# Patient Record
Sex: MALE | Race: WHITE | Employment: OTHER | URBAN - METROPOLITAN AREA
[De-identification: names, ages, dates, MRNs, and addresses within clinical notes are randomized per-mention and may not be internally consistent; named-entity substitution may affect disease eponyms.]

---

## 2018-04-09 ENCOUNTER — OFFICE VISIT (OUTPATIENT)
Dept: CARDIOLOGY CLINIC | Age: 73
End: 2018-04-09
Payer: COMMERCIAL

## 2018-04-09 VITALS
HEIGHT: 68 IN | BODY MASS INDEX: 29.86 KG/M2 | SYSTOLIC BLOOD PRESSURE: 124 MMHG | RESPIRATION RATE: 12 BRPM | WEIGHT: 197 LBS | DIASTOLIC BLOOD PRESSURE: 76 MMHG | HEART RATE: 62 BPM

## 2018-04-09 DIAGNOSIS — I25.10 CORONARY ARTERY DISEASE INVOLVING NATIVE CORONARY ARTERY OF NATIVE HEART WITHOUT ANGINA PECTORIS: Primary | ICD-10-CM

## 2018-04-09 DIAGNOSIS — Z87.2 H/O PSORIATIC ARTHRITIS: ICD-10-CM

## 2018-04-09 DIAGNOSIS — I25.2 MI, OLD: ICD-10-CM

## 2018-04-09 DIAGNOSIS — E78.00 PURE HYPERCHOLESTEROLEMIA: ICD-10-CM

## 2018-04-09 DIAGNOSIS — Z95.5 STENTED CORONARY ARTERY: ICD-10-CM

## 2018-04-09 PROCEDURE — 99213 OFFICE O/P EST LOW 20 MIN: CPT | Performed by: INTERNAL MEDICINE

## 2018-04-09 PROCEDURE — 93000 ELECTROCARDIOGRAM COMPLETE: CPT | Performed by: INTERNAL MEDICINE

## 2018-04-09 RX ORDER — HYDROXYCHLOROQUINE SULFATE 200 MG/1
200 TABLET, FILM COATED ORAL 2 TIMES DAILY
COMMUNITY

## 2018-05-31 ENCOUNTER — HOSPITAL ENCOUNTER (OUTPATIENT)
Dept: SLEEP CENTER | Age: 73
Discharge: HOME | End: 2018-05-31
Payer: COMMERCIAL

## 2018-05-31 PROCEDURE — G0399 HOME SLEEP TEST/TYPE 3 PORTA: HCPCS

## 2018-07-10 ENCOUNTER — HOSPITAL ENCOUNTER (OUTPATIENT)
Age: 73
Discharge: HOME | End: 2018-07-12
Payer: COMMERCIAL

## 2018-07-10 ENCOUNTER — HOSPITAL ENCOUNTER (OUTPATIENT)
Dept: GENERAL RADIOLOGY | Age: 73
Discharge: HOME | End: 2018-07-12
Payer: COMMERCIAL

## 2018-07-10 DIAGNOSIS — R05.9 COUGH: ICD-10-CM

## 2018-07-10 PROCEDURE — 71046 X-RAY EXAM CHEST 2 VIEWS: CPT

## 2018-07-12 RX ORDER — LOSARTAN POTASSIUM 25 MG/1
TABLET ORAL
Qty: 90 TABLET | Refills: 3 | Status: ON HOLD | OUTPATIENT
Start: 2018-07-12 | End: 2018-10-15 | Stop reason: HOSPADM

## 2018-08-30 ENCOUNTER — HOSPITAL ENCOUNTER (OUTPATIENT)
Dept: ULTRASOUND IMAGING | Age: 73
Discharge: HOME | End: 2018-09-01
Payer: COMMERCIAL

## 2018-08-30 ENCOUNTER — HOSPITAL ENCOUNTER (OUTPATIENT)
Age: 73
Discharge: HOME | End: 2018-09-01
Payer: COMMERCIAL

## 2018-08-30 DIAGNOSIS — R60.9 EDEMA, UNSPECIFIED TYPE: ICD-10-CM

## 2018-08-30 PROCEDURE — 93971 EXTREMITY STUDY: CPT

## 2018-09-26 ENCOUNTER — HOSPITAL ENCOUNTER (OUTPATIENT)
Age: 73
Discharge: HOME | End: 2018-09-28
Payer: COMMERCIAL

## 2018-09-26 ENCOUNTER — HOSPITAL ENCOUNTER (OUTPATIENT)
Dept: GENERAL RADIOLOGY | Age: 73
Discharge: HOME | End: 2018-09-28
Payer: COMMERCIAL

## 2018-09-26 DIAGNOSIS — R05.9 COUGH: ICD-10-CM

## 2018-09-26 PROCEDURE — 71046 X-RAY EXAM CHEST 2 VIEWS: CPT

## 2018-10-08 ENCOUNTER — APPOINTMENT (OUTPATIENT)
Dept: GENERAL RADIOLOGY | Age: 73
DRG: 377 | End: 2018-10-08
Payer: COMMERCIAL

## 2018-10-08 ENCOUNTER — HOSPITAL ENCOUNTER (INPATIENT)
Age: 73
LOS: 7 days | Discharge: HOME HEALTH | DRG: 377 | End: 2018-10-15
Attending: EMERGENCY MEDICINE | Admitting: SURGERY
Payer: COMMERCIAL

## 2018-10-08 DIAGNOSIS — R57.1 HYPOVOLEMIC SHOCK (HCC): ICD-10-CM

## 2018-10-08 DIAGNOSIS — D62 ACUTE BLOOD LOSS ANEMIA: ICD-10-CM

## 2018-10-08 DIAGNOSIS — K92.2 ACUTE UPPER GI BLEED: Primary | ICD-10-CM

## 2018-10-08 LAB
ABO/RH: NORMAL
ALBUMIN SERPL-MCNC: 3.1 G/DL (ref 3.5–5.2)
ALP BLD-CCNC: 75 U/L (ref 40–129)
ALT SERPL-CCNC: 18 U/L (ref 0–40)
ANION GAP SERPL CALCULATED.3IONS-SCNC: 12 MMOL/L (ref 7–16)
ANTIBODY SCREEN: NORMAL
APTT: 25.8 SEC (ref 24.5–35.1)
AST SERPL-CCNC: 23 U/L (ref 0–39)
BASOPHILS ABSOLUTE: 0.1 E9/L (ref 0–0.2)
BASOPHILS RELATIVE PERCENT: 0.5 % (ref 0–2)
BILIRUB SERPL-MCNC: 0.6 MG/DL (ref 0–1.2)
BLOOD BANK DISPENSE STATUS: NORMAL
BLOOD BANK PRODUCT CODE: NORMAL
BPU ID: NORMAL
BUN BLDV-MCNC: 11 MG/DL (ref 8–23)
CALCIUM SERPL-MCNC: 8.6 MG/DL (ref 8.6–10.2)
CHLORIDE BLD-SCNC: 102 MMOL/L (ref 98–107)
CO2: 21 MMOL/L (ref 22–29)
CREAT SERPL-MCNC: 0.8 MG/DL (ref 0.7–1.2)
DESCRIPTION BLOOD BANK: NORMAL
EKG ATRIAL RATE: 78 BPM
EKG P AXIS: 27 DEGREES
EKG P-R INTERVAL: 158 MS
EKG Q-T INTERVAL: 448 MS
EKG QRS DURATION: 94 MS
EKG QTC CALCULATION (BAZETT): 510 MS
EKG R AXIS: 6 DEGREES
EKG T AXIS: 6 DEGREES
EKG VENTRICULAR RATE: 78 BPM
EOSINOPHILS ABSOLUTE: 0.1 E9/L (ref 0.05–0.5)
EOSINOPHILS RELATIVE PERCENT: 0.5 % (ref 0–6)
GFR AFRICAN AMERICAN: >60
GFR NON-AFRICAN AMERICAN: >60 ML/MIN/1.73
GLUCOSE BLD-MCNC: 129 MG/DL (ref 74–109)
HCT VFR BLD CALC: 28.5 % (ref 37–54)
HEMOGLOBIN: 15 G/DL (ref 12.5–16.5)
HEMOGLOBIN: 9.6 G/DL (ref 12.5–16.5)
IMMATURE GRANULOCYTES #: 0.22 E9/L
IMMATURE GRANULOCYTES %: 1.2 % (ref 0–5)
INR BLD: 1.2
LYMPHOCYTES ABSOLUTE: 1.63 E9/L (ref 1.5–4)
LYMPHOCYTES RELATIVE PERCENT: 8.9 % (ref 20–42)
MCH RBC QN AUTO: 28.2 PG (ref 26–35)
MCHC RBC AUTO-ENTMCNC: 33.7 % (ref 32–34.5)
MCV RBC AUTO: 83.8 FL (ref 80–99.9)
MONOCYTES ABSOLUTE: 1.4 E9/L (ref 0.1–0.95)
MONOCYTES RELATIVE PERCENT: 7.6 % (ref 2–12)
NEUTROPHILS ABSOLUTE: 14.96 E9/L (ref 1.8–7.3)
NEUTROPHILS RELATIVE PERCENT: 81.3 % (ref 43–80)
PDW BLD-RTO: 19.2 FL (ref 11.5–15)
PLATELET # BLD: 378 E9/L (ref 130–450)
PMV BLD AUTO: 10 FL (ref 7–12)
POTASSIUM SERPL-SCNC: 4.6 MMOL/L (ref 3.5–5)
PROTHROMBIN TIME: 13.4 SEC (ref 9.3–12.4)
RBC # BLD: 3.4 E12/L (ref 3.8–5.8)
SODIUM BLD-SCNC: 135 MMOL/L (ref 132–146)
TOTAL PROTEIN: 5.8 G/DL (ref 6.4–8.3)
TROPONIN: 0.07 NG/ML (ref 0–0.03)
TROPONIN: <0.01 NG/ML (ref 0–0.03)
WBC # BLD: 18.4 E9/L (ref 4.5–11.5)

## 2018-10-08 PROCEDURE — 2000000000 HC ICU R&B

## 2018-10-08 PROCEDURE — 71045 X-RAY EXAM CHEST 1 VIEW: CPT

## 2018-10-08 PROCEDURE — 86900 BLOOD TYPING SEROLOGIC ABO: CPT

## 2018-10-08 PROCEDURE — P9040 RBC LEUKOREDUCED IRRADIATED: HCPCS

## 2018-10-08 PROCEDURE — P9059 PLASMA, FRZ BETWEEN 8-24HOUR: HCPCS

## 2018-10-08 PROCEDURE — 93005 ELECTROCARDIOGRAM TRACING: CPT | Performed by: EMERGENCY MEDICINE

## 2018-10-08 PROCEDURE — 3E0G8GC INTRODUCTION OF OTHER THERAPEUTIC SUBSTANCE INTO UPPER GI, VIA NATURAL OR ARTIFICIAL OPENING ENDOSCOPIC: ICD-10-PCS | Performed by: SURGERY

## 2018-10-08 PROCEDURE — 86923 COMPATIBILITY TEST ELECTRIC: CPT

## 2018-10-08 PROCEDURE — 86850 RBC ANTIBODY SCREEN: CPT

## 2018-10-08 PROCEDURE — 31500 INSERT EMERGENCY AIRWAY: CPT

## 2018-10-08 PROCEDURE — 0W3P8ZZ CONTROL BLEEDING IN GASTROINTESTINAL TRACT, VIA NATURAL OR ARTIFICIAL OPENING ENDOSCOPIC: ICD-10-PCS | Performed by: SURGERY

## 2018-10-08 PROCEDURE — P9016 RBC LEUKOCYTES REDUCED: HCPCS

## 2018-10-08 PROCEDURE — 96365 THER/PROPH/DIAG IV INF INIT: CPT

## 2018-10-08 PROCEDURE — 6360000002 HC RX W HCPCS: Performed by: STUDENT IN AN ORGANIZED HEALTH CARE EDUCATION/TRAINING PROGRAM

## 2018-10-08 PROCEDURE — 85730 THROMBOPLASTIN TIME PARTIAL: CPT

## 2018-10-08 PROCEDURE — 36415 COLL VENOUS BLD VENIPUNCTURE: CPT

## 2018-10-08 PROCEDURE — 36592 COLLECT BLOOD FROM PICC: CPT

## 2018-10-08 PROCEDURE — 2500000003 HC RX 250 WO HCPCS: Performed by: STUDENT IN AN ORGANIZED HEALTH CARE EDUCATION/TRAINING PROGRAM

## 2018-10-08 PROCEDURE — 84484 ASSAY OF TROPONIN QUANT: CPT

## 2018-10-08 PROCEDURE — 6370000000 HC RX 637 (ALT 250 FOR IP): Performed by: STUDENT IN AN ORGANIZED HEALTH CARE EDUCATION/TRAINING PROGRAM

## 2018-10-08 PROCEDURE — 85018 HEMOGLOBIN: CPT

## 2018-10-08 PROCEDURE — P9035 PLATELET PHERES LEUKOREDUCED: HCPCS

## 2018-10-08 PROCEDURE — 2709999900 HC NON-CHARGEABLE SUPPLY: Performed by: SURGERY

## 2018-10-08 PROCEDURE — 02HV33Z INSERTION OF INFUSION DEVICE INTO SUPERIOR VENA CAVA, PERCUTANEOUS APPROACH: ICD-10-PCS | Performed by: EMERGENCY MEDICINE

## 2018-10-08 PROCEDURE — 0BH17EZ INSERTION OF ENDOTRACHEAL AIRWAY INTO TRACHEA, VIA NATURAL OR ARTIFICIAL OPENING: ICD-10-PCS | Performed by: EMERGENCY MEDICINE

## 2018-10-08 PROCEDURE — 2580000003 HC RX 258: Performed by: EMERGENCY MEDICINE

## 2018-10-08 PROCEDURE — 85025 COMPLETE CBC W/AUTO DIFF WBC: CPT

## 2018-10-08 PROCEDURE — 2580000003 HC RX 258: Performed by: INTERNAL MEDICINE

## 2018-10-08 PROCEDURE — 94761 N-INVAS EAR/PLS OXIMETRY MLT: CPT

## 2018-10-08 PROCEDURE — 36430 TRANSFUSION BLD/BLD COMPNT: CPT

## 2018-10-08 PROCEDURE — 99291 CRITICAL CARE FIRST HOUR: CPT

## 2018-10-08 PROCEDURE — 2580000003 HC RX 258: Performed by: STUDENT IN AN ORGANIZED HEALTH CARE EDUCATION/TRAINING PROGRAM

## 2018-10-08 PROCEDURE — 6360000002 HC RX W HCPCS: Performed by: EMERGENCY MEDICINE

## 2018-10-08 PROCEDURE — 5A1945Z RESPIRATORY VENTILATION, 24-96 CONSECUTIVE HOURS: ICD-10-PCS | Performed by: EMERGENCY MEDICINE

## 2018-10-08 PROCEDURE — 86920 COMPATIBILITY TEST SPIN: CPT

## 2018-10-08 PROCEDURE — 2720000010 HC SURG SUPPLY STERILE: Performed by: SURGERY

## 2018-10-08 PROCEDURE — 3609013000 HC EGD TRANSORAL CONTROL BLEEDING ANY METHOD: Performed by: SURGERY

## 2018-10-08 PROCEDURE — C9113 INJ PANTOPRAZOLE SODIUM, VIA: HCPCS | Performed by: EMERGENCY MEDICINE

## 2018-10-08 PROCEDURE — 86901 BLOOD TYPING SEROLOGIC RH(D): CPT

## 2018-10-08 PROCEDURE — 85610 PROTHROMBIN TIME: CPT

## 2018-10-08 PROCEDURE — 80053 COMPREHEN METABOLIC PANEL: CPT

## 2018-10-08 PROCEDURE — 36620 INSERTION CATHETER ARTERY: CPT

## 2018-10-08 PROCEDURE — 30243R1 TRANSFUSION OF NONAUTOLOGOUS PLATELETS INTO CENTRAL VEIN, PERCUTANEOUS APPROACH: ICD-10-PCS | Performed by: EMERGENCY MEDICINE

## 2018-10-08 PROCEDURE — 30243K1 TRANSFUSION OF NONAUTOLOGOUS FROZEN PLASMA INTO CENTRAL VEIN, PERCUTANEOUS APPROACH: ICD-10-PCS | Performed by: EMERGENCY MEDICINE

## 2018-10-08 PROCEDURE — 94002 VENT MGMT INPAT INIT DAY: CPT

## 2018-10-08 PROCEDURE — 96375 TX/PRO/DX INJ NEW DRUG ADDON: CPT

## 2018-10-08 DEVICE — CLIPPING DEVICE
Type: IMPLANTABLE DEVICE | Status: FUNCTIONAL
Brand: RESOLUTION CLIP

## 2018-10-08 RX ORDER — SODIUM CHLORIDE 9 MG/ML
INJECTION, SOLUTION INTRAVENOUS CONTINUOUS
Status: DISCONTINUED | OUTPATIENT
Start: 2018-10-08 | End: 2018-10-10

## 2018-10-08 RX ORDER — ONDANSETRON 2 MG/ML
4 INJECTION INTRAMUSCULAR; INTRAVENOUS ONCE
Status: COMPLETED | OUTPATIENT
Start: 2018-10-08 | End: 2018-10-08

## 2018-10-08 RX ORDER — ROCURONIUM BROMIDE 10 MG/ML
80 INJECTION, SOLUTION INTRAVENOUS ONCE
Status: COMPLETED | OUTPATIENT
Start: 2018-10-08 | End: 2018-10-08

## 2018-10-08 RX ORDER — FOLIC ACID 1 MG/1
1 TABLET ORAL EVERY MORNING
COMMUNITY

## 2018-10-08 RX ORDER — 0.9 % SODIUM CHLORIDE 0.9 %
250 INTRAVENOUS SOLUTION INTRAVENOUS ONCE
Status: COMPLETED | OUTPATIENT
Start: 2018-10-08 | End: 2018-10-08

## 2018-10-08 RX ORDER — CHOLECALCIFEROL (VITAMIN D3) 125 MCG
1 CAPSULE ORAL EVERY MORNING
Status: ON HOLD | COMMUNITY
End: 2018-10-15 | Stop reason: HOSPADM

## 2018-10-08 RX ORDER — OCTREOTIDE ACETATE 50 UG/ML
50 INJECTION, SOLUTION INTRAVENOUS; SUBCUTANEOUS ONCE
Status: COMPLETED | OUTPATIENT
Start: 2018-10-08 | End: 2018-10-08

## 2018-10-08 RX ORDER — ANTACID TABLETS 648 MG/1
1 TABLET, CHEWABLE ORAL 2 TIMES DAILY
Status: ON HOLD | COMMUNITY
End: 2018-10-15 | Stop reason: HOSPADM

## 2018-10-08 RX ORDER — ONDANSETRON 2 MG/ML
INJECTION INTRAMUSCULAR; INTRAVENOUS
Status: DISPENSED
Start: 2018-10-08 | End: 2018-10-08

## 2018-10-08 RX ORDER — CHLORHEXIDINE GLUCONATE 0.12 MG/ML
15 RINSE ORAL 2 TIMES DAILY
Status: DISCONTINUED | OUTPATIENT
Start: 2018-10-08 | End: 2018-10-10

## 2018-10-08 RX ORDER — ETOMIDATE 2 MG/ML
INJECTION INTRAVENOUS
Status: DISPENSED
Start: 2018-10-08 | End: 2018-10-09

## 2018-10-08 RX ORDER — PROPOFOL 10 MG/ML
10 INJECTION, EMULSION INTRAVENOUS
Status: DISCONTINUED | OUTPATIENT
Start: 2018-10-08 | End: 2018-10-10

## 2018-10-08 RX ORDER — 0.9 % SODIUM CHLORIDE 0.9 %
1000 INTRAVENOUS SOLUTION INTRAVENOUS ONCE
Status: COMPLETED | OUTPATIENT
Start: 2018-10-08 | End: 2018-10-08

## 2018-10-08 RX ORDER — ETOMIDATE 2 MG/ML
20 INJECTION INTRAVENOUS ONCE
Status: COMPLETED | OUTPATIENT
Start: 2018-10-08 | End: 2018-10-08

## 2018-10-08 RX ORDER — SODIUM CHLORIDE 0.9 % (FLUSH) 0.9 %
10 SYRINGE (ML) INJECTION PRN
Status: DISCONTINUED | OUTPATIENT
Start: 2018-10-08 | End: 2018-10-15 | Stop reason: HOSPADM

## 2018-10-08 RX ORDER — ROCURONIUM BROMIDE 10 MG/ML
INJECTION, SOLUTION INTRAVENOUS
Status: DISPENSED
Start: 2018-10-08 | End: 2018-10-09

## 2018-10-08 RX ORDER — DOXYCYCLINE HYCLATE 100 MG
1 TABLET ORAL 2 TIMES DAILY
Status: ON HOLD | COMMUNITY
Start: 2018-09-27 | End: 2018-10-15 | Stop reason: HOSPADM

## 2018-10-08 RX ORDER — 0.9 % SODIUM CHLORIDE 0.9 %
1000 INTRAVENOUS SOLUTION INTRAVENOUS ONCE
Status: COMPLETED | OUTPATIENT
Start: 2018-10-08 | End: 2018-10-09

## 2018-10-08 RX ORDER — 0.9 % SODIUM CHLORIDE 0.9 %
250 INTRAVENOUS SOLUTION INTRAVENOUS ONCE
Status: COMPLETED | OUTPATIENT
Start: 2018-10-08 | End: 2018-10-09

## 2018-10-08 RX ADMIN — PROPOFOL 10 MCG/KG/MIN: 10 INJECTION, EMULSION INTRAVENOUS at 17:08

## 2018-10-08 RX ADMIN — SODIUM CHLORIDE 1000 ML: 9 INJECTION, SOLUTION INTRAVENOUS at 09:02

## 2018-10-08 RX ADMIN — CHLORHEXIDINE GLUCONATE 0.12% ORAL RINSE 15 ML: 1.2 LIQUID ORAL at 21:13

## 2018-10-08 RX ADMIN — ROCURONIUM BROMIDE 80 MG: 50 INJECTION, SOLUTION INTRAVENOUS at 14:58

## 2018-10-08 RX ADMIN — SODIUM CHLORIDE 1000 ML: 9 INJECTION, SOLUTION INTRAVENOUS at 22:58

## 2018-10-08 RX ADMIN — SODIUM CHLORIDE 250 ML: 9 INJECTION, SOLUTION INTRAVENOUS at 14:50

## 2018-10-08 RX ADMIN — PROPOFOL 25 MCG/KG/MIN: 10 INJECTION, EMULSION INTRAVENOUS at 22:47

## 2018-10-08 RX ADMIN — ONDANSETRON 4 MG: 2 INJECTION INTRAMUSCULAR; INTRAVENOUS at 10:39

## 2018-10-08 RX ADMIN — PANTOPRAZOLE SODIUM 8 MG/HR: 40 INJECTION, POWDER, FOR SOLUTION INTRAVENOUS at 18:01

## 2018-10-08 RX ADMIN — OCTREOTIDE ACETATE 25 MCG/HR: 500 INJECTION, SOLUTION INTRAVENOUS; SUBCUTANEOUS at 17:19

## 2018-10-08 RX ADMIN — PANTOPRAZOLE SODIUM 8 MG/HR: 40 INJECTION, POWDER, FOR SOLUTION INTRAVENOUS at 09:02

## 2018-10-08 RX ADMIN — CHLORHEXIDINE GLUCONATE 0.12% ORAL RINSE 15 ML: 1.2 LIQUID ORAL at 18:03

## 2018-10-08 RX ADMIN — SODIUM CHLORIDE 80 MG: 9 INJECTION, SOLUTION INTRAVENOUS at 08:26

## 2018-10-08 RX ADMIN — SODIUM CHLORIDE 100 ML/HR: 9 INJECTION, SOLUTION INTRAVENOUS at 21:23

## 2018-10-08 RX ADMIN — SODIUM CHLORIDE 250 ML: 9 INJECTION, SOLUTION INTRAVENOUS at 19:48

## 2018-10-08 RX ADMIN — ETOMIDATE 20 MG: 2 INJECTION INTRAVENOUS at 14:48

## 2018-10-08 RX ADMIN — Medication 25 MCG/HR: at 18:10

## 2018-10-08 RX ADMIN — OCTREOTIDE ACETATE 50 MCG: 50 INJECTION, SOLUTION INTRAVENOUS; SUBCUTANEOUS at 17:12

## 2018-10-08 ASSESSMENT — ENCOUNTER SYMPTOMS
BACK PAIN: 0
CHEST TIGHTNESS: 0
DIARRHEA: 0
NAUSEA: 0
COUGH: 0
COLOR CHANGE: 0
RECTAL PAIN: 0
EYE PAIN: 0
SHORTNESS OF BREATH: 0
RHINORRHEA: 0
ABDOMINAL PAIN: 0
VOMITING: 1
BLOOD IN STOOL: 1
SORE THROAT: 0

## 2018-10-08 ASSESSMENT — PULMONARY FUNCTION TESTS
PIF_VALUE: 21
PIF_VALUE: 19
PIF_VALUE: 20
PIF_VALUE: 21

## 2018-10-08 ASSESSMENT — PAIN SCALES - GENERAL
PAINLEVEL_OUTOF10: 7
PAINLEVEL_OUTOF10: 0

## 2018-10-08 NOTE — PROGRESS NOTES
Epi injection .1mg/1ml x 10cc to bleeding site gastric pouch by Dr. Neva Hamilton. Edlich tube used for irrigation. Resolution clip X2 to bleeding site gastric pouch.

## 2018-10-08 NOTE — H&P
History and Physical      CHIEF COMPLAINT:  GI bleed      HISTORY OF PRESENT ILLNESS:      The patient is a 68 y.o. male patient of Dr Pauly Correa who presents with GI bleed from home. He will call possibly 4 AM this morning at which time he noticed that he was having a frankly bloody bowel movement. He took Pepcid by mouth after which he began to vomit bright red blood. He began to develop dizziness and lightheadedness with standing. Does take aspirin 81 mg daily but is not on any other anticoagulants. He has not had any recent endoscopy and denies any previous history of intestinal bleeding. He denies use of nonsteroidals. He does have history of coronary disease and has had previous intestinal bypass surgery. He denies any chest pain or shortness of breath. He denies any fever or chills. He admits to vague generalized upper abdominal discomfort. His gastric bypass was performed including an approximately 14 years ago.     Past Medical History:    Past Medical History:   Diagnosis Date    CAD (coronary artery disease)     s/p stent RCA    Hypertension     Left ventricular systolic dysfunction     MI (myocardial infarction) ([facility] [address] 1997    Mitral regurgitation     Obesity, morbid ([address]     s/p intestinal bypass    Sleep apnea     Valvular heart disease        Past Surgical History:    Past Surgical History:   Procedure Laterality Date    APPENDECTOMY      BACK SURGERY      CARPAL TUNNEL RELEASE Bilateral     CORONARY ANGIOPLASTY  7/7/97    PTCA/stent of the RCA     DIAGNOSTIC CARDIAC CATH LAB PROCEDURE  2/24/97    DIAGNOSTIC CARDIAC CATH LAB PROCEDURE  7/7/97    EYE SURGERY Bilateral     cataract removal w lense implants    FRACTURE SURGERY      Clayton in left femur    HIP SURGERY  2014    fx, clayton placed    INTESTINAL BYPASS      JOINT REPLACEMENT      TONSILLECTOMY         Medications Prior to Admission:    Prescriptions Prior to Admission: methotrexate (RHEUMATREX) 2.5 MG chemo tablet, Take 10 mg by [id number]    Dispense Status Blood Bank selected   PREPARE RBC (CROSSMATCH) Number of Units: 6, 6 Units [[id number]] Collected: 10/08/18 0750   Updated: 10/08/18 1622    Specimen Source: Blood     Product Code Blood Bank [id number]    Description Blood Bank Red Blood Cells, Irradiated, Leuko-reduced    Unit Number [id number]    Dispense Status Blood Bank released    Product Code Blood Bank N1839M21    Description Blood Bank Red Blood Cells, Irradiated, Leuko-reduced    Unit Number [id number]    Dispense Status Blood Bank issued    Product Code Blood Bank Z1282K98    Description Blood Bank Red Blood Cells, Leuko-reduced    Unit Number [id number]    Dispense Status Blood Bank issued    Product Code Blood Bank N8851T12    Description Blood Bank Red Blood Cells, Leuko-reduced    Unit Number [id number]    Dispense Status Blood Bank issued    Product Code Blood Bank M9777W86    Description Blood Bank Red Blood Cells, Apheresis, Leuko-reduced    Unit Number [id number]    Dispense Status Blood Bank selected    Product Code Blood Bank G4297U77    Description Blood Bank Red Blood Cells, Apheresis, Leuko-reduced    Unit Number [id number]    Dispense Status Blood Bank selected    Product Code Blood Bank V0225C95    Description Blood Bank Red Blood Cells, Apheresis, Leuko-reduced    Unit Number [id number]    Dispense Status Blood Bank selected    Product Code Blood Bank G9158F57    Description Blood Bank Red Blood Cells, Leuko-reduced    Unit Number [id number]    Dispense Status Blood Bank selected    Product Code Blood Bank C8905B07    Description Blood Bank Red Blood Cells, Apheresis, Leuko-reduced    Unit Number [id number]    Dispense Status Blood Bank issued    Product Code Blood Bank P3666U70    Description Blood Bank Red Blood Cells, Apheresis, Leuko-reduced    Unit Number [id number]    [facility] Status Blood Bank released   PREPARE FRESH FROZEN PLASMA Number of Units: 4 [[id number]] Collected: 10/08/18 0750   Updated: 10/08/18 1433    Specimen Type: Blood    Specimen Source: Blood     Product Code Blood Bank G1360K83    Description Blood Bank Plasma, 5 Day, Thawed    Unit Number [id number]    Dispense Status Blood Bank issued    Product Code Blood Bank K7355Q51    Description Blood Bank Plasma, 5 Day, Thawed    Unit Number [id number]    Dispense Status Blood Bank issued    Product Code Blood Bank V5148B96    Description Blood Bank Plasma, 5 Day, Thawed    Unit Number [id number]    Dispense Status Blood Bank issued   Transfuse RBC [[id number]] Resulted: 10/08/18 1315   Updated: 10/08/18 1315    Transfuse RBC [[id number]] Resulted: 10/08/18 1310   Updated: 10/08/18 1310    Transfuse RBC [[id number]] Resulted: 10/08/18 1307   Updated: 10/08/18 1307    PREPARE RBC (CROSSMATCH) Number of Units: 2, 4 Units [[id number]] Collected: 10/08/18 0750   Updated: 10/08/18 1301    Specimen Source: Blood     Product Code Blood Bank I5599A20    Description Blood Bank Red Blood Cells, Leuko-reduced    Unit Number [id number]    Dispense Status Blood Bank issued    Product Code Blood Bank R2468Z43    Description Blood Bank Red Blood Cells, Irradiated, Leuko-reduced    Unit Number [id number]    Dispense Status Blood Bank issued    Product Code Blood Bank T1745N92    Description Blood Bank Red Blood Cells, Leuko-reduced    Unit Number [id number]    Dispense Status Blood Bank issued    Product Code Blood Bank S5356K89    Description Blood Bank Red Blood Cells, Leuko-reduced    Unit Number [id number]    Dispense Status Blood Bank issued    Product Code Blood Bank I7881S47    Description Blood Bank Red Blood Cells, Leuko-reduced    Unit Number [id number]    Dispense Status Blood Bank issued    Product Code Blood Bank R3775R45    Description Blood Bank Red Blood Cells, Leuko-reduced    Unit Number [id number]    Dispense Status Blood Bank issued    Product Code Blood Bank X5899B36    Description Blood Bank Red

## 2018-10-08 NOTE — CONSULTS
GENERAL SURGERY  CONSULT NOTE  10/8/2018    Physician Consulted: Dr. Chapo Lazaro  Reason for Consult: GIB      HPI  Allison Mratinez is a 68 y.o. male w/ a Hx of gastric bypass who presents for evaluation of 1 day of hematemesis and hematochezia. He states he had a gastric bypass done at [address] [place] [address] about 14 years ago. He takes 81mg ASA daily. He denies any Hx of GIB. States his last Colonoscopy was 2 years ago w/ Dr. Migel Colon and was nml. He denies any prior EGD. He denies any Hx of acid reflux or GERD. Past Medical History:   Diagnosis Date    CAD (coronary artery disease)     s/p stent RCA    Hypertension     Left ventricular systolic dysfunction     MI (myocardial infarction) ([facility] [address] 1997    Mitral regurgitation     Obesity, morbid ([facility] [address]     s/p intestinal bypass    Sleep apnea     Valvular heart disease        Past Surgical History:   Procedure Laterality Date    APPENDECTOMY      BACK SURGERY      CARPAL TUNNEL RELEASE Bilateral     CORONARY ANGIOPLASTY  7/7/97    PTCA/stent of the RCA     DIAGNOSTIC CARDIAC CATH LAB PROCEDURE  2/24/97    DIAGNOSTIC CARDIAC CATH LAB PROCEDURE  7/7/97    EYE SURGERY Bilateral     cataract removal w lense implants    FRACTURE SURGERY      Clayton in left femur    HIP SURGERY  2014    fx, clayton placed    INTESTINAL BYPASS      TONSILLECTOMY         Medications Prior to Admission:    Prior to Admission medications    Medication Sig Start Date End Date Taking?  Authorizing Provider   methotrexate (RHEUMATREX) 2.5 MG chemo tablet Take 10 mg by mouth once a week    Yes Historical Provider, MD   folic acid (FOLVITE) 1 MG tablet Take 1 mg by mouth every morning    Yes Historical Provider, MD   doxycycline hyclate (VIBRA-TABS) 100 MG tablet Take 1 tablet by mouth 2 times daily 9/27/18 10/11/18 Yes Historical Provider, MD   calcium carbonate 648 MG TABS Take 1 tablet by mouth 2 times daily   Yes Historical Provider, MD   Omega-3 Fatty Acids (FISH OIL TRIPLE STRENGTH) 1360 MG CAPS Take Father     Other Sister         back problems    Heart Disease Brother     Other Sister         MS    Other Brother         aneurysm       Social History   Substance Use Topics    Smoking status: Former Smoker     Packs/day: 0.50     Types: Cigarettes     Start date: 10/8/1972     Quit date: 1/1/2001    Smokeless tobacco: Never Used    Alcohol use 16.8 oz/week     28 Glasses of wine per week         Review of Systems   General ROS: fever (-), chills (-)  Respiratory ROS: SOB (-)  Cardiovascular ROS: CP (-)  Gastrointestinal ROS: Nausea (-), vomiting (-), diarrhea (-), constipation (-), melena (-), hematochezia (+)        PHYSICAL EXAM:    Vitals:    10/08/18 1138   BP: (!) 79/46   Pulse: 96   Resp: 20   Temp: 97.6 °F (36.4 °C)   SpO2:        General Appearance:  Alert, awake, cooperative, lying in bed   Lungs:  Normal work of breathing   Heart:  Regular rate   Abdomen:  Soft, no tenderness, non distended. NGT present with minimal bloody output      LABS:  CBC  Recent Labs      10/08/18   0807   WBC  18.4*   HGB  9.6*   HCT  28.5*   PLT  378     BMP  Recent Labs      10/08/18   0807   NA  135   K  4.6   CL  102   CO2  21*   BUN  11   CREATININE  0.8   CALCIUM  8.6     Liver Function  Recent Labs      10/08/18   0807   BILITOT  0.6   AST  23   ALT  18   ALKPHOS  75   PROT  5.8*   LABALBU  3.1*     No results for input(s): LACTATE in the last 72 hours. Recent Labs      10/08/18   0807   INR  1.2       RADIOLOGY  No results found.       ASSESSMENT:  68 y.o. male with GIB likely a marginal ulcer    PLAN:  PPI drip  NPO  IVF  Monitor Hg  Transfuse as needed  Plan for bedside EGD today    Electronically signed by Rg Torres MD on 10/8/18 at 11:54 AM

## 2018-10-08 NOTE — PROCEDURES
Sidneyhaleigh Carter      DATE OF PROCEDURE: 10/8/2018    SURGEON: Dr. Taylor Pean:    EGD with control of hemorrhage        Physician Signature: Electronically signed by Dr. Jude Olivera M.D.     Full dictation to follow  Findings: complicated marginal ulcer with active hemorrhage  Hemostasis with epinephrine and clips  JSGaJenelle

## 2018-10-08 NOTE — PROGRESS NOTES
Critical Care Team: Daily Progress Note         Date and time: 10/8/2018 4:22 PM    Patient's name:  Francia Pereyra    Medical Record Number: [id number]    Patient's account/billing number: [de-identified]    Patient's YOB: 1945    Age: 68 y.o. Date of Admission: 10/8/2018  7:29 AM    Length of stay during current admission: 0    Primary Care Physician: Leif Johnson MD    Previous Pulmonary: Shazia Villagomez (last seen 7/2018 for BETTIE)    Code Status: No Order    PMH:    Past Medical History:   Diagnosis Date    CAD (coronary artery disease)     s/p stent RCA    Hypertension     Left ventricular systolic dysfunction     MI (myocardial infarction) ([facility] [address] 1997    Mitral regurgitation     Obesity, morbid ([address]     s/p intestinal bypass    Sleep apnea     Valvular heart disease        PSH:   Past Surgical History:   Procedure Laterality Date    APPENDECTOMY      BACK SURGERY      CARPAL TUNNEL RELEASE Bilateral     CORONARY ANGIOPLASTY  7/7/97    PTCA/stent of the RCA     DIAGNOSTIC CARDIAC CATH LAB PROCEDURE  2/24/97    DIAGNOSTIC CARDIAC CATH LAB PROCEDURE  7/7/97    EYE SURGERY Bilateral     cataract removal w lense implants    FRACTURE SURGERY      Clayton in left femur    HIP SURGERY  2014    fx, clayton placed    INTESTINAL BYPASS      JOINT REPLACEMENT      TONSILLECTOMY         Reason for ICU admission:   1. Upper GI bleed      Subjective:     Events in the past 24 Hours:   3 81-year-old white male post gastric bypass surgery presented to the emergency room with hemoptysis. Intubated and ventilated for airway protection, surgical consultation was obtained urgently. No arterial line was placed. The patient was directly admitted to the ICU. Fresh frozen plasma and blood products are being transfused. In the ICU, endoscopy revealed acute blood loss occurring just distal to the stomach prior to the bypass.  Currently, the patient's been taken downstairs for

## 2018-10-09 ENCOUNTER — APPOINTMENT (OUTPATIENT)
Dept: GENERAL RADIOLOGY | Age: 73
DRG: 377 | End: 2018-10-09
Payer: COMMERCIAL

## 2018-10-09 LAB
AADO2: 157 MMHG
ALBUMIN SERPL-MCNC: 2.2 G/DL (ref 3.5–5.2)
ALP BLD-CCNC: 42 U/L (ref 40–129)
ALT SERPL-CCNC: 48 U/L (ref 0–40)
ANION GAP SERPL CALCULATED.3IONS-SCNC: 8 MMOL/L (ref 7–16)
ANISOCYTOSIS: ABNORMAL
AST SERPL-CCNC: 55 U/L (ref 0–39)
B.E.: -5.7 MMOL/L (ref -3–3)
BASOPHILS ABSOLUTE: 0.08 E9/L (ref 0–0.2)
BASOPHILS RELATIVE PERCENT: 0.4 % (ref 0–2)
BILIRUB SERPL-MCNC: 0.7 MG/DL (ref 0–1.2)
BLOOD BANK DISPENSE STATUS: NORMAL
BLOOD BANK PRODUCT CODE: NORMAL
BPU ID: NORMAL
BUN BLDV-MCNC: 16 MG/DL (ref 8–23)
BURR CELLS: ABNORMAL
CALCIUM SERPL-MCNC: 6.9 MG/DL (ref 8.6–10.2)
CHLORIDE BLD-SCNC: 111 MMOL/L (ref 98–107)
CO2: 20 MMOL/L (ref 22–29)
COHB: 0.6 % (ref 0–1.5)
CREAT SERPL-MCNC: 1.4 MG/DL (ref 0.7–1.2)
CRITICAL: ABNORMAL
DATE ANALYZED: ABNORMAL
DATE OF COLLECTION: ABNORMAL
DESCRIPTION BLOOD BANK: NORMAL
EOSINOPHILS ABSOLUTE: 0.04 E9/L (ref 0.05–0.5)
EOSINOPHILS RELATIVE PERCENT: 0.2 % (ref 0–6)
FIO2: 50 %
GFR AFRICAN AMERICAN: >60
GFR NON-AFRICAN AMERICAN: 50 ML/MIN/1.73
GLUCOSE BLD-MCNC: 128 MG/DL (ref 74–109)
HCO3: 19 MMOL/L (ref 22–26)
HCT VFR BLD CALC: 33 % (ref 37–54)
HEMOGLOBIN: 10.3 G/DL (ref 12.5–16.5)
HEMOGLOBIN: 10.5 G/DL (ref 12.5–16.5)
HEMOGLOBIN: 11.3 G/DL (ref 12.5–16.5)
HEMOGLOBIN: 11.6 G/DL (ref 12.5–16.5)
HEMOGLOBIN: 12.1 G/DL (ref 12.5–16.5)
HHB: 1.3 % (ref 0–5)
IMMATURE GRANULOCYTES #: 0.17 E9/L
IMMATURE GRANULOCYTES %: 0.9 % (ref 0–5)
LAB: ABNORMAL
LV EF: 38 %
LVEF MODALITY: NORMAL
LYMPHOCYTES ABSOLUTE: 1.18 E9/L (ref 1.5–4)
LYMPHOCYTES RELATIVE PERCENT: 6.3 % (ref 20–42)
Lab: ABNORMAL
MCH RBC QN AUTO: 30.5 PG (ref 26–35)
MCHC RBC AUTO-ENTMCNC: 34.2 % (ref 32–34.5)
MCV RBC AUTO: 88.9 FL (ref 80–99.9)
METHB: 0.4 % (ref 0–1.5)
MODE: AC
MONOCYTES ABSOLUTE: 2.51 E9/L (ref 0.1–0.95)
MONOCYTES RELATIVE PERCENT: 13.4 % (ref 2–12)
NEUTROPHILS ABSOLUTE: 14.81 E9/L (ref 1.8–7.3)
NEUTROPHILS RELATIVE PERCENT: 78.8 % (ref 43–80)
O2 CONTENT: 17 ML/DL
O2 SATURATION: 98.7 % (ref 92–98.5)
O2HB: 97.7 % (ref 94–97)
OPERATOR ID: ABNORMAL
OVALOCYTES: ABNORMAL
PATIENT TEMP: 37 C
PCO2: 34.5 MMHG (ref 35–45)
PDW BLD-RTO: 15.3 FL (ref 11.5–15)
PEEP/CPAP: 5 CMH2O
PFO2: 2.96 MMHG/%
PH BLOOD GAS: 7.36 (ref 7.35–7.45)
PLATELET # BLD: 97 E9/L (ref 130–450)
PLATELET CONFIRMATION: NORMAL
PMV BLD AUTO: 10.6 FL (ref 7–12)
PO2: 148.2 MMHG (ref 60–100)
POIKILOCYTES: ABNORMAL
POTASSIUM SERPL-SCNC: 5.4 MMOL/L (ref 3.5–5)
RBC # BLD: 3.71 E12/L (ref 3.8–5.8)
RI(T): 106 %
RR MECHANICAL: 14 B/MIN
SODIUM BLD-SCNC: 139 MMOL/L (ref 132–146)
SOURCE, BLOOD GAS: ABNORMAL
THB: 12.2 G/DL (ref 11.5–16.5)
TIME ANALYZED: 547
TOTAL PROTEIN: 3.8 G/DL (ref 6.4–8.3)
TROPONIN: 0.09 NG/ML (ref 0–0.03)
TROPONIN: 0.13 NG/ML (ref 0–0.03)
TROPONIN: 0.14 NG/ML (ref 0–0.03)
VT MECHANICAL: 500 ML
WBC # BLD: 18.8 E9/L (ref 4.5–11.5)

## 2018-10-09 PROCEDURE — C9113 INJ PANTOPRAZOLE SODIUM, VIA: HCPCS | Performed by: EMERGENCY MEDICINE

## 2018-10-09 PROCEDURE — 2500000003 HC RX 250 WO HCPCS: Performed by: STUDENT IN AN ORGANIZED HEALTH CARE EDUCATION/TRAINING PROGRAM

## 2018-10-09 PROCEDURE — 2000000000 HC ICU R&B

## 2018-10-09 PROCEDURE — 94003 VENT MGMT INPAT SUBQ DAY: CPT

## 2018-10-09 PROCEDURE — 86923 COMPATIBILITY TEST ELECTRIC: CPT

## 2018-10-09 PROCEDURE — 82805 BLOOD GASES W/O2 SATURATION: CPT

## 2018-10-09 PROCEDURE — 6360000002 HC RX W HCPCS: Performed by: EMERGENCY MEDICINE

## 2018-10-09 PROCEDURE — 2580000003 HC RX 258: Performed by: INTERNAL MEDICINE

## 2018-10-09 PROCEDURE — 84484 ASSAY OF TROPONIN QUANT: CPT

## 2018-10-09 PROCEDURE — 6370000000 HC RX 637 (ALT 250 FOR IP): Performed by: STUDENT IN AN ORGANIZED HEALTH CARE EDUCATION/TRAINING PROGRAM

## 2018-10-09 PROCEDURE — 2500000003 HC RX 250 WO HCPCS: Performed by: INTERNAL MEDICINE

## 2018-10-09 PROCEDURE — 36415 COLL VENOUS BLD VENIPUNCTURE: CPT

## 2018-10-09 PROCEDURE — 85018 HEMOGLOBIN: CPT

## 2018-10-09 PROCEDURE — 36600 WITHDRAWAL OF ARTERIAL BLOOD: CPT

## 2018-10-09 PROCEDURE — 93306 TTE W/DOPPLER COMPLETE: CPT

## 2018-10-09 PROCEDURE — 87081 CULTURE SCREEN ONLY: CPT

## 2018-10-09 PROCEDURE — 36592 COLLECT BLOOD FROM PICC: CPT

## 2018-10-09 PROCEDURE — 2580000003 HC RX 258: Performed by: EMERGENCY MEDICINE

## 2018-10-09 PROCEDURE — 2580000003 HC RX 258: Performed by: STUDENT IN AN ORGANIZED HEALTH CARE EDUCATION/TRAINING PROGRAM

## 2018-10-09 PROCEDURE — 80053 COMPREHEN METABOLIC PANEL: CPT

## 2018-10-09 PROCEDURE — 85025 COMPLETE CBC W/AUTO DIFF WBC: CPT

## 2018-10-09 PROCEDURE — APPSS180 APP SPLIT SHARED TIME > 60 MINUTES: Performed by: NURSE PRACTITIONER

## 2018-10-09 PROCEDURE — 99223 1ST HOSP IP/OBS HIGH 75: CPT | Performed by: INTERNAL MEDICINE

## 2018-10-09 PROCEDURE — 6360000002 HC RX W HCPCS

## 2018-10-09 PROCEDURE — 71045 X-RAY EXAM CHEST 1 VIEW: CPT

## 2018-10-09 PROCEDURE — 2700000000 HC OXYGEN THERAPY PER DAY

## 2018-10-09 PROCEDURE — 6360000002 HC RX W HCPCS: Performed by: STUDENT IN AN ORGANIZED HEALTH CARE EDUCATION/TRAINING PROGRAM

## 2018-10-09 RX ORDER — METOPROLOL TARTRATE 5 MG/5ML
5 INJECTION INTRAVENOUS EVERY 6 HOURS
Status: DISCONTINUED | OUTPATIENT
Start: 2018-10-09 | End: 2018-10-10

## 2018-10-09 RX ORDER — HYDRALAZINE HYDROCHLORIDE 20 MG/ML
10 INJECTION INTRAMUSCULAR; INTRAVENOUS EVERY 6 HOURS PRN
Status: DISCONTINUED | OUTPATIENT
Start: 2018-10-09 | End: 2018-10-15 | Stop reason: HOSPADM

## 2018-10-09 RX ORDER — HYDRALAZINE HYDROCHLORIDE 20 MG/ML
INJECTION INTRAMUSCULAR; INTRAVENOUS
Status: COMPLETED
Start: 2018-10-09 | End: 2018-10-09

## 2018-10-09 RX ADMIN — PROPOFOL 20 MCG/KG/MIN: 10 INJECTION, EMULSION INTRAVENOUS at 05:49

## 2018-10-09 RX ADMIN — PANTOPRAZOLE SODIUM 8 MG/HR: 40 INJECTION, POWDER, FOR SOLUTION INTRAVENOUS at 03:20

## 2018-10-09 RX ADMIN — METOPROLOL TARTRATE 5 MG: 5 INJECTION, SOLUTION INTRAVENOUS at 22:00

## 2018-10-09 RX ADMIN — PANTOPRAZOLE SODIUM 8 MG/HR: 40 INJECTION, POWDER, FOR SOLUTION INTRAVENOUS at 15:40

## 2018-10-09 RX ADMIN — SODIUM CHLORIDE: 9 INJECTION, SOLUTION INTRAVENOUS at 07:43

## 2018-10-09 RX ADMIN — HYDRALAZINE HYDROCHLORIDE 10 MG: 20 INJECTION INTRAMUSCULAR; INTRAVENOUS at 15:31

## 2018-10-09 RX ADMIN — PANTOPRAZOLE SODIUM 8 MG/HR: 40 INJECTION, POWDER, FOR SOLUTION INTRAVENOUS at 22:00

## 2018-10-09 RX ADMIN — PROPOFOL 20 MCG/KG/MIN: 10 INJECTION, EMULSION INTRAVENOUS at 09:55

## 2018-10-09 RX ADMIN — METOPROLOL TARTRATE 5 MG: 5 INJECTION, SOLUTION INTRAVENOUS at 17:30

## 2018-10-09 RX ADMIN — CHLORHEXIDINE GLUCONATE 0.12% ORAL RINSE 15 ML: 1.2 LIQUID ORAL at 20:47

## 2018-10-09 RX ADMIN — SODIUM CHLORIDE: 9 INJECTION, SOLUTION INTRAVENOUS at 22:00

## 2018-10-09 RX ADMIN — Medication 75 MCG/HR: at 09:55

## 2018-10-09 RX ADMIN — METOPROLOL TARTRATE 5 MG: 5 INJECTION, SOLUTION INTRAVENOUS at 12:48

## 2018-10-09 RX ADMIN — SODIUM CHLORIDE: 9 INJECTION, SOLUTION INTRAVENOUS at 18:17

## 2018-10-09 RX ADMIN — CHLORHEXIDINE GLUCONATE 0.12% ORAL RINSE 15 ML: 1.2 LIQUID ORAL at 09:55

## 2018-10-09 RX ADMIN — Medication 75 MCG/HR: at 02:30

## 2018-10-09 RX ADMIN — OCTREOTIDE ACETATE 25 MCG/HR: 500 INJECTION, SOLUTION INTRAVENOUS; SUBCUTANEOUS at 05:49

## 2018-10-09 ASSESSMENT — PAIN SCALES - GENERAL
PAINLEVEL_OUTOF10: 0

## 2018-10-09 ASSESSMENT — PULMONARY FUNCTION TESTS
PIF_VALUE: 15
PIF_VALUE: 13
PIF_VALUE: 20
PIF_VALUE: 13
PIF_VALUE: 21
PIF_VALUE: 20
PIF_VALUE: 21
PIF_VALUE: 11
PIF_VALUE: 25

## 2018-10-09 NOTE — PLAN OF CARE
Problem: Restraint Use - Nonviolent/Non-Self-Destructive Behavior:  Goal: Absence of restraint indications  Absence of restraint indications   Outcome: Met This Shift      Problem: Falls - Risk of:  Goal: Absence of physical injury  Absence of physical injury   Outcome: Met This Shift      Problem: Risk for Impaired Skin Integrity  Goal: Tissue integrity - skin and mucous membranes  Structural intactness and normal physiological function of skin and  mucous membranes.    Outcome: Met This Shift

## 2018-10-09 NOTE — PROGRESS NOTES
Subjective: Intubated and sedated    Objective:    BP (!) 107/57   Pulse 84   Temp 99.1 °F (37.3 °C) (Axillary)   Resp 20   Ht 5' 7\" (1.702 m)   Wt 214 lb 15.2 oz (97.5 kg)   SpO2 100%   BMI 33.67 kg/m²   Skin: Warm and dry  Neck: Supple, no JVD  Heart:  RRR, no murmurs, gallops, or rubs. Lungs:  Diminished breath sounds bilaterally, no wheeze, rales or rhonchi  Abd: bowel sounds present, nontender, nondistended, no masses  Extrem:  No clubbing, cyanosis, or edema, pulses intact    I/O last 3 completed shifts:   In: 9231 [I.V.:2769; Blood:4228]  Out: 275 [Urine:275]    Laboratory:     CBC with Differential:    Lab Results   Component Value Date    WBC 18.8 10/09/2018    RBC 3.71 10/09/2018    HGB 10.5 10/09/2018    HCT 33.0 10/09/2018    PLT 97 10/09/2018    MCV 88.9 10/09/2018    MCH 30.5 10/09/2018    MCHC 34.2 10/09/2018    RDW 15.3 10/09/2018    LYMPHOPCT 6.3 10/09/2018    MONOPCT 13.4 10/09/2018    BASOPCT 0.4 10/09/2018    MONOSABS 2.51 10/09/2018    LYMPHSABS 1.18 10/09/2018    EOSABS 0.04 10/09/2018    BASOSABS 0.08 10/09/2018     CMP:    Lab Results   Component Value Date     10/09/2018    K 5.4 10/09/2018     10/09/2018    CO2 20 10/09/2018    BUN 16 10/09/2018    CREATININE 1.4 10/09/2018    GFRAA >60 10/09/2018    LABGLOM 50 10/09/2018    GLUCOSE 128 10/09/2018    PROT 3.8 10/09/2018    LABALBU 2.2 10/09/2018    CALCIUM 6.9 10/09/2018    BILITOT 0.7 10/09/2018    ALKPHOS 42 10/09/2018    AST 55 10/09/2018    ALT 48 10/09/2018     Hepatic Function Panel:    Lab Results   Component Value Date    ALKPHOS 42 10/09/2018    ALT 48 10/09/2018    AST 55 10/09/2018    PROT 3.8 10/09/2018    BILITOT 0.7 10/09/2018    LABALBU 2.2 10/09/2018      Results      Component Value Units   Troponin [[id number]] (Abnormal) Collected: 10/09/18 1240   Updated: 10/09/18 1310    Specimen Source: Blood     Troponin 0.14 (H) ng/mL    Comment: TROPONIN T BLOOD LEVELS:          0.03 ng/mL     Upper

## 2018-10-09 NOTE — PROGRESS NOTES
bleed  2. Hx of BETTIE        Plan:     Wean per protocol:  [x] No   [] Yes  [] N/A    ICU Prophylaxis:  Stress ulcer:  [x] PPI Agent  [] P7Pwalo [] Sucralfate  [] Other:  VTE:   [] Enoxaparin  [] Unfract. Heparin Subcut  [x] EPC Cuffs    Nutrition:  [x] NPO [] Tube Feeding (Specify: ) [] TPN  [] PO (Diet:  )    Home medications reconciled: [] No  [x] Yes    Insulin drip:   [x] No   [] Yes    Family updated:    [] No   [x] Yes    Transfer Candidate?:   [x] No   [] Yes    Additional plans:  1. Wean vent as tolerated leading to t-piece and extubation  2. Follow H&H and labs  3. Vigilance for transfusion related acute lung injury          Chart review/lab review/X-ray viewing/documentation: 10 minutes  Assessment: 10 minutes  Conversation patient/family re: prognosis, care options and any end of life issues: 20 minutes  Conversation with staff: 10 minutes  RAMA Manzanares KansasRaoul ROCHE

## 2018-10-09 NOTE — CONSULTS
[facility] [employer]  53. IV Dye allergy: \"passed out\"  12. Lexiscan MPS 3/21/2017: EF 30%, with dyskinesis of inferior wall. Non ischemic. 13. TTE 3/30/2017 Dr Kris Nunez: Left ventricle is mildly enlarged. Inferior wall hypokinesis. There is doppler evidence of stage II diastolic dysfunction. EF visually estimated at 45 to 50%.  Mild tricuspid regurgitation. RVSP is 48 mmHg. Norvasc stopped and Cozaar 25 mg daily added  14. EGD 10/8/2018: No evidence of varices to explain the hemorrhage. The gastric pouch was entered. The anastomosis between the stomach and the Scar limb was somewhat angled, but I was able to note a visible vessel that was not actively bleeding right on the pouch side of the anastomosis s/p Resolution Clip on this visible vessel. Continued bleeding coming from the anastomosis. Right at the anastomosis was a very large marginal ulcer with active bleeding. Injection epinephrine into the ulcer, which was able to slow the bleeding down somewhat, but there was still active hemorrhage and pulsatile bleeding that could be visualized. I then inserted another Resolution Clip and was able to fire the clip. Just below the vessel, the angle was quite severe and difficult in order to get the clip directly on the blood vessel; there was some improvement. Placed  second        Resolution Clip above the first one. 15. Bilateral carpal tunnel surgery, R hip fracture s/p saumya placement 2014, tonsillectomy, bilateral cataract surgery, appendectomy, broken back s/p surgery  16. Placed on Plaquenil on 7/2018  17. Chronic back/neck pain s/p cervical injection 8/2018  18. Psoriatic Arthritis (follows at [facility]) last seen 8/2018 placed on Prednisone dose back along with Methotrexate and Folic Acid      Medications Prior to admit:  Prior to Admission medications    Medication Sig Start Date End Date Taking?  Authorizing Provider   methotrexate (RHEUMATREX) 2.5 MG chemo tablet Take 10 mg by mouth once a week    Yes Historical Provider, MD   folic acid (FOLVITE) 1 MG tablet Take 1 mg by mouth every morning    Yes Historical Provider, MD   doxycycline hyclate (VIBRA-TABS) 100 MG tablet Take 1 tablet by mouth 2 times daily 9/27/18 10/11/18 Yes Historical Provider, MD   calcium carbonate 648 MG TABS Take 1 tablet by mouth 2 times daily   Yes Historical Provider, MD   Omega-3 Fatty Acids (FISH OIL TRIPLE STRENGTH) 1360 MG CAPS Take 1 capsule by mouth every morning   Yes Historical Provider, MD   losartan (COZAAR) 25 MG tablet TAKE ONE TABLET BY MOUTH EVERY DAY  Patient taking differently: TAKE ONE TABLET BY MOUTH EVERY MORNING 7/12/18  Yes Mike Murillo MD   hydroxychloroquine (PLAQUENIL) 200 MG tablet Take 200 mg by mouth 2 times daily    Yes Historical Provider, MD   DULoxetine (CYMBALTA) 60 MG extended release capsule Take 60 mg by mouth every morning    Yes Historical Provider, MD   cetirizine (ZYRTEC) 10 MG tablet Take 10 mg by mouth every evening    Yes Historical Provider, MD   oxaprozin (DAYPRO) 600 MG tablet Take 600 mg by mouth 2 times daily    Yes Historical Provider, MD   Multiple Vitamins-Minerals (MULTIVITAMIN ADULT PO) Take 1 tablet by mouth 2 times daily    Yes Historical Provider, MD   Cholecalciferol (VITAMIN D3) 5000 UNITS TABS Take 1 tablet by mouth every morning    Yes Historical Provider, MD   Cranberry 500 MG CAPS Take 500 mg by mouth every morning    Yes Historical Provider, MD   gabapentin (NEURONTIN) 300 MG capsule Take 600 mg by mouth 3 times daily. No taking.  9/16/14  Yes Historical Provider, MD   tamsulosin (FLOMAX) 0.4 MG capsule Take 0.4 mg by mouth 2 times daily  8/26/14  Yes Historical Provider, MD   aspirin 81 MG EC tablet Take 81 mg by mouth every evening    Yes Historical Provider, MD   famotidine (PEPCID) 20 MG tablet Take 20 mg by mouth every evening    Yes Historical Provider, MD   atenolol (TENORMIN) 25 MG tablet Take 25 mg by mouth every morning    Yes Historical Provider, MD   RESTASIS 0.05

## 2018-10-10 ENCOUNTER — APPOINTMENT (OUTPATIENT)
Dept: GENERAL RADIOLOGY | Age: 73
DRG: 377 | End: 2018-10-10
Payer: COMMERCIAL

## 2018-10-10 LAB
ALBUMIN SERPL-MCNC: 2.2 G/DL (ref 3.5–5.2)
ALP BLD-CCNC: 48 U/L (ref 40–129)
ALT SERPL-CCNC: 29 U/L (ref 0–40)
ANION GAP SERPL CALCULATED.3IONS-SCNC: 7 MMOL/L (ref 7–16)
ANISOCYTOSIS: ABNORMAL
AST SERPL-CCNC: 24 U/L (ref 0–39)
BASOPHILS ABSOLUTE: 0.09 E9/L (ref 0–0.2)
BASOPHILS RELATIVE PERCENT: 0.4 % (ref 0–2)
BILIRUB SERPL-MCNC: 0.8 MG/DL (ref 0–1.2)
BUN BLDV-MCNC: 11 MG/DL (ref 8–23)
CALCIUM SERPL-MCNC: 7.1 MG/DL (ref 8.6–10.2)
CHLORIDE BLD-SCNC: 109 MMOL/L (ref 98–107)
CHOLESTEROL, TOTAL: 82 MG/DL (ref 0–199)
CO2: 20 MMOL/L (ref 22–29)
CREAT SERPL-MCNC: 0.9 MG/DL (ref 0.7–1.2)
EOSINOPHILS ABSOLUTE: 0.1 E9/L (ref 0.05–0.5)
EOSINOPHILS RELATIVE PERCENT: 0.4 % (ref 0–6)
GFR AFRICAN AMERICAN: >60
GFR NON-AFRICAN AMERICAN: >60 ML/MIN/1.73
GLUCOSE BLD-MCNC: 117 MG/DL (ref 74–109)
HCT VFR BLD CALC: 29.6 % (ref 37–54)
HDLC SERPL-MCNC: 23 MG/DL
HEMOGLOBIN: 9.9 G/DL (ref 12.5–16.5)
IMMATURE GRANULOCYTES #: 0.21 E9/L
IMMATURE GRANULOCYTES %: 0.8 % (ref 0–5)
LACTIC ACID: 0.9 MMOL/L (ref 0.5–2.2)
LDL CHOLESTEROL CALCULATED: 39 MG/DL (ref 0–99)
LYMPHOCYTES ABSOLUTE: 0.93 E9/L (ref 1.5–4)
LYMPHOCYTES RELATIVE PERCENT: 3.8 % (ref 20–42)
MAGNESIUM: 1.4 MG/DL (ref 1.6–2.6)
MAGNESIUM: 1.6 MG/DL (ref 1.6–2.6)
MCH RBC QN AUTO: 30.3 PG (ref 26–35)
MCHC RBC AUTO-ENTMCNC: 33.4 % (ref 32–34.5)
MCV RBC AUTO: 90.5 FL (ref 80–99.9)
MONOCYTES ABSOLUTE: 2.71 E9/L (ref 0.1–0.95)
MONOCYTES RELATIVE PERCENT: 10.9 % (ref 2–12)
NEUTROPHILS ABSOLUTE: 20.72 E9/L (ref 1.8–7.3)
NEUTROPHILS RELATIVE PERCENT: 83.7 % (ref 43–80)
OVALOCYTES: ABNORMAL
PDW BLD-RTO: 15.9 FL (ref 11.5–15)
PLATELET # BLD: 110 E9/L (ref 130–450)
PMV BLD AUTO: 10.4 FL (ref 7–12)
POIKILOCYTES: ABNORMAL
POTASSIUM SERPL-SCNC: 3.6 MMOL/L (ref 3.5–5)
POTASSIUM SERPL-SCNC: 3.8 MMOL/L (ref 3.5–5)
RBC # BLD: 3.27 E12/L (ref 3.8–5.8)
SODIUM BLD-SCNC: 136 MMOL/L (ref 132–146)
TOTAL PROTEIN: 4.3 G/DL (ref 6.4–8.3)
TRIGL SERPL-MCNC: 100 MG/DL (ref 0–149)
TSH SERPL DL<=0.05 MIU/L-ACNC: 0.44 UIU/ML (ref 0.27–4.2)
VLDLC SERPL CALC-MCNC: 20 MG/DL
WBC # BLD: 24.8 E9/L (ref 4.5–11.5)

## 2018-10-10 PROCEDURE — 6360000002 HC RX W HCPCS: Performed by: EMERGENCY MEDICINE

## 2018-10-10 PROCEDURE — 6360000002 HC RX W HCPCS: Performed by: STUDENT IN AN ORGANIZED HEALTH CARE EDUCATION/TRAINING PROGRAM

## 2018-10-10 PROCEDURE — 84132 ASSAY OF SERUM POTASSIUM: CPT

## 2018-10-10 PROCEDURE — 36415 COLL VENOUS BLD VENIPUNCTURE: CPT

## 2018-10-10 PROCEDURE — 36592 COLLECT BLOOD FROM PICC: CPT

## 2018-10-10 PROCEDURE — 84443 ASSAY THYROID STIM HORMONE: CPT

## 2018-10-10 PROCEDURE — 6370000000 HC RX 637 (ALT 250 FOR IP): Performed by: INTERNAL MEDICINE

## 2018-10-10 PROCEDURE — 2580000003 HC RX 258: Performed by: EMERGENCY MEDICINE

## 2018-10-10 PROCEDURE — 71045 X-RAY EXAM CHEST 1 VIEW: CPT

## 2018-10-10 PROCEDURE — 6370000000 HC RX 637 (ALT 250 FOR IP): Performed by: STUDENT IN AN ORGANIZED HEALTH CARE EDUCATION/TRAINING PROGRAM

## 2018-10-10 PROCEDURE — 83735 ASSAY OF MAGNESIUM: CPT

## 2018-10-10 PROCEDURE — 80061 LIPID PANEL: CPT

## 2018-10-10 PROCEDURE — C9113 INJ PANTOPRAZOLE SODIUM, VIA: HCPCS | Performed by: EMERGENCY MEDICINE

## 2018-10-10 PROCEDURE — 2700000000 HC OXYGEN THERAPY PER DAY

## 2018-10-10 PROCEDURE — 99233 SBSQ HOSP IP/OBS HIGH 50: CPT | Performed by: INTERNAL MEDICINE

## 2018-10-10 PROCEDURE — 2060000000 HC ICU INTERMEDIATE R&B

## 2018-10-10 PROCEDURE — 80053 COMPREHEN METABOLIC PANEL: CPT

## 2018-10-10 PROCEDURE — 6360000002 HC RX W HCPCS: Performed by: INTERNAL MEDICINE

## 2018-10-10 PROCEDURE — 2580000003 HC RX 258: Performed by: INTERNAL MEDICINE

## 2018-10-10 PROCEDURE — 2500000003 HC RX 250 WO HCPCS: Performed by: INTERNAL MEDICINE

## 2018-10-10 PROCEDURE — 83605 ASSAY OF LACTIC ACID: CPT

## 2018-10-10 PROCEDURE — 85025 COMPLETE CBC W/AUTO DIFF WBC: CPT

## 2018-10-10 RX ORDER — POTASSIUM BICARBONATE 25 MEQ/1
50 TABLET, EFFERVESCENT ORAL ONCE
Status: COMPLETED | OUTPATIENT
Start: 2018-10-10 | End: 2018-10-10

## 2018-10-10 RX ORDER — LOSARTAN POTASSIUM 25 MG/1
25 TABLET ORAL DAILY
Status: DISCONTINUED | OUTPATIENT
Start: 2018-10-10 | End: 2018-10-11

## 2018-10-10 RX ORDER — MAGNESIUM SULFATE IN WATER 40 MG/ML
2 INJECTION, SOLUTION INTRAVENOUS ONCE
Status: COMPLETED | OUTPATIENT
Start: 2018-10-10 | End: 2018-10-10

## 2018-10-10 RX ORDER — METOPROLOL SUCCINATE 50 MG/1
50 TABLET, EXTENDED RELEASE ORAL DAILY
Status: DISCONTINUED | OUTPATIENT
Start: 2018-10-11 | End: 2018-10-11

## 2018-10-10 RX ORDER — METOPROLOL SUCCINATE 25 MG/1
25 TABLET, EXTENDED RELEASE ORAL ONCE
Status: COMPLETED | OUTPATIENT
Start: 2018-10-10 | End: 2018-10-10

## 2018-10-10 RX ORDER — METOPROLOL TARTRATE 5 MG/5ML
5 INJECTION INTRAVENOUS EVERY 6 HOURS PRN
Status: DISCONTINUED | OUTPATIENT
Start: 2018-10-10 | End: 2018-10-15 | Stop reason: HOSPADM

## 2018-10-10 RX ORDER — FUROSEMIDE 10 MG/ML
20 INJECTION INTRAMUSCULAR; INTRAVENOUS ONCE
Status: COMPLETED | OUTPATIENT
Start: 2018-10-10 | End: 2018-10-10

## 2018-10-10 RX ORDER — ATENOLOL 25 MG/1
25 TABLET ORAL DAILY
Status: DISCONTINUED | OUTPATIENT
Start: 2018-10-10 | End: 2018-10-10

## 2018-10-10 RX ORDER — TAMSULOSIN HYDROCHLORIDE 0.4 MG/1
0.4 CAPSULE ORAL 2 TIMES DAILY
Status: DISCONTINUED | OUTPATIENT
Start: 2018-10-10 | End: 2018-10-15 | Stop reason: HOSPADM

## 2018-10-10 RX ORDER — FUROSEMIDE 10 MG/ML
20 INJECTION INTRAMUSCULAR; INTRAVENOUS DAILY
Status: DISCONTINUED | OUTPATIENT
Start: 2018-10-11 | End: 2018-10-11

## 2018-10-10 RX ORDER — SPIRONOLACTONE 25 MG/1
25 TABLET ORAL DAILY
Status: DISCONTINUED | OUTPATIENT
Start: 2018-10-10 | End: 2018-10-15 | Stop reason: HOSPADM

## 2018-10-10 RX ORDER — METOPROLOL SUCCINATE 25 MG/1
25 TABLET, EXTENDED RELEASE ORAL DAILY
Status: DISCONTINUED | OUTPATIENT
Start: 2018-10-10 | End: 2018-10-10

## 2018-10-10 RX ORDER — SUCRALFATE 1 G/1
1 TABLET ORAL
Status: DISCONTINUED | OUTPATIENT
Start: 2018-10-10 | End: 2018-10-15 | Stop reason: HOSPADM

## 2018-10-10 RX ORDER — METOPROLOL TARTRATE 5 MG/5ML
5 INJECTION INTRAVENOUS EVERY 6 HOURS PRN
Status: DISCONTINUED | OUTPATIENT
Start: 2018-10-10 | End: 2018-10-10

## 2018-10-10 RX ADMIN — ATENOLOL 25 MG: 25 TABLET ORAL at 10:28

## 2018-10-10 RX ADMIN — SUCRALFATE 1 G: 1 TABLET ORAL at 16:54

## 2018-10-10 RX ADMIN — LOSARTAN POTASSIUM 25 MG: 25 TABLET, FILM COATED ORAL at 10:28

## 2018-10-10 RX ADMIN — METOPROLOL SUCCINATE 25 MG: 25 TABLET, EXTENDED RELEASE ORAL at 12:49

## 2018-10-10 RX ADMIN — METOPROLOL TARTRATE 5 MG: 5 INJECTION, SOLUTION INTRAVENOUS at 04:19

## 2018-10-10 RX ADMIN — SPIRONOLACTONE 25 MG: 25 TABLET, FILM COATED ORAL at 12:49

## 2018-10-10 RX ADMIN — Medication 10 ML: at 14:31

## 2018-10-10 RX ADMIN — POTASSIUM BICARBONATE 50 MEQ: 25 TABLET, EFFERVESCENT ORAL at 18:19

## 2018-10-10 RX ADMIN — POTASSIUM BICARBONATE 50 MEQ: 25 TABLET, EFFERVESCENT ORAL at 16:54

## 2018-10-10 RX ADMIN — SUCRALFATE 1 G: 1 TABLET ORAL at 21:08

## 2018-10-10 RX ADMIN — SUCRALFATE 1 G: 1 TABLET ORAL at 08:30

## 2018-10-10 RX ADMIN — MAGNESIUM SULFATE HEPTAHYDRATE 2 G: 40 INJECTION, SOLUTION INTRAVENOUS at 08:58

## 2018-10-10 RX ADMIN — MAGNESIUM SULFATE HEPTAHYDRATE 2 G: 40 INJECTION, SOLUTION INTRAVENOUS at 17:13

## 2018-10-10 RX ADMIN — SUCRALFATE 1 G: 1 TABLET ORAL at 12:49

## 2018-10-10 RX ADMIN — TAMSULOSIN HYDROCHLORIDE 0.4 MG: 0.4 CAPSULE ORAL at 10:55

## 2018-10-10 RX ADMIN — PANTOPRAZOLE SODIUM 8 MG/HR: 40 INJECTION, POWDER, FOR SOLUTION INTRAVENOUS at 12:49

## 2018-10-10 RX ADMIN — OCTREOTIDE ACETATE 25 MCG/HR: 500 INJECTION, SOLUTION INTRAVENOUS; SUBCUTANEOUS at 10:55

## 2018-10-10 RX ADMIN — FUROSEMIDE 20 MG: 10 INJECTION, SOLUTION INTRAVENOUS at 10:28

## 2018-10-10 RX ADMIN — TAMSULOSIN HYDROCHLORIDE 0.4 MG: 0.4 CAPSULE ORAL at 21:08

## 2018-10-10 ASSESSMENT — PAIN SCALES - GENERAL
PAINLEVEL_OUTOF10: 0

## 2018-10-10 NOTE — PROGRESS NOTES
Sedation    Paralyzed?:  [x] No    [] Yes      Vasopressors:  [] No    [x] Yes    If yes -   [] Levophed       [] Dopamine     [] Vasopressin       [] Dobutamine  [] Phenylephrine         [] Epinephrine    Central line:     [] No   [x] Yes         If yes -       [] Right IJ     [] Left IJ [x] Right Femoral [] Left Femoral                   [] Right Subclavian [] Left Subclavian       Emery catheter:   [x] No   [] Yes     Urine output:            [] Good   [x] Low              [] Anuric      Objective:     Vital signs:  BP (!) 166/75   Pulse 113   Temp 99.5 °F (37.5 °C) (Axillary)   Resp 28   Ht 5' 7\" (1.702 m)   Wt 208 lb 1.8 oz (94.4 kg)   SpO2 98%   BMI 32.60 kg/m²   Tmax over 24 hours:  Temp (24hrs), Av °F (37.2 °C), Min:98.8 °F (37.1 °C), Max:99.5 °F (37.5 °C)      Patient Vitals for the past 6 hrs:   BP Temp Temp src Pulse Resp SpO2   10/10/18 0900 - - - 113 28 98 %   10/10/18 0800 (!) 166/75 99.5 °F (37.5 °C) Axillary 110 24 100 %   10/10/18 0700 - - - 98 21 100 %   10/10/18 0600 - - - 91 25 100 %   10/10/18 0500 - - - 90 23 99 %         Intake/Output Summary (Last 24 hours) at 10/10/18 1008  Last data filed at 10/10/18 0800   Gross per 24 hour   Intake          3185.25 ml   Output             2750 ml   Net           435.25 ml     Wt Readings from Last 2 Encounters:   10/10/18 208 lb 1.8 oz (94.4 kg)   18 197 lb (89.4 kg)     Body mass index is 32.6 kg/m². Physical examination:    General: No distress. Awake and alert  Eyes: PERRL. No sclera icterus. No conjunctival injection. ENT: No discharge. Pharynx clear. Neck: Trachea midline. Normal thyroid. Resp: No accessory muscle use. No rales. No wheezing. No rhonchi. CV: Regular rate. Regular rhythm. No murmur or rub. Abd: Non-tender. Non-distended. No masses. No organmegaly. Normal bowel sounds. Skin: Warm and dry. No nodules on exposed extremities. No rash on exposed extremities.   Ext: No cyanosis, clubbing, edema  Lymph: No

## 2018-10-10 NOTE — PROGRESS NOTES
[facility] Cardiology Inpatient Progress Note    Patient is a 68 y.o. male of Ramy Martins MD seen in hospital follow up. Chief complaint: SVT/GIB/Elevated troponin. HPI: No acute distress. Extubated. Some SOB. No CP.      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    Stented coronary artery    Hyperlipidemia    H/O psoriatic arthritis    Acute upper GI bleed    SVT (supraventricular tachycardia) (HCC)    Elevated troponin       Allergies   Allergen Reactions    Dye [Iodides] Other (See Comments)     Passing out       Current Facility-Administered Medications   Medication Dose Route Frequency Provider Last Rate Last Dose    sucralfate (CARAFATE) tablet 1 g  1 g Oral 4x Daily WC Javan Enamorado MD   1 g at 10/10/18 0830    magnesium sulfate 2 g in 50 mL IVPB premix  2 g Intravenous Once Cecy Lee, DO 25 mL/hr at 10/10/18 0858 2 g at 10/10/18 0858    metoprolol (LOPRESSOR) injection 5 mg  5 mg Intravenous Q6H Sidney Vaughn, DO   5 mg at 10/10/18 0419    perflutren lipid microspheres (DEFINITY) injection 1.65 mg  1.5 mL Intravenous ONCE PRN Sidney Vaughn, DO        hydrALAZINE (APRESOLINE) injection 10 mg  10 mg Intravenous Q6H PRN Cecy Lee, DO   10 mg at 10/09/18 1531    sodium chloride flush 0.9 % injection 10 mL  10 mL Intravenous PRN Amparo Moulds, DO        pantoprazole (PROTONIX) 80 mg in sodium chloride 0.9 % 100 mL infusion  8 mg/hr Intravenous Continuous Amparo Moulds, DO 10 mL/hr at 10/09/18 2200 8 mg/hr at 10/09/18 2200    chlorhexidine (PERIDEX) 0.12 % solution 15 mL  15 mL Mouth/Throat BID Cecy Lee, DO   15 mL at 10/09/18 2047    fentaNYL 5 mcg/ml in 0.9%  ml infusion  25 mcg/hr Intravenous Continuous Cecy Lee, DO   Stopped at 10/09/18 1900    propofol 1000 MG/100ML injection  10 mcg/kg/min

## 2018-10-11 ENCOUNTER — APPOINTMENT (OUTPATIENT)
Dept: GENERAL RADIOLOGY | Age: 73
DRG: 377 | End: 2018-10-11
Payer: COMMERCIAL

## 2018-10-11 LAB
ANION GAP SERPL CALCULATED.3IONS-SCNC: 10 MMOL/L (ref 7–16)
ANISOCYTOSIS: ABNORMAL
BASOPHILS ABSOLUTE: 0 E9/L (ref 0–0.2)
BASOPHILS RELATIVE PERCENT: 0 % (ref 0–2)
BUN BLDV-MCNC: 7 MG/DL (ref 8–23)
CALCIUM SERPL-MCNC: 7.8 MG/DL (ref 8.6–10.2)
CHLORIDE BLD-SCNC: 95 MMOL/L (ref 98–107)
CO2: 24 MMOL/L (ref 22–29)
CREAT SERPL-MCNC: 0.6 MG/DL (ref 0.7–1.2)
EOSINOPHILS ABSOLUTE: 0 E9/L (ref 0.05–0.5)
EOSINOPHILS RELATIVE PERCENT: 0 % (ref 0–6)
GFR AFRICAN AMERICAN: >60
GFR NON-AFRICAN AMERICAN: >60 ML/MIN/1.73
GLUCOSE BLD-MCNC: 117 MG/DL (ref 74–109)
HCT VFR BLD CALC: 28.3 % (ref 37–54)
HEMOGLOBIN: 9.4 G/DL (ref 12.5–16.5)
LYMPHOCYTES ABSOLUTE: 0.69 E9/L (ref 1.5–4)
LYMPHOCYTES RELATIVE PERCENT: 2.6 % (ref 20–42)
MAGNESIUM: 2.1 MG/DL (ref 1.6–2.6)
MCH RBC QN AUTO: 30.3 PG (ref 26–35)
MCHC RBC AUTO-ENTMCNC: 33.2 % (ref 32–34.5)
MCV RBC AUTO: 91.3 FL (ref 80–99.9)
MONOCYTES ABSOLUTE: 0.92 E9/L (ref 0.1–0.95)
MONOCYTES RELATIVE PERCENT: 4.4 % (ref 2–12)
MRSA CULTURE ONLY: NORMAL
NEUTROPHILS ABSOLUTE: 21.39 E9/L (ref 1.8–7.3)
NEUTROPHILS RELATIVE PERCENT: 93 % (ref 43–80)
NUCLEATED RED BLOOD CELLS: 0 /100 WBC
PDW BLD-RTO: 15.8 FL (ref 11.5–15)
PLATELET # BLD: 132 E9/L (ref 130–450)
PMV BLD AUTO: 10.5 FL (ref 7–12)
POLYCHROMASIA: ABNORMAL
POTASSIUM SERPL-SCNC: 3.8 MMOL/L (ref 3.5–5)
RBC # BLD: 3.1 E12/L (ref 3.8–5.8)
SODIUM BLD-SCNC: 129 MMOL/L (ref 132–146)
WBC # BLD: 23 E9/L (ref 4.5–11.5)

## 2018-10-11 PROCEDURE — 6360000002 HC RX W HCPCS: Performed by: INTERNAL MEDICINE

## 2018-10-11 PROCEDURE — 2060000000 HC ICU INTERMEDIATE R&B

## 2018-10-11 PROCEDURE — 2580000003 HC RX 258: Performed by: INTERNAL MEDICINE

## 2018-10-11 PROCEDURE — 36415 COLL VENOUS BLD VENIPUNCTURE: CPT

## 2018-10-11 PROCEDURE — 6370000000 HC RX 637 (ALT 250 FOR IP): Performed by: INTERNAL MEDICINE

## 2018-10-11 PROCEDURE — G8987 SELF CARE CURRENT STATUS: HCPCS

## 2018-10-11 PROCEDURE — 2580000003 HC RX 258: Performed by: EMERGENCY MEDICINE

## 2018-10-11 PROCEDURE — 71045 X-RAY EXAM CHEST 1 VIEW: CPT

## 2018-10-11 PROCEDURE — 97166 OT EVAL MOD COMPLEX 45 MIN: CPT

## 2018-10-11 PROCEDURE — 99233 SBSQ HOSP IP/OBS HIGH 50: CPT | Performed by: INTERNAL MEDICINE

## 2018-10-11 PROCEDURE — 6360000002 HC RX W HCPCS: Performed by: SURGERY

## 2018-10-11 PROCEDURE — 83735 ASSAY OF MAGNESIUM: CPT

## 2018-10-11 PROCEDURE — 6370000000 HC RX 637 (ALT 250 FOR IP): Performed by: STUDENT IN AN ORGANIZED HEALTH CARE EDUCATION/TRAINING PROGRAM

## 2018-10-11 PROCEDURE — 85025 COMPLETE CBC W/AUTO DIFF WBC: CPT

## 2018-10-11 PROCEDURE — C9113 INJ PANTOPRAZOLE SODIUM, VIA: HCPCS | Performed by: EMERGENCY MEDICINE

## 2018-10-11 PROCEDURE — 97530 THERAPEUTIC ACTIVITIES: CPT

## 2018-10-11 PROCEDURE — C9113 INJ PANTOPRAZOLE SODIUM, VIA: HCPCS | Performed by: SURGERY

## 2018-10-11 PROCEDURE — 6360000002 HC RX W HCPCS: Performed by: EMERGENCY MEDICINE

## 2018-10-11 PROCEDURE — 80048 BASIC METABOLIC PNL TOTAL CA: CPT

## 2018-10-11 PROCEDURE — G8988 SELF CARE GOAL STATUS: HCPCS

## 2018-10-11 PROCEDURE — 97535 SELF CARE MNGMENT TRAINING: CPT

## 2018-10-11 PROCEDURE — 97161 PT EVAL LOW COMPLEX 20 MIN: CPT

## 2018-10-11 RX ORDER — FUROSEMIDE 10 MG/ML
40 INJECTION INTRAMUSCULAR; INTRAVENOUS DAILY
Status: DISCONTINUED | OUTPATIENT
Start: 2018-10-11 | End: 2018-10-15 | Stop reason: HOSPADM

## 2018-10-11 RX ORDER — METOPROLOL SUCCINATE 50 MG/1
50 TABLET, EXTENDED RELEASE ORAL 2 TIMES DAILY
Status: DISCONTINUED | OUTPATIENT
Start: 2018-10-11 | End: 2018-10-15 | Stop reason: HOSPADM

## 2018-10-11 RX ORDER — LOSARTAN POTASSIUM 50 MG/1
50 TABLET ORAL DAILY
Status: DISCONTINUED | OUTPATIENT
Start: 2018-10-12 | End: 2018-10-15 | Stop reason: HOSPADM

## 2018-10-11 RX ORDER — PANTOPRAZOLE SODIUM 40 MG/10ML
40 INJECTION, POWDER, LYOPHILIZED, FOR SOLUTION INTRAVENOUS 2 TIMES DAILY
Status: DISCONTINUED | OUTPATIENT
Start: 2018-10-11 | End: 2018-10-15 | Stop reason: HOSPADM

## 2018-10-11 RX ADMIN — FUROSEMIDE 20 MG: 10 INJECTION, SOLUTION INTRAVENOUS at 08:22

## 2018-10-11 RX ADMIN — Medication 10 ML: at 21:05

## 2018-10-11 RX ADMIN — Medication 10 ML: at 08:22

## 2018-10-11 RX ADMIN — METHOTREXATE 10 MG: 2.5 TABLET ORAL at 21:05

## 2018-10-11 RX ADMIN — SUCRALFATE 1 G: 1 TABLET ORAL at 12:20

## 2018-10-11 RX ADMIN — PANTOPRAZOLE SODIUM 8 MG/HR: 40 INJECTION, POWDER, FOR SOLUTION INTRAVENOUS at 00:55

## 2018-10-11 RX ADMIN — PANTOPRAZOLE SODIUM 40 MG: 40 INJECTION, POWDER, FOR SOLUTION INTRAVENOUS at 21:06

## 2018-10-11 RX ADMIN — OCTREOTIDE ACETATE 25 MCG/HR: 500 INJECTION, SOLUTION INTRAVENOUS; SUBCUTANEOUS at 08:15

## 2018-10-11 RX ADMIN — LOSARTAN POTASSIUM 25 MG: 25 TABLET, FILM COATED ORAL at 08:18

## 2018-10-11 RX ADMIN — METOPROLOL SUCCINATE 50 MG: 50 TABLET, EXTENDED RELEASE ORAL at 08:18

## 2018-10-11 RX ADMIN — SPIRONOLACTONE 25 MG: 25 TABLET, FILM COATED ORAL at 08:18

## 2018-10-11 RX ADMIN — TAMSULOSIN HYDROCHLORIDE 0.4 MG: 0.4 CAPSULE ORAL at 08:18

## 2018-10-11 RX ADMIN — Medication 10 ML: at 10:47

## 2018-10-11 RX ADMIN — SUCRALFATE 1 G: 1 TABLET ORAL at 21:08

## 2018-10-11 RX ADMIN — PANTOPRAZOLE SODIUM 40 MG: 40 INJECTION, POWDER, FOR SOLUTION INTRAVENOUS at 10:47

## 2018-10-11 RX ADMIN — METOPROLOL SUCCINATE 50 MG: 50 TABLET, EXTENDED RELEASE ORAL at 21:05

## 2018-10-11 RX ADMIN — SUCRALFATE 1 G: 1 TABLET ORAL at 08:18

## 2018-10-11 RX ADMIN — TAMSULOSIN HYDROCHLORIDE 0.4 MG: 0.4 CAPSULE ORAL at 21:05

## 2018-10-11 RX ADMIN — FUROSEMIDE 20 MG: 10 INJECTION, SOLUTION INTRAMUSCULAR; INTRAVENOUS at 10:46

## 2018-10-11 RX ADMIN — SUCRALFATE 1 G: 1 TABLET ORAL at 17:27

## 2018-10-11 ASSESSMENT — PAIN SCALES - GENERAL: PAINLEVEL_OUTOF10: 0

## 2018-10-11 NOTE — PROGRESS NOTES
Balance: CGA/min assist ,fall risk     Endurance: decreased   Chair alarm: no - family present      ASSESSMENT  Pt displays functional ability as noted in the objective portion of this evaluation. Comments/Treatment:  Unsteady gait, at risk for falls. Rec ww use       Examination of body systems Decreased   Functional mobility x   ROM    Strength x   Safety Awareness    Cognition    Endurance x   Sensation    Balance x   Vision/Visual Deficits    Coordination        Patient education  Pt educated on  Fall risk , ww use     Patient response to education:   Pt verbalized understanding Pt demonstrated skill Pt requires further education in this area   x  x     Rehab potential is Good for reaching above PT goals. Pts/ family goals   1. Increase strength     Patient and or family understand(s) diagnosis, prognosis, and plan of care. -  Yes     PLAN  PT care will be provided in accordance with the objectives noted above. Whenever appropriate, clear delegation orders will be provided for nursing staff. Exercises and functional mobility practice will be used as well as appropriate assistive devices or modalities to obtain goals. Patient and family education will also be administered as needed. Frequency of treatments will be 2-3 x/week x  5 days.     Time in:  1017  Time out:  2620 Ba Sheikh number:  [id number]

## 2018-10-11 NOTE — PROGRESS NOTES
respiratory failure   respiratory failure     Comparison: 10/10/2018     FINDINGS:    Enlarged cardiac silhouette, as on the prior study. There is blunting of the costophrenic angles and hazy, patchy airspace  opacities in the lower lungs, worse on the right compared to the left. No pneumothorax is seen. Impression:     Bilateral small pleural effusions and opacities in the lower lungs  suggestive of pneumonia and likely atelectasis, without definite  change when compared to the previous day's study. Basic metabolic panel [[id number]] (Abnormal) Collected: 10/11/18 0402   Updated: 10/11/18 0528    Specimen Source: Blood     Sodium 129 (L) mmol/L    Potassium 3.8 mmol/L    Chloride 95 (L) mmol/L    CO2 24 mmol/L    Anion Gap 10 mmol/L    Glucose 117 (H) mg/dL    BUN 7 (L) mg/dL    CREATININE 0.6 (L) mg/dL    GFR Non-African American >60 mL/min/1.73    Comment: Chronic Kidney Disease: less than 60 ml/min/1.73 sq. m.         Kidney Failure: less than 15 ml/min/1.73 sq.m. Results valid for patients 18 years and older.         GFR African American >60    Calcium 7.8 (L) mg/dL   Magnesium [[id number]] Collected: 10/11/18 0402   Updated: 10/11/18 0528    Specimen Source: Blood     Magnesium 2.1 mg/dL         Current Facility-Administered Medications   Medication Dose Route Frequency Provider Last Rate Last Dose    metoprolol succinate (TOPROL XL) extended release tablet 50 mg  50 mg Oral BID  Massy, DO        [START ON 10/12/2018] losartan (COZAAR) tablet 50 mg  50 mg Oral Daily  Massy, DO        furosemide (LASIX) injection 40 mg  40 mg Intravenous Daily  Massy, DO   20 mg at 10/11/18 1046    pantoprazole (PROTONIX) injection 40 mg  40 mg Intravenous BID Bibiana Mancera MD   40 mg at 10/11/18 1047    sucralfate (CARAFATE) tablet 1 g  1 g Oral 4x Daily  Rosamaria Dasilva MD   1 g at 10/11/18 1220    metoprolol (LOPRESSOR) injection 5 mg  5 mg Intravenous Q6H PRN Reed Cook, MD Lindajo Bence tamsulosin (FLOMAX) capsule 0.4 mg  0.4 mg Oral BID Bruno Bustamante MD   0.4 mg at 10/11/18 0818    spironolactone (ALDACTONE) tablet 25 mg  25 mg Oral Daily Alex Butterfield, DO   25 mg at 10/11/18 0818    belladonna-opium (B&O SUPPRETTES) 16.2-30 MG suppository 60 mg  60 mg Rectal Q8H PRN Rickie Sanon DO        hydrALAZINE (APRESOLINE) injection 10 mg  10 mg Intravenous Q6H PRN Eddie Lutz DO   10 mg at 10/09/18 1531    sodium chloride flush 0.9 % injection 10 mL  10 mL Intravenous PRN Yassine Me, DO   10 mL at 10/11/18 1047       Problem list:    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 Advance diet as tolerated    2. Continue PPI    3. Continue metoprolol    4.  Encourage activity      Rickie Sanon D.O., [facility]  3:27 PM  10/11/2018

## 2018-10-12 LAB
ANION GAP SERPL CALCULATED.3IONS-SCNC: 10 MMOL/L (ref 7–16)
ANISOCYTOSIS: ABNORMAL
BASOPHILS ABSOLUTE: 0.04 E9/L (ref 0–0.2)
BASOPHILS RELATIVE PERCENT: 0.3 % (ref 0–2)
BUN BLDV-MCNC: 7 MG/DL (ref 8–23)
CALCIUM SERPL-MCNC: 8 MG/DL (ref 8.6–10.2)
CHLORIDE BLD-SCNC: 95 MMOL/L (ref 98–107)
CO2: 25 MMOL/L (ref 22–29)
CREAT SERPL-MCNC: 0.6 MG/DL (ref 0.7–1.2)
EOSINOPHILS ABSOLUTE: 0.12 E9/L (ref 0.05–0.5)
EOSINOPHILS RELATIVE PERCENT: 0.8 % (ref 0–6)
GFR AFRICAN AMERICAN: >60
GFR NON-AFRICAN AMERICAN: >60 ML/MIN/1.73
GLUCOSE BLD-MCNC: 97 MG/DL (ref 74–109)
HCT VFR BLD CALC: 26.5 % (ref 37–54)
HEMOGLOBIN: 8.8 G/DL (ref 12.5–16.5)
HYPOCHROMIA: ABNORMAL
IMMATURE GRANULOCYTES #: 0.1 E9/L
IMMATURE GRANULOCYTES %: 0.7 % (ref 0–5)
LYMPHOCYTES ABSOLUTE: 0.92 E9/L (ref 1.5–4)
LYMPHOCYTES RELATIVE PERCENT: 6.1 % (ref 20–42)
MCH RBC QN AUTO: 30.3 PG (ref 26–35)
MCHC RBC AUTO-ENTMCNC: 33.2 % (ref 32–34.5)
MCV RBC AUTO: 91.4 FL (ref 80–99.9)
MONOCYTES ABSOLUTE: 2.01 E9/L (ref 0.1–0.95)
MONOCYTES RELATIVE PERCENT: 13.3 % (ref 2–12)
NEUTROPHILS ABSOLUTE: 11.87 E9/L (ref 1.8–7.3)
NEUTROPHILS RELATIVE PERCENT: 78.8 % (ref 43–80)
PDW BLD-RTO: 15.6 FL (ref 11.5–15)
PLATELET # BLD: 157 E9/L (ref 130–450)
PMV BLD AUTO: 10.7 FL (ref 7–12)
POTASSIUM SERPL-SCNC: 3.5 MMOL/L (ref 3.5–5)
RBC # BLD: 2.9 E12/L (ref 3.8–5.8)
SODIUM BLD-SCNC: 130 MMOL/L (ref 132–146)
WBC # BLD: 15.1 E9/L (ref 4.5–11.5)

## 2018-10-12 PROCEDURE — 2580000003 HC RX 258: Performed by: EMERGENCY MEDICINE

## 2018-10-12 PROCEDURE — 6370000000 HC RX 637 (ALT 250 FOR IP): Performed by: INTERNAL MEDICINE

## 2018-10-12 PROCEDURE — 6360000002 HC RX W HCPCS: Performed by: INTERNAL MEDICINE

## 2018-10-12 PROCEDURE — 99233 SBSQ HOSP IP/OBS HIGH 50: CPT | Performed by: INTERNAL MEDICINE

## 2018-10-12 PROCEDURE — 6360000002 HC RX W HCPCS: Performed by: SURGERY

## 2018-10-12 PROCEDURE — 36415 COLL VENOUS BLD VENIPUNCTURE: CPT

## 2018-10-12 PROCEDURE — 6370000000 HC RX 637 (ALT 250 FOR IP): Performed by: STUDENT IN AN ORGANIZED HEALTH CARE EDUCATION/TRAINING PROGRAM

## 2018-10-12 PROCEDURE — 2060000000 HC ICU INTERMEDIATE R&B

## 2018-10-12 PROCEDURE — 80048 BASIC METABOLIC PNL TOTAL CA: CPT

## 2018-10-12 PROCEDURE — C9113 INJ PANTOPRAZOLE SODIUM, VIA: HCPCS | Performed by: SURGERY

## 2018-10-12 PROCEDURE — 85025 COMPLETE CBC W/AUTO DIFF WBC: CPT

## 2018-10-12 RX ORDER — ACETAMINOPHEN 325 MG/1
TABLET ORAL
Status: COMPLETED
Start: 2018-10-12 | End: 2018-10-12

## 2018-10-12 RX ADMIN — SUCRALFATE 1 G: 1 TABLET ORAL at 08:23

## 2018-10-12 RX ADMIN — TAMSULOSIN HYDROCHLORIDE 0.4 MG: 0.4 CAPSULE ORAL at 08:23

## 2018-10-12 RX ADMIN — PANTOPRAZOLE SODIUM 40 MG: 40 INJECTION, POWDER, FOR SOLUTION INTRAVENOUS at 20:17

## 2018-10-12 RX ADMIN — SUCRALFATE 1 G: 1 TABLET ORAL at 16:03

## 2018-10-12 RX ADMIN — Medication 10 ML: at 08:23

## 2018-10-12 RX ADMIN — METOPROLOL SUCCINATE 50 MG: 50 TABLET, EXTENDED RELEASE ORAL at 20:17

## 2018-10-12 RX ADMIN — SUCRALFATE 1 G: 1 TABLET ORAL at 20:17

## 2018-10-12 RX ADMIN — PANTOPRAZOLE SODIUM 40 MG: 40 INJECTION, POWDER, FOR SOLUTION INTRAVENOUS at 08:22

## 2018-10-12 RX ADMIN — TAMSULOSIN HYDROCHLORIDE 0.4 MG: 0.4 CAPSULE ORAL at 20:17

## 2018-10-12 RX ADMIN — FUROSEMIDE 40 MG: 10 INJECTION, SOLUTION INTRAMUSCULAR; INTRAVENOUS at 08:22

## 2018-10-12 RX ADMIN — Medication 10 ML: at 20:17

## 2018-10-12 RX ADMIN — SPIRONOLACTONE 25 MG: 25 TABLET, FILM COATED ORAL at 08:24

## 2018-10-12 RX ADMIN — SUCRALFATE 1 G: 1 TABLET ORAL at 12:07

## 2018-10-12 RX ADMIN — METOPROLOL SUCCINATE 50 MG: 50 TABLET, EXTENDED RELEASE ORAL at 08:24

## 2018-10-12 RX ADMIN — LOSARTAN POTASSIUM 50 MG: 50 TABLET, FILM COATED ORAL at 08:23

## 2018-10-12 ASSESSMENT — PAIN SCALES - GENERAL
PAINLEVEL_OUTOF10: 0

## 2018-10-12 NOTE — PROGRESS NOTES
Occupational Therapy  Date:10/12/2018  Patient Name: Aden Hare  Room: 8298/3124-O     Occupational Therapy (OT) treatment attempted this afternoon; patient seated in bedside chair with multiple visitors present upon this OTR's arrival to patient's room. Patient declined to participate in ADLs and functional transfers/mobility at this time. Continue OT POC. SANTI Funez/L  License Number: [id number]

## 2018-10-12 NOTE — CARE COORDINATION
Social Work / Discharge Planning : SW met with patient, spouse and daughter and explaiend role as discharge planner/ transition of care. Therapy AM/PAC discussed 16/24 and spouse and patient stated home at discharge. They declined SNF. HHC discussed and both receptive. Choices discussed and they both want referral made to [facility] due to pats hx with them. SW did notify [facility] from [facility] of referral. NO additional needs. Charge RN updated for [facility] orders. SW to follow.  Electronically signed by KRISTIAN Angeles on 10/12/2018 at 11:51 AM

## 2018-10-12 NOTE — PROGRESS NOTES
P Quality Flow/Interdisciplinary Rounds Progress Note        Quality Flow Rounds held on October 12, 2018    Disciplines Attending:  Bedside Nurse, ,  and Nursing Unit Leadership    Himanshu Parra was admitted on 10/8/2018  7:29 AM    Anticipated Discharge Date:  Expected Discharge Date: 10/11/18    Disposition:    Avelino Score:  Avelino Scale Score: 19    Readmission Risk              Risk of Unplanned Readmission:        10             Discussed patient goal for the day, patient clinical progression, and barriers to discharge.   The following Goal(s) of the Day/Commitment(s) have been identified:    neela Diaz  October 12, 2018

## 2018-10-12 NOTE — PROGRESS NOTES
mg Intravenous Daily Ronak Michell, DO   40 mg at 10/12/18 2394    pantoprazole (PROTONIX) injection 40 mg  40 mg Intravenous BID Js Wong MD   40 mg at 10/12/18 1122    methotrexate (RHEUMATREX) chemo tablet 10 mg  10 mg Oral Weekly Rafael Shook, DO   10 mg at 10/11/18 2105    sucralfate (CARAFATE) tablet 1 g  1 g Oral 4x Daily  Kath Brandon MD   1 g at 10/12/18 1207    metoprolol (LOPRESSOR) injection 5 mg  5 mg Intravenous Q6H PRN Bhargavi Diallo MD        [facility] capsule 0.4 mg  0.4 mg Oral BID Bhargavi Diallo MD   0.4 mg at 10/12/18 9657    spironolactone (ALDACTONE) tablet 25 mg  25 mg Oral Daily Ronak Galarza, DO   25 mg at 10/12/18 5055    belladonna-opium (B&O SUPPRETTES) 16.2-30 MG suppository 60 mg  60 mg Rectal Q8H PRN Rafael Ashleyok, DO        hydrALAZINE (APRESOLINE) injection 10 mg  10 mg Intravenous Q6H PRN Moira Dickson, DO   10 mg at 10/09/18 1531    sodium chloride flush 0.9 % injection 10 mL  10 mL Intravenous PRN Claretha Chol, DO   10 mL at 10/12/18 9851       Problem list:    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    Stented coronary artery    Hyperlipidemia    H/O psoriatic arthritis    Acute upper GI bleed    SVT [facility]supraventricular tachycardia) ([facility])    Elevated troponin       Assessment:     CAD (coronary artery disease)s/p stent RCA    Valvular heart disease    Hypertension    Obesity, morbid s/p intestinal bypass    MI, 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 Continue current time activity    2. Continue to monitor H/H closely    3.  Stress test when cleared by general surgery, prior to discharge      Nazario San D.O., FACOI  1:15 PM  10/12/2018

## 2018-10-13 LAB
ANION GAP SERPL CALCULATED.3IONS-SCNC: 12 MMOL/L (ref 7–16)
BASOPHILS ABSOLUTE: 0.12 E9/L (ref 0–0.2)
BASOPHILS RELATIVE PERCENT: 1 % (ref 0–2)
BUN BLDV-MCNC: 5 MG/DL (ref 8–23)
CALCIUM SERPL-MCNC: 7.9 MG/DL (ref 8.6–10.2)
CHLORIDE BLD-SCNC: 98 MMOL/L (ref 98–107)
CO2: 27 MMOL/L (ref 22–29)
CREAT SERPL-MCNC: 0.7 MG/DL (ref 0.7–1.2)
EOSINOPHILS ABSOLUTE: 0.25 E9/L (ref 0.05–0.5)
EOSINOPHILS RELATIVE PERCENT: 2 % (ref 0–6)
GFR AFRICAN AMERICAN: >60
GFR NON-AFRICAN AMERICAN: >60 ML/MIN/1.73
GLUCOSE BLD-MCNC: 99 MG/DL (ref 74–109)
HCT VFR BLD CALC: 28.7 % (ref 37–54)
HEMOGLOBIN: 9.5 G/DL (ref 12.5–16.5)
HYPOCHROMIA: ABNORMAL
LYMPHOCYTES ABSOLUTE: 0.86 E9/L (ref 1.5–4)
LYMPHOCYTES RELATIVE PERCENT: 7 % (ref 20–42)
MCH RBC QN AUTO: 30.3 PG (ref 26–35)
MCHC RBC AUTO-ENTMCNC: 33.1 % (ref 32–34.5)
MCV RBC AUTO: 91.4 FL (ref 80–99.9)
MONOCYTES ABSOLUTE: 0.86 E9/L (ref 0.1–0.95)
MONOCYTES RELATIVE PERCENT: 7 % (ref 2–12)
NEUTROPHILS ABSOLUTE: 10.21 E9/L (ref 1.8–7.3)
NEUTROPHILS RELATIVE PERCENT: 83 % (ref 43–80)
PDW BLD-RTO: 15.3 FL (ref 11.5–15)
PLATELET # BLD: 185 E9/L (ref 130–450)
PMV BLD AUTO: 10.6 FL (ref 7–12)
POLYCHROMASIA: ABNORMAL
POTASSIUM SERPL-SCNC: 2.9 MMOL/L (ref 3.5–5)
RBC # BLD: 3.14 E12/L (ref 3.8–5.8)
SODIUM BLD-SCNC: 137 MMOL/L (ref 132–146)
WBC # BLD: 12.3 E9/L (ref 4.5–11.5)

## 2018-10-13 PROCEDURE — 6360000002 HC RX W HCPCS: Performed by: INTERNAL MEDICINE

## 2018-10-13 PROCEDURE — 80048 BASIC METABOLIC PNL TOTAL CA: CPT

## 2018-10-13 PROCEDURE — 6370000000 HC RX 637 (ALT 250 FOR IP): Performed by: INTERNAL MEDICINE

## 2018-10-13 PROCEDURE — C9113 INJ PANTOPRAZOLE SODIUM, VIA: HCPCS | Performed by: SURGERY

## 2018-10-13 PROCEDURE — 6370000000 HC RX 637 (ALT 250 FOR IP): Performed by: STUDENT IN AN ORGANIZED HEALTH CARE EDUCATION/TRAINING PROGRAM

## 2018-10-13 PROCEDURE — 36415 COLL VENOUS BLD VENIPUNCTURE: CPT

## 2018-10-13 PROCEDURE — 6360000002 HC RX W HCPCS: Performed by: SURGERY

## 2018-10-13 PROCEDURE — 2580000003 HC RX 258: Performed by: EMERGENCY MEDICINE

## 2018-10-13 PROCEDURE — 85025 COMPLETE CBC W/AUTO DIFF WBC: CPT

## 2018-10-13 PROCEDURE — 2060000000 HC ICU INTERMEDIATE R&B

## 2018-10-13 RX ORDER — POTASSIUM CHLORIDE 7.45 MG/ML
10 INJECTION INTRAVENOUS
Status: DISCONTINUED | OUTPATIENT
Start: 2018-10-13 | End: 2018-10-13

## 2018-10-13 RX ORDER — POTASSIUM CHLORIDE 20 MEQ/1
20 TABLET, EXTENDED RELEASE ORAL ONCE
Status: COMPLETED | OUTPATIENT
Start: 2018-10-13 | End: 2018-10-13

## 2018-10-13 RX ORDER — POTASSIUM CHLORIDE 20 MEQ/1
40 TABLET, EXTENDED RELEASE ORAL ONCE
Status: COMPLETED | OUTPATIENT
Start: 2018-10-13 | End: 2018-10-13

## 2018-10-13 RX ADMIN — FUROSEMIDE 40 MG: 10 INJECTION, SOLUTION INTRAMUSCULAR; INTRAVENOUS at 08:36

## 2018-10-13 RX ADMIN — TAMSULOSIN HYDROCHLORIDE 0.4 MG: 0.4 CAPSULE ORAL at 08:30

## 2018-10-13 RX ADMIN — SUCRALFATE 1 G: 1 TABLET ORAL at 20:28

## 2018-10-13 RX ADMIN — PANTOPRAZOLE SODIUM 40 MG: 40 INJECTION, POWDER, FOR SOLUTION INTRAVENOUS at 20:28

## 2018-10-13 RX ADMIN — POTASSIUM CHLORIDE 20 MEQ: 20 TABLET, EXTENDED RELEASE ORAL at 12:24

## 2018-10-13 RX ADMIN — POTASSIUM CHLORIDE 20 MEQ: 20 TABLET, EXTENDED RELEASE ORAL at 09:27

## 2018-10-13 RX ADMIN — SPIRONOLACTONE 25 MG: 25 TABLET, FILM COATED ORAL at 08:30

## 2018-10-13 RX ADMIN — SUCRALFATE 1 G: 1 TABLET ORAL at 08:30

## 2018-10-13 RX ADMIN — METOPROLOL SUCCINATE 50 MG: 50 TABLET, EXTENDED RELEASE ORAL at 08:30

## 2018-10-13 RX ADMIN — POTASSIUM CHLORIDE 10 MEQ: 10 INJECTION, SOLUTION INTRAVENOUS at 08:31

## 2018-10-13 RX ADMIN — Medication 10 ML: at 20:28

## 2018-10-13 RX ADMIN — SUCRALFATE 1 G: 1 TABLET ORAL at 12:23

## 2018-10-13 RX ADMIN — PANTOPRAZOLE SODIUM 40 MG: 40 INJECTION, POWDER, FOR SOLUTION INTRAVENOUS at 08:31

## 2018-10-13 RX ADMIN — SUCRALFATE 1 G: 1 TABLET ORAL at 17:28

## 2018-10-13 RX ADMIN — LOSARTAN POTASSIUM 50 MG: 50 TABLET, FILM COATED ORAL at 08:30

## 2018-10-13 RX ADMIN — METOPROLOL SUCCINATE 50 MG: 50 TABLET, EXTENDED RELEASE ORAL at 20:28

## 2018-10-13 RX ADMIN — POTASSIUM CHLORIDE 40 MEQ: 20 TABLET, EXTENDED RELEASE ORAL at 17:28

## 2018-10-13 RX ADMIN — TAMSULOSIN HYDROCHLORIDE 0.4 MG: 0.4 CAPSULE ORAL at 20:31

## 2018-10-13 ASSESSMENT — PAIN SCALES - GENERAL
PAINLEVEL_OUTOF10: 0

## 2018-10-13 NOTE — PROGRESS NOTES
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 Replace potassium orally    2. Continue metoprolol    3. Continue to monitor H/H closely    4.  Ischemic assessment, timing at the discretion of cardiology      Rafael Song D.O., [facility] [place]  3:34 PM  10/13/2018

## 2018-10-13 NOTE — PROGRESS NOTES
clubbing, edema  Lymph: No cervical LAD. No supraclavicular LAD. M/S: No cyanosis. No joint deformity. No clubbing. Neuro: Awake. Follows commands. Positive pupils/gag/corneals. Normal pain response. Results:  CBC:   Recent Labs      10/11/18   0805  10/12/18   0430  10/13/18   0615   WBC  23.0*  15.1*  12.3*   HGB  9.4*  8.8*  9.5*   HCT  28.3*  26.5*  28.7*   MCV  91.3  91.4  91.4   PLT  132  157  185     BMP:   Recent Labs      10/11/18   0402  10/12/18   0430  10/13/18   0615   NA  129*  130*  137   K  3.8  3.5  2.9*   CL  95*  95*  98   CO2  24  25  27   BUN  7*  7*  5*   CREATININE  0.6*  0.6*  0.7     LIVER PROFILE:   No results for input(s): AST, ALT, LIPASE, BILIDIR, BILITOT, ALKPHOS in the last 72 hours. Invalid input(s): AMYLASE,  ALB  PT/INR: No results for input(s): PROTIME, INR in the last 72 hours. APTT: No results for input(s): APTT in the last 72 hours. Pathology:  1. N/A      Microbiology:  1. None    Recent ABG:   No results for input(s): PH, PO2, PCO2, HCO3, BE, O2SAT, METHB, O2HB, COHB, O2CON, HHB, THB in the last 72 hours. FiO2 : 50 %  I:E Ratio: 1:2.30    Recent Films:  XR CHEST PORTABLE   Final Result   Bilateral small pleural effusions and opacities in the lower lungs   suggestive of pneumonia and likely atelectasis, without definite   change when compared to the previous day's study. XR CHEST PORTABLE   Final Result   No change in mild CHF. XR CHEST PORTABLE   Final Result   Tortuous ectatic aorta   Cardiomegaly    Airspace disease compatible with atelectasis or pneumonia   Slightly improved at the right lung base and worse at the left lung   base                  XR Chest Abdomen Ng Placement   Final Result   Nonspecific bowel gas pattern with a nasogastric tube with the tip   looped in the proximal stomach. Infiltrates in the lung bases. XR CHEST PORTABLE    (Results Pending)       Assessment:  1.  Massive GI bleed: Appears stable

## 2018-10-13 NOTE — PROGRESS NOTES
Attending Physician Progress Note     Current Meds:    potassium chloride 10 mEq/100 mL IVPB (Peripheral Line) Q1H   metoprolol succinate (TOPROL XL) extended release tablet 50 mg BID   losartan (COZAAR) tablet 50 mg Daily   furosemide (LASIX) injection 40 mg Daily   pantoprazole (PROTONIX) injection 40 mg BID   methotrexate (RHEUMATREX) chemo tablet 10 mg Weekly   sucralfate (CARAFATE) tablet 1 g 4x Daily WC   metoprolol (LOPRESSOR) injection 5 mg Q6H PRN   tamsulosin (FLOMAX) capsule 0.4 mg BID   spironolactone (ALDACTONE) tablet 25 mg Daily   belladonna-opium (B&O SUPPRETTES) 16.2-30 MG suppository 60 mg Q8H PRN   hydrALAZINE (APRESOLINE) injection 10 mg Q6H PRN   sodium chloride flush 0.9 % injection 10 mL PRN        Vitals/Labs:    Patient Vitals for the past 24 hrs:   BP Temp Temp src Pulse Resp SpO2 Weight   10/13/18 0539 - - - - - - 193 lb 4.8 oz (87.7 kg)   10/13/18 0033 - - - 71 - - -   10/13/18 0030 (!) 152/76 98.5 °F (36.9 °C) Oral 82 16 97 % -   10/12/18 1958 132/70 98.9 °F (37.2 °C) Oral 82 18 96 % -   10/12/18 1445 (!) 158/69 99 °F (37.2 °C) Oral 72 16 97 % -   10/12/18 0900 (!) 155/84 98 °F (36.7 °C) Oral 92 18 98 % -        I/O last 3 completed shifts: In: 300 [P.O.:300]  Out: 2300 [Urine:2300]  I/O this shift:  In: -   Out: 425 [Urine:425]      Recent Labs      10/11/18   0805  10/12/18   0430  10/13/18   0615   WBC  23.0*  15.1*  12.3*   HGB  9.4*  8.8*  9.5*   HCT  28.3*  26.5*  28.7*   PLT  132  157  185     Recent Labs      10/11/18   0402  10/12/18   0430  10/13/18   0615   CREATININE  0.6*  0.6*  0.7   BUN  7*  7*  5*   NA  129*  130*  137   K  3.8  3.5  2.9*   CL  95*  95*  98   CO2  24  25  27     No results for input(s): AST, ALT, ALB, BILITOT, ALKPHOS in the last 72 hours. No results for input(s): LIPASE, AMYLASE in the last 72 hours.     Patient is feeling ok  Only complaint is burning in IV from potassium  No melena or hematochezia reported  Hb up today    Physical Exam:    Abdomen:

## 2018-10-14 LAB
ANION GAP SERPL CALCULATED.3IONS-SCNC: 12 MMOL/L (ref 7–16)
ANISOCYTOSIS: ABNORMAL
BASOPHILS ABSOLUTE: 0.06 E9/L (ref 0–0.2)
BASOPHILS RELATIVE PERCENT: 0.4 % (ref 0–2)
BUN BLDV-MCNC: 7 MG/DL (ref 8–23)
CALCIUM SERPL-MCNC: 8.2 MG/DL (ref 8.6–10.2)
CHLORIDE BLD-SCNC: 96 MMOL/L (ref 98–107)
CO2: 24 MMOL/L (ref 22–29)
CREAT SERPL-MCNC: 0.6 MG/DL (ref 0.7–1.2)
EOSINOPHILS ABSOLUTE: 0.15 E9/L (ref 0.05–0.5)
EOSINOPHILS RELATIVE PERCENT: 0.9 % (ref 0–6)
GFR AFRICAN AMERICAN: >60
GFR NON-AFRICAN AMERICAN: >60 ML/MIN/1.73
GLUCOSE BLD-MCNC: 88 MG/DL (ref 74–109)
HCT VFR BLD CALC: 27.2 % (ref 37–54)
HEMOGLOBIN: 9.2 G/DL (ref 12.5–16.5)
IMMATURE GRANULOCYTES #: 0.09 E9/L
IMMATURE GRANULOCYTES %: 0.6 % (ref 0–5)
LYMPHOCYTES ABSOLUTE: 0.91 E9/L (ref 1.5–4)
LYMPHOCYTES RELATIVE PERCENT: 5.7 % (ref 20–42)
MCH RBC QN AUTO: 30.8 PG (ref 26–35)
MCHC RBC AUTO-ENTMCNC: 33.8 % (ref 32–34.5)
MCV RBC AUTO: 91 FL (ref 80–99.9)
MONOCYTES ABSOLUTE: 2.55 E9/L (ref 0.1–0.95)
MONOCYTES RELATIVE PERCENT: 15.9 % (ref 2–12)
NEUTROPHILS ABSOLUTE: 12.32 E9/L (ref 1.8–7.3)
NEUTROPHILS RELATIVE PERCENT: 76.5 % (ref 43–80)
OVALOCYTES: ABNORMAL
PDW BLD-RTO: 15.1 FL (ref 11.5–15)
PLATELET # BLD: 232 E9/L (ref 130–450)
PMV BLD AUTO: 10.6 FL (ref 7–12)
POIKILOCYTES: ABNORMAL
POLYCHROMASIA: ABNORMAL
POTASSIUM SERPL-SCNC: 3.4 MMOL/L (ref 3.5–5)
RBC # BLD: 2.99 E12/L (ref 3.8–5.8)
SODIUM BLD-SCNC: 132 MMOL/L (ref 132–146)
WBC # BLD: 16.1 E9/L (ref 4.5–11.5)

## 2018-10-14 PROCEDURE — 6370000000 HC RX 637 (ALT 250 FOR IP): Performed by: INTERNAL MEDICINE

## 2018-10-14 PROCEDURE — 6370000000 HC RX 637 (ALT 250 FOR IP): Performed by: STUDENT IN AN ORGANIZED HEALTH CARE EDUCATION/TRAINING PROGRAM

## 2018-10-14 PROCEDURE — 2060000000 HC ICU INTERMEDIATE R&B

## 2018-10-14 PROCEDURE — 85025 COMPLETE CBC W/AUTO DIFF WBC: CPT

## 2018-10-14 PROCEDURE — 6360000002 HC RX W HCPCS: Performed by: SURGERY

## 2018-10-14 PROCEDURE — 99232 SBSQ HOSP IP/OBS MODERATE 35: CPT | Performed by: INTERNAL MEDICINE

## 2018-10-14 PROCEDURE — 2580000003 HC RX 258: Performed by: EMERGENCY MEDICINE

## 2018-10-14 PROCEDURE — 36415 COLL VENOUS BLD VENIPUNCTURE: CPT

## 2018-10-14 PROCEDURE — 6360000002 HC RX W HCPCS: Performed by: INTERNAL MEDICINE

## 2018-10-14 PROCEDURE — C9113 INJ PANTOPRAZOLE SODIUM, VIA: HCPCS | Performed by: SURGERY

## 2018-10-14 PROCEDURE — 80048 BASIC METABOLIC PNL TOTAL CA: CPT

## 2018-10-14 RX ORDER — POTASSIUM CHLORIDE 20 MEQ/1
20 TABLET, EXTENDED RELEASE ORAL ONCE
Status: COMPLETED | OUTPATIENT
Start: 2018-10-14 | End: 2018-10-14

## 2018-10-14 RX ORDER — ACETAMINOPHEN 325 MG/1
650 TABLET ORAL EVERY 4 HOURS PRN
Status: DISCONTINUED | OUTPATIENT
Start: 2018-10-14 | End: 2018-10-15 | Stop reason: HOSPADM

## 2018-10-14 RX ADMIN — POTASSIUM CHLORIDE 20 MEQ: 20 TABLET, EXTENDED RELEASE ORAL at 23:20

## 2018-10-14 RX ADMIN — ACETAMINOPHEN 650 MG: 325 TABLET ORAL at 21:02

## 2018-10-14 RX ADMIN — FUROSEMIDE 40 MG: 10 INJECTION, SOLUTION INTRAMUSCULAR; INTRAVENOUS at 09:09

## 2018-10-14 RX ADMIN — Medication 10 ML: at 09:09

## 2018-10-14 RX ADMIN — LOSARTAN POTASSIUM 50 MG: 50 TABLET, FILM COATED ORAL at 09:09

## 2018-10-14 RX ADMIN — TAMSULOSIN HYDROCHLORIDE 0.4 MG: 0.4 CAPSULE ORAL at 09:09

## 2018-10-14 RX ADMIN — METOPROLOL SUCCINATE 50 MG: 50 TABLET, EXTENDED RELEASE ORAL at 21:02

## 2018-10-14 RX ADMIN — PANTOPRAZOLE SODIUM 40 MG: 40 INJECTION, POWDER, FOR SOLUTION INTRAVENOUS at 09:09

## 2018-10-14 RX ADMIN — SUCRALFATE 1 G: 1 TABLET ORAL at 16:57

## 2018-10-14 RX ADMIN — METOPROLOL SUCCINATE 50 MG: 50 TABLET, EXTENDED RELEASE ORAL at 09:09

## 2018-10-14 RX ADMIN — TAMSULOSIN HYDROCHLORIDE 0.4 MG: 0.4 CAPSULE ORAL at 21:01

## 2018-10-14 RX ADMIN — SUCRALFATE 1 G: 1 TABLET ORAL at 21:01

## 2018-10-14 RX ADMIN — SUCRALFATE 1 G: 1 TABLET ORAL at 12:27

## 2018-10-14 RX ADMIN — SPIRONOLACTONE 25 MG: 25 TABLET, FILM COATED ORAL at 09:09

## 2018-10-14 RX ADMIN — SUCRALFATE 1 G: 1 TABLET ORAL at 09:08

## 2018-10-14 RX ADMIN — PANTOPRAZOLE SODIUM 40 MG: 40 INJECTION, POWDER, FOR SOLUTION INTRAVENOUS at 21:01

## 2018-10-14 ASSESSMENT — PAIN SCALES - GENERAL
PAINLEVEL_OUTOF10: 0
PAINLEVEL_OUTOF10: 1
PAINLEVEL_OUTOF10: 0

## 2018-10-14 NOTE — PROGRESS NOTES
primary,cardiology  Would not continue home daypro  May resume 81 asa on discharge  Change pepcid to PPI  rx for carafate as well  Follow up in my office; will arrange for repeat EGD in 12 weeks      Electronically by Ean Pérez MD on 10/14/2018 at 8:46 AM

## 2018-10-14 NOTE — PROGRESS NOTES
0.6*   GLUCOSE  97  99  88   CALCIUM  8.0*  7.9*  8.2*     Lab Results   Component Value Date    MG 2.1 10/11/2018     No results for input(s): ALKPHOS, ALT, AST, PROT, BILITOT, BILIDIR, LABALBU in the last 72 hours. Recent Labs      10/12/18   0430  10/13/18   0615  10/14/18   0356   WBC  15.1*  12.3*  16.1*   RBC  2.90*  3.14*  2.99*   HGB  8.8*  9.5*  9.2*   HCT  26.5*  28.7*  27.2*   MCV  91.4  91.4  91.0   MCH  30.3  30.3  30.8   MCHC  33.2  33.1  33.8   RDW  15.6*  15.3*  15.1*   PLT  157  185  232   MPV  10.7  10.6  10.6     Lab Results   Component Value Date    TROPONINI 0.14 (H) 10/09/2018    TROPONINI 0.13 (H) 10/09/2018    TROPONINI 0.09 (H) 10/09/2018     Lab Results   Component Value Date    INR 1.2 10/08/2018    PROTIME 13.4 (H) 10/08/2018     Lab Results   Component Value Date    TSH 0.439 10/10/2018     No results found for: LABA1C  No results found for: EAG  Lab Results   Component Value Date    CHOL 82 10/10/2018     Lab Results   Component Value Date    TRIG 100 10/10/2018     Lab Results   Component Value Date    HDL 23 10/10/2018     Lab Results   Component Value Date    LDLCALC 39 10/10/2018     Lab Results   Component Value Date    LABVLDL 20 10/10/2018     No results found for: CHOLHDLRATIO    Cardiac Tests:  ECG:     Telemetry findings reviewed: sinus    Chest X-ray:    Echo 10/09/18   2   Summary   Ejection fraction is visually estimated at 35-40%.   The inferolateral wall is hypokinetic suggesting underlying CAD.   There is doppler evidence of stage I diastolic dysfunction.   Mildly enlarged right ventricle with normal function. TAPSE is 1.7 cm.   Left atrial volume index of 41 ml per meters squared BSA.   Borderline aortic stenosis is present. Mild aortic regurgitation is noted.   Moderate mitral regurgitation is present.   Physiologic and/or trace tricuspid regurgitation.   Pulmonary hypertension is mild.  RVSP is 45 mmHg.       Pertinent history:  1 admitted 10/8/18 with hematemesis BUN 16. Creatinine 1.4. Troponin 0.07, 0.13, 0.14  2  10/08/18 marginal ulcer with active hemorrhage s/p hemostasis with epinephrine and clips. 3 Inferior STEMI 1997  4 [facility] 2/24/1997: 70% diagonal, sequential borderline lesion LCx, 80% RCA  5  PTCA RCA 02/26/97  6 [facility] 7/7/1997 Total occlusion RCA with PTCA/PCI RCA  7 BETTIE [id number]  following weight loss  no longer needed C-pap   C-pap resumed summer of 2018  8 Morbid obesity weighed up to 360 pounds lost 200 pounds  5 Gastric Bypass [address] [facility] [employer] [place] [address]    Assessment:  1 GI bleed stable post EGD 10/08/18 with epinephrine and resolution clip  H/H=  9.2/27    Plan  1 Lexiscan stress test in a.m.       Abdon Quispe, [facility]        Lab Results   Component Value Date     10/14/2018     10/13/2018     (L) 10/12/2018    K 3.4 (L) 10/14/2018    K 2.9 (L) 10/13/2018    K 3.5 10/12/2018    CL 96 (L) 10/14/2018    CL 98 10/13/2018    CL 95 (L) 10/12/2018    CO2 24 10/14/2018    CO2 27 10/13/2018    CO2 25 10/12/2018    BUN 7 (L) 10/14/2018    BUN 5 (L) 10/13/2018    BUN 7 (L) 10/12/2018    CREATININE 0.6 (L) 10/14/2018    CREATININE 0.7 10/13/2018    CREATININE 0.6 (L) 10/12/2018    GLUCOSE 88 10/14/2018    GLUCOSE 99 10/13/2018    GLUCOSE 97 10/12/2018    CALCIUM 8.2 (L) 10/14/2018    CALCIUM 7.9 (L) 10/13/2018    CALCIUM 8.0 (L) 10/12/2018

## 2018-10-15 ENCOUNTER — APPOINTMENT (OUTPATIENT)
Dept: NUCLEAR MEDICINE | Age: 73
DRG: 377 | End: 2018-10-15
Payer: COMMERCIAL

## 2018-10-15 VITALS
SYSTOLIC BLOOD PRESSURE: 141 MMHG | TEMPERATURE: 98.8 F | HEIGHT: 67 IN | HEART RATE: 91 BPM | BODY MASS INDEX: 30.09 KG/M2 | OXYGEN SATURATION: 94 % | RESPIRATION RATE: 16 BRPM | DIASTOLIC BLOOD PRESSURE: 79 MMHG | WEIGHT: 191.7 LBS

## 2018-10-15 LAB
ANION GAP SERPL CALCULATED.3IONS-SCNC: 9 MMOL/L (ref 7–16)
BASOPHILS ABSOLUTE: 0 E9/L (ref 0–0.2)
BASOPHILS RELATIVE PERCENT: 0 % (ref 0–2)
BUN BLDV-MCNC: 7 MG/DL (ref 8–23)
BURR CELLS: ABNORMAL
CALCIUM SERPL-MCNC: 8.2 MG/DL (ref 8.6–10.2)
CHLORIDE BLD-SCNC: 102 MMOL/L (ref 98–107)
CO2: 26 MMOL/L (ref 22–29)
CREAT SERPL-MCNC: 0.7 MG/DL (ref 0.7–1.2)
EOSINOPHILS ABSOLUTE: 0 E9/L (ref 0.05–0.5)
EOSINOPHILS RELATIVE PERCENT: 0 % (ref 0–6)
GFR AFRICAN AMERICAN: >60
GFR NON-AFRICAN AMERICAN: >60 ML/MIN/1.73
GLUCOSE BLD-MCNC: 91 MG/DL (ref 74–109)
HCT VFR BLD CALC: 29.5 % (ref 37–54)
HEMOGLOBIN: 9.9 G/DL (ref 12.5–16.5)
HYPOCHROMIA: ABNORMAL
LV EF: 28 %
LVEF MODALITY: NORMAL
LYMPHOCYTES ABSOLUTE: 0.69 E9/L (ref 1.5–4)
LYMPHOCYTES RELATIVE PERCENT: 4 % (ref 20–42)
MAGNESIUM: 1.8 MG/DL (ref 1.6–2.6)
MCH RBC QN AUTO: 30 PG (ref 26–35)
MCHC RBC AUTO-ENTMCNC: 33.6 % (ref 32–34.5)
MCV RBC AUTO: 89.4 FL (ref 80–99.9)
MONOCYTES ABSOLUTE: 1.38 E9/L (ref 0.1–0.95)
MONOCYTES RELATIVE PERCENT: 8 % (ref 2–12)
NEUTROPHILS ABSOLUTE: 15.22 E9/L (ref 1.8–7.3)
NEUTROPHILS RELATIVE PERCENT: 88 % (ref 43–80)
PDW BLD-RTO: 14.9 FL (ref 11.5–15)
PLATELET # BLD: 281 E9/L (ref 130–450)
PMV BLD AUTO: 9.9 FL (ref 7–12)
POIKILOCYTES: ABNORMAL
POLYCHROMASIA: ABNORMAL
POTASSIUM SERPL-SCNC: 3.4 MMOL/L (ref 3.5–5)
RBC # BLD: 3.3 E12/L (ref 3.8–5.8)
SODIUM BLD-SCNC: 137 MMOL/L (ref 132–146)
TARGET CELLS: ABNORMAL
WBC # BLD: 17.3 E9/L (ref 4.5–11.5)

## 2018-10-15 PROCEDURE — 6360000002 HC RX W HCPCS: Performed by: INTERNAL MEDICINE

## 2018-10-15 PROCEDURE — A9500 TC99M SESTAMIBI: HCPCS | Performed by: RADIOLOGY

## 2018-10-15 PROCEDURE — C9113 INJ PANTOPRAZOLE SODIUM, VIA: HCPCS | Performed by: SURGERY

## 2018-10-15 PROCEDURE — 6360000002 HC RX W HCPCS: Performed by: SURGERY

## 2018-10-15 PROCEDURE — 2580000003 HC RX 258: Performed by: EMERGENCY MEDICINE

## 2018-10-15 PROCEDURE — 93016 CV STRESS TEST SUPVJ ONLY: CPT | Performed by: INTERNAL MEDICINE

## 2018-10-15 PROCEDURE — 93018 CV STRESS TEST I&R ONLY: CPT | Performed by: INTERNAL MEDICINE

## 2018-10-15 PROCEDURE — 6370000000 HC RX 637 (ALT 250 FOR IP): Performed by: INTERNAL MEDICINE

## 2018-10-15 PROCEDURE — 93017 CV STRESS TEST TRACING ONLY: CPT

## 2018-10-15 PROCEDURE — 85025 COMPLETE CBC W/AUTO DIFF WBC: CPT

## 2018-10-15 PROCEDURE — 3430000000 HC RX DIAGNOSTIC RADIOPHARMACEUTICAL: Performed by: RADIOLOGY

## 2018-10-15 PROCEDURE — 80048 BASIC METABOLIC PNL TOTAL CA: CPT

## 2018-10-15 PROCEDURE — 78452 HT MUSCLE IMAGE SPECT MULT: CPT

## 2018-10-15 PROCEDURE — 36415 COLL VENOUS BLD VENIPUNCTURE: CPT

## 2018-10-15 PROCEDURE — 6370000000 HC RX 637 (ALT 250 FOR IP): Performed by: STUDENT IN AN ORGANIZED HEALTH CARE EDUCATION/TRAINING PROGRAM

## 2018-10-15 PROCEDURE — 83735 ASSAY OF MAGNESIUM: CPT

## 2018-10-15 PROCEDURE — 99232 SBSQ HOSP IP/OBS MODERATE 35: CPT | Performed by: INTERNAL MEDICINE

## 2018-10-15 RX ORDER — SUCRALFATE 1 G/1
1 TABLET ORAL
Qty: 120 TABLET | Refills: 3 | Status: SHIPPED | OUTPATIENT
Start: 2018-10-15 | End: 2019-01-15 | Stop reason: CLARIF

## 2018-10-15 RX ORDER — SPIRONOLACTONE 25 MG/1
25 TABLET ORAL DAILY
Qty: 30 TABLET | Refills: 3 | Status: SHIPPED | OUTPATIENT
Start: 2018-10-16

## 2018-10-15 RX ORDER — LOSARTAN POTASSIUM 50 MG/1
50 TABLET ORAL DAILY
Qty: 30 TABLET | Refills: 3 | Status: SHIPPED | OUTPATIENT
Start: 2018-10-16 | End: 2020-07-02 | Stop reason: ALTCHOICE

## 2018-10-15 RX ORDER — POTASSIUM CHLORIDE 20 MEQ/1
40 TABLET, EXTENDED RELEASE ORAL ONCE
Status: COMPLETED | OUTPATIENT
Start: 2018-10-15 | End: 2018-10-15

## 2018-10-15 RX ORDER — FUROSEMIDE 20 MG/1
20 TABLET ORAL DAILY
Qty: 30 TABLET | Refills: 0 | Status: SHIPPED | OUTPATIENT
Start: 2018-10-15 | End: 2018-11-05 | Stop reason: SDUPTHER

## 2018-10-15 RX ORDER — PANTOPRAZOLE SODIUM 40 MG/10ML
40 INJECTION, POWDER, LYOPHILIZED, FOR SOLUTION INTRAVENOUS DAILY
Qty: 90 EACH | Refills: 3 | Status: SHIPPED | OUTPATIENT
Start: 2018-10-15 | End: 2019-08-09 | Stop reason: CLARIF

## 2018-10-15 RX ORDER — METOPROLOL SUCCINATE 50 MG/1
50 TABLET, EXTENDED RELEASE ORAL 2 TIMES DAILY
Qty: 30 TABLET | Refills: 3 | Status: SHIPPED | OUTPATIENT
Start: 2018-10-15

## 2018-10-15 RX ADMIN — SUCRALFATE 1 G: 1 TABLET ORAL at 12:47

## 2018-10-15 RX ADMIN — POTASSIUM CHLORIDE 40 MEQ: 20 TABLET, EXTENDED RELEASE ORAL at 15:29

## 2018-10-15 RX ADMIN — PANTOPRAZOLE SODIUM 40 MG: 40 INJECTION, POWDER, FOR SOLUTION INTRAVENOUS at 08:18

## 2018-10-15 RX ADMIN — TAMSULOSIN HYDROCHLORIDE 0.4 MG: 0.4 CAPSULE ORAL at 12:47

## 2018-10-15 RX ADMIN — Medication 10 MILLICURIE: at 10:01

## 2018-10-15 RX ADMIN — REGADENOSON 0.4 MG: 0.08 INJECTION, SOLUTION INTRAVENOUS at 11:00

## 2018-10-15 RX ADMIN — Medication 10 ML: at 08:19

## 2018-10-15 RX ADMIN — SPIRONOLACTONE 25 MG: 25 TABLET, FILM COATED ORAL at 12:47

## 2018-10-15 RX ADMIN — LOSARTAN POTASSIUM 50 MG: 50 TABLET, FILM COATED ORAL at 12:47

## 2018-10-15 RX ADMIN — METOPROLOL SUCCINATE 50 MG: 50 TABLET, EXTENDED RELEASE ORAL at 12:48

## 2018-10-15 RX ADMIN — Medication 30 MILLICURIE: at 10:01

## 2018-10-15 RX ADMIN — SUCRALFATE 1 G: 1 TABLET ORAL at 17:41

## 2018-10-15 RX ADMIN — FUROSEMIDE 40 MG: 10 INJECTION, SOLUTION INTRAMUSCULAR; INTRAVENOUS at 08:19

## 2018-10-15 ASSESSMENT — PAIN SCALES - GENERAL
PAINLEVEL_OUTOF10: 0

## 2018-10-15 NOTE — PROGRESS NOTES
extremities. Ext: No cyanosis, clubbing, edema  Lymph: No cervical LAD. No supraclavicular LAD. M/S: No cyanosis. No joint deformity. No clubbing. Neuro: Awake. Follows commands. Positive pupils/gag/corneals. Normal pain response. Results:  CBC:   Recent Labs      10/13/18   0615  10/14/18   0356  10/15/18   0745   WBC  12.3*  16.1*  17.3*   HGB  9.5*  9.2*  9.9*   HCT  28.7*  27.2*  29.5*   MCV  91.4  91.0  89.4   PLT  185  232  281     BMP:   Recent Labs      10/13/18   0615  10/14/18   0356  10/15/18   0745   NA  137  132  137   K  2.9*  3.4*  3.4*   CL  98  96*  102   CO2  27  24  26   BUN  5*  7*  7*   CREATININE  0.7  0.6*  0.7     LIVER PROFILE:   No results for input(s): AST, ALT, LIPASE, BILIDIR, BILITOT, ALKPHOS in the last 72 hours. Invalid input(s): AMYLASE,  ALB  PT/INR: No results for input(s): PROTIME, INR in the last 72 hours. APTT: No results for input(s): APTT in the last 72 hours. Pathology:  1. N/A      Microbiology:  1. None    Recent ABG:   No results for input(s): PH, PO2, PCO2, HCO3, BE, O2SAT, METHB, O2HB, COHB, O2CON, HHB, THB in the last 72 hours. FiO2 : 50 %  I:E Ratio: 1:2.30    Recent Films:  Stress/Rest NM Myocardial SPECT RX   Final Result   ? 1. Pharmacologically-induced perfusion defect involving the inferior   wall and the lateral wall, without evidence of reversibility. 2. Diffusely hypokinetic left ventricular wall motion with akinesia of   both the inferior and septal walls, towards the heart base. 3. Left ventricular ejection fraction of 28 %   4. Left ventricular dilatation. XR CHEST PORTABLE   Final Result   Bilateral small pleural effusions and opacities in the lower lungs   suggestive of pneumonia and likely atelectasis, without definite   change when compared to the previous day's study. XR CHEST PORTABLE   Final Result   No change in mild CHF.             XR CHEST PORTABLE   Final Result   Tortuous ectatic aorta   Cardiomegaly

## 2018-10-16 NOTE — DISCHARGE SUMMARY
Physician Discharge Summary     Patient ID:  Joey Sims  [id number]  34 y.o.  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    Stented coronary artery    Hyperlipidemia    H/O psoriatic arthritis    Acute upper GI bleed secondary to marginal ulcer    Hypovolemic shock  Obstructive sleep apnea  Ischemic cardiomyopathy  Supraventricular tachycardia      Consults: cardiology and general surgery    Procedures:     Bibiana Mancera MD Physician Signed General Surgery  Op Note Date of Service: 10/8/2018 10:22 PM         []Manual[]Template  []Copied              [id number] [address]                                OPERATIVE REPORT     PATIENT NAME: Hever Gaston             :         1945  MED REC NO:   [id number]                            ROOM:  ACCOUNT NO:   [de-identified]                           ADMIT DATE:  10/08/2018  PROVIDER:     Bibiana Mancera MD     DATE OF PROCEDURE:  10/08/2018     PREOPERATIVE DIAGNOSIS:  Severe upper GI bleeding.     POSTOPERATIVE DIAGNOSIS:  Large marginal ulcer with active hemorrhage.     PROCEDURE PERFORMED:  EGD with control of hemorrhage via epinephrine  injection and clip application.     SURGEON:  Bibiana Mancera MD     ANESTHESIA:  None.     SPECIMENS:  None.     COMPLICATIONS:  None.     CLINICAL NOTE:  A 80-year-old male who had a history of Scar-en-Y  gastric bypass 15 years ago. He presents with hematemesis, melena,  and hypotension. He was taken from the ER and brought to the  Intensive Care Unit, and fluids and blood products were given.   He was  somewhat hypotensive and bleeding. He denies use of nonsteroidals. He does have history of coronary disease and has had previous intestinal bypass surgery. He denies any chest pain or shortness of breath. He denies any fever or chills. He admits to vague generalized upper abdominal discomfort. His gastric bypass was performed including an approximately 14 years ago.       He was seen by general surgery and taken for urgent surgical intervention with successful results as noted above. He did develop a significant atrial tachydysrhythmia and was seen in consultation by cardiology. He had significant blood loss requiring multiple transfusion. He gradually improved and ultimately underwent an ischemic assessment prior to discharge which was unremarkable. He was subsequently discharged home in stable condition home care and will follow up with general surgery and cardiology at their discretion.       Discharge Exam:  See progress note from today    Disposition: home    Patient Instructions:   Discharge Medication List as of 10/15/2018  6:16 PM      START taking these medications    Details   sucralfate (CARAFATE) 1 GM tablet Take 1 tablet by mouth 4 times daily (with meals and nightly), Disp-120 tablet, R-3Normal      pantoprazole (PROTONIX) 40 MG injection Infuse 40 mg intravenously daily, Disp-90 each, R-3Normal      metoprolol succinate (TOPROL XL) 50 MG extended release tablet Take 1 tablet by mouth 2 times daily, Disp-30 tablet, R-3Normal      spironolactone (ALDACTONE) 25 MG tablet Take 1 tablet by mouth daily, Disp-30 tablet, R-3Normal      furosemide (LASIX) 20 MG tablet Take 1 tablet by mouth daily, Disp-30 tablet, R-0Normal         CONTINUE these medications which have CHANGED    Details   losartan (COZAAR) 50 MG tablet Take 1 tablet by mouth daily, Disp-30 tablet, R-3Normal         CONTINUE these medications which have NOT CHANGED    Details   methotrexate (RHEUMATREX) 2.5 MG chemo tablet Take 10 mg by mouth once a week Historical Med folic acid (FOLVITE) 1 MG tablet Take 1 mg by mouth every morning Historical Med      hydroxychloroquine (PLAQUENIL) 200 MG tablet Take 200 mg by mouth 2 times daily Historical Med      DULoxetine (CYMBALTA) 60 MG extended release capsule Take 60 mg by mouth every morning Historical Med      Multiple Vitamins-Minerals (MULTIVITAMIN ADULT PO) Take 1 tablet by mouth 2 times daily Historical Med      Cholecalciferol (VITAMIN D3) 5000 UNITS TABS Take 1 tablet by mouth every morning Historical Med      tamsulosin (FLOMAX) 0.4 MG capsule Take 0.4 mg by mouth 2 times daily Historical Med      RESTASIS 0.05 % ophthalmic emulsion Place 1 drop into both eyes 2 times daily as needed Historical Med      vitamin B-12 (CYANOCOBALAMIN) 100 MCG tablet Take 50 mcg by mouth Twice a Week Tues and Sat. Historical Med         STOP taking these medications       doxycycline hyclate (VIBRA-TABS) 100 MG tablet Comments:   Reason for Stopping:         calcium carbonate 648 MG TABS Comments:   Reason for Stopping:         Omega-3 Fatty Acids (FISH OIL TRIPLE STRENGTH) 1360 MG CAPS Comments:   Reason for Stopping:         cetirizine (ZYRTEC) 10 MG tablet Comments:   Reason for Stopping:         oxaprozin (DAYPRO) 600 MG tablet Comments:   Reason for Stopping:         Cranberry 500 MG CAPS Comments:   Reason for Stopping:         gabapentin (NEURONTIN) 300 MG capsule Comments:   Reason for Stopping:         Flaxseed, Linseed, 1000 MG CAPS Comments:   Reason for Stopping:         aspirin 81 MG EC tablet Comments:   Reason for Stopping:         famotidine (PEPCID) 20 MG tablet Comments:   Reason for Stopping:         atenolol (TENORMIN) 25 MG tablet Comments:   Reason for Stopping:             Activity: activity as tolerated  Diet: regular diet    Follow-up with Dr Anderson in 1 week.     Note that over 30 minutes was spent in preparing discharge papers, discussing discharge with patient, medication review, etc.    Signed:  [facility] Briana MCKINNON,[facility]  10/16/2018  1:49 PM

## 2018-10-19 ENCOUNTER — TELEPHONE (OUTPATIENT)
Dept: ADMINISTRATIVE | Age: 73
End: 2018-10-19

## 2018-11-05 ENCOUNTER — OFFICE VISIT (OUTPATIENT)
Dept: CARDIOLOGY CLINIC | Age: 73
End: 2018-11-05
Payer: COMMERCIAL

## 2018-11-05 VITALS
DIASTOLIC BLOOD PRESSURE: 72 MMHG | BODY MASS INDEX: 27.31 KG/M2 | WEIGHT: 174 LBS | HEART RATE: 64 BPM | RESPIRATION RATE: 14 BRPM | HEIGHT: 67 IN | SYSTOLIC BLOOD PRESSURE: 118 MMHG

## 2018-11-05 DIAGNOSIS — K92.2 ACUTE UPPER GI BLEED: ICD-10-CM

## 2018-11-05 DIAGNOSIS — I38 VALVULAR HEART DISEASE: ICD-10-CM

## 2018-11-05 DIAGNOSIS — I25.10 CORONARY ARTERY DISEASE INVOLVING NATIVE CORONARY ARTERY OF NATIVE HEART WITHOUT ANGINA PECTORIS: Primary | ICD-10-CM

## 2018-11-05 DIAGNOSIS — I25.5 ISCHEMIC CARDIOMYOPATHY: ICD-10-CM

## 2018-11-05 DIAGNOSIS — I47.1 SVT (SUPRAVENTRICULAR TACHYCARDIA) (HCC): ICD-10-CM

## 2018-11-05 PROCEDURE — 99213 OFFICE O/P EST LOW 20 MIN: CPT | Performed by: PHYSICIAN ASSISTANT

## 2018-11-05 RX ORDER — GABAPENTIN 300 MG/1
CAPSULE ORAL
COMMUNITY
Start: 2018-04-24

## 2018-11-05 RX ORDER — FUROSEMIDE 20 MG/1
20 TABLET ORAL DAILY
Qty: 90 TABLET | Refills: 3 | Status: ON HOLD
Start: 2018-11-05 | End: 2021-12-21 | Stop reason: HOSPADM

## 2018-11-05 NOTE — PROGRESS NOTES
volume index of 41 ml per meters squared BSA.   Borderline aortic stenosis is present. Mild aortic regurgitation is noted.   Moderate mitral regurgitation is present.   Physiologic and/or trace tricuspid regurgitation.   Pulmonary hypertension is mild. RVSP is 45 mmHg. EKG: patient refused EKG today    ASSESSMENT/PLAN:     1. Hx CAD (inf STEMI 1997 s/p PTCA to RCA)- stable stress 10/15/18. Denies any exertional anginal complaints since discharge  2. ICM (EF 35-40%)- compensated on exam today  3. SVT (during hospitalization with GIB 10/18)- HRs controlled today  4. GIB- recent marginal ulcer with hemorrhage s/p epi and clipping. Following with Dr. Abran Sacks. Denies any active bleeding issues. 5. S/p gastric bypass 2005  6. VHD- Echo 10/9/18 noting mod MR, mild AI, mild PH  7. Psoriatic arthritis- follows with [facility]    - Echo and stress test results reviewed with patient in detail  - continue ASA, Toprol XL, Cozaar, Aldactone, Lasix  - encouraged monitoring of daily weights, monitoring fluid intake, maintaining low sodium diet  - monitor H/H closely. Maintain Hgb > 9.0 recommended  - monitor VHD with serial Echos  - he will return to see Dr. Sherlyn Barrera in 1-2 months or sooner if any issues arise               Thank you for entrusting us to participate in [address] [place] [address]. If you have any questions, please don't hesitate to call us at [facility].     Sincerely,    Zayda Boothe PA-C  [facility]

## 2018-12-17 ENCOUNTER — HOSPITAL ENCOUNTER (OUTPATIENT)
Age: 73
Discharge: HOME | End: 2018-12-19
Payer: COMMERCIAL

## 2018-12-17 LAB — PROSTATE SPECIFIC ANTIGEN: 2.29 NG/ML (ref 0–4)

## 2018-12-17 PROCEDURE — G0103 PSA SCREENING: HCPCS

## 2019-01-15 ENCOUNTER — OFFICE VISIT (OUTPATIENT)
Dept: CARDIOLOGY CLINIC | Age: 74
End: 2019-01-15
Payer: COMMERCIAL

## 2019-01-15 VITALS
WEIGHT: 189.6 LBS | BODY MASS INDEX: 29.76 KG/M2 | RESPIRATION RATE: 16 BRPM | DIASTOLIC BLOOD PRESSURE: 70 MMHG | HEART RATE: 55 BPM | HEIGHT: 67 IN | SYSTOLIC BLOOD PRESSURE: 120 MMHG

## 2019-01-15 DIAGNOSIS — I25.2 MI, OLD: ICD-10-CM

## 2019-01-15 DIAGNOSIS — I51.9 LV DYSFUNCTION: Primary | ICD-10-CM

## 2019-01-15 DIAGNOSIS — I47.1 SVT (SUPRAVENTRICULAR TACHYCARDIA) (HCC): ICD-10-CM

## 2019-01-15 DIAGNOSIS — E78.00 PURE HYPERCHOLESTEROLEMIA: ICD-10-CM

## 2019-01-15 DIAGNOSIS — Z95.5 STENTED CORONARY ARTERY: ICD-10-CM

## 2019-01-15 DIAGNOSIS — I10 ESSENTIAL HYPERTENSION: ICD-10-CM

## 2019-01-15 PROCEDURE — 93000 ELECTROCARDIOGRAM COMPLETE: CPT | Performed by: INTERNAL MEDICINE

## 2019-01-15 PROCEDURE — 99214 OFFICE O/P EST MOD 30 MIN: CPT | Performed by: INTERNAL MEDICINE

## 2019-04-08 ENCOUNTER — OFFICE VISIT (OUTPATIENT)
Dept: CARDIOLOGY CLINIC | Age: 74
End: 2019-04-08
Payer: COMMERCIAL

## 2019-04-08 VITALS
HEIGHT: 66 IN | SYSTOLIC BLOOD PRESSURE: 128 MMHG | DIASTOLIC BLOOD PRESSURE: 72 MMHG | HEART RATE: 51 BPM | RESPIRATION RATE: 12 BRPM | BODY MASS INDEX: 29.41 KG/M2 | WEIGHT: 183 LBS

## 2019-04-08 DIAGNOSIS — R00.1 BRADYCARDIA: ICD-10-CM

## 2019-04-08 DIAGNOSIS — I10 HYPERTENSION, UNSPECIFIED TYPE: ICD-10-CM

## 2019-04-08 DIAGNOSIS — I25.10 CORONARY ARTERY DISEASE INVOLVING NATIVE CORONARY ARTERY OF NATIVE HEART WITHOUT ANGINA PECTORIS: ICD-10-CM

## 2019-04-08 DIAGNOSIS — I38 VHD (VALVULAR HEART DISEASE): ICD-10-CM

## 2019-04-08 DIAGNOSIS — I50.22 CHRONIC SYSTOLIC HEART FAILURE (HCC): Primary | ICD-10-CM

## 2019-04-08 DIAGNOSIS — I51.9 LV DYSFUNCTION: ICD-10-CM

## 2019-04-08 PROCEDURE — 99213 OFFICE O/P EST LOW 20 MIN: CPT | Performed by: PHYSICIAN ASSISTANT

## 2019-04-08 RX ORDER — PYRIDOXINE HCL (VITAMIN B6) 100 MG
500 TABLET ORAL
COMMUNITY

## 2019-04-08 NOTE — PATIENT INSTRUCTIONS
Patient Education        Limiting Sodium and Fluids With Heart Failure: Care Instructions  Your Care Instructions    Sodium causes your body to hold on to extra water. This may cause your heart failure symptoms to get worse. Limiting sodium may help you feel better and lower your risk of having to go to the hospital.  People get most of their sodium from processed foods. Fast food and restaurant meals also tend to be very high in sodium. Your doctor may suggest that you limit sodium to 2,000 milligrams (mg) a day or less. That is less than 1 teaspoon of salt a day, including all the salt you eat in cooked or packaged foods. Usually, you have to limit the amount of liquids you drink only if your heart failure is severe. Limiting sodium alone often is enough to help your body get rid of extra fluids. However, your doctor may tell you to limit your fluid intake to a set amount each day. Follow-up care is a key part of your treatment and safety. Be sure to make and go to all appointments, and call your doctor if you are having problems. It's also a good idea to know your test results and keep a list of the medicines you take. How can you care for yourself at home? Read food labels  · Read food labels on cans and food packages. The labels tell you how much sodium is in each serving. Make sure that you look at the serving size. If you eat more than the serving size, you have eaten more sodium than is listed for one serving. · Food labels also tell you the Percent Daily Value. If the Percent Daily Value says 50%, it means that you will get at least 50% of all the sodium you need for the entire day in one serving. Choose products with low Percent Daily Values for sodium. · Be aware that sodium can come in forms other than salt, including monosodium glutamate (MSG), sodium citrate, and sodium bicarbonate (baking soda). MSG is often added to Asian food. You can sometimes ask for food without MSG or salt.   Buy pretzels. · Western [place]anya fries, pizza, tacos, and other fast foods. · Pickles, olives, ketchup, and other condiments, especially soy sauce, unless labeled sodium-free or low-sodium. If you cannot cook for yourself  · Have family members or friends help you, or have someone cook low-sodium meals. · Check with your local senior nutrition program to find out where meals are served and whether they offer a low-sodium option. You can often find these programs through your local health department or hospital.  · Have meals delivered to your home. Most [facility] have a Meals on [employer] [place]. These programs provide one hot meal a day for older adults, delivered to their homes. Ask whether these meals are low-sodium. Let them know that you are on a low-sodium diet. Limiting fluid intake  · Find a method that works for you. You might simply write down how much you drink every time you do. Some people keep a container filled with the amount of fluid allowed for that day. If they drink from a source other than the container, then they pour out that amount. · Measure your regular drinking glasses to find out how much fluid each one holds. Once you know this, you will not have to measure every time. · Besides water, milk, juices, and other drinks, some foods have a lot of fluid. Count any foods that will melt (such as ice cream or gelatin dessert) or liquid foods (such as soup) as part of your fluid intake for the day. Where can you learn more? Go to https://evi.[email or web address] org and sign in to your [employer] account. Enter A166 in the [facility] box to learn more about \"Limiting Sodium and Fluids With Heart Failure: Care Instructions. \"     If you do not have an account, please click on the \"Sign Up Now\" link. Current as of: July 22, 2018  Content Version: 11.9  © 3989-9689 for; [employer]. Care instructions adapted under license by [facility] ([facility]).  If you have questions about a medical condition

## 2019-04-08 NOTE — PROGRESS NOTES
[facility]        Dear Dr. Julio Jerez MD,    We had the pleasure of seeing Silvia Richard in the [facility] office. His primary cardiologist is Dr. Luciano Sarah. Chief Complaint: 3 month OV for CAD, HFrEF, VHD, HTN  Chief Complaint   Patient presents with    3 Month Follow-Up    Coronary Artery Disease       Date of Service: 4/8/2019    HISTORY OF PRESENT ILLNESS:    Silvia Richard is a pleasant 68 y.o. male with a past medical history below. Patient presents for 3 month f/u for history of CAD, ICM, HFrEF, and VHD. Over the past few months, he has been able to ambulate and do light ADLs without any exertional chest pain, significant LANDON, palpitations, or dizziness. He is down 6 pounds since his OV in 1/19. He denies any recent leg edema, orthopnea, PND, or abdominal distension. He has maintained compliance on his medications. He did inquire with his insurance company regarding [employer] (he was quoted around $300 + per month for the medication, which he states he cannot afford monthly). His wife and daughter are present during exam today.       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 Family History   Problem Relation Age of Onset    Hypertension Mother     Hypertension Father     Other Sister         back problems    Heart Disease Brother     Other Sister         MS    Other Brother         aneurysm          REVIEW OF SYSTEMS:  Cardiac: As per HPI  General: No fever, chills + mild fatigue  Pulmonary: As per HPI  HEENT: No visual disturbances, difficult swallowing  GI: No nausea, vomiting, abdominal pain  : No dysuria or hematuria  Endocrine: No thyroid disease or diabetes  Musculoskeletal: LAY x 4, no focal motor deficits  Skin: Intact, no rashes  Neuro/Psych: No headache or seizures    PHYSICAL EXAMINATION:   Vitals:    04/08/19 0800   BP: 128/72   Pulse: 51   Resp: 12   Weight: 183 lb (83 kg)   Height: 5' 6\" (1.676 m)       Constitutional: A well developed, well nourished 68 y.o. male who is alert, oriented, cooperative and in no apparent distress. Skin: Intact, no rash  Head: Normocephalic, atraumatic  Eyes: EOMI, no conjunctival erythema  ENT: No pharyngeal erythema, MMM, no rhinorrhea  Neck: Supple, no elevated JVP, no carotid bruits  Lungs: Clear to auscultation bilaterally. No wheezes, rales, or rhonchi. Cardiac: Regular rhythm, bradycardic, +S1S2, 1/6 CHRIS noted  Abdomen: Soft, nontender, +bowel sounds  Extremities: Moves all extremities x 4, no lower extremity edema  Neurologic: No focal motor deficits apparent, normal mood and affect, alert and oriented x 3  Peripheral Pulses: Intact posterior tibial pulses bilaterally    DATA:   The data collected below information that was obtained, reviewed, analyzed and interpreted today. Stress Test:  10/15/18  1. Pharmacologically-induced perfusion defect involving the inferior   wall and the lateral wall, without evidence of reversibility.    2. Diffusely hypokinetic left ventricular wall motion with akinesia of   both the inferior and septal walls, towards the heart base. 3. Left ventricular ejection fraction of 28 %   4. Left ventricular dilatation. Echocardiogram: 10/9/18   Summary   Ejection fraction is visually estimated at 35-40%.   The inferolateral wall is hypokinetic suggesting underlying CAD.   There is doppler evidence of stage I diastolic dysfunction.   Mildly enlarged right ventricle with normal function. TAPSE is 1.7 cm.   Left atrial volume index of 41 ml per meters squared BSA.   Borderline aortic stenosis is present. Mild aortic regurgitation is noted.   Moderate mitral regurgitation is present.   Physiologic and/or trace tricuspid regurgitation.   Pulmonary hypertension is mild. RVSP is 45 mmHg. EKG: patient refused EKG today      ASSESSMENT/PLAN:     1. Hx CAD (inf STEMI 1997 s/p PTCA to RCA)- stable stress 10/15/18. Denies any exertional anginal complaints since discharge  2. HFrEF/ICM (EF 35-40%)- compensated on exam today  3. SVT (during hospitalization with GIB 10/18)- HRs controlled today  4. GIB- recent marginal ulcer with hemorrhage s/p epi and clipping. Following with Dr. Odessia Nissen. Denies any active bleeding issues. 5. S/p gastric bypass 2005  6. VHD- Echo 10/9/18 noting mod MR, mild AI, mild PH  7. Psoriatic arthritis- follows with [facility]     - Echo and stress test results reviewed with patient in detail  - continue ASA, Toprol XL, Cozaar, Aldactone, Lasix  - he states Shlomo Pérez will cost $300+ per month per his insurance company; which he states he cannot afford monthly. He is doing well on above medications. - encouraged monitoring of daily weights, monitoring fluid intake, maintaining low sodium diet  - monitor H/H closely. Maintain Hgb > 9.0 recommended  - monitor VHD with serial Echos  - he will return to see Dr. Sujatha Lovett in 4 months or sooner if any issues arise              Thank you for entrusting us to participate in [address] [place] [address].   If you have

## 2019-06-17 ENCOUNTER — HOSPITAL ENCOUNTER (OUTPATIENT)
Age: 74
Discharge: HOME | End: 2019-06-17
Payer: COMMERCIAL

## 2019-06-17 LAB — PROSTATE SPECIFIC ANTIGEN: 4.35 NG/ML (ref 0–4)

## 2019-06-17 PROCEDURE — 87186 SC STD MICRODIL/AGAR DIL: CPT

## 2019-06-17 PROCEDURE — 84153 ASSAY OF PSA TOTAL: CPT

## 2019-06-17 PROCEDURE — 87088 URINE BACTERIA CULTURE: CPT

## 2019-06-17 PROCEDURE — 36415 COLL VENOUS BLD VENIPUNCTURE: CPT

## 2019-06-19 LAB
ORGANISM: ABNORMAL
URINE CULTURE, ROUTINE: ABNORMAL
URINE CULTURE, ROUTINE: ABNORMAL

## 2019-07-09 ENCOUNTER — HOSPITAL ENCOUNTER (OUTPATIENT)
Age: 74
Discharge: HOME | End: 2019-07-11
Payer: COMMERCIAL

## 2019-07-09 PROCEDURE — 87186 SC STD MICRODIL/AGAR DIL: CPT

## 2019-07-09 PROCEDURE — 87088 URINE BACTERIA CULTURE: CPT

## 2019-07-09 PROCEDURE — 87077 CULTURE AEROBIC IDENTIFY: CPT

## 2019-07-11 LAB
ORGANISM: ABNORMAL
URINE CULTURE, ROUTINE: ABNORMAL
URINE CULTURE, ROUTINE: ABNORMAL

## 2019-07-22 ENCOUNTER — HOSPITAL ENCOUNTER (OUTPATIENT)
Age: 74
Discharge: HOME | End: 2019-07-24
Payer: COMMERCIAL

## 2019-07-22 ENCOUNTER — HOSPITAL ENCOUNTER (OUTPATIENT)
Dept: GENERAL RADIOLOGY | Age: 74
Discharge: HOME | End: 2019-07-24
Payer: COMMERCIAL

## 2019-07-22 DIAGNOSIS — R05.9 COUGH: ICD-10-CM

## 2019-07-22 PROCEDURE — 71046 X-RAY EXAM CHEST 2 VIEWS: CPT

## 2019-08-09 ENCOUNTER — OFFICE VISIT (OUTPATIENT)
Dept: CARDIOLOGY CLINIC | Age: 74
End: 2019-08-09
Payer: COMMERCIAL

## 2019-08-09 ENCOUNTER — HOSPITAL ENCOUNTER (OUTPATIENT)
Age: 74
Discharge: HOME | End: 2019-08-11
Payer: COMMERCIAL

## 2019-08-09 VITALS
SYSTOLIC BLOOD PRESSURE: 108 MMHG | HEART RATE: 62 BPM | RESPIRATION RATE: 12 BRPM | DIASTOLIC BLOOD PRESSURE: 68 MMHG | WEIGHT: 180 LBS | HEIGHT: 66 IN | BODY MASS INDEX: 28.93 KG/M2

## 2019-08-09 DIAGNOSIS — I38 VHD (VALVULAR HEART DISEASE): ICD-10-CM

## 2019-08-09 DIAGNOSIS — I10 HYPERTENSION, UNSPECIFIED TYPE: ICD-10-CM

## 2019-08-09 DIAGNOSIS — I25.10 CORONARY ARTERY DISEASE INVOLVING NATIVE CORONARY ARTERY OF NATIVE HEART WITHOUT ANGINA PECTORIS: ICD-10-CM

## 2019-08-09 DIAGNOSIS — I25.2 MI, OLD: ICD-10-CM

## 2019-08-09 DIAGNOSIS — I10 ESSENTIAL HYPERTENSION: ICD-10-CM

## 2019-08-09 DIAGNOSIS — I50.22 CHRONIC SYSTOLIC HEART FAILURE (HCC): ICD-10-CM

## 2019-08-09 DIAGNOSIS — I51.9 LV DYSFUNCTION: Primary | ICD-10-CM

## 2019-08-09 DIAGNOSIS — R00.1 BRADYCARDIA: ICD-10-CM

## 2019-08-09 DIAGNOSIS — E78.00 PURE HYPERCHOLESTEROLEMIA: ICD-10-CM

## 2019-08-09 DIAGNOSIS — I47.1 SVT (SUPRAVENTRICULAR TACHYCARDIA) (HCC): ICD-10-CM

## 2019-08-09 PROCEDURE — 87088 URINE BACTERIA CULTURE: CPT

## 2019-08-09 PROCEDURE — 99214 OFFICE O/P EST MOD 30 MIN: CPT | Performed by: INTERNAL MEDICINE

## 2019-08-09 RX ORDER — PNV NO.95/FERROUS FUM/FOLIC AC 28MG-0.8MG
TABLET ORAL DAILY
Refills: 1 | COMMUNITY
Start: 2019-07-23

## 2019-08-09 RX ORDER — PANTOPRAZOLE SODIUM 40 MG/1
TABLET, DELAYED RELEASE ORAL
Refills: 3 | COMMUNITY
Start: 2019-07-23

## 2019-08-09 NOTE — PROGRESS NOTES
into both eyes 2 times daily as needed        No current facility-administered medications for this visit. CC: Patient is seen for new problem of worsening LV function and in follow-up for:  1. LV dysfunction    2. 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 SVT (supraventricular tachycardia) ([address]    4. Essential hypertension    5. Pure hypercholesterolemia    6. Stented coronary artery        HPI  Patient is seen in routine follow-up. Tolerating medications well without side effects. No chest pain, SOB or lightheadedness. Of concern is decrease in EF% on recent non invasive evaluation. Was seen in evaluation at [facility]. Review of Systems   Constitutional: Positive for fatigue. HENT: Negative for nosebleeds. Respiratory: Negative for cough. Cardiovascular: Negative for chest pain, palpitations and leg swelling. Gastrointestinal: Negative for blood in stool. Genitourinary: Negative for hematuria. Musculoskeletal: Negative for myalgias. Skin: Negative for color change. Neurological: Negative for dizziness, weakness and numbness. Hematological: Does not bruise/bleed easily. Psychiatric/Behavioral: Negative for sleep disturbance. The patient is not nervous/anxious. Objective:   Physical Exam  /78 pulse 70  NECK: No bruits. No JVD at 30 degrees. LUNGS: Clear to auscultation and percussion in all fields. HEART: Regular rate and rhythm. Quiet precordium. Normal S1-S2 in intensity and splitting. Gr I-II/VI HSM at LLSB   ABDOMEN: Soft without. masses or organomegaly. EXTREMITIES: 2+ left and 1+ right ankle edema No cyanosis. PERIPHERAL VASCULAR: Radial pulses are equal and symmetric. NEUROLOGIC: Grossly intact. HEMATOLOGIC:  No palpable nodes or splenomegaly. EKG: Normal sinus rhythm. Remote Inferior M.I. No change compared to prior tracing. Assessment:      1. LV dysfunction    2. SVT (supraventricular tachycardia) ([address]    3. Hypertension, unspecified type    4.

## 2019-08-11 LAB — URINE CULTURE, ROUTINE: NORMAL

## 2019-10-28 ENCOUNTER — HOSPITAL ENCOUNTER (OUTPATIENT)
Age: 74
Discharge: HOME | End: 2019-10-30
Payer: COMMERCIAL

## 2019-10-28 LAB — PROSTATE SPECIFIC ANTIGEN: 1.03 NG/ML (ref 0–4)

## 2019-10-28 PROCEDURE — 84153 ASSAY OF PSA TOTAL: CPT

## 2019-11-05 ENCOUNTER — HOSPITAL ENCOUNTER (OUTPATIENT)
Age: 74
Discharge: HOME | End: 2019-11-07
Payer: COMMERCIAL

## 2019-11-05 PROCEDURE — 88305 TISSUE EXAM BY PATHOLOGIST: CPT

## 2020-05-11 ENCOUNTER — HOSPITAL ENCOUNTER (OUTPATIENT)
Age: 75
Discharge: HOME | End: 2020-05-13
Payer: MEDICARE

## 2020-05-11 LAB — PROSTATE SPECIFIC ANTIGEN: 0.9 NG/ML (ref 0–4)

## 2020-05-11 PROCEDURE — 84153 ASSAY OF PSA TOTAL: CPT

## 2020-06-10 ENCOUNTER — OFFICE VISIT (OUTPATIENT)
Dept: CARDIOLOGY CLINIC | Age: 75
End: 2020-06-10
Payer: MEDICARE

## 2020-06-10 VITALS
BODY MASS INDEX: 30.95 KG/M2 | HEART RATE: 64 BPM | DIASTOLIC BLOOD PRESSURE: 62 MMHG | WEIGHT: 197.2 LBS | SYSTOLIC BLOOD PRESSURE: 110 MMHG | HEIGHT: 67 IN | RESPIRATION RATE: 18 BRPM

## 2020-06-10 PROCEDURE — 99214 OFFICE O/P EST MOD 30 MIN: CPT | Performed by: INTERNAL MEDICINE

## 2020-06-10 PROCEDURE — 93000 ELECTROCARDIOGRAM COMPLETE: CPT | Performed by: INTERNAL MEDICINE

## 2020-06-22 ENCOUNTER — HOSPITAL ENCOUNTER (OUTPATIENT)
Age: 75
Discharge: HOME | End: 2020-06-24
Payer: MEDICARE

## 2020-06-22 ENCOUNTER — HOSPITAL ENCOUNTER (OUTPATIENT)
Dept: GENERAL RADIOLOGY | Age: 75
Discharge: HOME | End: 2020-06-24
Payer: MEDICARE

## 2020-06-22 PROCEDURE — 71046 X-RAY EXAM CHEST 2 VIEWS: CPT

## 2020-06-29 ENCOUNTER — TELEPHONE (OUTPATIENT)
Dept: CARDIOLOGY | Age: 75
End: 2020-06-29

## 2020-06-29 NOTE — TELEPHONE ENCOUNTER
Left message on voice mail reminding patient of Lexiscan stress test on July 1, 2020 at 093. Instructions for testing left on message and asked patient to call office with any questions.

## 2020-07-01 ENCOUNTER — HOSPITAL ENCOUNTER (OUTPATIENT)
Dept: CARDIOLOGY | Age: 75
Discharge: HOME | End: 2020-07-01
Payer: MEDICARE

## 2020-07-01 ENCOUNTER — TELEPHONE (OUTPATIENT)
Dept: CARDIOLOGY CLINIC | Age: 75
End: 2020-07-01

## 2020-07-01 VITALS
WEIGHT: 195 LBS | DIASTOLIC BLOOD PRESSURE: 70 MMHG | HEART RATE: 72 BPM | SYSTOLIC BLOOD PRESSURE: 132 MMHG | HEIGHT: 67 IN | BODY MASS INDEX: 30.61 KG/M2

## 2020-07-01 PROCEDURE — 3430000000 HC RX DIAGNOSTIC RADIOPHARMACEUTICAL: Performed by: INTERNAL MEDICINE

## 2020-07-01 PROCEDURE — 6360000002 HC RX W HCPCS: Performed by: INTERNAL MEDICINE

## 2020-07-01 PROCEDURE — 93017 CV STRESS TEST TRACING ONLY: CPT

## 2020-07-01 PROCEDURE — A9500 TC99M SESTAMIBI: HCPCS | Performed by: INTERNAL MEDICINE

## 2020-07-01 PROCEDURE — 78452 HT MUSCLE IMAGE SPECT MULT: CPT

## 2020-07-01 PROCEDURE — 2580000003 HC RX 258: Performed by: INTERNAL MEDICINE

## 2020-07-01 RX ORDER — SODIUM CHLORIDE 0.9 % (FLUSH) 0.9 %
10 SYRINGE (ML) INJECTION PRN
Status: DISCONTINUED | OUTPATIENT
Start: 2020-07-01 | End: 2020-07-02 | Stop reason: HOSPADM

## 2020-07-01 RX ADMIN — SODIUM CHLORIDE, PRESERVATIVE FREE 10 ML: 5 INJECTION INTRAVENOUS at 11:30

## 2020-07-01 RX ADMIN — SODIUM CHLORIDE, PRESERVATIVE FREE 10 ML: 5 INJECTION INTRAVENOUS at 10:20

## 2020-07-01 RX ADMIN — Medication 10.2 MILLICURIE: at 10:20

## 2020-07-01 RX ADMIN — Medication 31.8 MILLICURIE: at 11:30

## 2020-07-01 RX ADMIN — SODIUM CHLORIDE, PRESERVATIVE FREE 10 ML: 5 INJECTION INTRAVENOUS at 11:31

## 2020-07-01 RX ADMIN — REGADENOSON 0.4 MG: 0.08 INJECTION, SOLUTION INTRAVENOUS at 11:30

## 2020-07-01 NOTE — PROCEDURES
[address] [facility] [address]  [phone]                Pharmacologic Stress Nuclear Gated SPECT Study    Name: Lynda Gaitan Account Number: [de-identified]    :  1945          Sex: male         Date of Study:  2020    Height: 5' 7\" (170.2 cm)         Weight: 195 lb (88.5 kg)     Ordering Provider: Apryl Davidson MD          PCP: Felecia Lebron MD      Cardiologist: Apryl Davidson MD             Interpreting Physician: Apryl Davidson MD  _________________________________________________________________________________    Indication:   Evaluation of extent and severity of coronary artery disease    Clinical History:   Patient has prior history of coronary artery disease. Resting ECG:    MS int .20 sec, QRS int .08 sec, QT int .42 sec; HR 52 bpm  Normal sinus rhythm and Nonspecific ST-T wave changes    Procedure:   Pharmacologic stress testing was performed with regadenoson 0.4 mg for 15 seconds. Additionally, low-level exercise was performed along with the infusion. The heart rate was 52 at baseline and bonnie to 84 beats during the infusion. This corresponds to 57% of maximum predicted heart rate. The blood pressure at baseline was 142/82 and blood pressure at the end of infusion was 132/70. Blood pressure response was normal during the stress procedure. The patient experienced mild increased breathing post infusion. Symptoms resolved post coffee. ECG during the infusion did not change. IMAGING: Myocardial perfusion imaging was performed at rest 30-35 minutes following the intravenous injection of 10.2 mCi of (Tc-Sestamibi) followed by 10 ml of Normal Saline. As per infusion protocol, the patient was injected intravenously with 31.8 mCi of (Tc-Sestamibi) followed by 10 ml of Normal Saline. Gated post-stress tomographic imaging was performed 20-25 minutes after stress.      FINDINGS: The overall quality of the study was good. Left ventricular cavity size was noted to be normal.    Rotational analog analysis demonstrated no patient motion or abnormal extracardiac radioactivity. A severe defect was present in the mid inferolateral wall(s) that was  large sized by quantification. The resting images show no change. Gated SPECT left ventricular ejection fraction was calculated to be 29%, with akinesis of the inferolateral wall. Impression:    1. ECG during the infusion did not change. 2. The myocardial perfusion imaging was abnormal. The abnormality was a a large sized fixed defect in the inferolateral wall suggestive of a prior MI  3. Overall left ventricular systolic function was abnormal with regional wall motion abnormalities. 4. High risk general pharmacologic stress test. Due to LV dysfunction. Thank you for sending your patient to this [employer].      Electronically signed by Lesley Munson MD on 7/1/20 at 1:17 PM EDT

## 2020-07-02 NOTE — TELEPHONE ENCOUNTER
Please let patient know that stress test is unchanged. Would like to change his medications to see if he would feel better. Would like to discontinue Cozaar and start him on Entresto. Office follow-up in 1 month.   Entresto 24/26 twice daily #60 refill x2

## 2020-08-03 ENCOUNTER — OFFICE VISIT (OUTPATIENT)
Dept: CARDIOLOGY CLINIC | Age: 75
End: 2020-08-03
Payer: MEDICARE

## 2020-08-03 VITALS
HEART RATE: 74 BPM | HEIGHT: 67 IN | BODY MASS INDEX: 30.95 KG/M2 | WEIGHT: 197.2 LBS | TEMPERATURE: 97.6 F | RESPIRATION RATE: 20 BRPM | DIASTOLIC BLOOD PRESSURE: 76 MMHG | SYSTOLIC BLOOD PRESSURE: 118 MMHG

## 2020-08-03 PROCEDURE — 99213 OFFICE O/P EST LOW 20 MIN: CPT | Performed by: INTERNAL MEDICINE

## 2020-08-03 RX ORDER — SACUBITRIL AND VALSARTAN 49; 51 MG/1; MG/1
1 TABLET, FILM COATED ORAL 2 TIMES DAILY
COMMUNITY
End: 2020-08-03 | Stop reason: SDUPTHER

## 2020-08-03 RX ORDER — SACUBITRIL AND VALSARTAN 49; 51 MG/1; MG/1
1 TABLET, FILM COATED ORAL 2 TIMES DAILY
Qty: 60 TABLET | Refills: 3 | Status: SHIPPED
Start: 2020-08-03 | End: 2020-08-03 | Stop reason: SDUPTHER

## 2020-08-03 RX ORDER — SACUBITRIL AND VALSARTAN 49; 51 MG/1; MG/1
1 TABLET, FILM COATED ORAL 2 TIMES DAILY
Qty: 60 TABLET | Refills: 3 | Status: SHIPPED
Start: 2020-08-03 | End: 2020-11-05 | Stop reason: CLARIF

## 2020-08-03 NOTE — PROGRESS NOTES
Subjective:      Patient ID: Saji Moy is a 77 y.o. male.   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 07/05/2012    CAD (coronary artery disease)s/p stent RCA     Valvular heart disease     Hypertension     Obesity, morbid s/p intestinal bypass        Current Outpatient Medications   Medication Sig Dispense Refill    sacubitril-valsartan (ENTRESTO) 49-51 MG per tablet Take 1 tablet by mouth 2 times daily 60 tablet 3    fexofenadine (ALLEGRA ALLERGY) 180 MG tablet Take 180 mg by mouth daily      azelastine HCl 0.15 % SOLN 2 sprays by Nasal route daily      risperiDONE (RISPERDAL) 1 MG tablet Take 1 mg by mouth daily       Ferrous Sulfate (IRON) 325 (65 Fe) MG TABS daily   1    pantoprazole (PROTONIX) 40 MG tablet TAKE 1 TABLET BY MOUTH ONCE DAILY  3    Cranberry 500 MG CAPS Take 500 mg by mouth      gabapentin (NEURONTIN) 300 MG capsule Take two capsules three times daily      aspirin 81 MG tablet Take 81 mg by mouth daily      furosemide (LASIX) 20 MG tablet Take 1 tablet by mouth daily 90 tablet 3    metoprolol succinate (TOPROL XL) 50 MG extended release tablet Take 1 tablet by mouth 2 times daily 30 tablet 3    spironolactone (ALDACTONE) 25 MG tablet Take 1 tablet by mouth daily 30 tablet 3    methotrexate (RHEUMATREX) 2.5 MG chemo tablet Take 10 mg by mouth once a week       folic acid (FOLVITE) 1 MG tablet Take 1 mg by mouth every morning       hydroxychloroquine (PLAQUENIL) 200 MG tablet Take 200 mg by mouth 2 times daily       DULoxetine (CYMBALTA) 60 MG extended release capsule Take 60 mg by mouth every morning       Multiple Vitamins-Minerals (MULTIVITAMIN ADULT PO) Take 1 tablet by mouth 2 times daily       Cholecalciferol (VITAMIN D3) 5000 UNITS TABS Take 1 tablet by mouth every morning       tamsulosin (FLOMAX) 0.4 MG capsule Take 0.4 mg by mouth every other day       RESTASIS 0.05 % ophthalmic emulsion Place 1 drop into both eyes 2 times daily as needed       Respiratory Therapy Supplies MONAE 1 Device by Does not apply route every 3 hours as needed (increase pressure to 12) (Patient taking differently: 1 Device by Does not apply route every 3 hours as needed (increase pressure to 12) C-PaP EVERY NIGHT) 1 Device 0     No current facility-administered medications for this visit. CC: Patient is seen in follow-up for:  1. LV dysfunction    2. 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 SVT (supraventricular tachycardia) ([address]    4. Essential hypertension    5. Pure hypercholesterolemia    6. Stented coronary artery    7. Mitral valve regurgitation-moderate    HPI  Significantly improved since switching to Entresto. Wife notes that he is more alert and less short of breath with activities. Review of Systems   Constitutional: Less fatigue. HENT: Negative for nosebleeds. Respiratory: Negative for cough. Cardiovascular: Negative for palpitations and leg swelling. Gastrointestinal: Negative for blood in stool. Genitourinary: Negative for hematuria. Musculoskeletal: Negative for myalgias. Skin: Negative for color change. Neurological: Negative for dizziness, weakness and numbness. Hematological: Does not bruise/bleed easily. Psychiatric/Behavioral: Negative for sleep disturbance. The patient is not nervous/anxious. Objective:   Physical Exam  /78 pulse 70  NECK: No bruits. No JVD at 30 degrees. LUNGS: Clear to auscultation and percussion in all fields. HEART: Regular rate and rhythm. Quiet precordium. Normal S1-S2 in intensity and splitting. Gr I-II/VI HSM at [facility]   ABDOMEN: Soft without. masses or organomegaly. EXTREMITIES: 1+ left and 1+ right ankle edema No cyanosis.     PERIPHERAL VASCULAR: Radial pulses are equal and symmetric. NEUROLOGIC: Grossly intact. HEMATOLOGIC:  No palpable nodes or splenomegaly. EKG: Normal sinus rhythm. Remote Inferior M.I. No change compared to prior tracing. Assessment:      1. LV dysfunction    2. Hypertension, unspecified type    3. Coronary artery disease involving native coronary artery of native heart without angina pectoris    4. Chronic systolic heart failure ([address]    5. VHD (valvular heart disease)    6. 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 Essential hypertension    8. Pure hypercholesterolemia    9. Bradycardia    11. Mitral valve regurgitation-moderate in severity    Chronic CHF -improved post initiation of Entresto      Plan:     1.   Reviewed labs 6/22/2020 creatinine 0.7, BUN 12, and LDL 50.  2. Increase Entresto to 49/51 dose      Follow up in 3 months

## 2020-11-05 ENCOUNTER — OFFICE VISIT (OUTPATIENT)
Dept: CARDIOLOGY CLINIC | Age: 75
End: 2020-11-05
Payer: MEDICARE

## 2020-11-05 VITALS
OXYGEN SATURATION: 97 % | SYSTOLIC BLOOD PRESSURE: 102 MMHG | BODY MASS INDEX: 32.28 KG/M2 | RESPIRATION RATE: 16 BRPM | HEART RATE: 64 BPM | WEIGHT: 189.1 LBS | DIASTOLIC BLOOD PRESSURE: 60 MMHG | HEIGHT: 64 IN

## 2020-11-05 PROCEDURE — 99214 OFFICE O/P EST MOD 30 MIN: CPT | Performed by: INTERNAL MEDICINE

## 2020-11-05 NOTE — PROGRESS NOTES
Subjective:      Patient ID: Jairo Aleman is a 77 y.o. male.   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/05/2012    CAD (coronary artery disease)s/p stent RCA     Valvular heart disease     Hypertension     Obesity, morbid s/p intestinal bypass        Current Outpatient Medications   Medication Sig Dispense Refill    sacubitril-valsartan (ENTRESTO)  MG per tablet Take 1 tablet by mouth 2 times daily 180 tablet 3    fexofenadine (ALLEGRA ALLERGY) 180 MG tablet Take 180 mg by mouth daily      azelastine HCl 0.15 % SOLN 2 sprays by Nasal route daily      risperiDONE (RISPERDAL) 1 MG tablet Take 1 mg by mouth daily       Respiratory Therapy Supplies MONAE 1 Device by Does not apply route every 3 hours as needed (increase pressure to 12) (Patient taking differently: 1 Device by Does not apply route every 3 hours as needed (increase pressure to 12) C-PaP EVERY NIGHT) 1 Device 0    Ferrous Sulfate (IRON) 325 (65 Fe) MG TABS daily   1    pantoprazole (PROTONIX) 40 MG tablet TAKE 1 TABLET BY MOUTH ONCE DAILY  3    Cranberry 500 MG CAPS Take 500 mg by mouth      gabapentin (NEURONTIN) 300 MG capsule Take two capsules three times daily      aspirin 81 MG tablet Take 81 mg by mouth daily      furosemide (LASIX) 20 MG tablet Take 1 tablet by mouth daily 90 tablet 3    metoprolol succinate (TOPROL XL) 50 MG extended release tablet Take 1 tablet by mouth 2 times daily 30 tablet 3    spironolactone (ALDACTONE) 25 MG tablet Take 1 tablet by mouth daily 30 tablet 3    methotrexate (RHEUMATREX) 2.5 MG chemo tablet Take 7.5 mg by mouth once a week Indications: Thursday       folic acid (FOLVITE) 1 MG tablet Take 1 mg by mouth every morning       hydroxychloroquine (PLAQUENIL) 200 MG tablet Take 200 mg by mouth 2 times daily       DULoxetine (CYMBALTA) 60 MG extended release capsule Take 60 mg by mouth every morning       Multiple Vitamins-Minerals (MULTIVITAMIN ADULT PO) Take 1 tablet by mouth 2 times daily       Cholecalciferol (VITAMIN D3) 5000 UNITS TABS Take 1 tablet by mouth every morning       tamsulosin (FLOMAX) 0.4 MG capsule Take 0.4 mg by mouth every other day       RESTASIS 0.05 % ophthalmic emulsion Place 1 drop into both eyes 2 times daily as needed        No current facility-administered medications for this visit. CC: Patient is seen in follow-up for:  1. LV dysfunction    2. 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 SVT (supraventricular tachycardia) ([address]    4. Essential hypertension    5. Pure hypercholesterolemia    6. Stented coronary artery    7. Mitral valve regurgitation-moderate    HPI  Significantly improved since switching to Entresto. Wife notes that he is more alert and less short of breath with activities. Review of Systems   Constitutional: Less fatigue. HENT: Negative for nosebleeds. Respiratory: Negative for cough. Cardiovascular: Negative for palpitations and leg swelling. Gastrointestinal: Negative for blood in stool. Genitourinary: Negative for hematuria. Musculoskeletal: Negative for myalgias. Skin: Negative for color change. Neurological: Negative for dizziness, weakness and numbness. Hematological: Does not bruise/bleed easily. Psychiatric/Behavioral: Negative for sleep disturbance. The patient is not nervous/anxious. Objective:   Physical Exam  /78 pulse 70  NECK: No bruits. No JVD at 30 degrees. LUNGS: Clear to auscultation and percussion in all fields. HEART: Regular rate and rhythm. Quiet precordium. Normal S1-S2 in intensity and splitting. Gr I[facility]II[facility]VI HSM at [facility]   ABDOMEN: Soft without. masses or organomegaly. EXTREMITIES: 1+ left and 1+ right ankle edema No cyanosis. PERIPHERAL VASCULAR: Radial pulses are equal and symmetric. NEUROLOGIC: Grossly intact. HEMATOLOGIC:  No palpable nodes or splenomegaly. EKG: Normal sinus rhythm. Remote Inferior M.I. No change compared to prior tracing. Assessment:      1. Coronary artery disease involving native coronary artery of native heart without angina pectoris    2. LV dysfunction    3. Hypertension, unspecified type    4. Chronic systolic heart failure ([address]    5. 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 Pure hypercholesterolemia    7. VHD (valvular heart disease) - moderate MR    11. Mitral valve regurgitation-moderate in severity    Chronic CHF -improved post initiation of Entresto      Plan:     1. Reviewed [facility] report and echo: EF  51% with moderate MR  2. Labs 6/25/2020 creatinine 0.7, BUN 12, and LDL 50.  3.  Increase Entresto to 97/103 dose      Follow up in 6 months

## 2021-05-05 ENCOUNTER — OFFICE VISIT (OUTPATIENT)
Dept: CARDIOLOGY CLINIC | Age: 76
End: 2021-05-05
Payer: MEDICARE

## 2021-05-05 VITALS
SYSTOLIC BLOOD PRESSURE: 124 MMHG | DIASTOLIC BLOOD PRESSURE: 60 MMHG | HEIGHT: 66 IN | BODY MASS INDEX: 32.11 KG/M2 | HEART RATE: 66 BPM | WEIGHT: 199.8 LBS | RESPIRATION RATE: 16 BRPM

## 2021-05-05 DIAGNOSIS — E78.00 PURE HYPERCHOLESTEROLEMIA: ICD-10-CM

## 2021-05-05 DIAGNOSIS — I10 ESSENTIAL HYPERTENSION: ICD-10-CM

## 2021-05-05 DIAGNOSIS — I25.2 MI, OLD: ICD-10-CM

## 2021-05-05 DIAGNOSIS — Z95.5 STENTED CORONARY ARTERY: ICD-10-CM

## 2021-05-05 DIAGNOSIS — I25.10 CORONARY ARTERY DISEASE INVOLVING NATIVE CORONARY ARTERY OF NATIVE HEART WITHOUT ANGINA PECTORIS: ICD-10-CM

## 2021-05-05 DIAGNOSIS — I50.22 CHRONIC SYSTOLIC HEART FAILURE (HCC): Primary | ICD-10-CM

## 2021-05-05 DIAGNOSIS — I51.9 LV DYSFUNCTION: ICD-10-CM

## 2021-05-05 DIAGNOSIS — I38 VHD (VALVULAR HEART DISEASE): ICD-10-CM

## 2021-05-05 PROCEDURE — 93000 ELECTROCARDIOGRAM COMPLETE: CPT | Performed by: INTERNAL MEDICINE

## 2021-05-05 PROCEDURE — 99213 OFFICE O/P EST LOW 20 MIN: CPT | Performed by: INTERNAL MEDICINE

## 2021-05-05 RX ORDER — SIMVASTATIN 20 MG
20 TABLET ORAL
Status: ON HOLD | COMMUNITY
Start: 2021-05-03 | End: 2021-12-21 | Stop reason: HOSPADM

## 2021-05-05 NOTE — PROGRESS NOTES
Subjective:      Patient ID: Kayla Rueda is a 77 y.o. male.   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/05/2012    CAD (coronary artery disease)s/p stent RCA     Valvular heart disease     Hypertension     Obesity, morbid s/p intestinal bypass        Current Outpatient Medications   Medication Sig Dispense Refill    Cetirizine HCl (ZYRTEC ALLERGY PO) Take by mouth daily      simvastatin (ZOCOR) 20 MG tablet 20 mg       Acetaminophen (TYLENOL ARTHRITIS PAIN PO) Take by mouth daily      sacubitril-valsartan (ENTRESTO)  MG per tablet Take 1 tablet by mouth 2 times daily 180 tablet 3    azelastine HCl 0.15 % SOLN 2 sprays by Nasal route daily      risperiDONE (RISPERDAL) 1 MG tablet Take 1 mg by mouth daily       Respiratory Therapy Supplies MONAE 1 Device by Does not apply route every 3 hours as needed (increase pressure to 12) (Patient taking differently: 1 Device by Does not apply route every 3 hours as needed (increase pressure to 12) C-PaP EVERY NIGHT) 1 Device 0    Ferrous Sulfate (IRON) 325 (65 Fe) MG TABS daily   1    pantoprazole (PROTONIX) 40 MG tablet TAKE 1 TABLET BY MOUTH ONCE DAILY  3    Cranberry 500 MG CAPS Take 500 mg by mouth      gabapentin (NEURONTIN) 300 MG capsule Take two capsules three times daily      aspirin 81 MG tablet Take 81 mg by mouth daily      furosemide (LASIX) 20 MG tablet Take 1 tablet by mouth daily 90 tablet 3    metoprolol succinate (TOPROL XL) 50 MG extended release tablet Take 1 tablet by mouth 2 times daily 30 tablet 3    spironolactone (ALDACTONE) 25 MG tablet Take 1 tablet by mouth daily 30 tablet 3    methotrexate (RHEUMATREX) 2.5 MG chemo tablet Take 7.5 mg by mouth once a week Indications: Thursday       folic acid (FOLVITE) 1 MG tablet Take 1 mg by mouth every morning       hydroxychloroquine (PLAQUENIL) 200 MG tablet Take 200 mg by mouth 2 times daily       DULoxetine (CYMBALTA) 60 MG extended release capsule Take 60 mg by mouth every morning       Multiple Vitamins-Minerals (MULTIVITAMIN ADULT PO) Take 1 tablet by mouth 2 times daily       Cholecalciferol (VITAMIN D3) 5000 UNITS TABS Take 1 tablet by mouth every morning       tamsulosin (FLOMAX) 0.4 MG capsule Take 0.4 mg by mouth every other day       RESTASIS 0.05 % ophthalmic emulsion Place 1 drop into both eyes 2 times daily as needed        No current facility-administered medications for this visit. CC: Patient is seen in follow-up for:  1. LV dysfunction    2. 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 SVT (supraventricular tachycardia) ([address]    4. Essential hypertension    5. Pure hypercholesterolemia    6. Stented coronary artery    7. Mitral valve regurgitation-moderate    HPI  Significantly improved since switching to Entresto. Wife notes that he is more alert and less short of breath with activities. Review of Systems   Constitutional: Less fatigue. HENT: Negative for nosebleeds. Respiratory: Negative for cough. Cardiovascular: Negative for palpitations and leg swelling. Gastrointestinal: Negative for blood in stool. Genitourinary: Negative for hematuria. Musculoskeletal: Negative for myalgias. Skin: Negative for color change. Neurological: Negative for dizziness, weakness and numbness. Hematological: Does not bruise/bleed easily. Psychiatric/Behavioral: Negative for sleep disturbance. The patient is not nervous/anxious. Objective:   Physical Exam  /78 pulse 70  NECK: No bruits. No JVD at 30 degrees. LUNGS: Clear to auscultation and percussion in all fields. HEART: Regular rate and rhythm. Quiet precordium. Normal S1-S2 in intensity and splitting.  [facility]: Soft without. masses or organomegaly. EXTREMITIES: 1+ left and 1+ right ankle edema No cyanosis. PERIPHERAL VASCULAR: Radial pulses are equal and symmetric. NEUROLOGIC: Grossly intact. HEMATOLOGIC:  No palpable nodes or splenomegaly. EKG: Normal sinus rhythm. Remote Inferior M.I. QTc 446 No change compared to prior tracing. Assessment:      1. Chronic systolic heart failure ([address]    2. LV dysfunction    3. Coronary artery disease involving native coronary artery of native heart without angina pectoris    4. 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 Pure hypercholesterolemia    6. VHD (valvular heart disease) - moderate MR    7. Essential hypertension    8. Stented coronary artery    11. Mitral valve regurgitation-moderate in severity    Chronic CHF -improved post initiation of Entresto      Plan:     1. Reviewed [facility] report and echo: EF  51% with moderate MR  2.   Labs 3/10/2021 creatinine 0.67, BUN 13, and LDL 54.  3. Same medications      Follow up in 6 months

## 2021-06-16 ENCOUNTER — TELEPHONE (OUTPATIENT)
Dept: CARDIOLOGY CLINIC | Age: 76
End: 2021-06-16

## 2021-06-16 NOTE — TELEPHONE ENCOUNTER
Patient called and stated that the [facility] cost has gone up to over 1000.00 a month and he cannot afford this.   He would like to know if there is anything he can be changed to

## 2021-11-15 RX ORDER — SACUBITRIL AND VALSARTAN 97; 103 MG/1; MG/1
TABLET, FILM COATED ORAL
Qty: 180 TABLET | Refills: 1 | Status: SHIPPED
Start: 2021-11-15 | End: 2022-08-24 | Stop reason: SDUPTHER

## 2021-11-16 ENCOUNTER — OFFICE VISIT (OUTPATIENT)
Dept: CARDIOLOGY CLINIC | Age: 76
End: 2021-11-16
Payer: MEDICARE

## 2021-11-16 VITALS
BODY MASS INDEX: 28.06 KG/M2 | HEART RATE: 74 BPM | SYSTOLIC BLOOD PRESSURE: 108 MMHG | DIASTOLIC BLOOD PRESSURE: 60 MMHG | WEIGHT: 174.6 LBS | HEIGHT: 66 IN | RESPIRATION RATE: 16 BRPM

## 2021-11-16 DIAGNOSIS — I25.10 CORONARY ARTERY DISEASE INVOLVING NATIVE CORONARY ARTERY OF NATIVE HEART WITHOUT ANGINA PECTORIS: Primary | ICD-10-CM

## 2021-11-16 DIAGNOSIS — I10 PRIMARY HYPERTENSION: ICD-10-CM

## 2021-11-16 DIAGNOSIS — I38 VALVULAR HEART DISEASE: ICD-10-CM

## 2021-11-16 PROCEDURE — 99213 OFFICE O/P EST LOW 20 MIN: CPT | Performed by: INTERNAL MEDICINE

## 2021-11-16 NOTE — PROGRESS NOTES
Subjective:      Patient ID: Fidel Bedoya is a 77 y.o. male.   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    CAD (coronary artery disease)s/p stent RCA     Valvular heart disease     Hypertension     Obesity, morbid s/p intestinal bypass        Current Outpatient Medications   Medication Sig Dispense Refill    ENTRESTO  MG per tablet TAKE ONE TABLET BY MOUTH TWO TIMES A  tablet 1    Cetirizine HCl (ZYRTEC ALLERGY PO) Take by mouth daily      simvastatin (ZOCOR) 20 MG tablet 20 mg       Acetaminophen (TYLENOL ARTHRITIS PAIN PO) Take by mouth daily      azelastine HCl 0.15 % SOLN 2 sprays by Nasal route daily      risperiDONE (RISPERDAL) 1 MG tablet Take 1 mg by mouth daily       Respiratory Therapy Supplies MONAE 1 Device by Does not apply route every 3 hours as needed (increase pressure to 12) (Patient taking differently: 1 Device by Does not apply route every 3 hours as needed (increase pressure to 12) C-PaP EVERY NIGHT) 1 Device 0    Ferrous Sulfate (IRON) 325 (65 Fe) MG TABS daily   1    pantoprazole (PROTONIX) 40 MG tablet TAKE 1 TABLET BY MOUTH ONCE DAILY  3    Cranberry 500 MG CAPS Take 500 mg by mouth      gabapentin (NEURONTIN) 300 MG capsule Take two capsules three times daily      aspirin 81 MG tablet Take 81 mg by mouth daily      furosemide (LASIX) 20 MG tablet Take 1 tablet by mouth daily 90 tablet 3    metoprolol succinate (TOPROL XL) 50 MG extended release tablet Take 1 tablet by mouth 2 times daily 30 tablet 3    spironolactone (ALDACTONE) 25 MG tablet Take 1 tablet by mouth daily 30 tablet 3    methotrexate (RHEUMATREX) 2.5 MG chemo tablet Take 7.5 mg by mouth once a week Indications: Thursday       folic acid (FOLVITE) 1 MG tablet Take 1 mg by mouth every morning       hydroxychloroquine (PLAQUENIL) 200 MG tablet Take 200 mg by mouth 2 times daily       DULoxetine (CYMBALTA) 60 MG extended release capsule Take 60 mg by mouth every morning       Multiple Vitamins-Minerals (MULTIVITAMIN ADULT PO) Take 1 tablet by mouth 2 times daily       Cholecalciferol (VITAMIN D3) 5000 UNITS TABS Take 1 tablet by mouth every morning       tamsulosin (FLOMAX) 0.4 MG capsule Take 0.4 mg by mouth daily       RESTASIS 0.05 % ophthalmic emulsion Place 1 drop into both eyes 2 times daily as needed        No current facility-administered medications for this visit. CC: Patient is seen in follow-up for:  1. LV dysfunction    2. 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 SVT (supraventricular tachycardia) ([address]    4. Essential hypertension    5. Pure hypercholesterolemia    6. Stented coronary artery    7. Mitral valve regurgitation-moderate    HPI  Significantly improved since switching to Entresto. Wife notes that he is more alert and less short of breath with activities. Recent Holter monitor [facility] at [facility]. Review of Systems   Constitutional: Less fatigue. HENT: Negative for nosebleeds. Respiratory: Negative for cough. Cardiovascular: Negative for palpitations and leg swelling. Gastrointestinal: Negative for blood in stool. Genitourinary: Negative for hematuria. Musculoskeletal: Negative for myalgias. Skin: Negative for color change. Neurological: Negative for dizziness, weakness and numbness. Hematological: Does not bruise/bleed easily. Psychiatric/Behavioral: Negative for sleep disturbance. The patient is not nervous/anxious. Objective:   Physical Exam  /78 pulse 70  NECK: No bruits. No JVD at 30 degrees. LUNGS: Clear to auscultation and percussion in all fields. HEART: Regular rate and rhythm. Quiet precordium. Normal S1-S2 in intensity and splitting.  [facility]: Soft without. masses or organomegaly. EXTREMITIES: 1+ left and 1+ right ankle edema No cyanosis. PERIPHERAL VASCULAR: Radial pulses are equal and symmetric. NEUROLOGIC: Grossly intact. HEMATOLOGIC:  No palpable nodes or splenomegaly. Assessment:      1. LV dysfunction    2. 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 SVT (supraventricular tachycardia) ([address]    4. Essential hypertension    5. Pure hypercholesterolemia    6. Stented coronary artery    7. Mitral valve regurgitation-moderate      Chronic CHF -improved post initiation of Entresto      Plan:     1. Reviewed [facility] report and echo: EF  51% with moderate MR  2.   Labs 9/30/2021 creatinine 0.69, and LDL 42.  3. Same medications      Follow up in 6 months

## 2021-12-12 ENCOUNTER — APPOINTMENT (OUTPATIENT)
Dept: GENERAL RADIOLOGY | Age: 76
End: 2021-12-12
Payer: MEDICARE

## 2021-12-12 ENCOUNTER — HOSPITAL ENCOUNTER (EMERGENCY)
Age: 76
Discharge: ANOTHER HOSPITAL | End: 2021-12-12
Attending: EMERGENCY MEDICINE
Payer: MEDICARE

## 2021-12-12 ENCOUNTER — HOSPITAL ENCOUNTER (INPATIENT)
Age: 76
LOS: 9 days | Discharge: HOME | DRG: 222 | End: 2021-12-21
Attending: INTERNAL MEDICINE | Admitting: INTERNAL MEDICINE
Payer: MEDICARE

## 2021-12-12 VITALS
HEIGHT: 66 IN | HEART RATE: 81 BPM | DIASTOLIC BLOOD PRESSURE: 59 MMHG | OXYGEN SATURATION: 73 % | TEMPERATURE: 97.8 F | SYSTOLIC BLOOD PRESSURE: 103 MMHG | WEIGHT: 168 LBS | RESPIRATION RATE: 14 BRPM | BODY MASS INDEX: 27 KG/M2

## 2021-12-12 DIAGNOSIS — I46.9 CARDIAC ARREST (HCC): ICD-10-CM

## 2021-12-12 DIAGNOSIS — I21.3 ST ELEVATION MYOCARDIAL INFARCTION (STEMI), UNSPECIFIED ARTERY (HCC): Primary | ICD-10-CM

## 2021-12-12 DIAGNOSIS — R74.01 TRANSAMINITIS: ICD-10-CM

## 2021-12-12 DIAGNOSIS — I47.20 VENTRICULAR TACHYCARDIA: ICD-10-CM

## 2021-12-12 LAB
AADO2: 162.6 MMHG
ALBUMIN SERPL-MCNC: 3.7 G/DL (ref 3.5–5.2)
ALBUMIN SERPL-MCNC: 4.3 G/DL (ref 3.5–5.2)
ALP BLD-CCNC: 102 U/L (ref 40–129)
ALP BLD-CCNC: 117 U/L (ref 40–129)
ALT SERPL-CCNC: 65 U/L (ref 0–40)
ALT SERPL-CCNC: 81 U/L (ref 0–40)
ANION GAP SERPL CALCULATED.3IONS-SCNC: 15 MMOL/L (ref 7–16)
ANION GAP SERPL CALCULATED.3IONS-SCNC: 16 MMOL/L (ref 7–16)
ANISOCYTOSIS: ABNORMAL
AST SERPL-CCNC: 100 U/L (ref 0–39)
AST SERPL-CCNC: 69 U/L (ref 0–39)
B.E.: -4.7 MMOL/L (ref -3–3)
B.E.: -6.2 MMOL/L (ref -3–3)
B.E.: -7.8 MMOL/L (ref -3–3)
BASOPHILS ABSOLUTE: 0 E9/L (ref 0–0.2)
BASOPHILS ABSOLUTE: 0.04 E9/L (ref 0–0.2)
BASOPHILS RELATIVE PERCENT: 0.2 % (ref 0–2)
BASOPHILS RELATIVE PERCENT: 0.4 % (ref 0–2)
BILIRUB SERPL-MCNC: 1.5 MG/DL (ref 0–1.2)
BILIRUB SERPL-MCNC: 1.5 MG/DL (ref 0–1.2)
BUN BLDV-MCNC: 15 MG/DL (ref 6–23)
BUN BLDV-MCNC: 15 MG/DL (ref 6–23)
BURR CELLS: ABNORMAL
CALCIUM SERPL-MCNC: 8.6 MG/DL (ref 8.6–10.2)
CALCIUM SERPL-MCNC: 9.3 MG/DL (ref 8.6–10.2)
CHLORIDE BLD-SCNC: 103 MMOL/L (ref 98–107)
CHLORIDE BLD-SCNC: 107 MMOL/L (ref 98–107)
CO2: 18 MMOL/L (ref 22–29)
CO2: 21 MMOL/L (ref 22–29)
COHB: 0.7 % (ref 0–1.5)
COHB: 0.7 % (ref 0–1.5)
COHB: 1.2 % (ref 0–1.5)
CREAT SERPL-MCNC: 0.8 MG/DL (ref 0.7–1.2)
CREAT SERPL-MCNC: 1 MG/DL (ref 0.7–1.2)
CRITICAL: ABNORMAL
DATE ANALYZED: ABNORMAL
DATE OF COLLECTION: ABNORMAL
EOSINOPHILS ABSOLUTE: 0.07 E9/L (ref 0.05–0.5)
EOSINOPHILS ABSOLUTE: 0.12 E9/L (ref 0.05–0.5)
EOSINOPHILS RELATIVE PERCENT: 0.9 % (ref 0–6)
EOSINOPHILS RELATIVE PERCENT: 1.3 % (ref 0–6)
FIO2: 100 %
FIO2: 100 %
GFR AFRICAN AMERICAN: >60
GFR AFRICAN AMERICAN: >60
GFR NON-AFRICAN AMERICAN: >60 ML/MIN/1.73
GFR NON-AFRICAN AMERICAN: >60 ML/MIN/1.73
GLUCOSE BLD-MCNC: 117 MG/DL (ref 74–99)
GLUCOSE BLD-MCNC: 131 MG/DL (ref 74–99)
HCO3: 17.4 MMOL/L (ref 22–26)
HCO3: 18.4 MMOL/L (ref 22–26)
HCO3: 19.2 MMOL/L (ref 22–26)
HCT VFR BLD CALC: 35.3 % (ref 37–54)
HCT VFR BLD CALC: 42.1 % (ref 37–54)
HEMOGLOBIN: 11.8 G/DL (ref 12.5–16.5)
HEMOGLOBIN: 13.6 G/DL (ref 12.5–16.5)
HHB: 0.1 % (ref 0–5)
HHB: 0.5 % (ref 0–5)
HHB: 0.7 % (ref 0–5)
IMMATURE GRANULOCYTES #: 0.03 E9/L
IMMATURE GRANULOCYTES %: 0.3 % (ref 0–5)
LAB: ABNORMAL
LV EF: 24 %
LVEF MODALITY: NORMAL
LYMPHOCYTES ABSOLUTE: 0.33 E9/L (ref 1.5–4)
LYMPHOCYTES ABSOLUTE: 1.23 E9/L (ref 1.5–4)
LYMPHOCYTES RELATIVE PERCENT: 13.2 % (ref 20–42)
LYMPHOCYTES RELATIVE PERCENT: 4.4 % (ref 20–42)
Lab: ABNORMAL
MAGNESIUM: 1.9 MG/DL (ref 1.6–2.6)
MCH RBC QN AUTO: 28.7 PG (ref 26–35)
MCH RBC QN AUTO: 29.1 PG (ref 26–35)
MCHC RBC AUTO-ENTMCNC: 32.3 % (ref 32–34.5)
MCHC RBC AUTO-ENTMCNC: 33.4 % (ref 32–34.5)
MCV RBC AUTO: 85.9 FL (ref 80–99.9)
MCV RBC AUTO: 90.1 FL (ref 80–99.9)
METER GLUCOSE: 122 MG/DL (ref 74–99)
METHB: 0.1 % (ref 0–1.5)
METHB: 0.3 % (ref 0–1.5)
METHB: 0.3 % (ref 0–1.5)
MODE: ABNORMAL
MODE: AC
MODE: AC
MONOCYTES ABSOLUTE: 0.25 E9/L (ref 0.1–0.95)
MONOCYTES ABSOLUTE: 0.63 E9/L (ref 0.1–0.95)
MONOCYTES RELATIVE PERCENT: 2.6 % (ref 2–12)
MONOCYTES RELATIVE PERCENT: 6.7 % (ref 2–12)
NEUTROPHILS ABSOLUTE: 7.29 E9/L (ref 1.8–7.3)
NEUTROPHILS ABSOLUTE: 7.64 E9/L (ref 1.8–7.3)
NEUTROPHILS RELATIVE PERCENT: 78.1 % (ref 43–80)
NEUTROPHILS RELATIVE PERCENT: 92.1 % (ref 43–80)
O2 CONTENT: 17.8 ML/DL
O2 SATURATION: 99.3 % (ref 92–98.5)
O2 SATURATION: 99.5 % (ref 92–98.5)
O2 SATURATION: 99.9 % (ref 92–98.5)
O2HB: 98 % (ref 94–97)
O2HB: 98.5 % (ref 94–97)
O2HB: 98.9 % (ref 94–97)
OPERATOR ID: 1893
OPERATOR ID: 1926
OPERATOR ID: 420
PATIENT TEMP: 37 C
PCO2: 28.3 MMHG (ref 35–45)
PCO2: 35 MMHG (ref 35–45)
PCO2: 37.6 MMHG (ref 35–45)
PDW BLD-RTO: 16 FL (ref 11.5–15)
PDW BLD-RTO: 16.6 FL (ref 11.5–15)
PEEP/CPAP: 5 CMH2O
PEEP/CPAP: 8 CMH2O
PFO2: 4.9 MMHG/%
PH BLOOD GAS: 7.32 (ref 7.35–7.45)
PH BLOOD GAS: 7.33 (ref 7.35–7.45)
PH BLOOD GAS: 7.43 (ref 7.35–7.45)
PLATELET # BLD: 109 E9/L (ref 130–450)
PLATELET # BLD: 120 E9/L (ref 130–450)
PMV BLD AUTO: 11.6 FL (ref 7–12)
PMV BLD AUTO: 12.8 FL (ref 7–12)
PO2: 198.6 MMHG (ref 75–100)
PO2: 490.4 MMHG (ref 75–100)
PO2: >500 MMHG (ref 75–100)
POC ACT LR: 275 SECONDS
POC ACT LR: 292 SECONDS
POIKILOCYTES: ABNORMAL
POTASSIUM REFLEX MAGNESIUM: 5.2 MMOL/L (ref 3.5–5)
POTASSIUM SERPL-SCNC: 3.51 MMOL/L (ref 3.5–5)
POTASSIUM SERPL-SCNC: 3.6 MMOL/L (ref 3.5–5)
PRO-BNP: 4962 PG/ML (ref 0–450)
RBC # BLD: 4.11 E12/L (ref 3.8–5.8)
RBC # BLD: 4.67 E12/L (ref 3.8–5.8)
REASON FOR REJECTION: NORMAL
REJECTED TEST: NORMAL
RI(T): 0.33
RR MECHANICAL: 14 B/MIN
RR MECHANICAL: 14 B/MIN
SARS-COV-2, NAAT: NOT DETECTED
SODIUM BLD-SCNC: 140 MMOL/L (ref 132–146)
SODIUM BLD-SCNC: 140 MMOL/L (ref 132–146)
SOURCE, BLOOD GAS: ABNORMAL
THB: 12.6 G/DL (ref 11.5–16.5)
THB: 12.6 G/DL (ref 11.5–16.5)
THB: 13.8 G/DL (ref 11.5–16.5)
TIME ANALYZED: 1042
TIME ANALYZED: 1218
TIME ANALYZED: 1555
TOTAL PROTEIN: 6.3 G/DL (ref 6.4–8.3)
TOTAL PROTEIN: 7.3 G/DL (ref 6.4–8.3)
TSH SERPL DL<=0.05 MIU/L-ACNC: 3.82 UIU/ML (ref 0.27–4.2)
VT MECHANICAL: 450 ML
VT MECHANICAL: 450 ML
WBC # BLD: 8.3 E9/L (ref 4.5–11.5)
WBC # BLD: 9.3 E9/L (ref 4.5–11.5)

## 2021-12-12 PROCEDURE — 2580000003 HC RX 258

## 2021-12-12 PROCEDURE — 93571 IV DOP VEL&/PRESS C FLO 1ST: CPT | Performed by: INTERNAL MEDICINE

## 2021-12-12 PROCEDURE — 5A1945Z RESPIRATORY VENTILATION, 24-96 CONSECUTIVE HOURS: ICD-10-PCS | Performed by: INTERNAL MEDICINE

## 2021-12-12 PROCEDURE — B2111ZZ FLUOROSCOPY OF MULTIPLE CORONARY ARTERIES USING LOW OSMOLAR CONTRAST: ICD-10-PCS | Performed by: INTERNAL MEDICINE

## 2021-12-12 PROCEDURE — 4A023N7 MEASUREMENT OF CARDIAC SAMPLING AND PRESSURE, LEFT HEART, PERCUTANEOUS APPROACH: ICD-10-PCS | Performed by: INTERNAL MEDICINE

## 2021-12-12 PROCEDURE — 02703ZZ DILATION OF CORONARY ARTERY, ONE ARTERY, PERCUTANEOUS APPROACH: ICD-10-PCS | Performed by: INTERNAL MEDICINE

## 2021-12-12 PROCEDURE — 93458 L HRT ARTERY/VENTRICLE ANGIO: CPT | Performed by: INTERNAL MEDICINE

## 2021-12-12 PROCEDURE — 36415 COLL VENOUS BLD VENIPUNCTURE: CPT

## 2021-12-12 PROCEDURE — 80053 COMPREHEN METABOLIC PANEL: CPT

## 2021-12-12 PROCEDURE — 92941 PRQ TRLML REVSC TOT OCCL AMI: CPT | Performed by: INTERNAL MEDICINE

## 2021-12-12 PROCEDURE — 85025 COMPLETE CBC W/AUTO DIFF WBC: CPT

## 2021-12-12 PROCEDURE — 96374 THER/PROPH/DIAG INJ IV PUSH: CPT

## 2021-12-12 PROCEDURE — 2580000003 HC RX 258: Performed by: EMERGENCY MEDICINE

## 2021-12-12 PROCEDURE — C1725 CATH, TRANSLUMIN NON-LASER: HCPCS

## 2021-12-12 PROCEDURE — 92960 CARDIOVERSION ELECTRIC EXT: CPT

## 2021-12-12 PROCEDURE — 84443 ASSAY THYROID STIM HORMONE: CPT

## 2021-12-12 PROCEDURE — 0BH17EZ INSERTION OF ENDOTRACHEAL AIRWAY INTO TRACHEA, VIA NATURAL OR ARTIFICIAL OPENING: ICD-10-PCS | Performed by: INTERNAL MEDICINE

## 2021-12-12 PROCEDURE — 36556 INSERT NON-TUNNEL CV CATH: CPT

## 2021-12-12 PROCEDURE — 6360000002 HC RX W HCPCS

## 2021-12-12 PROCEDURE — 99283 EMERGENCY DEPT VISIT LOW MDM: CPT

## 2021-12-12 PROCEDURE — C1874 STENT, COATED/COV W/DEL SYS: HCPCS

## 2021-12-12 PROCEDURE — C9606 PERC D-E COR REVASC W AMI S: HCPCS

## 2021-12-12 PROCEDURE — 84132 ASSAY OF SERUM POTASSIUM: CPT

## 2021-12-12 PROCEDURE — 71045 X-RAY EXAM CHEST 1 VIEW: CPT

## 2021-12-12 PROCEDURE — 93571 IV DOP VEL&/PRESS C FLO 1ST: CPT

## 2021-12-12 PROCEDURE — 6370000000 HC RX 637 (ALT 250 FOR IP): Performed by: EMERGENCY MEDICINE

## 2021-12-12 PROCEDURE — 83735 ASSAY OF MAGNESIUM: CPT

## 2021-12-12 PROCEDURE — 6370000000 HC RX 637 (ALT 250 FOR IP): Performed by: SURGERY

## 2021-12-12 PROCEDURE — 37799 UNLISTED PX VASCULAR SURGERY: CPT

## 2021-12-12 PROCEDURE — 2500000003 HC RX 250 WO HCPCS: Performed by: SURGERY

## 2021-12-12 PROCEDURE — 6360000004 HC RX CONTRAST MEDICATION

## 2021-12-12 PROCEDURE — 87635 SARS-COV-2 COVID-19 AMP PRB: CPT

## 2021-12-12 PROCEDURE — C1894 INTRO/SHEATH, NON-LASER: HCPCS

## 2021-12-12 PROCEDURE — 94002 VENT MGMT INPAT INIT DAY: CPT

## 2021-12-12 PROCEDURE — 2500000003 HC RX 250 WO HCPCS: Performed by: EMERGENCY MEDICINE

## 2021-12-12 PROCEDURE — 027034Z DILATION OF CORONARY ARTERY, ONE ARTERY WITH DRUG-ELUTING INTRALUMINAL DEVICE, PERCUTANEOUS APPROACH: ICD-10-PCS | Performed by: INTERNAL MEDICINE

## 2021-12-12 PROCEDURE — 83880 ASSAY OF NATRIURETIC PEPTIDE: CPT

## 2021-12-12 PROCEDURE — 85347 COAGULATION TIME ACTIVATED: CPT

## 2021-12-12 PROCEDURE — 6370000000 HC RX 637 (ALT 250 FOR IP): Performed by: INTERNAL MEDICINE

## 2021-12-12 PROCEDURE — 93306 TTE W/DOPPLER COMPLETE: CPT

## 2021-12-12 PROCEDURE — 2709999900 HC NON-CHARGEABLE SUPPLY

## 2021-12-12 PROCEDURE — 93458 L HRT ARTERY/VENTRICLE ANGIO: CPT

## 2021-12-12 PROCEDURE — 93005 ELECTROCARDIOGRAM TRACING: CPT | Performed by: EMERGENCY MEDICINE

## 2021-12-12 PROCEDURE — 2000000000 HC ICU R&B

## 2021-12-12 PROCEDURE — 6360000002 HC RX W HCPCS: Performed by: EMERGENCY MEDICINE

## 2021-12-12 PROCEDURE — 96375 TX/PRO/DX INJ NEW DRUG ADDON: CPT

## 2021-12-12 PROCEDURE — B2151ZZ FLUOROSCOPY OF LEFT HEART USING LOW OSMOLAR CONTRAST: ICD-10-PCS | Performed by: INTERNAL MEDICINE

## 2021-12-12 PROCEDURE — 82805 BLOOD GASES W/O2 SATURATION: CPT

## 2021-12-12 PROCEDURE — C1887 CATHETER, GUIDING: HCPCS

## 2021-12-12 PROCEDURE — 82962 GLUCOSE BLOOD TEST: CPT

## 2021-12-12 PROCEDURE — 92950 HEART/LUNG RESUSCITATION CPR: CPT

## 2021-12-12 PROCEDURE — C1769 GUIDE WIRE: HCPCS

## 2021-12-12 PROCEDURE — 2580000003 HC RX 258: Performed by: INTERNAL MEDICINE

## 2021-12-12 PROCEDURE — 6360000002 HC RX W HCPCS: Performed by: INTERNAL MEDICINE

## 2021-12-12 PROCEDURE — 2500000003 HC RX 250 WO HCPCS

## 2021-12-12 PROCEDURE — 5A12012 PERFORMANCE OF CARDIAC OUTPUT, SINGLE, MANUAL: ICD-10-PCS | Performed by: INTERNAL MEDICINE

## 2021-12-12 PROCEDURE — 31500 INSERT EMERGENCY AIRWAY: CPT

## 2021-12-12 PROCEDURE — 99291 CRITICAL CARE FIRST HOUR: CPT | Performed by: INTERNAL MEDICINE

## 2021-12-12 PROCEDURE — 92921 HC PRQ CARDIAC ANGIO ADDL ART: CPT

## 2021-12-12 RX ORDER — METHYLPREDNISOLONE SODIUM SUCCINATE 125 MG/2ML
125 INJECTION, POWDER, LYOPHILIZED, FOR SOLUTION INTRAMUSCULAR; INTRAVENOUS ONCE
Status: COMPLETED | OUTPATIENT
Start: 2021-12-12 | End: 2021-12-12

## 2021-12-12 RX ORDER — MIDAZOLAM HYDROCHLORIDE 1 MG/ML
INJECTION INTRAMUSCULAR; INTRAVENOUS
Status: DISCONTINUED
Start: 2021-12-12 | End: 2021-12-12 | Stop reason: HOSPADM

## 2021-12-12 RX ORDER — EPINEPHRINE 0.1 MG/ML
1 SYRINGE (ML) INJECTION ONCE
Status: COMPLETED | OUTPATIENT
Start: 2021-12-12 | End: 2021-12-12

## 2021-12-12 RX ORDER — DIPHENHYDRAMINE HYDROCHLORIDE 50 MG/ML
50 INJECTION INTRAMUSCULAR; INTRAVENOUS ONCE
Status: COMPLETED | OUTPATIENT
Start: 2021-12-12 | End: 2021-12-12

## 2021-12-12 RX ORDER — LIDOCAINE HYDROCHLORIDE ANHYDROUS AND DEXTROSE MONOHYDRATE .4; 5 G/100ML; G/100ML
0.5 INJECTION, SOLUTION INTRAVENOUS CONTINUOUS
Status: DISCONTINUED | OUTPATIENT
Start: 2021-12-12 | End: 2021-12-14

## 2021-12-12 RX ORDER — SODIUM CHLORIDE 9 MG/ML
INJECTION, SOLUTION INTRAVENOUS CONTINUOUS
Status: DISCONTINUED | OUTPATIENT
Start: 2021-12-12 | End: 2021-12-13

## 2021-12-12 RX ORDER — MINERAL OIL AND WHITE PETROLATUM 150; 830 MG/G; MG/G
OINTMENT OPHTHALMIC EVERY 4 HOURS
Status: DISCONTINUED | OUTPATIENT
Start: 2021-12-12 | End: 2021-12-13

## 2021-12-12 RX ORDER — ONDANSETRON 2 MG/ML
4 INJECTION INTRAMUSCULAR; INTRAVENOUS EVERY 6 HOURS PRN
Status: DISCONTINUED | OUTPATIENT
Start: 2021-12-12 | End: 2021-12-21 | Stop reason: HOSPADM

## 2021-12-12 RX ORDER — FENTANYL CITRATE 50 UG/ML
INJECTION, SOLUTION INTRAMUSCULAR; INTRAVENOUS
Status: DISCONTINUED
Start: 2021-12-12 | End: 2021-12-12 | Stop reason: HOSPADM

## 2021-12-12 RX ORDER — POLYVINYL ALCOHOL 14 MG/ML
1 SOLUTION/ DROPS OPHTHALMIC EVERY 4 HOURS
Status: DISCONTINUED | OUTPATIENT
Start: 2021-12-12 | End: 2021-12-13

## 2021-12-12 RX ORDER — LIDOCAINE HYDROCHLORIDE 10 MG/ML
100 INJECTION, SOLUTION INTRAVENOUS ONCE
Status: COMPLETED | OUTPATIENT
Start: 2021-12-12 | End: 2021-12-12

## 2021-12-12 RX ORDER — ACETAMINOPHEN 325 MG/1
650 TABLET ORAL EVERY 4 HOURS PRN
Status: DISCONTINUED | OUTPATIENT
Start: 2021-12-12 | End: 2021-12-21 | Stop reason: HOSPADM

## 2021-12-12 RX ORDER — ASPIRIN 81 MG/1
81 TABLET, CHEWABLE ORAL DAILY
Status: DISCONTINUED | OUTPATIENT
Start: 2021-12-13 | End: 2021-12-21 | Stop reason: HOSPADM

## 2021-12-12 RX ORDER — HEPARIN SODIUM 1000 [USP'U]/ML
4000 INJECTION, SOLUTION INTRAVENOUS; SUBCUTANEOUS ONCE
Status: COMPLETED | OUTPATIENT
Start: 2021-12-12 | End: 2021-12-12

## 2021-12-12 RX ORDER — SODIUM CHLORIDE 0.9 % (FLUSH) 0.9 %
5-40 SYRINGE (ML) INJECTION PRN
Status: DISCONTINUED | OUTPATIENT
Start: 2021-12-12 | End: 2021-12-21 | Stop reason: HOSPADM

## 2021-12-12 RX ORDER — ASPIRIN 81 MG/1
324 TABLET, CHEWABLE ORAL ONCE
Status: COMPLETED | OUTPATIENT
Start: 2021-12-12 | End: 2021-12-12

## 2021-12-12 RX ORDER — HEPARIN SODIUM 10000 [USP'U]/ML
5000 INJECTION, SOLUTION INTRAVENOUS; SUBCUTANEOUS EVERY 8 HOURS
Status: DISCONTINUED | OUTPATIENT
Start: 2021-12-12 | End: 2021-12-21 | Stop reason: HOSPADM

## 2021-12-12 RX ORDER — SODIUM CHLORIDE 9 MG/ML
25 INJECTION, SOLUTION INTRAVENOUS PRN
Status: DISCONTINUED | OUTPATIENT
Start: 2021-12-12 | End: 2021-12-21 | Stop reason: HOSPADM

## 2021-12-12 RX ORDER — ADENOSINE 3 MG/ML
INJECTION, SOLUTION INTRAVENOUS
Status: DISCONTINUED
Start: 2021-12-12 | End: 2021-12-12 | Stop reason: HOSPADM

## 2021-12-12 RX ORDER — CHLORHEXIDINE GLUCONATE 0.12 MG/ML
15 RINSE ORAL 2 TIMES DAILY
Status: DISCONTINUED | OUTPATIENT
Start: 2021-12-12 | End: 2021-12-16

## 2021-12-12 RX ORDER — MAGNESIUM SULFATE HEPTAHYDRATE 500 MG/ML
2000 INJECTION, SOLUTION INTRAMUSCULAR; INTRAVENOUS ONCE
Status: DISCONTINUED | OUTPATIENT
Start: 2021-12-12 | End: 2021-12-12 | Stop reason: HOSPADM

## 2021-12-12 RX ORDER — HEPARIN SODIUM 1000 [USP'U]/ML
4000 INJECTION, SOLUTION INTRAVENOUS; SUBCUTANEOUS PRN
Status: DISCONTINUED | OUTPATIENT
Start: 2021-12-12 | End: 2021-12-12 | Stop reason: HOSPADM

## 2021-12-12 RX ORDER — ROSUVASTATIN CALCIUM 20 MG/1
40 TABLET, COATED ORAL NIGHTLY
Status: DISCONTINUED | OUTPATIENT
Start: 2021-12-12 | End: 2021-12-21 | Stop reason: HOSPADM

## 2021-12-12 RX ORDER — 0.9 % SODIUM CHLORIDE 0.9 %
1000 INTRAVENOUS SOLUTION INTRAVENOUS ONCE
Status: COMPLETED | OUTPATIENT
Start: 2021-12-12 | End: 2021-12-12

## 2021-12-12 RX ORDER — LIDOCAINE HYDROCHLORIDE ANHYDROUS AND DEXTROSE MONOHYDRATE .4; 5 G/100ML; G/100ML
1 INJECTION, SOLUTION INTRAVENOUS CONTINUOUS
Status: DISCONTINUED | OUTPATIENT
Start: 2021-12-12 | End: 2021-12-12 | Stop reason: HOSPADM

## 2021-12-12 RX ORDER — FENTANYL CITRATE 50 UG/ML
100 INJECTION, SOLUTION INTRAMUSCULAR; INTRAVENOUS ONCE
Status: COMPLETED | OUTPATIENT
Start: 2021-12-12 | End: 2021-12-12

## 2021-12-12 RX ORDER — ADENOSINE 3 MG/ML
INJECTION, SOLUTION INTRAVENOUS
Status: DISCONTINUED
Start: 2021-12-12 | End: 2021-12-12 | Stop reason: WASHOUT

## 2021-12-12 RX ORDER — PROPOFOL 10 MG/ML
5-50 INJECTION, EMULSION INTRAVENOUS ONCE
Status: DISCONTINUED | OUTPATIENT
Start: 2021-12-12 | End: 2021-12-12 | Stop reason: HOSPADM

## 2021-12-12 RX ORDER — HEPARIN SODIUM 10000 [USP'U]/100ML
5-30 INJECTION, SOLUTION INTRAVENOUS CONTINUOUS
Status: DISCONTINUED | OUTPATIENT
Start: 2021-12-12 | End: 2021-12-12 | Stop reason: HOSPADM

## 2021-12-12 RX ORDER — HEPARIN SODIUM 1000 [USP'U]/ML
2000 INJECTION, SOLUTION INTRAVENOUS; SUBCUTANEOUS PRN
Status: DISCONTINUED | OUTPATIENT
Start: 2021-12-12 | End: 2021-12-12 | Stop reason: HOSPADM

## 2021-12-12 RX ORDER — SODIUM CHLORIDE 0.9 % (FLUSH) 0.9 %
5-40 SYRINGE (ML) INJECTION EVERY 12 HOURS SCHEDULED
Status: DISCONTINUED | OUTPATIENT
Start: 2021-12-12 | End: 2021-12-21 | Stop reason: HOSPADM

## 2021-12-12 RX ORDER — MIDAZOLAM HYDROCHLORIDE 2 MG/2ML
2 INJECTION, SOLUTION INTRAMUSCULAR; INTRAVENOUS ONCE
Status: COMPLETED | OUTPATIENT
Start: 2021-12-12 | End: 2021-12-12

## 2021-12-12 RX ADMIN — MIDAZOLAM HYDROCHLORIDE 2 MG: 1 INJECTION, SOLUTION INTRAMUSCULAR; INTRAVENOUS at 10:38

## 2021-12-12 RX ADMIN — FAMOTIDINE 20 MG: 10 INJECTION, SOLUTION INTRAVENOUS at 11:02

## 2021-12-12 RX ADMIN — Medication 12.5 MCG/HR: at 10:53

## 2021-12-12 RX ADMIN — EPINEPHRINE 1 MG: 0.1 INJECTION, SOLUTION ENDOTRACHEAL; INTRACARDIAC; INTRAVENOUS at 10:34

## 2021-12-12 RX ADMIN — LIDOCAINE HYDROCHLORIDE 1 MG/MIN: 4 INJECTION, SOLUTION INTRAVENOUS at 10:56

## 2021-12-12 RX ADMIN — METHYLPREDNISOLONE SODIUM SUCCINATE 125 MG: 125 INJECTION, POWDER, FOR SOLUTION INTRAMUSCULAR; INTRAVENOUS at 11:01

## 2021-12-12 RX ADMIN — HEPARIN SODIUM 4000 UNITS: 1000 INJECTION INTRAVENOUS; SUBCUTANEOUS at 11:06

## 2021-12-12 RX ADMIN — 0.12% CHLORHEXIDINE GLUCONATE 15 ML: 1.2 RINSE ORAL at 20:27

## 2021-12-12 RX ADMIN — METOPROLOL TARTRATE 25 MG: 25 TABLET, FILM COATED ORAL at 20:27

## 2021-12-12 RX ADMIN — NITROGLYCERIN 0.5 INCH: 20 OINTMENT TOPICAL at 17:58

## 2021-12-12 RX ADMIN — SODIUM CHLORIDE 1000 ML: 9 INJECTION, SOLUTION INTRAVENOUS at 10:30

## 2021-12-12 RX ADMIN — ROSUVASTATIN 40 MG: 20 TABLET, FILM COATED ORAL at 20:27

## 2021-12-12 RX ADMIN — FAMOTIDINE 20 MG: 10 INJECTION INTRAVENOUS at 20:27

## 2021-12-12 RX ADMIN — Medication 10 ML: at 20:27

## 2021-12-12 RX ADMIN — HEPARIN SODIUM 12 UNITS/KG/HR: 10000 INJECTION, SOLUTION INTRAVENOUS at 11:16

## 2021-12-12 RX ADMIN — HEPARIN SODIUM 5000 UNITS: 10000 INJECTION INTRAVENOUS; SUBCUTANEOUS at 22:28

## 2021-12-12 RX ADMIN — TICAGRELOR 90 MG: 90 TABLET ORAL at 20:27

## 2021-12-12 RX ADMIN — ASPIRIN 81 MG CHEWABLE TABLET 324 MG: 81 TABLET CHEWABLE at 11:08

## 2021-12-12 RX ADMIN — TICAGRELOR 180 MG: 90 TABLET ORAL at 11:07

## 2021-12-12 RX ADMIN — FENTANYL CITRATE 100 MCG: 50 INJECTION, SOLUTION INTRAMUSCULAR; INTRAVENOUS at 10:30

## 2021-12-12 RX ADMIN — MINERAL OIL AND WHITE PETROLATUM: 150; 830 OINTMENT OPHTHALMIC at 20:28

## 2021-12-12 RX ADMIN — DIPHENHYDRAMINE HYDROCHLORIDE 50 MG: 50 INJECTION, SOLUTION INTRAMUSCULAR; INTRAVENOUS at 11:02

## 2021-12-12 RX ADMIN — LIDOCAINE HYDROCHLORIDE 100 MG: 10 INJECTION, SOLUTION INTRAVENOUS at 10:26

## 2021-12-12 ASSESSMENT — PAIN SCALES - GENERAL
PAINLEVEL_OUTOF10: 0

## 2021-12-12 ASSESSMENT — ENCOUNTER SYMPTOMS
DIARRHEA: 0
BACK PAIN: 0
VOMITING: 0
COUGH: 0
NAUSEA: 0
ABDOMINAL PAIN: 0
EYE PAIN: 0
SHORTNESS OF BREATH: 0
SORE THROAT: 0

## 2021-12-12 ASSESSMENT — PULMONARY FUNCTION TESTS
PIF_VALUE: 23
PIF_VALUE: 31

## 2021-12-12 NOTE — PROCEDURES
60% luminal  narrowing. The apical LAD had a long 70% disease before the mustache. LCX:  The left circumflex is basically a moderate-sized marginal branch,  which is diffusely diseased with sequential lesions in the proximal and  mid vessel with up to 70% to 80% proximal vessel narrowing and 80% mid  vessel narrowing. RCA:  The right coronary artery is a dominant vessel. It is patent  along its course with TANG-3 flow in the vessel. There was mild diffuse  disease in the proximal and mid vessel with around 40% disease in the  mid vessel. The posterior descending artery branch appeared patent. The posterolateral branch gives multiple subdivisions with the largest  subdivision having around 70% luminal narrowing. LEFT VENTRICULOGRAPHY:  The left ventricle appeared dilated. There was  severe generalized hypokinesis with akinesis of the inferior wall with a  rapid sparing of the inferoapical wall. There appeared to be at least  moderate mitral regurgitation. There appeared to be a jet from the left  ventricle filling a cavity, the nature of which is unclear with the  differential diagnoses being those of an aneurysm or a pseudoaneurysm. INTERVENTIONAL PROCEDURE:  A pressure wire evaluation of the proximal LAD lesion was performed  yielding serial IFRs of 0.7 and 0.4 respectively. Accordingly, it was  decided to intervene on the left anterior descending artery. A 6-Bengali JL4 guide catheter with side holes was used for the pressure  wire evaluation and for the intervention on the LAD. An extra support  exchange wire was then successfully advanced through the proximal  stenosis in the LAD and further distal into the vessel. Another similar  wire was advanced into the diagonal branch (bifurcation lesion). The LAD stenosis was then dilated with a 3.0 mm x 12 mm balloon inflated  to 14 atmospheres.   This was followed by advancing a 3.5 mm x 15 mm  Resolute [place] drug-eluting coronary stent to the proximal LAD stenosis  and this stent was deployed at 12 atmospheres. The wire that was in the  diagonal branch was then pulled out and redirected through the stent  struts into the diagonal branch again over which a 2.5 mm x 12 mm  Monorail balloon was advanced to the ostium of the diagonal branch. Another 3.5 mm x 12 mm noncompliant balloon was advanced over the wire  in the LAD to the stented segment in the proximal LAD, and simultaneous  balloon inflations were performed with the balloon in the LAD inflated  at 12 atmospheres and the balloon in the diagonal branch also inflated  at 12 atmospheres. Contrast injection at the end of the procedure  revealed very good results at both sites without any significant  compromise to the ostium of the diagonal branch and with no significant  residual stenosis in the LAD with TANG-3 flow in the LAD and the  diagonal branch. The right femoral arterial sheath was securely sutured to the skin at  the end of the procedure. The patient tolerated the procedure well. The patient remained  hemodynamically stable during the procedure and was transferred to the  cath lab holding area awaiting an intensive bed unit. CONCLUSIONS:  1. Coronary artery disease. a. LEFT MAIN:  10% distal vessel tapering.  b.  LAD:  30% eccentric proximal vessel narrowing and 80% to 90% very  proximal eccentric bifurcation lesion at the site of takeoff of a large  diagonal branch. 90% ostial stenosis and 70% to 80% proximal stenosis  of a moderate-sized second diagonal branch. 90% ostial stenosis of a  small third diagonal branch. 50% disease of the LAD after the second  diagonal branch. 60% disease of the LAD after the third diagonal  branch. Long up to 60% to 70% disease of the apical LAD before the  mustache.  c.  LCX:  Basically a large lateral branch with 70% to 80% proximal  vessel disease and 80% mid vessel disease.   d.  RCA:  Dominant vessel with 40% mid vessel narrowing and

## 2021-12-12 NOTE — ED NOTES
Lab called and reports that initial troponin drawn in [facility] ED cannot be run D/T being hemolyzed.       Shirley Bird RN  12/12/21 [id number]

## 2021-12-12 NOTE — CONSULTS
Inpatient Cardiology Consultation      Reason for Consult:  VT Arrest    Consulting Physician: Dr Shira Collier    Requesting Physician:  Dr Daja Holland    Date of Consultation: 12/12/2021    HISTORY OF PRESENT ILLNESS: 78 yo  male known to Dr Tatyana Allison last seen in office 11/16/2021. PMH: ex smoker quit in 2003, HTN, HLD, Inferior STEMI 1997 s/p PCI RCA, morbid obesity s/p gastric bypass, BETTIE no longer needed barby ot weight loss, IV dye allergy \"passed out,\" GIB, ICMP, psoriatic arthritis on Methotrexate/plaquenil, bilateral carpal tunnel release, [place] wears hearing aides, palpitations s/p 72 hour Holter ([facility]), chronic neck/back pain, and abberant R subclavian artery, and vaccinated with Moderna (no booster). Saturday while having a BM became diaphoretic , lethargic, weak needed assistance off toilet but resolved fairly quickly according to wife  12/12/2021 around 0600 was in bed asked for a glass of water, complained of feeling weak, was diaphoretic, and lethargic. Denies CP or SOB. Wife check HR which was in the 100's and 's--> called daughter and she drove him to ED. [facility] ED 's taken back to room--> VT--> see [facility] ED physician note  Lab work hemolyzed in [address]  12/12/2021 transferred to [facility] for emergent :[facility] Inferior STEMI    Please note: past medical records were reviewed per electronic medical record (EMR) - see detailed reports under Past Medical/ Surgical History. Past Medical History:    1. Tobacco up to 1 ppd 40 years quit in 2003  2. HTN  3. HLD  4. Inferior STEMI 1997  5. [facility] 2/24/1997: 70% diagonal, sequential borderline lesion LCx, 80% RCA  6. 2/26/1997 PTCA RCA  7. [facility] 7/7/1997 Total occlusion RCA with PTCA/PCI RCA  8. BETTIE diagnosed in 1999 after loosing weight no longer needed C-pap but due to return of snoring C-pap resumed summer of 2018  9. Morbid obesity weighed up to 360 pounds lost 200 pounds  10. Gastric Bypass [address] [place] [address] [facility] [place] [address]  22.  IV Dye allergy: \"passed out\"  12. Lexiscan MPS 3/21/2017: EF 30%, with dyskinesis of inferior wall. Non ischemic. 13. TTE 3/30/2017 Dr Vickie Rucker: Left ventricle is mildly enlarged. Inferior wall hypokinesis. There is doppler evidence of stage II diastolic dysfunction. EF visually estimated at 45 to 50%.  Mild tricuspid regurgitation. RVSP is 48 mmHg. Norvasc stopped and Cozaar 25 mg daily added  14. EGD 10/8/2018: No evidence of varices to explain the hemorrhage. The gastric pouch was entered.  The anastomosis between the stomach and the Scar limb was somewhat angled, but I was able to note a visible vessel that was not actively bleeding right on the pouch side of the anastomosis s/p Resolution Clip on this visible vessel.  Continued bleeding coming from the anastomosis. Right at the anastomosis was a very large marginal ulcer with active bleeding.  Injection epinephrine into the ulcer, which was able to slow the bleeding down somewhat, but there was still active hemorrhage and pulsatile bleeding that could be visualized.  I then inserted another Resolution Clip and was able to fire the clip.  Just below the vessel, the angle was quite severe and difficult in order to get the clip directly on the blood vessel; there was some improvement.  Placed  second  Resolution Clip above the first one. 15. Bilateral carpal tunnel surgery, R hip fracture s/p saumya placement 2014, tonsillectomy, bilateral cataract surgery, appendectomy, broken back s/p surgery  16. Placed on Plaquenil on 7/2018  17. Chronic back/neck pain s/p cervical injection 8/2018  18.  Psoriatic Arthritis (follows at [facility]) last seen 8/2018 placed on Prednisone dose back along with Methotrexate and Folic Acid  6/19/9202 72 hour Zio Patch [facility] for palpitations-->Patient had a min HR of 33 bpm, max HR of 133 bpm, and avg HR of 50 bpm. Predominant underlying rhythm was Sinus Rhythm. 2 Ventricular Tachycardia runs occurred, the run with the fastest interval lasting 5 beats with a max rate of 133 bpm (avg 108 bpm); the run with the fastest interval was also the longest. Idioventricular Rhythm was present. Isolated SVEs were frequent (6.2%, 6313), SVE Couplets were rare (<1.0%, 150), and SVE Triplets were rare (<1.0%, 10). Isolated VEs were frequent (10.0%, 62259), VE Couplets were occasional (1.6%, 812), and VE Triplets were rare (<1.0%, 30). Ventricular Bigeminy and Trigeminy were present. 11/10/2021 Pulmonary outpatient ([facility])-->Lung nodules: To my eyes all nodules appear to be stable. Again, these are most likely benign. Subpleural fibrotic changes with some honeycombing changes/ILD: Concerning for IPF. These could not be well appreciated on the last CT due to the pleural effusions--> PFT's ordered per pulmonary of abnormal will refer to ILD clinic  11/10/2021 PFT's-->Spirometry shows a reduced FEV1/FVC ratio; but individually normal FVC and FEV1   predicted  values. This pattern indicates mild obstruction or a normal variant. There is no significant bronchodilator response. The diffusing capacity is moderately reduced. The presence of a reduced lung diffusing capacity - that does not normalize when measured independent of alveolar volume (kCO) suggests a parenchymal or pulmonary   vascular disorder. Medications Prior to admit:  Prior to Admission medications    Medication Sig Start Date End Date Taking?  Authorizing Provider   ENTRESTO  MG per tablet TAKE ONE TABLET BY MOUTH TWO TIMES A DAY 11/15/21   Shelbie Villanueva MD   Cetirizine HCl (ZYRTEC ALLERGY PO) Take by mouth daily    Historical Provider, MD   simvastatin (ZOCOR) 20 MG tablet 20 mg  5/3/21   Historical Provider, MD   Acetaminophen (TYLENOL ARTHRITIS PAIN PO) Take by mouth daily    Historical Provider, MD   azelastine HCl 0.15 % SOLN 2 sprays by Nasal route daily    Historical Provider, MD   risperiDONE (RISPERDAL) 1 MG tablet Take 1 mg by mouth daily     Historical Provider, MD   Respiratory Therapy Supplies MONAE 1 Device by Does not apply route every 3 hours as needed (increase pressure to 12)  Patient taking differently: 1 Device by Does not apply route every 3 hours as needed (increase pressure to 12) C-PaP EVERY NIGHT 5/27/20   Garret Geiger MD   Ferrous Sulfate (IRON) 325 (65 Fe) MG TABS daily  7/23/19   Historical Provider, MD   pantoprazole (PROTONIX) 40 MG tablet TAKE 1 TABLET BY MOUTH ONCE DAILY 7/23/19   Historical Provider, MD   Cranberry 500 MG CAPS Take 500 mg by mouth    Historical Provider, MD   gabapentin (NEURONTIN) 300 MG capsule Take two capsules three times daily 4/24/18   Historical Provider, MD   aspirin 81 MG tablet Take 81 mg by mouth daily    Historical Provider, MD   furosemide (LASIX) 20 MG tablet Take 1 tablet by mouth daily 11/5/18   MORGAN Kirkland   metoprolol succinate (TOPROL XL) 50 MG extended release tablet Take 1 tablet by mouth 2 times daily 10/15/18   Dutch Cedillo DO   spironolactone (ALDACTONE) 25 MG tablet Take 1 tablet by mouth daily 10/16/18   Dutch Cedillo DO   methotrexate (RHEUMATREX) 2.5 MG chemo tablet Take 7.5 mg by mouth once a week Indications: Thursday     Historical Provider, MD   folic acid (FOLVITE) 1 MG tablet Take 1 mg by mouth every morning     Historical Provider, MD   hydroxychloroquine (PLAQUENIL) 200 MG tablet Take 200 mg by mouth 2 times daily     Historical Provider, MD   DULoxetine (CYMBALTA) 60 MG extended release capsule Take 60 mg by mouth every morning     Historical Provider, MD   Multiple Vitamins-Minerals (MULTIVITAMIN ADULT PO) Take 1 tablet by mouth 2 times daily     Historical Provider, MD   Cholecalciferol (VITAMIN D3) 5000 UNITS TABS Take 1 tablet by mouth every morning     Historical Provider, MD   tamsulosin (FLOMAX) 0.4 MG capsule Take 0.4 mg by mouth daily  8/26/14   Historical Provider, MD   RESTASIS 0.05 % ophthalmic emulsion Place 1 drop into both eyes 2 times daily as needed  10/21/13   Historical Provider, MD       Current Medications:    No current facility-administered medications for this encounter. Facility-Administered Medications Ordered in Other Encounters: adenosine (ADENOCARD) 6 MG/2ML injection, , ,   adenosine (ADENOCARD) 6 MG/2ML injection, , ,   midazolam (VERSED) 2 MG/2ML injection, , ,   fentaNYL (SUBLIMAZE) 100 MCG/2ML injection, , ,   propofol injection, 5-50 mcg/kg/min, IntraVENous, Once  lidocaine (CARDIAC INFUSION) 2 g in dextrose 5% 500 mL infusion, 1 mg/min, IntraVENous, Continuous  fentaNYL 5 mcg/ml in 0.9%  ml infusion, 12.5-200 mcg/hr, IntraVENous, Continuous  heparin (porcine) injection 4,000 Units, 4,000 Units, IntraVENous, PRN  heparin (porcine) injection 2,000 Units, 2,000 Units, IntraVENous, PRN  heparin 25,000 units in dextrose 5% 250 mL (premix) infusion, 5-30 Units/kg/hr, IntraVENous, Continuous    Allergies:  Dye [iodides] and Dust mite extract   DYE: \"Passed out\"    Social History:    Ex smoker quit in 2003  Occasional glass of wine  Lives with wife in one story home with basement. + Drives  Code Status: Full Code      Family History: Non contributory secondary to age      REVIEW OF SYSTEMS:   Unable to be obtained patient intubated    PHYSICAL EXAM:   There were no vitals taken for this visit. CONST:  Well developed,  male who appears of stated age. Sedated, orally intubated on ventilator. No apparent distress. HEENT:   Head- Normocephalic, atraumatic   Eyes- Conjunctivae pink, anicteric  Throat- Oral mucosa pink and moist. Orally intubated. OGT. Neck-  No stridor, trachea midline, no jugular venous distention. No carotid bruit. RIJ TLC  CHEST: Chest symmetrical and no grimacing to palpation. No accessory muscle use or intercostal retractions  RESPIRATORY: Lung sounds - coarse BS   CARDIOVASCULAR:     Heart Inspection- shows no noted pulsations  Heart Palpation- no heaves or thrills; PMI is non-displaced   Heart Ausculation- Regular rate and rhythm, no murmur.  No s3, s4 or signed by Aline Whitfield MD on 12/12/2021 at 11:51 AM

## 2021-12-12 NOTE — ED PROVIDER NOTES
HPI   Patient is a 77 y.o. male with a past medical history of CAD, hypertension, hyperlipidemia, SVT, CHF, presenting to the Emergency Department for fatigue, sweating. History obtained by patient and patient's wife. Symptoms are severe in severity and persistent since onset. They are improved by nothing and worsened by nothing. Patient presents with fatigue and diaphoresis. Patient states he woke up this morning and has had severe diaphoresis and sweating. He states just does not feel good. He previously had diabetes but has been off of all of his diabetic medications secondary to weight loss. His sugars were normal at home today. He did know if this was causing his symptoms. He states he is in sweating all morning. He also states he is very tired and just does not feel well. Symptoms similar to this happened last week. He states he is very fatigued. He states he diaphoresis last week but his symptoms lasted for approximate 1 hour and resolved. Today the symptoms started and they have not improved which is what prompted evaluation. He denies any chest pain or shortness of breath but states he is too fatigued and lightheaded. He denies any fevers or chills. No recent sick contacts. Review of Systems   Constitutional: Positive for diaphoresis and fatigue. Negative for chills and fever. HENT: Negative for congestion and sore throat. Eyes: Negative for pain and visual disturbance. Respiratory: Negative for cough and shortness of breath. Cardiovascular: Negative for chest pain. Gastrointestinal: Negative for abdominal pain, diarrhea, nausea and vomiting. Genitourinary: Negative for dysuria and frequency. Musculoskeletal: Negative for arthralgias and back pain. Skin: Negative for rash and wound. Neurological: Positive for dizziness and light-headedness. Negative for syncope, weakness and headaches. All other systems reviewed and are negative.        Physical Exam  Vitals and nursing note reviewed. Constitutional:       Appearance: He is well-developed. He is ill-appearing and diaphoretic. HENT:      Head: Normocephalic and atraumatic. Eyes:      Extraocular Movements: Extraocular movements intact. Pupils: Pupils are equal, round, and reactive to light. Cardiovascular:      Rate and Rhythm: Regular rhythm. Tachycardia present. Pulses: Normal pulses. Heart sounds: Normal heart sounds. Pulmonary:      Effort: Pulmonary effort is normal. No respiratory distress. Breath sounds: Normal breath sounds. No wheezing or rales. Abdominal:      Palpations: Abdomen is soft. Tenderness: There is no abdominal tenderness. There is no guarding or rebound. Musculoskeletal:      Cervical back: Normal range of motion and neck supple. Skin:     Capillary Refill: Capillary refill takes less than 2 seconds. Coloration: Skin is pale. Comments: Cool, clammy   Neurological:      Mental Status: He is alert and oriented to person, place, and time. Cranial Nerves: No cranial nerve deficit. Sensory: No sensory deficit. Motor: No weakness. Coordination: Coordination normal.          Procedures   Central Line Placement Procedure Note    Indication: central venous monitoring, centrally administered medications and need for frequent blood draws    Consent: Unable to be obtained due to the emergent nature of this procedure. Procedure: The patient was positioned appropriately and the skin over the right internal jugular vein was prepped with betadine and draped in a sterile fashion. Local anesthesia was obtained by infiltration using 1% Lidocaine without epinephrine. A large bore needle was used to identify the vein. A guide wire was then inserted into the vein through the needle. A triple lumen catheter was then inserted into the vessel over the guide wire using the Seldinger technique.   All ports showed good, free flowing blood return and were flushed with saline solution. The catheter was then securely fastened to the skin with suture at 20 cm. Two sutures were placed into the proximal eyelets. An antibiotic disk was placed and the site was then covered with a sterile dressing. A post procedure X-ray was indicated showed no pneumothorax. The patient tolerated the procedure well. Complications: None      Intubation Procedure Note    Indication: airway compromise, airway protection    Consent: Unable to be obtained due to the emergent nature of this procedure. Medications Used: None    Procedure: The patient was placed in the appropriate position. Cricoid pressure was not required. Intubation was performed by direct laryngoscopy using a laryngoscope and a 7.5 cuffed endotracheal tube. The cuff was then inflated and the tube was secured appropriately at a distance of 25 cm to the dental ridge. Initial confirmation of placement included bilateral breath sounds, an end tidal CO2 detector, absence of sounds over the stomach, tube fogging and adequate chest rise. A chest x-ray to verify correct placement of the tube showed appropriate tube position. The patient tolerated the procedure well. Complications: None      PROCEDURE  12/12/21       defribirllation  Risks, benefits and alternatives (for applicable procedures below) described. Performed By: EM Attending Physician and EM Resident. Indication: ventricular tachycardia. Informed consent: Verbal consent obtained. The patient was and spouse was counseled regarding the procedure in person, it's indications, risks, potential complications and alternatives and any questions were answered. Verbal consent was obtained and then patient went into pulseless vtach and defibrillation performed  Procedure:  Vagal maneuvers were not attempted.   Prior to the Procedure, anesthsia given: no.  Cardioversion was performed under my order and direction, and was attempted by asynchronous mode, 1 times with 200 Joules. The resultant rhythm was:  conversion to normal sinus rhythm. Complications: None. Patient tolerated the procedure well. Cardiology Consult Placed:  yes. MDM   Patient presented to the Emergency Department for fatigue and diaphoresis. Ill-appearing on arrival, hypotensive as well as tachycardic. EKG concerning for ventricular tachycardia. Patient hypotensive. Given bolus of lidocaine while setting up for cardioversion. Lido did slow patient down to 160 but still vtach. Discussed patient's wishes with patient and patient's wife. He is a full code. While preparing to cardiovert patient he did convert to pulseless ventricular tachycardia. 1 round of CPR initiated. Defibrillation and 1 dose of epi given. Patient had ROSC after 1 round of CPR. He was intubated during arrest and right IJ placed when ROSC was obtained. Repeat EKG concerning for ST elevation inferiorly. With patient having unstable ventricular tachycardia as well as pulseless V. tach and concern for STEMI/ischemia consult placed to Cath Lab. Recommend heparin, Brilinta as well as aspirin which were given to patient's NG tube. There is concern for cardiac etiology and ischemia. Cardiology agreeable with heart catheterization. Patient was started on a lidocaine drip as well as sedation. He has AN allergy to amiodarone so lidocaine chosen. Updated patient's wife and daughter multiple times in the emergency department. Patient will be [facility] to [facility] for heart catheterization. There is concern for ventricular tachycardia secondary to patient's poor EF. He also has history of cardiovascular disease. ED Course as of 12/12/21 1211   Sun Dec 12, 2021   1043 Patient brought to the room. Heart rate in the 190s. EKG concerning for ventricular tachycardia. Blood pressure 90-22 systolic. Lido bolus given while preparing to cardiovert.  Disucssed with wife and patient and History: family history includes Heart Disease in his brother; Hypertension in his father and mother; Other in his brother, sister, and sister. The patients home medications have been reviewed.     Allergies: Dye [iodides] and Dust mite extract    ------------------------------------------------ RESULTS ---------------------------------------------------    LABS:  Results for orders placed or performed during the hospital encounter of 12/12/21   CBC Auto Differential   Result Value Ref Range    WBC 9.3 4.5 - 11.5 E9/L    RBC 4.67 3.80 - 5.80 E12/L    Hemoglobin 13.6 12.5 - 16.5 g/dL    Hematocrit 42.1 37.0 - 54.0 %    MCV 90.1 80.0 - 99.9 fL    MCH 29.1 26.0 - 35.0 pg    MCHC 32.3 32.0 - 34.5 %    RDW 16.6 (H) 11.5 - 15.0 fL    Platelets 867 (L) 592 - 450 E9/L    MPV 12.8 (H) 7.0 - 12.0 fL    Neutrophils % 78.1 43.0 - 80.0 %    Immature Granulocytes % 0.3 0.0 - 5.0 %    Lymphocytes % 13.2 (L) 20.0 - 42.0 %    Monocytes % 6.7 2.0 - 12.0 %    Eosinophils % 1.3 0.0 - 6.0 %    Basophils % 0.4 0.0 - 2.0 %    Neutrophils Absolute 7.29 1.80 - 7.30 E9/L    Immature Granulocytes # 0.03 E9/L    Lymphocytes Absolute 1.23 (L) 1.50 - 4.00 E9/L    Monocytes Absolute 0.63 0.10 - 0.95 E9/L    Eosinophils Absolute 0.12 0.05 - 0.50 E9/L    Basophils Absolute 0.04 0.00 - 0.20 E9/L   Comprehensive Metabolic Panel w/ Reflex to MG   Result Value Ref Range    Sodium 140 132 - 146 mmol/L    Potassium reflex Magnesium 5.2 (H) 3.5 - 5.0 mmol/L    Chloride 103 98 - 107 mmol/L    CO2 21 (L) 22 - 29 mmol/L    Anion Gap 16 7 - 16 mmol/L    Glucose 117 (H) 74 - 99 mg/dL    BUN 15 6 - 23 mg/dL    CREATININE 1.0 0.7 - 1.2 mg/dL    GFR Non-African American >60 >=60 mL/min/1.73    GFR African American >60     Calcium 9.3 8.6 - 10.2 mg/dL    Total Protein 7.3 6.4 - 8.3 g/dL    Albumin 4.3 3.5 - 5.2 g/dL    Total Bilirubin 1.5 (H) 0.0 - 1.2 mg/dL    Alkaline Phosphatase 117 40 - 129 U/L    ALT 81 (H) 0 - 40 U/L     (H) 0 - 39 U/L   Brain Natriuretic Peptide   Result Value Ref Range    Pro-BNP 4,962 (H) 0 - 450 pg/mL   Magnesium   Result Value Ref Range    Magnesium 1.9 1.6 - 2.6 mg/dL   TSH without Reflex   Result Value Ref Range    TSH 3.820 0.270 - 4.200 uIU/mL   Blood Gas, Arterial   Result Value Ref Range    Date Analyzed 20211212     Time Analyzed 1042     Source: Blood Arterial     pH, Blood Gas 7.430 7.350 - 7.450    PCO2 28.3 (L) 35.0 - 45.0 mmHg    PO2 198.6 (H) 75.0 - 100.0 mmHg    HCO3 18.4 (L) 22.0 - 26.0 mmol/L    B.E. -4.7 (L) -3.0 - 3.0 mmol/L    O2 Sat 99.3 (H) 92.0 - 98.5 %    O2Hb 98.0 (H) 94.0 - 97.0 %    COHb 1.2 0.0 - 1.5 %    MetHb 0.1 0.0 - 1.5 %    O2 Content 17.8 mL/dL    HHb 0.7 0.0 - 5.0 %    tHb (est) 12.6 11.5 - 16.5 g/dL    Mode RA     Date Of Collection      Time Collected      Pt Temp 37.0 C     ID [id number]     Lab [id number]     Critical(s) Notified . No Critical Values    SPECIMEN REJECTION   Result Value Ref Range    Rejected Test Trop     Reason for Rejection see below    POCT Glucose   Result Value Ref Range    Meter Glucose 122 (H) 74 - 99 mg/dL       RADIOLOGY:    All Radiology results interpreted by Radiologist unless otherwise noted. XR CHEST PORTABLE   Final Result   Endotracheal tube in good position. NG tube in the stomach. Right internal jugular central venous catheter tip in the SVC. No pneumothorax on the right on the left. A perihilar vascular congestion with cardiomegaly observed. Cannot exclude discrete infiltrates in the bases particular on the left side. XR CHEST ABDOMEN NG PLACEMENT   Final Result   NG tube in good position. EKG: This EKG is signed and interpreted by ED Physician. Time:  1007   Rate: 190  Rhythm: V-Tach. Interpretation: ventricular tachycardia. Comparison: changes compared to previous EKG. EKG: This EKG is signed and interpreted by ED Physician.   Time:  1038   Rate: 120  Rhythm: sinus with PVCs  Interpretation: Normal axis deviation. Left posterior fascicular block. Concern for inferior STEMI. Elevation in lead III as well as aVF. Depressions in aVL. Comparison: changes compared to previous EKG.    ---------------------------- NURSING NOTES AND VITALS REVIEWED -------------------------   The nursing notes within the ED encounter and vital signs as below have been reviewed.    BP (!) 103/59   Pulse 81   Temp 97.8 °F (36.6 °C) (Temporal)   Resp 14   Ht 5' 6\" (1.676 m)   Wt 168 lb (76.2 kg)   SpO2 (!) 73%   BMI 27.12 kg/m²   Oxygen Saturation Interpretation: Normal      ------------------------------------------PROGRESS NOTES -------------------------------------------    ED COURSE MEDICATIONS:                Medications   adenosine (ADENOCARD) 6 MG/2ML injection (has no administration in time range)   adenosine (ADENOCARD) 6 MG/2ML injection (has no administration in time range)   midazolam (VERSED) 2 MG/2ML injection (has no administration in time range)   fentaNYL (SUBLIMAZE) 100 MCG/2ML injection (has no administration in time range)   propofol injection (has no administration in time range)   lidocaine (CARDIAC INFUSION) 2 g in dextrose 5% 500 mL infusion (0 mg/min IntraVENous Patient Transferred to Other Facility 12/12/21 1147)   fentaNYL 5 mcg/ml in 0.9%  ml infusion (0 mcg/hr IntraVENous Patient Transferred to Other Facility 12/12/21 1147)   heparin (porcine) injection 4,000 Units (has no administration in time range)   heparin (porcine) injection 2,000 Units (has no administration in time range)   heparin 25,000 units in dextrose 5% 250 mL (premix) infusion (0 Units/kg/hr × 76.2 kg IntraVENous Patient Transferred to Other Facility 12/12/21 1147)   magnesium sulfate injection 2,000 mg (has no administration in time range)   0.9 % sodium chloride bolus (0 mLs IntraVENous Stopped 12/12/21 1100)   aspirin chewable tablet 324 mg (324 mg Per NG tube Given 12/12/21 1108)   ticagrelor (BRILINTA) tablet 180 mg (180 mg Per NG tube Given 12/12/21 1107)   heparin (porcine) injection 4,000 Units (4,000 Units IntraVENous Given 12/12/21 1106)   methylPREDNISolone sodium (SOLU-MEDROL) injection 125 mg (125 mg IntraVENous Given 12/12/21 1101)   famotidine (PEPCID) injection 20 mg (20 mg IntraVENous Given 12/12/21 1102)   diphenhydrAMINE (BENADRYL) injection 50 mg (50 mg IntraVENous Given 12/12/21 1102)   lidocaine (cardiac) (XYLOCAINE) injection 100 mg (100 mg IntraVENous Given 12/12/21 1026)   fentaNYL (SUBLIMAZE) injection 100 mcg (100 mcg IntraVENous Given 12/12/21 1030)   midazolam PF (VERSED) injection 2 mg (2 mg IntraVENous Given 12/12/21 1038)   EPINEPHrine 1 MG/10ML injection 1 mg (1 mg IntraVENous Given 12/12/21 1034)       CONSULTATIONS:            Consultation:  I Spoke with Dr. Letha Aviles  Discussed case. They will take to cath lab. Nadeen Hough PROCEDURES:         Intubation, central line, defibrillation    COUNSELING:   I have spoken with the spouse, daughter and patient and discussed todays results, in addition to providing specific details for the plan of care and counseling regarding the diagnosis and prognosis.     --------------------------------------- IMPRESSION & DISPOSITION --------------------------------     IMPRESSION(s):  1. ST elevation myocardial infarction (STEMI), unspecified artery ([address]    2. Ventricular tachycardia ([address]    3. Cardiac arrest ([address]    4. Transaminitis        This patient's ED course included: a personal history and physicial examination, re-evaluation prior to disposition, multiple bedside re-evaluations, IV medications, cardiac monitoring, continuous pulse oximetry and complex medical decision making and emergency management    This patient has initially deteriorated, but then stabilized during their ED course. DISPOSITION:  Disposition: Transfer to cath lab. Patient condition is critical.    CRITICAL CARE:   40 MINUTES.           Please note that the withdrawal or failure to initiate urgent interventions for this patient would likely result in a life threatening deterioration or permanent disability. Accordingly this patient received the above mentioned time, excluding separately billable procedures. END OF PROVIDER NOTE.            Horacio Johansen DO  Resident  12/12/21 [id number]

## 2021-12-12 NOTE — PLAN OF CARE
Problem: Cardiac Output - Decreased:  Goal: Hemodynamic stability will improve  Description: Hemodynamic stability will improve  Outcome: Met This Shift  Note: MONITOR VITAL SIGNS ADM MEDS AS ORDERED MONITOR FLUID BALANCE AND LAB VALUES     Problem: MECHANICAL VENTILATION  Goal: Patient will maintain patent airway  Outcome: Met This Shift  Note: MONITOR ABGS AND SAO2 ALSO MONITOR CXR AND BREATHE SOUNDS

## 2021-12-12 NOTE — ED NOTES
While administering Fentanyl prior to synchronized cardioversion pt started to have agonal respirations and had no palpable pulses. CPR was started, Epi was given, and pt was cardioverted with 200 joules. 2 minutes of CPR was performed and pt had a palpable pulse and was breathing on his own.      Nichole Layton RN  12/12/21 7825

## 2021-12-12 NOTE — ED NOTES
Bed: 25  Expected date:   Expected time:   Means of arrival:   Comments:  triage     Anila Webb.  Harleen Seay RN  12/12/21 1007

## 2021-12-12 NOTE — OP NOTE
Operative Note      Patient: Priyank Rodríguez  YOB: 1945  MRN: [id number]    Date of Procedure: 12/12/21      Indication:    1. Vfib arrest. Suspected inferior STEMI  2. Catheterization: AUC score 9 . Indication 2.  3. PCI: AUC score 9 Indication 1. Procedure: Left Heart Catheterization, coronary angiography, left ventriculography, percutaneous coronary intervention to LAD and LAD Diagonal branch ( bifurcation lesion )      Anesthesia: Came to the cath lab intubated, on vent, sedated, unresponsive, on Fentanyl drip      Cardiac cath performed via right femoral approach using 6 F 45 cm arrow sheath. Findings:   Left main: 0% stenosis  LAD: 80 %  Stenosis ( IFR = 0.70 and 0.74 )  Circumflex: 70-80%   stenosis. RCA: Dominant. 40 %  stenosis. LV angio: 25%  ejection fraction      Hemodynamics: Ao: 139/76 ( 102 )  LV: 152  LVEDP: 22    Interventional procedure:   1. PCI vessel: LAD. Lesion type: C. TANG III flow pre PCI. Angioplasty performed with 3.0 mm balloon. Stenting performed with 3.5 mm Post dilated with 3.5 mm non compliant balloon ( kissing balloons with 2.5 mm balloon in the Diagonal branch ). Result: 0% residual stenosis and TANG III distal flow. 2. PCI vessel:  LAD-Diag Lesion type C  TANG III flow pre PCI  Angioplasty performed with 2.5 mm balloon ( kissing balloons with  3.5 mm balloon in the LAD )  Result: 0% residual stenosis with TANG III distal flow. Drugs: . Anticoagulation was maintained with IV Heparin . Brilinta 180 mg load given  in the outlying ED. Right femoral sheath sutured to the skin. There was good distal pulses in the RLEE at the end of the procedure. Complication: None   Blood loss ( with blood draws ) ~ 50 cc  Contrast used: 130 cc      Post op diagnosis:  CAD  Ischemic cardiomyopathy  S/p Vfib arrest  S/p successful PCI to LAD and LAD-Diagonal branch    Plan:  DAPT  Risk profile modification.    See orders      Electronically signed by Julian Jauregui MD on 12/12/2021 at 1:41 PM

## 2021-12-12 NOTE — CONSULTS
SURGICAL CRITICAL CARE  ICU CONSULT NOTE      Date of admission:  12/12/2021  Reason for ICU transfer:  V fib arrest    TAMARA Flores is a 76yo male who presented with fatigue, sweating. PMH HTN, CAD, HLD, CHF, prior MI, obesity s/p RYGB, hx of GI bleed. History obtained from wife and daughter as patient presently intubated. He reportedly  woke up this morning and has had severe diaphoresis and sweating. He told ED that he just did not feel well. He previously had diabetes but has been off of all of his diabetic medications secondary to weight loss. Also c/o weakness, fatigue. He had EKG concerning for V-tach, became hypotensive. He did convert to pulseless Vtach and received 1 round of CPR before ROSC. Stat cardiology consult and he was taken for cardiac cath. Had PCI in LAD and LAD diagonal branch. Critical care consulted post cath as patient still intubated and requires ICU monitoring post procedure. He arouses and follows commands. Shakes head no to having pain. He is breathing comfortably with the ventilator.         Past Medical History:   Diagnosis Date    CAD (coronary artery disease)     s/p stent RCA    [place] (hard of hearing)     WEARS HEARING AIDES    Hypertension     Left ventricular systolic dysfunction     MI (myocardial infarction) ([address] 1997    Mitral regurgitation     Obesity, morbid ([address]     s/p intestinal bypass    Sleep apnea     Valvular heart disease        Past Surgical History:   Procedure Laterality Date    APPENDECTOMY      BACK SURGERY      CARPAL TUNNEL RELEASE Bilateral     CORONARY ANGIOPLASTY  7/7/97    PTCA/stent of the RCA     CORONARY ANGIOPLASTY WITH STENT PLACEMENT  12/12/2021    3.5 x 15 Resolute Luis to the prox LAD by Dr. Makenzie Neville CATH LAB PROCEDURE  2/24/97    DIAGNOSTIC CARDIAC CATH LAB PROCEDURE  7/7/97    EYE SURGERY Bilateral     cataract removal w lense implants    FRACTURE SURGERY      [place] in left femur    HIP SURGERY  2014    fx, saumya placed    INTESTINAL BYPASS      JOINT REPLACEMENT      TONSILLECTOMY      UPPER GASTROINTESTINAL ENDOSCOPY N/A 10/8/2018    EGD CONTROL HEMORRHAGE at bedside performed by Óscar Camacho MD at [facility]:  Unable to obtain as patient intubated    PHYSICAL EXAM  Vitals:    12/12/21 1519   BP: (!) 161/99   Pulse: 74   Resp: 12   Temp: 97.2 °F (36.2 °C)   SpO2: 100%       GENERAL:  Intubated, arousable, light sedation, follows commnds  HEAD:  Normocephalic. Atraumatic. EYES:   No scleral icterus. PERRL. LUNGS:  Mechanical ventilation, synrchronous with vent  CARDIOVASCULAR: on lidocaine drip. Regular rate and rhythm  ABDOMEN:  Soft, non-distended, non-tender. No guarding, rigidity, rebound. Upper midline scar from prior surgery. No hernias. EXTREMITIES:   MAEx4. Atraumatic. No LE edema. SKIN:  Warm and dry  NEUROLOGIC:  GCS 11T  SKIN:  No rashes or erythema. ASSESSMENT/PLAN  Neuro:  Pain - fentanyl gtt for sedation. CV:  Vfib arrest, s/p cardiac cath with PCI - Cardiology following, continue Lidocaine gtt per Cardiology. Beta blocker, ASA, nitroglycerine, Monitor hemodynamics  Pulm:  Acute respiratory failure 2/2 VF arrest - cont mechanical ventilation. Monitor RR, SpO2. Would wait til tomorrow to start SBT    Ventilator Settings: AC 14/450/40%+5  GI:  NPO, if unable to extubate tomorrow start tube feeds. HX RYGB and prior ulcer disease. Pepcid. Renal:  No acute issues. Monitor UOP, Electrolytes. Heme:        Anemia, monitor H/H  ID:  No acute issues. Monitor for SIRS. Endo:  Hx of DM. Monitor glucose, SSI.   MSK:  No acute issues. PT/OT when able. Bowel Regimen: Senna/Colace, miralax  DVT PPx:  PCDs  GI PPx:  Pepcid   Glucose Protocol: SSI  Mouth/Eye Care: Peridex/Aqua tears   Emery:   Keep in place for critical care monitoring of fluid balance. CVC Sites:  R fem A-line, R IJ CVC  Ancillary Consults: Cardiology, IM (admitting)  Family Update:  As available         HOSPITAL COURSE:  12/12/21  - Vfib arrest, taken for cardiac cath. S/p PCI to LAD, LAD diagnoal branch.  Intubated, sedated      Samira Briones DO  Resident, PGY-3  12/12/2021  3:32 PM

## 2021-12-13 ENCOUNTER — APPOINTMENT (OUTPATIENT)
Dept: GENERAL RADIOLOGY | Age: 76
DRG: 222 | End: 2021-12-13
Attending: INTERNAL MEDICINE
Payer: MEDICARE

## 2021-12-13 PROBLEM — I50.21 ACUTE SYSTOLIC (CONGESTIVE) HEART FAILURE (HCC): Status: ACTIVE | Noted: 2021-12-13

## 2021-12-13 PROBLEM — I34.0 MITRAL REGURGITATION: Status: ACTIVE | Noted: 2021-12-13

## 2021-12-13 PROBLEM — I05.0 MITRAL STENOSIS: Status: ACTIVE | Noted: 2021-12-13

## 2021-12-13 PROBLEM — I27.20 MODERATE TO SEVERE PULMONARY HYPERTENSION (HCC): Status: ACTIVE | Noted: 2021-12-13

## 2021-12-13 PROBLEM — I47.20 VENTRICULAR TACHYCARDIA (HCC): Status: ACTIVE | Noted: 2021-12-13

## 2021-12-13 LAB
AADO2: 43.3 MMHG
AADO2: 58.3 MMHG
ALBUMIN SERPL-MCNC: 3.7 G/DL (ref 3.5–5.2)
ALP BLD-CCNC: 89 U/L (ref 40–129)
ALT SERPL-CCNC: 52 U/L (ref 0–40)
ANION GAP SERPL CALCULATED.3IONS-SCNC: 12 MMOL/L (ref 7–16)
AST SERPL-CCNC: 68 U/L (ref 0–39)
B.E.: -2.8 MMOL/L (ref -3–3)
B.E.: -7 MMOL/L (ref -3–3)
BILIRUB SERPL-MCNC: 0.8 MG/DL (ref 0–1.2)
BUN BLDV-MCNC: 18 MG/DL (ref 6–23)
CALCIUM SERPL-MCNC: 8.3 MG/DL (ref 8.6–10.2)
CHLORIDE BLD-SCNC: 107 MMOL/L (ref 98–107)
CHOLESTEROL, TOTAL: 100 MG/DL (ref 0–199)
CO2: 20 MMOL/L (ref 22–29)
COHB: 0.7 % (ref 0–1.5)
COHB: 1.1 % (ref 0–1.5)
CREAT SERPL-MCNC: 0.9 MG/DL (ref 0.7–1.2)
CRITICAL: ABNORMAL
CRITICAL: ABNORMAL
DATE ANALYZED: ABNORMAL
DATE ANALYZED: ABNORMAL
DATE OF COLLECTION: ABNORMAL
DATE OF COLLECTION: ABNORMAL
EKG ATRIAL RATE: 220 BPM
EKG Q-T INTERVAL: 278 MS
EKG QRS DURATION: 162 MS
EKG QTC CALCULATION (BAZETT): 494 MS
EKG R AXIS: -28 DEGREES
EKG T AXIS: 100 DEGREES
EKG VENTRICULAR RATE: 190 BPM
FIO2: 35 %
FIO2: 35 %
GFR AFRICAN AMERICAN: >60
GFR NON-AFRICAN AMERICAN: >60 ML/MIN/1.73
GLUCOSE BLD-MCNC: 135 MG/DL (ref 74–99)
HBA1C MFR BLD: 4.9 % (ref 4–5.6)
HCO3: 15.9 MMOL/L (ref 22–26)
HCO3: 22.4 MMOL/L (ref 22–26)
HCT VFR BLD CALC: 37.4 % (ref 37–54)
HDLC SERPL-MCNC: 39 MG/DL
HEMOGLOBIN: 12.4 G/DL (ref 12.5–16.5)
HHB: 0.9 % (ref 0–5)
HHB: 1.1 % (ref 0–5)
LAB: ABNORMAL
LAB: ABNORMAL
LDL CHOLESTEROL CALCULATED: 49 MG/DL (ref 0–99)
Lab: ABNORMAL
Lab: ABNORMAL
MAGNESIUM: 2.1 MG/DL (ref 1.6–2.6)
MCH RBC QN AUTO: 29.4 PG (ref 26–35)
MCHC RBC AUTO-ENTMCNC: 33.2 % (ref 32–34.5)
MCV RBC AUTO: 88.6 FL (ref 80–99.9)
METHB: 0.2 % (ref 0–1.5)
METHB: 0.2 % (ref 0–1.5)
MODE: ABNORMAL
MODE: AC
O2 CONTENT: 18.1 ML/DL
O2 CONTENT: 18.9 ML/DL
O2 SATURATION: 98.9 % (ref 92–98.5)
O2 SATURATION: 99.1 % (ref 92–98.5)
O2HB: 97.6 % (ref 94–97)
O2HB: 98.2 % (ref 94–97)
OPERATOR ID: 3342
OPERATOR ID: 5100
PATIENT TEMP: 37 C
PATIENT TEMP: 37 C
PCO2: 25.7 MMHG (ref 35–45)
PCO2: 40.4 MMHG (ref 35–45)
PDW BLD-RTO: 16.1 FL (ref 11.5–15)
PEEP/CPAP: 5 CMH2O
PEEP/CPAP: 5 CMH2O
PFO2: 4.3 MMHG/%
PFO2: 4.36 MMHG/%
PH BLOOD GAS: 7.36 (ref 7.35–7.45)
PH BLOOD GAS: 7.41 (ref 7.35–7.45)
PHOSPHORUS: 4.2 MG/DL (ref 2.5–4.5)
PLATELET # BLD: 116 E9/L (ref 130–450)
PMV BLD AUTO: 11.9 FL (ref 7–12)
PO2: 150.5 MMHG (ref 75–100)
PO2: 152.6 MMHG (ref 75–100)
POTASSIUM REFLEX MAGNESIUM: 4.4 MMOL/L (ref 3.5–5)
POTASSIUM SERPL-SCNC: 4.32 MMOL/L (ref 3.5–5)
RBC # BLD: 4.22 E12/L (ref 3.8–5.8)
RI(T): 0.29
RI(T): 38 %
RR MECHANICAL: 14 B/MIN
SODIUM BLD-SCNC: 139 MMOL/L (ref 132–146)
SOURCE, BLOOD GAS: ABNORMAL
SOURCE, BLOOD GAS: ABNORMAL
THB: 13 G/DL (ref 11.5–16.5)
THB: 13.5 G/DL (ref 11.5–16.5)
TIME ANALYZED: 1329
TIME ANALYZED: 431
TOTAL PROTEIN: 6 G/DL (ref 6.4–8.3)
TRIGL SERPL-MCNC: 61 MG/DL (ref 0–149)
VLDLC SERPL CALC-MCNC: 12 MG/DL
VT MECHANICAL: 450 ML
WBC # BLD: 9.6 E9/L (ref 4.5–11.5)

## 2021-12-13 PROCEDURE — 36415 COLL VENOUS BLD VENIPUNCTURE: CPT

## 2021-12-13 PROCEDURE — 71045 X-RAY EXAM CHEST 1 VIEW: CPT

## 2021-12-13 PROCEDURE — 2500000003 HC RX 250 WO HCPCS: Performed by: SURGERY

## 2021-12-13 PROCEDURE — 36592 COLLECT BLOOD FROM PICC: CPT

## 2021-12-13 PROCEDURE — 2580000003 HC RX 258: Performed by: SURGERY

## 2021-12-13 PROCEDURE — 6370000000 HC RX 637 (ALT 250 FOR IP): Performed by: SURGERY

## 2021-12-13 PROCEDURE — 2580000003 HC RX 258: Performed by: STUDENT IN AN ORGANIZED HEALTH CARE EDUCATION/TRAINING PROGRAM

## 2021-12-13 PROCEDURE — 2500000003 HC RX 250 WO HCPCS: Performed by: INTERNAL MEDICINE

## 2021-12-13 PROCEDURE — 2580000003 HC RX 258: Performed by: INTERNAL MEDICINE

## 2021-12-13 PROCEDURE — 80053 COMPREHEN METABOLIC PANEL: CPT

## 2021-12-13 PROCEDURE — 6360000002 HC RX W HCPCS: Performed by: NURSE PRACTITIONER

## 2021-12-13 PROCEDURE — 93010 ELECTROCARDIOGRAM REPORT: CPT | Performed by: INTERNAL MEDICINE

## 2021-12-13 PROCEDURE — 99291 CRITICAL CARE FIRST HOUR: CPT | Performed by: INTERNAL MEDICINE

## 2021-12-13 PROCEDURE — 84132 ASSAY OF SERUM POTASSIUM: CPT

## 2021-12-13 PROCEDURE — 83036 HEMOGLOBIN GLYCOSYLATED A1C: CPT

## 2021-12-13 PROCEDURE — 84100 ASSAY OF PHOSPHORUS: CPT

## 2021-12-13 PROCEDURE — 99291 CRITICAL CARE FIRST HOUR: CPT | Performed by: SURGERY

## 2021-12-13 PROCEDURE — 82805 BLOOD GASES W/O2 SATURATION: CPT

## 2021-12-13 PROCEDURE — 6360000002 HC RX W HCPCS: Performed by: INTERNAL MEDICINE

## 2021-12-13 PROCEDURE — 6370000000 HC RX 637 (ALT 250 FOR IP): Performed by: INTERNAL MEDICINE

## 2021-12-13 PROCEDURE — 80061 LIPID PANEL: CPT

## 2021-12-13 PROCEDURE — 2000000000 HC ICU R&B

## 2021-12-13 PROCEDURE — 99233 SBSQ HOSP IP/OBS HIGH 50: CPT | Performed by: INTERNAL MEDICINE

## 2021-12-13 PROCEDURE — 94003 VENT MGMT INPAT SUBQ DAY: CPT

## 2021-12-13 PROCEDURE — 6370000000 HC RX 637 (ALT 250 FOR IP): Performed by: STUDENT IN AN ORGANIZED HEALTH CARE EDUCATION/TRAINING PROGRAM

## 2021-12-13 PROCEDURE — 85027 COMPLETE CBC AUTOMATED: CPT

## 2021-12-13 PROCEDURE — 2700000000 HC OXYGEN THERAPY PER DAY

## 2021-12-13 PROCEDURE — 83735 ASSAY OF MAGNESIUM: CPT

## 2021-12-13 RX ORDER — METOPROLOL SUCCINATE 25 MG/1
25 TABLET, EXTENDED RELEASE ORAL DAILY
Status: DISCONTINUED | OUTPATIENT
Start: 2021-12-13 | End: 2021-12-17

## 2021-12-13 RX ORDER — POLYETHYLENE GLYCOL 3350 17 G/17G
17 POWDER, FOR SOLUTION ORAL DAILY
Status: DISCONTINUED | OUTPATIENT
Start: 2021-12-13 | End: 2021-12-21 | Stop reason: HOSPADM

## 2021-12-13 RX ORDER — SODIUM CHLORIDE, SODIUM LACTATE, POTASSIUM CHLORIDE, AND CALCIUM CHLORIDE .6; .31; .03; .02 G/100ML; G/100ML; G/100ML; G/100ML
500 INJECTION, SOLUTION INTRAVENOUS ONCE
Status: COMPLETED | OUTPATIENT
Start: 2021-12-13 | End: 2021-12-13

## 2021-12-13 RX ORDER — FUROSEMIDE 10 MG/ML
20 INJECTION INTRAMUSCULAR; INTRAVENOUS ONCE
Status: COMPLETED | OUTPATIENT
Start: 2021-12-13 | End: 2021-12-13

## 2021-12-13 RX ADMIN — ASPIRIN 81 MG CHEWABLE TABLET 81 MG: 81 TABLET CHEWABLE at 08:32

## 2021-12-13 RX ADMIN — HEPARIN SODIUM 5000 UNITS: 10000 INJECTION INTRAVENOUS; SUBCUTANEOUS at 06:03

## 2021-12-13 RX ADMIN — POLYVINYL ALCOHOL 1 DROP: 14 SOLUTION/ DROPS OPHTHALMIC at 11:53

## 2021-12-13 RX ADMIN — HEPARIN SODIUM 5000 UNITS: 10000 INJECTION INTRAVENOUS; SUBCUTANEOUS at 21:44

## 2021-12-13 RX ADMIN — ROSUVASTATIN 40 MG: 20 TABLET, FILM COATED ORAL at 20:41

## 2021-12-13 RX ADMIN — 0.12% CHLORHEXIDINE GLUCONATE 15 ML: 1.2 RINSE ORAL at 08:32

## 2021-12-13 RX ADMIN — Medication 10 ML: at 08:32

## 2021-12-13 RX ADMIN — MINERAL OIL AND WHITE PETROLATUM: 150; 830 OINTMENT OPHTHALMIC at 12:45

## 2021-12-13 RX ADMIN — TICAGRELOR 90 MG: 90 TABLET ORAL at 08:32

## 2021-12-13 RX ADMIN — SODIUM CHLORIDE: 9 INJECTION, SOLUTION INTRAVENOUS at 05:00

## 2021-12-13 RX ADMIN — POLYVINYL ALCOHOL 1 DROP: 14 SOLUTION/ DROPS OPHTHALMIC at 08:00

## 2021-12-13 RX ADMIN — FUROSEMIDE 20 MG: 10 INJECTION, SOLUTION INTRAMUSCULAR; INTRAVENOUS at 16:46

## 2021-12-13 RX ADMIN — POLYVINYL ALCOHOL 1 DROP: 14 SOLUTION/ DROPS OPHTHALMIC at 03:39

## 2021-12-13 RX ADMIN — POLYVINYL ALCOHOL 1 DROP: 14 SOLUTION/ DROPS OPHTHALMIC at 00:00

## 2021-12-13 RX ADMIN — LIDOCAINE HYDROCHLORIDE 0.5 MG/MIN: 4 INJECTION, SOLUTION INTRAVENOUS at 20:42

## 2021-12-13 RX ADMIN — SODIUM CHLORIDE, POTASSIUM CHLORIDE, SODIUM LACTATE AND CALCIUM CHLORIDE 500 ML: 600; 310; 30; 20 INJECTION, SOLUTION INTRAVENOUS at 12:33

## 2021-12-13 RX ADMIN — POLYETHYLENE GLYCOL 3350 17 G: 17 POWDER, FOR SOLUTION ORAL at 08:41

## 2021-12-13 RX ADMIN — SODIUM CHLORIDE, PRESERVATIVE FREE 10 ML: 5 INJECTION INTRAVENOUS at 12:43

## 2021-12-13 RX ADMIN — Medication 10 ML: at 21:00

## 2021-12-13 RX ADMIN — MINERAL OIL AND WHITE PETROLATUM: 150; 830 OINTMENT OPHTHALMIC at 05:01

## 2021-12-13 RX ADMIN — FAMOTIDINE 20 MG: 10 INJECTION INTRAVENOUS at 20:41

## 2021-12-13 RX ADMIN — MINERAL OIL AND WHITE PETROLATUM: 150; 830 OINTMENT OPHTHALMIC at 01:06

## 2021-12-13 RX ADMIN — Medication 100 MCG/HR: at 03:37

## 2021-12-13 RX ADMIN — TICAGRELOR 90 MG: 90 TABLET ORAL at 20:41

## 2021-12-13 RX ADMIN — SODIUM CHLORIDE 0.2 MCG/KG/HR: 0.9 INJECTION, SOLUTION INTRAVENOUS at 14:36

## 2021-12-13 RX ADMIN — METOPROLOL TARTRATE 25 MG: 25 TABLET, FILM COATED ORAL at 08:32

## 2021-12-13 RX ADMIN — FAMOTIDINE 20 MG: 10 INJECTION INTRAVENOUS at 08:32

## 2021-12-13 RX ADMIN — MINERAL OIL AND WHITE PETROLATUM: 150; 830 OINTMENT OPHTHALMIC at 09:30

## 2021-12-13 RX ADMIN — SODIUM CHLORIDE, PRESERVATIVE FREE 10 ML: 5 INJECTION INTRAVENOUS at 16:49

## 2021-12-13 ASSESSMENT — PAIN SCALES - GENERAL
PAINLEVEL_OUTOF10: 0

## 2021-12-13 ASSESSMENT — PULMONARY FUNCTION TESTS
PIF_VALUE: 13
PIF_VALUE: 19
PIF_VALUE: 19
PIF_VALUE: 15
PIF_VALUE: 19
PIF_VALUE: 20
PIF_VALUE: 10
PIF_VALUE: 22
PIF_VALUE: 18
PIF_VALUE: 21
PIF_VALUE: 19
PIF_VALUE: 11
PIF_VALUE: 15
PIF_VALUE: 20
PIF_VALUE: 20

## 2021-12-13 NOTE — PROGRESS NOTES
Patient attempted in PSV for the second time today. Patient started on a PSV of 10 and PEEP of 5.  PS decreased to 10 due to patient pulling tidal volumes of 1052 consecutively. Patient however has a respiratory rate of only 6 breathes per minute. Loan CARRILLO notified of the attempt.

## 2021-12-13 NOTE — FLOWSHEET NOTE
Pt will not keep right leg straight despite reorientation and verbal redirection post arterial sheath removal.. Right ankle restraint added. Wife and daughter informed.

## 2021-12-13 NOTE — PROGRESS NOTES
tears. Peridex.    CVC sites:  [facility], Day # 1  Ancillary consults: Cardiology  Family Update: As available     Code status:   Full Code  Family Update: As available     Electronically signed by Margarita Bowen DO on 12/13/21 at 7:40 AM EST

## 2021-12-13 NOTE — FLOWSHEET NOTE
Pt will not keep right leg straight, keeps sitting up in bed despite 2 pt soft wrist restraints. Dr. Uzair Teran @[facility]. Right femoral arterial sheath discontinued. Pressure held x 30minutes. Site ecchymotic with hematoma. Pressure drsg applied with sandbag.

## 2021-12-13 NOTE — PLAN OF CARE
Problem: Non-Violent Restraints  Goal: No harm/injury to patient while restraints in use  12/13/2021 1740 by Adrián Brady RN  Outcome: Met This Shift     Problem: Non-Violent Restraints  Goal: Patient's dignity will be maintained  12/13/2021 1740 by Adrián Brady RN  Outcome: Met This Shift     Problem: Non-Violent Restraints  Goal: Removal from restraints as soon as assessed to be safe  12/13/2021 1740 by Adrián Brady RN  Outcome: Not Met This Shift

## 2021-12-13 NOTE — PROGRESS NOTES
Physical Therapy  Physical Therapy Attempt    Name: Fidel Bedoya  : 1945  MRN: [id number]      Date of Service: 2021  Chart reviewed. Pt was agitated after extubation and not following commands. Will re-attempt as able.     Lali Cook, PT, DPT  [id number]

## 2021-12-13 NOTE — H&P
[address] [facility]  History and Physical      CHIEF COMPLAINT:       Patient of Scarlet Larson MD presents with:  CAD (coronary artery disease)    History of Present Illness:   Patient is intubated so unable to provide history. His wife states that yesterday he woke up with severe nausea and diaphoresis. She states he was not complaining of chest pain or lightheadedness. No radiation. No other associated symptoms. No modifying factors. Presented to ED and found to be in monomorphic sustained VT. He coded in the emergency department but ROSC was achieved. He was started on antiarrhythmics and taken for cardiac catheterization. Currently he remains on lidocaine drip, intubated, sedated. He is following simple commands. REVIEW OF SYSTEMS:  Pertinent negatives are above in HPI. 10 point ROS otherwise negative.       Past Medical History:   Diagnosis Date    CAD (coronary artery disease)     s/p stent RCA    [place] (hard of hearing)     WEARS HEARING AIDES    Hypertension     Left ventricular systolic dysfunction     MI (myocardial infarction) ([address] 1997    Mitral regurgitation     Obesity, morbid ([address]     s/p intestinal bypass    Sleep apnea     Valvular heart disease          Past Surgical History:   Procedure Laterality Date    APPENDECTOMY      BACK SURGERY      CARPAL TUNNEL RELEASE Bilateral     CORONARY ANGIOPLASTY  7/7/97    PTCA/stent of the RCA     CORONARY ANGIOPLASTY WITH STENT PLACEMENT  12/12/2021    3.5 x 15 Resolute Luis to the prox LAD by Dr. Hellen Lemons CATH LAB PROCEDURE  2/24/97    DIAGNOSTIC CARDIAC CATH LAB PROCEDURE  7/7/97    EYE SURGERY Bilateral     cataract removal w lense implants    FRACTURE SURGERY      Clayton in left femur    HIP SURGERY  2014    fx, clayton placed    INTESTINAL BYPASS      JOINT REPLACEMENT      TONSILLECTOMY      UPPER GASTROINTESTINAL ENDOSCOPY N/A 10/8/2018    EGD CONTROL HEMORRHAGE at bedside performed by Jessenia Whipple MD at [facility] ENDOSCOPY       Medications Prior to Admission:    Medications Prior to Admission: ENTRESTO  MG per tablet, TAKE ONE TABLET BY MOUTH TWO TIMES A DAY  Cetirizine HCl (ZYRTEC ALLERGY PO), Take by mouth daily  simvastatin (ZOCOR) 20 MG tablet, 20 mg   Acetaminophen (TYLENOL ARTHRITIS PAIN PO), Take by mouth daily  azelastine HCl 0.15 % SOLN, 2 sprays by Nasal route daily  risperiDONE (RISPERDAL) 1 MG tablet, Take 1 mg by mouth daily   Respiratory Therapy Supplies MONAE, 1 Device by Does not apply route every 3 hours as needed (increase pressure to 12) (Patient taking differently: 1 Device by Does not apply route every 3 hours as needed (increase pressure to 12) C-PaP EVERY NIGHT)  Ferrous Sulfate (IRON) 325 (65 Fe) MG TABS, daily   pantoprazole (PROTONIX) 40 MG tablet, TAKE 1 TABLET BY MOUTH ONCE DAILY  Cranberry 500 MG CAPS, Take 500 mg by mouth  gabapentin (NEURONTIN) 300 MG capsule, Take two capsules three times daily  aspirin 81 MG tablet, Take 81 mg by mouth daily  furosemide (LASIX) 20 MG tablet, Take 1 tablet by mouth daily  metoprolol succinate (TOPROL XL) 50 MG extended release tablet, Take 1 tablet by mouth 2 times daily  spironolactone (ALDACTONE) 25 MG tablet, Take 1 tablet by mouth daily  methotrexate (RHEUMATREX) 2.5 MG chemo tablet, Take 7.5 mg by mouth once a week Indications: Thursday   folic acid (FOLVITE) 1 MG tablet, Take 1 mg by mouth every morning   hydroxychloroquine (PLAQUENIL) 200 MG tablet, Take 200 mg by mouth 2 times daily   DULoxetine (CYMBALTA) 60 MG extended release capsule, Take 60 mg by mouth every morning   Multiple Vitamins-Minerals (MULTIVITAMIN ADULT PO), Take 1 tablet by mouth 2 times daily   Cholecalciferol (VITAMIN D3) 5000 UNITS TABS, Take 1 tablet by mouth every morning   tamsulosin (FLOMAX) 0.4 MG capsule, Take 0.4 mg by mouth daily   RESTASIS 0.05 % ophthalmic emulsion, Place 1 drop into both eyes 2 times daily as needed     Note that the patient's home medications were reviewed and the above list is accurate to the best of my knowledge at the time of the exam.    Allergies:    Dye [iodides] and Dust mite extract    Social History:    reports that he quit smoking about 20 years ago. His smoking use included cigarettes. He started smoking about 49 years ago. He smoked 0.50 packs per day. He has never used smokeless tobacco. He reports current alcohol use of about 28.0 standard drinks of alcohol per week. He reports that he does not use drugs. Family History:   family history includes Heart Disease in his brother; Hypertension in his father and mother; Other in his brother, sister, and sister. PHYSICAL EXAM:    Vitals:  BP (!) 162/81   Pulse 60   Temp 97.7 °F (36.5 °C) (Esophageal)   Resp 15   Ht 5' 6\" (1.676 m)   Wt 190 lb 0.6 oz (86.2 kg)   SpO2 96%   BMI 30.67 kg/m²       General appearance: Awake, intubated  Eyes: Sclerae anicteric, PERRLA  HEENT: AT/NC, MMM  Neck: FROM, supple, no thyromegaly  Lymph: No cervical / supraclavicular lymphadenopathy  Lungs: Clear to auscultation anteriorly, WOB normal  CV: RRR, no MRGs, no lower extremity edema  Abdomen: Soft, non-tender; no masses or HSM, +BS  Extremities: FROM without synovitis. No clubbing or cyanosis of the hands. Skin: no rash, induration, lesions, or ulcers  Psych: Calm and cooperative. Neuro: Following simple commands, squeezes hands bilaterally, doesn't wiggle toes b/l, doesn't track with eyes but clearly awake    LABS:  All labs reviewed.   Of note:  CBC with Differential:    Lab Results   Component Value Date    WBC 9.6 12/13/2021    RBC 4.22 12/13/2021    HGB 12.4 12/13/2021    HCT 37.4 12/13/2021     12/13/2021    MCV 88.6 12/13/2021    MCH 29.4 12/13/2021    MCHC 33.2 12/13/2021    RDW 16.1 12/13/2021    NRBC 0.0 10/11/2018    LYMPHOPCT 4.4 12/12/2021    MONOPCT 2.6 12/12/2021    BASOPCT 0.2 12/12/2021    MONOSABS 0.25 12/12/2021    LYMPHSABS 0.33 12/12/2021 EOSABS 0.07 12/12/2021    BASOSABS 0.00 12/12/2021     CMP:    Lab Results   Component Value Date     12/13/2021    K 4.32 12/13/2021    K 4.4 12/13/2021     12/13/2021    CO2 20 12/13/2021    BUN 18 12/13/2021    CREATININE 0.9 12/13/2021    GFRAA >60 12/13/2021    LABGLOM >60 12/13/2021    GLUCOSE 135 12/13/2021    PROT 6.0 12/13/2021    LABALBU 3.7 12/13/2021    CALCIUM 8.3 12/13/2021    BILITOT 0.8 12/13/2021    ALKPHOS 89 12/13/2021    AST 68 12/13/2021    ALT 52 12/13/2021       Imaging:  I've personally reviewed the patient's CXR  Endotracheal tube in good position. NG tube in the stomach. Right internal jugular central venous catheter tip in the SVC. No pneumothorax on the right on the left. A perihilar vascular congestion with cardiomegaly observed. Cannot exclude discrete infiltrates in the bases particular on the left side. EKG:  I've personally reviewed the patient's EKG: Monomorphic VT    LHC    Findings:   Left main: 0% stenosis  LAD: 80 %  Stenosis ( IFR = 0.70 and 0.74 )  Circumflex: 70-80%   stenosis. RCA: Dominant. 40 %  stenosis. LV angio: 25%  ejection fraction        Hemodynamics: Ao: 139/76 ( 102 )  LV: 152  LVEDP: 22     Interventional procedure:   1. PCI vessel: LAD. Lesion type: C. TANG III flow pre PCI. Angioplasty performed with 3.0 mm balloon. Stenting performed with 3.5 mm Post dilated with 3.5 mm non compliant balloon ( kissing balloons with 2.5 mm balloon in the Diagonal branch ). Result: 0% residual stenosis and TANG III distal flow. 2. PCI vessel:  LAD-Diag Lesion type C  TANG III flow pre PCI  Angioplasty performed with 2.5 mm balloon ( kissing balloons with  3.5 mm balloon in the LAD )  Result: 0% residual stenosis with TANG III distal flow. TTE   Micro-bubble contrast injected ( to try to define an anomaly seen on the   LV gram ) --> could not be defined --> consider cardiac MRI ( once / if   patient recovers / is stable ).    The left ventricle is dilated. The inferior wall, the inferolateral wall, the basal lateral wall and the   posterior wall are thin, scarred and akinetic. The right ventricle is dilated. Right ventricle global systolic function is mildly reduced ( TAPSE = 1.6   ). The left atrium is mildly dilated. The right atrium is moderately dilated. Focal calcification mitral valve leaflets. The mitral valve leaflets are restricted in mobility   Mitral annular calcification is present. Mitral stenosis is present ( peak / mean gradient 17.66 / 5.24 mmHg; area   1.0 cm2 ). Moderate to Severe mitral regurgitation. Trace aortic regurgitation. Mild tricuspid regurgitation. Moderate to severe pulmonary hypertension. ASSESSMENT/PLAN:  Principal Problem:    STEMI (ST elevation myocardial infarction) ([address]  Active Problems:    CAD (coronary artery disease)s/p stent RCA    Valvular heart disease    H/O psoriatic arthritis    Cardiac arrest (HCC)    Acute systolic (congestive) heart failure (HCC)    Ventricular tachycardia (HCC)    Mitral stenosis    Mitral regurgitation    Moderate to severe pulmonary hypertension ([address]  Resolved Problems:    * No resolved hospital problems. *      Dual antiplatelet therapy, statin, beta-blocker    Continue home dose Entresto    Lidocaine drip    Wean ventilator    Consider LifeVest    EP consult    Further recommendations regarding valvular disease: Cardiology versus CTS?     DVT prophylaxis: Heparin  Code status: full  Requires inpatient level of care  Matthew Duval MD    6:56 AM  12/13/2021

## 2021-12-13 NOTE — CONSULTS
[facility],[address] Floor and [facility]  Consultation Report  PATIENT: Anival Syed [facility]  MEDICAL RECORD NUMBER: [id number]  DATE OF SERVICE:  12/13/2021  ATTENDING ELECTROPHYSIOLOGIST: Adriana Salguero MD   PRIMARY ELECTROPHYSIOLOGIST: Adriana Salguero MD   REFERRING PHYSICIAN: Geovani Rivero MD and Naomi Blue MD  CHIEF COMPLAINT: Lethargy and diaphoresis. HPI: Wili Pascual  is a 77 y.o. male with a history of prior CAD with a STEMI in 1997 with PCI to the RCA at that time, heart failure with recovered LVEF, LVEF 45-50% on TTE 03/30/17, 35-40 on TTE 10/09/18, and a LVEF of 29% on a Lexiscan stress test 07/01/20. HTN, HLD, obesity S/p gastric bypass, Carpal tunnel surgery. He was at home on Saturday 12/11/21 when he became diaphoretic. Weak, and lethargic when getting off the toilet, a day later at 0600 when in his wife found his HR in the 100's and SBP in the 100's as well and he was taken to [facility] where he was found to be in VT with a rate of 180 bpm. 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 He remains intubated and remains on IV Lidocaine at 1mg/min without recurrent VT. Troponin not assess prior to PCI, K 4.32, mag 2.1, Pro-BNP 4,962, ALT 52, AST 68, Plt 116. Initial EKG showed VT with a rate of 190 bpm, baseline EKG SR with 1st degree AVB rate 68 bpm, , QTc 478ms.          Patient Active Problem List   Diagnosis    CAD (coronary artery disease)s/p stent RCA    Valvular heart disease    Hypertension    Obesity, morbid s/p intestinal bypass    STEMI ([facility] elevation [facility] infarction) [facility] [address]    Stented coronary artery    Hyperlipidemia    H/O psoriatic arthritis    Acute upper GI bleed    SVT (supraventricular tachycardia) (HCC)    LV dysfunction    Cardiac arrest ([facility]    Acute systolic (congestive) heart failure (HCC)    Ventricular tachycardia (HCC)    Mitral stenosis    Mitral regurgitation    Moderate to severe pulmonary hypertension ([address]   who presents to the hospital complaining of lethargy and diaphoresis.   Cardiac electrophysiology service is consulted for VT arrest.    Patient Active Problem List    Diagnosis Date Noted    Acute systolic (congestive) heart failure ([address] 2021    Ventricular tachycardia ([address] 2021    Mitral stenosis 2021    Mitral regurgitation 2021    Moderate to severe pulmonary hypertension ([address] 2021    Cardiac arrest ([address] 2021    LV dysfunction 01/15/2019    [facility]     Acute upper GI bleed 10/08/2018    H/O psoriatic arthritis 2018    Hyperlipidemia 10/27/2013    Stented coronary artery 10/25/2013    STEMI (ST elevation myocardial infarction) ([address] 2012    CAD (coronary artery disease)s/p stent RCA     Valvular heart disease     Hypertension     Obesity, morbid s/p intestinal bypass        Past Medical History:   Diagnosis Date    CAD (coronary artery disease)     s/p stent RCA    Solomon (hard of hearing)     WEARS HEARING AIDES    Hypertension     Left ventricular systolic dysfunction     MI (myocardial infarction) ([address]     Mitral regurgitation     Obesity, morbid ([address]     s/p intestinal bypass    Sleep apnea     Valvular heart disease        Family History   Problem Relation Age of Onset    Hypertension Mother     Hypertension Father     Other Sister         back problems    Heart Disease Brother     Other Sister         MS    Other Brother         aneurysm CLOSE TO HIS HEART       Social History     Tobacco Use    Smoking status: Former Smoker     Packs/day: 0.50     Types: Cigarettes     Start date: 10/8/1972     Quit date: 2001     Years since quittin.9    Smokeless tobacco: Never Used   Substance Use Topics    Alcohol use: Yes     Alcohol/week: 28.0 standard drinks     Types: 28 Glasses of wine per week     Comment: SOCIALLY       Current Facility-Administered Medications   Medication Dose Route Frequency Provider Last Rate Last Admin    polyethylene glycol (GLYCOLAX) packet 17 g  17 g Oral Daily Melvin Noguera DO        perflutren lipid microspheres (DEFINITY) injection 1.65 mg  1.5 mL IntraVENous ONCE PRN Ptier Falcon MD        sodium chloride flush 0.9 % injection 5-40 mL  5-40 mL IntraVENous 2 times per day Piter Falcon MD   10 mL at 12/13/21 0832    sodium chloride flush 0.9 % injection 5-40 mL  5-40 mL IntraVENous PRN Piter Falcon MD        0.9 % sodium chloride infusion  25 mL IntraVENous PRN Piter Falcon MD        acetaminophen (TYLENOL) tablet 650 mg  650 mg Oral Q4H PRN Piter Falcon MD        ondansetron [facility]) injection 4 mg  4 mg IntraVENous Q6H PRN Piter Falcon MD        0.9 % sodium chloride infusion   IntraVENous Continuous Piter Falcon MD 75 mL/hr at 12/13/21 0500 New Bag at 12/13/21 0500    rosuvastatin (CRESTOR) tablet 40 mg  40 mg Oral Nightly Piter Falcon MD   40 mg at 12/12/21 2027    aspirin chewable tablet 81 mg  81 mg Oral Daily Piter Falcon MD   81 mg at 12/13/21 3431    ticagrelor (BRILINTA) tablet 90 mg  90 mg Oral BID Piter Falcon MD   90 mg at 12/13/21 5229    [Held by provider] nitroglycerin (NITRO-BID) 2 % ointment 0.5 inch  0.5 inch Topical 4 times per day Piter Falcon MD   0.5 inch at 12/12/21 1758    metoprolol tartrate (LOPRESSOR) tablet 25 mg  25 mg Oral BID Piter Falcon MD   25 mg at 12/13/21 9198    heparin (porcine) injection 5,000 Units  5,000 Units SubCUTAneous Q8H Piter Falcon MD   5,000 Units at 12/13/21 0603    lidocaine (CARDIAC INFUSION) 2 g in dextrose 5% 500 mL infusion  1 mg/min IntraVENous Continuous Piter Falcon MD 15 mL/hr at 12/12/21 2029 1 mg/min at 12/12/21 2029    fentaNYL 5 mcg/ml in 0.9%  ml infusion  12.5-200 mcg/hr IntraVENous Continuous Kamlesh Buckley MD 25 mL/hr at 12/13/21 0800 125 mcg/hr at 12/13/21 0800    polyvinyl alcohol (LIQUIFILM TEARS) 1.4 % ophthalmic solution 1 drop  1 drop Both Eyes Q4H Sheila E Kaercher, DO   1 drop at 12/13/21 0800    And    lubrifresh P.M. (artificial tears) ophthalmic ointment   Both Eyes Q4H Sheila E Jacqualine Bun, DO   Given at 12/13/21 0501    chlorhexidine (PERIDEX) 0.12 % solution 15 mL  15 mL Mouth/Throat BID Sheila E Kaercher, DO   15 mL at 12/13/21 6101    famotidine (PEPCID) injection 20 mg  20 mg IntraVENous BID Sheila E Kaercher, DO   20 mg at 12/13/21 7324        Allergies   Allergen Reactions    Dye [Iodides] Anaphylaxis     Passing out    Dust Mite Extract Other (See Comments)     All year around       ROS: Unable to assess due to intubation. PHYSICAL EXAM:   Vitals:    12/13/21 0700 12/13/21 0750 12/13/21 0800 12/13/21 0832   BP:    (!) 180/94   Pulse: 57 57 91 130   Resp: 14 15 15    Temp:   97.5 °F (36.4 °C)    TempSrc:   Esophageal    SpO2: 97% 94% 99%    Weight:       Height:       Constitutional: Intubated, restrained and moving all extremities. Head: Normocephalic and atraumatic. Neck: No hepatojugular reflux and no JVD present  Cardiovascular: S1 , S2 present, RRR   Pulmonary/Chest:  No respiratory distress. Abdominal: Soft. Normal appearance   Musculoskeletal: Normal range of motion of all extremities  Neurological: anxious not following commands. Skin: Skin is warm and dry. Extremity:   No edema. Psychiatric: Normal mood and affect.  Thought content normal.          I have personally reviewed the laboratory, cardiac diagnostic and radiographic testing as outlined below:    Data:    Recent Labs     12/12/21  1044 12/12/21  1225 12/13/21  0425   WBC 9.3 8.3 9.6   HGB 13.6 11.8* 12.4*   HCT 42.1 35.3* 37.4   * 109* 116*     Recent Labs     12/12/21  1044 12/12/21  1218 12/12/21  1225 12/13/21  0425 12/13/21  0431     --  140 139  --    K 5.2*   < > 3.6 4.4 4.32     --  107 107  --    CO2 21*  --  18* 20*  --    BUN 15  --  15 18  --    CREATININE 1.0  --  0.8 0.9  --    CALCIUM 9.3  --  8.6 8.3*  --     < > = values in this interval not displayed. Lab Results   Component Value Date    MG 2.1 12/13/2021     Recent Labs     12/12/21  1044   TSH 3.820     No results for input(s): INR in the last 72 hours. CXR (12/13/21):   Esophagogastric tube with side port in the region of the distal esophagus. Recommend repositioning.  Additional support devices as above.     Interval development of patchy bilateral airspace opacities, concerning for  pneumonia versus aspiration. Telemetry (12/13/21): sinus with PVC no recurrent sustained VT. EKG (12/12/21): VT rate 190 bpm, Please see scan in Cardiology. EKG (12/12/21): NSR rate 76 bpm, QRS 98ms, QTc 490ms  Please see scan in Cardiology. Echocardiogram (12/12/21): Micro-bubble contrast injected ( to try to define an anomaly seen on the   LV gram ) --> could not be defined --> consider cardiac MRI ( once / if   patient recovers / is stable ). The left ventricle is dilated. The inferior wall, the inferolateral wall, the basal lateral wall and the   posterior wall are thin, scarred and akinetic. The right ventricle is dilated. Right ventricle global systolic function is mildly reduced ( TAPSE = 1.6   ). The left atrium is mildly dilated. The right atrium is moderately dilated. Focal calcification mitral valve leaflets. The mitral valve leaflets are restricted in mobility   Mitral annular calcification is present. Mitral stenosis is present ( peak / mean gradient 17.66 / 5.24 mmHg; area   1.0 cm2 ). Moderate to Severe mitral regurgitation. Trace aortic regurgitation. Mild tricuspid regurgitation. Moderate to severe pulmonary hypertension. Cardiac Catheterization (12/12/21):   CONCLUSIONS:  1. Coronary artery disease. a.   LEFT MAIN:  10% distal vessel tapering.  b.  LAD:  30% eccentric proximal vessel narrowing and 80% to 90% very  proximal eccentric bifurcation lesion at the site of takeoff of a large  diagonal branch. 90% ostial stenosis and 70% to 80% proximal stenosis  of a moderate-sized second diagonal branch. 90% ostial stenosis of a  small third diagonal branch. 50% disease of the LAD after the second  diagonal branch. 60% disease of the LAD after the third diagonal  branch. Long up to 60% to 70% disease of the apical LAD before the  mustache.  c.  LCX:  Basically a large lateral branch with 70% to 80% proximal  vessel disease and 80% mid vessel disease. d.  RCA:  Dominant vessel with 40% mid vessel narrowing and 70% ostial  narrowing of the largest subdivision of the posterolateral branch. 2.  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 3.  Hemodynamically significant proximal LAD disease with pressure wire  evaluation yielding an IFR of 0.70 and _____. 4.  Successful balloon angioplasty with deployment of drug-eluting  coronary stent to the proximal LAD and balloon angioplasty to the ostium  of the diagonal branch (bifurcation lesion; double wire; kissing  balloons) with very good results. Stress Test (07/01/20):   1. ECG during the infusion did not change. 2. The myocardial perfusion imaging was abnormal. The abnormality was a a large sized fixed defect in the inferolateral wall suggestive of a prior MI  3. Overall left ventricular systolic function was abnormal with regional wall motion abnormalities. 4. High risk general pharmacologic stress test. Due to LV dysfunction. Cardiac MRI (08/13/19):    Mildly dilated left ventricle (LVEDVi 125cc/m2) with a   mild-to-moderate reduction in systolic function (LVEF 79%).  There are   thinned and akinetic [id number] inferior, inferolateral and   anterolateral segments with associated transmural delayed enhancement as   well as hypokinesis of the [id number] inferoseptum with subendocardial   enhancement.  This constellation of findings is consistent with ischemic   cardiomyopathy with an overall low probability of myocardial viability. 2. Posterior mitral leaflet tethering secondary to the akinetic basal   segments resulting in a mild-to-moderate anteriorly directed eccentric   jet of mitral regurgitation (Rvol 19-24 cc/beat; Rfrac 20-25%)     3. Moderate biatrial enlargement. 4. The arch vessel branching pattern is remarkable for a left sided arch   with an aberrant right subclavian artery that takes the typical   retroesophageal course. Prior outpatient monitor (09/27/21): Patient had a min HR of 33 bpm, max HR of 133 bpm, and avg HR of 50 bpm. Predominant underlying rhythm was Sinus Rhythm. 2 Ventricular Tachycardia runs occurred, the run with the fastest interval lasting 5 beats with a max rate of 133 bpm (avg 108 bpm); the run with the fastest interval was also the longest. Idioventricular Rhythm was present. Isolated SVEs were frequent (6.2%, 6313), SVE Couplets were rare (<1.0%, 150), and SVE Triplets were rare (<1.0%, 10). Isolated VEs were frequent (10.0%, 08032), VE Couplets were occasional (1.6%, 812), and VE Triplets were rare (<1.0%, 30). Ventricular Bigeminy and Trigeminy were present. I have independently reviewed all of the ECGs and rhythm strips per above     Assessment/Plan:  This is a 77 y.o. male with a history of   Patient Active Problem List   Diagnosis    CAD (coronary artery disease)s/p stent RCA    Valvular heart disease    Hypertension    Obesity, morbid s/p intestinal bypass    STEMI ([facility] elevation [facility] infarction) ([facility] [address]    Stented coronary artery    Hyperlipidemia    H/O psoriatic arthritis    Acute upper GI bleed    SVT (supraventricular tachycardia) (HCC)    LV dysfunction    Cardiac arrest (HCC)    Acute systolic (congestive) heart failure (HCC)    Ventricular tachycardia (HCC)    Mitral stenosis    Mitral regurgitation    Moderate to severe pulmonary hypertension ([facility] [address]    who presents with sustained ventricular tachycardia. 1. Ventricular tachycardia   - Observed 12/12/21 at 1007. - Initially stable VT then converting to pulseless VT in [facility] requiring DCCV.   - Known delayed enhancement of basal to mid interseptum on CMR 08/13/19.   - No recurrence after IV Lidocaine started 12/12/21    2. HFrEF   - Ischemic   - LVEF 50% 10/03/09 on TTE   - LVEF 45-50% 03/30/17 on TTE   - LVEF 35-40% 10/09/19 on TTE   - LVEF 29% 07/01/20 on Lexiscan stress test   - LVEF 24% on TTE 12/12/21  - GDMT: Entresto ,  Lasix, Toprol 50 BID, Aldactone 25mg daily. 3. Coronary Artery Disease   - Inferior STEMI 1997 s/p PCI RCA.  - Known Large sized inferolateral fixed defect suggesting prior MI.  - [facility] 12/12/21 patient RCA stent, high grade stenosis of the LAD-D1 S/p PCI. - statin, BB, ASA     4. HTN     5. HLD     6. Obesity s/p gastric bypass    7. IV dye allergy     8. psoriatic arthritis     9. GI bleed        Recommendations:  1. To follow per Dr. Marilee Reynaga. Thank you for allowing me to participate in your patient's care. Please call me if there are any questions or concerns. Discussed with Dr. Marilee Reynaga. Daryn Villanueva, APRN - CNP  Cardiac Electrophysiology  [facility]) Physicians  [facility] [place]: [facility]  8:33 AM  12/13/2021          Addendum    I personally saw, examined and provided care for the patient. Radiographs, labs and medication list were reviewed by me independently. The case was discussed in detail and plans for care were established.  Review of Nurse Practitioner documentation was conducted and revisions were made as appropriate. I agree with the above documented exam, problem list and plan of care. Re : VT    77year old male with known CAD, STEMI 1997 with PCI to the RCA,   LVEF 45-50% on TTE 03/30/17, 35-40% on TTE 10/09/18, LVEF of 29% on a Lexiscan stress test 07/01/20. Other hx includes HTN, HLD, obesity S/p gastric bypass, Carpal tunnel surgery. At baseline, he is on Entresto, spironolactone, Metoprolol 50 mg bid  He presented to ED 12/12 with palpitation, weakness for one hour and was found to be in sustained VT monomorphic, Rate 190, LBBB, superior axis. He was given Lidocaine and while preparing to cardiovert patient, he had pulseless VT and CPR was started, he was intubated, defibrillated x1. There was a concern for inferior STEMI thus Cath lab was activated. Cath showed prox LAD 80-90%, mod D2 90% ostial, apical LAD 70%, diffuse Lcx 70-80% lesions, mild diffuse RCA tsenosis  LV gram showed possible LV aneurysm, mod MR and inferior akinesis, dilated LV. FFR of LAD proved that it was significant thus he had simultaneous RYAN to prox LAD and angioplasty to ostium Diagonal branch    He is intubated and awake, not really following commands. He is on Lidocaine 1mg/min, no pressors, metoprolol 25 mg bid    ROS  - Unable to obtain    Vitals:    12/13/21 1400   BP: (!) 178/108   Pulse: 102   Resp: (!) 35   Temp:    SpO2: 97%       Constitutional: In NAD. Intubated  Eyes: Conjunctivae are normal.  Cardiovascular: S1, S2 positive, bradycardia  Pulmonary/Chest: decreased BS   Abdominal: Soft. Normal appearance  Neurological: Intubated/sedated  Skin: Skin is warm and dry. Extremity: No clubbing or cyanosis.       A/P    1. VT Arrest  - Known CAD on Entresto, BB, Sprinolactone  - last LVEF 29% on SPECT with fixed inferior large scar  - Now with sustained VT with hemodynamic compromise and arrest  - Cath findings do not suggest ACS but more likely severe CAD and is s/p revascularization of LAD and ostial of diagonal with residual high burden diffuse multivessel CAD   - Cardiac MRI from 2019 shows scar in the inf/inflat areas  - Wean Lidocaine gtt  - Start oral amiodarone load in am  - Monitor rhythm, currently in NSR  - Recommend ICD for secondary prevention prior to discharge  - Optimize medical regimen  - We will follow    I have spent a total of 40 CC minutes with the patient and his/her family reviewing the above stated recommendations. A total of >50% of that time involved face-to-face time providing counseling and or coordination of care with the other providers.       Tammy Cervantes M.D  [facility] Electrophysiology

## 2021-12-13 NOTE — PROGRESS NOTES
Patient extubated to a 4 liter nasal cannula with Dr. Levy Serra at bedside. Patient is unwilling to follow simple instructions in order to obtain weaning parameters. Patient after extubation is thrashing around his bed with repetitively stating \"no tubes. \" SpO2 96% on the 4 liters.

## 2021-12-13 NOTE — FLOWSHEET NOTE
Patient is attempting to pull at ETT, and important lines. Alternative measures have been attempted including distraction, redirection and pain management but patient is unable to be redirected and continues to attempt to bend his R. Leg where his sheath is placed causing bleeding and attempting to remove his ETT. At this time the patient is placed in bilateral soft wrist restraints for his own safety. Will continue to monitor.

## 2021-12-13 NOTE — FLOWSHEET NOTE
Dr. Haleigh Cross informed that Dr. Isaiah Thrasher wants the right femoral arterial sheath discontinued; will speak with Dr. Gustavo Gale

## 2021-12-13 NOTE — PROGRESS NOTES
[facility]   INTENSIVE CARE UNIT    CRITICAL CARE ATTENDING PROGRESS NOTE    I have examined the patient, reviewed the record, and discussed the case with the APN/  Resident. I have reviewed all relevant labs and imaging data. Please refer to the  APN/ resident's note. I agree with the  assessment and plan with the following corrections/ additions. The following summarizes my clinical findings and independent assessment. CC: V. fib arrest    HOSPITAL COURSE:  12/12 pulseless V. tach, 1 round of CPR, cardiac cath with PCI placement LAD and LAD diagonal branch  12/13 awake and angry. Overnight he had tachycardic, right bundle branch block and ventricular overload on EKG    EXAM:  Intubated, awake, follows commands, angry  Moves all extremities  Normal sinus  Abdomen soft nontender nondistended      ASSESSMENT:  Principal Problem:    STEMI (ST elevation myocardial infarction) ([facility] [address]  Active Problems:    CAD (coronary artery disease)s/p stent RCA    Valvular heart disease    H/O psoriatic arthritis    Cardiac arrest (HCC)    Acute systolic (congestive) heart failure (HCC)    Ventricular tachycardia (HCC)    Mitral stenosis    Mitral regurgitation    Moderate to severe pulmonary hypertension ([facility] [address]  Resolved Problems:    * No resolved hospital problems. *       PLAN:  Sedation/ Pain: Off all sedation    CV: Ventricular tachycardia-on lidocaine drip. Heart failure-on Lopressor  STEMI-status post cardiac cath. On aspirin and Brilinta and Crestor      Pulmonary: Acute respiratory failure-plan on extubation today. GI: N.p.o.    FEN: Marginal urine output. Continue monitor. Minimize IV fluids secondary to history of dilated cardiomyopathy/heart failure         Endo: Monitor Blood Sugars. Target blood glucose less than 180 in the ICU.       DVT prophylaxis--SCDS,    GI Prophylaxis--pepcid   Lines--right IJ triple-lumen catheter 12/12  CODE: FULL     DISPOSITION-Continue ICU Care    Critical care time exclusive of teaching and procedures = 32 min     Pt needs continuous ICU monitoring because the patient is at risk for deterioration from a cardiac standpoint. Amanda Washington MD, FACS  12/13/2021  1:55 PM    NOTE: This report was transcribed using voice recognition software. Every effort was made to ensure accuracy; however, inadvertent computerized transcription errors may be present.

## 2021-12-13 NOTE — PLAN OF CARE
Problem: Cardiac:  Goal: Ability to maintain vital signs within normal range will improve  Description: Ability to maintain vital signs within normal range will improve  Outcome: Met This Shift     Problem: Physical Regulation:  Goal: Complications related to the disease process, condition or treatment will be avoided or minimized  Description: Complications related to the disease process, condition or treatment will be avoided or minimized  Outcome: Met This Shift     Problem: Cardiac Output - Decreased:  Goal: Hemodynamic stability will improve  Description: Hemodynamic stability will improve  12/13/2021 0145 by Camelia Ken RN  Outcome: Met This Shift     Problem: OXYGENATION/RESPIRATORY FUNCTION  Goal: Patient will maintain patent airway  Outcome: Met This Shift     Problem: MECHANICAL VENTILATION  Goal: Patient will maintain patent airway  12/13/2021 0145 by Camelia Ken RN  Outcome: Met This Shift     Problem: MECHANICAL VENTILATION  Goal: ET tube will be managed safely  Outcome: Met This Shift     Problem: Skin Integrity:  Goal: Will show no infection signs and symptoms  Description: Will show no infection signs and symptoms  Outcome: Met This Shift

## 2021-12-13 NOTE — PROGRESS NOTES
Occupational Therapy  OT consult received. Chart reviewed. Will hold evaluation secondary to patient not medically appropriate. Will re-attempt OT evaluation as schedule permits when patient is appropriate.  Yonatan Ya, OTR/L #[id number]

## 2021-12-13 NOTE — FLOWSHEET NOTE
Dr. Kia So notified that BP 79/41, HR 52, Fentanyl stopped, and that U/O low, 83cc out so far this am.

## 2021-12-13 NOTE — FLOWSHEET NOTE
Spoke to  about the patients dysrhythmias and current vital signs. See MAR for new orders. Will Continue to monitor.

## 2021-12-13 NOTE — FLOWSHEET NOTE
when restraints released, pt reaches for ETT despite verbal redirection. 2 pt soft wrist restraints cont'd for pt safety.

## 2021-12-13 NOTE — PROGRESS NOTES
Inpatient Cardiology Progress note     PATIENT IS BEING FOLLOWED FOR: VT/VFib arrest. CAD. Ischemic cardiomyopathy. Mitral regurgitation    Cholo Carlisle is a 77 y.o. male known to Dr. Clarisse Jaquez: Intubated on vent, not adequately setated on IV fentanyl ( per critical care team attempting to extubate ), Trying to sit up in bed. Following commands. BP elevated when he is agitated, low when resting calm    OBJECTIVE: as above. Right femoral sheath transduced !! --> orders placed last pm to discontinue sheath. Nitropaste discontinued last pm per critical care team for \" low BP \"     ROS:  Consist: Denies fevers, chills or night sweats  Heart: Denies chest pain, palpitations, lightheadedness, dizziness or syncope  Lungs: Denies SOB, cough, wheezing, orthopnea or PND  GI: Denies abdominal pain, vomiting or diarrhea    PHYSICAL EXAM:   BP (!) 162/81   Pulse 91   Temp 97.7 °F (36.5 °C) (Esophageal)   Resp 15   Ht 5' 6\" (1.676 m)   Wt 190 lb 0.6 oz (86.2 kg)   SpO2 99%   BMI 30.67 kg/m²    B/P Range last 24 hours: Systolic (57ENE), LDQ:610 , Min:103 , RJE:242    Diastolic (53NPT), RRZ:16, Min:59, Max:118    CONST: Well developed, well nourished who appears stated age. Awake, alert and cooperative. No apparent distress  HEENT:   Head- Normocephalic, atraumatic   Eyes- Conjunctivae pink, anicteric  Throat- Oral mucosa pink and moist  Neck-  No stridor, trachea midline, no jugular venous distention. No carotid bruit  CHEST: Chest symmetrical and non-tender to palpation. No accessory muscle use or intercostal retractions  RESPIRATORY:  Lung sounds - clear throughout fields   CARDIOVASCULAR:     Heart Inspection- shows no noted pulsations  Heart Palpation- no heaves or thrills; PMI is non-displaced   Heart Ausculation- Regular rate and rhythm, no murmur. No s3, s4 or rub   PV: No lower extremity edema. No varicosities.  Pedal pulses palpable, no clubbing or cyanosis   ABDOMEN: Soft, non-tender to light palpation. Bowel sounds present. No palpable masses no organomegaly; no abdominal bruit  MS: Good muscle strength and tone. No atrophy or abnormal movements. : Deferred  SKIN: Warm and dry no statis dermatitis or ulcers   NEURO / PSYCH: Oriented to person, place and time. Speech clear and appropriate. Follows all commands. Pleasant affect       Intake/Output Summary (Last 24 hours) at 12/13/2021 0825  Last data filed at 12/13/2021 0800  Gross per 24 hour   Intake 1733 ml   Output 695 ml   Net 1038 ml       Weight:   Wt Readings from Last 3 Encounters:   12/13/21 190 lb 0.6 oz (86.2 kg)   12/12/21 168 lb (76.2 kg)   11/16/21 174 lb 9.6 oz (79.2 kg)     Current Inpatient Medications:   polyethylene glycol  17 g Oral Daily    sodium chloride flush  5-40 mL IntraVENous 2 times per day    rosuvastatin  40 mg Oral Nightly    aspirin  81 mg Oral Daily    ticagrelor  90 mg Oral BID    [Held by provider] nitroglycerin  0.5 inch Topical 4 times per day    metoprolol tartrate  25 mg Oral BID    heparin (porcine)  5,000 Units SubCUTAneous Q8H    polyvinyl alcohol  1 drop Both Eyes Q4H    And    artificial tears   Both Eyes Q4H    chlorhexidine  15 mL Mouth/Throat BID    famotidine (PEPCID) injection  20 mg IntraVENous BID       IV Infusions (if any):   sodium chloride      sodium chloride 75 mL/hr at 12/13/21 0500    lidocaine 1 mg/min (12/12/21 2029)    fentaNYL 5 mcg/ml in 0.9%  ml infusion 125 mcg/hr (12/13/21 0800)       DIAGNOSTIC/ LABORATORY DATA:  Labs:   CBC:   Recent Labs     12/12/21  1225 12/13/21  0425   WBC 8.3 9.6   HGB 11.8* 12.4*   HCT 35.3* 37.4   * 116*     BMP:   Recent Labs     12/12/21  1044 12/12/21  1225 12/13/21  0425 12/13/21  0431   NA  --  140 139  --    K   < > 3.6 4.4 4.32   CO2  --  18* 20*  --    BUN  --  15 18  --    CREATININE  --  0.8 0.9  --    LABGLOM  --  >60 >60  --    CALCIUM  --  8.6 8.3*  --     < > = values in this interval not displayed.      Mag: Recent Labs     12/12/21  1044 12/13/21  0425   MG 1.9 2.1     Phos:   Recent Labs     12/13/21  0425   PHOS 4.2     TFT:   Lab Results   Component Value Date    TSH 3.820 12/12/2021      HgA1c:   Lab Results   Component Value Date    LABA1C 4.9 12/13/2021     FASTING LIPID PANEL:  Lab Results   Component Value Date    CHOL 100 12/13/2021    HDL 39 12/13/2021    LDLCALC 49 12/13/2021    TRIG 61 12/13/2021     LIVER PROFILE:  Recent Labs     12/12/21  1225 12/13/21  0425   AST 69* 68*   ALT 65* 52*   LABALBU 3.7 3.7       CXR 12/12/21:  Endotracheal tube in good position.   NG tube in the stomach.   Right internal jugular central venous catheter tip in the SVC.   No pneumothorax on the right on the left.   A perihilar vascular congestion with cardiomegaly observed.   Cannot exclude discrete infiltrates in the bases particular on the left side. CXR 12/13/21:   Esophagogastric tube with side port in the region of the distal esophagus. Recommend repositioning.  Additional support devices as above.   Interval development of patchy bilateral airspace opacities, concerning for pneumonia versus aspiration. ECG 12/12/21 19:41  Sinus rhythm with 1st degree AV block with premature atrial complexes  Cannot rule out Inferior infarct (cited on or before 12-DEC-2021)  Abnormal ECG    Telemetry: SR. SB. ST    Cardiac Catheterization 12/12/2021 ([facility])   CONCLUSIONS:  1. Coronary artery disease. a. LEFT MAIN:  10% distal vessel tapering.  b.  LAD:  30% eccentric proximal vessel narrowing and 80% to 90% very  proximal eccentric bifurcation lesion at the site of takeoff of a large  diagonal branch. 90% ostial stenosis and 70% to 80% proximal stenosis  of a moderate-sized second diagonal branch. 90% ostial stenosis of a  small third diagonal branch. 50% disease of the LAD after the second  diagonal branch. 60% disease of the LAD after the third diagonal  branch.   Long up to 60% to 70% disease of the apical LAD before 1. Pulseless VT Arrest s/p CPR. EP following. Started on Amiodarone; ICD recommended for secondary prevention   2. Hx ventricular arrhythmias on Holter 9/2021 ([facility])  3. Known CAD/ICMP/Moderate MR. Hx Inferior MI. s/p [facility] 12/12/0221 with RYAN- proximal LAD and kissing balloon angioplasty to diagonal branch ([facility] 12/12/2021)  -- doubt the primary cause of VT   arrest.   4. Respiratory failure secondary to # 1, on vent, weaning in progress  4. H/o HTN  5. H/o HLD  6. Ex smoker quit in 2003  7. Psoriatic arthritis on Methotrexate and Plaquenil  8. IV dye allergy  9. S/p Gastric bypass for morbid obesity in 2005  10. Hx BETTIE C-pap non longer needed after weight loss  11. Hx GIB in 2018  12. Depression  13. Bilateral carpal tunnel surgery  14. Chronic back/cervical pain   15. Mild transaminitis     PLAN:  1. Discontinue right femoral arterial sheath  2. Weaning / extubation as per the critical care team  3. Will give one dose of IV lasix  4. Change lopressor to Toprol XL post extubation, when taking PO  5. Monitor BP, consider resuming low dose Entresto  6. Consider resuming spironolactone  7. Continue DAPT  8. Continue Statin therapy, monitor LFT's  9. Consider JIMBO / cardiac MRI  10. Rest as per the critical care team, the primary service and other consultants  11.  Will follow        Electronically signed by Warden Reddy MD on 12/13/2021 at 8:25 AM

## 2021-12-13 NOTE — FLOWSHEET NOTE
Dr. Isaiah Thrasher informed that after the last 2 doses of metoprolol, pt dropped HR into 50s (59) and Bp 89/48(63); will decrease dose.

## 2021-12-13 NOTE — FLOWSHEET NOTE
When restraints released, pt pulls at critical IV lines and won't keep his right leg straight despite verbal redirection. 2 pt soft wrist and right ankle restraints cont'd for pt safety.

## 2021-12-13 NOTE — FLOWSHEET NOTE
Dr. Shira Collier phoned in, updated on extubation, sheath removal, hematoma of R groin, when agitated SBP 170s, HR 80s, when calm SBP 80s, HR 62, low U/O; will order lasix.

## 2021-12-14 LAB
ABO/RH: NORMAL
ALBUMIN SERPL-MCNC: 3.4 G/DL (ref 3.5–5.2)
ALP BLD-CCNC: 74 U/L (ref 40–129)
ALT SERPL-CCNC: 41 U/L (ref 0–40)
ANION GAP SERPL CALCULATED.3IONS-SCNC: 14 MMOL/L (ref 7–16)
ANTIBODY SCREEN: NORMAL
AST SERPL-CCNC: 58 U/L (ref 0–39)
BILIRUB SERPL-MCNC: 1 MG/DL (ref 0–1.2)
BLOOD BANK DISPENSE STATUS: NORMAL
BLOOD BANK PRODUCT CODE: NORMAL
BPU ID: NORMAL
BUN BLDV-MCNC: 25 MG/DL (ref 6–23)
CALCIUM SERPL-MCNC: 8.5 MG/DL (ref 8.6–10.2)
CHLORIDE BLD-SCNC: 103 MMOL/L (ref 98–107)
CO2: 20 MMOL/L (ref 22–29)
CREAT SERPL-MCNC: 1.1 MG/DL (ref 0.7–1.2)
DESCRIPTION BLOOD BANK: NORMAL
EKG ATRIAL RATE: 88 BPM
EKG Q-T INTERVAL: 346 MS
EKG QRS DURATION: 86 MS
EKG QTC CALCULATION (BAZETT): 493 MS
EKG R AXIS: 44 DEGREES
EKG T AXIS: 151 DEGREES
EKG VENTRICULAR RATE: 122 BPM
GFR AFRICAN AMERICAN: >60
GFR NON-AFRICAN AMERICAN: >60 ML/MIN/1.73
GLUCOSE BLD-MCNC: 96 MG/DL (ref 74–99)
HCT VFR BLD CALC: 24.1 % (ref 37–54)
HEMOGLOBIN: 8 G/DL (ref 12.5–16.5)
MAGNESIUM: 2 MG/DL (ref 1.6–2.6)
MCH RBC QN AUTO: 29.5 PG (ref 26–35)
MCHC RBC AUTO-ENTMCNC: 33.2 % (ref 32–34.5)
MCV RBC AUTO: 88.9 FL (ref 80–99.9)
PDW BLD-RTO: 15.9 FL (ref 11.5–15)
PHOSPHORUS: 2.5 MG/DL (ref 2.5–4.5)
PLATELET # BLD: 103 E9/L (ref 130–450)
PMV BLD AUTO: 12.1 FL (ref 7–12)
POTASSIUM REFLEX MAGNESIUM: 4.1 MMOL/L (ref 3.5–5)
PROCALCITONIN: 0.16 NG/ML (ref 0–0.08)
RBC # BLD: 2.71 E12/L (ref 3.8–5.8)
SODIUM BLD-SCNC: 137 MMOL/L (ref 132–146)
TOTAL PROTEIN: 6.3 G/DL (ref 6.4–8.3)
WBC # BLD: 12.1 E9/L (ref 4.5–11.5)

## 2021-12-14 PROCEDURE — P9016 RBC LEUKOCYTES REDUCED: HCPCS

## 2021-12-14 PROCEDURE — 86901 BLOOD TYPING SEROLOGIC RH(D): CPT

## 2021-12-14 PROCEDURE — 99291 CRITICAL CARE FIRST HOUR: CPT | Performed by: INTERNAL MEDICINE

## 2021-12-14 PROCEDURE — 97162 PT EVAL MOD COMPLEX 30 MIN: CPT

## 2021-12-14 PROCEDURE — 83735 ASSAY OF MAGNESIUM: CPT

## 2021-12-14 PROCEDURE — 85027 COMPLETE CBC AUTOMATED: CPT

## 2021-12-14 PROCEDURE — 97530 THERAPEUTIC ACTIVITIES: CPT

## 2021-12-14 PROCEDURE — 6370000000 HC RX 637 (ALT 250 FOR IP): Performed by: SURGERY

## 2021-12-14 PROCEDURE — 6370000000 HC RX 637 (ALT 250 FOR IP): Performed by: INTERNAL MEDICINE

## 2021-12-14 PROCEDURE — 36415 COLL VENOUS BLD VENIPUNCTURE: CPT

## 2021-12-14 PROCEDURE — 2700000000 HC OXYGEN THERAPY PER DAY

## 2021-12-14 PROCEDURE — 99233 SBSQ HOSP IP/OBS HIGH 50: CPT | Performed by: INTERNAL MEDICINE

## 2021-12-14 PROCEDURE — 36430 TRANSFUSION BLD/BLD COMPNT: CPT

## 2021-12-14 PROCEDURE — 2000000000 HC ICU R&B

## 2021-12-14 PROCEDURE — 2580000003 HC RX 258: Performed by: INTERNAL MEDICINE

## 2021-12-14 PROCEDURE — 36592 COLLECT BLOOD FROM PICC: CPT

## 2021-12-14 PROCEDURE — 97535 SELF CARE MNGMENT TRAINING: CPT

## 2021-12-14 PROCEDURE — 99291 CRITICAL CARE FIRST HOUR: CPT | Performed by: SURGERY

## 2021-12-14 PROCEDURE — 84100 ASSAY OF PHOSPHORUS: CPT

## 2021-12-14 PROCEDURE — 84145 PROCALCITONIN (PCT): CPT

## 2021-12-14 PROCEDURE — 80053 COMPREHEN METABOLIC PANEL: CPT

## 2021-12-14 PROCEDURE — 86900 BLOOD TYPING SEROLOGIC ABO: CPT

## 2021-12-14 PROCEDURE — 86850 RBC ANTIBODY SCREEN: CPT

## 2021-12-14 PROCEDURE — 97166 OT EVAL MOD COMPLEX 45 MIN: CPT

## 2021-12-14 PROCEDURE — 86923 COMPATIBILITY TEST ELECTRIC: CPT

## 2021-12-14 PROCEDURE — 93005 ELECTROCARDIOGRAM TRACING: CPT | Performed by: INTERNAL MEDICINE

## 2021-12-14 PROCEDURE — 6360000002 HC RX W HCPCS: Performed by: INTERNAL MEDICINE

## 2021-12-14 PROCEDURE — 2500000003 HC RX 250 WO HCPCS: Performed by: SURGERY

## 2021-12-14 PROCEDURE — 6370000000 HC RX 637 (ALT 250 FOR IP): Performed by: NURSE PRACTITIONER

## 2021-12-14 PROCEDURE — 99222 1ST HOSP IP/OBS MODERATE 55: CPT | Performed by: NURSE PRACTITIONER

## 2021-12-14 RX ORDER — SPIRONOLACTONE 25 MG/1
25 TABLET ORAL DAILY
Status: DISCONTINUED | OUTPATIENT
Start: 2021-12-14 | End: 2021-12-21 | Stop reason: HOSPADM

## 2021-12-14 RX ORDER — FUROSEMIDE 10 MG/ML
20 INJECTION INTRAMUSCULAR; INTRAVENOUS ONCE
Status: COMPLETED | OUTPATIENT
Start: 2021-12-14 | End: 2021-12-14

## 2021-12-14 RX ORDER — FUROSEMIDE 20 MG/1
20 TABLET ORAL DAILY
Status: DISCONTINUED | OUTPATIENT
Start: 2021-12-15 | End: 2021-12-16

## 2021-12-14 RX ORDER — SODIUM CHLORIDE 9 MG/ML
INJECTION, SOLUTION INTRAVENOUS PRN
Status: DISCONTINUED | OUTPATIENT
Start: 2021-12-14 | End: 2021-12-15

## 2021-12-14 RX ORDER — AMIODARONE HYDROCHLORIDE 200 MG/1
400 TABLET ORAL 2 TIMES DAILY
Status: DISCONTINUED | OUTPATIENT
Start: 2021-12-14 | End: 2021-12-21

## 2021-12-14 RX ADMIN — TICAGRELOR 90 MG: 90 TABLET ORAL at 07:59

## 2021-12-14 RX ADMIN — 0.12% CHLORHEXIDINE GLUCONATE 15 ML: 1.2 RINSE ORAL at 08:00

## 2021-12-14 RX ADMIN — FAMOTIDINE 20 MG: 10 INJECTION INTRAVENOUS at 21:39

## 2021-12-14 RX ADMIN — FUROSEMIDE 20 MG: 20 INJECTION, SOLUTION INTRAMUSCULAR; INTRAVENOUS at 19:13

## 2021-12-14 RX ADMIN — SPIRONOLACTONE 25 MG: 25 TABLET ORAL at 18:20

## 2021-12-14 RX ADMIN — METOPROLOL SUCCINATE 25 MG: 25 TABLET, EXTENDED RELEASE ORAL at 07:59

## 2021-12-14 RX ADMIN — FAMOTIDINE 20 MG: 10 INJECTION INTRAVENOUS at 07:59

## 2021-12-14 RX ADMIN — HEPARIN SODIUM 5000 UNITS: 10000 INJECTION INTRAVENOUS; SUBCUTANEOUS at 06:08

## 2021-12-14 RX ADMIN — TICAGRELOR 90 MG: 90 TABLET ORAL at 21:40

## 2021-12-14 RX ADMIN — HEPARIN SODIUM 5000 UNITS: 10000 INJECTION INTRAVENOUS; SUBCUTANEOUS at 14:00

## 2021-12-14 RX ADMIN — 0.12% CHLORHEXIDINE GLUCONATE 15 ML: 1.2 RINSE ORAL at 21:39

## 2021-12-14 RX ADMIN — AMIODARONE HYDROCHLORIDE 400 MG: 200 TABLET ORAL at 21:39

## 2021-12-14 RX ADMIN — Medication 10 ML: at 21:48

## 2021-12-14 RX ADMIN — AMIODARONE HYDROCHLORIDE 400 MG: 200 TABLET ORAL at 11:00

## 2021-12-14 RX ADMIN — SACUBITRIL AND VALSARTAN 1 TABLET: 24; 26 TABLET, FILM COATED ORAL at 21:40

## 2021-12-14 RX ADMIN — ROSUVASTATIN 40 MG: 20 TABLET, FILM COATED ORAL at 21:40

## 2021-12-14 RX ADMIN — ASPIRIN 81 MG CHEWABLE TABLET 81 MG: 81 TABLET CHEWABLE at 07:59

## 2021-12-14 RX ADMIN — Medication 10 ML: at 08:05

## 2021-12-14 RX ADMIN — HEPARIN SODIUM 5000 UNITS: 10000 INJECTION INTRAVENOUS; SUBCUTANEOUS at 22:07

## 2021-12-14 ASSESSMENT — PAIN SCALES - PAIN ASSESSMENT IN ADVANCED DEMENTIA (PAINAD)
NEGVOCALIZATION: 1
CONSOLABILITY: 2
FACIALEXPRESSION: 0
TOTALSCORE: 4
BREATHING: 1
BODYLANGUAGE: 0

## 2021-12-14 ASSESSMENT — PAIN SCALES - GENERAL
PAINLEVEL_OUTOF10: 0

## 2021-12-14 ASSESSMENT — PULMONARY FUNCTION TESTS
PIF_VALUE: 0
PIF_VALUE: 34
PIF_VALUE: 0

## 2021-12-14 NOTE — FLOWSHEET NOTE
Single Right leg restraint discontinued. The patient no longer needs to keep leg straight after the sheath removal. Bilateral soft wrist restraints still indicated for the patients safety as the patient continues to attempt to pull off leads, pulling at NG and ames. Will continue to monitor closely.

## 2021-12-14 NOTE — PROGRESS NOTES
[id number] [place] [address]       JXUL:                                                               Patient Name: Brendon Moseley  MRN: [id number]  : 1945  Room: [address] [place], [place] [id number]    Evaluating OT: Viva Osgood, OTR/L 0594    Referring Provider: Ladan Mckeon DO   Specific Provider Orders/Date: OT eval and treat (21)       Diagnosis: V fib arrest -STEMI    Reason for admission: : Patient went into pulseless V tach and received one round of CPR before ROSC. Patient was immediately taken for cardiac cath. PCI placement in LAD and LAD diagonal branch. Pertinent Medical History: CAD s/p stent, [place], hearing aids, MI, Sleep apnea       *Precautions:  Fall Risk, O2, bed alarm    Assessment of current deficits   [x] Functional mobility  [x]ADLs  [x] Strength               []Cognition   [x] Functional transfers   [x] IADLs         [x] Safety Awareness   [x]Endurance   [] Fine Coordination        [x] ROM     [] Vision/perception   []Sensation    []Gross Motor Coordination [x] Balance   [] Delirium                  []Motor Control     [] Communication    OT PLAN OF CARE   OT POC based on physician orders, patient diagnosis and results of clinical assessment.        Frequency/Duration: 1-3 days/wk for 1-2 weeks PRN    Specific OT Treatment to include:   ADL retraining/adapted techniques and AE recommendations to increase functional independence within precautions                    Energy conservation techniques to improve tolerance for selfcare routine   Functional transfer/mobility training/DME recommendations for increased independence, safety and fall prevention         Patient/family education to increase safety and functional independence within precautions              Environmental modifications for safe mobility and completion of ADLs Cognitive retraining ex's to improve problem solving skills & safe participation in ADLs/IADLs  Sensory re-education techniques to improve extremity awareness, maintain skin integrity and improve hand function                             Visual/Perceptual retraining  to improve body awareness and safety during transfers and ADLs  Splinting/positioning needs to maintain joint/skin integrity and prevent contractures  Therapeutic activity to improve functional performance during ADLs/IADLs                                         Therapeutic exercise to improve tolerance and functional strength for ADLs   Balance retraining exercises/tasks for facilitation of postural control with dynamic challenges during ADLs . Positioning to improve functional independence  Neuromuscular re-education: facilitation of righting/equilibrium reactions, normalization muscle tone/facilitation active functional movement                      Delirium prevention/treatment    Recommended Adaptive Equipment: LB dressing AE pending progress, [employer]     Home Living: Pt lives with wife in a 1 floor plan with 2 step(s) to enter and no rail(s); bed/bath on first floor. Bathroom setup: walk in shower with chair; no rail. Equipment owned: shower seat    Prior Level of Function: IND with ADLs;  IND with IADLs. No device for ambulation. Driving: yes  Occupation: retired from [employer]    Pain Level: pt c/o 0/10  pain  this session    Cognition: A&O: 4/4    Follows 1-2 step commands appropriately.     Memory: good   Comprehension good   Problem solving: fair   Judgement/safety: fair               Communication skills: WFL           Vision: WFL               Glasses:no                                                   Hearing: WFL     RASS: 0  CAM-ICU: (NT) Delirium     UE Assessment:  Hand Dominance: Right [x]  Left []     ROM Strength STM goal: PRN   RUE  Shoulder flexion/ abduction 45; WFL distal   (recent shoulder replacement s/p 4 months per pt Instruction on precautions prior to bed mobility to facilitate safe transfers and ADLS. Pt required 2 person assist for safe mobility due to complexity of medical condition, medical lines and deconditioning. Balance retraining: Performed sitting/standing balance ex's with instruction to facilitate righting reactions with postural changes during ADLS. ADL retraining: Instruction on adapted techniques/AE (reacher, sock aid) to increase independence and safe reach during dressing/bathing activities. Pt demonstrated fair follow through. Energy conservation: Education on breathing techniques, pacing, work simplification strategies & recommended bathroom DME for safety and energy conservation during self care tasks and activities of daily living. Delirium Prevention: Environmental and sensory modifications assessed and implemented to decrease ICU acquired delirium and to improve overall orientation, mentation and pt interaction with family/staff. Line management and environmental modifications made prior to and end of session to ensure patient safety and to increase efficiency of session. Skilled monitoring of HR, O2 saturation, blood pressure and patient's response to activity performed throughout session. Comments: OK from RN to see patient. Upon arrival, patient supine in bed, agreeable to session. Pt demo fair tolerance with fair understanding of education/techniques. At end of session, patient left seated in chair to increase activity tolerance. Son present. Call light within reach, all lines and tubes intact. Pt instructed on use of call light for assistance and fall prevention. .    Patient presents with decreased ROM/strength, activity tolerance, dynamic balance, functional mobility limiting completion of ADLs and safety. Pt can benefit from continued skilled OT  to increase safety, functional independence and quality of life.      Rehab Potential: good for established goals    Patient / Family Goal: to return to PLOF    Patient and/or family were instructed/educated on diagnosis, prognosis/goals and plan of care. Pt demonstrated F understanding. Evaluation Complexity: Moderate     · History: Expanded chart review of consults, imaging, and psychosocial history related to current functional performance. · Exam: 5+ performance deficits identified limiting functional independence and safe return home   · Assistance/Modification: Min/mod assistance or modifications required to perform tasks. May have comorbidities that affect occupational performance. [] Malnutrition indicators have been identified and nursing has been notified to ensure a dietitian consult is ordered. Time In:1035             Time Out: 1058         Total Treatment time: 8   Min Units   OT Eval Low 95959     OT Eval Medium 76618 X    OT Eval High 46514     OT Re-Eval [id number]     Therapeutic Ex [id number]     Therapeutic Activities 58018     ADL/Self Care 74217 8 1   Orthotic Management 81001     Neuro Re-Ed 60495     Non-Billable Time        Evaluation time includes thorough review of current medical information, gathering information on past medical history/social history and prior level of function, completion of standardized testing/informal observation of tasks, assessment of data and development of POC/Goals.      Any Foster, OTR/L 1147

## 2021-12-14 NOTE — PROGRESS NOTES
Inpatient Cardiology Progress note     PATIENT IS BEING FOLLOWED FOR: VT/VFib arrest. CAD. Ischemic cardiomyopathy. Mitral regurgitation    Ricarda Mckeon is a 77 y.o. male known to Dr. Shannon Haynes: Denies CP or SOB    OBJECTIVE: sitting in chair in no distress    ROS:  Consist: Denies fevers, chills or night sweats  Heart: Denies chest pain, palpitations, lightheadedness, dizziness or syncope  Lungs: Denies SOB, cough, wheezing, orthopnea or PND  GI: Denies abdominal pain, vomiting or diarrhea    PHYSICAL EXAM:   BP (!) 168/87   Pulse 79   Temp 97.6 °F (36.4 °C) (Temporal)   Resp 17   Ht 5' 6\" (1.676 m)   Wt 189 lb 9.5 oz (86 kg)   SpO2 98%   BMI 30.60 kg/m²      CONST: Well developed, well nourished who appears stated age. Awake, alert and cooperative. No apparent distress  HEENT:   Head- Normocephalic, atraumatic   Eyes- Conjunctivae pink, anicteric  Throat- Oral mucosa pink and moist  Neck-  No stridor, trachea midline, no jugular venous distention. No carotid bruit  CHEST: Chest symmetrical and non-tender to palpation. No accessory muscle use or intercostal retractions  RESPIRATORY:  Lung sounds - clear throughout fields   CARDIOVASCULAR:     Heart Inspection- shows no noted pulsations  Heart Palpation- no heaves or thrills; PMI is non-displaced   Heart Ausculation- Regular rate and rhythm, no murmur. No s3, s4 or rub   PV: 2+ lower extremity edema. No varicosities. Pedal pulses palpable, no clubbing or cyanosis   ABDOMEN: Soft, non-tender to light palpation. Bowel sounds present. No palpable masses no organomegaly; no abdominal bruit  MS: Good muscle strength and tone. No atrophy or abnormal movements. : Deferred. Emery cath  SKIN: Warm and dry no statis dermatitis or ulcers   NEURO / PSYCH: Oriented to person, place and time. Speech clear and appropriate. Follows all commands.  Pleasant affect       Intake/Output Summary (Last 24 hours) at 12/14/2021 1511  Last data filed at 12/14/2021 1400  Gross per 24 hour   Intake 314 ml   Output 785 ml   Net -471 ml       Weight:   Wt Readings from Last 3 Encounters:   12/14/21 189 lb 9.5 oz (86 kg)   12/12/21 168 lb (76.2 kg)   11/16/21 174 lb 9.6 oz (79.2 kg)     Current Inpatient Medications:   amiodarone  400 mg Oral BID    [START ON 12/15/2021] furosemide  20 mg Oral Daily    sacubitril-valsartan  1 tablet Oral BID    spironolactone  25 mg Oral Daily    furosemide  20 mg IntraVENous Once    polyethylene glycol  17 g Oral Daily    metoprolol succinate  25 mg Oral Daily    sodium chloride flush  5-40 mL IntraVENous 2 times per day    rosuvastatin  40 mg Oral Nightly    aspirin  81 mg Oral Daily    ticagrelor  90 mg Oral BID    [Held by provider] nitroglycerin  0.5 inch Topical 4 times per day    heparin (porcine)  5,000 Units SubCUTAneous Q8H    chlorhexidine  15 mL Mouth/Throat BID    famotidine (PEPCID) injection  20 mg IntraVENous BID       IV Infusions (if any):   sodium chloride      sodium chloride         DIAGNOSTIC/ LABORATORY DATA:  Labs:   CBC:   Recent Labs     12/13/21 0425 12/14/21 0455   WBC 9.6 12.1*   HGB 12.4* 8.0*   HCT 37.4 24.1*   * 103*     BMP:   Recent Labs     12/12/21  1225 12/13/21 0425 12/13/21  0431 12/14/21  0455   NA  --  139  --  137   K   < > 4.4 4.32 4.1   CO2  --  20*  --  20*   BUN  --  18  --  25*   CREATININE  --  0.9  --  1.1   LABGLOM  --  >60  --  >60   CALCIUM  --  8.3*  --  8.5*    < > = values in this interval not displayed.      Mag:   Recent Labs     12/13/21 0425 12/14/21  0455   MG 2.1 2.0     Phos:   Recent Labs     12/13/21 0425 12/14/21 0455   PHOS 4.2 2.5     TFT:   Lab Results   Component Value Date    TSH 3.820 12/12/2021      HgA1c:   Lab Results   Component Value Date    LABA1C 4.9 12/13/2021     FASTING LIPID PANEL:  Lab Results   Component Value Date    CHOL 100 12/13/2021    HDL 39 12/13/2021    LDLCALC 49 12/13/2021    TRIG 61 12/13/2021     LIVER PROFILE:  Recent Labs     12/13/21  0425 12/14/21  0455   AST 68* 58*   ALT 52* 41*   LABALBU 3.7 3.4*       CXR 12/12/21:  Endotracheal tube in good position.   NG tube in the stomach.   Right internal jugular central venous catheter tip in the SVC.   No pneumothorax on the right on the left.   A perihilar vascular congestion with cardiomegaly observed.   Cannot exclude discrete infiltrates in the bases particular on the left side. CXR 12/13/21:   Esophagogastric tube with side port in the region of the distal esophagus. Recommend repositioning.  Additional support devices as above.   Interval development of patchy bilateral airspace opacities, concerning for pneumonia versus aspiration. ECG 12/12/21 19:41  Sinus rhythm with 1st degree AV block with premature atrial complexes  Cannot rule out Inferior infarct (cited on or before 12-DEC-2021)  Abnormal ECG    Telemetry: SR with PAC's    Cardiac Catheterization 12/12/2021 [facility])   CONCLUSIONS:  1. Coronary artery disease. a. LEFT MAIN:  10% distal vessel tapering.  b.  LAD:  30% eccentric proximal vessel narrowing and 80% to 90% very  proximal eccentric bifurcation lesion at the site of takeoff of a large  diagonal branch. 90% ostial stenosis and 70% to 80% proximal stenosis  of a moderate-sized second diagonal branch. 90% ostial stenosis of a  small third diagonal branch. 50% disease of the LAD after the second  diagonal branch. 60% disease of the LAD after the third diagonal  branch. Long up to 60% to 70% disease of the apical LAD before the  mustache.  c.  LCX:  Basically a large lateral branch with 70% to 80% proximal  vessel disease and 80% mid vessel disease. d.  RCA:  Dominant vessel with 40% mid vessel narrowing and 70% ostial  narrowing of the largest subdivision of the posterolateral branch.   2.  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 3.  Hemodynamically significant proximal LAD disease with pressure wire evaluation yielding an IFR of 0.70 and 0.74  4. Successful balloon angioplasty with deployment of drug-eluting coronary stent to the proximal LAD and balloon angioplasty to the ostium of the diagonal branch (bifurcation lesion; double wire; kissingballoons) with very good results. Echo12/12/21:  Summary   Micro-bubble contrast injected ( to try to define an anomaly seen on the  LV gram ) --> could not be defined --> consider cardiac MRI ( once / if patient recovers / is stable ). The left ventricle is dilated. The inferior wall, the inferolateral wall, the basal lateral wall and the   posterior wall are thin, scarred and akinetic. The right ventricle is dilated. Right ventricle global systolic function is mildly reduced ( TAPSE = 1.6   ). The left atrium is mildly dilated. The right atrium is moderately dilated. Focal calcification mitral valve leaflets. The mitral valve leaflets are restricted in mobility   Mitral annular calcification is present. Mitral stenosis is present ( peak / mean gradient 17.66 / 5.24 mmHg; area 1.0 cm2 ). Moderate to Severe mitral regurgitation. Trace aortic regurgitation. Mild tricuspid regurgitation. Moderate to severe pulmonary hypertension. ASSESSMENT:   1. Pulseless VT Arrest s/p CPR. EP following. Started on Amiodarone; ICD recommended for secondary prevention   2. Hx ventricular arrhythmias on Holter 9/2021 ([facility])  3. Known CAD/ICMP/Moderate MR.  Hx Inferior MI. s/p [facility] 12/12/0221 with RYAN- proximal LAD and kissing balloon angioplasty to diagonal branch ([facility] 12/12/2021)  -- doubt the primary cause of VT  arrest.   4. Respiratory failure secondary to # 1, resolving.  extubated 12/13/2021 -- on 4-5LNC today   5. Acute blood loss anemia, Hgb 12.4--> 8.0. Surgery following -- plan for EGD if remains low. Hx GIB in 2018  6. New onset atrial fibrillation, noted 12/14/2021. EP following-- [address] held with drop in Hgb   7. Leukocytosis, afebrile   8. Mild thrombocytopenia  9. Hypertension   10. H/o HLD  11. Ex smoker quit in 2003  12. Psoriatic arthritis on Methotrexate and Plaquenil  13. IV dye allergy  14. S/p Gastric bypass for morbid obesity in 2005  15. Hx BETTIE C-pap non longer needed after weight loss  16. Depression  17. Bilateral carpal tunnel surgery  18. Chronic back/cervical pain   19. Mild transaminitis, improving. PLAN:  1. Transfuse one unit PRBC. Monitor H&H.    2. Will give one dose of IV Lasix today during blood transfusion -- start PO Lasix tomorrow   3. GDMT:Continue Toprol XL. Start Entresto and Aldactone, monitor BP. Titrate as tolerated   4. Continue DAPT  5. Continue Statin therapy, monitor LFT's  6. Monitor Leukocytosis for signs of infection   7. Consider JIMBO / cardiac MRI for further evaluation of anomaly seen on LV gram   8. Rest as per the critical care team, the primary service and other consultants  9.  Will follow    Electronically signed by Sheri Boyce MD on 12/14/2021 at 3:11 PM

## 2021-12-14 NOTE — PROGRESS NOTES
infarction) ([facility] [address] [facility]    Stented coronary artery    Hyperlipidemia    H/O psoriatic arthritis    Acute upper GI bleed    SVT (supraventricular tachycardia) (HCC)    LV dysfunction    Cardiac arrest ([facility] [address] [facility]    Acute systolic (congestive) heart failure (HCC)    Ventricular tachycardia (HCC)    Mitral stenosis    Mitral regurgitation    Moderate to severe pulmonary hypertension (HCC)         Scheduled Medications:   polyethylene glycol  17 g Oral Daily    metoprolol succinate  25 mg Oral Daily    sodium chloride flush  5-40 mL IntraVENous 2 times per day    rosuvastatin  40 mg Oral Nightly    aspirin  81 mg Oral Daily    ticagrelor  90 mg Oral BID    [Held by provider] nitroglycerin  0.5 inch Topical 4 times per day    heparin (porcine)  5,000 Units SubCUTAneous Q8H    chlorhexidine  15 mL Mouth/Throat BID    famotidine (PEPCID) injection  20 mg IntraVENous BID       Infusion Medications:   sodium chloride      lidocaine 0.5 mg/min (12/13/21 2042)    fentaNYL 5 mcg/ml in 0.9%  ml infusion Stopped (12/13/21 1223)       PRN Medications:  sodium chloride flush, sodium chloride, acetaminophen, ondansetron    Allergies   Allergen Reactions    Dye [Iodides] Anaphylaxis     Passing out    Dust Mite Extract Other (See Comments)     All year around       [facility] Readings from Last 3 Encounters:   12/14/21 189 lb 9.5 oz (86 kg)   12/12/21 168 lb (76.2 kg)   11/16/21 174 lb 9.6 oz (79.2 kg)     Temp Readings from Last 3 Encounters:   12/14/21 97.6 °F (36.4 °C) (Temporal)   12/12/21 97.8 °F (36.6 °C) (Temporal)   10/12/21 97.4 °F (36.3 °C)     BP Readings from Last 3 Encounters:   12/14/21 (!) 154/71   12/12/21 (!) 103/59   11/16/21 108/60     Pulse Readings from Last 3 Encounters:   12/14/21 93   12/12/21 81   11/16/21 74         Intake/Output Summary (Last 24 hours) at 12/14/2021 0939  Last data filed at 12/14/2021 0700  Gross per 24 hour   Intake 1601 ml   Output 698 ml   Net 903 ml       ROS: (Unreliable)   Constitutional: Negative for fever, activity change and appetite change. HENT: Negative for epistaxis, difficulty swallowing. Eyes: Negative for blurred vision or double vision. Respiratory: Negative for cough, chest tightness, shortness of breath and wheezing. Cardiovascular: Negative for chest pain, palpitations and leg swelling. Gastrointestinal: Negative for abdominal pain, heartburn, or blood in stool. Genitourinary: Negative for hematuria, burning or frequency. Musculoskeletal: Negative for myalgias, stiffness, or swelling. Skin: Negative for rash, pain, or lumps. Neurological: Negative for syncope, seizures, or headaches. Psychiatric/Behavioral: Negative for confusion and agitation. The patient is not nervous/anxious. PHYSICAL EXAM:  Vitals:    12/14/21 0600 12/14/21 0700 12/14/21 0704 12/14/21 0800   BP: (!) 135/104 (!) 175/85 (!) 155/80 (!) 154/71   Pulse: 79 144 82 93   Resp: 24 10 19 10   Temp:       TempSrc:       SpO2: 97% 90%  97%   Weight:  189 lb 9.5 oz (86 kg)     Height:         Constitutional: In NAD. Awake not combative. Eyes: Conjunctivae are normal.  Cardiovascular: S1, S2 positive, IRIR   Pulmonary/Chest: decreased BS   Abdominal: Soft. Normal appearance  Neurological: Awake and confused. Skin: Skin is warm and dry. Extremity: No clubbing or cyanosis. Pertinent Labs:  CBC:   Recent Labs     12/12/21  1225 12/13/21  0425 12/14/21  0455   WBC 8.3 9.6 12.1*   HGB 11.8* 12.4* 8.0*   HCT 35.3* 37.4 24.1*   * 116* 103*     BMP:  Recent Labs     12/12/21  1044 12/12/21  1225 12/13/21  0425 12/13/21  0431 12/14/21  0455   NA  --  140 139  --  137   K   < > 3.6 4.4 4.32 4.1   CL  --  107 107  --  103   CO2  --  18* 20*  --  20*   BUN  --  15 18  --  25*   CREATININE  --  0.8 0.9  --  1.1   CALCIUM  --  8.6 8.3*  --  8.5*    < > = values in this interval not displayed.        TSH:   Lab Results   Component Value Date    TSH 3.820 12/12/2021 Cardiac Injury Profile: No results for input(s): CKTOTAL, CKMB, CKMBINDEX, TROPONINI in the last 72 hours. Lipid Profile:   Lab Results   Component Value Date    TRIG 61 12/13/2021    HDL 39 12/13/2021    LDLCALC 49 12/13/2021    CHOL 100 12/13/2021          Pertinent Cardiac Testing:     EKG (12/12/21): VT rate 190 bpm, Please see scan in Cardiology. EKG (12/12/21): NSR rate 76 bpm, QRS 98ms, QTc 490ms  Please see scan in Cardiology.     Echocardiogram (12/12/21): Micro-bubble contrast injected ( to try to define an anomaly seen on the   LV gram ) --> could not be defined --> consider cardiac MRI ( once / if   patient recovers / is stable ).   The left ventricle is dilated.   The inferior wall, the inferolateral wall, the basal lateral wall and the   posterior wall are thin, scarred and akinetic.   The right ventricle is dilated.   Right ventricle global systolic function is mildly reduced ( TAPSE = 1.6   ).   The left atrium is mildly dilated.   The right atrium is moderately dilated.   Focal calcification mitral valve leaflets.   The mitral valve leaflets are restricted in mobility   Mitral annular calcification is present.   Mitral stenosis is present ( peak / mean gradient 17.66 / 5.24 mmHg; area   1.0 cm2 ).  Moderate to Severe mitral regurgitation.   Trace aortic regurgitation.   Mild tricuspid regurgitation.   Moderate to severe pulmonary hypertension.     Cardiac Catheterization (12/12/21):   CONCLUSIONS:  1.  Coronary artery disease.   a.  LEFT MAIN:  10% distal vessel tapering.  b.  LAD:  30% eccentric proximal vessel narrowing and 80% to 90% very  proximal eccentric bifurcation lesion at the site of takeoff of a large  diagonal branch.  90% ostial stenosis and 70% to 80% proximal stenosis  of a moderate-sized second diagonal branch.  90% ostial stenosis of a  small third diagonal branch.  50% disease of the LAD after the second  diagonal branch.  60% disease of the LAD after the third probability of myocardial viability. 2. Posterior mitral leaflet tethering secondary to the akinetic basal   segments resulting in a mild-to-moderate anteriorly directed eccentric   jet of mitral regurgitation (Rvol 19-24 cc/beat; Rfrac 20-25%)     3. Moderate biatrial enlargement. 4. The arch vessel branching pattern is remarkable for a left sided arch   with an aberrant right subclavian artery that takes the typical   retroesophageal course.      Prior outpatient monitor (09/27/21): Patient had a min HR of 33 bpm, max HR of 133 bpm, and avg HR of 50 bpm. Predominant underlying rhythm was Sinus Rhythm. 2 Ventricular Tachycardia runs occurred, the run with the fastest interval lasting 5 beats with a max rate of 133 bpm (avg 108 bpm); the run with the fastest interval was also the longest. Idioventricular Rhythm was present. Isolated SVEs were frequent (6.2%, 6313), SVE Couplets were rare (<1.0%, 150), and SVE Triplets were rare (<1.0%, 10). Isolated VEs were frequent (10.0%, 71182), VE Couplets were occasional (1.6%, 812), and VE Triplets were rare (<1.0%, 30). Ventricular Bigeminy and Trigeminy were present. Rjhsgslag59/14/2021: AF rate 104 bpm         I have independently reviewed all of the ECGs and rhythm strips per above. I have personally reviewed the laboratory, cardiac diagnostic and radiographic testing as outlined above. I have reviewed previous records noted in [address]. Impression:    1.  VT Arrest  - Known CAD on Entresto, BB, Sprinolactone  - last LVEF 29% on SPECT with fixed inferior large scar  - Now with sustained VT with hemodynamic compromise and arrest  - Cath findings do not suggest ACS but more likely severe CAD and is s/p revascularization of LAD and ostial of diagonal with residual high burden diffuse multivessel CAD   - Cardiac MRI from 2019 shows scar in the inf/inflat areas  - Stop Lidocaine gtt  - Start oral amiodarone load today  - Monitor rhythm, currently in NSR  - Recommend dual chamber ICD for secondary prevention prior to discharge  - Optimize medical regimen  - We will follow    2. New onset Atrial fibrillation   - First seen 12/14/21  - CHADS-VASc (age, VASc, CHF, HTN)  - Given the 4gm hemoglobin drop will defer anticoagulation today. 3.  HFrEF   - Ischemic   - LVEF 50% 10/03/09 on TTE   - LVEF 45-50% 03/30/17 on TTE   - LVEF 35-40% 10/09/19 on TTE   - LVEF 29% 07/01/20 on Lexiscan stress test   - LVEF 24% on TTE 12/12/21  - GDMT: Entresto ,  Lasix, Toprol 50 BID, Aldactone 25mg daily.      4. Coronary Artery Disease   - Inferior STEMI 1997 s/p PCI RCA.  - Known Large sized inferolateral fixed defect suggesting prior MI.  - [facility] 12/12/21 patient RCA stent, high grade stenosis of the LAD-D1 S/p PCI. - statin, BB, ASA      5. HTN      6. HLD      7. Obesity s/p gastric bypass     8. IV dye allergy      9. psoriatic arthritis      10. Anemia   -  12.4/37.4-->8.0/24.1     11. Leukocytosis       Recommendations:    1. Stop IV lidocaine today, start PO amiodarone loading 400mg BID. 2. Defer IV heparin due to anemia today. 3. Will need dual chamber ICD for secondary prevention prior to discharge but not in the next 24-48 hours. 4. Leukocytosis   5. EP will follow. Thank you for allowing me to participate in your patient's care. Discussed with Dr. Jorge Simons  Electronically signed by KAYE Feliz CNP on 12/14/2021 at 9:56 AM   [address] [place] [address] Physicians        Addendum    I personally saw, examined and provided care for the patient. Radiographs, labs and medication list were reviewed by me independently. The case was discussed in detail and plans for care were established. Review of Nurse Practitioner documentation was conducted and revisions were made as appropriate. I agree with the above documented exam, problem list and plan of care.     - Doing better, awake and alert  - PAF noted on monitor with some RVR  - 4g drop in hgb/hct today, thus needs work up. Defer [address] till this issue is stabilized and resolved  - Secondary prevention ICD prior to discharge  - Continue to treat acute anemia, sepsis  - Start PO amiodarone  - Wean IV lidocaine    I have spent a total of 35 CC minutes with the patient and his/her family reviewing the above stated recommendations. A total of >50% of that time involved face-to-face time providing counseling and or coordination of care with the other providers.       Tammy Cervantes M.D  [facility] Electrophysiology

## 2021-12-14 NOTE — CONSULTS
Palliative Care Department  [phone]  Palliative Care Initial Consult  Provider Rupinder Leon, APRN - CNP     [place] [address]  [id number]  Hospital Day: 3  Date of Initial Consult: 12/13/2021  Referring Provider: Francisca Wilson MD  Palliative Medicine was consulted for assistance with: Goals of Care, Code Status Discussion     HPI:   Libby Lara is a 77 y.o. with a medical history of  who was admitted on 12/12/2021 from HTN, CAD, HLD, CHF MI, obesity s/p RYBG, GIB with a CHIEF COMPLAINT of V fib arrest.  Patient presented to ED with complaints of fatigue and sweating. EKG concerning for V. tach. While in ED, patient was given fentanyl to cardiovert and subsequently lost pulses. ACLS initiated and ROSC was achieved. Patient was taken to cath lab and had PCI in LAD and LAD diagonal branch. Patient was transferred to CVICU for further management. Patient currently extubated on high flow nasal cannula. Palliative medicine consulted for goals of care and CODE STATUS discussion. ASSESSMENT/PLAN:     Pertinent Hospital Problems      V. Tach arrest status post cardiac cath      Palliative Care Encounter / Counseling Regarding Goals of Care  Please see detailed goals of care discussion as below   At this time, Libby Lara, Does have capacity for medical decision-making. Capacity is time limited and situation/question specific   Outcome of goals of care meeting: Continue current management, continue full code   Code status Full Code   Advanced Directives: no POA or living will in [facility]    Surrogate/Legal NOK:  Betsey Manueln, spouse, [phone]  o Fausto Raygoza, child, [phone]      Spiritual assessment: no spiritual distress identified  Bereavement and grief: to be determined  Referrals to: none today   SUBJECTIVE:       Details of Conversation: Chart reviewed and patient seen. Patient resting in bed, wife at bedside.   Patient extubated to high flow nasal cannula. Role of palliative medicine explained. Discussion regarding goals of care and CODE STATUS. Patient wishes to continue full code and current management at this time. Patient states that his wife is healthcare power of . Patient also states that he has a living will. Plan is for future ICD placement before discharge. Support offered and questions answered. No further needs from a palliative medicine standpoint at this time. Will sign off. OBJECTIVE:   Prognosis: unknown    Physical Exam:  BP 91/64   Pulse 90   Temp 97.6 °F (36.4 °C) (Temporal)   Resp 15   Ht 5' 6\" (1.676 m)   Wt 189 lb 9.5 oz (86 kg)   SpO2 96%   BMI 30.60 kg/m²   Constitutional:  Elderly, thin, NAD, awake, alert  ENMT:  Normocephalic, atraumatic, mucosa moist, EOMI  Lungs:  HFNC  Heart:  Irregular   Abd:  Soft, non tender, non distended  Ext:  Moving all extremities, + edema, pulses present  Skin:  Warm and dry  Neuro:  PERRL, Alert, grossly nonfocal; following commands    Objective data reviewed: labs, images, records, medication use, vitals and chart    Discussed patient and the plan of care with the other IDT members: Palliative Medicine IDT Team    Time/Communication  Greater than 50% of time spent, total 50 minutes in counseling and coordination of care at the bedside regarding goals of care, diagnosis and prognosis and see above. Thank you for allowing Palliative Medicine to participate in the care of Darek Vargas.

## 2021-12-14 NOTE — PLAN OF CARE
Problem: Cardiac Output - Decreased:  Goal: Hemodynamic stability will improve  Description: Hemodynamic stability will improve  12/13/2021 2134 by Amada James RN  Outcome: Met This Shift     Problem: Non-Violent Restraints  Goal: Removal from restraints as soon as assessed to be safe  12/14/2021 1048 by Jonatan Carr RN  Outcome: Met This Shift  12/13/2021 2134 by Amada James RN  Outcome: Ongoing  Goal: No harm/injury to patient while restraints in use  12/14/2021 1048 by Jonatan Carr RN  Outcome: Met This Shift  12/13/2021 2134 by Amada James RN  Outcome: Met This Shift  Goal: Patient's dignity will be maintained  12/14/2021 1048 by Jonatan Carr RN  Outcome: Met This Shift  12/13/2021 2134 by Amada James RN  Outcome: Met This Shift

## 2021-12-14 NOTE — PROGRESS NOTES
Chief Complaint:  No chief complaint on file. STEMI (ST elevation myocardial infarction) ([address]     Subjective:    Extubated, CP free, feeling better. Scant cough without significant sputum. Hypoxemic on RA but denies dyspnea. No fevers. Objective:    BP (!) 160/91   Pulse 99   Temp 97.6 °F (36.4 °C) (Temporal)   Resp 20   Ht 5' 6\" (1.676 m)   Wt 189 lb 9.5 oz (86 kg)   SpO2 96%   BMI 30.60 kg/m²     Current medications that patient is taking have been reviewed.     General appearance: NAD, conversant  HEENT: AT/NC, MMM  Neck: FROM, supple  Lungs: Clear to auscultation, WOB normal  CV: RRR, no MRGs  Abdomen: Soft, non-tender; no masses or HSM, +BS  Extremities: No peripheral edema or digital cyanosis  Skin: no rash, lesions or ulcers  Psych: Calm and cooperative  Neuro: Alert and interactive, face symmetric, moving all extremities, speech fluent    Labs:  CBC with Differential:    Lab Results   Component Value Date    WBC 12.1 12/14/2021    RBC 2.71 12/14/2021    HGB 8.0 12/14/2021    HCT 24.1 12/14/2021     12/14/2021    MCV 88.9 12/14/2021    MCH 29.5 12/14/2021    MCHC 33.2 12/14/2021    RDW 15.9 12/14/2021    NRBC 0.0 10/11/2018    LYMPHOPCT 4.4 12/12/2021    MONOPCT 2.6 12/12/2021    BASOPCT 0.2 12/12/2021    MONOSABS 0.25 12/12/2021    LYMPHSABS 0.33 12/12/2021    EOSABS 0.07 12/12/2021    BASOSABS 0.00 12/12/2021     CMP:    Lab Results   Component Value Date     12/14/2021    K 4.1 12/14/2021     12/14/2021    CO2 20 12/14/2021    BUN 25 12/14/2021    CREATININE 1.1 12/14/2021    GFRAA >60 12/14/2021    LABGLOM >60 12/14/2021    GLUCOSE 96 12/14/2021    PROT 6.3 12/14/2021    LABALBU 3.4 12/14/2021    CALCIUM 8.5 12/14/2021    BILITOT 1.0 12/14/2021    ALKPHOS 74 12/14/2021    AST 58 12/14/2021    ALT 41 12/14/2021        Imaging:  I've personally reviewed the patient's CXR  RUL infiltrate    Assessment/Plan:  Principal Problem:    STEMI (ST elevation myocardial infarction) [facility])  Active Problems:    CAD (coronary artery disease)s/p stent RCA    Valvular heart disease    H/O psoriatic arthritis    Cardiac arrest (HCC)    Acute systolic (congestive) heart failure (HCC)    Ventricular tachycardia (HCC)    Mitral stenosis    Mitral regurgitation    Moderate to severe pulmonary hypertension ([address]  Resolved Problems:    * No resolved hospital problems.  *       Suspect atelectasis of RUL    IS, chest PT    Check procal    Hold abx for now but watch WBC and other signs of infection    DAPt, statin    Wean off lidocaine and transition to amio per EP    Continue BB    DVT prophylaxis: UFH  Requires continued inpatient level of care     Gricelda Bragg MD    1:21 PM  12/14/2021

## 2021-12-14 NOTE — PROGRESS NOTES
Palliative Medicine Social Work     Patient Name: Ricarda Mckeon  Age: 77 y.o. [place] Status: yes    Next of Kin Kelly Davidson Spouse   Primary Phone: [phone]                 Additional Support:[place] Child Primary Phone:    Home Phone: [phone]       Minor Children: no    Advanced Directives: none on file but his wife states she will provide a copy which appoints her as HCPOA    Confirm Code Status: full-   Current Goals of Care: to be reveiwed    Mental Health History: no    Substance Abuse:no    Indications of Abuse/Neglect: no    Financial Concerns: no    Living Situation: resides with wife, normally independent    Physical Care Needs Met: yes    Emotional Needs Met: yes    Assessment: 76 yo  male admitted from home STEMI, diabetic. He is now extubated in icu, alert. His wife is at bedside. Pt wishes to remain full code, they will provide copy of HCPOA, Living Will. They deny needs at this time.

## 2021-12-14 NOTE — PLAN OF CARE
Problem: Cardiac:  Goal: Ability to maintain vital signs within normal range will improve  Description: Ability to maintain vital signs within normal range will improve  Outcome: Met This Shift     Problem: Health Behavior:  Goal: Will modify at least one risk factor affecting health status  Description: Will modify at least one risk factor affecting health status  Outcome: Met This Shift     Problem: Cardiac Output - Decreased:  Goal: Hemodynamic stability will improve  Description: Hemodynamic stability will improve  Outcome: Met This Shift     Problem: SKIN INTEGRITY  Goal: Skin integrity is maintained or improved  Outcome: Met This Shift     Problem: Non-Violent Restraints  Goal: No harm/injury to patient while restraints in use  12/13/2021 2134 by Haritha Phelan RN  Outcome: Met This Shift     Problem: Non-Violent Restraints  Goal: Patient's dignity will be maintained  12/13/2021 2134 by Haritha Phelan RN  Outcome: Met This Shift     Problem: Non-Violent Restraints  Goal: Removal from restraints as soon as assessed to be safe  12/13/2021 2134 by Haritha Phelan RN  Outcome: Ongoing     Problem: Confusion - Acute:  Goal: Absence of continued neurological deterioration signs and symptoms  Description: Absence of continued neurological deterioration signs and symptoms  Outcome: Not Met This Shift  Goal: Mental status will be restored to baseline  Description: Mental status will be restored to baseline  Outcome: Not Met This Shift     Problem: MECHANICAL VENTILATION  Goal: Patient will maintain patent airway  Outcome: Completed  Goal: Oral health is maintained or improved  Outcome: Completed  Goal: Tracheostomy will be managed safely  Outcome: Completed  Goal: ET tube will be managed safely  Outcome: Completed  Goal: Ability to express needs and understand communication  Outcome: Completed  Goal: Mobility/activity is maintained at optimum level for patient  Outcome: Completed

## 2021-12-14 NOTE — PROGRESS NOTES
Surgical Intensive Care Unit  Daily Progress Note  Date of admission:  12/12/2021  Reason for ICU transfer: V fib arrest    Subjective:  Patient extubated yesterday. Patient confused this morning. Only alert to self. Patient repeatedly calling out for help. Hospital Course:  12/12: Patient went into pulseless V tach and received one round of CPR before ROSC. Patient was immediately taken for cardiac cath. PCI placement in LAD and LAD diagonal branch. 12/13: Patient remains intubated and on fent. He does not complain of pain. SBT today. Overnight patient had bout of tachycardia, RBBB and ventricular overlaod on EKG. Patient extubated. 12/14: Patient was extubated yesterday. Confused this morning. EP plans to start amiodarone today with future ICD placement before discharge. Physical Exam:  /61   Pulse 123   Temp 97.6 °F (36.4 °C) (Temporal)   Resp (!) 33   Ht 5' 6\" (1.676 m)   Wt 190 lb 0.6 oz (86.2 kg)   SpO2 94%   BMI 30.67 kg/m²     Physical Exam  HENT:      Head: Normocephalic and atraumatic. Nose: Nose normal.      Mouth/Throat:      Mouth: Mucous membranes are moist.   Eyes:      Pupils: Pupils are equal, round, and reactive to light. Cardiovascular:      Rate and Rhythm: Tachycardia present. Rhythm irregular. Abdominal:      General: Abdomen is flat. There is no distension. Palpations: Abdomen is soft. Skin:     General: Skin is warm and dry. Capillary Refill: Capillary refill takes less than 2 seconds. Neurological:      Mental Status: He is alert. ASSESSMENT / PLAN:  · Neuro:  Patient states he is in no pain. Tylenol as needed  · CV: V fib arrest s/p cardiac cath - On lidocaine gtt, metoprolol rosuvastatin, cardiology and EP input appreciated. Planned to switch to amiodarone today and place ICD per EP  · Pulm: Acute respiratory failure - patient extubated yesterday. Saturations remained in the 90s.   · GI:  No acute issues - bowel regimen · Renal: No acute issues - monitor bmp  · ID:  No acute issues -  Monitor CBC  · Endo: Monitor glucose  · MSK: No acute issues    Bowel regime: glyocolax  Pain control/Sedation:  Tylenol. DVT prophylaxis: SCDs. Heparin. brilinta   GI: Pepcid. Glucose protocol:  None  Mouth/Eye care: As needed. Artificial tears. Peridex.    CVC sites:  [facility], Day # 2  Ancillary consults: Cardiology  Family Update: As available     Code status:   Full Code  Family Update: As available     Electronically signed by Ravi Leyva DO on 12/14/2021 at 6:04 AM

## 2021-12-14 NOTE — PROGRESS NOTES
[facility]   INTENSIVE CARE UNIT    CRITICAL CARE ATTENDING PROGRESS NOTE    I have examined the patient, reviewed the record, and discussed the case with the APN/  Resident. I have reviewed all relevant labs and imaging data. Please refer to the  APN/ resident's note. I agree with the  assessment and plan with the following corrections/ additions. The following summarizes my clinical findings and independent assessment. CC: V. fib arrest    HOSPITAL COURSE:  12/12 pulseless V. tach, 1 round of CPR, cardiac cath with PCI placement LAD and LAD diagonal branch  12/13 awake and angry. Overnight he had tachycardic, right bundle branch block and ventricular overload on EKG  12/14 extubated yesterday. Patient is less confused    EXAM:  GCS 14 awake alert to person place  Moves all extremities  Normal sinus  Abdomen soft nontender nondistended      ASSESSMENT:  Principal Problem:    STEMI (ST elevation myocardial infarction) ([facility] [address]  Active Problems:    CAD (coronary artery disease)s/p stent RCA    Valvular heart disease    H/O psoriatic arthritis    Cardiac arrest (HCC)    Acute systolic (congestive) heart failure (HCC)    Ventricular tachycardia (HCC)    Mitral stenosis    Mitral regurgitation    Moderate to severe pulmonary hypertension ([facility] [address]  Resolved Problems:    * No resolved hospital problems. *       PLAN:  Sedation/ Pain: Off all sedation    CV: Ventricular tachycardia-weaned off lidocaine drip  Heart failure-on Lopressor  STEMI-status post cardiac cath. On aspirin and Brilinta and Crestor      Pulmonary: Acute respiratory failure-plan on extubation today. GI: Advance diet    FEN: Marginal urine output. Continue monitor. Minimize IV fluids secondary to history of dilated cardiomyopathy/heart failure    Heme: Hemoglobin 8.0 today, down from 12.4. We will check CBC today to ensure that hemoglobin today is not erroneous. If hemoglobin truly is 8.   We will plan on EGD    Endo: Monitor Blood Sugars. Target blood glucose less than 180 in the ICU. DVT prophylaxis--SCDS, heparin 3 times daily  GI Prophylaxis--pepcid   Lines--right IJ triple-lumen catheter 12/12  CODE: FULL     DISPOSITION-Continue ICU Care    Critical care time exclusive of teaching and procedures =35 min     Pt needs continuous ICU monitoring because the patient is at risk for deterioration from a cardiac standpoint. Beata Connell MD, FACS  12/14/2021  3:09 PM    NOTE: This report was transcribed using voice recognition software. Every effort was made to ensure accuracy; however, inadvertent computerized transcription errors may be present.

## 2021-12-14 NOTE — PROGRESS NOTES
Physical Therapy  Physical Therapy Initial Assessment     Name: Farida Daniels  : 1945  MRN: [id number]      Date of Service: 2021    Evaluating PT:  Vikki Rosas PT, DPT [id number]    Room #:  7835/1479-Q  Diagnosis:  Cardiac arrest ([facility] [address] [I46.9]  PMHx/PSHx:  CAD, HTN, MI  Procedure/Surgery:   [facility]  Precautions:  Falls, O2, NG tube  Equipment Needs:  TBD    SUBJECTIVE:    Pt lives with wife in a 1 story home with 2 stairs to enter and no rail. Pt ambulated without device and was independent PTA. OBJECTIVE:   Initial Evaluation  Date: 21 Treatment Short Term/ Long Term   Goals   AM-PAC 6 Clicks      Was pt agreeable to Eval/treatment? Yes     Does pt have pain? No c/o pain     Bed Mobility  Rolling: NT  Supine to sit: ModA  Sit to supine: NT  Scooting: ModA  Mod Independent   Transfers Sit to stand: Josiane  Stand to sit: Josiane  Stand pivot: ModA no device  Mod Independent with AAD if needed   Ambulation   A few steps to chair with ModA no device  >400 feet with Mod Independent with AAD if needed   Stair negotiation: ascended and descended NT  >4 steps with 1 rail Mod Independent   ROM BUE:  Defer to OT note  BLE:  WFL     Strength BUE:  Defer to OT note  BLE:  4/5  Increase by 1/3 MMT grade   Balance Sitting EOB:  Josiane  Dynamic Standing:  ModA no device  Sitting EOB:  Independent  Dynamic Standing:   Mod Independent with AAD if needed     Pt is A & O x 4  CAM-ICU: NT  RASS: 0  Sensation:  No reported paresthesias  Edema:  None    Vitals:  Heart Rate at rest 77 bpm Heart Rate post session 84 bpm   SpO2 at rest -% SpO2 post session -%   Blood Pressure at rest 152/82 mmHg Blood Pressure post session 156/90 mmHg       Functional Status Score-Intensive Care Unit (FSS-ICU)   Rolling -/   Supine to sit transfer 3   Unsupported sitting  /   Sit to stand transfers /   Ambulation    Total         Therapeutic Exercises:  NA    Patient education  Pt educated on safety    Patient response to education:   Pt verbalized understanding Pt demonstrated skill Pt requires further education in this area   x x x     ASSESSMENT:    Conditions Requiring Skilled Therapeutic Intervention:    [x]Decreased strength     []Decreased ROM  [x]Decreased functional mobility  [x]Decreased balance   [x]Decreased endurance   [x]Decreased posture  []Decreased sensation  []Decreased coordination   []Decreased vision  []Decreased safety awareness   []Increased pain       Comments:  RN reported pt was medically stable. Pt was in bed upon arrival, agreeable to initial evaluation. Pt's grandson was present initially. Pt required trunk assistance for bed mobility. Flexed posture noted with all upright activity. Pt was mildly unsteady and completed shuffled steps to chair. Fatigue limited activity. Pt was left in chair with all needs met and call light in reach. All lines remained intact. Treatment:  Patient practiced and was instructed in the following treatment:     Bed mobility training - pt given verbal and tactile cues to facilitate proper sequencing and safety during supine>sit as well as provided with physical assistance.  Sitting EOB for >5 minutes for upright tolerance, postural awareness and BLE ROM   Transfer training - pt was given verbal and tactile cues to facilitate proper hand placement, technique and safety during sit to stand, stand to sit and stand pivot transfers as well as provided with physical assistance.  Gait training- pt was given verbal and tactile cues to facilitate safety and balance during ambulation as well as provided with physical assistance. Pt's/ family goals   1. Return home    Prognosis is good for reaching above PT goals. Patient and or family understand(s) diagnosis, prognosis, and plan of care.   yes    PHYSICAL THERAPY PLAN OF CARE:    PT POC is established based on physician order and patient diagnosis     Referring provider/PT Order: Fitz Baldwin, DO /12/12/21 1900 PT eval and treat  Diagnosis:  Cardiac arrest ([facility] [address] [I46.9]  Specific instructions for next treatment:  Progress activity    Current Treatment Recommendations:     [x] Strengthening to improve independence with functional mobility   [] ROM to improve independence with functional mobility   [x] Balance Training to improve static/dynamic balance and to reduce fall risk  [x] Endurance Training to improve activity tolerance during functional mobility   [x] Transfer Training to improve safety and independence with all functional transfers   [x] Gait Training to improve gait mechanics, endurance and asses need for appropriate assistive device  [x] Stair Training in preparation for safe discharge home and/or into the community   [] Positioning to prevent skin breakdown and contractures  [x] Safety and Education Training   [x] Patient/Caregiver Education   [] HEP  [] Other     PT long term treatment goals are located in above grid    Frequency of treatments: 2-5x/week x 1-2 weeks. Time in  1025  Time out  1045    Total Treatment Time  10 minutes     Evaluation Time includes thorough review of current medical information, gathering information on past medical history/social history and prior level of function, completion of standardized testing/informal observation of tasks, assessment of data and education on plan of care and goals.     CPT codes:  [] Low Complexity PT evaluation 32828  [x] Moderate Complexity PT evaluation 35185  [] High Complexity PT evaluation 55623  [] PT Re-evaluation 48526  [] Gait training 31959 - minutes  [] Manual therapy 37043 - minutes  [x] Therapeutic activities 50784 10 minutes  [] Therapeutic exercises 14984 - minutes  [] Neuromuscular reeducation 77790 - minutes     Jeanette Gibson, PT, DPT  [id number]

## 2021-12-15 ENCOUNTER — APPOINTMENT (OUTPATIENT)
Dept: GENERAL RADIOLOGY | Age: 76
DRG: 222 | End: 2021-12-15
Attending: INTERNAL MEDICINE
Payer: MEDICARE

## 2021-12-15 PROBLEM — D64.9 ANEMIA: Status: ACTIVE | Noted: 2021-12-15

## 2021-12-15 LAB
ALBUMIN SERPL-MCNC: 3.6 G/DL (ref 3.5–5.2)
ALP BLD-CCNC: 72 U/L (ref 40–129)
ALT SERPL-CCNC: 44 U/L (ref 0–40)
ANION GAP SERPL CALCULATED.3IONS-SCNC: 11 MMOL/L (ref 7–16)
AST SERPL-CCNC: 57 U/L (ref 0–39)
BILIRUB SERPL-MCNC: 1.3 MG/DL (ref 0–1.2)
BUN BLDV-MCNC: 19 MG/DL (ref 6–23)
CALCIUM SERPL-MCNC: 8.5 MG/DL (ref 8.6–10.2)
CHLORIDE BLD-SCNC: 105 MMOL/L (ref 98–107)
CO2: 22 MMOL/L (ref 22–29)
CREAT SERPL-MCNC: 0.7 MG/DL (ref 0.7–1.2)
GFR AFRICAN AMERICAN: >60
GFR NON-AFRICAN AMERICAN: >60 ML/MIN/1.73
GLUCOSE BLD-MCNC: 97 MG/DL (ref 74–99)
HCT VFR BLD CALC: 25 % (ref 37–54)
HEMOGLOBIN: 8.3 G/DL (ref 12.5–16.5)
MAGNESIUM: 1.9 MG/DL (ref 1.6–2.6)
MCH RBC QN AUTO: 29.3 PG (ref 26–35)
MCHC RBC AUTO-ENTMCNC: 33.2 % (ref 32–34.5)
MCV RBC AUTO: 88.3 FL (ref 80–99.9)
PDW BLD-RTO: 15.6 FL (ref 11.5–15)
PHOSPHORUS: 2.5 MG/DL (ref 2.5–4.5)
PLATELET # BLD: 88 E9/L (ref 130–450)
PLATELET CONFIRMATION: NORMAL
PMV BLD AUTO: 12.2 FL (ref 7–12)
POTASSIUM REFLEX MAGNESIUM: 4 MMOL/L (ref 3.5–5)
PRO-BNP: ABNORMAL PG/ML (ref 0–450)
RBC # BLD: 2.83 E12/L (ref 3.8–5.8)
SODIUM BLD-SCNC: 138 MMOL/L (ref 132–146)
TOTAL PROTEIN: 5.6 G/DL (ref 6.4–8.3)
WBC # BLD: 10.2 E9/L (ref 4.5–11.5)

## 2021-12-15 PROCEDURE — 2580000003 HC RX 258: Performed by: INTERNAL MEDICINE

## 2021-12-15 PROCEDURE — 6370000000 HC RX 637 (ALT 250 FOR IP): Performed by: STUDENT IN AN ORGANIZED HEALTH CARE EDUCATION/TRAINING PROGRAM

## 2021-12-15 PROCEDURE — 6370000000 HC RX 637 (ALT 250 FOR IP): Performed by: INTERNAL MEDICINE

## 2021-12-15 PROCEDURE — 2500000003 HC RX 250 WO HCPCS: Performed by: SURGERY

## 2021-12-15 PROCEDURE — 83735 ASSAY OF MAGNESIUM: CPT

## 2021-12-15 PROCEDURE — 2000000000 HC ICU R&B

## 2021-12-15 PROCEDURE — 99291 CRITICAL CARE FIRST HOUR: CPT | Performed by: INTERNAL MEDICINE

## 2021-12-15 PROCEDURE — 99233 SBSQ HOSP IP/OBS HIGH 50: CPT | Performed by: INTERNAL MEDICINE

## 2021-12-15 PROCEDURE — 2700000000 HC OXYGEN THERAPY PER DAY

## 2021-12-15 PROCEDURE — 6370000000 HC RX 637 (ALT 250 FOR IP): Performed by: NURSE PRACTITIONER

## 2021-12-15 PROCEDURE — 85027 COMPLETE CBC AUTOMATED: CPT

## 2021-12-15 PROCEDURE — 93005 ELECTROCARDIOGRAM TRACING: CPT | Performed by: NURSE PRACTITIONER

## 2021-12-15 PROCEDURE — 99233 SBSQ HOSP IP/OBS HIGH 50: CPT | Performed by: SURGERY

## 2021-12-15 PROCEDURE — 97535 SELF CARE MNGMENT TRAINING: CPT

## 2021-12-15 PROCEDURE — 97530 THERAPEUTIC ACTIVITIES: CPT

## 2021-12-15 PROCEDURE — 84100 ASSAY OF PHOSPHORUS: CPT

## 2021-12-15 PROCEDURE — 83880 ASSAY OF NATRIURETIC PEPTIDE: CPT

## 2021-12-15 PROCEDURE — 36592 COLLECT BLOOD FROM PICC: CPT

## 2021-12-15 PROCEDURE — 71045 X-RAY EXAM CHEST 1 VIEW: CPT

## 2021-12-15 PROCEDURE — 36415 COLL VENOUS BLD VENIPUNCTURE: CPT

## 2021-12-15 PROCEDURE — 6360000002 HC RX W HCPCS: Performed by: STUDENT IN AN ORGANIZED HEALTH CARE EDUCATION/TRAINING PROGRAM

## 2021-12-15 PROCEDURE — 6370000000 HC RX 637 (ALT 250 FOR IP): Performed by: SURGERY

## 2021-12-15 PROCEDURE — 6360000002 HC RX W HCPCS: Performed by: INTERNAL MEDICINE

## 2021-12-15 PROCEDURE — 80053 COMPREHEN METABOLIC PANEL: CPT

## 2021-12-15 RX ORDER — ISOSORBIDE MONONITRATE 30 MG/1
30 TABLET, EXTENDED RELEASE ORAL DAILY
Status: DISCONTINUED | OUTPATIENT
Start: 2021-12-16 | End: 2021-12-21 | Stop reason: HOSPADM

## 2021-12-15 RX ORDER — HYDRALAZINE HYDROCHLORIDE 20 MG/ML
10 INJECTION INTRAMUSCULAR; INTRAVENOUS EVERY 30 MIN PRN
Status: DISCONTINUED | OUTPATIENT
Start: 2021-12-15 | End: 2021-12-21 | Stop reason: HOSPADM

## 2021-12-15 RX ORDER — LABETALOL HYDROCHLORIDE 5 MG/ML
10 INJECTION, SOLUTION INTRAVENOUS EVERY 30 MIN PRN
Status: DISCONTINUED | OUTPATIENT
Start: 2021-12-15 | End: 2021-12-21 | Stop reason: HOSPADM

## 2021-12-15 RX ORDER — MAGNESIUM SULFATE IN WATER 40 MG/ML
4000 INJECTION, SOLUTION INTRAVENOUS ONCE
Status: COMPLETED | OUTPATIENT
Start: 2021-12-15 | End: 2021-12-15

## 2021-12-15 RX ADMIN — AMIODARONE HYDROCHLORIDE 400 MG: 200 TABLET ORAL at 21:39

## 2021-12-15 RX ADMIN — ACETAMINOPHEN 650 MG: 325 TABLET ORAL at 17:19

## 2021-12-15 RX ADMIN — Medication 10 ML: at 07:51

## 2021-12-15 RX ADMIN — METOPROLOL SUCCINATE 25 MG: 25 TABLET, EXTENDED RELEASE ORAL at 07:49

## 2021-12-15 RX ADMIN — MAGNESIUM SULFATE HEPTAHYDRATE 4000 MG: 40 INJECTION, SOLUTION INTRAVENOUS at 08:44

## 2021-12-15 RX ADMIN — Medication 250 MG: at 17:19

## 2021-12-15 RX ADMIN — Medication 250 MG: at 12:37

## 2021-12-15 RX ADMIN — Medication 10 ML: at 21:42

## 2021-12-15 RX ADMIN — Medication 250 MG: at 21:53

## 2021-12-15 RX ADMIN — HEPARIN SODIUM 5000 UNITS: 10000 INJECTION INTRAVENOUS; SUBCUTANEOUS at 06:13

## 2021-12-15 RX ADMIN — TICAGRELOR 90 MG: 90 TABLET ORAL at 21:43

## 2021-12-15 RX ADMIN — POLYETHYLENE GLYCOL 3350 17 G: 17 POWDER, FOR SOLUTION ORAL at 07:51

## 2021-12-15 RX ADMIN — Medication 250 MG: at 07:50

## 2021-12-15 RX ADMIN — ASPIRIN 81 MG CHEWABLE TABLET 81 MG: 81 TABLET CHEWABLE at 07:48

## 2021-12-15 RX ADMIN — HEPARIN SODIUM 5000 UNITS: 10000 INJECTION INTRAVENOUS; SUBCUTANEOUS at 21:44

## 2021-12-15 RX ADMIN — SACUBITRIL AND VALSARTAN 1 TABLET: 49; 51 TABLET, FILM COATED ORAL at 21:42

## 2021-12-15 RX ADMIN — FUROSEMIDE 20 MG: 20 TABLET ORAL at 07:49

## 2021-12-15 RX ADMIN — SACUBITRIL AND VALSARTAN 1 TABLET: 24; 26 TABLET, FILM COATED ORAL at 07:50

## 2021-12-15 RX ADMIN — ROSUVASTATIN 40 MG: 20 TABLET, FILM COATED ORAL at 21:42

## 2021-12-15 RX ADMIN — SPIRONOLACTONE 25 MG: 25 TABLET ORAL at 07:48

## 2021-12-15 RX ADMIN — FAMOTIDINE 20 MG: 10 INJECTION INTRAVENOUS at 07:51

## 2021-12-15 RX ADMIN — 0.12% CHLORHEXIDINE GLUCONATE 15 ML: 1.2 RINSE ORAL at 21:39

## 2021-12-15 RX ADMIN — AMIODARONE HYDROCHLORIDE 400 MG: 200 TABLET ORAL at 07:49

## 2021-12-15 RX ADMIN — TICAGRELOR 90 MG: 90 TABLET ORAL at 07:48

## 2021-12-15 RX ADMIN — FAMOTIDINE 20 MG: 10 INJECTION INTRAVENOUS at 21:39

## 2021-12-15 ASSESSMENT — PAIN SCALES - GENERAL
PAINLEVEL_OUTOF10: 3
PAINLEVEL_OUTOF10: 0
PAINLEVEL_OUTOF10: 2
PAINLEVEL_OUTOF10: 0

## 2021-12-15 ASSESSMENT — PAIN DESCRIPTION - PAIN TYPE: TYPE: ACUTE PAIN

## 2021-12-15 NOTE — PROGRESS NOTES
Attempted to have patient sign for EGD tomorrow. At this time, patient would prefer to speak with family regarding the procedure. Will speak with family when visiting begins and they get here.

## 2021-12-15 NOTE — PROGRESS NOTES
Physical Therapy  Physical Therapy Treatment Note    Name: Nada Lanes  : 1945  MRN: [id number]      Date of Service: 12/15/2021    Evaluating PT:  Minor Shanelle PT, DPT [id number]    Room #:  6257/3448-W  Diagnosis:  Cardiac arrest ([facility] [address] [I46.9]  PMHx/PSHx:  CAD, HTN, MI  Procedure/Surgery:   [facility]  Precautions:  Falls, O2, NG tube  Equipment Needs:  TBD    SUBJECTIVE:    Pt lives with wife in a 1 story home with 2 stairs to enter and no rail. Pt ambulated without device and was independent PTA. OBJECTIVE:   Initial Evaluation  Date: 21 Treatment  12/15/21 Short Term/ Long Term   Goals   AM-PAC 6 Clicks 85/32 98/35    Was pt agreeable to Eval/treatment? Yes Yes    Does pt have pain? No c/o pain No c/o pain    Bed Mobility  Rolling: NT  Supine to sit: ModA  Sit to supine: NT  Scooting: ModA Rolling: NT  Supine to sit: Josiane with HOB elevated  Sit to supine: NT  Scooting: Josiane Mod Independent   Transfers Sit to stand: Josiane  Stand to sit: Josiane  Stand pivot: ModA no device Sit to stand: Josiane  Stand to sit: Josiane  Stand pivot: Josiane with HHA Mod Independent with AAD if needed   Ambulation   A few steps to chair with ModA no device 25 feet with Josiane with HHA >400 feet with Mod Independent with AAD if needed   Stair negotiation: ascended and descended NT NT >4 steps with 1 rail Mod Independent   ROM BUE:  Defer to OT note  BLE:  WFL     Strength BUE:  Defer to OT note  BLE:  4/5  Increase by 1/3 MMT grade   Balance Sitting EOB:  Josiane  Dynamic Standing:  ModA no device Sitting EOB:  Josiane  Dynamic Standing:  Josiane with HHA Sitting EOB:  Independent  Dynamic Standing:   Mod Independent with AAD if needed     Pt is A & O x 4  CAM-ICU: NT  RASS: 0  Sensation:  No reported paresthesias  Edema:  None    Vitals:  Heart Rate at rest 62 bpm Heart Rate post session 68 bpm   SpO2 at rest 98% SpO2 post session 100%   Blood Pressure at rest 137/72 mmHg Blood Pressure post session 156/73 mmHg Functional Status Score-Intensive Care Unit (FSS-ICU)   Rolling -/7   Supine to sit transfer 3/7   Unsupported sitting  4/7   Sit to stand transfers 4/7   Ambulation 1/7   Total  12/35       Therapeutic Exercises:  NA    Patient education  Pt educated on safety    Patient response to education:   Pt verbalized understanding Pt demonstrated skill Pt requires further education in this area   x x x     ASSESSMENT:    Conditions Requiring Skilled Therapeutic Intervention:    [x]Decreased strength     []Decreased ROM  [x]Decreased functional mobility  [x]Decreased balance   [x]Decreased endurance   [x]Decreased posture  []Decreased sensation  []Decreased coordination   []Decreased vision  []Decreased safety awareness   []Increased pain       Comments:  RN reported pt was medically stable. Pt was in bed upon arrival, agreeable to treatment session. Pt's wife was present for session. Pt demonstrated improved mobility and cognition this session. Pt completed short distance ambulation in room with HHA for mild unsteadiness. Fatigue limited further activity. Pt was left in chair with all needs met and call light in reach. All lines remained intact. RN in room. Treatment:  Patient practiced and was instructed in the following treatment:     Bed mobility training - pt given verbal and tactile cues to facilitate proper sequencing and safety during supine>sit as well as provided with physical assistance.  Sitting EOB for >5 minutes for upright tolerance, postural awareness and BLE ROM   Transfer training - pt was given verbal and tactile cues to facilitate proper hand placement, technique and safety during sit to stand, stand to sit and stand pivot transfers as well as provided with physical assistance.  Gait training- pt was given verbal and tactile cues to facilitate safety and balance during ambulation as well as provided with physical assistance.     PLAN:    Patient is making progress towards established goals. Will continue with current POC.       Time in  0920  Time out  0945    Total Treatment Time  25 minutes     CPT codes:  [] Gait training 40401 - minutes  [] Manual therapy 94051 - minutes  [x] Therapeutic activities 08219 25 minutes  [] Therapeutic exercises 01634 - minutes  [] Neuromuscular reeducation 62541 - minutes    Stevie Peralta PT, DPT  [id number]

## 2021-12-15 NOTE — PROGRESS NOTES
OCCUPATIONAL THERAPY TREATMENT NOTE    [address] [address] [address]       [place]                                                               Patient Name: Jaye Syed  MRN: [id number]  : 1945  Room: [address] [place] [address]    Evaluating OT: Allyson Pradhan, OTR/L [id number]     Referring Provider: Lala Hunt DO   Specific Provider Orders/Date: OT eval and treat (21)        Diagnosis: V fib arrest -STEMI     Reason for admission: : Patient went into pulseless V tach and received one round of CPR before ROSC. Patient was immediately taken for cardiac cath. PCI placement in LAD and LAD diagonal branch.      Pertinent Medical History: CAD s/p stent, [place], hearing aids, MI, Sleep apnea         *Precautions:  Fall Risk, O2, bed alarm     Assessment of current deficits   [x]? Functional mobility          [x]? ADLs           [x]? Strength                  []?Cognition   [x]? Functional transfers        [x]? IADLs         [x]? Safety Awareness   [x]? Endurance   []? Fine Coordination           [x]? ROM           []? Vision/perception    []? Sensation     []? Gross Motor Coordination [x]? Balance    []? Delirium                  []?Motor Control     []?  Communication     OT PLAN OF CARE   OT POC based on physician orders, patient diagnosis and results of clinical assessment.        Frequency/Duration: 1-3 days/wk for 1-2 weeks PRN    Specific OT Treatment to include:   ADL retraining/adapted techniques and AE recommendations to increase functional independence within precautions                    Energy conservation techniques to improve tolerance for selfcare routine   Functional transfer/mobility training/DME recommendations for increased independence, safety and fall prevention         Patient/family education to increase safety and functional independence within precautions              Environmental modifications for safe mobility and completion of ADLs                           Cognitive retraining ex's to improve problem solving skills & safe participation in ADLs/IADLs  Sensory re-education techniques to improve extremity awareness, maintain skin integrity and improve hand function                             Visual/Perceptual retraining  to improve body awareness and safety during transfers and ADLs  Splinting/positioning needs to maintain joint/skin integrity and prevent contractures  Therapeutic activity to improve functional performance during ADLs/IADLs                                         Therapeutic exercise to improve tolerance and functional strength for ADLs   Balance retraining exercises/tasks for facilitation of postural control with dynamic challenges during ADLs . Positioning to improve functional independence  Neuromuscular re-education: facilitation of righting/equilibrium reactions, normalization muscle tone/facilitation active functional movement                      Delirium prevention/treatment     Recommended Adaptive Equipment: LB dressing AE pending progress, [employer]      Home Living: Pt lives with wife in a 1 floor plan with 2 step(s) to enter and no rail(s); bed/bath on first floor. Bathroom setup: walk in shower with chair; no rail. Equipment owned: shower seat     Prior Level of Function: IND with ADLs;  IND with IADLs. No device for ambulation. Driving: yes  Occupation: retired from [employer]     Pain Level: pt c/o 0/10  pain  this session     Cognition: A&O: 4/4    Follows 1-2 step commands appropriately.               Memory: fair- (pt did not recognize therapists)             Comprehension fair-             Problem solving: fair-             Judgement/safety: fair-                Communication skills: WFL             Vision: WFL                    Glasses:no                                                       Hearing: WFL               RASS: 0  CAM-ICU: (NT) Delirium      UE Assessment:  Hand Dominance: Right [x]? Left []?       ROM Strength STM goal: PRN   RUE  Shoulder flexion/ abduction 45; WFL distal   (recent shoulder replacement s/p 4 months per pt report) 3-/5 proximal  WFL distal  WFL for ADLS      LUE Shoulder flexion/abducation grossly 80; WFL distal 3+/5 proximal  WFL distal  WFL for ADLS         Sensation: No c/o numbness/tingling in  extremities. Tone: WNL   Edema: WFL     Functional Assessment:  AM-PAC Daily Activity Raw Score: 13/24    Initial Eval Status  Date: 12/14/21 Treatment Status  Date:12/15 STGs = LTGs  Time frame: 7-14 days   Feeding S; set up S; set up                        [place]  while seated up in chair to increase activity tolerance         Grooming Mod A  NT                       Min A   while standing sink level requiring AD as needed for balance and demonstrating G tolerance       UB dressing/bathing Max A  Mod A  (medical lines)                       Min A         LB dressing/bathing Dep     Max A   seated in chair using AE after  instruction  Mod A  AE  seated in chair; demo fair follow through                       [address] A   using AE as needed for safe reach/ energy conservation        Toileting NT NT                        Min A      Bed Mobility  Supine to sit:   Mod A     Sit to supine:   NT  Supine to sit:   Min A     Sit to supine:   NT                       SBA  in prep of ADL tasks & transfers   Functional Transfers Sit to stand:   Min A     Stand to sit:   Min A Sit to stand:   Min A     Stand to sit:   Min A                        SBA  sit<>stand/functional bathroom transfers using AD/DME as needed for balance and safety   Functional Mobility SPT: Mod A Min A   no device                        SBA   functional/bathroom mobility using AD as needed & demonstrating G safety      Balance Sitting:     Static: Min A    Dynamic:Min A  Standing:  Mod A Sitting:     Static: Min A    Dynamic:Min A  Standing: Min A  S dynamic sitting balance; SBA dynamic standing balance during ADL tasks & transfers   Endurance/Activity Tolerance    F tolerance with moderate activity.   fair tolerance with light to moderate activity G   tolerance with moderate activity/self care routine   Visual/  Perceptual    WFL                            Vitals:   HR at rest: 62 bpm HR at end of session: 68 bpm   Spo2 at rest:98% Spo2 at end of session: 100%   BP at rest: 137/72 mmHg BP at end of session: 156/73 mmHg     Treatment: OT treatment provided this date:  Bed mobility: Instruction on precautions prior to bed mobility to facilitate safe transfers and ADLS. Pt required min cues for technique. Balance retraining: Performed sitting/standing balance ex's with instruction to facilitate righting reactions with postural changes during ADLS. ADL retraining: Instruction on adapted techniques/AE(reacher, sock aid) to increase independence and safe reach during dressing/bathing activities. Pt demonstrated increased problem solving during tasks. Delirium Prevention: Environmental and sensory modifications assessed and implemented to decrease ICU acquired delirium and to improve overall orientation, mentation and pt interaction with family/staff. Line management and environmental modifications made prior to and end of session to ensure patient safety and to increase efficiency of session. Skilled monitoring of HR, O2 saturation, blood pressure and patient's response to activity performed throughout session. Comments: OK from RN to see patient. Upon arrival, patient supine in bed, agreeable to session. Pt demo fair tolerance with fair understanding of education/techniques. Pt more alert today with improved focus on task. At end of session, patient left seated in chair; wife present. Call light within reach, all lines and tubes intact. Pt instructed on use of call light for assistance and fall prevention. Overall, pt presents with decreased activity tolerance, dynamic balance, functional mobility limiting completion of ADLs and safe return home. Pt can benefit from continued skilled OT to increase safety, functional independence & quality of life. · Pt has made good progress towards set goals.    · Continue with current plan of care       Time In:0930              Time Out: 7032         Total Treatment Time: 23      Treatment Charges: Mins Units   Ther Ex  [id number]     Manual Therapy 03202     Thera Activities [id number] 1   ADL/Home Mgt [id number] 15 1   Neuro Re-ed [id number]     Orthotic manage/training  [id number]     Non-Billable Time         Sean Miles, OTR/L [id number]

## 2021-12-15 NOTE — PROGRESS NOTES
Emery Catheter removed per Dr. Kallie Rodriguez. No complications at this time. DTV set for [id number]. Will continue to monitor.

## 2021-12-15 NOTE — PLAN OF CARE
Problem: Cardiac:  Goal: Ability to maintain vital signs within normal range will improve  Description: Ability to maintain vital signs within normal range will improve  12/15/2021 0905 by Guerda Suarez RN  Outcome: Met This Shift     Problem: Cardiac:  Goal: Cardiovascular alteration will improve  Description: Cardiovascular alteration will improve  12/15/2021 0905 by Guerda Suarez RN  Outcome: Met This Shift     Problem: Cardiac Output - Decreased:  Goal: Hemodynamic stability will improve  Description: Hemodynamic stability will improve  12/15/2021 0905 by Guerda Suarez RN  Outcome: Met This Shift     Problem: OXYGENATION/RESPIRATORY FUNCTION  Goal: Patient will maintain patent airway  12/15/2021 0905 by Guerda Suarez RN  Outcome: Met This Shift     Problem: SKIN INTEGRITY  Goal: Skin integrity is maintained or improved  12/15/2021 0905 by Guerda Suarez RN  Outcome: Met This Shift     Problem: NUTRITION  Goal: Nutritional status is improving  12/15/2021 0905 by Guerda Suarez RN  Outcome: Met This Shift     Problem: Falls - Risk of:  Goal: Will remain free from falls  Description: Will remain free from falls  12/15/2021 0905 by Guerda Suarez RN  Outcome: Met This Shift     Problem: Falls - Risk of:  Goal: Absence of physical injury  Description: Absence of physical injury  12/15/2021 0905 by Guerda Suarez RN  Outcome: Met This Shift     Problem: Skin Integrity:  Goal: Will show no infection signs and symptoms  Description: Will show no infection signs and symptoms  12/15/2021 0905 by Guerda Suarez RN  Outcome: Met This Shift     Problem: Skin Integrity:  Goal: Absence of new skin breakdown  Description: Absence of new skin breakdown  12/15/2021 0905 by Guerda Suarez RN  Outcome: Met This Shift     Problem: Injury - Risk of, Physical Injury:  Goal: Will remain free from falls  Description: Will remain free from falls  12/15/2021 0905 by Guerda Suarez RN  Outcome: Met This Shift     Problem:

## 2021-12-15 NOTE — PROGRESS NOTES
patient extubated yesterday. Saturations remained in the 90s. · GI:  No acute issues - bowel regimen   · Renal: No acute issues - monitor bmp  · ID:  No acute issues -  Monitor CBC  · Endo: Monitor glucose  · MSK: No acute issues    Bowel regime: glyocolax  Pain control/Sedation:  Tylenol. DVT prophylaxis: SCDs. Heparin. brilinta   GI: Pepcid. Glucose protocol:  None  Mouth/Eye care: As needed. Artificial tears. Peridex.    CVC sites:  [facility], Day # 3  Ancillary consults: Cardiology  Family Update: As available     Code status:   Full Code  Family Update: As available     Electronically signed by Pablito Mckeon DO on 12/15/2021 at 6:01 AM

## 2021-12-15 NOTE — PROGRESS NOTES
[facility]   INTENSIVE CARE UNIT    CRITICAL CARE ATTENDING PROGRESS NOTE    I have examined the patient, reviewed the record, and discussed the case with the APN/  Resident. I have reviewed all relevant labs and imaging data. Please refer to the  APN/ resident's note. I agree with the  assessment and plan with the following corrections/ additions. The following summarizes my clinical findings and independent assessment. CC: V. fib arrest    HOSPITAL COURSE:  12/12 pulseless V. tach, 1 round of CPR, cardiac cath with PCI placement LAD and LAD diagonal branch  12/13 awake and angry. Overnight he had tachycardic, right bundle branch block and ventricular overload on EKG  12/14 extubated yesterday. Patient is less confused  12/15 hb stable today at 8.3    EXAM:  GCS 14 awake alert to person place  Moves all extremities  Normal sinus  Abdomen soft nontender nondistended      ASSESSMENT:  Principal Problem:    STEMI (ST elevation myocardial infarction) ([facility]  Active Problems:    CAD (coronary artery disease)s/p stent RCA    Valvular heart disease    H/O psoriatic arthritis    Cardiac arrest (HCC)    Acute systolic (congestive) heart failure (HCC)    Ventricular tachycardia (HCC)    Mitral stenosis    Mitral regurgitation    Moderate to severe pulmonary hypertension ([facility]    Anemia  Resolved Problems:    * No resolved hospital problems. *       PLAN:  Sedation/ Pain: Off all sedation    CV: Ventricular tachycardia-weaned off lidocaine drip  Heart failure-on Lopressor  STEMI-status post cardiac cath. On aspirin and Brilinta and Crestor  ICD planned for Friday      Pulmonary: Acute respiratory failure resolved--extubated on 12/13    GI: Advance diet    FEN: Marginal urine output. Continue monitor. Minimize IV fluids secondary to history of dilated cardiomyopathy/heart failure    Heme: Hemoglobin 8.3.   Hemoglobin stable the past 24 hours  Hemoglobin from 2 days ago was 12.4  Hx of Gastric bypass  Plan on EGD to rule out upper GI bleeding    Endo: Monitor Blood Sugars. Target blood glucose less than 180 in the ICU. DVT prophylaxis--SCDS, heparin 3 times daily  GI Prophylaxis--pepcid   Lines--right IJ triple-lumen catheter 12/12  CODE: FULL     DISPOSITION-Continue ICU Care        Sabrina Paulino MD, FACS  12/15/2021  3:11 PM    NOTE: This report was transcribed using voice recognition software. Every effort was made to ensure accuracy; however, inadvertent computerized transcription errors may be present.

## 2021-12-15 NOTE — PLAN OF CARE
modify at least one risk factor affecting health status  Outcome: Ongoing  Goal: Identification of resources available to assist in meeting health care needs will improve  Outcome: Ongoing     Problem: Discharge Planning:  Goal: Ability to perform activities of daily living will improve  Outcome: Ongoing     Problem: OXYGENATION/RESPIRATORY FUNCTION  Goal: Patient will achieve/maintain normal respiratory rate/effort  Outcome: Completed     Problem: Non-Violent Restraints  Goal: Removal from restraints as soon as assessed to be safe  12/14/2021 1050 by Sharon Mtz RN  Outcome: Completed  12/14/2021 1048 by Sharon Mtz RN  Outcome: Met This Shift  Goal: No harm/injury to patient while restraints in use  12/14/2021 1050 by Sharon Mtz RN  Outcome: Completed  12/14/2021 1048 by Sharon Mtz RN  Outcome: Met This Shift  Goal: Patient's dignity will be maintained  12/14/2021 1050 by Sharon Mtz RN  Outcome: Completed  12/14/2021 1048 by Sharon Mtz RN  Outcome: Met This Shift     Problem: Confusion - Acute:  Goal: Absence of continued neurological deterioration signs and symptoms  Outcome: Completed     Problem: Mood - Altered:  Goal: Absence of abusive behavior  Outcome: Completed     Problem: Psychomotor Activity - Altered:  Goal: Absence of psychomotor disturbance signs and symptoms  Outcome: Completed     Problem: Sensory Perception - Impaired:  Goal: Demonstrations of improved sensory functioning will increase  Outcome: Completed  Goal: Decrease in sensory misperception frequency  Outcome: Completed  Goal: Able to refrain from responding to false sensory perceptions  Outcome: Completed  Goal: Demonstrates accurate environmental perceptions  Outcome: Completed  Goal: Able to distinguish between reality-based and nonreality-based thinking  Outcome: Completed  Goal: Able to interrupt nonreality-based thinking  Outcome: Completed

## 2021-12-15 NOTE — PROGRESS NOTES
Chief Complaint:  STEMI (ST elevation myocardial infarction) ([address]     Subjective:    Feeling well, scant cough, no CP, no SOB    Objective:    BP (!) 155/84   Pulse 90   Temp 97.2 °F (36.2 °C) (Temporal)   Resp 14   Ht 5' 6\" (1.676 m)   Wt 189 lb 9.5 oz (86 kg)   SpO2 100%   BMI 30.60 kg/m²     Current medications that patient is taking have been reviewed.     General appearance: NAD, conversant  HEENT: AT/NC, MMM  Neck: FROM, supple  Lungs: Clear to auscultation, WOB normal  CV: RRR, no MRGs  Abdomen: Soft, non-tender; no masses or HSM, +BS  Extremities: No peripheral edema or digital cyanosis  Skin: no rash, lesions or ulcers  Psych: Calm and cooperative  Neuro: Alert and interactive, face symmetric, moving all extremities, speech fluent    Labs:  CBC with Differential:    Lab Results   Component Value Date    WBC 10.2 12/15/2021    RBC 2.83 12/15/2021    HGB 8.3 12/15/2021    HCT 25.0 12/15/2021    PLT 88 12/15/2021    MCV 88.3 12/15/2021    MCH 29.3 12/15/2021    MCHC 33.2 12/15/2021    RDW 15.6 12/15/2021    NRBC 0.0 10/11/2018    LYMPHOPCT 4.4 12/12/2021    MONOPCT 2.6 12/12/2021    BASOPCT 0.2 12/12/2021    MONOSABS 0.25 12/12/2021    LYMPHSABS 0.33 12/12/2021    EOSABS 0.07 12/12/2021    BASOSABS 0.00 12/12/2021     CMP:    Lab Results   Component Value Date     12/15/2021    K 4.0 12/15/2021     12/15/2021    CO2 22 12/15/2021    BUN 19 12/15/2021    CREATININE 0.7 12/15/2021    GFRAA >60 12/15/2021    LABGLOM >60 12/15/2021    GLUCOSE 97 12/15/2021    PROT 5.6 12/15/2021    LABALBU 3.6 12/15/2021    CALCIUM 8.5 12/15/2021    BILITOT 1.3 12/15/2021    ALKPHOS 72 12/15/2021    AST 57 12/15/2021    ALT 44 12/15/2021        Imaging:  I've personally reviewed the patient's CXR  RUL infiltrate has mostly resolved    Assessment/Plan:  Principal Problem:    STEMI (ST elevation myocardial infarction) ([address]  Active Problems:    CAD (coronary artery disease)s/p stent RCA    Valvular heart disease H/O psoriatic arthritis    Cardiac arrest (HCC)    Acute systolic (congestive) heart failure (HCC)    Ventricular tachycardia (HCC)    Mitral stenosis    Mitral regurgitation    Moderate to severe pulmonary hypertension (HCC)    Anemia  Resolved Problems:    * No resolved hospital problems. *       Suspect atelectasis of RUL - much better on repeat imaging. Procal negative and WBC downtrending.     EGD today due to anemia    Amio    ICD in near future    DAPT, statin    Wean off lidocaine and transition to amio per EP    Continue BB, entresto, spirono    DVT prophylaxis: UFH  Requires continued inpatient level of care     Nikki Matthews MD    1:49 PM  12/15/2021

## 2021-12-15 NOTE — PROGRESS NOTES
Inpatient Cardiology Progress note     PATIENT IS BEING FOLLOWED FOR: VT/VFib arrest. CAD. Ischemic cardiomyopathy. Mitral regurgitation    Viktor Corona is a 77 y.o. male known to Dr. Rebekah Cline: Denies CP or SOB    OBJECTIVE: sitting in chair in no distress    ROS:  Consist: Denies fevers, chills or night sweats  Heart: Denies chest pain, palpitations, lightheadedness, dizziness or syncope  Lungs: Denies SOB, cough, wheezing, orthopnea or PND  GI: Denies abdominal pain, vomiting or diarrhea    PHYSICAL EXAM:   BP (!) 146/78   Pulse 59   Temp 97 °F (36.1 °C) (Temporal)   Resp 22   Ht 5' 6\" (1.676 m)   Wt 189 lb 9.5 oz (86 kg)   SpO2 100%   BMI 30.60 kg/m²      CONST: Well developed, well nourished who appears stated age. Awake, alert and cooperative. No apparent distress  HEENT:   Head- Normocephalic, atraumatic   Eyes- Conjunctivae pink, anicteric  Throat- Oral mucosa pink and moist  Neck-  No stridor, trachea midline, no jugular venous distention. No carotid bruit  CHEST: Chest symmetrical and non-tender to palpation. No accessory muscle use or intercostal retractions  RESPIRATORY:  Lung sounds - clear throughout fields   CARDIOVASCULAR:     Heart Inspection- shows no noted pulsations  Heart Palpation- no heaves or thrills; PMI is non-displaced   Heart Ausculation- Regular rate and rhythm, no murmur. No s3, s4 or rub   PV: 2+ lower extremity edema. No varicosities. Pedal pulses palpable, no clubbing or cyanosis   ABDOMEN: Soft, non-tender to light palpation. Bowel sounds present. No palpable masses no organomegaly; no abdominal bruit  MS: Good muscle strength and tone. No atrophy or abnormal movements. : Deferred. Emery cath  SKIN: Warm and dry no statis dermatitis or ulcers   NEURO / PSYCH: Oriented to person, place and time. Speech clear and appropriate. Follows all commands.  Pleasant affect       Intake/Output Summary (Last 24 hours) at 12/15/2021 1210  Last data filed at CXR 12/12/21:  Endotracheal tube in good position.   NG tube in the stomach.   Right internal jugular central venous catheter tip in the SVC.   No pneumothorax on the right on the left.   A perihilar vascular congestion with cardiomegaly observed.   Cannot exclude discrete infiltrates in the bases particular on the left side. CXR 12/13/21:   Esophagogastric tube with side port in the region of the distal esophagus. Recommend repositioning.  Additional support devices as above.   Interval development of patchy bilateral airspace opacities, concerning for pneumonia versus aspiration. ECG 12/12/21 19:41  Sinus rhythm with 1st degree AV block with premature atrial complexes  Cannot rule out Inferior infarct (cited on or before 12-DEC-2021)  Abnormal ECG    Telemetry: SR with PAC's    Cardiac Catheterization 12/12/2021 [facility])   CONCLUSIONS:  1. Coronary artery disease. a. LEFT MAIN:  10% distal vessel tapering.  b.  LAD:  30% eccentric proximal vessel narrowing and 80% to 90% very  proximal eccentric bifurcation lesion at the site of takeoff of a large  diagonal branch. 90% ostial stenosis and 70% to 80% proximal stenosis  of a moderate-sized second diagonal branch. 90% ostial stenosis of a  small third diagonal branch. 50% disease of the LAD after the second  diagonal branch. 60% disease of the LAD after the third diagonal  branch. Long up to 60% to 70% disease of the apical LAD before the  mustache.  c.  LCX:  Basically a large lateral branch with 70% to 80% proximal  vessel disease and 80% mid vessel disease. d.  RCA:  Dominant vessel with 40% mid vessel narrowing and 70% ostial  narrowing of the largest subdivision of the posterolateral branch.   2.  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 3.  Hemodynamically significant proximal LAD disease with pressure wire evaluation yielding an IFR of 0.70 and 0.74  4. Successful balloon angioplasty with deployment of drug-eluting coronary stent to the proximal LAD and balloon angioplasty to the ostium of the diagonal branch (bifurcation lesion; double wire; kissingballoons) with very good results. Echo12/12/21:  Summary   Micro-bubble contrast injected ( to try to define an anomaly seen on the  LV gram ) --> could not be defined --> consider cardiac MRI ( once / if patient recovers / is stable ). The left ventricle is dilated. The inferior wall, the inferolateral wall, the basal lateral wall and the   posterior wall are thin, scarred and akinetic. The right ventricle is dilated. Right ventricle global systolic function is mildly reduced ( TAPSE = 1.6   ). The left atrium is mildly dilated. The right atrium is moderately dilated. Focal calcification mitral valve leaflets. The mitral valve leaflets are restricted in mobility   Mitral annular calcification is present. Mitral stenosis is present ( peak / mean gradient 17.66 / 5.24 mmHg; area 1.0 cm2 ). Moderate to Severe mitral regurgitation. Trace aortic regurgitation. Mild tricuspid regurgitation. Moderate to severe pulmonary hypertension. ASSESSMENT:   1. Pulseless VT Arrest s/p CPR. EP following. Started on Amiodarone; ICD recommended for secondary prevention   2. Hx ventricular arrhythmias on Holter 9/2021 ([facility])  3. Known CAD/ICMP/Moderate MR. Hx Inferior MI. s/p [facility] 12/12/0221 with RYAN- proximal LAD and kissing balloon angioplasty to diagonal branch ([facility] 12/12/2021)  -- doubt the primary cause of VT  arrest.   4. Respiratory failure secondary to # 1, resolving.  extubated 12/13/2021 -- on 4-5LNC today   5.  Acute blood loss anemia, Hgb 12.4--> 8.0 --> 8.3 post tranfusion Surgery plan for EGD today. Hx GIB in 2018  6. New onset atrial fibrillation, noted 12/14/2021. EP following-- [address] held with drop in Hgb   7. Leukocytosis, afebrile   8. Mild thrombocytopenia  9. Hypertension   10. H/o HLD  11. Ex smoker quit in 2003  12. Psoriatic arthritis on Methotrexate and Plaquenil  13. IV dye allergy  14. S/p Gastric bypass for morbid obesity in 2005  15. Hx BETTIE C-pap non longer needed after weight loss  16. Depression  17. Bilateral carpal tunnel surgery  18. Chronic back/cervical pain   19. Mild transaminitis, improving. PLAN:  1. Increase Entresto  2. Add Imdur   3. Rest of cardiac medications same  4. Discontinue Emery Cath  5. Consider JIMBO / cardiac MRI for further evaluation of anomaly seen on LV gram  6. Rest as per the critical care team, the primary service and other consultants  7.  Will follow    Electronically signed by Reshma Benitez MD on 12/15/2021 at 12:10 PM

## 2021-12-15 NOTE — PROGRESS NOTES
Perfect-served surgical residents regarding patient and patient family's desire to have Dr. Gardenia Gregory perform EGD scheduled for tomorrow-  because he has done past procedures on patient and would feel more comfort. Will continue to monitor and care for patient treatment needs. Dr. Gardenia Gregory not available at this time. Will go on with original plans with Dr. Lizett Steiner.

## 2021-12-15 NOTE — PROGRESS NOTES
12/15- 12pm-MASTER  Magnus Second not vented any longer- says he can hold his breath and lay flat for a hour and a half. Once MRI screening is completed, will try and schedule with RN to come to MRI when MRI availability allows.  RN would have to be in MRI for approx 2 hours for this exam.

## 2021-12-15 NOTE — PROGRESS NOTES
Notified residents of patient having tremors and shaking from chills and Heart rate being in 120-140's. Not orders that this time. Will continue to monitor.

## 2021-12-15 NOTE — PROGRESS NOTES
Cardiac Electrophysiology Inpatient Progress Note    Viktor Corona  1945  Date of Service: 12/15/2021  PCP: Tiffanie Suresh MD  Cardiologist: Thania So MD   Primary/Attending Electrophysiologist: Dylan Baker MD         Subjective: Viktor Corona is seen in hospital follow-up and management of: VT     Initial Hospital consult (12/13/21):  77year old male with known CAD, STEMI 1997 with PCI to the RCA,   LVEF 45-50% on TTE 03/30/17, 35-40% on TTE 10/09/18, LVEF of 29% on a Lexiscan stress test 07/01/20. Other hx includes HTN, HLD, obesity S/p gastric bypass, Carpal tunnel surgery. At baseline, he is on Entresto, spironolactone, Metoprolol 50 mg bid  He presented to ED 12/12 with palpitation, weakness for one hour and was found to be in sustained VT monomorphic, Rate 190, LBBB, superior axis. He was given Lidocaine and while preparing to cardiovert patient, he had pulseless VT and CPR was started, he was intubated, defibrillated x1. There was a concern for inferior STEMI thus Cath lab was activated. Cath showed prox LAD 80-90%, mod D2 90% ostial, apical LAD 70%, diffuse Lcx 70-80% lesions, mild diffuse RCA tsenosis  LV gram showed possible LV aneurysm, mod MR and inferior akinesis, dilated LV. FFR of LAD proved that it was significant thus he had simultaneous RYAN to prox LAD and angioplasty to ostium Diagonal branch     He is intubated and awake, not really following commands. He is on Lidocaine 1mg/min, no pressors, metoprolol 25 mg bid    (12/14/21): He converted to AF with a controled ventricular rate overnight without recurrent VT, he remains on IV Lidocaine 0.5 mg/min. Today he is confused to his situation and agreeable. His count dropped from 12 to 8.0 this morning.     (12/15/21): He has improved cognition today and is in sinus, he awaits EGD today due to ongoing anemia with a PRBC transfusion on 12/14. No recurrent VT and tolerating amiodarone well.      Patient Active Problem List   Diagnosis    CAD (coronary artery disease)s/p stent RCA    Valvular heart disease    Hypertension    Obesity, morbid s/p intestinal bypass    [facility]I [facility] elevation myocardial infarction) ([facility] [address]    Stented coronary artery    Hyperlipidemia    H/O psoriatic arthritis    Acute upper GI bleed    SVT (supraventricular tachycardia) (HCC)    LV dysfunction    Cardiac arrest ([facility] [address]    Acute systolic (congestive) heart failure (HCC)    Ventricular tachycardia (HCC)    Mitral stenosis    Mitral regurgitation    Moderate to severe pulmonary hypertension (HCC)         Scheduled Medications:   magnesium sulfate  4,000 mg IntraVENous Once    potassium & sodium phosphates  1 packet Oral 4x Daily    amiodarone  400 mg Oral BID    furosemide  20 mg Oral Daily    sacubitril-valsartan  1 tablet Oral BID    spironolactone  25 mg Oral Daily    polyethylene glycol  17 g Oral Daily    metoprolol succinate  25 mg Oral Daily    sodium chloride flush  5-40 mL IntraVENous 2 times per day    rosuvastatin  40 mg Oral Nightly    aspirin  81 mg Oral Daily    ticagrelor  90 mg Oral BID    [Held by provider] nitroglycerin  0.5 inch Topical 4 times per day    heparin (porcine)  5,000 Units SubCUTAneous Q8H    chlorhexidine  15 mL Mouth/Throat BID    famotidine (PEPCID) injection  20 mg IntraVENous BID       Infusion Medications:   sodium chloride      sodium chloride         PRN Medications:  sodium chloride, sodium chloride flush, sodium chloride, acetaminophen, ondansetron    Allergies   Allergen Reactions    Dye [Iodides] Anaphylaxis     Passing out    Dust Mite Extract Other (See Comments)     All year around       [employer] Readings from Last 3 Encounters:   12/14/21 189 lb 9.5 oz (86 kg)   12/12/21 168 lb (76.2 kg)   11/16/21 174 lb 9.6 oz (79.2 kg)     Temp Readings from Last 3 Encounters:   12/15/21 97 °F (36.1 °C) (Temporal)   12/12/21 97.8 °F (36.6 °C) (Temporal)   10/12/21 97.4 °F (36.3 °C)     BP Readings from Last 3 Encounters:   12/15/21 (!) 145/85   12/12/21 (!) 103/59   11/16/21 108/60     Pulse Readings from Last 3 Encounters:   12/15/21 67   12/12/21 81   11/16/21 74         Intake/Output Summary (Last 24 hours) at 12/15/2021 1048  Last data filed at 12/15/2021 1000  Gross per 24 hour   Intake 1242.08 ml   Output 960 ml   Net 282.08 ml       ROS:    Constitutional: Negative for fever, activity change and appetite change. HENT: Negative for epistaxis, difficulty swallowing. Eyes: Negative for blurred vision or double vision. Respiratory: Negative for cough, chest tightness, shortness of breath and wheezing. Cardiovascular: Negative for chest pain, palpitations and leg swelling. Gastrointestinal: Negative for abdominal pain, heartburn, or blood in stool. Genitourinary: Negative for hematuria, burning or frequency. Musculoskeletal: Negative for myalgias, stiffness, or swelling. Skin: Negative for rash, pain, or lumps. Neurological: Negative for syncope, seizures, or headaches. Psychiatric/Behavioral: Negative for confusion and agitation. The patient is not nervous/anxious. PHYSICAL EXAM:  Vitals:    12/15/21 0800 12/15/21 0900 12/15/21 0930 12/15/21 1000   BP: (!) 180/110 (!) 144/70 137/72 (!) 145/85   Pulse: 83 69 62 67   Resp: 19 19 14 21   Temp: 97 °F (36.1 °C)      TempSrc: Temporal      SpO2: 100% 100% 100% 100%   Weight:       Height:         Constitutional: In NAD. improved cognition today   Eyes: Conjunctivae are normal.  Cardiovascular: S1, S2 positive, RRR    Pulmonary/Chest: decreased BS   Abdominal: Soft. Normal appearance  Neurological: Awake and confused. Skin: Skin is warm and dry. Extremity: No clubbing or cyanosis.   Pertinent Labs:  CBC:   Recent Labs     12/13/21  0425 12/14/21  0455 12/15/21  0535   WBC 9.6 12.1* 10.2   HGB 12.4* 8.0* 8.3*   HCT 37.4 24.1* 25.0*   * 103* 88*     BMP:  Recent Labs     12/12/21  1225 12/13/21  0425 12/13/21  0431 12/14/21  0450 12/15/21  0535   NA  --  139  --  137 138   K   < > 4.4 4.32 4.1 4.0   CL  --  107  --  103 105   CO2  --  20*  --  20* 22   BUN  --  18  --  25* 19   CREATININE  --  0.9  --  1.1 0.7   CALCIUM  --  8.3*  --  8.5* 8.5*    < > = values in this interval not displayed. TSH:   Lab Results   Component Value Date    TSH 3.820 12/12/2021      Lipid Profile:   Lab Results   Component Value Date    TRIG 61 12/13/2021    HDL 39 12/13/2021    LDLCALC 49 12/13/2021    CHOL 100 12/13/2021          Pertinent Cardiac Testing:     EKG (12/12/21): VT rate 190 bpm, Please see scan in Cardiology. EKG (12/12/21): NSR rate 76 bpm, QRS 98ms, QTc 490ms  Please see scan in Cardiology.     Echocardiogram (12/12/21): Micro-bubble contrast injected ( to try to define an anomaly seen on the   LV gram ) --> could not be defined --> consider cardiac MRI ( once / if   patient recovers / is stable ).   The left ventricle is dilated.   The inferior wall, the inferolateral wall, the basal lateral wall and the   posterior wall are thin, scarred and akinetic.   The right ventricle is dilated.   Right ventricle global systolic function is mildly reduced ( TAPSE = 1.6   ).   The left atrium is mildly dilated.   The right atrium is moderately dilated.   Focal calcification mitral valve leaflets.   The mitral valve leaflets are restricted in mobility   Mitral annular calcification is present.   Mitral stenosis is present ( peak / mean gradient 17.66 / 5.24 mmHg; area   1.0 cm2 ).  Moderate to Severe mitral regurgitation.   Trace aortic regurgitation.   Mild tricuspid regurgitation.   Moderate to severe pulmonary hypertension.     Cardiac Catheterization (12/12/21):   CONCLUSIONS:  1.  Coronary artery disease.   a.  LEFT MAIN:  10% distal vessel tapering.  b.  LAD:  30% eccentric proximal vessel narrowing and 80% to 90% very  proximal eccentric bifurcation lesion at the site of takeoff of a large  diagonal branch.  90% ostial stenosis and 70% to 80% proximal stenosis  of a moderate-sized second diagonal branch.  90% ostial stenosis of a  small third diagonal branch.  50% disease of the LAD after the second  diagonal branch.  60% disease of the LAD after the third diagonal  branch.  Long up to 60% to 70% disease of the apical LAD before the  mustache.  c.  LCX:  Basically a large lateral branch with 70% to 80% proximal  vessel disease and 80% mid vessel disease. d.  RCA:  Dominant vessel with 40% mid vessel narrowing and 70% ostial  narrowing of the largest subdivision of the posterolateral branch. 2.  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 3.  Hemodynamically significant proximal LAD disease with pressure wire  evaluation yielding an IFR of 0.70 and _____. 4.  Successful balloon angioplasty with deployment of drug-eluting  coronary stent to the proximal LAD and balloon angioplasty to the ostium  of the diagonal branch (bifurcation lesion; double wire; kissing  balloons) with very good results.     Stress Test (07/01/20):   1. ECG during the infusion did not change. 2. The myocardial perfusion imaging was abnormal. The abnormality was a a large sized fixed defect in the inferolateral wall suggestive of a prior MI  3. Overall left ventricular systolic function was abnormal with regional wall motion abnormalities. 4. High risk general pharmacologic stress test. Due to LV dysfunction.     Cardiac MRI (08/13/19):    Mildly dilated left ventricle (LVEDVi 125cc/m2) with a   mild-to-moderate reduction in systolic function (LVEF 58%).  There are   thinned and akinetic [id number] inferior, inferolateral and   anterolateral segments with associated transmural delayed enhancement as   well as hypokinesis of the [id number] inferoseptum with subendocardial   enhancement.  This constellation of findings is consistent with ischemic   cardiomyopathy with an overall low probability of myocardial viability. 2. Posterior mitral leaflet tethering secondary to the akinetic basal   segments resulting in a mild-to-moderate anteriorly directed eccentric   jet of mitral regurgitation (Rvol 19-24 cc/beat; Rfrac 20-25%)     3. Moderate biatrial enlargement. 4. The arch vessel branching pattern is remarkable for a left sided arch   with an aberrant right subclavian artery that takes the typical   retroesophageal course.      Prior outpatient monitor (09/27/21): Patient had a min HR of 33 bpm, max HR of 133 bpm, and avg HR of 50 bpm. Predominant underlying rhythm was Sinus Rhythm. 2 Ventricular Tachycardia runs occurred, the run with the fastest interval lasting 5 beats with a max rate of 133 bpm (avg 108 bpm); the run with the fastest interval was also the longest. Idioventricular Rhythm was present. Isolated SVEs were frequent (6.2%, 6313), SVE Couplets were rare (<1.0%, 150), and SVE Triplets were rare (<1.0%, 10). Isolated VEs were frequent (10.0%, 44660), VE Couplets were occasional (1.6%, 812), and VE Triplets were rare (<1.0%, 30). Ventricular Bigeminy and Trigeminy were present. Xwbbztxjc01/15/2021: NSR rate 65 with PACs         I have independently reviewed all of the ECGs and rhythm strips per above. I have personally reviewed the laboratory, cardiac diagnostic and radiographic testing as outlined above. I have reviewed previous records noted in [address]. Impression:    1.  VT Arrest  - Known CAD on Entresto, BB, Sprinolactone  - last LVEF 29% on SPECT with fixed inferior large scar  - Now with sustained VT with hemodynamic compromise and arrest  - Cath findings do not suggest ACS but more likely severe CAD and is s/p revascularization of LAD and ostial of diagonal with residual high burden diffuse multivessel CAD   - Cardiac MRI from 2019 shows scar in the inf/inflat areas  - Monitor rhythm, currently in NSR  - Recommend dual chamber ICD for secondary prevention prior to discharge  - Optimize medical regimen  - We will follow    2. New onset Atrial fibrillation   - First seen 12/14/21  - CHADS-VASc (age, VASc, CHF, HTN)  - Given the 4gm hemoglobin drop will defer anticoagulation today. 3.  HFrEF   - Ischemic   - LVEF 50% 10/03/09 on TTE   - LVEF 45-50% 03/30/17 on TTE   - LVEF 35-40% 10/09/19 on TTE   - LVEF 29% 07/01/20 on Lexiscan stress test   - LVEF 24% on TTE 12/12/21  - GDMT: Entresto ,  Lasix, Toprol 50 BID, Aldactone 25mg daily.      4. Coronary Artery Disease   - Inferior STEMI 1997 s/p PCI RCA.  - Known Large sized inferolateral fixed defect suggesting prior MI.  - [facility] 12/12/21 patient RCA stent, high grade stenosis of the LAD-D1 S/p PCI. - statin, BB, ASA      5. HTN      6. HLD      7. Obesity s/p gastric bypass     8. IV dye allergy      9. psoriatic arthritis      10. Anemia   -  PRBC transfusion on 12/14 count remains at 8.3 today       Recommendations:    1. Continue PO amiodarone loading 400mg BID for one week (12/21/21) then 200 mg BID (12/28/21) then 200mg daily. 2. Defer IV heparin due to anemia pending EGD and PRBC transfusion. 3. Will need dual chamber ICD for secondary prevention prior to discharge possible Friday depending upon clinic course. 5. EP will follow. Thank you for allowing me to participate in your patient's care. Discussed with Dr. Sherman Velazquez  Electronically signed by KAYE Choudhury CNP on 12/15/2021 at 10:48 AM   [facility]        Addendum    I personally saw, examined and provided care for the patient. Radiographs, labs and medication list were reviewed by me independently.   The case was discussed in detail and plans for care were established. Review of Nurse Practitioner documentation was conducted and revisions were made as appropriate. I agree with the above documented exam, problem list and plan of care. - Doing well today  - He has EGD scheduled today  - Cardiac MRI to evaluate LV aneurysm  - Plan for dual chamber ICD tentatively on Friday for secondary prevention. Will discuss with patient and family  - IV lidocaine stopped and no further VT  - No further Afib on monitor. Defer [address] till hgb stabilizes  - HR 50's on PO amiodarone thus will benefit from atrial lead    I have spent a total of 35 CC minutes with the patient and his/her family reviewing the above stated recommendations. A total of >50% of that time involved face-to-face time providing counseling and or coordination of care with the other providers.       Ruthann Schmidt M.D  [facility]

## 2021-12-16 ENCOUNTER — ANESTHESIA EVENT (OUTPATIENT)
Dept: ENDOSCOPY | Age: 76
DRG: 222 | End: 2021-12-16
Payer: MEDICARE

## 2021-12-16 ENCOUNTER — ANESTHESIA (OUTPATIENT)
Dept: ENDOSCOPY | Age: 76
DRG: 222 | End: 2021-12-16
Payer: MEDICARE

## 2021-12-16 VITALS
RESPIRATION RATE: 20 BRPM | SYSTOLIC BLOOD PRESSURE: 115 MMHG | OXYGEN SATURATION: 100 % | DIASTOLIC BLOOD PRESSURE: 72 MMHG

## 2021-12-16 LAB
ALBUMIN SERPL-MCNC: 3.4 G/DL (ref 3.5–5.2)
ALP BLD-CCNC: 72 U/L (ref 40–129)
ALT SERPL-CCNC: 38 U/L (ref 0–40)
ANION GAP SERPL CALCULATED.3IONS-SCNC: 14 MMOL/L (ref 7–16)
AST SERPL-CCNC: 42 U/L (ref 0–39)
BILIRUB SERPL-MCNC: 1.7 MG/DL (ref 0–1.2)
BUN BLDV-MCNC: 16 MG/DL (ref 6–23)
CALCIUM SERPL-MCNC: 8.5 MG/DL (ref 8.6–10.2)
CHLORIDE BLD-SCNC: 101 MMOL/L (ref 98–107)
CO2: 20 MMOL/L (ref 22–29)
CREAT SERPL-MCNC: 0.7 MG/DL (ref 0.7–1.2)
EKG ATRIAL RATE: 68 BPM
EKG ATRIAL RATE: 99 BPM
EKG P AXIS: 65 DEGREES
EKG P-R INTERVAL: 180 MS
EKG P-R INTERVAL: 210 MS
EKG Q-T INTERVAL: 370 MS
EKG Q-T INTERVAL: 450 MS
EKG QRS DURATION: 100 MS
EKG QRS DURATION: 96 MS
EKG QTC CALCULATION (BAZETT): 474 MS
EKG QTC CALCULATION (BAZETT): 478 MS
EKG R AXIS: 111 DEGREES
EKG R AXIS: 83 DEGREES
EKG T AXIS: -27 DEGREES
EKG T AXIS: 28 DEGREES
EKG VENTRICULAR RATE: 68 BPM
EKG VENTRICULAR RATE: 99 BPM
GFR AFRICAN AMERICAN: >60
GFR NON-AFRICAN AMERICAN: >60 ML/MIN/1.73
GLUCOSE BLD-MCNC: 105 MG/DL (ref 74–99)
HCT VFR BLD CALC: 23.2 % (ref 37–54)
HEMOGLOBIN: 7.9 G/DL (ref 12.5–16.5)
MAGNESIUM: 2.1 MG/DL (ref 1.6–2.6)
MCH RBC QN AUTO: 29.4 PG (ref 26–35)
MCHC RBC AUTO-ENTMCNC: 34.1 % (ref 32–34.5)
MCV RBC AUTO: 86.2 FL (ref 80–99.9)
PDW BLD-RTO: 15.7 FL (ref 11.5–15)
PHOSPHORUS: 2.7 MG/DL (ref 2.5–4.5)
PLATELET # BLD: 102 E9/L (ref 130–450)
PMV BLD AUTO: 12.2 FL (ref 7–12)
POTASSIUM REFLEX MAGNESIUM: 4 MMOL/L (ref 3.5–5)
RBC # BLD: 2.69 E12/L (ref 3.8–5.8)
SODIUM BLD-SCNC: 135 MMOL/L (ref 132–146)
TOTAL PROTEIN: 5.8 G/DL (ref 6.4–8.3)
WBC # BLD: 21.9 E9/L (ref 4.5–11.5)

## 2021-12-16 PROCEDURE — 85027 COMPLETE CBC AUTOMATED: CPT

## 2021-12-16 PROCEDURE — 99291 CRITICAL CARE FIRST HOUR: CPT | Performed by: INTERNAL MEDICINE

## 2021-12-16 PROCEDURE — 6370000000 HC RX 637 (ALT 250 FOR IP): Performed by: INTERNAL MEDICINE

## 2021-12-16 PROCEDURE — 2709999900 HC NON-CHARGEABLE SUPPLY: Performed by: SURGERY

## 2021-12-16 PROCEDURE — 2000000000 HC ICU R&B

## 2021-12-16 PROCEDURE — 7100000000 HC PACU RECOVERY - FIRST 15 MIN

## 2021-12-16 PROCEDURE — 36592 COLLECT BLOOD FROM PICC: CPT

## 2021-12-16 PROCEDURE — 2580000003 HC RX 258: Performed by: INTERNAL MEDICINE

## 2021-12-16 PROCEDURE — 6360000002 HC RX W HCPCS: Performed by: NURSE ANESTHETIST, CERTIFIED REGISTERED

## 2021-12-16 PROCEDURE — 3700000001 HC ADD 15 MINUTES (ANESTHESIA): Performed by: SURGERY

## 2021-12-16 PROCEDURE — 3609017100 HC EGD: Performed by: SURGERY

## 2021-12-16 PROCEDURE — 80053 COMPREHEN METABOLIC PANEL: CPT

## 2021-12-16 PROCEDURE — 6360000002 HC RX W HCPCS: Performed by: INTERNAL MEDICINE

## 2021-12-16 PROCEDURE — 6370000000 HC RX 637 (ALT 250 FOR IP): Performed by: NURSE PRACTITIONER

## 2021-12-16 PROCEDURE — 99233 SBSQ HOSP IP/OBS HIGH 50: CPT | Performed by: SURGERY

## 2021-12-16 PROCEDURE — 7100000001 HC PACU RECOVERY - ADDTL 15 MIN

## 2021-12-16 PROCEDURE — 83735 ASSAY OF MAGNESIUM: CPT

## 2021-12-16 PROCEDURE — 99233 SBSQ HOSP IP/OBS HIGH 50: CPT | Performed by: INTERNAL MEDICINE

## 2021-12-16 PROCEDURE — 0DJ08ZZ INSPECTION OF UPPER INTESTINAL TRACT, VIA NATURAL OR ARTIFICIAL OPENING ENDOSCOPIC: ICD-10-PCS | Performed by: SURGERY

## 2021-12-16 PROCEDURE — 84100 ASSAY OF PHOSPHORUS: CPT

## 2021-12-16 PROCEDURE — 6370000000 HC RX 637 (ALT 250 FOR IP): Performed by: STUDENT IN AN ORGANIZED HEALTH CARE EDUCATION/TRAINING PROGRAM

## 2021-12-16 PROCEDURE — 3700000000 HC ANESTHESIA ATTENDED CARE: Performed by: SURGERY

## 2021-12-16 PROCEDURE — 2500000003 HC RX 250 WO HCPCS: Performed by: STUDENT IN AN ORGANIZED HEALTH CARE EDUCATION/TRAINING PROGRAM

## 2021-12-16 PROCEDURE — 36415 COLL VENOUS BLD VENIPUNCTURE: CPT

## 2021-12-16 PROCEDURE — 2580000003 HC RX 258: Performed by: NURSE ANESTHETIST, CERTIFIED REGISTERED

## 2021-12-16 PROCEDURE — 2500000003 HC RX 250 WO HCPCS: Performed by: SURGERY

## 2021-12-16 PROCEDURE — 2700000000 HC OXYGEN THERAPY PER DAY

## 2021-12-16 PROCEDURE — 43235 EGD DIAGNOSTIC BRUSH WASH: CPT | Performed by: SURGERY

## 2021-12-16 RX ORDER — FUROSEMIDE 20 MG/1
40 TABLET ORAL DAILY
Status: DISCONTINUED | OUTPATIENT
Start: 2021-12-17 | End: 2021-12-21 | Stop reason: HOSPADM

## 2021-12-16 RX ORDER — PANTOPRAZOLE SODIUM 40 MG/1
40 TABLET, DELAYED RELEASE ORAL 2 TIMES DAILY
Status: DISCONTINUED | OUTPATIENT
Start: 2021-12-16 | End: 2021-12-21 | Stop reason: HOSPADM

## 2021-12-16 RX ORDER — SODIUM CHLORIDE 9 MG/ML
INJECTION, SOLUTION INTRAVENOUS CONTINUOUS PRN
Status: DISCONTINUED | OUTPATIENT
Start: 2021-12-16 | End: 2021-12-16 | Stop reason: SDUPTHER

## 2021-12-16 RX ORDER — PROPOFOL 10 MG/ML
INJECTION, EMULSION INTRAVENOUS PRN
Status: DISCONTINUED | OUTPATIENT
Start: 2021-12-16 | End: 2021-12-16 | Stop reason: SDUPTHER

## 2021-12-16 RX ORDER — FUROSEMIDE 10 MG/ML
40 INJECTION INTRAMUSCULAR; INTRAVENOUS ONCE
Status: COMPLETED | OUTPATIENT
Start: 2021-12-16 | End: 2021-12-16

## 2021-12-16 RX ADMIN — PROPOFOL 60 MG: 10 INJECTION, EMULSION INTRAVENOUS at 09:23

## 2021-12-16 RX ADMIN — FUROSEMIDE 40 MG: 10 INJECTION, SOLUTION INTRAMUSCULAR; INTRAVENOUS at 10:22

## 2021-12-16 RX ADMIN — FUROSEMIDE 20 MG: 20 TABLET ORAL at 08:31

## 2021-12-16 RX ADMIN — SACUBITRIL AND VALSARTAN 1 TABLET: 49; 51 TABLET, FILM COATED ORAL at 08:32

## 2021-12-16 RX ADMIN — ROSUVASTATIN 40 MG: 20 TABLET, FILM COATED ORAL at 20:03

## 2021-12-16 RX ADMIN — HEPARIN SODIUM 5000 UNITS: 10000 INJECTION INTRAVENOUS; SUBCUTANEOUS at 14:30

## 2021-12-16 RX ADMIN — AMIODARONE HYDROCHLORIDE 400 MG: 200 TABLET ORAL at 08:31

## 2021-12-16 RX ADMIN — Medication 10 ML: at 10:23

## 2021-12-16 RX ADMIN — SACUBITRIL AND VALSARTAN 1 TABLET: 49; 51 TABLET, FILM COATED ORAL at 20:03

## 2021-12-16 RX ADMIN — HEPARIN SODIUM 5000 UNITS: 10000 INJECTION INTRAVENOUS; SUBCUTANEOUS at 21:37

## 2021-12-16 RX ADMIN — SPIRONOLACTONE 25 MG: 25 TABLET ORAL at 08:31

## 2021-12-16 RX ADMIN — AMIODARONE HYDROCHLORIDE 400 MG: 200 TABLET ORAL at 20:02

## 2021-12-16 RX ADMIN — FAMOTIDINE 20 MG: 10 INJECTION INTRAVENOUS at 08:31

## 2021-12-16 RX ADMIN — TICAGRELOR 90 MG: 90 TABLET ORAL at 08:31

## 2021-12-16 RX ADMIN — Medication 10 ML: at 20:02

## 2021-12-16 RX ADMIN — ISOSORBIDE MONONITRATE 30 MG: 30 TABLET ORAL at 08:31

## 2021-12-16 RX ADMIN — METOPROLOL SUCCINATE 25 MG: 25 TABLET, EXTENDED RELEASE ORAL at 08:31

## 2021-12-16 RX ADMIN — TICAGRELOR 90 MG: 90 TABLET ORAL at 20:03

## 2021-12-16 RX ADMIN — ASPIRIN 81 MG CHEWABLE TABLET 81 MG: 81 TABLET CHEWABLE at 08:31

## 2021-12-16 RX ADMIN — LABETALOL HYDROCHLORIDE 10 MG: 5 INJECTION INTRAVENOUS at 01:10

## 2021-12-16 RX ADMIN — HEPARIN SODIUM 5000 UNITS: 10000 INJECTION INTRAVENOUS; SUBCUTANEOUS at 06:19

## 2021-12-16 RX ADMIN — SODIUM CHLORIDE: 9 INJECTION, SOLUTION INTRAVENOUS at 09:06

## 2021-12-16 RX ADMIN — PANTOPRAZOLE SODIUM 40 MG: 40 TABLET, DELAYED RELEASE ORAL at 20:03

## 2021-12-16 ASSESSMENT — PAIN SCALES - GENERAL
PAINLEVEL_OUTOF10: 0

## 2021-12-16 NOTE — ANESTHESIA POSTPROCEDURE EVALUATION
Department of Anesthesiology  Postprocedure Note    Patient: Farida Daniels  MRN: [id number]  YOB: 1945  Date of evaluation: 12/16/2021  Time:  3:23 PM     Procedure Summary     Date: 12/16/21 Room / Location: [address] / [facility]    Anesthesia Start: 4974 Anesthesia Stop: 5564    Procedure: EGD ESOPHAGOGASTRODUODENOSCOPY (N/A ) Diagnosis: (/)    Surgeons: Laury Sifuentes MD Responsible Provider: Sana Dumont MD    Anesthesia Type: MAC ASA Status: 4          Anesthesia Type: MAC    Mildred Phase I:      Mildred Phase II:      Last vitals: Reviewed and per EMR flowsheets.        Anesthesia Post Evaluation    Patient location during evaluation: PACU  Patient participation: complete - patient participated  Level of consciousness: awake and alert  Pain score: 0  Airway patency: patent  Nausea & Vomiting: no vomiting and no nausea  Complications: no  Cardiovascular status: hemodynamically stable  Respiratory status: spontaneous ventilation  Hydration status: stable

## 2021-12-16 NOTE — PLAN OF CARE
Problem: Cardiac:  Goal: Ability to maintain vital signs within normal range will improve  Outcome: Met This Shift     Problem: Physical Regulation:  Goal: Complications related to the disease process, condition or treatment will be avoided or minimized  Outcome: Met This Shift     Problem: Cardiac Output - Decreased:  Goal: Hemodynamic stability will improve  Outcome: Met This Shift     Problem: SKIN INTEGRITY  Goal: Skin integrity is maintained or improved  Outcome: Met This Shift     Problem: Falls - Risk of:  Goal: Will remain free from falls  Outcome: Met This Shift  Goal: Absence of physical injury  Outcome: Met This Shift     Problem: Skin Integrity:  Goal: Will show no infection signs and symptoms  Outcome: Met This Shift  Goal: Absence of new skin breakdown  Outcome: Met This Shift     Problem: Confusion - Acute:  Goal: Mental status will be restored to baseline  Outcome: Met This Shift     Problem: Discharge Planning:  Goal: Participates in care planning  Outcome: Met This Shift     Problem: Injury - Risk of, Physical Injury:  Goal: Will remain free from falls  Outcome: Met This Shift  Goal: Absence of physical injury  Outcome: Met This Shift     Problem: Mood - Altered:  Goal: Mood stable  Outcome: Met This Shift  Goal: Verbalizations of feeling emotionally comfortable while being cared for will increase  Outcome: Met This Shift     Problem: Sleep Pattern Disturbance:  Goal: Appears well-rested  Outcome: Met This Shift     Problem: Musculor/Skeletal Functional Status  Goal: Highest potential functional level  Outcome: Met This Shift  Goal: Absence of falls  Outcome: Met This Shift     Problem: Cardiac:  Goal: Cardiovascular alteration will improve  Outcome: Ongoing     Problem: Health Behavior:  Goal: Will modify at least one risk factor affecting health status  Outcome: Ongoing  Goal: Identification of resources available to assist in meeting health care needs will improve  Outcome: Ongoing Problem: NUTRITION  Goal: Nutritional status is improving  Outcome: Ongoing     Problem: Discharge Planning:  Goal: Ability to perform activities of daily living will improve  Outcome: Ongoing     Problem: OXYGENATION/RESPIRATORY FUNCTION  Goal: Patient will maintain patent airway  Outcome: Completed

## 2021-12-16 NOTE — PROGRESS NOTES
[facility]   INTENSIVE CARE UNIT    CRITICAL CARE ATTENDING PROGRESS NOTE    I have examined the patient, reviewed the record, and discussed the case with the APN/  Resident. I have reviewed all relevant labs and imaging data. Please refer to the  APN/ resident's note. I agree with the  assessment and plan with the following corrections/ additions. The following summarizes my clinical findings and independent assessment. CC: V. fib arrest    HOSPITAL COURSE:  12/12 pulseless V. tach, 1 round of CPR, cardiac cath with PCI placement LAD and LAD diagonal branch  12/13 awake and angry. Overnight he had tachycardic, right bundle branch block and ventricular overload on EKG  12/14 extubated yesterday. Patient is less confused  12/15 hb stable today at 8.3  12/16 wife present at bedside. EGD today    EXAM:  GCS 14 awake alert to person place  Moves all extremities  Normal sinus  Abdomen soft nontender nondistended  Right groin ecchymotic    ASSESSMENT:  Principal Problem:    STEMI (ST elevation myocardial infarction) ([facility] [address]  Active Problems:    CAD (coronary artery disease)s/p stent RCA    Valvular heart disease    H/O psoriatic arthritis    Cardiac arrest (HCC)    Acute systolic (congestive) heart failure (HCC)    Ventricular tachycardia (HCC)    Mitral stenosis    Mitral regurgitation    Moderate to severe pulmonary hypertension ([address]    Anemia  Resolved Problems:    * No resolved hospital problems. *       PLAN:  Sedation/ Pain: Off all sedation    CV: Ventricular tachycardia- on amiodarone  Heart failure-on Lopressor, entresto  STEMI-status post cardiac cath. On aspirin and Brilinta and Crestor  I spoke to EP today--ICD on hold     Pulmonary: Acute respiratory failure resolved--extubated on 12/13    GI: NPO for EGD today    FEN: Marginal urine output. Continue monitor.     secondary to history of dilated cardiomyopathy/heart failure  On lasix 40 mg daily    Heme: Hemoglobin  7.9 today  Thrombocytopenia resolving  plts up to 102  Hx of Gastric bypass  Plan on EGD to rule out upper GI bleeding  I am doubtful that patient has an upper GI bleed. More than likely the bleeding was likely secondary to his cardiac catheter the right groin since there is ecchymosis there. Currently there is no active bleeding patient's platelets are going up and blood count has been stable over the past 72 hours    Endo: Monitor Blood Sugars. Target blood glucose less than 180 in the ICU. DVT prophylaxis--SCDS, heparin 3 times daily  GI Prophylaxis--pepcid   Lines--right IJ triple-lumen catheter 12/12  CODE: FULL     DISPOSITION-Continue ICU Care        Grace MD Mumtaz, FACS  12/16/2021  9:19 AM    NOTE: This report was transcribed using voice recognition software. Every effort was made to ensure accuracy; however, inadvertent computerized transcription errors may be present.

## 2021-12-16 NOTE — PROGRESS NOTES
Surgical Intensive Care Unit  Daily Progress Note  Date of admission:  12/12/2021  Reason for ICU transfer: V fib arrest    Subjective:  Patient requiring less oxygen. He is resting comfortable in bed. He is less confused this am. Has hesitancy about ICD placement. Hospital Course:  12/12: Patient went into pulseless V tach and received one round of CPR before ROSC. Patient was immediately taken for cardiac cath. PCI placement in LAD and LAD diagonal branch. 12/13: Patient remains intubated and on fent. He does not complain of pain. SBT today. Overnight patient had bout of tachycardia, RBBB and ventricular overlaod on EKG. Patient extubated. 12/14: Patient was extubated yesterday. Confused this morning. EP plans to start amiodarone today with future ICD placement before discharge. 12/15: Patient less confused this am.  States he is hesitant about an ICD placement. On NC at this time with no other complaints. 12/16: Patient resting comfortably this am. He is for EGD today. Had a BM. Physical Exam:  BP (!) 141/91   Pulse 115   Temp 97.7 °F (36.5 °C) (Temporal)   Resp 14   Ht 5' 6\" (1.676 m)   Wt 189 lb 9.5 oz (86 kg)   SpO2 100%   BMI 30.60 kg/m²     Physical Exam  HENT:      Head: Normocephalic and atraumatic. Nose: Nose normal.      Mouth/Throat:      Mouth: Mucous membranes are moist.   Eyes:      Pupils: Pupils are equal, round, and reactive to light. Cardiovascular:      Rate and Rhythm: Tachycardia present. Rhythm irregular. Abdominal:      General: Abdomen is flat. There is no distension. Palpations: Abdomen is soft. Skin:     General: Skin is warm and dry. Capillary Refill: Capillary refill takes less than 2 seconds. Neurological:      Mental Status: He is alert. ASSESSMENT / PLAN:  · Neuro:  Patient states he is in no pain. Tylenol as needed  · CV: V fib arrest s/p cardiac cath - On metoprolol rosuvastatin, cardiology and EP input appreciated. On oral amiodarone and ICD placement per EP  · Pulm: Acute respiratory failure - patient extubated yesterday. Saturations remained in the 90s. · GI:  EGD today - bowel regimen   · Renal: No acute issues - monitor bmp  · ID:  No acute issues -  Monitor CBC  · Endo: Monitor glucose  · MSK: No acute issues    Bowel regime: glyocolax  Pain control/Sedation:  Tylenol. DVT prophylaxis: SCDs. Heparin. brilinta   GI: Pepcid. Glucose protocol:  None  Mouth/Eye care: As needed. Artificial tears. Peridex.    CVC sites:  [facility], Day # 4  Ancillary consults: Cardiology  Family Update: As available     Code status:   Full Code  Family Update: As available     Electronically signed by Elif Falcon DO on 12/16/2021 at 6:03 AM

## 2021-12-16 NOTE — OP NOTE
[address] [place] [address]  UPPER ENDOSCOPY REPORT    DATE OF PROCEDURE: 12/16/21     SURGEON: Amanda Washington M.D.    ASSISTANT: none    PREOPERATIVE DIAGNOSIS: acute anemia    POSTOPERATIVE DIAGNOSIS: no marginal ulcer, no evidence of upper GI bleeding    OPERATION: Esophagogastroduodenoscopy     ANESTHESIA: Local monitored anesthesia. (refer to Anesthesia record for details)    ESTIMATED BLOOD LOSS:  0 ml    COMPLICATIONS: None    SPECIMENS:  Was Not Obtained    HISTORY: The patient is a 77y.o. year old male with history of above preop diagnosis. I recommended esophagogastroduodenoscopy with possible biopsy and I explained the risk, benefits, expected outcome, and alternatives to the procedure. Risks included but are not limited to bleeding, infection, respiratory distress, hypotension, and perforation of the esophagus, stomach, or duodenum. Patient understands and is in agreement. PROCEDURE: The patient was connected to the monitors and given supplemental oxygen by nasal cannula. The patient was sedated. The gastroscope was inserted orally and advanced under direct vision through the esophagus into the gastric pouch and into the Scar limb    Findings:       Gastric pouch   from 37 to 45 cm at the incisors  No evidence of marginal ulcer  No evidence of upper GI bleed    Scar limb--no bleeding, large patent anastamosis            Esophagus: small hiatal hernia, GE junction at 37 cm    Larynx: normal    The scope was removed and the patient tolerated the procedure well. IMPRESSION/PLAN:   1. No evidence of upper gi bleed/ no marginal ulcers  2. Continue Pepcid  3.  [id number] [address] for antiplatelet/ anticoagulation      Amanda Washington MD, FACS  12/16/2021  9:33 AM

## 2021-12-16 NOTE — PROGRESS NOTES
Inpatient Cardiology Progress note     PATIENT IS BEING FOLLOWED FOR: VT/VFib arrest. CAD. Ischemic cardiomyopathy. Mitral regurgitation    Viktor Corona is a 77 y.o. male known to Dr. Rebekah Cline: Denies CP or SOB    OBJECTIVE: sitting in bed in no distress    ROS:  Consist: Denies fevers, chills or night sweats  Heart: Denies chest pain, palpitations, lightheadedness, dizziness or syncope  Lungs: Denies SOB, cough, wheezing, orthopnea or PND  GI: Denies abdominal pain, vomiting or diarrhea    PHYSICAL EXAM:   BP (!) 136/91   Pulse 106   Temp 98.2 °F (36.8 °C)   Resp 10   Ht 5' 6\" (1.676 m)   Wt 189 lb 9.5 oz (86 kg)   SpO2 100%   BMI 30.60 kg/m²      CONST: Well developed, well nourished who appears stated age. Awake, alert and cooperative. No apparent distress  HEENT:   Head- Normocephalic, atraumatic   Eyes- Conjunctivae pink, anicteric  Throat- Oral mucosa pink and moist  Neck-  No stridor, trachea midline, no jugular venous distention. No carotid bruit  CHEST: Chest symmetrical and non-tender to palpation. No accessory muscle use or intercostal retractions  RESPIRATORY:  Lung sounds - fine crackles right base   CARDIOVASCULAR:     Heart Inspection- shows no noted pulsations  Heart Palpation- no heaves or thrills; PMI is non-displaced   Heart Ausculation- Regular rate and rhythm, no murmur. No s3, s4 or rub   PV: 1+ lower extremity edema. No varicosities. Pedal pulses palpable, no clubbing or cyanosis   ABDOMEN: Soft, non-tender to light palpation. Bowel sounds present. No palpable masses no organomegaly; no abdominal bruit  MS: Good muscle strength and tone. No atrophy or abnormal movements. : Deferred. Emery cath  SKIN: Warm and dry no statis dermatitis or ulcers   NEURO / PSYCH: Oriented to person, place and time. Speech clear and appropriate. Follows all commands.  Pleasant affect       Intake/Output Summary (Last 24 hours) at 12/16/2021 0862  Last data filed at 12/16/2021 0500  Gross per 24 hour   Intake 339 ml   Output 1240 ml   Net -901 ml       Weight:   Wt Readings from Last 3 Encounters:   12/14/21 189 lb 9.5 oz (86 kg)   12/12/21 168 lb (76.2 kg)   11/16/21 174 lb 9.6 oz (79.2 kg)     Current Inpatient Medications:   sacubitril-valsartan  1 tablet Oral BID    isosorbide mononitrate  30 mg Oral Daily    amiodarone  400 mg Oral BID    furosemide  20 mg Oral Daily    spironolactone  25 mg Oral Daily    polyethylene glycol  17 g Oral Daily    metoprolol succinate  25 mg Oral Daily    sodium chloride flush  5-40 mL IntraVENous 2 times per day    rosuvastatin  40 mg Oral Nightly    aspirin  81 mg Oral Daily    ticagrelor  90 mg Oral BID    heparin (porcine)  5,000 Units SubCUTAneous Q8H    famotidine (PEPCID) injection  20 mg IntraVENous BID       IV Infusions (if any):   sodium chloride         DIAGNOSTIC/ LABORATORY DATA:  Labs:   CBC:   Recent Labs     12/15/21  0535 12/16/21  0545   WBC 10.2 21.9*   HGB 8.3* 7.9*   HCT 25.0* 23.2*   PLT 88* 102*     BMP:   Recent Labs     12/15/21  0535 12/16/21  0545    135   K 4.0 4.0   CO2 22 20*   BUN 19 16   CREATININE 0.7 0.7   LABGLOM >60 >60   CALCIUM 8.5* 8.5*     Mag:   Recent Labs     12/15/21  0535 12/16/21  0545   MG 1.9 2.1     Phos:   Recent Labs     12/15/21  0535 12/16/21  0545   PHOS 2.5 2.7     TFT:   Lab Results   Component Value Date    TSH 3.820 12/12/2021      HgA1c:   Lab Results   Component Value Date    LABA1C 4.9 12/13/2021     FASTING LIPID PANEL:  Lab Results   Component Value Date    CHOL 100 12/13/2021    HDL 39 12/13/2021    LDLCALC 49 12/13/2021    TRIG 61 12/13/2021     LIVER PROFILE:  Recent Labs     12/15/21  0535 12/16/21  0545   AST 57* 42*   ALT 44* 38   LABALBU 3.6 3.4*       CXR 12/12/21:  Endotracheal tube in good position.   NG tube in the stomach.   Right internal jugular central venous catheter tip in the SVC.   No pneumothorax on the right on the left.   A perihilar vascular congestion with cardiomegaly observed.   Cannot exclude discrete infiltrates in the bases particular on the left side. CXR 12/13/21:   Esophagogastric tube with side port in the region of the distal esophagus. Recommend repositioning.  Additional support devices as above.   Interval development of patchy bilateral airspace opacities, concerning for pneumonia versus aspiration. CXR 1/15/21:  Improved aeration of the right lung compared to prior study.   The ET tube and enteric tubes have been removed. Stable positioning of rightinternal jugular line. ECG 12/12/21 19:41  Sinus rhythm with 1st degree AV block with premature atrial complexes  Cannot rule out Inferior infarct (cited on or before 12-DEC-2021)  Abnormal ECG    Cardiac Catheterization 12/12/2021 [facility])   CONCLUSIONS:  1. Coronary artery disease. a. LEFT MAIN:  10% distal vessel tapering.  b.  LAD:  30% eccentric proximal vessel narrowing and 80% to 90% very  proximal eccentric bifurcation lesion at the site of takeoff of a large  diagonal branch. 90% ostial stenosis and 70% to 80% proximal stenosis  of a moderate-sized second diagonal branch. 90% ostial stenosis of a  small third diagonal branch. 50% disease of the LAD after the second  diagonal branch. 60% disease of the LAD after the third diagonal  branch. Long up to 60% to 70% disease of the apical LAD before the  mustache.  c.  LCX:  Basically a large lateral branch with 70% to 80% proximal  vessel disease and 80% mid vessel disease. d.  RCA:  Dominant vessel with 40% mid vessel narrowing and 70% ostial  narrowing of the largest subdivision of the posterolateral branch.   2.  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 3.  Hemodynamically significant proximal LAD disease with pressure wire evaluation yielding an IFR of 0.70 and 0.74  4. Successful balloon angioplasty with deployment of drug-eluting coronary stent to the proximal LAD and balloon angioplasty to the ostium of the diagonal branch (bifurcation lesion; double wire; kissingballoons) with very good results. Echo12/12/21:  Summary   Micro-bubble contrast injected ( to try to define an anomaly seen on the  LV gram ) --> could not be defined --> consider cardiac MRI ( once / if patient recovers / is stable ). The left ventricle is dilated. The inferior wall, the inferolateral wall, the basal lateral wall and the   posterior wall are thin, scarred and akinetic. The right ventricle is dilated. Right ventricle global systolic function is mildly reduced ( TAPSE = 1.6   ). The left atrium is mildly dilated. The right atrium is moderately dilated. Focal calcification mitral valve leaflets. The mitral valve leaflets are restricted in mobility   Mitral annular calcification is present. Mitral stenosis is present ( peak / mean gradient 17.66 / 5.24 mmHg; area 1.0 cm2 ). Moderate to Severe mitral regurgitation. Trace aortic regurgitation. Mild tricuspid regurgitation. Moderate to severe pulmonary hypertension. Pro BNP 12/15/21: 20,550    Telemetry: Afib controlled rate ( 80's )    ASSESSMENT:   1. Pulseless VT Arrest s/p CPR. EP following. Started on Amiodarone; ICD recommended for secondary prevention   2. Hx ventricular arrhythmias on Holter 9/2021 (CC)  3. Known CAD. Hx Inferior MI. S/p [facility] 12/12/0221 with RYAN- proximal LAD and kissing balloon angioplasty to diagonal branch ([facility] 12/12/2021)  4. Ischemic cardiomyopathy with moderate to severe LV systolic dysfunction with EF estimated at 35+/-5% on Echo 12/12/21  5. Acute on chronic HFrEFW  6. Moderate to severe mitral regurgitation  7.  Moderate to severe pulmonary HTN  8. Respiratory failure secondary to # 1, resolved. extubated 12/13/2021  9. Acute blood loss anemia, Hgb 12.4--> 8.0 --> 8.3 post trasnfusion --> 7.9 today ( 12/16/21 ). Surgery plan for EGD today. Hx GIB in 2018  10. New onset atrial fibrillation, noted 12/14/2021. EP following-- [address] held with drop in Hgb   11. Leukocytosis, worse, afebrile   12. Mild thrombocytopenia  13. Hypertension   14. H/o HLD  15. Ex smoker quit in 2003  16. Psoriatic arthritis on Methotrexate and Plaquenil  17. IV dye allergy  18. S/p Gastric bypass for morbid obesity in 2005  19. Hx BETTIE C-pap non longer needed after weight loss  20. Depression  21. Bilateral carpal tunnel surgery  22. Chronic back/cervical pain   23. Mild transaminitis, improving. PLAN:  1. Will give one dose of IV lasix today and increase PO lasix to 40 mg daily as of tomorrow  2. Rest of cardiac medications same for now. 3. Monitor H&H, consider transfusion  4. ? For EGD today ( per nurse )  5. Consider JIMBO / cardiac MRI ( in the near future ) for further evaluation of anomaly seen on LV gram  6. Rest as per the critical care team, the primary service and other consultants  7.  Will follow    Electronically signed by Anastacia Brownlee MD on 12/16/2021 at 8:49 AM

## 2021-12-16 NOTE — PROGRESS NOTES
Comprehensive Nutrition Assessment    Type and Reason for Visit:  Initial (LOS ICU)    Nutrition Recommendations/Plan: Continue current diet, Start Ensure BID    Nutrition Assessment:  Pt admit 2/2 NSTEMI, CAD s/p LHC, PCI/stent. Noted anemia s/p EGD. Noted hx RYGB, CHF. Will add ONS and continue to monitor. Malnutrition Assessment:  Malnutrition Status: At risk for malnutrition (Comment)    Context:  Acute Illness     Findings of the 6 clinical characteristics of malnutrition:  Energy Intake:  Mild decrease in energy intake (Comment)  Weight Loss:  No significant weight loss     Body Fat Loss:  No significant body fat loss     Muscle Mass Loss:  No significant muscle mass loss    Fluid Accumulation:  No significant fluid accumulation     Strength:  Not Performed    Estimated Daily Nutrient Needs:  Energy (kcal):  MSJ 1499 x 1.2 SF = 6216-3203; Weight Used for Energy Requirements:  Current     Protein (g):  85-95; Weight Used for Protein Requirements:  Ideal (1.3-1.5)        Fluid (ml/day):  per critical care; Method Used for Fluid Requirements:         Nutrition Related Findings:  pt alert, active BS, soft abd, +2 edema, +I/Os, bili 1.7      Wounds:  None       Current Nutrition Therapies:    ADULT DIET; Easy to Chew    Anthropometric Measures:  · Height: 5' 6\" (167.6 cm)  · Current Body Weight: 181 lb (82.1 kg) (12/2 admit wt as CBW elevated)   · Admission Body Weight: 181 lb (82.1 kg) (12/2 bed)    · Usual Body Weight: 199 lb (90.3 kg) (5/2021 actual per EMR)     · Ideal Body Weight: 142 lbs; % Ideal Body Weight 127.5 %   · BMI: 29.2  · BMI Categories: Overweight (BMI 25.0-29. 9)       Nutrition Diagnosis:   · Inadequate oral intake related to cardiac dysfunction as evidenced by intake 0-25%    Nutrition Interventions:   Food and/or Nutrient Delivery:  Continue Current Diet, Start Oral Nutrition Supplement  Nutrition Education/Counseling:  Education not appropriate   Coordination of Nutrition Care: Continue to monitor while inpatient    Goals:  pt to consume >50% meals/ONS       Nutrition Monitoring and Evaluation:   Food/Nutrient Intake Outcomes:  Food and Nutrient Intake, Supplement Intake  Physical Signs/Symptoms Outcomes:  Biochemical Data, Chewing or Swallowing, GI Status, Fluid Status or Edema, Hemodynamic Status, Nutrition Focused Physical Findings, Skin, Weight     Discharge Planning:     Too soon to determine     Electronically signed by Shira Greco MS, RD, LD on 12/16/21 at 3:07 PM EST    Contact: [phone]

## 2021-12-16 NOTE — CONSULTS
Department of Internal Medicine  Infectious Diseases   Consult Note      Reason for Consult: Leukocytosis , ID clearance for ICD  insertion       Requesting Physician:  Dr Cherry Coleman:                This is a 77 yrs old male with hx of CAD , HTN, hyperlipidemia, psoriatic arthritis presented to the ER on 12/12 with weakness, lethargy , diaphoresis - pt was found to be in V Tachy in the ER - coded, ROSC achieved - he was transferred to [facility] for emergent catheterization for STEMI . He was intubated, then extubated now . He denied fever, chills , cough, chest pain . WBC up to 21 K . He underwent EGD for anemia .       Past Medical History:      Past Medical History:   Diagnosis Date    CAD (coronary artery disease)     s/p stent RCA    [place] (hard of hearing)     WEARS HEARING AIDES    Hypertension     Left ventricular systolic dysfunction     MI (myocardial infarction) ([address] 1997    Mitral regurgitation     Obesity, morbid ([address]     s/p intestinal bypass    Sleep apnea     Valvular heart disease          Past Surgical History:      Past Surgical History:   Procedure Laterality Date    APPENDECTOMY      BACK SURGERY      CARPAL TUNNEL RELEASE Bilateral     CORONARY ANGIOPLASTY  7/7/97    PTCA/stent of the RCA     CORONARY ANGIOPLASTY WITH STENT PLACEMENT  12/12/2021    3.5 x 15 Resolute Luis to the prox LAD by Dr. Antonio Bob CATH LAB PROCEDURE  2/24/97    DIAGNOSTIC CARDIAC CATH LAB PROCEDURE  7/7/97    EYE SURGERY Bilateral     cataract removal w lense implants    FRACTURE SURGERY      Clayton in left femur    HIP SURGERY  2014    fx, clayton placed    INTESTINAL BYPASS      JOINT REPLACEMENT      TONSILLECTOMY      UPPER GASTROINTESTINAL ENDOSCOPY N/A 10/8/2018    EGD CONTROL HEMORRHAGE at bedside performed by Ty Robert MD at [facility] ENDOSCOPY       Current Medications:      Current Facility-Administered Medications   Medication Dose Route Frequency Provider Last Rate Last Admin    [START ON 12/17/2021] furosemide (LASIX) tablet 40 mg  40 mg Oral Daily Jae Florian MD        labetalol (NORMODYNE;TRANDATE) injection 10 mg  10 mg IntraVENous Q30 Min PRN Bishop Rain MD   10 mg at 12/16/21 0110    hydrALAZINE (APRESOLINE) injection 10 mg  10 mg IntraVENous Q30 Min PRN Bishop Rain MD        sacubitril-valsartan (ENTRESTO) 49-51 MG per tablet 1 tablet  1 tablet Oral BID Jae Florian MD   1 tablet at 12/16/21 6534    isosorbide mononitrate (IMDUR) extended release tablet 30 mg  30 mg Oral Daily Jae Florian MD   30 mg at 12/16/21 0831    amiodarone (CORDARONE) tablet 400 mg  400 mg Oral BID KAYE Patel CNP   400 mg at 12/16/21 0831    spironolactone (ALDACTONE) tablet 25 mg  25 mg Oral Daily Jae Florian MD   25 mg at 12/16/21 0831    polyethylene glycol (GLYCOLAX) packet 17 g  17 g Oral Daily Ryanne Noguera DO   17 g at 12/15/21 0751    metoprolol succinate (TOPROL XL) extended release tablet 25 mg  25 mg Oral Daily Jae Florian MD   25 mg at 12/16/21 0831    sodium chloride flush 0.9 % injection 5-40 mL  5-40 mL IntraVENous 2 times per day Jae Florian MD   10 mL at 12/16/21 1023    sodium chloride flush 0.9 % injection 5-40 mL  5-40 mL IntraVENous PRN Jae Florian MD   10 mL at 12/13/21 1649    0.9 % sodium chloride infusion  25 mL IntraVENous PRN Jae Florian MD        acetaminophen (TYLENOL) tablet 650 mg  650 mg Oral Q4H PRN Jae Florian MD   650 mg at 12/15/21 1719    ondansetron (ZOFRAN) injection 4 mg  4 mg IntraVENous Q6H PRN Jae Florian MD        rosuvastatin (CRESTOR) tablet 40 mg  40 mg Oral Nightly Jae Florian MD   40 mg at 12/15/21 2142    aspirin chewable tablet 81 mg  81 mg Oral Daily Jae Florian MD   81 mg at 12/16/21 0831    ticagrelor (BRILINTA) tablet 90 mg  90 mg Oral BID [place] MD Anival   90 mg at 12/16/21 0831    heparin (porcine) injection 5,000 Units  5,000 Units SubCUTAneous Q8H Kobe Bowman MD   5,000 Units at 21 5119    famotidine (PEPCID) injection 20 mg  20 mg IntraVENous BID Sheila Eubankssukhdeepher    20 mg at 21 2569       Allergies:  Dye [iodides] and Dust mite extract    Social History:      Social History     Socioeconomic History    Marital status:      Spouse name: Not on file    Number of children: Not on file    Years of education: Not on file    Highest education level: Not on file   Occupational History    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 [employer]   Tobacco Use    Smoking status: Former Smoker     Packs/day: 0.50     Types: Cigarettes     Start date: 10/8/1972     Quit date: 2001     Years since quittin.9    Smokeless tobacco: Never Used   Vaping Use    Vaping Use: Never used   Substance and Sexual Activity    Alcohol use: Yes     Alcohol/week: 28.0 standard drinks     Types: 28 Glasses of wine per week     Comment: SOCIALLY    Drug use: No    Sexual activity: Not Currently     Partners: Female   Other Topics Concern    Not on file   Social History Narrative    Not on file     Social Determinants of Health     Financial Resource Strain:     Difficulty of Paying Living Expenses: Not on file   Food Insecurity:     Worried About Running Out of Food in the Last Year: Not on file    Graciela of Food in the Last Year: Not on file   Transportation Needs:     Lack of Transportation (Medical): Not on file    Lack of Transportation (Non-Medical):  Not on file   Physical Activity:     Days of Exercise per Week: Not on file    Minutes of Exercise per Session: Not on file   Stress:     Feeling of Stress : Not on file   Social Connections:     Frequency of Communication with Friends and Family: Not on file    Frequency of Social Gatherings with Friends and Family: Not on file    Attends Buddhist Services: Not on file    Active Member of Clubs or Organizations: Not on file    Attends Club or Organization Meetings: Not on file    Marital Status: Not on file   Intimate Partner Violence:     Fear of Current or Ex-Partner: Not on file    Emotionally Abused: Not on file    Physically Abused: Not on file    Sexually Abused: Not on file   Housing Stability:     Unable to Pay for Housing in the Last Year: Not on file    Number of [place] in the Last Year: Not on file    Unstable Housing in the Last Year: Not on file         Family History:     Family History   Problem Relation Age of Onset    Hypertension Mother     Hypertension Father     Other Sister         back problems    Heart Disease Brother     Other Sister         MS    Other Brother         aneurysm CLOSE TO HIS HEART       REVIEW OF SYSTEMS:    CONSTITUTIONAL:  Denies fever, chill or rigors  HEENT: denies blurring of vision or double vision, denies hearing problem  RESPIRATORY: denies cough, shortness of breath, sputum expectoration. CARDIOVASCULAR:  Denies palpitation  GASTROINTESTINAL:  Denies abdomen pain, diarrhea or constipation. GENITOURINARY:  Denies burning urination or frequency of urination  INTEGUMENT: Ecchymosis   HEMATOLOGIC/LYMPHATIC:  Denies lymph node swelling, gum bleeding or easy bruising. MUSCULOSKELETAL:  Denies leg pain , joint pain , joint swelling  NEUROLOGICAL:  Weakness       PHYSICAL EXAM:      Vitals:     /80   Pulse 84   Temp 98.2 °F (36.8 °C)   Resp 16   Ht 5' 6\" (1.676 m)   Wt 189 lb 9.5 oz (86 kg)   SpO2 100%   BMI 30.60 kg/m²     General Appearance:    Awake, alert , no acute distress. Head:    Normocephalic, atraumatic   Eyes:    No pallor, no icterus,   Ears:    No obvious deformity or drainage.    Nose:   No nasal drainage   Throat:   Mucosa moist, no oral thrush   Neck:   Supple, no lymphadenopathy   Back:     no CVA tenderness   Lungs:     Clear to auscultation bilaterally    Heart:    Irregular    Abdomen:     Soft, non-tender, bowel sounds present    Extremities:   No edema, non tender    Pulses: Dorsalis pedis palpable    Skin:   Right groin ecchymosis        CBC with Differential:      Lab Results   Component Value Date    WBC 21.9 12/16/2021    RBC 2.69 12/16/2021    HGB 7.9 12/16/2021    HCT 23.2 12/16/2021     12/16/2021    MCV 86.2 12/16/2021    MCH 29.4 12/16/2021    MCHC 34.1 12/16/2021    RDW 15.7 12/16/2021    NRBC 0.0 10/11/2018    LYMPHOPCT 4.4 12/12/2021    MONOPCT 2.6 12/12/2021    BASOPCT 0.2 12/12/2021    MONOSABS 0.25 12/12/2021    LYMPHSABS 0.33 12/12/2021    EOSABS 0.07 12/12/2021    BASOSABS 0.00 12/12/2021       CMP     Lab Results   Component Value Date     12/16/2021    K 4.0 12/16/2021     12/16/2021    CO2 20 12/16/2021    BUN 16 12/16/2021    CREATININE 0.7 12/16/2021    GFRAA >60 12/16/2021    LABGLOM >60 12/16/2021    GLUCOSE 105 12/16/2021    PROT 5.8 12/16/2021    LABALBU 3.4 12/16/2021    CALCIUM 8.5 12/16/2021    BILITOT 1.7 12/16/2021    ALKPHOS 72 12/16/2021    AST 42 12/16/2021    ALT 38 12/16/2021         Hepatic Function Panel:    Lab Results   Component Value Date    ALKPHOS 72 12/16/2021    ALT 38 12/16/2021    AST 42 12/16/2021    PROT 5.8 12/16/2021    BILITOT 1.7 12/16/2021    LABALBU 3.4 12/16/2021       PT/INR:    Lab Results   Component Value Date    PROTIME 13.4 10/08/2018    INR 1.2 10/08/2018       TSH:    Lab Results   Component Value Date    TSH 3.820 12/12/2021       U/A:  No results found for: NITRITE, COLORU, PHUR, LABCAST, WBCUA, RBCUA, MUCUS, TRICHOMONAS, YEAST, BACTERIA, CLARITYU, SPECGRAV, LEUKOCYTESUR, UROBILINOGEN, BILIRUBINUR, BLOODU, GLUCOSEU, AMORPHOUS    ABG:  No results found for: FFX8TGO, BEART, X2IPCUNG, PHART, THGBART, RQB4TNA, PO2ART, UPE1JRP    MICROBIOLOGY:    Blood culture - pending     SARS CoV2 negative       Radiology :    Chest X ray- bilateral patchy opacities     IMPRESSION:     1. STEMI s/p PCI / stent   2. Right groin hematoma, reactive leukocytosis     RECOMMENDATIONS:     1.  Blood cx X 2 , CBC with diff , off abx for now     Thank you Dr Suzy Hoffmann for the consult

## 2021-12-16 NOTE — ANESTHESIA PRE PROCEDURE
Department of Anesthesiology  Preprocedure Note       Name:  Alyssa Cheatham   Age:  77 y.o.  :  1945                                          MRN:  [id number]         Date:  2021      Surgeon: Damián Blanton):  Earmon Councilman, MD    Procedure: Procedure(s):  EGD ESOPHAGOGASTRODUODENOSCOPY    Medications prior to admission:   Prior to Admission medications    Medication Sig Start Date End Date Taking?  Authorizing Provider   ENTRESTO  MG per tablet TAKE ONE TABLET BY MOUTH TWO TIMES A DAY 11/15/21   Odette Rodríguez MD   Cetirizine HCl (ZYRTEC ALLERGY PO) Take by mouth daily    Historical Provider, MD   simvastatin (ZOCOR) 20 MG tablet 20 mg  5/3/21   Historical Provider, MD   Acetaminophen (TYLENOL ARTHRITIS PAIN PO) Take by mouth daily    Historical Provider, MD   azelastine HCl 0.15 % SOLN 2 sprays by Nasal route daily    Historical Provider, MD   risperiDONE (RISPERDAL) 1 MG tablet Take 1 mg by mouth daily     Historical Provider, MD   Respiratory Therapy Supplies MONAE 1 Device by Does not apply route every 3 hours as needed (increase pressure to 12)  Patient taking differently: 1 Device by Does not apply route every 3 hours as needed (increase pressure to 12) C-PaP EVERY NIGHT 20   Gómez Oden MD   Ferrous Sulfate (IRON) 325 (65 Fe) MG TABS daily  19   Historical Provider, MD   pantoprazole (PROTONIX) 40 MG tablet TAKE 1 TABLET BY MOUTH ONCE DAILY 19   Historical Provider, MD   Cranberry 500 MG CAPS Take 500 mg by mouth    Historical Provider, MD   gabapentin (NEURONTIN) 300 MG capsule Take two capsules three times daily 18   Historical Provider, MD   aspirin 81 MG tablet Take 81 mg by mouth daily    Historical Provider, MD   furosemide (LASIX) 20 MG tablet Take 1 tablet by mouth daily 18   MORGAN Reid   metoprolol succinate (TOPROL XL) 50 MG extended release tablet Take 1 tablet by mouth 2 times daily 10/15/18   Kailee Estrella DO   spironolactone (ALDACTONE) 25 MG tablet Take 1 tablet by mouth daily 10/16/18   Amy Perry DO   methotrexate (RHEUMATREX) 2.5 MG chemo tablet Take 7.5 mg by mouth once a week Indications: Thursday     Historical Provider, MD   folic acid (FOLVITE) 1 MG tablet Take 1 mg by mouth every morning     Historical Provider, MD   hydroxychloroquine (PLAQUENIL) 200 MG tablet Take 200 mg by mouth 2 times daily     Historical Provider, MD   DULoxetine (CYMBALTA) 60 MG extended release capsule Take 60 mg by mouth every morning     Historical Provider, MD   Multiple Vitamins-Minerals (MULTIVITAMIN ADULT PO) Take 1 tablet by mouth 2 times daily     Historical Provider, MD   Cholecalciferol (VITAMIN D3) 5000 UNITS TABS Take 1 tablet by mouth every morning     Historical Provider, MD   tamsulosin (FLOMAX) 0.4 MG capsule Take 0.4 mg by mouth daily  8/26/14   Historical Provider, MD   RESTASIS 0.05 % ophthalmic emulsion Place 1 drop into both eyes 2 times daily as needed  10/21/13   Historical Provider, MD       Current medications:    Current Facility-Administered Medications   Medication Dose Route Frequency Provider Last Rate Last Admin    labetalol (NORMODYNE;TRANDATE) injection 10 mg  10 mg IntraVENous Q30 Min PRN Forrest Alfred MD   10 mg at 12/16/21 0110    hydrALAZINE (APRESOLINE) injection 10 mg  10 mg IntraVENous Q30 Min PRN Forrest Alfred MD        sacubitril-valsartan (ENTRESTO) 49-51 MG per tablet 1 tablet  1 tablet Oral BID Piter Falcon MD   1 tablet at 12/16/21 4329    isosorbide mononitrate (IMDUR) extended release tablet 30 mg  30 mg Oral Daily Piter Falcon MD   30 mg at 12/16/21 0831    amiodarone (CORDARONE) tablet 400 mg  400 mg Oral BID Kathia Due, APRN - CNP   400 mg at 12/16/21 0831    furosemide (LASIX) tablet 20 mg  20 mg Oral Daily Piter Falcon MD   20 mg at 12/16/21 0831    spironolactone (ALDACTONE) tablet 25 mg  25 mg Oral Daily Piter Falcon MD   25 mg at 12/16/21 0831    polyethylene glycol (GLYCOLAX) packet 17 g  17 g Oral Daily Melvin Noguera, DO   17 g at 12/15/21 0751    metoprolol succinate (TOPROL XL) extended release tablet 25 mg  25 mg Oral Daily Shahriar Posada MD   25 mg at 12/16/21 0831    sodium chloride flush 0.9 % injection 5-40 mL  5-40 mL IntraVENous 2 times per day Shahriar Posada MD   10 mL at 12/15/21 2142    sodium chloride flush 0.9 % injection 5-40 mL  5-40 mL IntraVENous PRN Shahriar Posada MD   10 mL at 12/13/21 1649    0.9 % sodium chloride infusion  25 mL IntraVENous PRN Shahriar Posada MD        acetaminophen (TYLENOL) tablet 650 mg  650 mg Oral Q4H PRN Shahriar Posada MD   650 mg at 12/15/21 1719    ondansetron (ZOFRAN) injection 4 mg  4 mg IntraVENous Q6H PRN Shahriar Posada MD        rosuvastatin (CRESTOR) tablet 40 mg  40 mg Oral Nightly Shahriar Posada MD   40 mg at 12/15/21 2142    aspirin chewable tablet 81 mg  81 mg Oral Daily Shahriar Posada MD   81 mg at 12/16/21 0831    ticagrelor (BRILINTA) tablet 90 mg  90 mg Oral BID Shahriar Posada MD   90 mg at 12/16/21 0831    heparin (porcine) injection 5,000 Units  5,000 Units SubCUTAneous Q8H Shahriar Posada MD   5,000 Units at 12/16/21 4619    famotidine (PEPCID) injection 20 mg  20 mg IntraVENous BID Sehila E Kaercher, DO   20 mg at 12/16/21 0831       Allergies:     Allergies   Allergen Reactions    Dye [Iodides] Anaphylaxis     Passing out    Dust Mite Extract Other (See Comments)     All year around       Problem List:    Patient Active Problem List   Diagnosis Code    CAD (coronary artery disease)s/p stent RCA I25.10    Valvular heart disease I38    Hypertension I10    Obesity, morbid s/p intestinal bypass E66.01    STEMI (ST elevation myocardial infarction) [address] I21.3    Stented coronary artery Z95.5    Hyperlipidemia E78.5    H/O psoriatic arthritis Z87.2    Acute upper GI bleed K92.2    SVT (supraventricular tachycardia) (HCC) I47.1    LV dysfunction I51.9    Cardiac arrest ([facility]) I46.9    Acute systolic (congestive) heart failure (HCC) I50.21    Ventricular tachycardia (HCC) I47.2    Mitral stenosis I05.0    Mitral regurgitation I34.0    Moderate to severe pulmonary hypertension (HCC) I27.20    Anemia D64.9       Past Medical History:        Diagnosis Date    CAD (coronary artery disease)     s/p stent RCA    [place] (hard of hearing)     WEARS HEARING AIDES    Hypertension     Left ventricular systolic dysfunction     [facility]ocardial infarction) [address]     Mitral regurgitation     Obesity, morbid ([address]     s/p intestinal bypass    Sleep apnea     Valvular heart disease        Past Surgical History:        Procedure Laterality Date    APPENDECTOMY      BACK SURGERY      CARPAL TUNNEL RELEASE Bilateral     CORONARY ANGIOPLASTY  97    PTCA/stent of the RCA     CORONARY ANGIOPLASTY WITH STENT PLACEMENT  2021    3.5 x 15 Resolute Luis to the prox LAD by Dr. Erin Doherty CATH LAB PROCEDURE  97    DIAGNOSTIC CARDIAC CATH LAB PROCEDURE  97    EYE SURGERY Bilateral     cataract removal w lense implants    FRACTURE SURGERY      Clayton in left femur    HIP SURGERY      fx, clayton placed    INTESTINAL BYPASS      JOINT REPLACEMENT      TONSILLECTOMY      UPPER GASTROINTESTINAL ENDOSCOPY N/A 10/8/2018    EGD CONTROL HEMORRHAGE at bedside performed by Óscar Camacho MD at [address] [place] [address] Crossing History:    Social History     Tobacco Use    Smoking status: Former Smoker     Packs/day: 0.50     Types: Cigarettes     Start date: 10/8/1972     Quit date: 2001     Years since quittin.9    Smokeless tobacco: Never Used   Substance Use Topics    Alcohol use:  Yes     Alcohol/week: 28.0 standard drinks     Types: 28 Glasses of wine per week     Comment: SOCIALLY                                Counseling given: Not Answered      Vital Signs (Current):   Vitals:    21 0500 21 0600 21 0700 21 0800   BP: (!) 146/81 (!) 134/91 (!) 136/91    Pulse: 104 83 106    Resp: 9 15 10    Temp:    36.8 °C (98.2 °F)   TempSrc:       SpO2: 100% 100% 100%    Weight:       Height:                                                  BP Readings from Last 3 Encounters:   12/16/21 (!) 136/91   12/12/21 (!) 103/59   11/16/21 108/60       NPO Status:  1200                                                                               BMI:   Wt Readings from Last 3 Encounters:   12/14/21 189 lb 9.5 oz (86 kg)   12/12/21 168 lb (76.2 kg)   11/16/21 174 lb 9.6 oz (79.2 kg)     Body mass index is 30.6 kg/m². CBC:   Lab Results   Component Value Date    WBC 21.9 12/16/2021    RBC 2.69 12/16/2021    HGB 7.9 12/16/2021    HCT 23.2 12/16/2021    MCV 86.2 12/16/2021    RDW 15.7 12/16/2021     12/16/2021       CMP:   Lab Results   Component Value Date     12/16/2021    K 4.0 12/16/2021     12/16/2021    CO2 20 12/16/2021    BUN 16 12/16/2021    CREATININE 0.7 12/16/2021    GFRAA >60 12/16/2021    LABGLOM >60 12/16/2021    GLUCOSE 105 12/16/2021    PROT 5.8 12/16/2021    CALCIUM 8.5 12/16/2021    BILITOT 1.7 12/16/2021    ALKPHOS 72 12/16/2021    AST 42 12/16/2021    ALT 38 12/16/2021       POC Tests: No results for input(s): POCGLU, POCNA, POCK, POCCL, POCBUN, POCHEMO, POCHCT in the last 72 hours.     Coags:   Lab Results   Component Value Date    PROTIME 13.4 10/08/2018    INR 1.2 10/08/2018    APTT 25.8 10/08/2018       HCG (If Applicable): No results found for: PREGTESTUR, PREGSERUM, HCG, HCGQUANT     ABGs: No results found for: PHART, PO2ART, ICI1SUP, ERY2JHM, BEART, W4HVUZCR     Type & Screen (If Applicable):  No results found for: LABABO, LABRH    Drug/Infectious Status (If Applicable):  No results found for: HIV, HEPCAB    COVID-19 Screening (If Applicable):   Lab Results   Component Value Date    COVID19 Not Detected 12/12/2021           Anesthesia Evaluation  Patient summary reviewed and Nursing notes reviewed no history of anesthetic complications:   Airway: Mallampati: II  TM distance: >3 FB   Neck ROM: full  Mouth opening: > = 3 FB Dental:          Pulmonary:normal exam    (+) sleep apnea:                             Cardiovascular:    (+) hypertension:, valvular problems/murmurs: MR, past MI: < 1 month, CAD:, CABG/stent:, dysrhythmias: SVT, CHF:,          Beta Blocker:  Dose within 24 Hrs      ROS comment: PTCA 12/12/21    pulm htn     Neuro/Psych:   Negative Neuro/Psych ROS              GI/Hepatic/Renal:            ROS comment: S/p gastric bypass  . Endo/Other:    (+) blood dyscrasia: anemia:., .                 Abdominal:             Vascular: negative vascular ROS. Other Findings:           Anesthesia Plan      MAC     ASA 4       Induction: intravenous. Anesthetic plan and risks discussed with patient. Plan discussed with CRNA and surgical team.                KAYE Brown - CRNA   12/16/2021        DOS STAFF ADDENDUM:    Pt seen and examined, physical exam updated, chart reviewed including anesthesia, drug and allergy history. H&P reviewed. No interval changes to history or physical examination (unless noted above). NPO status confirmed. Anesthetic plan, risks, benefits, alternatives discussed with patient. Patient verbalized an understanding and agrees to proceed.      Aime Hodges MD   Anesthesiologist

## 2021-12-16 NOTE — CARE COORDINATION
Egd done today with no evidence of ugi bleed. Per EP , will need dual chamber icd prior to discharge, but it is currently delayed d/t Leukocytosis. ID consulted. Dc plan is to return home when stable.

## 2021-12-16 NOTE — PROGRESS NOTES
H/O psoriatic arthritis    Cardiac arrest (HCC)    Acute systolic (congestive) heart failure (HCC)    Ventricular tachycardia (HCC)    Mitral stenosis    Mitral regurgitation    Moderate to severe pulmonary hypertension (HCC)    Anemia  Resolved Problems:    * No resolved hospital problems.  *       EGD today due to anemia    Amio    ICD in near future    DAPT, statin    Continue BB, entresto, spirono    DVT prophylaxis: UFH  Requires continued inpatient level of care     Titi Calderon MD    4:35 PM  12/16/2021

## 2021-12-16 NOTE — PROGRESS NOTES
Cardiac Electrophysiology Inpatient Progress Note    Ricarda Mckeon  1945  Date of Service: 12/16/2021  PCP: Zainab Martinez MD  Cardiologist: Cal Mcclendon MD   Primary/Attending Electrophysiologist: Geoffrey Rodriges MD         Subjective: Ricarda Mckeon is seen in hospital follow-up and management of: VT     Initial Hospital consult (12/13/21):  77year old male with known CAD, STEMI 1997 with PCI to the RCA,   LVEF 45-50% on TTE 03/30/17, 35-40% on TTE 10/09/18, LVEF of 29% on a Lexiscan stress test 07/01/20. Other hx includes HTN, HLD, obesity S/p gastric bypass, Carpal tunnel surgery. At baseline, he is on Entresto, spironolactone, Metoprolol 50 mg bid  He presented to ED 12/12 with palpitation, weakness for one hour and was found to be in sustained VT monomorphic, Rate 190, LBBB, superior axis. He was given Lidocaine and while preparing to cardiovert patient, he had pulseless VT and CPR was started, he was intubated, defibrillated x1. There was a concern for inferior STEMI thus Cath lab was activated. Cath showed prox LAD 80-90%, mod D2 90% ostial, apical LAD 70%, diffuse Lcx 70-80% lesions, mild diffuse RCA tsenosis  LV gram showed possible LV aneurysm, mod MR and inferior akinesis, dilated LV. FFR of LAD proved that it was significant thus he had simultaneous RYAN to prox LAD and angioplasty to ostium Diagonal branch     He is intubated and awake, not really following commands. He is on Lidocaine 1mg/min, no pressors, metoprolol 25 mg bid    (12/14/21): He converted to AF with a controled ventricular rate overnight without recurrent VT, he remains on IV Lidocaine 0.5 mg/min. Today he is confused to his situation and agreeable. His count dropped from 12 to 8.0 this morning.     (12/15/21): He has improved cognition today and is in sinus, he awaits EGD today due to ongoing anemia with a PRBC transfusion on 12/14. No recurrent VT and tolerating amiodarone well.      (12/16/21): He had recurrent AF with RVR last night with rates into the 140's today his H/H continues to decline.      Patient Active Problem List   Diagnosis    CAD (coronary artery disease)s/p stent RCA    Valvular heart disease    Hypertension    Obesity, morbid s/p intestinal bypass    STEMI (ST elevation myocardial infarction) ([facility] [address]    Stented coronary artery    Hyperlipidemia    H/O psoriatic arthritis    Acute upper GI bleed    SVT (supraventricular tachycardia) (HCC)    LV dysfunction    Cardiac arrest ([facility] [address]    Acute systolic (congestive) heart failure (HCC)    Ventricular tachycardia (HCC)    Mitral stenosis    Mitral regurgitation    Moderate to severe pulmonary hypertension (HCC)    Anemia         Scheduled Medications:   [START ON 12/17/2021] furosemide  40 mg Oral Daily    sacubitril-valsartan  1 tablet Oral BID    isosorbide mononitrate  30 mg Oral Daily    amiodarone  400 mg Oral BID    spironolactone  25 mg Oral Daily    polyethylene glycol  17 g Oral Daily    metoprolol succinate  25 mg Oral Daily    sodium chloride flush  5-40 mL IntraVENous 2 times per day    rosuvastatin  40 mg Oral Nightly    aspirin  81 mg Oral Daily    ticagrelor  90 mg Oral BID    heparin (porcine)  5,000 Units SubCUTAneous Q8H    famotidine (PEPCID) injection  20 mg IntraVENous BID       Infusion Medications:   sodium chloride         PRN Medications:  labetalol, hydrALAZINE, sodium chloride flush, sodium chloride, acetaminophen, ondansetron    Allergies   Allergen Reactions    Dye [Iodides] Anaphylaxis     Passing out    Dust Mite Extract Other (See Comments)     All year around       [facility] Readings from Last 3 Encounters:   12/14/21 189 lb 9.5 oz (86 kg)   12/12/21 168 lb (76.2 kg)   11/16/21 174 lb 9.6 oz (79.2 kg)     Temp Readings from Last 3 Encounters:   12/16/21 98.2 °F (36.8 °C)   12/12/21 97.8 °F (36.6 °C) (Temporal)   10/12/21 97.4 °F (36.3 °C)     BP Readings from Last 3 Encounters:   12/16/21 119/74 12/16/21 115/72   12/12/21 (!) 103/59     Pulse Readings from Last 3 Encounters:   12/16/21 86   12/12/21 81   11/16/21 74         Intake/Output Summary (Last 24 hours) at 12/16/2021 1039  Last data filed at 12/16/2021 0934  Gross per 24 hour   Intake 489 ml   Output 1170 ml   Net -681 ml       ROS:    Constitutional: Negative for fever, activity change and appetite change. HENT: Negative for epistaxis, difficulty swallowing. Eyes: Negative for blurred vision or double vision. Respiratory: Negative for cough, chest tightness, shortness of breath and wheezing. Cardiovascular: Negative for chest pain, palpitations and leg swelling. Gastrointestinal: Negative for abdominal pain, heartburn, or blood in stool. Genitourinary: Negative for hematuria, burning or frequency. Musculoskeletal: Negative for myalgias, stiffness, or swelling. Skin: Negative for rash, pain, or lumps. Neurological: Negative for syncope, seizures, or headaches. Psychiatric/Behavioral: Negative for confusion and agitation. The patient is not nervous/anxious. PHYSICAL EXAM:  Vitals:    12/16/21 0700 12/16/21 0800 12/16/21 0900 12/16/21 0935   BP: (!) 136/91 (!) 144/84 (!) 137/92 119/74   Pulse: 106 93 81 86   Resp: 10 10 12 20   Temp:  98.2 °F (36.8 °C)     TempSrc:       SpO2: 100% 100% 100% 100%   Weight:       Height:         Constitutional: In NAD. improved cognition today   Eyes: Conjunctivae are normal.  Cardiovascular: S1, S2 positive, IRIR     Pulmonary/Chest: decreased BS   Abdominal: Soft. Normal appearance  Neurological: Awake and confused. Skin: Skin is warm and dry. Extremity: No clubbing or cyanosis.   Pertinent Labs:  CBC:   Recent Labs     12/14/21  0455 12/15/21  0535 12/16/21  0545   WBC 12.1* 10.2 21.9*   HGB 8.0* 8.3* 7.9*   HCT 24.1* 25.0* 23.2*   * 88* 102*     BMP:  Recent Labs     12/14/21  0455 12/15/21  0535 12/16/21  0545    138 135   K 4.1 4.0 4.0    105 101   CO2 20* 22 20* BUN 25* 19 16   CREATININE 1.1 0.7 0.7   CALCIUM 8.5* 8.5* 8.5*       TSH:   Lab Results   Component Value Date    TSH 3.820 12/12/2021      Lipid Profile:   Lab Results   Component Value Date    TRIG 61 12/13/2021    HDL 39 12/13/2021    LDLCALC 49 12/13/2021    CHOL 100 12/13/2021          Pertinent Cardiac Testing:     EKG (12/12/21): VT rate 190 bpm, Please see scan in Cardiology. EKG (12/12/21): NSR rate 76 bpm, QRS 98ms, QTc 490ms  Please see scan in Cardiology.     Echocardiogram (12/12/21): Micro-bubble contrast injected ( to try to define an anomaly seen on the   LV gram ) --> could not be defined --> consider cardiac MRI ( once / if   patient recovers / is stable ).   The left ventricle is dilated.   The inferior wall, the inferolateral wall, the basal lateral wall and the   posterior wall are thin, scarred and akinetic.   The right ventricle is dilated.   Right ventricle global systolic function is mildly reduced ( TAPSE = 1.6   ).   The left atrium is mildly dilated.   The right atrium is moderately dilated.   Focal calcification mitral valve leaflets.   The mitral valve leaflets are restricted in mobility   Mitral annular calcification is present.   Mitral stenosis is present ( peak / mean gradient 17.66 / 5.24 mmHg; area   1.0 cm2 ).  Moderate to Severe mitral regurgitation.   Trace aortic regurgitation.   Mild tricuspid regurgitation.   Moderate to severe pulmonary hypertension.     Cardiac Catheterization (12/12/21):   CONCLUSIONS:  1.  Coronary artery disease.   a.  LEFT MAIN:  10% distal vessel tapering.  b.  LAD:  30% eccentric proximal vessel narrowing and 80% to 90% very  proximal eccentric bifurcation lesion at the site of takeoff of a large  diagonal branch.  90% ostial stenosis and 70% to 80% proximal stenosis  of a moderate-sized second diagonal branch.  90% ostial stenosis of a  small third diagonal branch.  50% disease of the LAD after the second  diagonal branch.  60% disease of the LAD after the third diagonal  branch.  Long up to 60% to 70% disease of the apical LAD before the  mustache.  c.  LCX:  Basically a large lateral branch with 70% to 80% proximal  vessel disease and 80% mid vessel disease. d.  RCA:  Dominant vessel with 40% mid vessel narrowing and 70% ostial  narrowing of the largest subdivision of the posterolateral branch. 2.  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 3.  Hemodynamically significant proximal LAD disease with pressure wire  evaluation yielding an IFR of 0.70 and _____. 4.  Successful balloon angioplasty with deployment of drug-eluting  coronary stent to the proximal LAD and balloon angioplasty to the ostium  of the diagonal branch (bifurcation lesion; double wire; kissing  balloons) with very good results.     Stress Test (07/01/20):   1. ECG during the infusion did not change. 2. The myocardial perfusion imaging was abnormal. The abnormality was a a large sized fixed defect in the inferolateral wall suggestive of a prior MI  3. Overall left ventricular systolic function was abnormal with regional wall motion abnormalities. 4. High risk general pharmacologic stress test. Due to LV dysfunction.     Cardiac MRI (08/13/19):    Mildly dilated left ventricle (LVEDVi 125cc/m2) with a   mild-to-moderate reduction in systolic function (LVEF 38%).  There are   thinned and akinetic [id number] inferior, inferolateral and   anterolateral segments with associated transmural delayed enhancement as   well as hypokinesis of the [id number] inferoseptum with subendocardial   enhancement.  This constellation of findings is consistent with ischemic   cardiomyopathy with an overall low probability of myocardial viability. 2. Posterior mitral leaflet tethering secondary to the akinetic basal   segments resulting in a mild-to-moderate anteriorly directed eccentric   jet of mitral regurgitation (Rvol 19-24 cc/beat; Rfrac 20-25%)     3. Moderate biatrial enlargement. 4. The arch vessel branching pattern is remarkable for a left sided arch   with an aberrant right subclavian artery that takes the typical   retroesophageal course.      Prior outpatient monitor (09/27/21): Patient had a min HR of 33 bpm, max HR of 133 bpm, and avg HR of 50 bpm. Predominant underlying rhythm was Sinus Rhythm. 2 Ventricular Tachycardia runs occurred, the run with the fastest interval lasting 5 beats with a max rate of 133 bpm (avg 108 bpm); the run with the fastest interval was also the longest. Idioventricular Rhythm was present. Isolated SVEs were frequent (6.2%, 6313), SVE Couplets were rare (<1.0%, 150), and SVE Triplets were rare (<1.0%, 10). Isolated VEs were frequent (10.0%, 86585), VE Couplets were occasional (1.6%, 812), and VE Triplets were rare (<1.0%, 30). Ventricular Bigeminy and Trigeminy were present. Nbcupflff96/16/2021: Atrial fibrillation rate 93 bpm         I have independently reviewed all of the ECGs and rhythm strips per above. I have personally reviewed the laboratory, cardiac diagnostic and radiographic testing as outlined above. I have reviewed previous records noted in [address]. Impression:    1.  VT Arrest  - Known CAD on Entresto, BB, Sprinolactone  - last LVEF 29% on SPECT with fixed inferior large scar  - Now with sustained MMVT with hemodynamic compromise and arrest  - Cath findings do not suggest ACS but more likely severe CAD and is s/p revascularization of LAD and ostial of diagonal with residual high burden diffuse multivessel CAD   - Cardiac MRI from 2019 shows scar in the inf/inflat areas  - Monitor rhythm, currently in NSR  - Recommend Practitioner documentation was conducted and revisions were made as appropriate. I agree with the above documented exam, problem list and plan of care. I have spent a total of 35CC minutes with the patient and his/her family reviewing the above stated recommendations. A total of >50% of that time involved face-to-face time providing counseling and or coordination of care with the other providers.     Adriana Salguero M.D  [facility]

## 2021-12-17 ENCOUNTER — APPOINTMENT (OUTPATIENT)
Dept: MRI IMAGING | Age: 76
DRG: 222 | End: 2021-12-17
Attending: INTERNAL MEDICINE
Payer: MEDICARE

## 2021-12-17 LAB
ALBUMIN SERPL-MCNC: 3.4 G/DL (ref 3.5–5.2)
ALP BLD-CCNC: 76 U/L (ref 40–129)
ALT SERPL-CCNC: 36 U/L (ref 0–40)
ANION GAP SERPL CALCULATED.3IONS-SCNC: 13 MMOL/L (ref 7–16)
AST SERPL-CCNC: 40 U/L (ref 0–39)
BILIRUB SERPL-MCNC: 2.1 MG/DL (ref 0–1.2)
BUN BLDV-MCNC: 18 MG/DL (ref 6–23)
CALCIUM SERPL-MCNC: 8.4 MG/DL (ref 8.6–10.2)
CHLORIDE BLD-SCNC: 101 MMOL/L (ref 98–107)
CO2: 22 MMOL/L (ref 22–29)
CREAT SERPL-MCNC: 0.8 MG/DL (ref 0.7–1.2)
EKG ATRIAL RATE: 66 BPM
EKG P AXIS: 61 DEGREES
EKG P-R INTERVAL: 180 MS
EKG Q-T INTERVAL: 422 MS
EKG QRS DURATION: 86 MS
EKG QTC CALCULATION (BAZETT): 442 MS
EKG R AXIS: 74 DEGREES
EKG T AXIS: 138 DEGREES
EKG VENTRICULAR RATE: 66 BPM
GFR AFRICAN AMERICAN: >60
GFR NON-AFRICAN AMERICAN: >60 ML/MIN/1.73
GLUCOSE BLD-MCNC: 103 MG/DL (ref 74–99)
HCT VFR BLD CALC: 25.9 % (ref 37–54)
HEMOGLOBIN: 8.6 G/DL (ref 12.5–16.5)
LV EF: 21 %
LVEF MODALITY: NORMAL
MAGNESIUM: 2 MG/DL (ref 1.6–2.6)
MCH RBC QN AUTO: 29.5 PG (ref 26–35)
MCHC RBC AUTO-ENTMCNC: 33.2 % (ref 32–34.5)
MCV RBC AUTO: 88.7 FL (ref 80–99.9)
PDW BLD-RTO: 15.9 FL (ref 11.5–15)
PHOSPHORUS: 2.4 MG/DL (ref 2.5–4.5)
PLATELET # BLD: 138 E9/L (ref 130–450)
PMV BLD AUTO: 12 FL (ref 7–12)
POTASSIUM REFLEX MAGNESIUM: 3.5 MMOL/L (ref 3.5–5)
PROCALCITONIN: 1.84 NG/ML (ref 0–0.08)
RBC # BLD: 2.92 E12/L (ref 3.8–5.8)
SODIUM BLD-SCNC: 136 MMOL/L (ref 132–146)
TOTAL PROTEIN: 5.9 G/DL (ref 6.4–8.3)
WBC # BLD: 16.7 E9/L (ref 4.5–11.5)

## 2021-12-17 PROCEDURE — 99232 SBSQ HOSP IP/OBS MODERATE 35: CPT | Performed by: SURGERY

## 2021-12-17 PROCEDURE — 6360000004 HC RX CONTRAST MEDICATION: Performed by: RADIOLOGY

## 2021-12-17 PROCEDURE — 97530 THERAPEUTIC ACTIVITIES: CPT

## 2021-12-17 PROCEDURE — 84145 PROCALCITONIN (PCT): CPT

## 2021-12-17 PROCEDURE — 97535 SELF CARE MNGMENT TRAINING: CPT

## 2021-12-17 PROCEDURE — A9579 GAD-BASE MR CONTRAST NOS,1ML: HCPCS | Performed by: RADIOLOGY

## 2021-12-17 PROCEDURE — 75561 CARDIAC MRI FOR MORPH W/DYE: CPT

## 2021-12-17 PROCEDURE — 6370000000 HC RX 637 (ALT 250 FOR IP): Performed by: INTERNAL MEDICINE

## 2021-12-17 PROCEDURE — 6370000000 HC RX 637 (ALT 250 FOR IP): Performed by: STUDENT IN AN ORGANIZED HEALTH CARE EDUCATION/TRAINING PROGRAM

## 2021-12-17 PROCEDURE — 6360000002 HC RX W HCPCS: Performed by: INTERNAL MEDICINE

## 2021-12-17 PROCEDURE — 85027 COMPLETE CBC AUTOMATED: CPT

## 2021-12-17 PROCEDURE — 6370000000 HC RX 637 (ALT 250 FOR IP): Performed by: NURSE PRACTITIONER

## 2021-12-17 PROCEDURE — 93010 ELECTROCARDIOGRAM REPORT: CPT | Performed by: INTERNAL MEDICINE

## 2021-12-17 PROCEDURE — 99291 CRITICAL CARE FIRST HOUR: CPT | Performed by: INTERNAL MEDICINE

## 2021-12-17 PROCEDURE — 36415 COLL VENOUS BLD VENIPUNCTURE: CPT

## 2021-12-17 PROCEDURE — 2580000003 HC RX 258: Performed by: INTERNAL MEDICINE

## 2021-12-17 PROCEDURE — 80053 COMPREHEN METABOLIC PANEL: CPT

## 2021-12-17 PROCEDURE — 2140000000 HC CCU INTERMEDIATE R&B

## 2021-12-17 PROCEDURE — 87040 BLOOD CULTURE FOR BACTERIA: CPT

## 2021-12-17 PROCEDURE — 36592 COLLECT BLOOD FROM PICC: CPT

## 2021-12-17 PROCEDURE — 99233 SBSQ HOSP IP/OBS HIGH 50: CPT | Performed by: INTERNAL MEDICINE

## 2021-12-17 PROCEDURE — 75561 CARDIAC MRI FOR MORPH W/DYE: CPT | Performed by: INTERNAL MEDICINE

## 2021-12-17 PROCEDURE — 84100 ASSAY OF PHOSPHORUS: CPT

## 2021-12-17 PROCEDURE — 83735 ASSAY OF MAGNESIUM: CPT

## 2021-12-17 RX ORDER — METOPROLOL SUCCINATE 25 MG/1
37.5 TABLET, EXTENDED RELEASE ORAL 2 TIMES DAILY
Status: DISCONTINUED | OUTPATIENT
Start: 2021-12-17 | End: 2021-12-18

## 2021-12-17 RX ADMIN — Medication 10 ML: at 20:57

## 2021-12-17 RX ADMIN — ISOSORBIDE MONONITRATE 30 MG: 30 TABLET ORAL at 09:45

## 2021-12-17 RX ADMIN — SACUBITRIL AND VALSARTAN 1 TABLET: 97; 103 TABLET, FILM COATED ORAL at 21:04

## 2021-12-17 RX ADMIN — METOPROLOL SUCCINATE 37.5 MG: 25 TABLET, EXTENDED RELEASE ORAL at 20:57

## 2021-12-17 RX ADMIN — HEPARIN SODIUM 5000 UNITS: 10000 INJECTION INTRAVENOUS; SUBCUTANEOUS at 06:22

## 2021-12-17 RX ADMIN — TICAGRELOR 90 MG: 90 TABLET ORAL at 10:34

## 2021-12-17 RX ADMIN — Medication 500 MG: at 09:45

## 2021-12-17 RX ADMIN — HEPARIN SODIUM 5000 UNITS: 10000 INJECTION INTRAVENOUS; SUBCUTANEOUS at 21:04

## 2021-12-17 RX ADMIN — Medication 500 MG: at 13:21

## 2021-12-17 RX ADMIN — AMIODARONE HYDROCHLORIDE 400 MG: 200 TABLET ORAL at 09:45

## 2021-12-17 RX ADMIN — GADOTERIDOL 36 ML: 279.3 INJECTION, SOLUTION INTRAVENOUS at 13:13

## 2021-12-17 RX ADMIN — HEPARIN SODIUM 5000 UNITS: 10000 INJECTION INTRAVENOUS; SUBCUTANEOUS at 14:18

## 2021-12-17 RX ADMIN — FUROSEMIDE 40 MG: 20 TABLET ORAL at 13:21

## 2021-12-17 RX ADMIN — TICAGRELOR 90 MG: 90 TABLET ORAL at 20:58

## 2021-12-17 RX ADMIN — AMIODARONE HYDROCHLORIDE 400 MG: 200 TABLET ORAL at 20:57

## 2021-12-17 RX ADMIN — PANTOPRAZOLE SODIUM 40 MG: 40 TABLET, DELAYED RELEASE ORAL at 20:57

## 2021-12-17 RX ADMIN — Medication 10 ML: at 09:53

## 2021-12-17 RX ADMIN — ROSUVASTATIN 40 MG: 20 TABLET, FILM COATED ORAL at 20:57

## 2021-12-17 RX ADMIN — PANTOPRAZOLE SODIUM 40 MG: 40 TABLET, DELAYED RELEASE ORAL at 09:45

## 2021-12-17 RX ADMIN — ASPIRIN 81 MG CHEWABLE TABLET 81 MG: 81 TABLET CHEWABLE at 09:45

## 2021-12-17 RX ADMIN — SPIRONOLACTONE 25 MG: 25 TABLET ORAL at 13:22

## 2021-12-17 RX ADMIN — Medication 2 PACKET: at 17:24

## 2021-12-17 RX ADMIN — Medication 500 MG: at 20:58

## 2021-12-17 RX ADMIN — SACUBITRIL AND VALSARTAN 1 TABLET: 97; 103 TABLET, FILM COATED ORAL at 09:48

## 2021-12-17 ASSESSMENT — PAIN SCALES - GENERAL
PAINLEVEL_OUTOF10: 0

## 2021-12-17 NOTE — PROGRESS NOTES
Cardiac Electrophysiology Inpatient Progress Note    Cristine Benoit  1945  Date of Service: 12/17/2021  PCP: Davina Nguyen MD  Cardiologist: Adebayo Ott MD   Primary Electrophysiologist: Alondra Salazar MD   Attending Electrophysiologist: Herlinda Greenfield MD         Subjective: Cristine Benoit is seen in hospital follow-up and management of: VT     Initial Hospital consult (12/13/21):  77year old male with known CAD, STEMI 1997 with PCI to the RCA,   LVEF 45-50% on TTE 03/30/17, 35-40% on TTE 10/09/18, LVEF of 29% on a Lexiscan stress test 07/01/20. Other hx includes HTN, HLD, obesity S/p gastric bypass, Carpal tunnel surgery. At baseline, he is on Entresto, spironolactone, Metoprolol 50 mg bid  He presented to ED 12/12 with palpitation, weakness for one hour and was found to be in sustained VT monomorphic, Rate 190, LBBB, superior axis. He was given Lidocaine and while preparing to cardiovert patient, he had pulseless VT and CPR was started, he was intubated, defibrillated x1. There was a concern for inferior STEMI thus Cath lab was activated. Cath showed prox LAD 80-90%, mod D2 90% ostial, apical LAD 70%, diffuse Lcx 70-80% lesions, mild diffuse RCA tsenosis  LV gram showed possible LV aneurysm, mod MR and inferior akinesis, dilated LV. FFR of LAD proved that it was significant thus he had simultaneous RYAN to prox LAD and angioplasty to ostium Diagonal branch     He is intubated and awake, not really following commands. He is on Lidocaine 1mg/min, no pressors, metoprolol 25 mg bid    (12/14/21): He converted to AF with a controled ventricular rate overnight without recurrent VT, he remains on IV Lidocaine 0.5 mg/min. Today he is confused to his situation and agreeable. His count dropped from 12 to 8.0 this morning.     (12/15/21): He has improved cognition today and is in sinus, he awaits EGD today due to ongoing anemia with a PRBC transfusion on 12/14.  No recurrent VT and tolerating amiodarone well. (12/16/21): He had recurrent AF with RVR last night with rates into the 140's today his H/H continues to decline. (12/17/21): The patient is seen in hospital follow-up, he continues in AF with rates in the 90's he is tolerating the increased dose of Toprol as well as the increased dose of Entresto. Today he is awake and alert without cardiac complaints.      Patient Active Problem List   Diagnosis    CAD (coronary artery disease)s/p stent RCA    Valvular heart disease    Hypertension    Obesity, morbid s/p intestinal bypass    STEMI (ST elevation myocardial infarction) ([facility] [address]    Stented coronary artery    Hyperlipidemia    H/O psoriatic arthritis    Acute upper GI bleed    SVT (supraventricular tachycardia) (HCC)    LV dysfunction    Cardiac arrest ([facility] [address]    Acute systolic (congestive) heart failure (HCC)    Ventricular tachycardia (HCC)    Mitral stenosis    Mitral regurgitation    Moderate to severe pulmonary hypertension (HCC)    Anemia         Scheduled Medications:   potassium & sodium phosphates  2 packet Oral 4x Daily    sacubitril-valsartan  1 tablet Oral BID    furosemide  40 mg Oral Daily    pantoprazole  40 mg Oral BID    isosorbide mononitrate  30 mg Oral Daily    amiodarone  400 mg Oral BID    spironolactone  25 mg Oral Daily    polyethylene glycol  17 g Oral Daily    metoprolol succinate  25 mg Oral Daily    sodium chloride flush  5-40 mL IntraVENous 2 times per day    rosuvastatin  40 mg Oral Nightly    aspirin  81 mg Oral Daily    ticagrelor  90 mg Oral BID    heparin (porcine)  5,000 Units SubCUTAneous Q8H       Infusion Medications:   sodium chloride         PRN Medications:  labetalol, hydrALAZINE, sodium chloride flush, sodium chloride, acetaminophen, ondansetron    Allergies   Allergen Reactions    Dye [Iodides] Anaphylaxis     Passing out    Dust Mite Extract Other (See Comments)     All year around       [facility] Readings from Last 3 Encounters:   12/14/21 189 lb 9.5 oz (86 kg)   12/12/21 168 lb (76.2 kg)   11/16/21 174 lb 9.6 oz (79.2 kg)     Temp Readings from Last 3 Encounters:   12/17/21 98.3 °F (36.8 °C) (Oral)   12/12/21 97.8 °F (36.6 °C) (Temporal)   10/12/21 97.4 °F (36.3 °C)     BP Readings from Last 3 Encounters:   12/17/21 (!) 160/93   12/16/21 115/72   12/12/21 (!) 103/59     Pulse Readings from Last 3 Encounters:   12/17/21 94   12/12/21 81   11/16/21 74         Intake/Output Summary (Last 24 hours) at 12/17/2021 0909  Last data filed at 12/17/2021 0600  Gross per 24 hour   Intake 220 ml   Output 775 ml   Net -555 ml       ROS:    Constitutional: Negative for fever, activity change and appetite change. HENT: Negative for epistaxis, difficulty swallowing. Eyes: Negative for blurred vision or double vision. Respiratory: Negative for cough, chest tightness, shortness of breath and wheezing. Cardiovascular: Negative for chest pain, palpitations and leg swelling. Gastrointestinal: Negative for abdominal pain, heartburn, or blood in stool. Genitourinary: Negative for hematuria, burning or frequency. Musculoskeletal: Negative for myalgias, stiffness, or swelling. Skin: Negative for rash, pain, or lumps. Neurological: Negative for syncope, seizures, or headaches. Psychiatric/Behavioral: Negative for confusion and agitation. The patient is not nervous/anxious. PHYSICAL EXAM:  Vitals:    12/17/21 0500 12/17/21 0600 12/17/21 0700 12/17/21 0800   BP:  (!) 159/101 (!) 160/93    Pulse: 92 95 91 94   Resp: 16 16 20    Temp:       TempSrc:       SpO2: 96% 97% 96%    Weight:       Height:         Constitutional: Well-developed, no acute distress seen in ICU no family at bedside. Eyes: conjunctivae normal, no xanthelasma   Ears, Nose, Throat: oral mucosa moist, no cyanosis   CV: no JVD. IRIR. Normal S1S2 and no S3. No murmurs, rubs, or gallops.  PMI is nondisplaced  Lungs: clear to auscultation bilaterally, normal respiratory effort without used of accessory muscles  Abdomen: soft, non-tender, bowel sounds present, no masses or hepatomegaly   Musculoskeletal: no digital clubbing, no edema   Skin: warm, no rashes         Pertinent Labs:  CBC:   Recent Labs     12/15/21  0535 12/16/21  0545 12/17/21  0450   WBC 10.2 21.9* 16.7*   HGB 8.3* 7.9* 8.6*   HCT 25.0* 23.2* 25.9*   PLT 88* 102* 138     BMP:  Recent Labs     12/15/21  0535 12/16/21  0545 12/17/21  0450    135 136   K 4.0 4.0 3.5    101 101   CO2 22 20* 22   BUN 19 16 18   CREATININE 0.7 0.7 0.8   CALCIUM 8.5* 8.5* 8.4*       TSH:   Lab Results   Component Value Date    TSH 3.820 12/12/2021      Lipid Profile:   Lab Results   Component Value Date    TRIG 61 12/13/2021    HDL 39 12/13/2021    LDLCALC 49 12/13/2021    CHOL 100 12/13/2021          Pertinent Cardiac Testing:     EKG (12/12/21): VT rate 190 bpm, Please see scan in Cardiology. EKG (12/12/21): NSR rate 76 bpm, QRS 98ms, QTc 490ms  Please see scan in Cardiology. ECG (12/14/21): Atrial fibrillation vs fib/flutter rate 122 bpm, QRS 86 ms, QTc 493ms      Echocardiogram (12/12/21): Micro-bubble contrast injected ( to try to define an anomaly seen on the   LV gram ) --> could not be defined --> consider cardiac MRI ( once / if   patient recovers / is stable ).   The left ventricle is dilated.   The inferior wall, the inferolateral wall, the basal lateral wall and the   posterior wall are thin, scarred and akinetic.   The right ventricle is dilated.   Right ventricle global systolic function is mildly reduced ( TAPSE = 1.6   ).   The left atrium is mildly dilated.   The right atrium is moderately dilated.   Focal calcification mitral valve leaflets.   The mitral valve leaflets are restricted in mobility   Mitral annular calcification is present.   Mitral stenosis is present ( peak / mean gradient 17.66 / 5.24 mmHg; area   1.0 cm2 ).    Moderate to Severe mitral regurgitation.   Trace aortic regurgitation.   Mild tricuspid regurgitation.   Moderate to severe pulmonary hypertension.     Cardiac Catheterization (12/12/21):   CONCLUSIONS:  1.  Coronary artery disease. a.  LEFT MAIN:  10% distal vessel tapering.  b.  LAD:  30% eccentric proximal vessel narrowing and 80% to 90% very  proximal eccentric bifurcation lesion at the site of takeoff of a large  diagonal branch.  90% ostial stenosis and 70% to 80% proximal stenosis  of a moderate-sized second diagonal branch.  90% ostial stenosis of a  small third diagonal branch.  50% disease of the LAD after the second  diagonal branch.  60% disease of the LAD after the third diagonal  branch.  Long up to 60% to 70% disease of the apical LAD before the  mustache.  c.  LCX:  Basically a large lateral branch with 70% to 80% proximal  vessel disease and 80% mid vessel disease. d.  RCA:  Dominant vessel with 40% mid vessel narrowing and 70% ostial  narrowing of the largest subdivision of the posterolateral branch. 2.  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 3.  Hemodynamically significant proximal LAD disease with pressure wire  evaluation yielding an IFR of 0.70 and _____. 4.  Successful balloon angioplasty with deployment of drug-eluting  coronary stent to the proximal LAD and balloon angioplasty to the ostium  of the diagonal branch (bifurcation lesion; double wire; kissing  balloons) with very good results.     Stress Test (07/01/20):   1. ECG during the infusion did not change. 2. The myocardial perfusion imaging was abnormal. The abnormality was a a large sized fixed defect in the inferolateral wall suggestive of a prior MI  3.  Overall left ventricular systolic function was abnormal with regional wall motion abnormalities. 4. High risk general pharmacologic stress test. Due to LV dysfunction.     Cardiac MRI (08/13/19): Mildly dilated left ventricle (LVEDVi 125cc/m2) with a   mild-to-moderate reduction in systolic function (LVEF 61%).  There are   thinned and akinetic [id number] inferior, inferolateral and   anterolateral segments with associated transmural delayed enhancement as   well as hypokinesis of the [id number] inferoseptum with subendocardial   enhancement.  This constellation of findings is consistent with ischemic   cardiomyopathy with an overall low probability of myocardial viability. 2. Posterior mitral leaflet tethering secondary to the akinetic basal   segments resulting in a mild-to-moderate anteriorly directed eccentric   jet of mitral regurgitation (Rvol 19-24 cc/beat; Rfrac 20-25%)     3. Moderate biatrial enlargement. 4. The arch vessel branching pattern is remarkable for a left sided arch   with an aberrant right subclavian artery that takes the typical   retroesophageal course.      Prior outpatient monitor (09/27/21): Patient had a min HR of 33 bpm, max HR of 133 bpm, and avg HR of 50 bpm. Predominant underlying rhythm was Sinus Rhythm. 2 Ventricular Tachycardia runs occurred, the run with the fastest interval lasting 5 beats with a max rate of 133 bpm (avg 108 bpm); the run with the fastest interval was also the longest. Idioventricular Rhythm was present. Isolated SVEs were frequent (6.2%, 6313), SVE Couplets were rare (<1.0%, 150), and SVE Triplets were rare (<1.0%, 10). Isolated VEs were frequent (10.0%, 49828), VE Couplets were occasional (1.6%, 812), and VE Triplets were rare (<1.0%, 30). Ventricular Bigeminy and Trigeminy were present. [id number]/17/2021: Atrial fibrillation rate 90 bpm with PVCs         I have independently reviewed all of the ECGs and rhythm strips per above.     I have personally reviewed the laboratory, cardiac diagnostic and radiographic testing as outlined above. I have reviewed previous records noted in [address]. Impression:    1. VT Arrest  - Known CAD  on Entresto, BB, Sprinolactone  - last LVEF 29% on SPECT with fixed inferior large scar  - Now with sustained MMVT with hemodynamic compromise and arrest rate 190 bpm  - Cath findings do not suggest ACS but more likely severe CAD and is s/p revascularization of LAD and ostial of diagonal with residual high burden diffuse multivessel CAD   - Cardiac MRI from 2019 shows scar in the inf/inflat areas  - Monitor rhythm, currently in AF  - Planning for dual chamber ICD for secondary prevention prior to discharge once blood cultures are reported, possibly Monday 12/20/21.    - Amiodarone started 12/14/21 400mg BID. 2. New onset Atrial fibrillation   - First seen 12/14/21  - CHADS-VASc 5 (age, VASc, CHF, HTN)  - Given the on going anemia will delay anticoagulations. - Currently in AF will increase Toprol to 37.5 mg BID, home dose was Toprol 50mg BID. 3.  HFrEF   - Ischemic   - LVEF 50% 10/03/09 on TTE   - LVEF 45-50% 03/30/17 on TTE   - LVEF 35-40% 10/09/19 on TTE   - LVEF 29% 07/01/20 on Lexiscan stress test   - LVEF 24% on TTE 12/12/21  - GDMT: Entresto ,  Lasix, Toprol 50 BID, Aldactone 25mg daily at home.       4. Coronary Artery Disease   - Inferior STEMI 1997 s/p PCI RCA.  - Known Large sized inferolateral fixed defect suggesting prior MI.  - [facility] 12/12/21 patient RCA stent, high grade stenosis of the LAD-D1 S/p PCI. - Statin, BB, ASA, Imdur, Brilinta.      5. Hypertension    - BP remains elevated. In the 160's      6. HLD      7. Obesity s/p gastric bypass     8. IV dye allergy  - Anaphylaxis       9. psoriatic arthritis      10. Anemia   -  Count 8.6/25.9 today with a negative EGD on 12/16/21    11. Leukocytosis   - WBC 21.9 on 12/16, possibly reactionary blood cultures pending.      12.  Possible aneurysm or a pseudoaneurysm seen on LV gram 12/12/21.   - Cardiac MRI pending       Recommendations:    1. Continue PO amiodarone loading 400mg BID for one week (12/21/21) then 200 mg BID (12/28/21) then 200mg daily. 2. Defer theraputic SubQ Lovenox  For 24 hours. 3. Will need dual chamber ICD for secondary prevention prior to discharge now delayed due to leukocytosis possible insertion on Monday 12/20 pending clinical course. 4. Increase Toprol to 37.5 mg BID   5. EP will follow. Thank you for allowing me to participate in your patient's care. Discussed with Dr. Gómez Osborne. Electronically signed by KAYE Brewster CNP on 12/17/2021 at 9:09 AM   [address] [facility]    Attending Physician's Statement    Patient seen with the ANP. Agree with the findings, assessment and plan. Management plan was discussed. I have personally interviewed the patient, independently performed a focused cardiac examination, reviewed the pertinent laboratory and diagnostic testing with the patient and directly participated in the medical decision-making as noted above with the following additions:     Feeling well. Remains in AFL with HR of 80's. No VT overnight. Physical exam showed no JVD, IRIR, no murmur, clear lung bilaterally, edema of LEs    Await for negative blood cultures at 48 hours per ID. Continue Amiodarone loading dose and Toprol XL. Consider start anticoagulation when is able. Tentative plan for ICD implantation on Monday. I have spent a total of 35 minutes of CCT with the patient and the family reviewing the above stated recommendations.   And a total of >50% of that time involved face-to-face time providing counseling and/or coordination of care with the other providers, preparation for the clinic visit, reviewing records/tests, counseling/education of the patient, ordering medications/tests/procedures, coordinating care, and documenting clinical information in the EHR.      Eloy Hill MD  Cardiac Electrophysiology  [address]  [facility] [place]: [facility]  5:53 PM  12/17/2021

## 2021-12-17 NOTE — PROGRESS NOTES
Surgical Intensive Care Unit  Daily Progress Note  Date of admission:  12/12/2021  Reason for ICU transfer: V fib arrest    Subjective:  Resting comfortably. Awaiting blood cx for ICD clearance      Hospital Course:  12/12: Patient went into pulseless V tach and received one round of CPR before ROSC. Patient was immediately taken for cardiac cath. PCI placement in LAD and LAD diagonal branch. 12/13: Patient remains intubated and on fent. He does not complain of pain. SBT today. Overnight patient had bout of tachycardia, RBBB and ventricular overlaod on EKG. Patient extubated. 12/14: Patient was extubated yesterday. Confused this morning. EP plans to start amiodarone today with future ICD placement before discharge. 12/15: Patient less confused this am.  States he is hesitant about an ICD placement. On NC at this time with no other complaints. 12/16: Patient resting comfortably this am. He is for EGD today. Had a BM.   12/17: endo showed to acute GI bleed with small hiatal hernia. Awaiting ICD. For cardiac MRI today. Physical Exam:  BP (!) 132/94   Pulse 89   Temp 98.3 °F (36.8 °C) (Oral)   Resp 17   Ht 5' 6\" (1.676 m)   Wt 189 lb 9.5 oz (86 kg)   SpO2 95%   BMI 30.60 kg/m²     Physical Exam  HENT:      Head: Normocephalic and atraumatic. Nose: Nose normal.      Mouth/Throat:      Mouth: Mucous membranes are moist.   Eyes:      Pupils: Pupils are equal, round, and reactive to light. Cardiovascular:      Rate and Rhythm: Normal rate. Rhythm irregular. Abdominal:      General: Abdomen is flat. There is no distension. Palpations: Abdomen is soft. Skin:     General: Skin is warm and dry. Capillary Refill: Capillary refill takes less than 2 seconds. Neurological:      Mental Status: He is alert. ASSESSMENT / PLAN:  · Neuro:  Patient states he is in no pain.   Tylenol as needed  · CV: V fib arrest s/p cardiac cath - On metoprolol rosuvastatin, cardiology and EP input appreciated. On oral amiodarone and ICD placement per EP  · Pulm: Acute respiratory failure - patient extubated yesterday. Saturations remained in the 90s. · GI:  EGD today - bowel regimen   · Renal: No acute issues - monitor bmp  · ID:  Leukocytosis -  Monitor CBC - await blood cx  · Endo: Monitor glucose  · MSK: No acute issues    Bowel regime: glyocolax  Pain control/Sedation:  Tylenol. DVT prophylaxis: SCDs. Heparin. brilinta   GI: Pepcid. Glucose protocol:  None  Mouth/Eye care: As needed. Artificial tears. Peridex.    CVC sites:  [facility], Day # 5  Ancillary consults: Cardiology  Family Update: As available     Code status:   Full Code  Family Update: As available     Electronically signed by Glenys Miller DO on 12/17/2021 at 5:29 AM

## 2021-12-17 NOTE — PROGRESS NOTES
Inpatient Cardiology Progress note     PATIENT IS BEING FOLLOWED FOR: VT/VFib arrest. CAD. Ischemic cardiomyopathy. Mitral regurgitation    Priyank Rodríguez is a 77 y.o. male known to Dr. Sophia So: Denies CP or SOB    OBJECTIVE: sitting in bed in no distress, on room air    ROS:  Consist: Denies fevers, chills or night sweats  Heart: Denies chest pain, palpitations, lightheadedness, dizziness or syncope  Lungs: Denies SOB, cough, wheezing, orthopnea or PND  GI: Denies abdominal pain, vomiting or diarrhea    PHYSICAL EXAM:   BP (!) 160/93   Pulse 91   Temp 98.3 °F (36.8 °C) (Oral)   Resp 20   Ht 5' 6\" (1.676 m)   Wt 189 lb 9.5 oz (86 kg)   SpO2 96%   BMI 30.60 kg/m²      CONST: Well developed, well nourished who appears stated age. Awake, alert and cooperative. No apparent distress  HEENT:   Head- Normocephalic, atraumatic   Eyes- Conjunctivae pink, anicteric  Throat- Oral mucosa pink and moist  Neck-  No stridor, trachea midline, no jugular venous distention. No carotid bruit  CHEST: Chest symmetrical and non-tender to palpation. No accessory muscle use or intercostal retractions  RESPIRATORY:  Lung sounds - fairly clear bilaterally  CARDIOVASCULAR:     Heart Inspection- shows no noted pulsations  Heart Palpation- no heaves or thrills; PMI is non-displaced   Heart Ausculation- Irregular rate and rhythm. 1-2/6 SM apex. No s3 or rub   PV: 1+ lower extremity edema. No varicosities. Pedal pulses palpable, no clubbing or cyanosis   ABDOMEN: Soft, non-tender to light palpation. Bowel sounds present. No palpable masses no organomegaly; no abdominal bruit  MS: Good muscle strength and tone. No atrophy or abnormal movements. : Deferred. SKIN: Warm and dry no statis dermatitis or ulcers   NEURO / PSYCH: Oriented to person, place and time. Speech clear and appropriate. Follows all commands.  Pleasant affect       Intake/Output Summary (Last 24 hours) at 12/17/2021 0809  Last data filed at 12/17/2021 0600  Gross per 24 hour   Intake 220 ml   Output 775 ml   Net -555 ml       Weight:   Wt Readings from Last 3 Encounters:   12/14/21 189 lb 9.5 oz (86 kg)   12/12/21 168 lb (76.2 kg)   11/16/21 174 lb 9.6 oz (79.2 kg)     Current Inpatient Medications:   potassium & sodium phosphates  1 packet Oral 4x Daily    furosemide  40 mg Oral Daily    pantoprazole  40 mg Oral BID    sacubitril-valsartan  1 tablet Oral BID    isosorbide mononitrate  30 mg Oral Daily    amiodarone  400 mg Oral BID    spironolactone  25 mg Oral Daily    polyethylene glycol  17 g Oral Daily    metoprolol succinate  25 mg Oral Daily    sodium chloride flush  5-40 mL IntraVENous 2 times per day    rosuvastatin  40 mg Oral Nightly    aspirin  81 mg Oral Daily    ticagrelor  90 mg Oral BID    heparin (porcine)  5,000 Units SubCUTAneous Q8H       IV Infusions (if any):   sodium chloride         DIAGNOSTIC/ LABORATORY DATA:  Labs:   CBC:   Recent Labs     12/16/21  0545 12/17/21  0450   WBC 21.9* 16.7*   HGB 7.9* 8.6*   HCT 23.2* 25.9*   * 138     BMP:   Recent Labs     12/16/21  0545 12/17/21  0450    136   K 4.0 3.5   CO2 20* 22   BUN 16 18   CREATININE 0.7 0.8   LABGLOM >60 >60   CALCIUM 8.5* 8.4*     Mag:   Recent Labs     12/16/21  0545 12/17/21  0450   MG 2.1 2.0     Phos:   Recent Labs     12/16/21  0545 12/17/21  0450   PHOS 2.7 2.4*     TFT:   Lab Results   Component Value Date    TSH 3.820 12/12/2021      HgA1c:   Lab Results   Component Value Date    LABA1C 4.9 12/13/2021     FASTING LIPID PANEL:  Lab Results   Component Value Date    CHOL 100 12/13/2021    HDL 39 12/13/2021    LDLCALC 49 12/13/2021    TRIG 61 12/13/2021     LIVER PROFILE:  Recent Labs     12/16/21  0545 12/17/21  0450   AST 42* 40*   ALT 38 36   LABALBU 3.4* 3.4*       CXR 12/12/21:  Endotracheal tube in good position.   NG tube in the stomach.   Right internal jugular central venous catheter tip in the SVC.   No pneumothorax on the right on the left.   A perihilar vascular congestion with cardiomegaly observed.   Cannot exclude discrete infiltrates in the bases particular on the left side. CXR 12/13/21:   Esophagogastric tube with side port in the region of the distal esophagus. Recommend repositioning.  Additional support devices as above.   Interval development of patchy bilateral airspace opacities, concerning for pneumonia versus aspiration. CXR 1/15/21:  Improved aeration of the right lung compared to prior study.   The ET tube and enteric tubes have been removed. Stable positioning of rightinternal jugular line. ECG 12/12/21 19:41  Sinus rhythm with 1st degree AV block with premature atrial complexes  Cannot rule out Inferior infarct (cited on or before 12-DEC-2021)  Abnormal ECG    Cardiac Catheterization 12/12/2021 [facility])   CONCLUSIONS:  1. Coronary artery disease. a. LEFT MAIN:  10% distal vessel tapering.  b.  LAD:  30% eccentric proximal vessel narrowing and 80% to 90% very  proximal eccentric bifurcation lesion at the site of takeoff of a large  diagonal branch. 90% ostial stenosis and 70% to 80% proximal stenosis  of a moderate-sized second diagonal branch. 90% ostial stenosis of a  small third diagonal branch. 50% disease of the LAD after the second  diagonal branch. 60% disease of the LAD after the third diagonal  branch. Long up to 60% to 70% disease of the apical LAD before the  mustache.  c.  LCX:  Basically a large lateral branch with 70% to 80% proximal  vessel disease and 80% mid vessel disease. d.  RCA:  Dominant vessel with 40% mid vessel narrowing and 70% ostial  narrowing of the largest subdivision of the posterolateral branch.   2.  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 3.  Hemodynamically significant proximal LAD disease with pressure wire evaluation yielding an IFR of 0.70 and 0.74  4. Successful balloon angioplasty with deployment of drug-eluting coronary stent to the proximal LAD and balloon angioplasty to the ostium of the diagonal branch (bifurcation lesion; double wire; kissingballoons) with very good results. Echo12/12/21:  Summary   Micro-bubble contrast injected ( to try to define an anomaly seen on the  LV gram ) --> could not be defined --> consider cardiac MRI ( once / if patient recovers / is stable ). The left ventricle is dilated. The inferior wall, the inferolateral wall, the basal lateral wall and the   posterior wall are thin, scarred and akinetic. The right ventricle is dilated. Right ventricle global systolic function is mildly reduced ( TAPSE = 1.6   ). The left atrium is mildly dilated. The right atrium is moderately dilated. Focal calcification mitral valve leaflets. The mitral valve leaflets are restricted in mobility   Mitral annular calcification is present. Mitral stenosis is present ( peak / mean gradient 17.66 / 5.24 mmHg; area 1.0 cm2 ). Moderate to Severe mitral regurgitation. Trace aortic regurgitation. Mild tricuspid regurgitation. Moderate to severe pulmonary hypertension. Pro BNP 12/15/21: 20,550    Telemetry: Afib controlled rate ( 80's )    ASSESSMENT:   1. Pulseless VT Arrest s/p CPR. EP following. Started on Amiodarone; ICD recommended for secondary prevention   2. Hx ventricular arrhythmias on Holter 9/2021 ([facility])  3. Known CAD. Hx Inferior MI. S/p [facility] 12/12/0221 with RYAN- proximal LAD and kissing balloon angioplasty to diagonal branch ([facility] 12/12/2021)  4. Ischemic cardiomyopathy with moderate to severe LV systolic dysfunction with EF estimated at 35+/-5% on Echo 12/12/21  5. Acute on chronic HFrEFW  6.  Moderate to severe mitral regurgitation  7. Moderate to severe pulmonary HTN  8. Respiratory failure secondary to # 1, resolved. extubated 12/13/2021  9. Acute blood loss anemia, Hgb 12.4--> 8.0 --> 8.3 post trasnfusion --> 8.6 today ( 12/17/21 ). Hx GIB in 2018. EGD 12/16/21 Negative  10. New onset atrial fibrillation, noted 12/14/2021. EP following-- [address] held with drop in Hgb   11. Leukocytosis, reactive per ID, improving, afebrile   12. Mild thrombocytopenia  13. Hypertension   14. H/o HLD  15. Ex smoker quit in 2003  16. Psoriatic arthritis on Methotrexate and Plaquenil  17. IV dye allergy  18. S/p Gastric bypass for morbid obesity in 2005  19. Hx BETTIE C-pap non longer needed after weight loss  20. Depression  21. Bilateral carpal tunnel surgery  22. Chronic back/cervical pain   23. Mild transaminitis, improving/resolved. PLAN:  1. Increase Entresto  2. Rest of cardiac medications same for now. 3. Monitor H&H, transfuse as needed as indicated  4. Cardiac MRI  for evaluation of anomaly seen on LV gram  5. Rest as per the critical care team, the primary service and other consultants  6.  Will follow    Electronically signed by Franki Goodpasture, MD on 12/17/2021 at 8:09 AM

## 2021-12-17 NOTE — FLOWSHEET NOTE
Nurse to nurse report called to [phone]. Called and updated patient's wife of transfer out of ICU.

## 2021-12-17 NOTE — PROGRESS NOTES
Department of Internal Medicine  Infectious Diseases  Progress Note      C/C :  Leukocytosis , ID clearance for ICD  insertion     Pt is awake and alert  Denies fever or chills  Denies SOB   Afebrile    Current Facility-Administered Medications   Medication Dose Route Frequency Provider Last Rate Last Admin    potassium & sodium phosphates (PHOS-NAK) 280-160-250 MG packet 500 mg  2 packet Oral 4x Daily Mary Carmen Paulino MD   500 mg at 12/17/21 1321    sacubitril-valsartan (ENTRESTO)  MG per tablet 1 tablet  1 tablet Oral BID Ken Boo MD   1 tablet at 12/17/21 0948    metoprolol succinate (TOPROL XL) extended release tablet 37.5 mg  37.5 mg Oral BID KAYE Wu - MEHRAN        furosemide (LASIX) tablet 40 mg  40 mg Oral Daily Ken Boo MD   40 mg at 12/17/21 1321    pantoprazole (PROTONIX) tablet 40 mg  40 mg Oral BID Mary Carmen Paulino MD   40 mg at 12/17/21 0945    labetalol (NORMODYNE;TRANDATE) injection 10 mg  10 mg IntraVENous Q30 Min PRN Beth Vincent MD   10 mg at 12/16/21 0110    hydrALAZINE (APRESOLINE) injection 10 mg  10 mg IntraVENous Q30 Min PRN Beth Vincent MD        isosorbide mononitrate (IMDUR) extended release tablet 30 mg  30 mg Oral Daily Ken Boo MD   30 mg at 12/17/21 0945    amiodarone (CORDARONE) tablet 400 mg  400 mg Oral BID KAYE Wu - CNP   400 mg at 12/17/21 0945    spironolactone (ALDACTONE) tablet 25 mg  25 mg Oral Daily Ken Boo MD   25 mg at 12/17/21 1322    polyethylene glycol (GLYCOLAX) packet 17 g  17 g Oral Daily Melvin Noguera, DO   17 g at 12/15/21 0751    sodium chloride flush 0.9 % injection 5-40 mL  5-40 mL IntraVENous 2 times per day Ken Boo MD   10 mL at 12/17/21 0953    sodium chloride flush 0.9 % injection 5-40 mL  5-40 mL IntraVENous PRN Ken Boo MD   10 mL at 12/13/21 1649    0.9 % sodium chloride infusion  25 mL IntraVENous PRN Ken Boo MD        acetaminophen (TYLENOL) tablet 650 mg  650 mg Oral Q4H PRN Arleen Foster MD   650 mg at 12/15/21 1719    ondansetron (ZOFRAN) injection 4 mg  4 mg IntraVENous Q6H PRN Arleen Foster MD        rosuvastatin (CRESTOR) tablet 40 mg  40 mg Oral Nightly Arleen Foster MD   40 mg at 12/16/21 2003    aspirin chewable tablet 81 mg  81 mg Oral Daily Arleen Foster MD   81 mg at 12/17/21 0945    ticagrelor (BRILINTA) tablet 90 mg  90 mg Oral BID Arleen Foster MD   90 mg at 12/17/21 1034    heparin (porcine) injection 5,000 Units  5,000 Units SubCUTAneous Q8H Arleen Foster MD   5,000 Units at 12/17/21 1418         REVIEW OF SYSTEMS:    CONSTITUTIONAL:  Denies fever, chill or rigors  HEENT: denies blurring of vision or double vision, denies hearing problem  RESPIRATORY: denies cough, shortness of breath, sputum expectoration. CARDIOVASCULAR:  Denies palpitation  GASTROINTESTINAL:  Denies abdomen pain, diarrhea or constipation. GENITOURINARY:  Denies burning urination or frequency of urination  INTEGUMENT: Ecchymosis   HEMATOLOGIC/LYMPHATIC:  Denies lymph node swelling, gum bleeding or easy bruising. MUSCULOSKELETAL:  Denies leg pain , joint pain , joint swelling  NEUROLOGICAL:  Weakness       PHYSICAL EXAM:      Vitals:     BP (!) 136/90   Pulse 111   Temp 98.1 °F (36.7 °C)   Resp 23   Ht 5' 6\" (1.676 m)   Wt 189 lb 9.5 oz (86 kg)   SpO2 99%   BMI 30.60 kg/m²     General Appearance:    Awake, alert , no acute distress. Head:    Normocephalic, atraumatic   Eyes:    No pallor, no icterus,   Ears:    No obvious deformity or drainage.    Nose:   No nasal drainage   Throat:   Mucosa moist, no oral thrush   Neck:   Supple, no lymphadenopathy   Back:     no CVA tenderness   Lungs:     Clear to auscultation bilaterally    Heart:    Irregular    Abdomen:     Soft, non-tender, bowel sounds present    Extremities:   No edema, non tender    Pulses:   Dorsalis pedis palpable    Skin:   Right groin ecchymosis        CBC with Differential:      Lab Results   Component Value Date    WBC 16.7 12/17/2021    RBC 2.92 12/17/2021    HGB 8.6 12/17/2021    HCT 25.9 12/17/2021     12/17/2021    MCV 88.7 12/17/2021    MCH 29.5 12/17/2021    MCHC 33.2 12/17/2021    RDW 15.9 12/17/2021    NRBC 0.0 10/11/2018    LYMPHOPCT 4.4 12/12/2021    MONOPCT 2.6 12/12/2021    BASOPCT 0.2 12/12/2021    MONOSABS 0.25 12/12/2021    LYMPHSABS 0.33 12/12/2021    EOSABS 0.07 12/12/2021    BASOSABS 0.00 12/12/2021       CMP     Lab Results   Component Value Date     12/17/2021    K 3.5 12/17/2021     12/17/2021    CO2 22 12/17/2021    BUN 18 12/17/2021    CREATININE 0.8 12/17/2021    GFRAA >60 12/17/2021    LABGLOM >60 12/17/2021    GLUCOSE 103 12/17/2021    PROT 5.9 12/17/2021    LABALBU 3.4 12/17/2021    CALCIUM 8.4 12/17/2021    BILITOT 2.1 12/17/2021    ALKPHOS 76 12/17/2021    AST 40 12/17/2021    ALT 36 12/17/2021         Hepatic Function Panel:    Lab Results   Component Value Date    ALKPHOS 76 12/17/2021    ALT 36 12/17/2021    AST 40 12/17/2021    PROT 5.9 12/17/2021    BILITOT 2.1 12/17/2021    LABALBU 3.4 12/17/2021       PT/INR:    Lab Results   Component Value Date    PROTIME 13.4 10/08/2018    INR 1.2 10/08/2018       TSH:    Lab Results   Component Value Date    TSH 3.820 12/12/2021       U/A:  No results found for: NITRITE, COLORU, PHUR, LABCAST, WBCUA, RBCUA, MUCUS, TRICHOMONAS, YEAST, BACTERIA, CLARITYU, SPECGRAV, LEUKOCYTESUR, UROBILINOGEN, BILIRUBINUR, BLOODU, GLUCOSEU, AMORPHOUS    ABG:  No results found for: MZG1XVL, BEART, P1JJRPHJ, PHART, THGBART, QDR8QUG, PO2ART, MBC9CXJ    MICROBIOLOGY:    Blood culture - neg to date     SARS CoV2 negative       Radiology :    Chest X ray- bilateral patchy opacities     IMPRESSION:     1. STEMI s/p PCI / stent   2. Right groin hematoma, reactive leukocytosis - WBC trending down     RECOMMENDATIONS:     1. Follow blood cx , CBC with diff , off abx for now.  If neg at 48 hrs, OK for AICD insertion

## 2021-12-17 NOTE — PROGRESS NOTES
OCCUPATIONAL THERAPY TREATMENT NOTE   [facility] [address] [address] [address]       OOKR:                                                               Patient Name: Viktor Corona  MRN: [id number]  : 1945  Room: 3815  Evaluating OT: Alessandra Babb, OTR/L [id number]     Referring Provider: Nadya Perera DO   Specific Provider Orders/Date: OT eval and treat (21)        Diagnosis: V fib arrest -STEMI     Reason for admission: : Patient went into pulseless V tach and received one round of CPR before ROSC. Patient was immediately taken for cardiac cath. PCI placement in LAD and LAD diagonal branch.      Pertinent Medical History: CAD s/p stent, [place], hearing aids, MI, Sleep apnea         *Precautions:  Fall Risk, O2, bed alarm     Assessment of current deficits   [x]? Functional mobility          [x]? ADLs           [x]? Strength                  []?Cognition   [x]? Functional transfers        [x]? IADLs         [x]? Safety Awareness   [x]? Endurance   []? Fine Coordination           [x]? ROM           []? Vision/perception    []? Sensation     []? Gross Motor Coordination [x]? Balance    []? Delirium                  []?Motor Control     []?  Communication     OT PLAN OF CARE   OT POC based on physician orders, patient diagnosis and results of clinical assessment.        Frequency/Duration: 1-3 days/wk for 1-2 weeks PRN    Specific OT Treatment to include:   ADL retraining/adapted techniques and AE recommendations to increase functional independence within precautions                    Energy conservation techniques to improve tolerance for selfcare routine   Functional transfer/mobility training/DME recommendations for increased independence, safety and fall prevention         Patient/family education to increase safety and functional independence within precautions              Environmental modifications for safe mobility and completion of ADLs                           Cognitive retraining ex's to improve problem solving skills & safe participation in ADLs/IADLs  Sensory re-education techniques to improve extremity awareness, maintain skin integrity and improve hand function                             Visual/Perceptual retraining  to improve body awareness and safety during transfers and ADLs  Splinting/positioning needs to maintain joint/skin integrity and prevent contractures  Therapeutic activity to improve functional performance during ADLs/IADLs                                         Therapeutic exercise to improve tolerance and functional strength for ADLs   Balance retraining exercises/tasks for facilitation of postural control with dynamic challenges during ADLs . Positioning to improve functional independence  Neuromuscular re-education: facilitation of righting/equilibrium reactions, normalization muscle tone/facilitation active functional movement                      Delirium prevention/treatment     Recommended Adaptive Equipment: LB dressing AE pending progress, [employer]      Home Living: Pt lives with wife in a 1 floor plan with 2 step(s) to enter and no rail(s); bed/bath on first floor. Bathroom setup: walk in shower with chair; no rail. Equipment owned: shower seat     Prior Level of Function: IND with ADLs;  IND with IADLs. No device for ambulation. Driving: yes  Occupation: retired from [employer]     Pain Level: pt c/o 0/10  pain  this session     Cognition: A&O: 4/4    Follows 1-2 step commands appropriately.               Memory: fair- (pt did not recognize therapists)             Comprehension fair-             Problem solving: fair-             Judgement/safety: fair-                Communication skills: WFL             Vision: WFL                    Glasses:no                                                       Hearing: WFL               RASS: 0  CAM-ICU: (NT) Delirium      UE Assessment:  Hand Dominance: Right [x]? Left []?       ROM Strength STM goal: PRN   RUE  Shoulder flexion/ abduction 45; WFL distal   (recent shoulder replacement s/p 4 months per pt report) 3-/5 proximal  WFL distal  WFL for ADLS      LUE Shoulder flexion/abducation grossly 80; WFL distal 3+/5 proximal  WFL distal  WFL for ADLS         Sensation: No c/o numbness/tingling in  extremities. Tone: WNL   Edema: WFL     Functional Assessment:  AM-PAC Daily Activity Raw Score: 15/24    Initial Eval Status  Date: 12/14/21 Treatment Status  Date:12/17 STGs = LTGs  Time frame: 7-14 days   Feeding S; set up S; set up                        [place]  while seated up in chair to increase activity tolerance         Grooming Mod A  NT                       Min A   while standing sink level requiring AD as needed for balance and demonstrating G tolerance       UB dressing/bathing Max A  NT                       Min A         LB dressing/bathing Dep     Max A   seated in chair using AE after  instruction  Min  A  AE  seated EOB; Min A to pull pants up over hips                       Min A   using AE as needed for safe reach/ energy conservation        Toileting NT NT                        Min A      Bed Mobility  Supine to sit:   Mod A     Sit to supine:   NT  Supine to sit:   Min A     Sit to supine:   NT                       SBA  in prep of ADL tasks & transfers   Functional Transfers Sit to stand:   Min A     Stand to sit:   Min A Sit to stand:   Min A     Stand to sit:   Min A                        SBA  sit<>stand/functional bathroom transfers using AD/DME as needed for balance and safety   Functional Mobility SPT: Mod A Min A   no device                        SBA   functional/bathroom mobility using AD as needed & demonstrating G safety      Balance Sitting:     Static: Min A    Dynamic:Min A  Standing:  Mod A Sitting:     Static: SBA    Dynamic:Min A  Standing: Min A  S dynamic sitting balance; SBA dynamic standing balance  during ADL tasks & transfers   Endurance/Activity Tolerance    F tolerance with moderate activity.   fair tolerance with light to moderate activity; min SOB with functional mobility ex G   tolerance with moderate activity/self care routine   Visual/  Perceptual    WFL                            Vitals:   HR at rest: 89 bpm HR at end of session: 96 bpm   Spo2 at rest:NT% Spo2 at end of session: 97%   BP at rest: 136/90 mmHg BP at end of session: 146/66 mmHg     Treatment: OT treatment provided this date:  Bed mobility: Instruction on precautions prior to bed mobility to facilitate safe transfers and ADLS. Pt required min cues for technique. Balance retraining: Performed sitting/standing balance ex's with instruction to facilitate righting reactions with postural changes during ADLS. ADL retraining: Instruction on adapted techniques/AE(reacher, sock aid) to increase independence and safe reach during dressing/bathing activities. Pt demonstrated good follow through. Delirium Prevention: Environmental and sensory modifications assessed and implemented to decrease ICU acquired delirium and to improve overall orientation, mentation and pt interaction with family/staff. Line management and environmental modifications made prior to and end of session to ensure patient safety and to increase efficiency of session. Skilled monitoring of HR, O2 saturation, blood pressure and patient's response to activity performed throughout session. Comments: OK from RN to see patient. Upon arrival, patient supine in bed, agreeable to session. Pt demo fair tolerance with good understanding of education/techniques. At end of session, patient left seated in chair with no complaints. Nurse aware. Call light within reach, all lines and tubes intact. Pt instructed on use of call light for assistance and fall prevention. Overall, pt presents with decreased activity tolerance, dynamic balance, functional mobility limiting completion of ADLs and safe return home. Pt can benefit from continued skilled OT to increase safety, functional independence & quality of life. · Pt has made good progress towards set goals.    · Continue with current plan of care       Time In:1410             Time Out: 1435         Total Treatment Time: 25      Treatment Charges: Mins Units   Ther Ex  [id number]     Manual Therapy [id number]     Thera Activities [id number]   ADL/Home Mgt [id number]   Neuro Re-ed [id number]     Orthotic manage/training  [id number]     Non-Billable Time         Cheo Ayon, OTR/L [id number]

## 2021-12-17 NOTE — CARE COORDINATION
Sat 96% on RA. S/p Egd which was neg for ugi bleed. Anemia secondary to c cath R fem puncture site hematoma. For cardiac Mri today. Will need ICD placed prior to discharge once cleared by ID. Dc plan is to return home at discharge. If hhc needed, wife has no preference.

## 2021-12-17 NOTE — PLAN OF CARE
Problem: Cardiac:  Goal: Ability to maintain vital signs within normal range will improve  Description: Ability to maintain vital signs within normal range will improve  Outcome: Met This Shift  Goal: Cardiovascular alteration will improve  Description: Cardiovascular alteration will improve  Outcome: Met This Shift     Problem: Falls - Risk of:  Goal: Will remain free from falls  Description: Will remain free from falls  Outcome: Met This Shift     Problem: Skin Integrity:  Goal: Will show no infection signs and symptoms  Description: Will show no infection signs and symptoms  Outcome: Met This Shift     Problem: Injury - Risk of, Physical Injury:  Goal: Will remain free from falls  Description: Will remain free from falls  Outcome: Met This Shift

## 2021-12-17 NOTE — PROGRESS NOTES
Chief Complaint:  STEMI (ST elevation myocardial infarction) ([facility] [address]     Subjective:    Feeling well, scant cough, no CP, no SOB, no fevers, overall feeling a lot better. No significant R groin discomfort. Objective:    BP (!) 136/90   Pulse 111   Temp 98.1 °F (36.7 °C)   Resp 23   Ht 5' 6\" (1.676 m)   Wt 189 lb 9.5 oz (86 kg)   SpO2 99%   BMI 30.60 kg/m²     Current medications that patient is taking have been reviewed. General appearance: NAD, conversant  HEENT: AT/NC, MMM  Neck: FROM, supple  Lungs: Clear to auscultation, WOB normal  CV: RRR, no MRGs  Abdomen: Soft, non-tender; no masses or HSM, +BS  Extremities: No peripheral edema or digital cyanosis.   Moderate soft bruising R groin  Skin: no rash, lesions or ulcers  Psych: Calm and cooperative  Neuro: Alert and interactive, face symmetric, moving all extremities, speech fluent    Labs:  CBC with Differential:    Lab Results   Component Value Date    WBC 16.7 12/17/2021    RBC 2.92 12/17/2021    HGB 8.6 12/17/2021    HCT 25.9 12/17/2021     12/17/2021    MCV 88.7 12/17/2021    MCH 29.5 12/17/2021    MCHC 33.2 12/17/2021    RDW 15.9 12/17/2021    NRBC 0.0 10/11/2018    LYMPHOPCT 4.4 12/12/2021    MONOPCT 2.6 12/12/2021    BASOPCT 0.2 12/12/2021    MONOSABS 0.25 12/12/2021    LYMPHSABS 0.33 12/12/2021    EOSABS 0.07 12/12/2021    BASOSABS 0.00 12/12/2021     CMP:    Lab Results   Component Value Date     12/17/2021    K 3.5 12/17/2021     12/17/2021    CO2 22 12/17/2021    BUN 18 12/17/2021    CREATININE 0.8 12/17/2021    GFRAA >60 12/17/2021    LABGLOM >60 12/17/2021    GLUCOSE 103 12/17/2021    PROT 5.9 12/17/2021    LABALBU 3.4 12/17/2021    CALCIUM 8.4 12/17/2021    BILITOT 2.1 12/17/2021    ALKPHOS 76 12/17/2021    AST 40 12/17/2021    ALT 36 12/17/2021          Assessment/Plan:  Principal Problem:    STEMI (ST elevation myocardial infarction) ([address]  Active Problems:    CAD (coronary artery disease)s/p stent RCA    Valvular heart disease    H/O psoriatic arthritis    Cardiac arrest (HCC)    Acute systolic (congestive) heart failure (HCC)    Ventricular tachycardia (HCC)    Mitral stenosis    Mitral regurgitation    Moderate to severe pulmonary hypertension (HCC)    Anemia  Resolved Problems:    * No resolved hospital problems.  *       Continue amio    Leukocytosis likely reactive, ID workup ongoing, awaiting blood cultures    ICD in near future once cleared by ID    DAPT, statin    Continue BB, entresto, spirono    DVT prophylaxis: UFH  Requires continued inpatient level of care     Nate Batista MD    3:59 PM  12/17/2021

## 2021-12-17 NOTE — PROGRESS NOTES
[facility]   INTENSIVE CARE UNIT    CRITICAL CARE ATTENDING PROGRESS NOTE    I have examined the patient, reviewed the record, and discussed the case with the APN/  Resident. I have reviewed all relevant labs and imaging data. Please refer to the  APN/ resident's note. I agree with the  assessment and plan with the following corrections/ additions. The following summarizes my clinical findings and independent assessment. CC: V. fib arrest    HOSPITAL COURSE:  12/12 pulseless V. tach, 1 round of CPR, cardiac cath with PCI placement LAD and LAD diagonal branch  12/13 awake and angry. Overnight he had tachycardic, right bundle branch block and ventricular overload on EKG  12/14 extubated yesterday. Patient is less confused  12/15 hb stable today at 8.3  12/16 wife present at bedside. EGD today  12/17 patient is sitting in a chair. He underwent EGD yesterday there is no evidence of upper GI bleed    EXAM:  GCS 14 awake alert to person place  Moves all extremities  Normal sinus  Abdomen soft nontender nondistended  Right groin ecchymotic    ASSESSMENT:  Principal Problem:    STEMI (ST elevation myocardial infarction) ([facility] [address]  Active Problems:    CAD (coronary artery disease)s/p stent RCA    Valvular heart disease    H/O psoriatic arthritis    Cardiac arrest (HCC)    Acute systolic (congestive) heart failure (HCC)    Ventricular tachycardia (HCC)    Mitral stenosis    Mitral regurgitation    Moderate to severe pulmonary hypertension ([facility] [address]    Anemia  Resolved Problems:    * No resolved hospital problems. *       PLAN:  Sedation/ Pain: Off all sedation    CV: Ventricular tachycardia- on amiodarone  Heart failure-on Lopressor, entresto increased  STEMI-status post cardiac cath.   On aspirin and Brilinta and Crestor  ICD on hold until cleared by ID  Cardiac MRI pending    Pulmonary: Acute respiratory failure resolved--extubated on 12/13    GI: On diet    FEN: Monitor urine output  On lasix 40 mg daily    Heme: Hemoglobin has been stable for the past 4 days. 8.0, 8.3, 7.9 and today's hemoglobin is 8.6  Platelets have recovered to 138  EGD from 12/16 shows no evidence of upper GI bleeding  Patient's anemia was secondary to hematoma after right femoral puncture site for his cardiac cath    Endo: Monitor Blood Sugars. Target blood glucose less than 180 in the ICU. DVT prophylaxis--SCDS, heparin 3 times daily  GI Prophylaxis--pepcid   Lines--right IJ triple-lumen catheter 12/12  CODE: FULL     DISPOSITION-Continue ICU Care    No active critical care issues. We extubated the patient is ready and perform the EGD with no evidence of upper GI bleeding  Please call if there are any questions. We'll sign off      Elias Victor MD, [facility]  12/17/2021  8:18 AM    NOTE: This report was transcribed using voice recognition software. Every effort was made to ensure accuracy; however, inadvertent computerized transcription errors may be present.

## 2021-12-17 NOTE — PROGRESS NOTES
Physical Therapy  Physical Therapy Treatment Note    Name: Emmy Paredes  : 1945  MRN: [id number]      Date of Service: 2021    Evaluating PT:  Andrés Yi, PT, DPT [id number]    Room #:  5438/6016-E  Diagnosis:  Cardiac arrest ([facility] [address] [I46.9]  PMHx/PSHx:  CAD, HTN, MI  Procedure/Surgery:   [facility]  Precautions:  Falls, O2, NG tube  Equipment Needs:  TBD    SUBJECTIVE:    Pt lives with wife in a 1 story home with 2 stairs to enter and no rail. Pt ambulated without device and was independent PTA. OBJECTIVE:   Initial Evaluation  Date: 21 Treatment  21 Short Term/ Long Term   Goals   AM-PAC 6 Clicks     Was pt agreeable to Eval/treatment? Yes Yes    Does pt have pain? No c/o pain No c/o pain    Bed Mobility  Rolling: NT  Supine to sit: ModA  Sit to supine: NT  Scooting: ModA Rolling: SBA  Supine to sit: SBA with HOB elevated  Sit to supine: NT  Scooting: Josiane Mod Independent   Transfers Sit to stand: Josiane  Stand to sit: Josiane  Stand pivot: ModA no device Sit to stand: Josiane  Stand to sit: Josiane  Stand pivot: Josiane no device Mod Independent with AAD if needed   Ambulation   A few steps to chair with ModA no device 50 feet with Josiane no device >400 feet with Mod Independent with AAD if needed   Stair negotiation: ascended and descended NT NT >4 steps with 1 rail Mod Independent   ROM BUE:  Defer to OT note  BLE:  WFL     Strength BUE:  Defer to OT note  BLE:  4/5  Increase by 1/3 MMT grade   Balance Sitting EOB:  Josiane  Dynamic Standing:  ModA no device Sitting EOB:  Josiane  Dynamic Standing:  Josiane no device Sitting EOB:  Independent  Dynamic Standing:   Mod Independent with AAD if needed     Pt is A & O x 4  CAM-ICU: NT  RASS: 0  Sensation:  No reported paresthesias  Edema:  None    Vitals:  Heart Rate at rest 90 bpm Heart Rate post session 94 bpm   SpO2 at rest -% SpO2 post session 95%   Blood Pressure at rest 136/90 mmHg Blood Pressure post session 146/66 mmHg Functional Status Score-Intensive Care Unit (FSS-ICU)   Rolling 5/7   Supine to sit transfer 5/7   Unsupported sitting  4/7   Sit to stand transfers 4/7   Ambulation 2/7   Total  20/35       Therapeutic Exercises:  NA    Patient education  Pt educated on safety    Patient response to education:   Pt verbalized understanding Pt demonstrated skill Pt requires further education in this area   x x x     ASSESSMENT:    Conditions Requiring Skilled Therapeutic Intervention:    [x]Decreased strength     []Decreased ROM  [x]Decreased functional mobility  [x]Decreased balance   [x]Decreased endurance   [x]Decreased posture  []Decreased sensation  []Decreased coordination   []Decreased vision  []Decreased safety awareness   []Increased pain       Comments:  RN reported pt was medically stable. Pt was in bed upon arrival, agreeable to treatment session. Pt demonstrated improved mobility this session. Cues provided for bed mobility technique. Pt ambulated with flexed posture and quick gait speed. SOB noted with ambulation as well as tachycardia into the 120s. Fatigue limited further activity. Pt was left in chair with all needs met and call light in reach. All lines remained intact. Treatment:  Patient practiced and was instructed in the following treatment:     Bed mobility training - pt given verbal and tactile cues to facilitate proper sequencing and safety during supine>sit as well as provided with physical assistance.  Sitting EOB for >5 minutes for upright tolerance, postural awareness and BLE ROM   Transfer training - pt was given verbal and tactile cues to facilitate proper hand placement, technique and safety during sit to stand, stand to sit and stand pivot transfers as well as provided with physical assistance.  Gait training- pt was given verbal and tactile cues to facilitate safety and balance during ambulation as well as provided with physical assistance.     PLAN:    Patient is making progress towards established goals. Will continue with current POC.       Time in  1400  Time out  1425    Total Treatment Time  25 minutes     CPT codes:  [] Gait training 53208 - minutes  [] Manual therapy 29292 - minutes  [x] Therapeutic activities 75213 25 minutes  [] Therapeutic exercises 53789 - minutes  [] Neuromuscular reeducation 82159 - minutes    Renea Ying, PT, DPT  [id number]

## 2021-12-18 ENCOUNTER — APPOINTMENT (OUTPATIENT)
Dept: GENERAL RADIOLOGY | Age: 76
DRG: 222 | End: 2021-12-18
Attending: INTERNAL MEDICINE
Payer: MEDICARE

## 2021-12-18 LAB
ANION GAP SERPL CALCULATED.3IONS-SCNC: 13 MMOL/L (ref 7–16)
ANISOCYTOSIS: ABNORMAL
BACTERIA: ABNORMAL /HPF
BASOPHILS ABSOLUTE: 0.02 E9/L (ref 0–0.2)
BASOPHILS RELATIVE PERCENT: 0.1 % (ref 0–2)
BILIRUBIN URINE: NEGATIVE
BLOOD, URINE: ABNORMAL
BUN BLDV-MCNC: 19 MG/DL (ref 6–23)
BURR CELLS: ABNORMAL
CALCIUM SERPL-MCNC: 8.2 MG/DL (ref 8.6–10.2)
CHLORIDE BLD-SCNC: 100 MMOL/L (ref 98–107)
CLARITY: CLEAR
CO2: 23 MMOL/L (ref 22–29)
COLOR: YELLOW
CREAT SERPL-MCNC: 0.8 MG/DL (ref 0.7–1.2)
EOSINOPHILS ABSOLUTE: 0.06 E9/L (ref 0.05–0.5)
EOSINOPHILS RELATIVE PERCENT: 0.4 % (ref 0–6)
GFR AFRICAN AMERICAN: >60
GFR NON-AFRICAN AMERICAN: >60 ML/MIN/1.73
GLUCOSE BLD-MCNC: 109 MG/DL (ref 74–99)
GLUCOSE URINE: NEGATIVE MG/DL
HCT VFR BLD CALC: 27.1 % (ref 37–54)
HEMOGLOBIN: 9.1 G/DL (ref 12.5–16.5)
IMMATURE GRANULOCYTES #: 0.1 E9/L
IMMATURE GRANULOCYTES %: 0.6 % (ref 0–5)
KETONES, URINE: NEGATIVE MG/DL
LEUKOCYTE ESTERASE, URINE: ABNORMAL
LYMPHOCYTES ABSOLUTE: 0.83 E9/L (ref 1.5–4)
LYMPHOCYTES RELATIVE PERCENT: 4.9 % (ref 20–42)
MAGNESIUM: 1.8 MG/DL (ref 1.6–2.6)
MCH RBC QN AUTO: 29.7 PG (ref 26–35)
MCHC RBC AUTO-ENTMCNC: 33.6 % (ref 32–34.5)
MCV RBC AUTO: 88.6 FL (ref 80–99.9)
MONOCYTES ABSOLUTE: 1.55 E9/L (ref 0.1–0.95)
MONOCYTES RELATIVE PERCENT: 9.2 % (ref 2–12)
NEUTROPHILS ABSOLUTE: 14.31 E9/L (ref 1.8–7.3)
NEUTROPHILS RELATIVE PERCENT: 84.8 % (ref 43–80)
NITRITE, URINE: NEGATIVE
PDW BLD-RTO: 16.7 FL (ref 11.5–15)
PH UA: 6.5 (ref 5–9)
PHOSPHORUS: 3.1 MG/DL (ref 2.5–4.5)
PLATELET # BLD: 174 E9/L (ref 130–450)
PMV BLD AUTO: 11.3 FL (ref 7–12)
POIKILOCYTES: ABNORMAL
POLYCHROMASIA: ABNORMAL
POTASSIUM SERPL-SCNC: 3.4 MMOL/L (ref 3.5–5)
PROTEIN UA: NEGATIVE MG/DL
RBC # BLD: 3.06 E12/L (ref 3.8–5.8)
RBC UA: ABNORMAL /HPF (ref 0–2)
SODIUM BLD-SCNC: 136 MMOL/L (ref 132–146)
SPECIFIC GRAVITY UA: 1.01 (ref 1–1.03)
TARGET CELLS: ABNORMAL
UROBILINOGEN, URINE: 1 E.U./DL
WBC # BLD: 16.9 E9/L (ref 4.5–11.5)
WBC UA: ABNORMAL /HPF (ref 0–5)

## 2021-12-18 PROCEDURE — 83735 ASSAY OF MAGNESIUM: CPT

## 2021-12-18 PROCEDURE — 6360000002 HC RX W HCPCS: Performed by: INTERNAL MEDICINE

## 2021-12-18 PROCEDURE — 71046 X-RAY EXAM CHEST 2 VIEWS: CPT

## 2021-12-18 PROCEDURE — 85025 COMPLETE CBC W/AUTO DIFF WBC: CPT

## 2021-12-18 PROCEDURE — 2140000000 HC CCU INTERMEDIATE R&B

## 2021-12-18 PROCEDURE — 99232 SBSQ HOSP IP/OBS MODERATE 35: CPT | Performed by: INTERNAL MEDICINE

## 2021-12-18 PROCEDURE — 84100 ASSAY OF PHOSPHORUS: CPT

## 2021-12-18 PROCEDURE — 6370000000 HC RX 637 (ALT 250 FOR IP): Performed by: NURSE PRACTITIONER

## 2021-12-18 PROCEDURE — 80048 BASIC METABOLIC PNL TOTAL CA: CPT

## 2021-12-18 PROCEDURE — 6370000000 HC RX 637 (ALT 250 FOR IP): Performed by: INTERNAL MEDICINE

## 2021-12-18 PROCEDURE — 81001 URINALYSIS AUTO W/SCOPE: CPT

## 2021-12-18 PROCEDURE — 36415 COLL VENOUS BLD VENIPUNCTURE: CPT

## 2021-12-18 PROCEDURE — 6370000000 HC RX 637 (ALT 250 FOR IP): Performed by: STUDENT IN AN ORGANIZED HEALTH CARE EDUCATION/TRAINING PROGRAM

## 2021-12-18 PROCEDURE — 87186 SC STD MICRODIL/AGAR DIL: CPT

## 2021-12-18 PROCEDURE — 2580000003 HC RX 258: Performed by: INTERNAL MEDICINE

## 2021-12-18 PROCEDURE — 99291 CRITICAL CARE FIRST HOUR: CPT | Performed by: INTERNAL MEDICINE

## 2021-12-18 PROCEDURE — 87088 URINE BACTERIA CULTURE: CPT

## 2021-12-18 RX ORDER — DIPHENHYDRAMINE HCL 25 MG
50 TABLET ORAL ONCE
Status: COMPLETED | OUTPATIENT
Start: 2021-12-20 | End: 2021-12-20

## 2021-12-18 RX ORDER — POTASSIUM CHLORIDE 20 MEQ/1
40 TABLET, EXTENDED RELEASE ORAL ONCE
Status: COMPLETED | OUTPATIENT
Start: 2021-12-18 | End: 2021-12-18

## 2021-12-18 RX ORDER — METOPROLOL SUCCINATE 50 MG/1
50 TABLET, EXTENDED RELEASE ORAL 2 TIMES DAILY
Status: DISCONTINUED | OUTPATIENT
Start: 2021-12-18 | End: 2021-12-21 | Stop reason: HOSPADM

## 2021-12-18 RX ORDER — HYDRALAZINE HYDROCHLORIDE 25 MG/1
25 TABLET, FILM COATED ORAL EVERY 8 HOURS SCHEDULED
Status: DISCONTINUED | OUTPATIENT
Start: 2021-12-18 | End: 2021-12-19

## 2021-12-18 RX ORDER — MAGNESIUM SULFATE IN WATER 40 MG/ML
2000 INJECTION, SOLUTION INTRAVENOUS ONCE
Status: COMPLETED | OUTPATIENT
Start: 2021-12-18 | End: 2021-12-18

## 2021-12-18 RX ADMIN — ROSUVASTATIN 40 MG: 20 TABLET, FILM COATED ORAL at 20:55

## 2021-12-18 RX ADMIN — SODIUM CHLORIDE, PRESERVATIVE FREE 10 ML: 5 INJECTION INTRAVENOUS at 05:37

## 2021-12-18 RX ADMIN — METOPROLOL SUCCINATE 50 MG: 50 TABLET, EXTENDED RELEASE ORAL at 09:14

## 2021-12-18 RX ADMIN — POTASSIUM CHLORIDE 40 MEQ: 1500 TABLET, EXTENDED RELEASE ORAL at 09:12

## 2021-12-18 RX ADMIN — AMIODARONE HYDROCHLORIDE 400 MG: 200 TABLET ORAL at 09:13

## 2021-12-18 RX ADMIN — MAGNESIUM SULFATE HEPTAHYDRATE 2000 MG: 40 INJECTION, SOLUTION INTRAVENOUS at 09:13

## 2021-12-18 RX ADMIN — HYDRALAZINE HYDROCHLORIDE 25 MG: 25 TABLET, FILM COATED ORAL at 09:12

## 2021-12-18 RX ADMIN — TICAGRELOR 90 MG: 90 TABLET ORAL at 20:55

## 2021-12-18 RX ADMIN — HYDRALAZINE HYDROCHLORIDE 25 MG: 25 TABLET, FILM COATED ORAL at 21:02

## 2021-12-18 RX ADMIN — Medication 10 ML: at 22:05

## 2021-12-18 RX ADMIN — POLYETHYLENE GLYCOL 3350 17 G: 17 POWDER, FOR SOLUTION ORAL at 09:13

## 2021-12-18 RX ADMIN — SACUBITRIL AND VALSARTAN 1 TABLET: 97; 103 TABLET, FILM COATED ORAL at 22:04

## 2021-12-18 RX ADMIN — FUROSEMIDE 40 MG: 20 TABLET ORAL at 09:13

## 2021-12-18 RX ADMIN — PANTOPRAZOLE SODIUM 40 MG: 40 TABLET, DELAYED RELEASE ORAL at 20:55

## 2021-12-18 RX ADMIN — ASPIRIN 81 MG CHEWABLE TABLET 81 MG: 81 TABLET CHEWABLE at 09:13

## 2021-12-18 RX ADMIN — PANTOPRAZOLE SODIUM 40 MG: 40 TABLET, DELAYED RELEASE ORAL at 09:14

## 2021-12-18 RX ADMIN — ISOSORBIDE MONONITRATE 30 MG: 30 TABLET ORAL at 09:14

## 2021-12-18 RX ADMIN — HEPARIN SODIUM 5000 UNITS: 10000 INJECTION INTRAVENOUS; SUBCUTANEOUS at 21:02

## 2021-12-18 RX ADMIN — HEPARIN SODIUM 5000 UNITS: 10000 INJECTION INTRAVENOUS; SUBCUTANEOUS at 13:26

## 2021-12-18 RX ADMIN — HYDRALAZINE HYDROCHLORIDE 25 MG: 25 TABLET, FILM COATED ORAL at 13:26

## 2021-12-18 RX ADMIN — METOPROLOL SUCCINATE 50 MG: 50 TABLET, EXTENDED RELEASE ORAL at 20:55

## 2021-12-18 RX ADMIN — TICAGRELOR 90 MG: 90 TABLET ORAL at 09:14

## 2021-12-18 RX ADMIN — AMIODARONE HYDROCHLORIDE 400 MG: 200 TABLET ORAL at 20:55

## 2021-12-18 RX ADMIN — SPIRONOLACTONE 25 MG: 25 TABLET ORAL at 09:13

## 2021-12-18 RX ADMIN — SACUBITRIL AND VALSARTAN 1 TABLET: 97; 103 TABLET, FILM COATED ORAL at 09:14

## 2021-12-18 RX ADMIN — HEPARIN SODIUM 5000 UNITS: 10000 INJECTION INTRAVENOUS; SUBCUTANEOUS at 05:37

## 2021-12-18 RX ADMIN — Medication 10 ML: at 09:13

## 2021-12-18 ASSESSMENT — PAIN SCALES - GENERAL
PAINLEVEL_OUTOF10: 0

## 2021-12-18 NOTE — PROGRESS NOTES
Cardiac Electrophysiology Inpatient Progress Note    Fabiano Williamson  1945  Date of Service: 12/18/2021  PCP: Bedelia Spurling, MD  Cardiologist: Jayda Noel MD   Primary Electrophysiologist: Dominick Khan MD   Attending Electrophysiologist: Nina Nunez MD         Subjective: Fabiano Williamson is seen in hospital follow-up and management of: VT     Initial Hospital consult (12/13/21):  77year old male with known CAD, STEMI 1997 with PCI to the RCA,   LVEF 45-50% on TTE 03/30/17, 35-40% on TTE 10/09/18, LVEF of 29% on a Lexiscan stress test 07/01/20. Other hx includes HTN, HLD, obesity S/p gastric bypass, Carpal tunnel surgery. At baseline, he is on Entresto, spironolactone, Metoprolol 50 mg bid  He presented to ED 12/12 with palpitation, weakness for one hour and was found to be in sustained VT monomorphic, Rate 190, LBBB, superior axis. He was given Lidocaine and while preparing to cardiovert patient, he had pulseless VT and CPR was started, he was intubated, defibrillated x1. There was a concern for inferior STEMI thus Cath lab was activated. Cath showed prox LAD 80-90%, mod D2 90% ostial, apical LAD 70%, diffuse Lcx 70-80% lesions, mild diffuse RCA tsenosis  LV gram showed possible LV aneurysm, mod MR and inferior akinesis, dilated LV. FFR of LAD proved that it was significant thus he had simultaneous RYAN to prox LAD and angioplasty to ostium Diagonal branch     He is intubated and awake, not really following commands. He is on Lidocaine 1mg/min, no pressors, metoprolol 25 mg bid    (12/14/21): He converted to AF with a controled ventricular rate overnight without recurrent VT, he remains on IV Lidocaine 0.5 mg/min. Today he is confused to his situation and agreeable. His count dropped from 12 to 8.0 this morning.     (12/15/21): He has improved cognition today and is in sinus, he awaits EGD today due to ongoing anemia with a PRBC transfusion on 12/14.  No recurrent VT and tolerating amiodarone well. (12/16/21): He had recurrent AF with RVR last night with rates into the 140's today his H/H continues to decline. (12/17/21): The patient is seen in hospital follow-up, he continues in AF with rates in the 90's he is tolerating the increased dose of Toprol as well as the increased dose of Entresto. Today he is awake and alert without cardiac complaints. 12/18/21: The patient is seen in the hospital for follow up. He remains in AFL with HR of  bpm. He denies any chest pain, dyspnea or palpitations.      Patient Active Problem List   Diagnosis    CAD (coronary artery disease)s/p stent RCA    Valvular heart disease    Hypertension    Obesity, morbid s/p intestinal bypass    STEMI [facility] elevation myocardial infarction) ([facility] [address]    Stented coronary artery    Hyperlipidemia    H/O psoriatic arthritis    Acute upper GI bleed    SVT (supraventricular tachycardia) (HCC)    LV dysfunction    Cardiac arrest ([facility] [address]    Acute systolic (congestive) heart failure (HCC)    Ventricular tachycardia (HCC)    Mitral stenosis    Mitral regurgitation    Moderate to severe pulmonary hypertension (HCC)    Anemia         Scheduled Medications:   sacubitril-valsartan  1 tablet Oral BID    metoprolol succinate  37.5 mg Oral BID    furosemide  40 mg Oral Daily    pantoprazole  40 mg Oral BID    isosorbide mononitrate  30 mg Oral Daily    amiodarone  400 mg Oral BID    spironolactone  25 mg Oral Daily    polyethylene glycol  17 g Oral Daily    sodium chloride flush  5-40 mL IntraVENous 2 times per day    rosuvastatin  40 mg Oral Nightly    aspirin  81 mg Oral Daily    ticagrelor  90 mg Oral BID    heparin (porcine)  5,000 Units SubCUTAneous Q8H       Infusion Medications:   sodium chloride         PRN Medications:  labetalol, hydrALAZINE, sodium chloride flush, sodium chloride, acetaminophen, ondansetron    Allergies   Allergen Reactions    Dye [Iodides] Anaphylaxis Passing out    Dust Mite Extract Other (See Comments)     All year around       [facility] Readings from Last 3 Encounters:   12/14/21 189 lb 9.5 oz (86 kg)   12/12/21 168 lb (76.2 kg)   11/16/21 174 lb 9.6 oz (79.2 kg)     Temp Readings from Last 3 Encounters:   12/17/21 98.1 °F (36.7 °C) (Temporal)   12/12/21 97.8 °F (36.6 °C) (Temporal)   10/12/21 97.4 °F (36.3 °C)     BP Readings from Last 3 Encounters:   12/17/21 (!) 144/84   12/16/21 115/72   12/12/21 (!) 103/59     Pulse Readings from Last 3 Encounters:   12/18/21 96   12/12/21 81   11/16/21 74         Intake/Output Summary (Last 24 hours) at 12/18/2021 0757  Last data filed at 12/18/2021 0650  Gross per 24 hour   Intake 480 ml   Output 250 ml   Net 230 ml       ROS:    Constitutional: Negative for fever. Positive for activity change. Negative for appetite change. HENT: Negative for epistaxis, difficulty swallowing. Eyes: Negative for blurred vision or double vision. Respiratory: Negative for cough, chest tightness, shortness of breath and wheezing. Cardiovascular: Negative for chest pain, palpitations and leg swelling. Gastrointestinal: Negative for abdominal pain, heartburn, or blood in stool. Genitourinary: Negative for hematuria, burning or frequency. Musculoskeletal: Negative for myalgias, stiffness, or swelling. Skin: Negative for rash, pain, or lumps. Neurological: Negative for syncope, seizures, or headaches. Psychiatric/Behavioral: Negative for confusion and agitation. The patient is not nervous/anxious. PHYSICAL EXAM:  Vitals:    12/17/21 1530 12/17/21 1925 12/17/21 2300 12/18/21 0310   BP: 138/67 132/80 (!) 144/84    Pulse: 82 92 93 96   Resp: 20 16 16    Temp: 98.1 °F (36.7 °C) 99.1 °F (37.3 °C) 98.1 °F (36.7 °C)    TempSrc:  Temporal Temporal    SpO2: 98% 100% 99%    Weight:       Height:         Constitutional: Well-developed, no acute distress seen in ICU no family at bedside.    Eyes: conjunctivae normal, no xanthelasma   Ears, Nose, Throat: oral mucosa moist, no cyanosis   CV: no JVD. IRIR. No murmurs, rubs, or gallops. PMI is nondisplaced  Lungs: clear to auscultation bilaterally, normal respiratory effort without used of accessory muscles  Abdomen: soft, non-tender, bowel sounds present, no masses or hepatomegaly   Musculoskeletal: no digital clubbing, no edema   Skin: warm, no rashes     Pertinent Labs:  CBC:   Recent Labs     12/16/21  0545 12/17/21  0450 12/18/21  0540   WBC 21.9* 16.7* 16.9*   HGB 7.9* 8.6* 9.1*   HCT 23.2* 25.9* 27.1*   * 138 174     BMP:  Recent Labs     12/16/21  0545 12/17/21  0450 12/18/21  0540    136 136   K 4.0 3.5 3.4*    101 100   CO2 20* 22 23   BUN 16 18 19   CREATININE 0.7 0.8 0.8   CALCIUM 8.5* 8.4* 8.2*       TSH:   Lab Results   Component Value Date    TSH 3.820 12/12/2021      Lipid Profile:   Lab Results   Component Value Date    TRIG 61 12/13/2021    HDL 39 12/13/2021    LDLCALC 49 12/13/2021    CHOL 100 12/13/2021      Pertinent Cardiac Testing:     EKG (12/12/21): VT rate 190 bpm, Please see scan in Cardiology. EKG (12/12/21): NSR rate 76 bpm, QRS 98ms, QTc 490ms  Please see scan in Cardiology. ECG (12/14/21): Atrial fibrillation vs fib/flutter rate 122 bpm, QRS 86 ms, QTc 493ms      Echocardiogram (12/12/21):    Micro-bubble contrast injected ( to try to define an anomaly seen on the   LV gram ) --> could not be defined --> consider cardiac MRI ( once / if   patient recovers / is stable ).   The left ventricle is dilated.   The inferior wall, the inferolateral wall, the basal lateral wall and the   posterior wall are thin, scarred and akinetic.   The right ventricle is dilated.   Right ventricle global systolic function is mildly reduced ( TAPSE = 1.6   ).   The left atrium is mildly dilated.   The right atrium is moderately dilated.   Focal calcification mitral valve leaflets.   The mitral valve leaflets are restricted in mobility   Mitral annular calcification is present.   Mitral stenosis is present ( peak / mean gradient 17.66 / 5.24 mmHg; area   1.0 cm2 ).  Moderate to Severe mitral regurgitation.   Trace aortic regurgitation.   Mild tricuspid regurgitation.   Moderate to severe pulmonary hypertension.     Cardiac Catheterization (12/12/21):   CONCLUSIONS:  1.  Coronary artery disease. a.  LEFT MAIN:  10% distal vessel tapering.  b.  LAD:  30% eccentric proximal vessel narrowing and 80% to 90% very  proximal eccentric bifurcation lesion at the site of takeoff of a large  diagonal branch.  90% ostial stenosis and 70% to 80% proximal stenosis  of a moderate-sized second diagonal branch.  90% ostial stenosis of a  small third diagonal branch.  50% disease of the LAD after the second  diagonal branch.  60% disease of the LAD after the third diagonal  branch.  Long up to 60% to 70% disease of the apical LAD before the  mustache.  c.  LCX:  Basically a large lateral branch with 70% to 80% proximal  vessel disease and 80% mid vessel disease. d.  RCA:  Dominant vessel with 40% mid vessel narrowing and 70% ostial  narrowing of the largest subdivision of the posterolateral branch. 2.  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 3.  Hemodynamically significant proximal LAD disease with pressure wire  evaluation yielding an IFR of 0.70 and _____. 4.  Successful balloon angioplasty with deployment of drug-eluting  coronary stent to the proximal LAD and balloon angioplasty to the ostium  of the diagonal branch (bifurcation lesion; double wire; kissing  balloons) with very good results.     Stress Test (07/01/20):   1. ECG during the infusion did not change.    2. The myocardial perfusion imaging was abnormal. The abnormality was a a large sized fixed defect in the inferolateral wall suggestive of a prior MI  3. Overall left ventricular systolic function was abnormal with regional wall motion abnormalities. 4. High risk general pharmacologic stress test. Due to LV dysfunction.     Cardiac MRI 12/17/21:  1.  Mildly dilated left ventricle with severe LV systolic dysfunction,   LVEF 21%. 2.  Mid to basal inferior and inferolateral walls are akinetic. 3.  No evidence of aneurysm or pseudoaneurysm involving the left   ventricle. 4.  Moderately dilated right ventricle with mild RV dysfunction. 5.  Severe mitral regurgitation. Moderate tricuspid regurgitation. 6.  Moderately dilated right and left atria. 7.  Transmural infarct involving the mid to basal inferior and   inferolateral walls. 8.  No pericardial effusion. 9.  Small bilateral pleural effusions. 10.  Partially visualized large left renal cyst (consider further   evaluation if clinically indicated).       Zainab Bentley MD, [facility] cardiology          Cardiac MRI (08/13/19): Mildly dilated left ventricle (LVEDVi 125cc/m2) with a   mild-to-moderate reduction in systolic function (LVEF 88%).  There are   thinned and akinetic [id number] inferior, inferolateral and   anterolateral segments with associated transmural delayed enhancement as   well as hypokinesis of the [id number] inferoseptum with subendocardial   enhancement.  This constellation of findings is consistent with ischemic   cardiomyopathy with an overall low probability of myocardial viability. 2. Posterior mitral leaflet tethering secondary to the akinetic basal   segments resulting in a mild-to-moderate anteriorly directed eccentric   jet of mitral regurgitation (Rvol 19-24 cc/beat; Rfrac 20-25%)     3. Moderate biatrial enlargement.      4. The arch vessel branching pattern is remarkable for a left sided arch   with an aberrant right subclavian artery that takes the typical   retroesophageal course.      Prior outpatient monitor (09/27/21): Patient had a min HR of 33 bpm, max HR of 133 bpm, and avg HR of 50 bpm. Predominant underlying rhythm was Sinus Rhythm. 2 Ventricular Tachycardia runs occurred, the run with the fastest interval lasting 5 beats with a max rate of 133 bpm (avg 108 bpm); the run with the fastest interval was also the longest. Idioventricular Rhythm was present. Isolated SVEs were frequent (6.2%, 6313), SVE Couplets were rare (<1.0%, 150), and SVE Triplets were rare (<1.0%, 10). Isolated VEs were frequent (10.0%, 45587), VE Couplets were occasional (1.6%, 812), and VE Triplets were rare (<1.0%, 30). Ventricular Bigeminy and Trigeminy were present. Sskooctrr44/18/2021: Atrial flutter rate  bpm.    Impression:    1. VT arrest  - Known CAD  on Entresto, BB, Sprinolactone  - last LVEF 29% on SPECT with fixed inferior large scar  - Now with sustained MMVT with hemodynamic compromise and arrest rate 190 bpm  - Cath findings do not suggest ACS but more likely severe CAD and is s/p revascularization of LAD and ostial of diagonal with residual high burden diffuse multivessel CAD   - Cardiac MRI from 2019 shows scar in the inf/inflat areas  - LV EF 21% on cardiac MRI 12/17/21.  - Amiodarone started 12/14/21 400mg BID.   - No recurrent VT  - The risks, benefits, and alternatives to ICD implantation and possible defibrillation threshold testing were discussed with the patient. These risks include but are not limited to bleeding, infection, blood clot, pneumothorax, hemothorax, cardiac valve damage, cardiac perforation and tamponade required emergent thoracotomy, contrast induced nephropathy leading to short or even long term dialysis, vascular injury requiring emergent surgical repair, lead dislodgement required reposition, stroke and even death.  The patient understands these risks and wishes to proceed with ICD implantation and possible defibrillation threshold testing. 2. New onset atrial flutter  - First seen 12/14/21  - CHADS-VASc 5 (age, VASc, CHF, HTN)  - Currently not on anticoagulation due to anemia.  - On Toprol XL for rate control.  - Currently in AF with CVR    3.  HFrEF   - Ischemic   - LVEF 50% 10/03/09 on TTE   - LVEF 45-50% 03/30/17 on TTE   - LVEF 35-40% 10/09/19 on TTE   - LVEF 29% 07/01/20 on Lexiscan stress test   - LVEF 24% on TTE 12/12/21  - LV EF 21% on Cardiac MRI  - GDMT: Entresto,  Lasix, Toprol XL, and Aldactone.     4. Coronary artery disease   - Inferior STEMI 1997 s/p PCI RCA.  - Known Large sized inferolateral fixed defect suggesting prior MI.  - [facility] 12/12/21 patient RCA stent, high grade stenosis of the LAD-D1 S/p PCI. - On statin, BB, ASA, Imdur, Brilinta.      5. Hypertension    - BP remains elevated. - Management per Cardiology     6. HLD      7. Obesity s/p gastric bypass     8. IV dye allergy  - Anaphylaxis       9. psoriatic arthritis      10. Anemia   -  Count 8.6/25.9 today with a negative EGD on 12/16/21    11. Leukocytosis   - WBC 21.9 on 12/16, possibly reactionary blood cultures pending. 12.  Possible aneurysm or a pseudoaneurysm seen on LV gram 12/12/21.   - Cardiac MRI showed no aneurysm. 13. Valvular heart disease  - Severe MR and moderate TR on Cardiac MRI. Recommendations:    1. Continue oral Amiodarone loading dose   2. Will increase Toprol XL to 50 mg bid. 3. Will hold off [address] until after ICD implantation due to ongoing anemia and groin hematoma. Consider switch Brilinta to Plavix when he is on triple therapy. 4. Tentative plan for dual chamber ICD implantation for secondary prevention of SCD on Monday pending blood culture results and ID clearance. 5. Dicussed with Dr. Marino Mccain, Cardiology is planing to address his MR in future. 6. Will give start Prednisolone on Sunday night due to history of dye allergy.   7. Keep potassium around 4 and magnesium around 2. Thank you for allowing me to participate in your patient's care. I have spent a total of 35 minutes of CCT with the patient and the family reviewing the above stated recommendations. And a total of >50% of that time involved face-to-face time providing counseling and/or coordination of care with the other providers, preparation for the clinic visit, reviewing records/tests, counseling/education of the patient, ordering medications/tests/procedures, coordinating care, and documenting clinical information in the EHR.      Maggie Dixon MD  Cardiac Electrophysiology  [address]  [facility]: [place] [place] [facility]  7:57 AM  12/18/2021

## 2021-12-18 NOTE — PLAN OF CARE
Problem: Cardiac:  Goal: Ability to maintain vital signs within normal range will improve  Description: Ability to maintain vital signs within normal range will improve  12/17/2021 2233 by Abigail Madrid RN  Outcome: Met This Shift  12/17/2021 1627 by Dianna Zavala RN  Outcome: Met This Shift  Goal: Cardiovascular alteration will improve  Description: Cardiovascular alteration will improve  12/17/2021 1627 by Dianna Zavala RN  Outcome: Met This Shift     Problem: Health Behavior:  Goal: Will modify at least one risk factor affecting health status  Description: Will modify at least one risk factor affecting health status  Outcome: Met This Shift     Problem: Physical Regulation:  Goal: Complications related to the disease process, condition or treatment will be avoided or minimized  Description: Complications related to the disease process, condition or treatment will be avoided or minimized  Outcome: Met This Shift     Problem: Cardiac Output - Decreased:  Goal: Hemodynamic stability will improve  Description: Hemodynamic stability will improve  Outcome: Met This Shift

## 2021-12-18 NOTE — PATIENT CARE CONFERENCE
[facility] Quality Flow/Interdisciplinary Rounds Progress Note        Quality Flow Rounds held on December 18, 2021    Disciplines Attending:  Bedside Nurse and Nursing Unit Leadership    Cholo Carlisle was admitted on 12/12/2021  2:47 PM    Anticipated Discharge Date:  Expected Discharge Date: 12/18/21    Disposition:    Avelino Score:  Avelino Scale Score: 20    Readmission Risk              Risk of Unplanned Readmission:  24           Discussed patient goal for the day, patient clinical progression, and barriers to discharge.   The following Goal(s) of the Day/Commitment(s) have been identified:  Labs - Report Results and get ready for ICD      Dionna Morrison RN  December 18, 2021

## 2021-12-18 NOTE — PROGRESS NOTES
Department of Internal Medicine  Infectious Diseases  Progress Note      C/C :  Leukocytosis , ID clearance for ICD  insertion     Pt is awake and alert  Denies fever or chills  Denies SOB   Afebrile    Current Facility-Administered Medications   Medication Dose Route Frequency Provider Last Rate Last Admin    magnesium sulfate 2000 mg in 50 mL IVPB premix  2,000 mg IntraVENous Once Jered Garcia DO 25 mL/hr at 12/18/21 0913 2,000 mg at 12/18/21 0913    hydrALAZINE (APRESOLINE) tablet 25 mg  25 mg Oral 3 times per day Jered Garcia DO   25 mg at 12/18/21 7723    metoprolol succinate (TOPROL XL) extended release tablet 50 mg  50 mg Oral BID Uvaldo Myers MD   50 mg at 12/18/21 0914    [START ON 12/20/2021] predniSONE (DELTASONE) tablet 50 mg  50 mg Oral Once Uvaldo Myers MD        Followed by   Timbo Roldan ON 12/20/2021] predniSONE (DELTASONE) tablet 50 mg  50 mg Oral Once Uvaldo Myers MD        Followed by   Timbo Roldan ON 12/20/2021] predniSONE (DELTASONE) tablet 50 mg  50 mg Oral Once Uvaldo Myers MD        Followed by   Timbo Roldan ON 12/20/2021] diphenhydrAMINE (BENADRYL) tablet 50 mg  50 mg Oral Once Uvaldo Myers MD        sacubitril-valsartan (ENTRESTO)  MG per tablet 1 tablet  1 tablet Oral BID Anastacia Brownlee MD   1 tablet at 12/18/21 0914    furosemide (LASIX) tablet 40 mg  40 mg Oral Daily Anastacia Brownlee MD   40 mg at 12/18/21 0913    pantoprazole (PROTONIX) tablet 40 mg  40 mg Oral BID Talat Jalloh MD   40 mg at 12/18/21 0914    labetalol (NORMODYNE;TRANDATE) injection 10 mg  10 mg IntraVENous Q30 Min PRN Danette Silvestre MD   10 mg at 12/16/21 0110    hydrALAZINE (APRESOLINE) injection 10 mg  10 mg IntraVENous Q30 Min PRN Danette Silvestre MD        isosorbide mononitrate (IMDUR) extended release tablet 30 mg  30 mg Oral Daily Anastacia Brownlee MD   30 mg at 12/18/21 0914    amiodarone (CORDARONE) tablet 400 mg  400 mg Oral BID Danial Cranker, APRN - CNP   400 mg at 12/18/21 0913    spironolactone (ALDACTONE) tablet 25 mg  25 mg Oral Daily Luis Sousa MD   25 mg at 12/18/21 0913    polyethylene glycol (GLYCOLAX) packet 17 g  17 g Oral Daily Melvin Noguera DO   17 g at 12/18/21 0913    sodium chloride flush 0.9 % injection 5-40 mL  5-40 mL IntraVENous 2 times per day Luis Sousa MD   10 mL at 12/18/21 0913    sodium chloride flush 0.9 % injection 5-40 mL  5-40 mL IntraVENous PRN Luis Sousa MD   10 mL at 12/18/21 0537    0.9 % sodium chloride infusion  25 mL IntraVENous PRN Luis Sousa MD        acetaminophen (TYLENOL) tablet 650 mg  650 mg Oral Q4H PRN Luis Sousa MD   650 mg at 12/15/21 1719    ondansetron (ZOFRAN) injection 4 mg  4 mg IntraVENous Q6H PRN Luis Sousa MD        rosuvastatin (CRESTOR) tablet 40 mg  40 mg Oral Nightly Luis Sousa MD   40 mg at 12/17/21 2057    aspirin chewable tablet 81 mg  81 mg Oral Daily Luis Sousa MD   81 mg at 12/18/21 0913    ticagrelor (BRILINTA) tablet 90 mg  90 mg Oral BID Luis Sousa MD   90 mg at 12/18/21 0914    heparin (porcine) injection 5,000 Units  5,000 Units SubCUTAneous Q8H Luis Sousa MD   5,000 Units at 12/18/21 0537         REVIEW OF SYSTEMS:    CONSTITUTIONAL:  Denies fever, chill or rigors  HEENT: denies blurring of vision or double vision, denies hearing problem  RESPIRATORY: denies cough, shortness of breath, sputum expectoration. CARDIOVASCULAR:  Denies palpitation  GASTROINTESTINAL:  Denies abdomen pain, diarrhea or constipation. GENITOURINARY:  Denies burning urination or frequency of urination  INTEGUMENT: Ecchymosis   HEMATOLOGIC/LYMPHATIC:  Denies lymph node swelling, gum bleeding or easy bruising.   MUSCULOSKELETAL:  Denies leg pain , joint pain , joint swelling  NEUROLOGICAL:  Weakness       PHYSICAL EXAM:      Vitals:     BP (!) 145/89   Pulse 75   Temp 97.3 °F (36.3 °C)   Resp 20   Ht 5' 6\" (1.676 m)   Wt 189 lb 9.5 oz (86 kg)   SpO2 99% BMI 30.60 kg/m²     General Appearance:    Awake, alert , no acute distress. Head:    Normocephalic, atraumatic   Eyes:    No pallor, no icterus,   Ears:    No obvious deformity or drainage.    Nose:   No nasal drainage   Throat:   Mucosa moist, no oral thrush   Neck:   Supple, no lymphadenopathy   Back:     no CVA tenderness   Lungs:     Clear to auscultation bilaterally    Heart:    Irregular    Abdomen:     Soft, non-tender, bowel sounds present    Extremities:   No edema, non tender    Pulses:   Dorsalis pedis palpable    Skin:   Right groin ecchymosis        CBC with Differential:      Lab Results   Component Value Date    WBC 16.9 12/18/2021    RBC 3.06 12/18/2021    HGB 9.1 12/18/2021    HCT 27.1 12/18/2021     12/18/2021    MCV 88.6 12/18/2021    MCH 29.7 12/18/2021    MCHC 33.6 12/18/2021    RDW 16.7 12/18/2021    NRBC 0.0 10/11/2018    LYMPHOPCT 4.9 12/18/2021    MONOPCT 9.2 12/18/2021    BASOPCT 0.1 12/18/2021    MONOSABS 1.55 12/18/2021    LYMPHSABS 0.83 12/18/2021    EOSABS 0.06 12/18/2021    BASOSABS 0.02 12/18/2021       CMP     Lab Results   Component Value Date     12/18/2021    K 3.4 12/18/2021    K 3.5 12/17/2021     12/18/2021    CO2 23 12/18/2021    BUN 19 12/18/2021    CREATININE 0.8 12/18/2021    GFRAA >60 12/18/2021    LABGLOM >60 12/18/2021    GLUCOSE 109 12/18/2021    PROT 5.9 12/17/2021    LABALBU 3.4 12/17/2021    CALCIUM 8.2 12/18/2021    BILITOT 2.1 12/17/2021    ALKPHOS 76 12/17/2021    AST 40 12/17/2021    ALT 36 12/17/2021         Hepatic Function Panel:    Lab Results   Component Value Date    ALKPHOS 76 12/17/2021    ALT 36 12/17/2021    AST 40 12/17/2021    PROT 5.9 12/17/2021    BILITOT 2.1 12/17/2021    LABALBU 3.4 12/17/2021       PT/INR:    Lab Results   Component Value Date    PROTIME 13.4 10/08/2018    INR 1.2 10/08/2018       TSH:    Lab Results   Component Value Date    TSH 3.820 12/12/2021       U/A:  No results found for: NITRITE, COLORU, PHUR, LABCAST, WBCUA, RBCUA, MUCUS, TRICHOMONAS, YEAST, BACTERIA, CLARITYU, SPECGRAV, LEUKOCYTESUR, UROBILINOGEN, BILIRUBINUR, BLOODU, GLUCOSEU, AMORPHOUS    ABG:  No results found for: FDP6PHU, BEART, J7CSEOET, PHART, THGBART, XIY5PZO, PO2ART, WFS5HZI    MICROBIOLOGY:    Blood culture - neg to date     SARS CoV2 negative       Radiology :    Chest X ray- bilateral patchy opacities     IMPRESSION:     1. STEMI s/p PCI / stent   2. Right groin hematoma, reactive leukocytosis - WBC trending down     RECOMMENDATIONS:     1. Follow blood cx , CBC with diff , off abx for now.  If neg at 48 hrs by 12/20, OK for AICD insertion

## 2021-12-18 NOTE — PROGRESS NOTES
PROGRESS NOTE     CARDIOLOGY    Chief complaint: Seen today for follow up, management & recommendations for coronary artery disease, mitral regurgitation, ischemic cardiomyopathy. He denies chest pain or shortness of breath today. He was lying flat in bed. He was comfortable and in no distress. Wt Readings from Last 3 Encounters:   12/14/21 189 lb 9.5 oz (86 kg)   12/12/21 168 lb (76.2 kg)   11/16/21 174 lb 9.6 oz (79.2 kg)     Temp Readings from Last 3 Encounters:   12/17/21 98.1 °F (36.7 °C) (Temporal)   12/12/21 97.8 °F (36.6 °C) (Temporal)   10/12/21 97.4 °F (36.3 °C)     BP Readings from Last 3 Encounters:   12/17/21 (!) 144/84   12/16/21 115/72   12/12/21 (!) 103/59     Pulse Readings from Last 3 Encounters:   12/18/21 96   12/12/21 81   11/16/21 74         Intake/Output Summary (Last 24 hours) at 12/18/2021 0812  Last data filed at 12/18/2021 0650  Gross per 24 hour   Intake 480 ml   Output 250 ml   Net 230 ml       Recent Labs     12/16/21  0545 12/17/21  0450 12/18/21  0540   WBC 21.9* 16.7* 16.9*   HGB 7.9* 8.6* 9.1*   HCT 23.2* 25.9* 27.1*   MCV 86.2 88.7 88.6   * 138 174     Recent Labs     12/16/21  0545 12/17/21  0450 12/18/21  0540    136 136   K 4.0 3.5 3.4*    101 100   CO2 20* 22 23   PHOS 2.7 2.4* 3.1   BUN 16 18 19   CREATININE 0.7 0.8 0.8   MG 2.1 2.0 1.8     No results for input(s): PROTIME, INR in the last 72 hours. No results for input(s): CKTOTAL, CKMB, CKMBINDEX, TROPONINI in the last 72 hours. No results for input(s): BNP in the last 72 hours. No results for input(s): CHOL, HDL, TRIG in the last 72 hours. Invalid input(s): CHOLHDLR, LDLCALCU  No results for input(s): TROPHS in the last 72 hours.       sacubitril-valsartan (ENTRESTO)  MG per tablet 1 tablet, BID  metoprolol succinate (TOPROL XL) extended release tablet 37.5 mg, BID  furosemide (LASIX) tablet 40 mg, Daily  pantoprazole (PROTONIX) tablet 40 mg, BID  labetalol (NORMODYNE;TRANDATE) injection 10 mg, Q30 Min PRN  hydrALAZINE (APRESOLINE) injection 10 mg, Q30 Min PRN  isosorbide mononitrate (IMDUR) extended release tablet 30 mg, Daily  amiodarone (CORDARONE) tablet 400 mg, BID  spironolactone (ALDACTONE) tablet 25 mg, Daily  polyethylene glycol (GLYCOLAX) packet 17 g, Daily  sodium chloride flush 0.9 % injection 5-40 mL, 2 times per day  sodium chloride flush 0.9 % injection 5-40 mL, PRN  0.9 % sodium chloride infusion, PRN  acetaminophen (TYLENOL) tablet 650 mg, Q4H PRN  ondansetron (ZOFRAN) injection 4 mg, Q6H PRN  rosuvastatin (CRESTOR) tablet 40 mg, Nightly  aspirin chewable tablet 81 mg, Daily  ticagrelor (BRILINTA) tablet 90 mg, BID  heparin (porcine) injection 5,000 Units, Q8H        Review of systems:     Heart: as above   Lungs: as above   Eyes: denies changes in vision or discharge. Ears: denies changes in hearing or pain. Nose: denies epistaxis or masses   Throat: denies sore throat or trouble swallowing. Neuro: denies numbness, tingling, tremors. Skin: denies rashes or itching. : denies hematuria, dysuria   GI: denies vomiting, diarrhea   Psych: denies mood changed, anxiety, depression. Physical exam:    Constitutional: A&O x3, communicates well, no acute distress. Eyes: extraocular muscles intact, PERRL. Normal lids & conjunctiva. No icterus. ENT: clear, no bleeding. No external masses. Lips normal formation. Neck: supple, full ROM, no JVD, no bruits, no lymphadenopathy. No masses. trachea midline. Heart: regular rate & rhythm, normal S1 & S2, II/VI holosystolic murmur. No heave. Lungs: CTA. No accessory muscles. Abd: soft, non-tender. Normal bowel sounds. Neuro: Full ROM X 4, EOMI, no tremors. EXT: Moderate bilateral lower extremity edema  Skin: warm, dry, intact. Good turgor. Psych: A&O x 3, normal behavior, not anxious. Assessment/Recommendations  1. Coronary artery disease.   Bifurcation lesion with PCI to the LAD and PTCA to the diagonal this admission. No symptoms today. 2. Ischemic cardiomyopathy. EF 35%. Moderate lower extremity edema. Continue Lasix and cardiomyopathy medications. Add hydralazine. 3. Moderately severe mitral regurgitation. 4. Pulmonary hypertension. 5. VT/VF arrest.  Per EP. 6. Paroxysmal atrial fibrillation. Per EP. 7. Hypertension, not well controlled today. Entresto maxed. We will add hydralazine. 8. Anemia. Hemoglobin slowly increasing. Note: This report was completed using computerized voice recognition software. Every effort has been made to ensure accuracy, however; and invert and computerized transcription errors may be present.

## 2021-12-19 LAB
ALBUMIN SERPL-MCNC: 3.8 G/DL (ref 3.5–5.2)
ALP BLD-CCNC: 82 U/L (ref 40–129)
ALT SERPL-CCNC: 41 U/L (ref 0–40)
ANION GAP SERPL CALCULATED.3IONS-SCNC: 12 MMOL/L (ref 7–16)
ANISOCYTOSIS: ABNORMAL
AST SERPL-CCNC: 43 U/L (ref 0–39)
BASOPHILS ABSOLUTE: 0.02 E9/L (ref 0–0.2)
BASOPHILS RELATIVE PERCENT: 0.1 % (ref 0–2)
BILIRUB SERPL-MCNC: 2.7 MG/DL (ref 0–1.2)
BILIRUBIN DIRECT: 0.7 MG/DL (ref 0–0.3)
BILIRUBIN, INDIRECT: 2 MG/DL (ref 0–1)
BUN BLDV-MCNC: 15 MG/DL (ref 6–23)
BURR CELLS: ABNORMAL
CALCIUM SERPL-MCNC: 8.5 MG/DL (ref 8.6–10.2)
CHLORIDE BLD-SCNC: 97 MMOL/L (ref 98–107)
CO2: 23 MMOL/L (ref 22–29)
CREAT SERPL-MCNC: 0.8 MG/DL (ref 0.7–1.2)
EKG ATRIAL RATE: 76 BPM
EKG P AXIS: 60 DEGREES
EKG P-R INTERVAL: 200 MS
EKG Q-T INTERVAL: 436 MS
EKG QRS DURATION: 98 MS
EKG QTC CALCULATION (BAZETT): 490 MS
EKG R AXIS: 72 DEGREES
EKG T AXIS: 4 DEGREES
EKG VENTRICULAR RATE: 76 BPM
EOSINOPHILS ABSOLUTE: 0.14 E9/L (ref 0.05–0.5)
EOSINOPHILS RELATIVE PERCENT: 0.9 % (ref 0–6)
GFR AFRICAN AMERICAN: >60
GFR NON-AFRICAN AMERICAN: >60 ML/MIN/1.73
GLUCOSE BLD-MCNC: 117 MG/DL (ref 74–99)
HCT VFR BLD CALC: 28 % (ref 37–54)
HEMOGLOBIN: 9.3 G/DL (ref 12.5–16.5)
IMMATURE GRANULOCYTES #: 0.13 E9/L
IMMATURE GRANULOCYTES %: 0.9 % (ref 0–5)
LYMPHOCYTES ABSOLUTE: 1.09 E9/L (ref 1.5–4)
LYMPHOCYTES RELATIVE PERCENT: 7.3 % (ref 20–42)
MAGNESIUM: 2.1 MG/DL (ref 1.6–2.6)
MCH RBC QN AUTO: 29.1 PG (ref 26–35)
MCHC RBC AUTO-ENTMCNC: 33.2 % (ref 32–34.5)
MCV RBC AUTO: 87.5 FL (ref 80–99.9)
MONOCYTES ABSOLUTE: 1.61 E9/L (ref 0.1–0.95)
MONOCYTES RELATIVE PERCENT: 10.8 % (ref 2–12)
NEUTROPHILS ABSOLUTE: 11.95 E9/L (ref 1.8–7.3)
NEUTROPHILS RELATIVE PERCENT: 80 % (ref 43–80)
PDW BLD-RTO: 16.4 FL (ref 11.5–15)
PHOSPHORUS: 2.4 MG/DL (ref 2.5–4.5)
PLATELET # BLD: 237 E9/L (ref 130–450)
PMV BLD AUTO: 11.2 FL (ref 7–12)
POIKILOCYTES: ABNORMAL
POLYCHROMASIA: ABNORMAL
POTASSIUM SERPL-SCNC: 3.6 MMOL/L (ref 3.5–5)
RBC # BLD: 3.2 E12/L (ref 3.8–5.8)
SODIUM BLD-SCNC: 132 MMOL/L (ref 132–146)
TOTAL PROTEIN: 6.1 G/DL (ref 6.4–8.3)
WBC # BLD: 14.9 E9/L (ref 4.5–11.5)

## 2021-12-19 PROCEDURE — 99291 CRITICAL CARE FIRST HOUR: CPT | Performed by: INTERNAL MEDICINE

## 2021-12-19 PROCEDURE — 6360000002 HC RX W HCPCS: Performed by: INTERNAL MEDICINE

## 2021-12-19 PROCEDURE — 6370000000 HC RX 637 (ALT 250 FOR IP): Performed by: INTERNAL MEDICINE

## 2021-12-19 PROCEDURE — 80076 HEPATIC FUNCTION PANEL: CPT

## 2021-12-19 PROCEDURE — 80048 BASIC METABOLIC PNL TOTAL CA: CPT

## 2021-12-19 PROCEDURE — 36415 COLL VENOUS BLD VENIPUNCTURE: CPT

## 2021-12-19 PROCEDURE — 6370000000 HC RX 637 (ALT 250 FOR IP): Performed by: NURSE PRACTITIONER

## 2021-12-19 PROCEDURE — 83735 ASSAY OF MAGNESIUM: CPT

## 2021-12-19 PROCEDURE — 84100 ASSAY OF PHOSPHORUS: CPT

## 2021-12-19 PROCEDURE — 2140000000 HC CCU INTERMEDIATE R&B

## 2021-12-19 PROCEDURE — 99232 SBSQ HOSP IP/OBS MODERATE 35: CPT | Performed by: INTERNAL MEDICINE

## 2021-12-19 PROCEDURE — 6370000000 HC RX 637 (ALT 250 FOR IP): Performed by: STUDENT IN AN ORGANIZED HEALTH CARE EDUCATION/TRAINING PROGRAM

## 2021-12-19 PROCEDURE — 85025 COMPLETE CBC W/AUTO DIFF WBC: CPT

## 2021-12-19 PROCEDURE — 2580000003 HC RX 258: Performed by: INTERNAL MEDICINE

## 2021-12-19 RX ORDER — POTASSIUM CHLORIDE 20 MEQ/1
40 TABLET, EXTENDED RELEASE ORAL ONCE
Status: COMPLETED | OUTPATIENT
Start: 2021-12-19 | End: 2021-12-19

## 2021-12-19 RX ORDER — HYDRALAZINE HYDROCHLORIDE 50 MG/1
50 TABLET, FILM COATED ORAL EVERY 8 HOURS SCHEDULED
Status: DISCONTINUED | OUTPATIENT
Start: 2021-12-19 | End: 2021-12-21 | Stop reason: HOSPADM

## 2021-12-19 RX ADMIN — SACUBITRIL AND VALSARTAN 1 TABLET: 97; 103 TABLET, FILM COATED ORAL at 08:46

## 2021-12-19 RX ADMIN — SPIRONOLACTONE 25 MG: 25 TABLET ORAL at 08:47

## 2021-12-19 RX ADMIN — POTASSIUM CHLORIDE 40 MEQ: 1500 TABLET, EXTENDED RELEASE ORAL at 16:16

## 2021-12-19 RX ADMIN — FUROSEMIDE 40 MG: 20 TABLET ORAL at 08:47

## 2021-12-19 RX ADMIN — SACUBITRIL AND VALSARTAN 1 TABLET: 97; 103 TABLET, FILM COATED ORAL at 20:56

## 2021-12-19 RX ADMIN — METOPROLOL SUCCINATE 50 MG: 50 TABLET, EXTENDED RELEASE ORAL at 08:47

## 2021-12-19 RX ADMIN — HEPARIN SODIUM 5000 UNITS: 10000 INJECTION INTRAVENOUS; SUBCUTANEOUS at 06:15

## 2021-12-19 RX ADMIN — AMIODARONE HYDROCHLORIDE 400 MG: 200 TABLET ORAL at 08:46

## 2021-12-19 RX ADMIN — ACETAMINOPHEN 650 MG: 325 TABLET ORAL at 20:55

## 2021-12-19 RX ADMIN — ACETAMINOPHEN 650 MG: 325 TABLET ORAL at 01:34

## 2021-12-19 RX ADMIN — TICAGRELOR 90 MG: 90 TABLET ORAL at 20:56

## 2021-12-19 RX ADMIN — ISOSORBIDE MONONITRATE 30 MG: 30 TABLET ORAL at 08:50

## 2021-12-19 RX ADMIN — TICAGRELOR 90 MG: 90 TABLET ORAL at 08:46

## 2021-12-19 RX ADMIN — METOPROLOL SUCCINATE 50 MG: 50 TABLET, EXTENDED RELEASE ORAL at 20:55

## 2021-12-19 RX ADMIN — AMIODARONE HYDROCHLORIDE 400 MG: 200 TABLET ORAL at 20:55

## 2021-12-19 RX ADMIN — Medication 250 MG: at 16:16

## 2021-12-19 RX ADMIN — ROSUVASTATIN 40 MG: 20 TABLET, FILM COATED ORAL at 20:55

## 2021-12-19 RX ADMIN — HYDRALAZINE HYDROCHLORIDE 25 MG: 25 TABLET, FILM COATED ORAL at 06:19

## 2021-12-19 RX ADMIN — ASPIRIN 81 MG CHEWABLE TABLET 81 MG: 81 TABLET CHEWABLE at 08:46

## 2021-12-19 RX ADMIN — HYDRALAZINE HYDROCHLORIDE 50 MG: 50 TABLET, FILM COATED ORAL at 16:17

## 2021-12-19 RX ADMIN — PANTOPRAZOLE SODIUM 40 MG: 40 TABLET, DELAYED RELEASE ORAL at 20:55

## 2021-12-19 RX ADMIN — HYDRALAZINE HYDROCHLORIDE 50 MG: 50 TABLET, FILM COATED ORAL at 20:55

## 2021-12-19 RX ADMIN — PANTOPRAZOLE SODIUM 40 MG: 40 TABLET, DELAYED RELEASE ORAL at 08:47

## 2021-12-19 RX ADMIN — Medication 10 ML: at 20:56

## 2021-12-19 RX ADMIN — Medication 10 ML: at 08:47

## 2021-12-19 ASSESSMENT — PAIN DESCRIPTION - LOCATION: LOCATION: CHEST

## 2021-12-19 ASSESSMENT — PAIN SCALES - GENERAL
PAINLEVEL_OUTOF10: 0
PAINLEVEL_OUTOF10: 2
PAINLEVEL_OUTOF10: 0
PAINLEVEL_OUTOF10: 0

## 2021-12-19 ASSESSMENT — PAIN DESCRIPTION - DESCRIPTORS: DESCRIPTORS: BURNING

## 2021-12-19 ASSESSMENT — PAIN - FUNCTIONAL ASSESSMENT: PAIN_FUNCTIONAL_ASSESSMENT: ACTIVITIES ARE NOT PREVENTED

## 2021-12-19 ASSESSMENT — PAIN DESCRIPTION - PROGRESSION: CLINICAL_PROGRESSION: NOT CHANGED

## 2021-12-19 ASSESSMENT — PAIN DESCRIPTION - FREQUENCY: FREQUENCY: INTERMITTENT

## 2021-12-19 ASSESSMENT — PAIN DESCRIPTION - PAIN TYPE: TYPE: ACUTE PAIN

## 2021-12-19 ASSESSMENT — PAIN DESCRIPTION - ONSET: ONSET: GRADUAL

## 2021-12-19 ASSESSMENT — PAIN DESCRIPTION - ORIENTATION: ORIENTATION: MID

## 2021-12-19 NOTE — PROGRESS NOTES
Dr. Chelsi Craven notified of phos 2.4 while rounding. Orders placed. Dr. Mary Sol replaced K+. Consent form signed and placed in chart.        LALA Geller, RN  12/19/2021  11:59 AM

## 2021-12-19 NOTE — PROGRESS NOTES
PROGRESS NOTE     CARDIOLOGY    Chief complaint: Seen today for follow up, management & recommendations for coronary artery disease, mitral regurgitation, ischemic cardiomyopathy. He denies chest pain or shortness of breath today. He was sitting in a chair at the side of the bed visiting with his wife. He was comfortable and in no distress. Wt Readings from Last 3 Encounters:   12/19/21 170 lb 9.6 oz (77.4 kg)   12/12/21 168 lb (76.2 kg)   11/16/21 174 lb 9.6 oz (79.2 kg)     Temp Readings from Last 3 Encounters:   12/19/21 97 °F (36.1 °C) (Temporal)   12/12/21 97.8 °F (36.6 °C) (Temporal)   10/12/21 97.4 °F (36.3 °C)     BP Readings from Last 3 Encounters:   12/19/21 118/65   12/16/21 115/72   12/12/21 (!) 103/59     Pulse Readings from Last 3 Encounters:   12/19/21 82   12/12/21 81   11/16/21 74         Intake/Output Summary (Last 24 hours) at 12/19/2021 1215  Last data filed at 12/19/2021 0925  Gross per 24 hour   Intake 900 ml   Output 1150 ml   Net -250 ml       Recent Labs     12/17/21  0450 12/18/21  0540 12/19/21  0500   WBC 16.7* 16.9* 14.9*   HGB 8.6* 9.1* 9.3*   HCT 25.9* 27.1* 28.0*   MCV 88.7 88.6 87.5    174 237     Recent Labs     12/17/21  0450 12/18/21  0540 12/19/21  0500    136 132   K 3.5 3.4* 3.6    100 97*   CO2 22 23 23   PHOS 2.4* 3.1 2.4*   BUN 18 19 15   CREATININE 0.8 0.8 0.8   MG 2.0 1.8 2.1     No results for input(s): PROTIME, INR in the last 72 hours. No results for input(s): CKTOTAL, CKMB, CKMBINDEX, TROPONINI in the last 72 hours. No results for input(s): BNP in the last 72 hours. No results for input(s): CHOL, HDL, TRIG in the last 72 hours. Invalid input(s): CHOLHDLR, LDLCALCU  No results for input(s): TROPHS in the last 72 hours.       hydrALAZINE (APRESOLINE) tablet 50 mg, 3 times per day  [START ON 12/20/2021] vancomycin 1000 mg IVPB in 250 mL D5W addavial, On Call to OR  potassium chloride (KLOR-CON M) extended release tablet 40 mEq, Once  potassium & sodium phosphates (PHOS-NAK) 280-160-250 MG packet 250 mg, Once  metoprolol succinate (TOPROL XL) extended release tablet 50 mg, BID  [START ON 12/20/2021] predniSONE (DELTASONE) tablet 50 mg, Once   Followed by  Ramon Noe ON 12/20/2021] predniSONE (DELTASONE) tablet 50 mg, Once   Followed by  Ramon Noe ON 12/20/2021] predniSONE (DELTASONE) tablet 50 mg, Once   Followed by  Ramon Noe ON 12/20/2021] diphenhydrAMINE (BENADRYL) tablet 50 mg, Once  sacubitril-valsartan (ENTRESTO)  MG per tablet 1 tablet, BID  furosemide (LASIX) tablet 40 mg, Daily  pantoprazole (PROTONIX) tablet 40 mg, BID  labetalol (NORMODYNE;TRANDATE) injection 10 mg, Q30 Min PRN  hydrALAZINE (APRESOLINE) injection 10 mg, Q30 Min PRN  isosorbide mononitrate (IMDUR) extended release tablet 30 mg, Daily  amiodarone (CORDARONE) tablet 400 mg, BID  spironolactone (ALDACTONE) tablet 25 mg, Daily  polyethylene glycol (GLYCOLAX) packet 17 g, Daily  sodium chloride flush 0.9 % injection 5-40 mL, 2 times per day  sodium chloride flush 0.9 % injection 5-40 mL, PRN  0.9 % sodium chloride infusion, PRN  acetaminophen (TYLENOL) tablet 650 mg, Q4H PRN  ondansetron (ZOFRAN) injection 4 mg, Q6H PRN  rosuvastatin (CRESTOR) tablet 40 mg, Nightly  aspirin chewable tablet 81 mg, Daily  ticagrelor (BRILINTA) tablet 90 mg, BID  [Held by provider] heparin (porcine) injection 5,000 Units, Q8H        Review of systems:     Heart: as above   Lungs: as above   Eyes: denies changes in vision or discharge. Ears: denies changes in hearing or pain. Nose: denies epistaxis or masses   Throat: denies sore throat or trouble swallowing. Neuro: denies numbness, tingling, tremors. Skin: denies rashes or itching. : denies hematuria, dysuria   GI: denies vomiting, diarrhea   Psych: denies mood changed, anxiety, depression. Physical exam:    Constitutional: A&O x3, communicates well, no acute distress. Eyes: extraocular muscles intact, PERRL.   Normal lids & conjunctiva. No icterus. ENT: clear, no bleeding. No external masses. Lips normal formation. Neck: supple, full ROM, no JVD, no bruits, no lymphadenopathy. No masses. trachea midline. Heart: regular rate & rhythm, normal S1 & S2, II/VI holosystolic murmur at the apex. No heave. Lungs: CTA. No accessory muscles. Poor air movement. Abd: soft, non-tender. Normal bowel sounds. Neuro: Full ROM X 4, EOMI, no tremors. EXT: Moderate bilateral lower extremity edema on exam.  Skin: warm, dry, intact. Good turgor. Psych: A&O x 3, normal behavior, not anxious. Assessment/Recommendations  1. Coronary artery disease. Bifurcation lesion with PCI to the LAD and PTCA to the diagonal this admission. No symptoms today. 2. Ischemic cardiomyopathy. EF 35%. Moderate lower extremity edema. Continue Lasix and cardiomyopathy medications. Increase the hydralazine today. 3. Severe mitral regurgitation. Increased compared to prior evaluations from office notes. This is also in the setting of ischemic CAD and severe/uncontrolled hypertension. I had a long discussion with his wife and him. We will continue to titrate blood pressure controlling medications and afterload reduction. He is also now been revascularized. I do recommend a transesophageal echo. This can be performed as an outpatient after the above has been achieved. They understand and agree. I will increase the hydralazine today. 4. Pulmonary hypertension. 5. VT/VF arrest.  Per EP. Planning for ICD tomorrow. 6. Paroxysmal atrial fibrillation. Per EP. 7. Hypertension, not well controlled today. Entresto gene. I will increase the hydralazine today. .  8. Anemia. Hemoglobin slowly increasing. Note: This report was completed using computerized voice recognition software. Every effort has been made to ensure accuracy, however; and invert and computerized transcription errors may be present.

## 2021-12-19 NOTE — PROGRESS NOTES
Department of Internal Medicine  Infectious Diseases  Progress Note      C/C :  Leukocytosis , ID clearance for ICD  insertion     Pt is awake and alert  Denies fever or chills  Denies SOB   No dysuria  Afebrile      Current Facility-Administered Medications   Medication Dose Route Frequency Provider Last Rate Last Admin    hydrALAZINE (APRESOLINE) tablet 50 mg  50 mg Oral 3 times per day Asenatfranny Webster, DO        [START ON 12/20/2021] vancomycin 1000 mg IVPB in 250 mL D5W addavial  1,000 mg IntraVENous On Call to Ana Kraft MD        potassium chloride (KLOR-CON M) extended release tablet 40 mEq  40 mEq Oral Once Maryann Crowelld, DO        potassium & sodium phosphates (PHOS-NAK) 280-160-250 MG packet 250 mg  1 packet Oral Once Germaine Groves MD        metoprolol succinate (TOPROL XL) extended release tablet 50 mg  50 mg Oral BID Amy Rhoades MD   50 mg at 12/19/21 0847    [START ON 12/20/2021] predniSONE (DELTASONE) tablet 50 mg  50 mg Oral Once Amy Rhoades MD        Followed by   Zohra Luna ON 12/20/2021] predniSONE (DELTASONE) tablet 50 mg  50 mg Oral Once Amy Rhoades MD        Followed by   Zohra Luna ON 12/20/2021] predniSONE (DELTASONE) tablet 50 mg  50 mg Oral Once Amy Rhoades MD        Followed by   Zohra Luna ON 12/20/2021] diphenhydrAMINE (BENADRYL) tablet 50 mg  50 mg Oral Once Amy Rhoades MD        sacubitril-valsartan (ENTRESTO)  MG per tablet 1 tablet  1 tablet Oral BID Stephanie Dunlap MD   1 tablet at 12/19/21 0846    furosemide (LASIX) tablet 40 mg  40 mg Oral Daily Stephanie Dunlap MD   40 mg at 12/19/21 0847    pantoprazole (PROTONIX) tablet 40 mg  40 mg Oral BID Arjun Hager MD   40 mg at 12/19/21 0847    labetalol (NORMODYNE;TRANDATE) injection 10 mg  10 mg IntraVENous Q30 Min PRN Varun Michele MD   10 mg at 12/16/21 0110    hydrALAZINE (APRESOLINE) injection 10 mg  10 mg IntraVENous Q30 Min PRN Varun Michele MD       [address] [place] [address] isosorbide mononitrate (IMDUR) extended release tablet 30 mg  30 mg Oral Daily Gregg Poe MD   30 mg at 12/19/21 0850    amiodarone (CORDARONE) tablet 400 mg  400 mg Oral BID KAYE Stacy CNP   400 mg at 12/19/21 0846    spironolactone (ALDACTONE) tablet 25 mg  25 mg Oral Daily Gregg Poe MD   25 mg at 12/19/21 0847    polyethylene glycol (GLYCOLAX) packet 17 g  17 g Oral Daily Melvin Noguera, DO   17 g at 12/18/21 0913    sodium chloride flush 0.9 % injection 5-40 mL  5-40 mL IntraVENous 2 times per day Gregg Poe MD   10 mL at 12/19/21 0847    sodium chloride flush 0.9 % injection 5-40 mL  5-40 mL IntraVENous PRN Gregg Poe MD   10 mL at 12/18/21 0537    0.9 % sodium chloride infusion  25 mL IntraVENous PRN Gregg Poe MD        acetaminophen (TYLENOL) tablet 650 mg  650 mg Oral Q4H PRN Gregg Poe MD   650 mg at 12/19/21 0134    ondansetron (ZOFRAN) injection 4 mg  4 mg IntraVENous Q6H PRN Gregg Poe MD        rosuvastatin (CRESTOR) tablet 40 mg  40 mg Oral Nightly Gregg Poe MD   40 mg at 12/18/21 2055    aspirin chewable tablet 81 mg  81 mg Oral Daily Gregg Poe MD   81 mg at 12/19/21 0846    ticagrelor (BRILINTA) tablet 90 mg  90 mg Oral BID Gregg Poe MD   90 mg at 12/19/21 0846    [Held by provider] heparin (porcine) injection 5,000 Units  5,000 Units SubCUTAneous Q8H Gregg Poe MD   5,000 Units at 12/19/21 0615         REVIEW OF SYSTEMS:    CONSTITUTIONAL:  Denies fever, chill or rigors  HEENT: denies blurring of vision or double vision, denies hearing problem  RESPIRATORY: denies cough, shortness of breath, sputum expectoration. CARDIOVASCULAR:  Denies palpitation  GASTROINTESTINAL:  Denies abdomen pain, diarrhea or constipation. GENITOURINARY:  Denies burning urination or frequency of urination  INTEGUMENT: Ecchymosis   HEMATOLOGIC/LYMPHATIC:  Denies lymph node swelling, gum bleeding or easy bruising.   MUSCULOSKELETAL:  Denies leg pain , joint pain , joint swelling  NEUROLOGICAL:  Weakness       PHYSICAL EXAM:      Vitals:     /65   Pulse 82   Temp 97 °F (36.1 °C) (Temporal)   Resp 18   Ht 5' 6\" (1.676 m)   Wt 170 lb 9.6 oz (77.4 kg)   SpO2 99%   BMI 27.54 kg/m²     General Appearance:    Awake, alert , no acute distress. Head:    Normocephalic, atraumatic   Eyes:    No pallor, no icterus,   Ears:    No obvious deformity or drainage.    Nose:   No nasal drainage   Throat:   Mucosa moist, no oral thrush   Neck:   Supple, no lymphadenopathy   Back:     no CVA tenderness   Lungs:     Clear to auscultation bilaterally    Heart:    Irregular    Abdomen:     Soft, non-tender, bowel sounds present    Extremities:   No edema, non tender    Pulses:   Dorsalis pedis palpable    Skin:   Right groin ecchymosis        CBC with Differential:      Lab Results   Component Value Date    WBC 14.9 12/19/2021    RBC 3.20 12/19/2021    HGB 9.3 12/19/2021    HCT 28.0 12/19/2021     12/19/2021    MCV 87.5 12/19/2021    MCH 29.1 12/19/2021    MCHC 33.2 12/19/2021    RDW 16.4 12/19/2021    NRBC 0.0 10/11/2018    LYMPHOPCT 7.3 12/19/2021    MONOPCT 10.8 12/19/2021    BASOPCT 0.1 12/19/2021    MONOSABS 1.61 12/19/2021    LYMPHSABS 1.09 12/19/2021    EOSABS 0.14 12/19/2021    BASOSABS 0.02 12/19/2021       CMP     Lab Results   Component Value Date     12/19/2021    K 3.6 12/19/2021    K 3.5 12/17/2021    CL 97 12/19/2021    CO2 23 12/19/2021    BUN 15 12/19/2021    CREATININE 0.8 12/19/2021    GFRAA >60 12/19/2021    LABGLOM >60 12/19/2021    GLUCOSE 117 12/19/2021    PROT 6.1 12/19/2021    LABALBU 3.8 12/19/2021    CALCIUM 8.5 12/19/2021    BILITOT 2.7 12/19/2021    ALKPHOS 82 12/19/2021    AST 43 12/19/2021    ALT 41 12/19/2021         Hepatic Function Panel:    Lab Results   Component Value Date    ALKPHOS 82 12/19/2021    ALT 41 12/19/2021    AST 43 12/19/2021    PROT 6.1 12/19/2021    BILITOT 2.7 12/19/2021    BILIDIR 0.7 12/19/2021    IBILI 2.0 12/19/2021    LABALBU 3.8 12/19/2021       PT/INR:    Lab Results   Component Value Date    PROTIME 13.4 10/08/2018    INR 1.2 10/08/2018       TSH:    Lab Results   Component Value Date    TSH 3.820 12/12/2021       U/A:    Lab Results   Component Value Date    COLORU Yellow 12/18/2021    PHUR 6.5 12/18/2021    WBCUA 10-20 12/18/2021    RBCUA 1-3 12/18/2021    BACTERIA FEW 12/18/2021    CLARITYU Clear 12/18/2021    SPECGRAV 1.010 12/18/2021    LEUKOCYTESUR MODERATE 12/18/2021    UROBILINOGEN 1.0 12/18/2021    BILIRUBINUR Negative 12/18/2021    BLOODU SMALL 12/18/2021    GLUCOSEU Negative 12/18/2021       ABG:  No results found for: RDP0YYI, BEART, Y1HVCLQE, PHART, THGBART, BGD8RUW, PO2ART, [id number]    MICROBIOLOGY:    Blood culture - neg to date     SARS CoV2 negative       Radiology :    Chest X ray- bilateral patchy opacities     IMPRESSION:     1. STEMI s/p PCI / stent   2. Right groin hematoma, reactive leukocytosis - WBC trending down   3. Asymptomatic bacteriuria    RECOMMENDATIONS:     1. Follow blood cx , CBC with diff , off abx for now.  If neg at 48 hrs by 12/20, OK for AICD insertion

## 2021-12-19 NOTE — PLAN OF CARE
Problem: Cardiac:  Goal: Ability to maintain vital signs within normal range will improve  Description: Ability to maintain vital signs within normal range will improve  Outcome: Met This Shift  Goal: Cardiovascular alteration will improve  Description: Cardiovascular alteration will improve  Outcome: Met This Shift     Problem: SKIN INTEGRITY  Goal: Skin integrity is maintained or improved  Outcome: Met This Shift

## 2021-12-19 NOTE — PROGRESS NOTES
Chief Complaint:  STEMI (ST elevation myocardial infarction) ([facility] [address]     Subjective:    No cough, fevers, shortness of breath. Endorses very mild urinary discomfort. No abdominal pain, nausea, vomiting. Single episode diarrhea yesterday, but today stool was formed    Objective:    /64   Pulse 62   Temp 97.5 °F (36.4 °C) (Temporal)   Resp 16   Ht 5' 6\" (1.676 m)   Wt 189 lb 9.5 oz (86 kg)   SpO2 99%   BMI 30.60 kg/m²     Current medications that patient is taking have been reviewed. General appearance: NAD, conversant  HEENT: AT/NC, MMM  Neck: FROM, supple  Lungs: Clear to auscultation, WOB normal  CV: RRR, no MRGs  Abdomen: Soft, non-tender; no masses or HSM, +BS  Extremities: No peripheral edema or digital cyanosis.   Moderate soft bruising R groin  Skin: no rash, lesions or ulcers  Psych: Calm and cooperative  Neuro: Alert and interactive, face symmetric, moving all extremities, speech fluent    Labs:  CBC with Differential:    Lab Results   Component Value Date    WBC 16.9 12/18/2021    RBC 3.06 12/18/2021    HGB 9.1 12/18/2021    HCT 27.1 12/18/2021     12/18/2021    MCV 88.6 12/18/2021    MCH 29.7 12/18/2021    MCHC 33.6 12/18/2021    RDW 16.7 12/18/2021    NRBC 0.0 10/11/2018    LYMPHOPCT 4.9 12/18/2021    MONOPCT 9.2 12/18/2021    BASOPCT 0.1 12/18/2021    MONOSABS 1.55 12/18/2021    LYMPHSABS 0.83 12/18/2021    EOSABS 0.06 12/18/2021    BASOSABS 0.02 12/18/2021     CMP:    Lab Results   Component Value Date     12/18/2021    K 3.4 12/18/2021    K 3.5 12/17/2021     12/18/2021    CO2 23 12/18/2021    BUN 19 12/18/2021    CREATININE 0.8 12/18/2021    GFRAA >60 12/18/2021    LABGLOM >60 12/18/2021    GLUCOSE 109 12/18/2021    PROT 5.9 12/17/2021    LABALBU 3.4 12/17/2021    CALCIUM 8.2 12/18/2021    BILITOT 2.1 12/17/2021    ALKPHOS 76 12/17/2021    AST 40 12/17/2021    ALT 36 12/17/2021          Assessment/Plan:  Principal Problem:    STEMI (ST elevation myocardial infarction) ([facility] [address]  Active Problems:    CAD (coronary artery disease)s/p stent RCA    Valvular heart disease    H/O psoriatic arthritis    Cardiac arrest (HCC)    Acute systolic (congestive) heart failure (HCC)    Ventricular tachycardia (HCC)    Mitral stenosis    Mitral regurgitation    Moderate to severe pulmonary hypertension (HCC)    Anemia  Resolved Problems:    * No resolved hospital problems. *       Continue amio    Procal slightly elevated, WBC persistently elevated. New CXR done, totally clear (images personally reviewed). UA shows significant pyuria.   D/w ID, they would like to hold off on abx for now    ICD in near future once cleared by ID    DAPT, statin    Continue BB, entresto, spirono    DVT prophylaxis: UFH  Requires continued inpatient level of care     Nate Batista MD    8:14 PM  12/18/2021

## 2021-12-19 NOTE — PROGRESS NOTES
tolerating amiodarone well. (12/16/21): He had recurrent AF with RVR last night with rates into the 140's today his H/H continues to decline. (12/17/21): The patient is seen in hospital follow-up, he continues in AF with rates in the 90's he is tolerating the increased dose of Toprol as well as the increased dose of Entresto. Today he is awake and alert without cardiac complaints. 12/18/21: The patient is seen in the hospital for follow up. He remains in AFL with HR of  bpm. He denies any chest pain, dyspnea or palpitations. 12/19/21: The patient is seen in the hospital for follow up. He remains in AFL with HR of 60-80 bpm. He denies any chest pain, dyspnea or palpitations.      Patient Active Problem List   Diagnosis    CAD (coronary artery disease)s/p stent RCA    Valvular heart disease    Hypertension    Obesity, morbid s/p intestinal bypass    STEMI (ST elevation myocardial infarction) ([address]    Stented coronary artery    Hyperlipidemia    H/O psoriatic arthritis    Acute upper GI bleed    SVT (supraventricular tachycardia) (HCC)    LV dysfunction    Cardiac arrest ([address]    Acute systolic (congestive) heart failure (HCC)    Ventricular tachycardia (HCC)    Mitral stenosis    Mitral regurgitation    Moderate to severe pulmonary hypertension (HCC)    Anemia         Scheduled Medications:   hydrALAZINE  25 mg Oral 3 times per day    metoprolol succinate  50 mg Oral BID    [START ON 12/20/2021] predniSONE  50 mg Oral Once    Followed by   Hua Specter ON 12/20/2021] predniSONE  50 mg Oral Once    Followed by   Hua Specter ON 12/20/2021] predniSONE  50 mg Oral Once    Followed by   Hua Specter ON 12/20/2021] diphenhydrAMINE  50 mg Oral Once    sacubitril-valsartan  1 tablet Oral BID    furosemide  40 mg Oral Daily    pantoprazole  40 mg Oral BID    isosorbide mononitrate  30 mg Oral Daily    amiodarone  400 mg Oral BID    spironolactone  25 mg Oral Daily    polyethylene glycol 17 g Oral Daily    sodium chloride flush  5-40 mL IntraVENous 2 times per day    rosuvastatin  40 mg Oral Nightly    aspirin  81 mg Oral Daily    ticagrelor  90 mg Oral BID    heparin (porcine)  5,000 Units SubCUTAneous Q8H       Infusion Medications:   sodium chloride         PRN Medications:  labetalol, hydrALAZINE, sodium chloride flush, sodium chloride, acetaminophen, ondansetron    Allergies   Allergen Reactions    Dye [Iodides] Anaphylaxis     Passing out    Dust Mite Extract Other (See Comments)     All year around       [facility] Readings from Last 3 Encounters:   12/19/21 170 lb 9.6 oz (77.4 kg)   12/12/21 168 lb (76.2 kg)   11/16/21 174 lb 9.6 oz (79.2 kg)     Temp Readings from Last 3 Encounters:   12/18/21 96.8 °F (36 °C) (Temporal)   12/12/21 97.8 °F (36.6 °C) (Temporal)   10/12/21 97.4 °F (36.3 °C)     BP Readings from Last 3 Encounters:   12/19/21 (!) 151/87   12/16/21 115/72   12/12/21 (!) 103/59     Pulse Readings from Last 3 Encounters:   12/18/21 70   12/12/21 81   11/16/21 74         Intake/Output Summary (Last 24 hours) at 12/19/2021 0803  Last data filed at 12/19/2021 0646  Gross per 24 hour   Intake 1190 ml   Output 1550 ml   Net -360 ml       ROS:    Constitutional: Negative for fever. Positive for activity change. Negative for appetite change. HENT: Negative for epistaxis, difficulty swallowing. Eyes: Negative for blurred vision or double vision. Respiratory: Negative for cough, chest tightness, shortness of breath and wheezing. Cardiovascular: Negative for chest pain, palpitations and leg swelling. Gastrointestinal: Negative for abdominal pain, heartburn, or blood in stool. Genitourinary: Negative for hematuria, burning or frequency. Musculoskeletal: Negative for myalgias, stiffness, or swelling. Skin: Negative for rash, pain, or lumps. Neurological: Negative for syncope, seizures, or headaches. Psychiatric/Behavioral: Negative for confusion and agitation.  The patient is not nervous/anxious. PHYSICAL EXAM:  Vitals:    12/18/21 2030 12/18/21 2345 12/19/21 0609 12/19/21 0619   BP:  131/75  (!) 151/87   Pulse: 82 70     Resp:  16     Temp:  96.8 °F (36 °C)     TempSrc:  Temporal     SpO2:  99%     Weight:   170 lb 9.6 oz (77.4 kg)    Height:         Constitutional: Well-developed, no acute distress seen in ICU no family at bedside. Eyes: conjunctivae normal, no xanthelasma   Ears, Nose, Throat: oral mucosa moist, no cyanosis   CV: no JVD. IRIR. No murmurs, rubs, or gallops. PMI is nondisplaced  Lungs: clear to auscultation bilaterally, normal respiratory effort without used of accessory muscles  Abdomen: soft, non-tender, bowel sounds present, no masses or hepatomegaly   Musculoskeletal: no digital clubbing, no edema   Skin: warm, no rashes     Pertinent Labs:  CBC:   Recent Labs     12/17/21  0450 12/18/21  0540 12/19/21  0500   WBC 16.7* 16.9* 14.9*   HGB 8.6* 9.1* 9.3*   HCT 25.9* 27.1* 28.0*    174 237     BMP:  Recent Labs     12/17/21  0450 12/18/21  0540 12/19/21  0500    136 132   K 3.5 3.4* 3.6    100 97*   CO2 22 23 23   BUN 18 19 15   CREATININE 0.8 0.8 0.8   CALCIUM 8.4* 8.2* 8.5*       TSH:   Lab Results   Component Value Date    TSH 3.820 12/12/2021      Lipid Profile:   Lab Results   Component Value Date    TRIG 61 12/13/2021    HDL 39 12/13/2021    LDLCALC 49 12/13/2021    CHOL 100 12/13/2021      Pertinent Cardiac Testing:     EKG (12/12/21): VT rate 190 bpm, Please see scan in Cardiology. EKG (12/12/21): NSR rate 76 bpm, QRS 98ms, QTc 490ms  Please see scan in Cardiology. ECG (12/14/21): Atrial fibrillation vs fib/flutter rate 122 bpm, QRS 86 ms, QTc 493ms      Echocardiogram (12/12/21):    Micro-bubble contrast injected ( to try to define an anomaly seen on the   LV gram ) --> could not be defined --> consider cardiac MRI ( once / if   patient recovers / is stable ).   The left ventricle is dilated.   The inferior wall, the inferolateral wall, the basal lateral wall and the   posterior wall are thin, scarred and akinetic.   The right ventricle is dilated.   Right ventricle global systolic function is mildly reduced ( TAPSE = 1.6   ).   The left atrium is mildly dilated.   The right atrium is moderately dilated.   Focal calcification mitral valve leaflets.   The mitral valve leaflets are restricted in mobility   Mitral annular calcification is present.   Mitral stenosis is present ( peak / mean gradient 17.66 / 5.24 mmHg; area   1.0 cm2 ).  Moderate to Severe mitral regurgitation.   Trace aortic regurgitation.   Mild tricuspid regurgitation.   Moderate to severe pulmonary hypertension.     Cardiac Catheterization (12/12/21):   CONCLUSIONS:  1.  Coronary artery disease. a.  LEFT MAIN:  10% distal vessel tapering.  b.  LAD:  30% eccentric proximal vessel narrowing and 80% to 90% very  proximal eccentric bifurcation lesion at the site of takeoff of a large  diagonal branch.  90% ostial stenosis and 70% to 80% proximal stenosis  of a moderate-sized second diagonal branch.  90% ostial stenosis of a  small third diagonal branch.  50% disease of the LAD after the second  diagonal branch.  60% disease of the LAD after the third diagonal  branch.  Long up to 60% to 70% disease of the apical LAD before the  mustache.  c.  LCX:  Basically a large lateral branch with 70% to 80% proximal  vessel disease and 80% mid vessel disease. d.  RCA:  Dominant vessel with 40% mid vessel narrowing and 70% ostial  narrowing of the largest subdivision of the posterolateral branch. 2.  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   3.  Hemodynamically significant proximal LAD disease with pressure wire  evaluation yielding an IFR of 0.70 and _____. 4.  Successful balloon angioplasty with deployment of drug-eluting  coronary stent to the proximal LAD and balloon angioplasty to the ostium  of the diagonal branch (bifurcation lesion; double wire; kissing  balloons) with very good results.     Stress Test (07/01/20):   1. ECG during the infusion did not change. 2. The myocardial perfusion imaging was abnormal. The abnormality was a a large sized fixed defect in the inferolateral wall suggestive of a prior MI  3. Overall left ventricular systolic function was abnormal with regional wall motion abnormalities. 4. High risk general pharmacologic stress test. Due to LV dysfunction.     Cardiac MRI 12/17/21:  1.  Mildly dilated left ventricle with severe LV systolic dysfunction,   LVEF 21%. 2.  Mid to basal inferior and inferolateral walls are akinetic. 3.  No evidence of aneurysm or pseudoaneurysm involving the left   ventricle. 4.  Moderately dilated right ventricle with mild RV dysfunction. 5.  Severe mitral regurgitation. Moderate tricuspid regurgitation. 6.  Moderately dilated right and left atria. 7.  Transmural infarct involving the mid to basal inferior and   inferolateral walls. 8.  No pericardial effusion. 9.  Small bilateral pleural effusions. 10.  Partially visualized large left renal cyst (consider further   evaluation if clinically indicated).       German Flores MD, [facility]  cardiology          Cardiac MRI (08/13/19):    Mildly dilated left ventricle (LVEDVi 125cc/m2) with a   mild-to-moderate reduction in systolic function (LVEF 56%).  There are   thinned and akinetic [id number] inferior, inferolateral and   anterolateral segments with associated transmural delayed enhancement as   well as hypokinesis of the [id number] inferoseptum with subendocardial   enhancement.  This constellation of findings is consistent with ischemic cardiomyopathy with an overall low probability of myocardial viability. 2. Posterior mitral leaflet tethering secondary to the akinetic basal   segments resulting in a mild-to-moderate anteriorly directed eccentric   jet of mitral regurgitation (Rvol 19-24 cc/beat; Rfrac 20-25%)     3. Moderate biatrial enlargement. 4. The arch vessel branching pattern is remarkable for a left sided arch   with an aberrant right subclavian artery that takes the typical   retroesophageal course.      Prior outpatient monitor (09/27/21): Patient had a min HR of 33 bpm, max HR of 133 bpm, and avg HR of 50 bpm. Predominant underlying rhythm was Sinus Rhythm. 2 Ventricular Tachycardia runs occurred, the run with the fastest interval lasting 5 beats with a max rate of 133 bpm (avg 108 bpm); the run with the fastest interval was also the longest. Idioventricular Rhythm was present. Isolated SVEs were frequent (6.2%, 6313), SVE Couplets were rare (<1.0%, 150), and SVE Triplets were rare (<1.0%, 10). Isolated VEs were frequent (10.0%, 41969), VE Couplets were occasional (1.6%, 812), and VE Triplets were rare (<1.0%, 30). Ventricular Bigeminy and Trigeminy were present. [id number]/19/2021: Atrial flutter rate 60-80 bpm.    Impression:    1. VT arrest  - Known CAD  on Entresto, BB, Sprinolactone  - last LVEF 29% on SPECT with fixed inferior large scar  - Now with sustained MMVT with hemodynamic compromise and arrest rate 190 bpm  - Cath findings do not suggest ACS but more likely severe CAD and is s/p revascularization of LAD and ostial of diagonal with residual high burden diffuse multivessel CAD   - Cardiac MRI from 2019 shows scar in the inf/inflat areas  - LV EF 21% on cardiac MRI 12/17/21.  - Amiodarone started 12/14/21 400mg BID.   - No recurrent VT  - The risks, benefits, and alternatives to ICD implantation and possible defibrillation threshold testing were discussed with the patient.  These risks include but are not limited to bleeding, infection, blood clot, pneumothorax, hemothorax, cardiac valve damage, cardiac perforation and tamponade required emergent thoracotomy, contrast induced nephropathy leading to short or even long term dialysis, vascular injury requiring emergent surgical repair, lead dislodgement required reposition, stroke and even death. The patient understands these risks and wishes to proceed with ICD implantation and possible defibrillation threshold testing. 2. New onset atrial flutter  - First seen 12/14/21  - CHADS-VASc 5 (age, VASc, CHF, HTN)  - Currently not on anticoagulation due to anemia.  - On Toprol XL for rate control.  - Currently in AF with CVR    3.  HFrEF   - Ischemic   - LVEF 50% 10/03/09 on TTE   - LVEF 45-50% 03/30/17 on TTE   - LVEF 35-40% 10/09/19 on TTE   - LVEF 29% 07/01/20 on Lexiscan stress test   - LVEF 24% on TTE 12/12/21  - LV EF 21% on Cardiac MRI  - GDMT: Entresto,  Lasix, Toprol XL, and Aldactone.     4. Coronary artery disease   - Inferior STEMI 1997 s/p PCI RCA.  - Known Large sized inferolateral fixed defect suggesting prior MI.  - [facility] 12/12/21 patient RCA stent, high grade stenosis of the LAD-D1 S/p PCI. - On statin, BB, ASA, Imdur, Brilinta.      5. Hypertension    - BP remains elevated. - Management per Cardiology     6. HLD      7. Obesity s/p gastric bypass     8. IV dye allergy  - Anaphylaxis       9. psoriatic arthritis      10. Anemia   -  Count 8.6/25.9 today with a negative EGD on 12/16/21    11. Leukocytosis   - WBC 21.9 on 12/16, possibly reactionary blood cultures pending. 12.  Possible aneurysm or a pseudoaneurysm seen on LV gram 12/12/21.   - Cardiac MRI showed no aneurysm. 13. Valvular heart disease  - Severe MR and moderate TR on Cardiac MRI. Recommendations:    1. Continue oral Amiodarone loading dose   2. Continue Toprol 50 mg bid. 3. Will hold off Nordex Online until after ICD implantation due to ongoing anemia and groin hematoma.  Consider switch Brilinta to Plavix when he is on triple therapy. 4. Tentative plan for dual chamber ICD implantation for secondary prevention of SCD on Monday pending blood culture results and ID clearance. 5. Will give start oral Prednisolone tonight per protocol due to history of dye allergy. 6. Keep potassium around 4 and magnesium around 2.  7. NPO after midnight. 8. Hold SC Heparin. Thank you for allowing me to participate in your patient's care. I have spent a total of 35 minutes of CCT with the patient and the family reviewing the above stated recommendations. And a total of >50% of that time involved face-to-face time providing counseling and/or coordination of care with the other providers, preparation for the clinic visit, reviewing records/tests, counseling/education of the patient, ordering medications/tests/procedures, coordinating care, and documenting clinical information in the EHR.      Lionel Mccullough MD  Cardiac Electrophysiology  [facility]  [facility] [place]: [facility]  8:03 AM  12/19/2021

## 2021-12-20 ENCOUNTER — ANESTHESIA EVENT (OUTPATIENT)
Dept: CARDIAC CATH/INVASIVE PROCEDURES | Age: 76
DRG: 222 | End: 2021-12-20
Payer: MEDICARE

## 2021-12-20 ENCOUNTER — ANESTHESIA (OUTPATIENT)
Dept: CARDIAC CATH/INVASIVE PROCEDURES | Age: 76
DRG: 222 | End: 2021-12-20
Payer: MEDICARE

## 2021-12-20 ENCOUNTER — APPOINTMENT (OUTPATIENT)
Dept: GENERAL RADIOLOGY | Age: 76
DRG: 222 | End: 2021-12-20
Attending: INTERNAL MEDICINE
Payer: MEDICARE

## 2021-12-20 VITALS
RESPIRATION RATE: 13 BRPM | OXYGEN SATURATION: 99 % | SYSTOLIC BLOOD PRESSURE: 140 MMHG | DIASTOLIC BLOOD PRESSURE: 77 MMHG

## 2021-12-20 LAB
ANION GAP SERPL CALCULATED.3IONS-SCNC: 13 MMOL/L (ref 7–16)
ANISOCYTOSIS: ABNORMAL
BASOPHILS ABSOLUTE: 0.01 E9/L (ref 0–0.2)
BASOPHILS RELATIVE PERCENT: 0.1 % (ref 0–2)
BUN BLDV-MCNC: 15 MG/DL (ref 6–23)
BURR CELLS: ABNORMAL
CALCIUM SERPL-MCNC: 8.7 MG/DL (ref 8.6–10.2)
CHLORIDE BLD-SCNC: 99 MMOL/L (ref 98–107)
CO2: 21 MMOL/L (ref 22–29)
CREAT SERPL-MCNC: 0.8 MG/DL (ref 0.7–1.2)
EOSINOPHILS ABSOLUTE: 0.01 E9/L (ref 0.05–0.5)
EOSINOPHILS RELATIVE PERCENT: 0.1 % (ref 0–6)
GFR AFRICAN AMERICAN: >60
GFR NON-AFRICAN AMERICAN: >60 ML/MIN/1.73
GLUCOSE BLD-MCNC: 119 MG/DL (ref 74–99)
HCT VFR BLD CALC: 28.9 % (ref 37–54)
HEMOGLOBIN: 9.7 G/DL (ref 12.5–16.5)
HYPOCHROMIA: ABNORMAL
IMMATURE GRANULOCYTES #: 0.07 E9/L
IMMATURE GRANULOCYTES %: 0.6 % (ref 0–5)
INR BLD: 1.1
INR BLD: 1.1
LYMPHOCYTES ABSOLUTE: 0.48 E9/L (ref 1.5–4)
LYMPHOCYTES RELATIVE PERCENT: 4.2 % (ref 20–42)
MAGNESIUM: 2.1 MG/DL (ref 1.6–2.6)
MCH RBC QN AUTO: 28.9 PG (ref 26–35)
MCHC RBC AUTO-ENTMCNC: 33.6 % (ref 32–34.5)
MCV RBC AUTO: 86 FL (ref 80–99.9)
MONOCYTES ABSOLUTE: 0.23 E9/L (ref 0.1–0.95)
MONOCYTES RELATIVE PERCENT: 2 % (ref 2–12)
NEUTROPHILS ABSOLUTE: 10.71 E9/L (ref 1.8–7.3)
NEUTROPHILS RELATIVE PERCENT: 93 % (ref 43–80)
OVALOCYTES: ABNORMAL
PDW BLD-RTO: 16.9 FL (ref 11.5–15)
PHOSPHORUS: 2.9 MG/DL (ref 2.5–4.5)
PLATELET # BLD: 311 E9/L (ref 130–450)
PMV BLD AUTO: 11.1 FL (ref 7–12)
POIKILOCYTES: ABNORMAL
POLYCHROMASIA: ABNORMAL
POTASSIUM SERPL-SCNC: 4.1 MMOL/L (ref 3.5–5)
PROTHROMBIN TIME: 12.5 SEC (ref 9.3–12.4)
PROTHROMBIN TIME: 12.7 SEC (ref 9.3–12.4)
RBC # BLD: 3.36 E12/L (ref 3.8–5.8)
SCHISTOCYTES: ABNORMAL
SODIUM BLD-SCNC: 133 MMOL/L (ref 132–146)
TARGET CELLS: ABNORMAL
WBC # BLD: 11.5 E9/L (ref 4.5–11.5)

## 2021-12-20 PROCEDURE — 6370000000 HC RX 637 (ALT 250 FOR IP): Performed by: INTERNAL MEDICINE

## 2021-12-20 PROCEDURE — 33249 INSJ/RPLCMT DEFIB W/LEAD(S): CPT

## 2021-12-20 PROCEDURE — 6360000002 HC RX W HCPCS: Performed by: NURSE ANESTHETIST, CERTIFIED REGISTERED

## 2021-12-20 PROCEDURE — 84100 ASSAY OF PHOSPHORUS: CPT

## 2021-12-20 PROCEDURE — 02HK3KZ INSERTION OF DEFIBRILLATOR LEAD INTO RIGHT VENTRICLE, PERCUTANEOUS APPROACH: ICD-10-PCS | Performed by: STUDENT IN AN ORGANIZED HEALTH CARE EDUCATION/TRAINING PROGRAM

## 2021-12-20 PROCEDURE — 2580000003 HC RX 258: Performed by: INTERNAL MEDICINE

## 2021-12-20 PROCEDURE — 97535 SELF CARE MNGMENT TRAINING: CPT

## 2021-12-20 PROCEDURE — C1898 LEAD, PMKR, OTHER THAN TRANS: HCPCS

## 2021-12-20 PROCEDURE — 99024 POSTOP FOLLOW-UP VISIT: CPT | Performed by: INTERNAL MEDICINE

## 2021-12-20 PROCEDURE — 85610 PROTHROMBIN TIME: CPT

## 2021-12-20 PROCEDURE — 6360000002 HC RX W HCPCS

## 2021-12-20 PROCEDURE — 6370000000 HC RX 637 (ALT 250 FOR IP): Performed by: NURSE PRACTITIONER

## 2021-12-20 PROCEDURE — C1721 AICD, DUAL CHAMBER: HCPCS

## 2021-12-20 PROCEDURE — 6370000000 HC RX 637 (ALT 250 FOR IP): Performed by: STUDENT IN AN ORGANIZED HEALTH CARE EDUCATION/TRAINING PROGRAM

## 2021-12-20 PROCEDURE — C1777 LEAD, AICD, ENDO SINGLE COIL: HCPCS

## 2021-12-20 PROCEDURE — 71045 X-RAY EXAM CHEST 1 VIEW: CPT

## 2021-12-20 PROCEDURE — C1894 INTRO/SHEATH, NON-LASER: HCPCS

## 2021-12-20 PROCEDURE — C1892 INTRO/SHEATH,FIXED,PEEL-AWAY: HCPCS

## 2021-12-20 PROCEDURE — 83735 ASSAY OF MAGNESIUM: CPT

## 2021-12-20 PROCEDURE — 2580000003 HC RX 258: Performed by: NURSE ANESTHETIST, CERTIFIED REGISTERED

## 2021-12-20 PROCEDURE — 02H63KZ INSERTION OF DEFIBRILLATOR LEAD INTO RIGHT ATRIUM, PERCUTANEOUS APPROACH: ICD-10-PCS | Performed by: STUDENT IN AN ORGANIZED HEALTH CARE EDUCATION/TRAINING PROGRAM

## 2021-12-20 PROCEDURE — 80048 BASIC METABOLIC PNL TOTAL CA: CPT

## 2021-12-20 PROCEDURE — 2709999900 HC NON-CHARGEABLE SUPPLY

## 2021-12-20 PROCEDURE — 6360000002 HC RX W HCPCS: Performed by: INTERNAL MEDICINE

## 2021-12-20 PROCEDURE — 85025 COMPLETE CBC W/AUTO DIFF WBC: CPT

## 2021-12-20 PROCEDURE — 0JH608Z INSERTION OF DEFIBRILLATOR GENERATOR INTO CHEST SUBCUTANEOUS TISSUE AND FASCIA, OPEN APPROACH: ICD-10-PCS | Performed by: STUDENT IN AN ORGANIZED HEALTH CARE EDUCATION/TRAINING PROGRAM

## 2021-12-20 PROCEDURE — 36415 COLL VENOUS BLD VENIPUNCTURE: CPT

## 2021-12-20 PROCEDURE — 2140000000 HC CCU INTERMEDIATE R&B

## 2021-12-20 PROCEDURE — 2500000003 HC RX 250 WO HCPCS

## 2021-12-20 PROCEDURE — 2580000003 HC RX 258: Performed by: STUDENT IN AN ORGANIZED HEALTH CARE EDUCATION/TRAINING PROGRAM

## 2021-12-20 RX ORDER — SODIUM CHLORIDE 0.9 % (FLUSH) 0.9 %
5-40 SYRINGE (ML) INJECTION EVERY 12 HOURS SCHEDULED
Status: DISCONTINUED | OUTPATIENT
Start: 2021-12-20 | End: 2021-12-21 | Stop reason: HOSPADM

## 2021-12-20 RX ORDER — FENTANYL CITRATE 50 UG/ML
INJECTION, SOLUTION INTRAMUSCULAR; INTRAVENOUS PRN
Status: DISCONTINUED | OUTPATIENT
Start: 2021-12-20 | End: 2021-12-20 | Stop reason: SDUPTHER

## 2021-12-20 RX ORDER — MIDAZOLAM HYDROCHLORIDE 1 MG/ML
INJECTION INTRAMUSCULAR; INTRAVENOUS PRN
Status: DISCONTINUED | OUTPATIENT
Start: 2021-12-20 | End: 2021-12-20 | Stop reason: SDUPTHER

## 2021-12-20 RX ORDER — SODIUM CHLORIDE 0.9 % (FLUSH) 0.9 %
5-40 SYRINGE (ML) INJECTION PRN
Status: DISCONTINUED | OUTPATIENT
Start: 2021-12-20 | End: 2021-12-21 | Stop reason: HOSPADM

## 2021-12-20 RX ORDER — SODIUM CHLORIDE 9 MG/ML
INJECTION, SOLUTION INTRAVENOUS CONTINUOUS PRN
Status: DISCONTINUED | OUTPATIENT
Start: 2021-12-20 | End: 2021-12-20 | Stop reason: SDUPTHER

## 2021-12-20 RX ORDER — SODIUM CHLORIDE 9 MG/ML
25 INJECTION, SOLUTION INTRAVENOUS PRN
Status: DISCONTINUED | OUTPATIENT
Start: 2021-12-20 | End: 2021-12-21 | Stop reason: HOSPADM

## 2021-12-20 RX ADMIN — SODIUM CHLORIDE: 9 INJECTION, SOLUTION INTRAVENOUS at 18:09

## 2021-12-20 RX ADMIN — PANTOPRAZOLE SODIUM 40 MG: 40 TABLET, DELAYED RELEASE ORAL at 22:29

## 2021-12-20 RX ADMIN — METOPROLOL SUCCINATE 50 MG: 50 TABLET, EXTENDED RELEASE ORAL at 08:10

## 2021-12-20 RX ADMIN — PREDNISONE 50 MG: 20 TABLET ORAL at 12:43

## 2021-12-20 RX ADMIN — AMIODARONE HYDROCHLORIDE 400 MG: 200 TABLET ORAL at 22:30

## 2021-12-20 RX ADMIN — ROSUVASTATIN 40 MG: 20 TABLET, FILM COATED ORAL at 22:30

## 2021-12-20 RX ADMIN — PANTOPRAZOLE SODIUM 40 MG: 40 TABLET, DELAYED RELEASE ORAL at 08:11

## 2021-12-20 RX ADMIN — SODIUM CHLORIDE, PRESERVATIVE FREE 10 ML: 5 INJECTION INTRAVENOUS at 05:45

## 2021-12-20 RX ADMIN — FUROSEMIDE 40 MG: 20 TABLET ORAL at 08:11

## 2021-12-20 RX ADMIN — MIDAZOLAM 1 MG: 1 INJECTION INTRAMUSCULAR; INTRAVENOUS at 18:08

## 2021-12-20 RX ADMIN — SACUBITRIL AND VALSARTAN 1 TABLET: 97; 103 TABLET, FILM COATED ORAL at 22:32

## 2021-12-20 RX ADMIN — AMIODARONE HYDROCHLORIDE 400 MG: 200 TABLET ORAL at 08:11

## 2021-12-20 RX ADMIN — TICAGRELOR 90 MG: 90 TABLET ORAL at 08:59

## 2021-12-20 RX ADMIN — METOPROLOL SUCCINATE 50 MG: 50 TABLET, EXTENDED RELEASE ORAL at 22:30

## 2021-12-20 RX ADMIN — Medication 10 ML: at 08:10

## 2021-12-20 RX ADMIN — HYDRALAZINE HYDROCHLORIDE 50 MG: 50 TABLET, FILM COATED ORAL at 05:46

## 2021-12-20 RX ADMIN — TICAGRELOR 90 MG: 90 TABLET ORAL at 22:29

## 2021-12-20 RX ADMIN — PREDNISONE 50 MG: 20 TABLET ORAL at 00:08

## 2021-12-20 RX ADMIN — Medication 10 ML: at 21:00

## 2021-12-20 RX ADMIN — SODIUM CHLORIDE, PRESERVATIVE FREE 10 ML: 5 INJECTION INTRAVENOUS at 22:30

## 2021-12-20 RX ADMIN — HYDRALAZINE HYDROCHLORIDE 50 MG: 50 TABLET, FILM COATED ORAL at 22:30

## 2021-12-20 RX ADMIN — MIDAZOLAM 1 MG: 1 INJECTION INTRAMUSCULAR; INTRAVENOUS at 17:24

## 2021-12-20 RX ADMIN — SACUBITRIL AND VALSARTAN 1 TABLET: 97; 103 TABLET, FILM COATED ORAL at 08:10

## 2021-12-20 RX ADMIN — DIPHENHYDRAMINE HYDROCHLORIDE 50 MG: 25 TABLET ORAL at 12:43

## 2021-12-20 RX ADMIN — ISOSORBIDE MONONITRATE 30 MG: 30 TABLET ORAL at 08:11

## 2021-12-20 RX ADMIN — SODIUM CHLORIDE: 9 INJECTION, SOLUTION INTRAVENOUS at 17:12

## 2021-12-20 RX ADMIN — FENTANYL CITRATE 50 MCG: 50 INJECTION, SOLUTION INTRAMUSCULAR; INTRAVENOUS at 17:42

## 2021-12-20 RX ADMIN — SPIRONOLACTONE 25 MG: 25 TABLET ORAL at 08:10

## 2021-12-20 RX ADMIN — ASPIRIN 81 MG CHEWABLE TABLET 81 MG: 81 TABLET CHEWABLE at 08:10

## 2021-12-20 RX ADMIN — PREDNISONE 50 MG: 20 TABLET ORAL at 05:44

## 2021-12-20 RX ADMIN — WATER 1000 MG: 1 INJECTION INTRAMUSCULAR; INTRAVENOUS; SUBCUTANEOUS at 14:18

## 2021-12-20 RX ADMIN — VANCOMYCIN HYDROCHLORIDE 1 G: 1 INJECTION, POWDER, LYOPHILIZED, FOR SOLUTION INTRAVENOUS at 17:25

## 2021-12-20 RX ADMIN — FENTANYL CITRATE 50 MCG: 50 INJECTION, SOLUTION INTRAMUSCULAR; INTRAVENOUS at 18:08

## 2021-12-20 RX ADMIN — FENTANYL CITRATE 50 MCG: 50 INJECTION, SOLUTION INTRAMUSCULAR; INTRAVENOUS at 17:24

## 2021-12-20 RX ADMIN — MIDAZOLAM 1 MG: 1 INJECTION INTRAMUSCULAR; INTRAVENOUS at 17:40

## 2021-12-20 ASSESSMENT — PAIN SCALES - GENERAL
PAINLEVEL_OUTOF10: 0

## 2021-12-20 ASSESSMENT — PULMONARY FUNCTION TESTS
PIF_VALUE: 0
PIF_VALUE: 0

## 2021-12-20 NOTE — PLAN OF CARE
Problem: Cardiac:  Goal: Ability to maintain vital signs within normal range will improve  Description: Ability to maintain vital signs within normal range will improve  Outcome: Met This Shift     Problem: Health Behavior:  Goal: Will modify at least one risk factor affecting health status  Description: Will modify at least one risk factor affecting health status  Outcome: Met This Shift     Problem: Cardiac Output - Decreased:  Goal: Hemodynamic stability will improve  Description: Hemodynamic stability will improve  Outcome: Met This Shift     Problem: SKIN INTEGRITY  Goal: Skin integrity is maintained or improved  Outcome: Met This Shift

## 2021-12-20 NOTE — PATIENT CARE CONFERENCE
P Quality Flow/Interdisciplinary Rounds Progress Note        Quality Flow Rounds held on December 20, 2021    Disciplines Attending:  Bedside Nurse, ,  and Nursing Unit Leadership    Ricarda Mckeon was admitted on 12/12/2021  2:47 PM    Anticipated Discharge Date:  Expected Discharge Date: 12/18/21    Disposition:    Avelino Score:  Avelino Scale Score: 19    Readmission Risk              Risk of Unplanned Readmission:  24           Discussed patient goal for the day, patient clinical progression, and barriers to discharge. The following Goal(s) of the Day/Commitment(s) have been identified:   For ICD placement      Adarsh Ro RN  December 20, 2021

## 2021-12-20 NOTE — PROGRESS NOTES
OCCUPATIONAL THERAPY TREATMENT NOTE    [address] [address] [facility]  [address] [place], [place]       PWLK:                                                               Patient Name: Greyson Valenzuela  MRN: [id number]  : 1945  Room: 3815  Evaluating OT: Marcin Woodard, OTR/L [id number]     Referring Provider: Naina Lawrence DO   Specific Provider Orders/Date: OT eval and treat (21)        Diagnosis: V fib arrest -STEMI     Reason for admission: : Patient went into pulseless V tach and received one round of CPR before ROSC. Patient was immediately taken for cardiac cath. PCI placement in LAD and LAD diagonal branch.      Pertinent Medical History: CAD s/p stent, [place], hearing aids, MI, Sleep apnea         *Precautions:  Fall Risk, O2, bed alarm     Assessment of current deficits   [x]? Functional mobility          [x]? ADLs           [x]? Strength                  []?Cognition   [x]? Functional transfers        [x]? IADLs         [x]? Safety Awareness   [x]? Endurance   []? Fine Coordination           [x]? ROM           []? Vision/perception    []? Sensation     []? Gross Motor Coordination [x]? Balance    []? Delirium                  []?Motor Control     []?  Communication     OT PLAN OF CARE   OT POC based on physician orders, patient diagnosis and results of clinical assessment.        Frequency/Duration: 1-3 days/wk for 1-2 weeks PRN    Specific OT Treatment to include:   ADL retraining/adapted techniques and AE recommendations to increase functional independence within precautions                    Energy conservation techniques to improve tolerance for selfcare routine   Functional transfer/mobility training/DME recommendations for increased independence, safety and fall prevention         Patient/family education to increase safety and functional independence within precautions              Environmental modifications for safe mobility and completion of ADLs                           Cognitive retraining ex's to improve problem solving skills & safe participation in ADLs/IADLs  Sensory re-education techniques to improve extremity awareness, maintain skin integrity and improve hand function                             Visual/Perceptual retraining  to improve body awareness and safety during transfers and ADLs  Splinting/positioning needs to maintain joint/skin integrity and prevent contractures  Therapeutic activity to improve functional performance during ADLs/IADLs                                         Therapeutic exercise to improve tolerance and functional strength for ADLs   Balance retraining exercises/tasks for facilitation of postural control with dynamic challenges during ADLs . Positioning to improve functional independence  Neuromuscular re-education: facilitation of righting/equilibrium reactions, normalization muscle tone/facilitation active functional movement                      Delirium prevention/treatment     Recommended Adaptive Equipment: LB dressing AE pending progress, [employer]      Home Living: Pt lives with wife in a 1 floor plan with 2 step(s) to enter and no rail(s); bed/bath on first floor. Bathroom setup: walk in shower with chair; no rail. Equipment owned: shower seat     Prior Level of Function: IND with ADLs;  IND with IADLs. No device for ambulation. Driving: yes  Occupation: retired from [employer]     Pain Level: Pt did not complain of pain this session     Cognition: A&O: 4/4    Follows 1-2 step commands appropriately.               Memory: fair- (pt did not recognize therapists)             Comprehension fair-             Problem solving: fair-             Judgement/safety: fair-                Communication skills: WFL             Vision: WFL                    Glasses:no                                                       Hearing: WFL               RASS: 0  CAM-ICU: (NT) Delirium      UE Assessment:  Hand Dominance: Right [x]? Left []?       ROM Strength STM goal: PRN   RUE  Shoulder flexion/ abduction 45; WFL distal   (recent shoulder replacement s/p 4 months per pt report) 3-/5 proximal  WFL distal  WFL for ADLS      LUE Shoulder flexion/abducation grossly 80; WFL distal 3+/5 proximal  WFL distal  WFL for ADLS         Sensation: No c/o numbness/tingling in  extremities.    Tone: WNL   Edema: WFL     Functional Assessment:  AM-PAC Daily Activity Raw Score: 16/24    Initial Eval Status  Date: 12/14/21 Treatment Status  Date:12/20/21 STGs = LTGs  Time frame: 7-14 days   Feeding S; set up Setup                       [place]  while seated up in chair to increase activity tolerance         Grooming Mod A SBA  Pt washed face, applied deodorant, brushed teeth seated upright in chair at sink                       Min A   while standing sink level requiring AD as needed for balance and demonstrating G tolerance       UB dressing/bathing Max A Krystle-dressing  [facility] hospital gown seated upright in chair, assistance to manage of lines/tubes    SBA-bathing  Sponge bathing task seated at sink                       Min A         LB dressing/bathing Dep     Max A   seated in chair using AE after  instruction  Krystle-dressing  [facility] socks with use of reacher and sockaide seated upright in chair    modA-bathing  Sponge bathing task seated/standing, with pt able to wash of thighs and wash flo area, with assistance to wash of LE's and stand to wash of buttocks                       Min A   using AE as needed for safe reach/ energy conservation        Toileting NT DNT                       Min A      Bed Mobility  Supine to sit:   Mod A     Sit to supine:   NT  DNT  Krystle per previous level    Pt seated EOB upon arrival and upright in chair at end of session                       SBA  in prep of ADL tasks & transfers   Functional Transfers Sit to stand:   Min A     Stand to sit:   Min A CGA  Sit to Stand  Stand to Sit     Cueing for hand placement                       SBA  sit<>stand/functional bathroom transfers using AD/DME as needed for balance and safety   Functional Mobility SPT: Mod A CGA  Pt ambulated short household distance in room EOB<>Bathroom with no device                      SBA   functional/bathroom mobility using AD as needed & demonstrating G safety      Balance Sitting:     Static: Min A    Dynamic:Min A  Standing: Mod A Sitting EOB:  Supervision    Standing:  CGA/SBA S dynamic sitting balance; SBA dynamic standing balance  during ADL tasks & transfers   Endurance/Activity Tolerance    F tolerance with moderate activity. Fair- G   tolerance with moderate activity/self care routine   Visual/  Perceptual    WFL                              Education: Pt was educated on role of OT, goals to be reached, importance of OOB activity, safety and hand placement with transfers, safety and balance with functional mobility and use of AE to assist with LB dressing tasks. Comments: Upon arrival, pt seated EOB, agreeable to therapy, speaking with nursing okaying pt to be seen this session, wife present. Pt compelted of transfers, functional mobility and ADL tasks this session. At end of session, pt seated upright in chair, BLE's elevated, all tubes and lines intact, call light within reach. · Pt has made good progress towards set goals.    · Continue with current plan of care focusing on increasing of independency with transfers and ADL tasks       Time In: 10:10am          Time Out: 10:35am      Total Treatment Time: 25mins      Treatment Charges: Mins Units   Ther Ex  [id number]     Manual Therapy [id number]     Thera Activities [id number]     ADL/Home Mgt [id number] 25 2   Neuro Re-ed [id number]     Orthotic manage/training  [id number]     Non-Billable Time         Lexy Irby GILL/L [id number]

## 2021-12-20 NOTE — CARE COORDINATION
Presented to er for severe nausea /sweatijng on 12/13. S/p Egd which was neg for ugi bleed. Anemia secondary to c cath R fem puncture site hematoma. For ICD placement today. Dc plan is to return home at discharge. If hhc needed, wife has no preference. MOUNIKA./AUGUSTO to follow.  Electronically signed by Mary Red RN on 12/20/2021 at 10:28 AM

## 2021-12-20 NOTE — PROGRESS NOTES
Dr Joelle Noriega office paged to clarify if pt ok for ICD placement based off of BC to date. Awaiting return call.

## 2021-12-20 NOTE — PLAN OF CARE
Patient's chart updated to reflect:      . - HF care plan, HF education points and HF discharge instructions.  -Orders: 2 gram sodium diet, daily weights, I/O.  -PCP and/or Cardiologist appointment to be scheduled within 7 days of hospital discharge.   Raymond Callahan RN RN, BSN  Heart Failure Navigator

## 2021-12-20 NOTE — PROGRESS NOTES
Chief Complaint:  STEMI (ST elevation myocardial infarction) ([address]     Subjective:    No cough, fevers, shortness of breath. No dysuria    Objective:    /78   Pulse 74   Temp 97.3 °F (36.3 °C) (Temporal)   Resp 16   Ht 5' 6\" (1.676 m)   Wt 170 lb 9.6 oz (77.4 kg)   SpO2 97%   BMI 27.54 kg/m²     Current medications that patient is taking have been reviewed. General appearance: NAD, conversant  HEENT: AT/NC, MMM  Neck: FROM, supple  Lungs: Clear to auscultation, WOB normal  CV: RRR, no MRGs  Abdomen: Soft, non-tender; no masses or HSM, +BS  Extremities: No peripheral edema or digital cyanosis.   Skin: no rash, lesions or ulcers  Psych: Calm and cooperative  Neuro: Alert and interactive, face symmetric, moving all extremities, speech fluent    Labs:  CBC with Differential:    Lab Results   Component Value Date    WBC 14.9 12/19/2021    RBC 3.20 12/19/2021    HGB 9.3 12/19/2021    HCT 28.0 12/19/2021     12/19/2021    MCV 87.5 12/19/2021    MCH 29.1 12/19/2021    MCHC 33.2 12/19/2021    RDW 16.4 12/19/2021    NRBC 0.0 10/11/2018    LYMPHOPCT 7.3 12/19/2021    MONOPCT 10.8 12/19/2021    BASOPCT 0.1 12/19/2021    MONOSABS 1.61 12/19/2021    LYMPHSABS 1.09 12/19/2021    EOSABS 0.14 12/19/2021    BASOSABS 0.02 12/19/2021     CMP:    Lab Results   Component Value Date     12/19/2021    K 3.6 12/19/2021    K 3.5 12/17/2021    CL 97 12/19/2021    CO2 23 12/19/2021    BUN 15 12/19/2021    CREATININE 0.8 12/19/2021    GFRAA >60 12/19/2021    LABGLOM >60 12/19/2021    GLUCOSE 117 12/19/2021    PROT 6.1 12/19/2021    LABALBU 3.8 12/19/2021    CALCIUM 8.5 12/19/2021    BILITOT 2.7 12/19/2021    ALKPHOS 82 12/19/2021    AST 43 12/19/2021    ALT 41 12/19/2021          Assessment/Plan:  Principal Problem:    STEMI (ST elevation myocardial infarction) ([facility]  Active Problems:    CAD (coronary artery disease)s/p stent RCA    Valvular heart disease    H/O psoriatic arthritis    Cardiac arrest ([facility] [facility]    Acute systolic (congestive) heart failure (HCC)    Ventricular tachycardia (HCC)    Mitral stenosis    Mitral regurgitation    Moderate to severe pulmonary hypertension (HCC)    Anemia  Resolved Problems:    * No resolved hospital problems.  *       Continue amio    Hold abx per ID    ICD in near future once cleared by ID    DAPT, statin    Continue BB, entresto, spirono    DVT prophylaxis: UFH  Requires continued inpatient level of care     Boubacar Howard MD    11:27 PM  12/19/2021

## 2021-12-20 NOTE — OP NOTE
Operative Note      Patient: Keisha Case  YOB: 1945  MRN: [id number]    Date of Procedure: 12/20/2021    Patient History: Keisha Case is a 77 y.o. male with a history of MMVT, SVT, NYHA Class II HFrEF-ischemic sp PTCA/stent RCA (7/7/97) and RYAN x 1 proximal LAD (12/12/21), mild-moderate MR, HTN, DM2, overweight sp gastric bypass, BETTIE, GI bleed, and psoriatic arthritis. He is managed by Dr Vickie Rucker with aspirin 81 mg daily, Entresto  mg BID, lasix 20 mg daily, metoprolol XL 50 mg BID, simvastatin 20 mg daily, and spironolactone 25 mg daily. Based on available records, he had PVI in 1997. LVEF prior to 2017 is unclear. Since 2017, LVEF declined from 45-50% to 29% in 2020. TTE in 9/2020 reported LVEF improved to 51%, but unclear what facilitated this improvement. On 12/12/21, he presented with chief complaint of fatigue and diphoresis. He was diagnosed with STEMI and MMVT at 190 bpm, which were treated with RYAN x 1 proximal LAD and external defibrillation. His LHC was complicated by right groin hematoma/anemia. Post-LHC he had paroxysms of AF/AFL. He was evaluated by EP, who recommended amiodarone and dual chamber secondary prevention ICD. He has a contrast allergy and treated with prophylactic prednisone and benadryl. He was also diagnosed with UTI. Blood cultures report NGTD. He was cleared by ID and now presents for secondary prevention MDT dc ICD. Prior cardiac testing:  · TTE (12/12/21): LVEF = NR, akinetic inferior inferolateral and basal lateral walls,  LV dilation, RV dilation, mild RVSD, mild LAE, moderate TERRY, MS-?mild, moderate-severe MR, mild TR, and moderate-severe pulmonary HTN.   · LHC (12/12/21): LVEF = 25% with aneurysm/pseudoaneurysm of posterior LV wall, LV dilation, akinetic inferior wall and hypokinesis of anterolateral and apical walls, moderate MR, 10% distal LM stenosis, 80-90% proximal LAD stenosis treated with RYAN x 1 with kissing balloon technique involving D1, 90% ostial and 70-80% proximal D2 stenosis, 90% osital D3 stenosis, 50% mid LAD stenosis, 60% distal LAD stenosis, 60-70% apical LAD stenosis, 70-80% proximal and 80% mid LCx stenosis, 40% mid RCA stenosis, and 70% ostial PLB stenosis. · TTE (9/10/20): LVEF = 51%, mild LV dilation, RV dilation, mild LAE, TERRY, moderate MR.  · Nuc Stress (7/1/20): LVEF = 29%, inferolateral infarct, and no comment on ischemia. · Cardiac MRI (8/13/19): LVEF = 40%, mild LV dilation, basal-mid inferior inferolateral and anterolateral infarct, low probability of viability, mild-moderate MR, and moderate AXEL. · Event monitor (9/27/21): SR with HR of  bpm (mean: 50 bpm), 2 episodes of NSVT (fastest/longest: 133 bpm for 5 beats), IVR episodes, SVE burden = 6.2% with isolated PACs, VE burden = 11.5% with predominantly isoalted PVCs. · TTE (10/9/18): LVEF = 35-40%  · TTE (3/20/17): LVEF 45-50%     Pre-Op Diagnosis: MMVT at 190 bpm, atrial fibrillation/flutter, SVT    Post-Op Diagnosis: same       Procedure: Medtronic dual chamber ICD implant     Surgeon: Ekta Hager DO     Assistant: None     Anesthesia: see separate Anesthesia report     Estimated Blood Loss (mL): 10 mL     Complications: none     Detailed Description of Procedure:   Verbal and written informed consent obtained from the patient and family. The patient was brought to the EP lab in a fasting state. Monitored anesthesia care was administered by anesthesia provider during the procedure. The left upper chest was inspected for hair at the anticipated incision site within the clavipectoral triangle and if present was removed with electric clippers. The site was then prepared with chlorhexidine gluconate and draped in a sterile fashion. Vancomycin was administered paraenterally within 0.5 hours prior to incision.  The left upper chest area was inspected for main folding lines and striae to guide orientation of incision and if not apparent a pinch test in various directions was performed to visualize the folding lines. The incision was planned to follow perpendicular to the main folding lines in order to minimize scar formation and optimize wound healing. Local anesthesia with 2% lidocaine HCl was applied to the planned incision and pocket area. Once adequate sedation was achieved and lidocaine was administered, an incision was made two finger-breadths inferior the left clavicle between the mid-clavicular line and deltopectoral groove, which was of adequate width to accommodate the device. Using blunt dissection and electrocautery, a subcutaneous device pocket was created superficial to the pectoralis major muscle fascia in order to avoid the pain corpuscles of the subcuticular plane and vascular pectoralis major muscle. The pocket was extended in a medial and inferior direction from the pocket incision site. A superior lip to the pocket was also created. Hemostasis was achieved and confirmed. A venogram with IV contrast was performed in order to confirm patency and location of left axillary, subclavian veins and SVC, as well as guide access in the axillary vein overlying the first rib with a modified Seldinger technique and micro puncture needle under fluoroscopic visualization in AP 30* caudal in order to reduce the risk of pneumothorax and hemothorax. Two J tip wires were advanced to the right atrium (RA) and IVC in order to confirm venous puncture and avoid inadvertent placement of pacing leads in the arterial system. A 9 [place]ese sheath was advanced over the the first one. Wire and dilator were removed. A 62 cm Medtronic ICD lead (model: [id number]) active fixation with a soft straight stylet was advanced into the sheath, then the stylet was partially retracted and the lead was advanced into the RA via the sheath. The stylet was removed and a curve was introduced to the distal end of the stylet. The stylet was the reintroduced to the lead.  A soft stylet was used to minimize the risk of cardiac perforation and damage the tricuspid valve (TV). The lead was advanced into the right ventricle (RV) at a lower plane in order to avoid complications with the TV due to the chordae on the septal aspect. The lead was advanced into the pulmonary artery while partially retracting the stylet in order to confirm location in the RV and avoid inadvertent placement of the lead in the middle cardiac vein or in the LV through a patent foramen ovale. The stylet was exchanged for soft stylet with small curve introduced to distal aspect. The lead was then slowly withdrawn until it dropped into the RV cavity and parallel with mid-apical septum in right anterior oblique (ASENCIO) 30* fluoroscopic view. The lead was positioned on the mid RV septum as this position may result in better clinical and functional outcomes compared to apical RV . SUSANA 40* view showed Rv lead tip was 0-60* from horizontal. The lead tip was deployed. The EGM was reviewed and current of injury observed, which is associated with adequate threshold and long-term stability. No evidence of perforation (negative ST-segment) was observed. Slew rate was > 1 V/s, which indicates consistent sensing. Lead parameters were assessed and found to be adequate (sensing > 5 mV, impedance = 400 - 1200 ohms, threshold < 1.5 V @ 0.4 msec). High output pacing performed and no evidence of phrenic nerve capture was observed. Adequate lead slack was confirmed as RV lead noted to lay along the floor of the RA without prolapsing into IVC. The sheath was split, and the lead was sutured to the pectoralis major muscle with a non-absorbable 0 silk suture over suture sleeve. Another 7 Sami sheath was introduced over the second J-tip wire, following which the wire and dilator were removed.  A 45 cm 5.7 F MicroEvalureFix Novus MRI ScureScan (model: [id number]) active fixation atrial lead with soft straight stylet was introduced into the sheath, then the stylet was suture technique with deep bites of equal spacing. An absorbable 4-0 Monocryl suture with reverse cutting needle was used to approximate the subcuticular tissue with minimal tension through running suture technique with closely spaced bites and buried sutured knot at the ends. Dermabond was placed over the incision. An Aquacel Ag surgical dressing was placed over the incision. A pressure dressing was placed over the incision. Final lead assessment:   RA: sensing = 0.3 mV, impedance = 551 ohms, capture threshold = N/A (AF)   RV: sensing = 13.0 mV, impedance = 418 ohms (RV HV = 38 ohms), capture threshold = 0.75 V @ 0.4 msec    The device was programmed:   AAI <=> DDD  50/120 ppm   AV delays: 340 msec (sense) and 350 msec (pace)   Lead programming:  o RA lead: sensitivity =  0.15 mV, output = 3.5 V @ 0.4 msec  o RV lead: sensitivity = 0.3 mV, output =  3.5 V @ 0.4 msec. SUMMARY: Successful Medtronic dual chamber ICD implant in the setting of HFrEF-ischemic and MMVT. Low voltage noted throughout the RA in the setting of atrial fibrillation.      Electronically signed by Sheran Schirmer, DO on 12/20/2021 at 5:01 PM

## 2021-12-20 NOTE — ANESTHESIA PRE PROCEDURE
Department of Anesthesiology  Preprocedure Note       Name:  Wilson Cosby   Age:  77 y.o.  :  1945                                          MRN:  [id number]         Date:  2021      Surgeon: * Surgery not found *    Procedure:     Medications prior to admission:   Prior to Admission medications    Medication Sig Start Date End Date Taking?  Authorizing Provider   ENTRESTO  MG per tablet TAKE ONE TABLET BY MOUTH TWO TIMES A DAY 11/15/21   Thierry Julien MD   Cetirizine HCl (ZYRTEC ALLERGY PO) Take by mouth daily    Historical Provider, MD   simvastatin (ZOCOR) 20 MG tablet 20 mg  5/3/21   Historical Provider, MD   Acetaminophen (TYLENOL ARTHRITIS PAIN PO) Take by mouth daily    Historical Provider, MD   azelastine HCl 0.15 % SOLN 2 sprays by Nasal route daily    Historical Provider, MD   risperiDONE (RISPERDAL) 1 MG tablet Take 1 mg by mouth daily     Historical Provider, MD   Respiratory Therapy Supplies MONAE 1 Device by Does not apply route every 3 hours as needed (increase pressure to 12)  Patient taking differently: 1 Device by Does not apply route every 3 hours as needed (increase pressure to 12) C-PaP EVERY NIGHT 20   Lauren Dhillon MD   Ferrous Sulfate (IRON) 325 (65 Fe) MG TABS daily  19   Historical Provider, MD   pantoprazole (PROTONIX) 40 MG tablet TAKE 1 TABLET BY MOUTH ONCE DAILY 19   Historical Provider, MD   Cranberry 500 MG CAPS Take 500 mg by mouth    Historical Provider, MD   gabapentin (NEURONTIN) 300 MG capsule Take two capsules three times daily 18   Historical Provider, MD   aspirin 81 MG tablet Take 81 mg by mouth daily    Historical Provider, MD   furosemide (LASIX) 20 MG tablet Take 1 tablet by mouth daily 18   MORGAN Laguna   metoprolol succinate (TOPROL XL) 50 MG extended release tablet Take 1 tablet by mouth 2 times daily 10/15/18   Vivi Stanton DO   spironolactone (ALDACTONE) 25 MG tablet Take 1 tablet by mouth daily 10/16/18   Ginger Paredes DO   methotrexate (RHEUMATREX) 2.5 MG chemo tablet Take 7.5 mg by mouth once a week Indications: Thursday     Historical Provider, MD   folic acid (FOLVITE) 1 MG tablet Take 1 mg by mouth every morning     Historical Provider, MD   hydroxychloroquine (PLAQUENIL) 200 MG tablet Take 200 mg by mouth 2 times daily     Historical Provider, MD   DULoxetine (CYMBALTA) 60 MG extended release capsule Take 60 mg by mouth every morning     Historical Provider, MD   Multiple Vitamins-Minerals (MULTIVITAMIN ADULT PO) Take 1 tablet by mouth 2 times daily     Historical Provider, MD   Cholecalciferol (VITAMIN D3) 5000 UNITS TABS Take 1 tablet by mouth every morning     Historical Provider, MD   tamsulosin (FLOMAX) 0.4 MG capsule Take 0.4 mg by mouth daily  8/26/14   Historical Provider, MD   RESTASIS 0.05 % ophthalmic emulsion Place 1 drop into both eyes 2 times daily as needed  10/21/13   Historical Provider, MD       Current medications:    No current facility-administered medications for this visit. No current outpatient medications on file.      Facility-Administered Medications Ordered in Other Visits   Medication Dose Route Frequency Provider Last Rate Last Admin    cefTRIAXone (ROCEPHIN) 1,000 mg in sterile water 10 mL IV syringe  1,000 mg IntraVENous Q24H Ramos Casper MD   1,000 mg at 12/20/21 1418    hydrALAZINE (APRESOLINE) tablet 50 mg  50 mg Oral 3 times per day Wan Plata DO   50 mg at 12/20/21 0546    vancomycin 1000 mg IVPB in 250 mL D5W addavial  1,000 mg IntraVENous On Call to Ana Kraft MD        metoprolol succinate (TOPROL XL) extended release tablet 50 mg  50 mg Oral BID Brissa Leonard MD   50 mg at 12/20/21 0810    sacubitril-valsartan (ENTRESTO)  MG per tablet 1 tablet  1 tablet Oral BID Toni Pino MD   1 tablet at 12/20/21 0810    furosemide (LASIX) tablet 40 mg  40 mg Oral Daily Toni Pino MD   40 mg at 12/20/21 0837    pantoprazole (PROTONIX) tablet 40 mg  40 mg Oral BID Nico Liu MD   40 mg at 12/20/21 0811    labetalol (NORMODYNE;TRANDATE) injection 10 mg  10 mg IntraVENous Q30 Min PRN Bishop Rain MD   10 mg at 12/16/21 0110    hydrALAZINE (APRESOLINE) injection 10 mg  10 mg IntraVENous Q30 Min PRN Bishop Rain MD        isosorbide mononitrate (IMDUR) extended release tablet 30 mg  30 mg Oral Daily Jae Florian MD   30 mg at 12/20/21 8108    amiodarone (CORDARONE) tablet 400 mg  400 mg Oral BID KAYE Patel CNP   400 mg at 12/20/21 0083    spironolactone (ALDACTONE) tablet 25 mg  25 mg Oral Daily Jae Florian MD   25 mg at 12/20/21 0810    polyethylene glycol (GLYCOLAX) packet 17 g  17 g Oral Daily Ryanne Noguera, DO   17 g at 12/18/21 0913    sodium chloride flush 0.9 % injection 5-40 mL  5-40 mL IntraVENous 2 times per day Jae Florian MD   10 mL at 12/20/21 0810    sodium chloride flush 0.9 % injection 5-40 mL  5-40 mL IntraVENous PRN Jae Florian MD   10 mL at 12/20/21 0545    0.9 % sodium chloride infusion  25 mL IntraVENous PRN Jae Florian MD        acetaminophen (TYLENOL) tablet 650 mg  650 mg Oral Q4H PRN Jae Florian MD   650 mg at 12/19/21 2055    ondansetron (ZOFRAN) injection 4 mg  4 mg IntraVENous Q6H PRN Jae Florian MD        rosuvastatin (CRESTOR) tablet 40 mg  40 mg Oral Nightly Jae Florian MD   40 mg at 12/19/21 2055    aspirin chewable tablet 81 mg  81 mg Oral Daily Jae Florian MD   81 mg at 12/20/21 0810    ticagrelor (BRILINTA) tablet 90 mg  90 mg Oral BID Jae Florian MD   90 mg at 12/20/21 0859    [Held by provider] heparin (porcine) injection 5,000 Units  5,000 Units SubCUTAneous Salvador Orta MD   5,000 Units at 12/19/21 0615       Allergies:     Allergies   Allergen Reactions    Dye [Iodides] Anaphylaxis     Passing out    Dust Mite Extract Other (See Comments)     All year around       Problem List:    Patient Active Problem List Diagnosis Code    CAD (coronary artery disease)s/p stent RCA I25.10    Valvular heart disease I38    Hypertension I10    Obesity, morbid s/p intestinal bypass E66.01    STEMI (ST elevation myocardial infarction) ([facility]) I21.3    Stented coronary artery Z95.5    Hyperlipidemia E78.5    H/O psoriatic arthritis Z87.2    Acute upper GI bleed K92.2    SVT (supraventricular tachycardia) ([facility]) I47.1    LV dysfunction I51.9    Cardiac arrest ([facility]) N41.6    Acute systolic (congestive) heart failure ([facility]) I50.21    Ventricular tachycardia ([facility]) I47.2    Mitral stenosis I05.0    Mitral regurgitation I34.0    Moderate to severe pulmonary hypertension ([facility]) I27.20    Anemia D64.9       Past Medical History:        Diagnosis Date    CAD (coronary artery disease)     s/p stent RCA    [place] (hard of hearing)     WEARS HEARING AIDES    Hypertension     Left ventricular systolic dysfunction     MI (myocardial infarction) ([address] 1997    Mitral regurgitation     Obesity, morbid ([address]     s/p intestinal bypass    Sleep apnea     Valvular heart disease        Past Surgical History:        Procedure Laterality Date    APPENDECTOMY      BACK SURGERY      CARPAL TUNNEL RELEASE Bilateral     CORONARY ANGIOPLASTY  7/7/97    PTCA/stent of the RCA     CORONARY ANGIOPLASTY WITH STENT PLACEMENT  12/12/2021    3.5 x 15 Resolute Luis to the prox LAD by Dr. Regena Bosworth CATH LAB PROCEDURE  2/24/97    DIAGNOSTIC CARDIAC CATH LAB PROCEDURE  7/7/97    EYE SURGERY Bilateral     cataract removal w lense implants    FRACTURE SURGERY      Clayton in left femur    HIP SURGERY  2014    fx, clayton placed    INTESTINAL BYPASS      JOINT REPLACEMENT      TONSILLECTOMY      UPPER GASTROINTESTINAL ENDOSCOPY N/A 10/8/2018    EGD CONTROL HEMORRHAGE at bedside performed by Jovon Pradhan MD at [facility] N/A 12/16/2021    EGD ESOPHAGOGASTRODUODENOSCOPY performed by Laury Sifuentes MD at [facility] History:    Social History     Tobacco Use    Smoking status: Former Smoker     Packs/day: 0.50     Types: Cigarettes     Start date: 10/8/1972     Quit date: 2001     Years since quittin.9    Smokeless tobacco: Never Used   Substance Use Topics    Alcohol use: Yes     Alcohol/week: 28.0 standard drinks     Types: 28 Glasses of wine per week     Comment: SOCIALLY                                Counseling given: Not Answered      Vital Signs (Current): There were no vitals filed for this visit. BP Readings from Last 3 Encounters:   21 115/69   21 115/72   21 (!) 103/59       NPO Status:  1200                                                                               BMI:   Wt Readings from Last 3 Encounters:   21 170 lb 9.6 oz (77.4 kg)   21 168 lb (76.2 kg)   21 174 lb 9.6 oz (79.2 kg)     There is no height or weight on file to calculate BMI.    CBC:   Lab Results   Component Value Date    WBC 11.5 2021    RBC 3.36 2021    HGB 9.7 2021    HCT 28.9 2021    MCV 86.0 2021    RDW 16.9 2021     2021       CMP:   Lab Results   Component Value Date     2021    K 4.1 2021    K 3.5 2021    CL 99 2021    CO2 21 2021    BUN 15 2021    CREATININE 0.8 2021    GFRAA >60 2021    LABGLOM >60 2021    GLUCOSE 119 2021    PROT 6.1 2021    CALCIUM 8.7 2021    BILITOT 2.7 2021    ALKPHOS 82 2021    AST 43 2021    ALT 41 2021       POC Tests: No results for input(s): POCGLU, POCNA, POCK, POCCL, POCBUN, POCHEMO, POCHCT in the last 72 hours.     Coags:   Lab Results   Component Value Date    PROTIME 12.7 2021    INR 1.1 2021    APTT 25.8 10/08/2018       HCG (If Applicable): No results found for: PREGTESTUR, PREGSERUM, HCG, HCGQUANT     ABGs: No results found for: PHART, PO2ART, XHR1ELL, RWJ5WPE, BEART, B6OUERDQ     Type & Screen (If Applicable):  No results found for: LABABO, LABRH    Drug/Infectious Status (If Applicable):  No results found for: HIV, HEPCAB    COVID-19 Screening (If Applicable):   Lab Results   Component Value Date    COVID19 Not Detected 12/12/2021           Anesthesia Evaluation  Patient summary reviewed and Nursing notes reviewed no history of anesthetic complications:   Airway: Mallampati: II  TM distance: >3 FB   Neck ROM: full  Mouth opening: > = 3 FB Dental:          Pulmonary:normal exam    (+) sleep apnea:                             Cardiovascular:    (+) hypertension:, valvular problems/murmurs: MR, past MI: < 1 month, CAD:, CABG/stent:, dysrhythmias: SVT, CHF:,          Beta Blocker:  Dose within 24 Hrs      ROS comment: PTCA 12/12/21    pulm htn     Neuro/Psych:   Negative Neuro/Psych ROS              GI/Hepatic/Renal:            ROS comment: S/p gastric bypass  . Endo/Other:    (+) blood dyscrasia: anemia:., .                 Abdominal:       Abdomen: soft. Vascular: negative vascular ROS. Other Findings:               Anesthesia Plan      MAC     ASA 4       Induction: intravenous. Anesthetic plan and risks discussed with patient. Plan discussed with attending.                   KAYE Nunez - JEFF   12/20/2021

## 2021-12-20 NOTE — PROGRESS NOTES
Department of Internal Medicine  Infectious Diseases  Progress Note      C/C :  Leukocytosis , ID clearance for ICD  insertion     Pt is awake and alert  Denies fever or chills  Denies SOB   Afebrile    Current Facility-Administered Medications   Medication Dose Route Frequency Provider Last Rate Last Admin    hydrALAZINE (APRESOLINE) tablet 50 mg  50 mg Oral 3 times per day Nile Spaulding DO   50 mg at 12/20/21 0546    vancomycin 1000 mg IVPB in 250 mL D5W addavial  1,000 mg IntraVENous On Call to Ana Kraft MD        metoprolol succinate (TOPROL XL) extended release tablet 50 mg  50 mg Oral BID Herlinda Greenfield MD   50 mg at 12/20/21 0810    sacubitril-valsartan (ENTRESTO)  MG per tablet 1 tablet  1 tablet Oral BID Antwon Lee MD   1 tablet at 12/20/21 0810    furosemide (LASIX) tablet 40 mg  40 mg Oral Daily Antwon Lee MD   40 mg at 12/20/21 0811    pantoprazole (PROTONIX) tablet 40 mg  40 mg Oral BID Curtis Dobbins MD   40 mg at 12/20/21 0811    labetalol (NORMODYNE;TRANDATE) injection 10 mg  10 mg IntraVENous Q30 Min PRN John Hdz MD   10 mg at 12/16/21 0110    hydrALAZINE (APRESOLINE) injection 10 mg  10 mg IntraVENous Q30 Min PRN John Hdz MD        isosorbide mononitrate (IMDUR) extended release tablet 30 mg  30 mg Oral Daily Antwon Lee MD   30 mg at 12/20/21 5039    amiodarone (CORDARONE) tablet 400 mg  400 mg Oral BID KAYE Barron CNP   400 mg at 12/20/21 1751    spironolactone (ALDACTONE) tablet 25 mg  25 mg Oral Daily Antwon Lee MD   25 mg at 12/20/21 0810    polyethylene glycol (GLYCOLAX) packet 17 g  17 g Oral Daily Melvin R Kilar, DO   17 g at 12/18/21 0913    sodium chloride flush 0.9 % injection 5-40 mL  5-40 mL IntraVENous 2 times per day Antwon Lee MD   10 mL at 12/20/21 0810    sodium chloride flush 0.9 % injection 5-40 mL  5-40 mL IntraVENous PRN Antwon Lee MD   10 mL at 12/20/21 0545    0.9 % sodium chloride infusion  25 mL IntraVENous PRN Anton Ely MD        acetaminophen (TYLENOL) tablet 650 mg  650 mg Oral Q4H PRN Anton Ely MD   650 mg at 12/19/21 2055    ondansetron (ZOFRAN) injection 4 mg  4 mg IntraVENous Q6H PRN Anton Ely MD        rosuvastatin (CRESTOR) tablet 40 mg  40 mg Oral Nightly Anton Ely MD   40 mg at 12/19/21 2055    aspirin chewable tablet 81 mg  81 mg Oral Daily Anton Ely MD   81 mg at 12/20/21 0810    ticagrelor (BRILINTA) tablet 90 mg  90 mg Oral BID Anton Ely MD   90 mg at 12/20/21 0859    [Held by provider] heparin (porcine) injection 5,000 Units  5,000 Units SubCUTAneous Q8H Anton Ely MD   5,000 Units at 12/19/21 0615         REVIEW OF SYSTEMS:    CONSTITUTIONAL:  Denies fever, chill or rigors  HEENT: denies blurring of vision or double vision, denies hearing problem  RESPIRATORY: denies cough, shortness of breath, sputum expectoration. CARDIOVASCULAR:  Denies palpitation  GASTROINTESTINAL:  Denies abdomen pain, diarrhea or constipation. GENITOURINARY:  Denies burning urination or frequency of urination  INTEGUMENT: Ecchymosis   HEMATOLOGIC/LYMPHATIC:  Denies lymph node swelling, gum bleeding or easy bruising. MUSCULOSKELETAL:  Denies leg pain , joint pain , joint swelling  NEUROLOGICAL:  Weakness       PHYSICAL EXAM:      Vitals:     /62   Pulse 76   Temp 97.2 °F (36.2 °C)   Resp 16   Ht 5' 6\" (1.676 m)   Wt 170 lb 9.6 oz (77.4 kg)   SpO2 97%   BMI 27.54 kg/m²     General Appearance:    Awake, alert , no acute distress. Head:    Normocephalic, atraumatic   Eyes:    No pallor, no icterus,   Ears:    No obvious deformity or drainage.    Nose:   No nasal drainage   Throat:   Mucosa moist, no oral thrush   Neck:   Supple, no lymphadenopathy   Back:     no CVA tenderness   Lungs:     Clear to auscultation bilaterally    Heart:    Irregular    Abdomen:     Soft, non-tender, bowel sounds present    Extremities:   No edema, non tender Pulses:   Dorsalis pedis palpable    Skin:   Right groin ecchymosis        CBC with Differential:      Lab Results   Component Value Date    WBC 11.5 12/20/2021    RBC 3.36 12/20/2021    HGB 9.7 12/20/2021    HCT 28.9 12/20/2021     12/20/2021    MCV 86.0 12/20/2021    MCH 28.9 12/20/2021    MCHC 33.6 12/20/2021    RDW 16.9 12/20/2021    NRBC 0.0 10/11/2018    LYMPHOPCT 4.2 12/20/2021    MONOPCT 2.0 12/20/2021    BASOPCT 0.1 12/20/2021    MONOSABS 0.23 12/20/2021    LYMPHSABS 0.48 12/20/2021    EOSABS 0.01 12/20/2021    BASOSABS 0.01 12/20/2021       CMP     Lab Results   Component Value Date     12/20/2021    K 4.1 12/20/2021    K 3.5 12/17/2021    CL 99 12/20/2021    CO2 21 12/20/2021    BUN 15 12/20/2021    CREATININE 0.8 12/20/2021    GFRAA >60 12/20/2021    LABGLOM >60 12/20/2021    GLUCOSE 119 12/20/2021    PROT 6.1 12/19/2021    LABALBU 3.8 12/19/2021    CALCIUM 8.7 12/20/2021    BILITOT 2.7 12/19/2021    ALKPHOS 82 12/19/2021    AST 43 12/19/2021    ALT 41 12/19/2021         Hepatic Function Panel:    Lab Results   Component Value Date    ALKPHOS 82 12/19/2021    ALT 41 12/19/2021    AST 43 12/19/2021    PROT 6.1 12/19/2021    BILITOT 2.7 12/19/2021    BILIDIR 0.7 12/19/2021    IBILI 2.0 12/19/2021    LABALBU 3.8 12/19/2021       PT/INR:    Lab Results   Component Value Date    PROTIME 12.7 12/20/2021    INR 1.1 12/20/2021       TSH:    Lab Results   Component Value Date    TSH 3.820 12/12/2021       U/A:    Lab Results   Component Value Date    COLORU Yellow 12/18/2021    PHUR 6.5 12/18/2021    WBCUA 10-20 12/18/2021    RBCUA 1-3 12/18/2021    BACTERIA FEW 12/18/2021    CLARITYU Clear 12/18/2021    SPECGRAV 1.010 12/18/2021    LEUKOCYTESUR MODERATE 12/18/2021    UROBILINOGEN 1.0 12/18/2021    BILIRUBINUR Negative 12/18/2021    BLOODU SMALL 12/18/2021    GLUCOSEU Negative 12/18/2021       ABG:  No results found for: WLY0KNV, BEART, C3KRPJNV, PHART, THGBART, OTE5GUW, PO2ART, HMH7HHC    MICROBIOLOGY:    Blood culture - neg to date     SARS CoV2 negative       Radiology :    Chest X ray- bilateral patchy opacities     IMPRESSION:     1. STEMI s/p PCI / stent   2. Right groin hematoma, reactive leukocytosis -improved   3. E coli bacteriuria      RECOMMENDATIONS:     1. OK for AICD insertion   2.  Rocephin 1 gram IV q 24 hrs

## 2021-12-20 NOTE — PROGRESS NOTES
Cardiac Electrophysiology Inpatient Progress Note    Aissatou Ding  1945  Date of Service: 12/20/2021  PCP: Joaquim Gandih MD  Cardiologist: Shilpi Smiley MD   Primary Electrophysiologist: Joellen Solano MD   Attending Electrophysiologist: Feroz Mon MD         Subjective: Aissatou Ding is seen in hospital follow-up and management of: VT     Initial Hospital consult (12/13/21):  77year old male with known CAD, STEMI 1997 with PCI to the RCA,   LVEF 45-50% on TTE 03/30/17, 35-40% on TTE 10/09/18, LVEF of 29% on a Lexiscan stress test 07/01/20. Other hx includes HTN, HLD, obesity S/p gastric bypass, Carpal tunnel surgery. At baseline, he is on Entresto, spironolactone, Metoprolol 50 mg bid  He presented to ED 12/12 with palpitation, weakness for one hour and was found to be in sustained VT monomorphic, Rate 190, LBBB, superior axis. He was given Lidocaine and while preparing to cardiovert patient, he had pulseless VT and CPR was started, he was intubated, defibrillated x1. There was a concern for inferior STEMI thus Cath lab was activated. Cath showed prox LAD 80-90%, mod D2 90% ostial, apical LAD 70%, diffuse Lcx 70-80% lesions, mild diffuse RCA tsenosis  LV gram showed possible LV aneurysm, mod MR and inferior akinesis, dilated LV. FFR of LAD proved that it was significant thus he had simultaneous RYAN to prox LAD and angioplasty to ostium Diagonal branch     He is intubated and awake, not really following commands. He is on Lidocaine 1mg/min, no pressors, metoprolol 25 mg bid    (12/14/21): He converted to AF with a controled ventricular rate overnight without recurrent VT, he remains on IV Lidocaine 0.5 mg/min. Today he is confused to his situation and agreeable. His count dropped from 12 to 8.0 this morning.     (12/15/21): He has improved cognition today and is in sinus, he awaits EGD today due to ongoing anemia with a PRBC transfusion on 12/14.  No recurrent VT and tolerating amiodarone well. (12/16/21): He had recurrent AF with RVR last night with rates into the 140's today his H/H continues to decline. (12/17/21): The patient is seen in hospital follow-up, he continues in AF with rates in the 90's he is tolerating the increased dose of Toprol as well as the increased dose of Entresto. Today he is awake and alert without cardiac complaints. 12/18/21: The patient is seen in the hospital for follow up. He remains in AFL with HR of  bpm. He denies any chest pain, dyspnea or palpitations. 12/19/21: The patient is seen in the hospital for follow up. He remains in AFL with HR of 60-80 bpm. He denies any chest pain, dyspnea or palpitations. 12/20/21: The patient is seen in the hospital for follow up. He remains in AF/AFL with HR 60-80 bpm. He denies any chest pain, dyspnea or palpitations. Final blood cultures are pending.       Patient Active Problem List   Diagnosis    CAD (coronary artery disease)s/p stent RCA    Valvular heart disease    Hypertension    Obesity, morbid s/p intestinal bypass    STEMI (ST elevation myocardial infarction) ([address]    Stented coronary artery    Hyperlipidemia    H/O psoriatic arthritis    Acute upper GI bleed    SVT (supraventricular tachycardia) (HCC)    LV dysfunction    Cardiac arrest ([address]    Acute systolic (congestive) heart failure (HCC)    Ventricular tachycardia (HCC)    Mitral stenosis    Mitral regurgitation    Moderate to severe pulmonary hypertension (HCC)    Anemia         Scheduled Medications:   hydrALAZINE  50 mg Oral 3 times per day    vancomycin  1,000 mg IntraVENous On Call to OR    metoprolol succinate  50 mg Oral BID    predniSONE  50 mg Oral Once    Followed by    diphenhydrAMINE  50 mg Oral Once    sacubitril-valsartan  1 tablet Oral BID    furosemide  40 mg Oral Daily    pantoprazole  40 mg Oral BID    isosorbide mononitrate  30 mg Oral Daily    amiodarone  400 mg Oral BID    spironolactone  25 mg Oral Daily    polyethylene glycol  17 g Oral Daily    sodium chloride flush  5-40 mL IntraVENous 2 times per day    rosuvastatin  40 mg Oral Nightly    aspirin  81 mg Oral Daily    ticagrelor  90 mg Oral BID    [Held by provider] heparin (porcine)  5,000 Units SubCUTAneous Q8H       Infusion Medications:   sodium chloride         PRN Medications:  labetalol, hydrALAZINE, sodium chloride flush, sodium chloride, acetaminophen, ondansetron    Allergies   Allergen Reactions    Dye [Iodides] Anaphylaxis     Passing out    Dust Mite Extract Other (See Comments)     All year around       [facility] Readings from Last 3 Encounters:   12/19/21 170 lb 9.6 oz (77.4 kg)   12/12/21 168 lb (76.2 kg)   11/16/21 174 lb 9.6 oz (79.2 kg)     Temp Readings from Last 3 Encounters:   12/20/21 97.2 °F (36.2 °C)   12/12/21 97.8 °F (36.6 °C) (Temporal)   10/12/21 97.4 °F (36.3 °C)     BP Readings from Last 3 Encounters:   12/20/21 117/62   12/16/21 115/72   12/12/21 (!) 103/59     Pulse Readings from Last 3 Encounters:   12/20/21 76   12/12/21 81   11/16/21 74         Intake/Output Summary (Last 24 hours) at 12/20/2021 0913  Last data filed at 12/20/2021 0810  Gross per 24 hour   Intake 730 ml   Output 1475 ml   Net -745 ml       ROS:    Constitutional: Negative for fever. Positive for activity change. Negative for appetite change. HENT: Negative for epistaxis, difficulty swallowing. Eyes: Negative for blurred vision or double vision. Respiratory: Negative for cough, chest tightness, shortness of breath and wheezing. Cardiovascular: Negative for chest pain, palpitations and leg swelling. Gastrointestinal: Negative for abdominal pain, heartburn, or blood in stool. Genitourinary: Negative for hematuria, burning or frequency. Musculoskeletal: Negative for myalgias, stiffness, or swelling. Skin: Negative for rash, pain, or lumps. Neurological: Negative for syncope, seizures, or headaches. Psychiatric/Behavioral: Negative for confusion and agitation. The patient is not nervous/anxious. PHYSICAL EXAM:  Vitals:    12/20/21 0000 12/20/21 0311 12/20/21 0545 12/20/21 0800   BP: 126/63  133/69 117/62   Pulse: 60 72 77 76   Resp: 16      Temp: 97.6 °F (36.4 °C)   97.2 °F (36.2 °C)   TempSrc: Temporal      SpO2: 98%   97%   Weight:       Height:         Constitutional: Well-developed, no acute distress seen in ICU no family at bedside. Eyes: conjunctivae normal, no xanthelasma   Ears, Nose, Throat: oral mucosa moist, no cyanosis   CV: no JVD. IRIR. No murmurs, rubs, or gallops. PMI is nondisplaced  Lungs: clear to auscultation bilaterally, normal respiratory effort without used of accessory muscles  Abdomen: soft, non-tender, bowel sounds present, no masses or hepatomegaly   Musculoskeletal: no digital clubbing, no edema   Skin: warm, no rashes     Pertinent Labs:  CBC:   Recent Labs     12/18/21  0540 12/19/21  0500 12/20/21  0555   WBC 16.9* 14.9* 11.5   HGB 9.1* 9.3* 9.7*   HCT 27.1* 28.0* 28.9*    237 311     BMP:  Recent Labs     12/18/21  0540 12/19/21  0500 12/20/21  0555    132 133   K 3.4* 3.6 4.1    97* 99   CO2 23 23 21*   BUN 19 15 15   CREATININE 0.8 0.8 0.8   CALCIUM 8.2* 8.5* 8.7       TSH:   Lab Results   Component Value Date    TSH 3.820 12/12/2021      Lipid Profile:   Lab Results   Component Value Date    TRIG 61 12/13/2021    HDL 39 12/13/2021    LDLCALC 49 12/13/2021    CHOL 100 12/13/2021      Pertinent Cardiac Testing:     EKG (12/12/21): VT rate 190 bpm, Please see scan in Cardiology. EKG (12/12/21): NSR rate 76 bpm, QRS 98ms, QTc 490ms  Please see scan in Cardiology. ECG (12/14/21): Atrial fibrillation vs fib/flutter rate 122 bpm, QRS 86 ms, QTc 493ms      Echocardiogram (12/12/21):    Micro-bubble contrast injected ( to try to define an anomaly seen on the   LV gram ) --> could not be defined --> consider cardiac MRI ( once / if   patient recovers / is stable ).   The left ventricle is dilated.   The inferior wall, the inferolateral wall, the basal lateral wall and the   posterior wall are thin, scarred and akinetic.   The right ventricle is dilated.   Right ventricle global systolic function is mildly reduced ( TAPSE = 1.6   ).   The left atrium is mildly dilated.   The right atrium is moderately dilated.   Focal calcification mitral valve leaflets.   The mitral valve leaflets are restricted in mobility   Mitral annular calcification is present.   Mitral stenosis is present ( peak / mean gradient 17.66 / 5.24 mmHg; area   1.0 cm2 ).  Moderate to Severe mitral regurgitation.   Trace aortic regurgitation.   Mild tricuspid regurgitation.   Moderate to severe pulmonary hypertension.     Cardiac Catheterization (12/12/21):   CONCLUSIONS:  1.  Coronary artery disease. a.  LEFT MAIN:  10% distal vessel tapering.  b.  LAD:  30% eccentric proximal vessel narrowing and 80% to 90% very  proximal eccentric bifurcation lesion at the site of takeoff of a large  diagonal branch.  90% ostial stenosis and 70% to 80% proximal stenosis  of a moderate-sized second diagonal branch.  90% ostial stenosis of a  small third diagonal branch.  50% disease of the LAD after the second  diagonal branch.  60% disease of the LAD after the third diagonal  branch.  Long up to 60% to 70% disease of the apical LAD before the  mustache.  c.  LCX:  Basically a large lateral branch with 70% to 80% proximal  vessel disease and 80% mid vessel disease. d.  RCA:  Dominant vessel with 40% mid vessel narrowing and 70% ostial  narrowing of the largest subdivision of the posterolateral branch.   2.  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 3.  Hemodynamically significant proximal LAD disease with pressure wire  evaluation yielding an IFR of 0.70 and _____. 4.  Successful balloon angioplasty with deployment of drug-eluting  coronary stent to the proximal LAD and balloon angioplasty to the ostium  of the diagonal branch (bifurcation lesion; double wire; kissing  balloons) with very good results.     Stress Test (07/01/20):   1. ECG during the infusion did not change. 2. The myocardial perfusion imaging was abnormal. The abnormality was a a large sized fixed defect in the inferolateral wall suggestive of a prior MI  3. Overall left ventricular systolic function was abnormal with regional wall motion abnormalities. 4. High risk general pharmacologic stress test. Due to LV dysfunction.     Cardiac MRI 12/17/21:  1.  Mildly dilated left ventricle with severe LV systolic dysfunction,   LVEF 21%. 2.  Mid to basal inferior and inferolateral walls are akinetic. 3.  No evidence of aneurysm or pseudoaneurysm involving the left   ventricle. 4.  Moderately dilated right ventricle with mild RV dysfunction. 5.  Severe mitral regurgitation. Moderate tricuspid regurgitation. 6.  Moderately dilated right and left atria. 7.  Transmural infarct involving the mid to basal inferior and   inferolateral walls. 8.  No pericardial effusion. 9.  Small bilateral pleural effusions. 10.  Partially visualized large left renal cyst (consider further   evaluation if clinically indicated).       Kari Mcbride MD, [facility]  cardiology          Cardiac MRI (08/13/19):    Mildly dilated left ventricle (LVEDVi 125cc/m2) with a   mild-to-moderate reduction in systolic function (LVEF 19%).  There are   thinned and akinetic [id number] inferior, inferolateral and   anterolateral segments with associated transmural delayed enhancement as   well as hypokinesis of the [id number] inferoseptum with subendocardial enhancement.  This constellation of findings is consistent with ischemic   cardiomyopathy with an overall low probability of myocardial viability. 2. Posterior mitral leaflet tethering secondary to the akinetic basal   segments resulting in a mild-to-moderate anteriorly directed eccentric   jet of mitral regurgitation (Rvol 19-24 cc/beat; Rfrac 20-25%)     3. Moderate biatrial enlargement. 4. The arch vessel branching pattern is remarkable for a left sided arch   with an aberrant right subclavian artery that takes the typical   retroesophageal course.      Prior outpatient monitor (09/27/21): Patient had a min HR of 33 bpm, max HR of 133 bpm, and avg HR of 50 bpm. Predominant underlying rhythm was Sinus Rhythm. 2 Ventricular Tachycardia runs occurred, the run with the fastest interval lasting 5 beats with a max rate of 133 bpm (avg 108 bpm); the run with the fastest interval was also the longest. Idioventricular Rhythm was present. Isolated SVEs were frequent (6.2%, 6313), SVE Couplets were rare (<1.0%, 150), and SVE Triplets were rare (<1.0%, 10). Isolated VEs were frequent (10.0%, 50873), VE Couplets were occasional (1.6%, 812), and VE Triplets were rare (<1.0%, 30). Ventricular Bigeminy and Trigeminy were present.      Llmrhutvp94/20/2021: Atrial flutter rate 60-80 bpm.    Impression:    1. VT arrest  - Known CAD  on Entresto, BB, Sprinolactone  - last LVEF 29% on SPECT with fixed inferior large scar  - Now with sustained MMVT with hemodynamic compromise and arrest rate 190 bpm  - Cath findings do not suggest ACS but more likely severe CAD and is s/p revascularization of LAD and ostial of diagonal with residual high burden diffuse multivessel CAD   - Cardiac MRI from 2019 shows scar in the inf/inflat areas  - LV EF 21% on cardiac MRI 12/17/21.  - Amiodarone started 12/14/21 400mg BID.   - No recurrent VT  - The risks, benefits, and alternatives to ICD implantation and possible defibrillation threshold after ICD implantation due to ongoing anemia and groin hematoma. Consider switch Brilinta to Plavix when he is on triple therapy. 4. Tentative plan for dual chamber ICD implantation for secondary prevention of SCD today pending blood culture results and ID clearance. 5. Premedication for iodine allergy was given before the procedure. Thank you for allowing me to participate in your patient's care. I have spent a total of 35 minutes of CCT with the patient and the family reviewing the above stated recommendations. And a total of >50% of that time involved face-to-face time providing counseling and/or coordination of care with the other providers, preparation for the clinic visit, reviewing records/tests, counseling/education of the patient, ordering medications/tests/procedures, coordinating care, and documenting clinical information in the EHR.      Mustapha Moeller MD  Cardiac Electrophysiology  [facility]) Physicians  [facility] [place]: [place] [facility]  9:13 AM  12/20/2021

## 2021-12-21 ENCOUNTER — APPOINTMENT (OUTPATIENT)
Dept: GENERAL RADIOLOGY | Age: 76
DRG: 222 | End: 2021-12-21
Attending: INTERNAL MEDICINE
Payer: MEDICARE

## 2021-12-21 VITALS
SYSTOLIC BLOOD PRESSURE: 104 MMHG | TEMPERATURE: 97.7 F | HEART RATE: 69 BPM | RESPIRATION RATE: 13 BRPM | WEIGHT: 175.6 LBS | OXYGEN SATURATION: 99 % | HEIGHT: 66 IN | DIASTOLIC BLOOD PRESSURE: 60 MMHG | BODY MASS INDEX: 28.22 KG/M2

## 2021-12-21 LAB
ANION GAP SERPL CALCULATED.3IONS-SCNC: 16 MMOL/L (ref 7–16)
BASOPHILS ABSOLUTE: 0.02 E9/L (ref 0–0.2)
BASOPHILS RELATIVE PERCENT: 0.1 % (ref 0–2)
BUN BLDV-MCNC: 16 MG/DL (ref 6–23)
CALCIUM SERPL-MCNC: 8.9 MG/DL (ref 8.6–10.2)
CHLORIDE BLD-SCNC: 99 MMOL/L (ref 98–107)
CO2: 19 MMOL/L (ref 22–29)
CREAT SERPL-MCNC: 0.9 MG/DL (ref 0.7–1.2)
EOSINOPHILS ABSOLUTE: 0 E9/L (ref 0.05–0.5)
EOSINOPHILS RELATIVE PERCENT: 0 % (ref 0–6)
GFR AFRICAN AMERICAN: >60
GFR NON-AFRICAN AMERICAN: >60 ML/MIN/1.73
GLUCOSE BLD-MCNC: 124 MG/DL (ref 74–99)
HCT VFR BLD CALC: 28.2 % (ref 37–54)
HEMOGLOBIN: 9.4 G/DL (ref 12.5–16.5)
IMMATURE GRANULOCYTES #: 0.11 E9/L
IMMATURE GRANULOCYTES %: 0.7 % (ref 0–5)
LYMPHOCYTES ABSOLUTE: 0.69 E9/L (ref 1.5–4)
LYMPHOCYTES RELATIVE PERCENT: 4.3 % (ref 20–42)
MAGNESIUM: 2.3 MG/DL (ref 1.6–2.6)
MCH RBC QN AUTO: 29.6 PG (ref 26–35)
MCHC RBC AUTO-ENTMCNC: 33.3 % (ref 32–34.5)
MCV RBC AUTO: 88.7 FL (ref 80–99.9)
MONOCYTES ABSOLUTE: 1.32 E9/L (ref 0.1–0.95)
MONOCYTES RELATIVE PERCENT: 8.1 % (ref 2–12)
NEUTROPHILS ABSOLUTE: 14.06 E9/L (ref 1.8–7.3)
NEUTROPHILS RELATIVE PERCENT: 86.8 % (ref 43–80)
ORGANISM: ABNORMAL
PDW BLD-RTO: 18.4 FL (ref 11.5–15)
PHOSPHORUS: 4 MG/DL (ref 2.5–4.5)
PLATELET # BLD: 323 E9/L (ref 130–450)
PMV BLD AUTO: 10.7 FL (ref 7–12)
POTASSIUM SERPL-SCNC: 4.2 MMOL/L (ref 3.5–5)
RBC # BLD: 3.18 E12/L (ref 3.8–5.8)
SODIUM BLD-SCNC: 134 MMOL/L (ref 132–146)
URINE CULTURE, ROUTINE: ABNORMAL
WBC # BLD: 16.2 E9/L (ref 4.5–11.5)

## 2021-12-21 PROCEDURE — 83735 ASSAY OF MAGNESIUM: CPT

## 2021-12-21 PROCEDURE — 6360000002 HC RX W HCPCS: Performed by: INTERNAL MEDICINE

## 2021-12-21 PROCEDURE — 6370000000 HC RX 637 (ALT 250 FOR IP): Performed by: INTERNAL MEDICINE

## 2021-12-21 PROCEDURE — 6370000000 HC RX 637 (ALT 250 FOR IP): Performed by: NURSE PRACTITIONER

## 2021-12-21 PROCEDURE — 84100 ASSAY OF PHOSPHORUS: CPT

## 2021-12-21 PROCEDURE — 85025 COMPLETE CBC W/AUTO DIFF WBC: CPT

## 2021-12-21 PROCEDURE — 71046 X-RAY EXAM CHEST 2 VIEWS: CPT

## 2021-12-21 PROCEDURE — 93283 PRGRMG EVAL IMPLANTABLE DFB: CPT | Performed by: INTERNAL MEDICINE

## 2021-12-21 PROCEDURE — 2580000003 HC RX 258: Performed by: STUDENT IN AN ORGANIZED HEALTH CARE EDUCATION/TRAINING PROGRAM

## 2021-12-21 PROCEDURE — 99024 POSTOP FOLLOW-UP VISIT: CPT | Performed by: INTERNAL MEDICINE

## 2021-12-21 PROCEDURE — 6370000000 HC RX 637 (ALT 250 FOR IP): Performed by: STUDENT IN AN ORGANIZED HEALTH CARE EDUCATION/TRAINING PROGRAM

## 2021-12-21 PROCEDURE — 2580000003 HC RX 258: Performed by: INTERNAL MEDICINE

## 2021-12-21 PROCEDURE — 33249 INSJ/RPLCMT DEFIB W/LEAD(S): CPT | Performed by: STUDENT IN AN ORGANIZED HEALTH CARE EDUCATION/TRAINING PROGRAM

## 2021-12-21 PROCEDURE — 80048 BASIC METABOLIC PNL TOTAL CA: CPT

## 2021-12-21 RX ORDER — ISOSORBIDE MONONITRATE 30 MG/1
30 TABLET, EXTENDED RELEASE ORAL DAILY
Qty: 30 TABLET | Refills: 3 | Status: SHIPPED | OUTPATIENT
Start: 2021-12-22 | End: 2021-12-21

## 2021-12-21 RX ORDER — CEFDINIR 300 MG/1
300 CAPSULE ORAL 2 TIMES DAILY
Qty: 6 CAPSULE | Refills: 0 | Status: SHIPPED | OUTPATIENT
Start: 2021-12-21 | End: 2021-12-21 | Stop reason: HOSPADM

## 2021-12-21 RX ORDER — CLOPIDOGREL BISULFATE 75 MG/1
75 TABLET ORAL DAILY
Qty: 30 TABLET | Refills: 3 | Status: SHIPPED | OUTPATIENT
Start: 2021-12-22 | End: 2021-12-21

## 2021-12-21 RX ORDER — ISOSORBIDE MONONITRATE 30 MG/1
30 TABLET, EXTENDED RELEASE ORAL DAILY
Qty: 30 TABLET | Refills: 3 | Status: SHIPPED | OUTPATIENT
Start: 2021-12-22 | End: 2022-02-10 | Stop reason: CLARIF

## 2021-12-21 RX ORDER — CLOPIDOGREL BISULFATE 75 MG/1
75 TABLET ORAL DAILY
Status: DISCONTINUED | OUTPATIENT
Start: 2021-12-22 | End: 2021-12-21 | Stop reason: HOSPADM

## 2021-12-21 RX ORDER — CLOPIDOGREL BISULFATE 75 MG/1
75 TABLET ORAL DAILY
Qty: 30 TABLET | Refills: 3 | Status: SHIPPED | OUTPATIENT
Start: 2021-12-22

## 2021-12-21 RX ORDER — AMIODARONE HYDROCHLORIDE 200 MG/1
200 TABLET ORAL 2 TIMES DAILY
Status: DISCONTINUED | OUTPATIENT
Start: 2021-12-21 | End: 2021-12-21 | Stop reason: HOSPADM

## 2021-12-21 RX ORDER — AMIODARONE HYDROCHLORIDE 200 MG/1
TABLET ORAL
Qty: 60 TABLET | Refills: 3 | Status: SHIPPED | OUTPATIENT
Start: 2021-12-21 | End: 2021-12-21

## 2021-12-21 RX ORDER — HYDRALAZINE HYDROCHLORIDE 25 MG/1
25 TABLET, FILM COATED ORAL EVERY 8 HOURS SCHEDULED
Qty: 90 TABLET | Refills: 3 | Status: SHIPPED | OUTPATIENT
Start: 2021-12-21 | End: 2022-02-10 | Stop reason: CLARIF

## 2021-12-21 RX ORDER — HYDRALAZINE HYDROCHLORIDE 25 MG/1
25 TABLET, FILM COATED ORAL EVERY 8 HOURS SCHEDULED
Qty: 90 TABLET | Refills: 3 | Status: SHIPPED | OUTPATIENT
Start: 2021-12-21 | End: 2021-12-21

## 2021-12-21 RX ORDER — ROSUVASTATIN CALCIUM 40 MG/1
40 TABLET, COATED ORAL NIGHTLY
Qty: 30 TABLET | Refills: 3 | Status: SHIPPED | OUTPATIENT
Start: 2021-12-21 | End: 2021-12-21

## 2021-12-21 RX ORDER — FUROSEMIDE 40 MG/1
40 TABLET ORAL DAILY
Qty: 30 TABLET | Refills: 3 | Status: SHIPPED | OUTPATIENT
Start: 2021-12-22

## 2021-12-21 RX ORDER — ROSUVASTATIN CALCIUM 40 MG/1
40 TABLET, COATED ORAL NIGHTLY
Qty: 30 TABLET | Refills: 3 | Status: SHIPPED | OUTPATIENT
Start: 2021-12-21 | End: 2022-06-29 | Stop reason: SDUPTHER

## 2021-12-21 RX ORDER — AMIODARONE HYDROCHLORIDE 200 MG/1
TABLET ORAL
Qty: 60 TABLET | Refills: 3 | Status: SHIPPED | OUTPATIENT
Start: 2021-12-21 | End: 2022-02-10 | Stop reason: CLARIF

## 2021-12-21 RX ORDER — CLOPIDOGREL 300 MG/1
300 TABLET, FILM COATED ORAL ONCE
Status: DISCONTINUED | OUTPATIENT
Start: 2021-12-21 | End: 2021-12-21 | Stop reason: HOSPADM

## 2021-12-21 RX ORDER — FUROSEMIDE 40 MG/1
40 TABLET ORAL DAILY
Qty: 30 TABLET | Refills: 3 | Status: SHIPPED | OUTPATIENT
Start: 2021-12-22 | End: 2021-12-21

## 2021-12-21 RX ADMIN — HYDRALAZINE HYDROCHLORIDE 50 MG: 50 TABLET, FILM COATED ORAL at 14:57

## 2021-12-21 RX ADMIN — SACUBITRIL AND VALSARTAN 1 TABLET: 97; 103 TABLET, FILM COATED ORAL at 08:18

## 2021-12-21 RX ADMIN — POLYETHYLENE GLYCOL 3350 17 G: 17 POWDER, FOR SOLUTION ORAL at 08:17

## 2021-12-21 RX ADMIN — TICAGRELOR 90 MG: 90 TABLET ORAL at 08:18

## 2021-12-21 RX ADMIN — SODIUM CHLORIDE, PRESERVATIVE FREE 10 ML: 5 INJECTION INTRAVENOUS at 08:19

## 2021-12-21 RX ADMIN — METOPROLOL SUCCINATE 50 MG: 50 TABLET, EXTENDED RELEASE ORAL at 08:18

## 2021-12-21 RX ADMIN — HYDRALAZINE HYDROCHLORIDE 50 MG: 50 TABLET, FILM COATED ORAL at 06:37

## 2021-12-21 RX ADMIN — WATER 1000 MG: 1 INJECTION INTRAMUSCULAR; INTRAVENOUS; SUBCUTANEOUS at 14:58

## 2021-12-21 RX ADMIN — FUROSEMIDE 40 MG: 20 TABLET ORAL at 08:19

## 2021-12-21 RX ADMIN — ASPIRIN 81 MG CHEWABLE TABLET 81 MG: 81 TABLET CHEWABLE at 08:18

## 2021-12-21 RX ADMIN — ISOSORBIDE MONONITRATE 30 MG: 30 TABLET ORAL at 08:19

## 2021-12-21 RX ADMIN — PANTOPRAZOLE SODIUM 40 MG: 40 TABLET, DELAYED RELEASE ORAL at 08:18

## 2021-12-21 RX ADMIN — AMIODARONE HYDROCHLORIDE 400 MG: 200 TABLET ORAL at 08:19

## 2021-12-21 RX ADMIN — SPIRONOLACTONE 25 MG: 25 TABLET ORAL at 08:18

## 2021-12-21 ASSESSMENT — PAIN SCALES - GENERAL
PAINLEVEL_OUTOF10: 0
PAINLEVEL_OUTOF10: 0

## 2021-12-21 NOTE — PATIENT CARE CONFERENCE
[facility] Quality Flow/Interdisciplinary Rounds Progress Note        Quality Flow Rounds held on December 21, 2021    Disciplines Attending:  Bedside Nurse, ,  and Nursing Unit Leadership    Laith Nash was admitted on 12/12/2021  2:47 PM    Anticipated Discharge Date:  Expected Discharge Date: 12/18/21    Disposition:    Avelino Score:  Avelino Scale Score: 17    Readmission Risk              Risk of Unplanned Readmission:  23           Discussed patient goal for the day, patient clinical progression, and barriers to discharge.   The following Goal(s) of the Day/Commitment(s) have been identified:  have post-op chest x-ray completed      Quirino Johnston RN  December 21, 2021

## 2021-12-21 NOTE — PROGRESS NOTES
CLINICAL PHARMACY NOTE: MEDS TO BEDS    Total # of Prescriptions Filled: 7   The following medications were delivered to the patient:  · HYDRALAZINE 25 MG   · IMDUR 30 MG   · CRESTOR 40 MG   · CORDARONE 200 MG   · LASIX 40 MG   · PLAVIX 75 MG   · ELIQUIS 5 MG     Additional Documentation:

## 2021-12-21 NOTE — PROGRESS NOTES
KAYE Pittman - CNP   400 mg at 12/21/21 6611    spironolactone (ALDACTONE) tablet 25 mg  25 mg Oral Daily Anton Ely MD   25 mg at 12/21/21 0818    polyethylene glycol (GLYCOLAX) packet 17 g  17 g Oral Daily Duyen Noguera,    17 g at 12/21/21 0817    sodium chloride flush 0.9 % injection 5-40 mL  5-40 mL IntraVENous 2 times per day Anton Ely MD   10 mL at 12/20/21 2100    sodium chloride flush 0.9 % injection 5-40 mL  5-40 mL IntraVENous PRN Anton Ely MD   10 mL at 12/20/21 0545    0.9 % sodium chloride infusion  25 mL IntraVENous PRN Anton Ely MD        acetaminophen (TYLENOL) tablet 650 mg  650 mg Oral Q4H PRN Anton Ely MD   650 mg at 12/19/21 2055    ondansetron (ZOFRAN) injection 4 mg  4 mg IntraVENous Q6H PRN Anton Ely MD        rosuvastatin (CRESTOR) tablet 40 mg  40 mg Oral Nightly Anton Ely MD   40 mg at 12/20/21 2230    aspirin chewable tablet 81 mg  81 mg Oral Daily Anton Ely MD   81 mg at 12/21/21 0818    [Held by provider] heparin (porcine) injection 5,000 Units  5,000 Units SubCUTAneous Q8H Anton Ely MD   5,000 Units at 12/19/21 0615         REVIEW OF SYSTEMS:    CONSTITUTIONAL:  Denies fever, chill or rigors  HEENT: denies blurring of vision or double vision, denies hearing problem  RESPIRATORY: denies cough, shortness of breath, sputum expectoration. CARDIOVASCULAR:  Denies palpitation  GASTROINTESTINAL:  Denies abdomen pain, diarrhea or constipation. GENITOURINARY:  Denies burning urination or frequency of urination  INTEGUMENT: Ecchymosis   HEMATOLOGIC/LYMPHATIC:  Denies lymph node swelling, gum bleeding or easy bruising.   MUSCULOSKELETAL:  Denies leg pain , joint pain , joint swelling  NEUROLOGICAL:  Weakness       PHYSICAL EXAM:      Vitals:     /60   Pulse 69   Temp 97.7 °F (36.5 °C)   Resp 13   Ht 5' 6\" (1.676 m)   Wt 175 lb 9.6 oz (79.7 kg)   SpO2 99%   BMI 28.34 kg/m²     General Appearance:    Awake, alert , no acute distress. Head:    Normocephalic, atraumatic   Eyes:    No pallor, no icterus,   Ears:    No obvious deformity or drainage.    Nose:   No nasal drainage   Throat:   Mucosa moist, no oral thrush   Neck:   Supple, no lymphadenopathy   Back:     no CVA tenderness   Lungs:     Clear to auscultation bilaterally    Heart:    Irregular    Abdomen:     Soft, non-tender, bowel sounds present    Extremities:   No edema, non tender    Pulses:   Dorsalis pedis palpable    Skin:   Right groin ecchymosis      CBC with Differential:      Lab Results   Component Value Date    WBC 16.2 12/21/2021    RBC 3.18 12/21/2021    HGB 9.4 12/21/2021    HCT 28.2 12/21/2021     12/21/2021    MCV 88.7 12/21/2021    MCH 29.6 12/21/2021    MCHC 33.3 12/21/2021    RDW 18.4 12/21/2021    NRBC 0.0 10/11/2018    LYMPHOPCT 4.3 12/21/2021    MONOPCT 8.1 12/21/2021    BASOPCT 0.1 12/21/2021    MONOSABS 1.32 12/21/2021    LYMPHSABS 0.69 12/21/2021    EOSABS 0.00 12/21/2021    BASOSABS 0.02 12/21/2021       CMP     Lab Results   Component Value Date     12/21/2021    K 4.2 12/21/2021    K 3.5 12/17/2021    CL 99 12/21/2021    CO2 19 12/21/2021    BUN 16 12/21/2021    CREATININE 0.9 12/21/2021    GFRAA >60 12/21/2021    LABGLOM >60 12/21/2021    GLUCOSE 124 12/21/2021    PROT 6.1 12/19/2021    LABALBU 3.8 12/19/2021    CALCIUM 8.9 12/21/2021    BILITOT 2.7 12/19/2021    ALKPHOS 82 12/19/2021    AST 43 12/19/2021    ALT 41 12/19/2021         Hepatic Function Panel:    Lab Results   Component Value Date    ALKPHOS 82 12/19/2021    ALT 41 12/19/2021    AST 43 12/19/2021    PROT 6.1 12/19/2021    BILITOT 2.7 12/19/2021    BILIDIR 0.7 12/19/2021    IBILI 2.0 12/19/2021    LABALBU 3.8 12/19/2021       PT/INR:    Lab Results   Component Value Date    PROTIME 12.5 12/20/2021    INR 1.1 12/20/2021       TSH:    Lab Results   Component Value Date    TSH 3.820 12/12/2021       U/A:    Lab Results   Component Value Date    COLORU Yellow 12/18/2021 PHUR 6.5 12/18/2021    WBCUA 10-20 12/18/2021    RBCUA 1-3 12/18/2021    BACTERIA FEW 12/18/2021    CLARITYU Clear 12/18/2021    SPECGRAV 1.010 12/18/2021    LEUKOCYTESUR MODERATE 12/18/2021    UROBILINOGEN 1.0 12/18/2021    BILIRUBINUR Negative 12/18/2021    BLOODU SMALL 12/18/2021    GLUCOSEU Negative 12/18/2021       ABG:  No results found for: FDR3IEO, BEART, [id number], PHART, THGBART, EJY2TOZ, PO2ART, MBP2IOH    MICROBIOLOGY:    Blood culture - neg to date     SARS CoV2 negative       Radiology :    Chest X ray- bilateral patchy opacities     IMPRESSION:     1. STEMI s/p PCI / stent , s/p pacer insertion   2. Right groin hematoma, reactive leukocytosis 16 K , was on prednisone   3. E coli bacteriuria      RECOMMENDATIONS:       1. Stop rocephin   2.  OK to discharge from ID POV

## 2021-12-21 NOTE — PROGRESS NOTES
Cardiac Electrophysiology Inpatient Progress Note    Irma Clemons  1945  Date of Service: 12/21/2021  PCP: Billie Kitchen MD  Cardiologist: Nadine Terry MD   Primary Electrophysiologist: Sonia Bartlett MD   Attending Electrophysiologist: Mignon Pacheco MD         Subjective: Irma Clemons is seen in hospital follow-up and management of: VT     Initial Hospital consult (12/13/21):  77year old male with known CAD, STEMI 1997 with PCI to the RCA,   LVEF 45-50% on TTE 03/30/17, 35-40% on TTE 10/09/18, LVEF of 29% on a Lexiscan stress test 07/01/20. Other hx includes HTN, HLD, obesity S/p gastric bypass, Carpal tunnel surgery. At baseline, he is on Entresto, spironolactone, Metoprolol 50 mg bid  He presented to ED 12/12 with palpitation, weakness for one hour and was found to be in sustained VT monomorphic, Rate 190, LBBB, superior axis. He was given Lidocaine and while preparing to cardiovert patient, he had pulseless VT and CPR was started, he was intubated, defibrillated x1. There was a concern for inferior STEMI thus Cath lab was activated. Cath showed prox LAD 80-90%, mod D2 90% ostial, apical LAD 70%, diffuse Lcx 70-80% lesions, mild diffuse RCA tsenosis  LV gram showed possible LV aneurysm, mod MR and inferior akinesis, dilated LV. FFR of LAD proved that it was significant thus he had simultaneous RYAN to prox LAD and angioplasty to ostium Diagonal branch     He is intubated and awake, not really following commands. He is on Lidocaine 1mg/min, no pressors, metoprolol 25 mg bid    (12/14/21): He converted to AF with a controled ventricular rate overnight without recurrent VT, he remains on IV Lidocaine 0.5 mg/min. Today he is confused to his situation and agreeable. His count dropped from 12 to 8.0 this morning.     (12/15/21): He has improved cognition today and is in sinus, he awaits EGD today due to ongoing anemia with a PRBC transfusion on 12/14.  No recurrent VT and tolerating amiodarone well. (12/16/21): He had recurrent AF with RVR last night with rates into the 140's today his H/H continues to decline. (12/17/21): The patient is seen in hospital follow-up, he continues in AF with rates in the 90's he is tolerating the increased dose of Toprol as well as the increased dose of Entresto. Today he is awake and alert without cardiac complaints. 12/18/21: The patient is seen in the hospital for follow up. He remains in AFL with HR of  bpm. He denies any chest pain, dyspnea or palpitations. 12/19/21: The patient is seen in the hospital for follow up. He remains in AFL with HR of 60-80 bpm. He denies any chest pain, dyspnea or palpitations. 12/20/21: The patient is seen in the hospital for follow up. He remains in AF/AFL with HR 60-80 bpm. He denies any chest pain, dyspnea or palpitations. Final blood cultures are pending. 12/21/21: The patient is seen in hospital follow-up, he had a dual chamber ICD implanted yesterday today the site is soft compressible without edema or hematoma the Aquacel clean dry and intact. He has no complaints of pain at the incision site.        Patient Active Problem List   Diagnosis    CAD (coronary artery disease)s/p stent RCA    Valvular heart disease    Hypertension    Obesity, morbid s/p intestinal bypass    STEMI (ST elevation myocardial infarction) ([address]    Stented coronary artery    Hyperlipidemia    H/O psoriatic arthritis    Acute upper GI bleed    SVT (supraventricular tachycardia) (HCC)    LV dysfunction    Cardiac arrest ([address]    Acute systolic (congestive) heart failure (HCC)    Ventricular tachycardia (HCC)    Mitral stenosis    Mitral regurgitation    Moderate to severe pulmonary hypertension (HCC)    Anemia    Paroxysmal atrial fibrillation (HCC)         Scheduled Medications:   cefTRIAXone (ROCEPHIN) IV  1,000 mg IntraVENous Q24H    sodium chloride flush  5-40 mL IntraVENous 2 times per day  hydrALAZINE  50 mg Oral 3 times per day    metoprolol succinate  50 mg Oral BID    sacubitril-valsartan  1 tablet Oral BID    furosemide  40 mg Oral Daily    pantoprazole  40 mg Oral BID    isosorbide mononitrate  30 mg Oral Daily    amiodarone  400 mg Oral BID    spironolactone  25 mg Oral Daily    polyethylene glycol  17 g Oral Daily    sodium chloride flush  5-40 mL IntraVENous 2 times per day    rosuvastatin  40 mg Oral Nightly    aspirin  81 mg Oral Daily    ticagrelor  90 mg Oral BID    [Held by provider] heparin (porcine)  5,000 Units SubCUTAneous Q8H       Infusion Medications:   sodium chloride      sodium chloride         PRN Medications:  sodium chloride flush, sodium chloride, labetalol, hydrALAZINE, sodium chloride flush, sodium chloride, acetaminophen, ondansetron    Allergies   Allergen Reactions    Dye [Iodides] Anaphylaxis     Passing out    Dust Mite Extract Other (See Comments)     All year around       [facility] Readings from Last 3 Encounters:   12/21/21 175 lb 9.6 oz (79.7 kg)   12/12/21 168 lb (76.2 kg)   11/16/21 174 lb 9.6 oz (79.2 kg)     Temp Readings from Last 3 Encounters:   12/21/21 97.7 °F (36.5 °C) (Temporal)   12/12/21 97.8 °F (36.6 °C) (Temporal)   10/12/21 97.4 °F (36.3 °C)     BP Readings from Last 3 Encounters:   12/21/21 (!) 112/45   12/20/21 (!) 140/77   12/16/21 115/72     Pulse Readings from Last 3 Encounters:   12/21/21 71   12/12/21 81   11/16/21 74         Intake/Output Summary (Last 24 hours) at 12/21/2021 0953  Last data filed at 12/21/2021 0644  Gross per 24 hour   Intake 990 ml   Output 1925 ml   Net -935 ml       ROS:    Constitutional: Negative for fever. Positive for activity change. Negative for appetite change. HENT: Negative for epistaxis, difficulty swallowing. Eyes: Negative for blurred vision or double vision. Respiratory: Negative for cough, chest tightness, shortness of breath and wheezing.    Cardiovascular: Negative for chest pain, palpitations and leg swelling. Gastrointestinal: Negative for abdominal pain, heartburn, or blood in stool. Genitourinary: Negative for hematuria, burning or frequency. Musculoskeletal: Negative for myalgias, stiffness, or swelling. Skin: Negative for rash, pain, or lumps. Neurological: Negative for syncope, seizures, or headaches. Psychiatric/Behavioral: Negative for confusion and agitation. The patient is not nervous/anxious. PHYSICAL EXAM:  Vitals:    12/21/21 0359 12/21/21 0521 12/21/21 0636 12/21/21 0730   BP: (!) 111/59  135/64 (!) 112/45   Pulse: 61 55 61 71   Resp: 16   16   Temp:    97.7 °F (36.5 °C)   TempSrc:    Temporal   SpO2:    100%   Weight:  175 lb 9.6 oz (79.7 kg)     Height:         Constitutional: Well-developed, no acute distress seen in ICU no family at bedside. Eyes: conjunctivae normal, no xanthelasma   Ears, Nose, Throat: oral mucosa moist, no cyanosis   CV: no JVD. IRIR. No murmurs, rubs, or gallops. PMI is nondisplaced  Lungs: clear to auscultation bilaterally, normal respiratory effort without used of accessory muscles  Abdomen: soft, non-tender, bowel sounds present, no masses or hepatomegaly   Musculoskeletal: no digital clubbing, no edema   Skin: warm, no rashes   CIED: The device site is clean dry and intact, there is no edema or ecchymosis.      Pertinent Labs:  CBC:   Recent Labs     12/19/21  0500 12/20/21  0555 12/21/21  0541   WBC 14.9* 11.5 16.2*   HGB 9.3* 9.7* 9.4*   HCT 28.0* 28.9* 28.2*    311 323     BMP:  Recent Labs     12/19/21  0500 12/20/21  0555 12/21/21  0541    133 134   K 3.6 4.1 4.2   CL 97* 99 99   CO2 23 21* 19*   BUN 15 15 16   CREATININE 0.8 0.8 0.9   CALCIUM 8.5* 8.7 8.9       TSH:   Lab Results   Component Value Date    TSH 3.820 12/12/2021      Lipid Profile:   Lab Results   Component Value Date    TRIG 61 12/13/2021    HDL 39 12/13/2021    LDLCALC 49 12/13/2021    CHOL 100 12/13/2021      Pertinent Cardiac Testing:     EKG (12/12/21): VT rate 190 bpm, Please see scan in Cardiology. EKG (12/12/21): NSR rate 76 bpm, QRS 98ms, QTc 490ms  Please see scan in Cardiology. ECG (12/14/21): Atrial fibrillation vs fib/flutter rate 122 bpm, QRS 86 ms, QTc 493ms      Echocardiogram (12/12/21): Micro-bubble contrast injected ( to try to define an anomaly seen on the   LV gram ) --> could not be defined --> consider cardiac MRI ( once / if   patient recovers / is stable ).   The left ventricle is dilated.   The inferior wall, the inferolateral wall, the basal lateral wall and the   posterior wall are thin, scarred and akinetic.   The right ventricle is dilated.   Right ventricle global systolic function is mildly reduced ( TAPSE = 1.6   ).   The left atrium is mildly dilated.   The right atrium is moderately dilated.   Focal calcification mitral valve leaflets.   The mitral valve leaflets are restricted in mobility   Mitral annular calcification is present.   Mitral stenosis is present ( peak / mean gradient 17.66 / 5.24 mmHg; area   1.0 cm2 ).  Moderate to Severe mitral regurgitation.   Trace aortic regurgitation.   Mild tricuspid regurgitation.   Moderate to severe pulmonary hypertension.     Cardiac Catheterization (12/12/21):   CONCLUSIONS:  1.  Coronary artery disease. a.  LEFT MAIN:  10% distal vessel tapering.  b.  LAD:  30% eccentric proximal vessel narrowing and 80% to 90% very  proximal eccentric bifurcation lesion at the site of takeoff of a large  diagonal branch.  90% ostial stenosis and 70% to 80% proximal stenosis  of a moderate-sized second diagonal branch.  90% ostial stenosis of a  small third diagonal branch.  50% disease of the LAD after the second  diagonal branch.  60% disease of the LAD after the third diagonal  branch.  Long up to 60% to 70% disease of the apical LAD before the  mustache.  c.  LCX:  Basically a large lateral branch with 70% to 80% proximal  vessel disease and 80% mid vessel disease.   katty Smith Messenger: Erika Murray vessel with 40% mid vessel narrowing and 70% ostial  narrowing of the largest subdivision of the posterolateral branch. 2.  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 3.  Hemodynamically significant proximal LAD disease with pressure wire  evaluation yielding an IFR of 0.70 and _____. 4.  Successful balloon angioplasty with deployment of drug-eluting  coronary stent to the proximal LAD and balloon angioplasty to the ostium  of the diagonal branch (bifurcation lesion; double wire; kissing  balloons) with very good results.     Stress Test (07/01/20):   1. ECG during the infusion did not change. 2. The myocardial perfusion imaging was abnormal. The abnormality was a a large sized fixed defect in the inferolateral wall suggestive of a prior MI  3. Overall left ventricular systolic function was abnormal with regional wall motion abnormalities. 4. High risk general pharmacologic stress test. Due to LV dysfunction.     Cardiac MRI 12/17/21:  1.  Mildly dilated left ventricle with severe LV systolic dysfunction,   LVEF 21%. 2.  Mid to basal inferior and inferolateral walls are akinetic. 3.  No evidence of aneurysm or pseudoaneurysm involving the left   ventricle. 4.  Moderately dilated right ventricle with mild RV dysfunction. 5.  Severe mitral regurgitation. Moderate tricuspid regurgitation. 6.  Moderately dilated right and left atria. 7.  Transmural infarct involving the mid to basal inferior and   inferolateral walls. 8.  No pericardial effusion. 9.  Small bilateral pleural effusions. 10.  Partially visualized large left renal cyst (consider further   evaluation if clinically indicated).      Vineet Lozano MD, [address] cardiology          Cardiac MRI (08/13/19): Mildly dilated left ventricle (LVEDVi 125cc/m2) with a   mild-to-moderate reduction in systolic function (LVEF 77%).  There are   thinned and akinetic [id number] inferior, inferolateral and   anterolateral segments with associated transmural delayed enhancement as   well as hypokinesis of the [id number]-np-[id number] inferoseptum with subendocardial   enhancement.  This constellation of findings is consistent with ischemic   cardiomyopathy with an overall low probability of myocardial viability. 2. Posterior mitral leaflet tethering secondary to the akinetic basal   segments resulting in a mild-to-moderate anteriorly directed eccentric   jet of mitral regurgitation (Rvol 19-24 cc/beat; Rfrac 20-25%)     3. Moderate biatrial enlargement. 4. The arch vessel branching pattern is remarkable for a left sided arch   with an aberrant right subclavian artery that takes the typical   retroesophageal course.      Prior outpatient monitor (09/27/21): Patient had a min HR of 33 bpm, max HR of 133 bpm, and avg HR of 50 bpm. Predominant underlying rhythm was Sinus Rhythm. 2 Ventricular Tachycardia runs occurred, the run with the fastest interval lasting 5 beats with a max rate of 133 bpm (avg 108 bpm); the run with the fastest interval was also the longest. Idioventricular Rhythm was present. Isolated SVEs were frequent (6.2%, 6313), SVE Couplets were rare (<1.0%, 150), and SVE Triplets were rare (<1.0%, 10). Isolated VEs were frequent (10.0%, 58192), VE Couplets were occasional (1.6%, 812), and VE Triplets were rare (<1.0%, 30). Ventricular Bigeminy and Trigeminy were present.      Jfitzwllc03/21/2021: AF-VS rate 71 bpm    Device Interrogation ( 12/21/21 )  Make/Model Medtronic Cobalt  Mode AAIR<--> DDDR 50/130  P wave: 0.9 mV  Impedance: 494 ohms   Threshold: AF  RV R wave: 15 mV  Impedance: 418 ohms   Threshold: 0.5 V @ 0.4 ms  Pacing: A: 0.4%  RV: 5.3%    Battery Voltage/Longevity:  Initializing     Arrhythmias: none   Reprogramming included None   Overall device function is normal  All device programmable settings were evaluated per above and in the scanned document, along with iterative adjustments (capture thresholds) to assess and select the most appropriate final programming to provide for consistent delivery of the appropriate therapy and to verify function of the device. Impression:    1. VT arrest  - Known CAD  on Entresto, BB, Sprinolactone  - last LVEF 29% on SPECT with fixed inferior large scar  - Now with sustained MMVT with hemodynamic compromise and arrest rate 190 bpm  - Cath findings do not suggest ACS but more likely severe CAD and is s/p revascularization of LAD and ostial of diagonal with residual high burden diffuse multivessel CAD   - Cardiac MRI from 2019 shows scar in the inf/inflat areas  - LV EF 21% on cardiac MRI 12/17/21.  - Amiodarone started 12/14/21 400mg BID.   - No recurrent VT since 12/14/21     2. ICD in situ   - Medtronic   - DOI 12/20/21  - Secondary preventions. 2. New onset persistent atrial  fibrillations/flutter  - First seen 12/14/21  - CHADS-VASc 5 (age, VASc, CHF, HTN)  - Currently not on anticoagulation due to anemia.  - On Toprol XL for rate control.  - Currently in AF with CVR    3.  HFrEF   - Ischemic   - LVEF 50% 10/03/09 on TTE   - LVEF 45-50% 03/30/17 on TTE   - LVEF 35-40% 10/09/19 on TTE   - LVEF 29% 07/01/20 on Lexiscan stress test   - LVEF 24% on TTE 12/12/21  - LV EF 21% on Cardiac MRI  - GDMT: Entresto,  Lasix, Toprol XL, and Aldactone.     4. Coronary artery disease   - Inferior STEMI 1997 s/p PCI RCA.  - Known Large sized inferolateral fixed defect suggesting prior MI.  - [facility] 12/12/21 patient RCA stent, high grade stenosis of the LAD-D1 S/p PCI. - On statin, BB, ASA, Imdur, Brilinta.      5. Hypertension    - BP remains elevated. - Management per Cardiology     6. HLD      7. Obesity s/p gastric bypass     8. IV dye allergy  - Anaphylaxis       9. psoriatic arthritis      10. Anemia   -  Count 8.6/25.9 today with a negative EGD on 12/16/21    11. Leukocytosis   - WBC 21.9 on 12/16, possibly reactionary blood cultures pending. 12.  Possible aneurysm or a pseudoaneurysm seen on LV gram 12/12/21.   - Cardiac MRI showed no aneurysm. 13. Valvular heart disease  - Severe MR and moderate TR on Cardiac MRI. Recommendations:    1. Reduce Amiodarone to 200mg BID for 7 days (12/28/21)  then  200mg daily thereafter. 2. Continue Toprol XL 50 mg bid. 3. Will start Eliquis 5mg BID Tomorrow in the morning. 3. Will hold off [address] until after ICD implantation due to ongoing anemia and groin hematoma. Consider switch Brilinta to Plavix when he is on triple therapy. 4. Will need DCCV as an outpatient once on uninterupted [address] for at least 3 weeks. 5. Follow-up with Dr. Jennie Eckert in 4-6 weeks. Thank you for allowing me to participate in your patient's care. Discussed with Dr. Judit De La Cruz. KAYE [facility] - [facility]  Cardiac Electrophysiology  [facility]) Physicians  [facility] [place]: [place] [facility]  9:53 AM  12/21/2021    Attending Physician's Statement    Patient seen with the ANP. Agree with the findings, assessment and plan. Management plan was discussed. I have personally interviewed the patient, independently performed a focused cardiac examination, reviewed the pertinent laboratory and diagnostic testing with the patient and directly participated in the medical decision-making as noted above with the following additions:     Feeling well. No VT overnight. Remains in AF with HR 70's    Physical exam showed no JVD, IRIR, no murmur, clear lungs bilaterally, edema. ICD site: no bleeding or hematoma    Normal ICD function. Continue loading dose of Amiodarone. Continue Toprol XL 50 mg bid. Start Eliquis tomorrow.  Plan for DC-CV as an OPD after at least 3 weeks of uninterrupted [address]. EP will sign off. Please call for any questions or concerns. Follow up in 2 weeks in device clinic.     Enrique Plaza MD  Cardiac Electrophysiology  [address]  [facility] [place]: [place] [facility]  6:22 PM  12/21/2021

## 2021-12-21 NOTE — CARE COORDINATION
POD #1 ICD. Patient sitting up at side of bed eating breakfast. Wife at bedside. Continues on IV rocephin. Discharge plan is to return home. Wife to transport. cm/elvia to follow.  Electronically signed by Mary Red RN on 12/21/2021 at 10:28 AM

## 2021-12-21 NOTE — PLAN OF CARE
Problem: Cardiac:  Goal: Ability to maintain vital signs within normal range will improve  Description: Ability to maintain vital signs within normal range will improve  Outcome: Met This Shift  Goal: Cardiovascular alteration will improve  Description: Cardiovascular alteration will improve  Outcome: Met This Shift     Problem: Physical Regulation:  Goal: Complications related to the disease process, condition or treatment will be avoided or minimized  Description: Complications related to the disease process, condition or treatment will be avoided or minimized  Outcome: Met This Shift     Problem: SKIN INTEGRITY  Goal: Skin integrity is maintained or improved  Outcome: Met This Shift     Problem: Falls - Risk of:  Goal: Will remain free from falls  Description: Will remain free from falls  Outcome: Met This Shift  Goal: Absence of physical injury  Description: Absence of physical injury  Outcome: Met This Shift     Problem: Skin Integrity:  Goal: Will show no infection signs and symptoms  Description: Will show no infection signs and symptoms  Outcome: Met This Shift  Goal: Absence of new skin breakdown  Description: Absence of new skin breakdown  Outcome: Met This Shift     Problem: Injury - Risk of, Physical Injury:  Goal: Will remain free from falls  Description: Will remain free from falls  Outcome: Met This Shift  Goal: Absence of physical injury  Description: Absence of physical injury  Outcome: Met This Shift

## 2021-12-21 NOTE — PROGRESS NOTES
ARRHYTHMIA EDUCATION     Met with patient to review information relating to VTach, Sudden Cardiac Death, & ICD Insertion    Discussed the following topics: The Heart's Electrical System, VTach, Sudden Cardiac Death, ICD, Post Operative Care of ICD, & ICD DC Instructions     Handouts given on above topics given & questions answered. Patient verbalized understanding as evidenced by \"teach back\". Time spent with patient 30 minutes.

## 2021-12-21 NOTE — PROGRESS NOTES
Occupational Therapy  OT BEDSIDE TREATMENT NOTE   [address] [facility] [address] [address]        MVOI:  Patient Name: Laith Nash  MRN: [id number]  : 1945  Room: [address] [place] [address]     Attempted to see pt this afternoon, with pt currently needing a re-evaluation by therapist due to having ICD placed yesterday . OT notified of need for update. Will attempt at a later date/time.       [address] GILL/L [id number]

## 2021-12-21 NOTE — PROGRESS NOTES
Chief Complaint:  STEMI (ST elevation myocardial infarction) ([address]     Subjective:    No cough, fevers, shortness of breath. No dysuria    Objective:    BP (!) 147/79   Pulse 71   Temp 97.5 °F (36.4 °C) (Temporal)   Resp 16   Ht 5' 6\" (1.676 m)   Wt 170 lb 9.6 oz (77.4 kg)   SpO2 95%   BMI 27.54 kg/m²     Current medications that patient is taking have been reviewed. General appearance: NAD, conversant  HEENT: AT/NC, MMM  Neck: FROM, supple  Lungs: Clear to auscultation, WOB normal  CV: RRR, no MRGs  Abdomen: Soft, non-tender; no masses or HSM, +BS  Extremities: No peripheral edema or digital cyanosis.   Skin: no rash, lesions or ulcers  Psych: Calm and cooperative  Neuro: Alert and interactive, face symmetric, moving all extremities, speech fluent    Labs:  CBC with Differential:    Lab Results   Component Value Date    WBC 11.5 12/20/2021    RBC 3.36 12/20/2021    HGB 9.7 12/20/2021    HCT 28.9 12/20/2021     12/20/2021    MCV 86.0 12/20/2021    MCH 28.9 12/20/2021    MCHC 33.6 12/20/2021    RDW 16.9 12/20/2021    NRBC 0.0 10/11/2018    LYMPHOPCT 4.2 12/20/2021    MONOPCT 2.0 12/20/2021    BASOPCT 0.1 12/20/2021    MONOSABS 0.23 12/20/2021    LYMPHSABS 0.48 12/20/2021    EOSABS 0.01 12/20/2021    BASOSABS 0.01 12/20/2021     CMP:    Lab Results   Component Value Date     12/20/2021    K 4.1 12/20/2021    K 3.5 12/17/2021    CL 99 12/20/2021    CO2 21 12/20/2021    BUN 15 12/20/2021    CREATININE 0.8 12/20/2021    GFRAA >60 12/20/2021    LABGLOM >60 12/20/2021    GLUCOSE 119 12/20/2021    PROT 6.1 12/19/2021    LABALBU 3.8 12/19/2021    CALCIUM 8.7 12/20/2021    BILITOT 2.7 12/19/2021    ALKPHOS 82 12/19/2021    AST 43 12/19/2021    ALT 41 12/19/2021          Assessment/Plan:  Principal Problem:    STEMI (ST elevation myocardial infarction) ([facility]  Active Problems:    CAD (coronary artery disease)s/p stent RCA    Valvular heart disease    H/O psoriatic arthritis    Cardiac arrest ([facility] Acute systolic (congestive) heart failure (HCC)    Ventricular tachycardia (HCC)    Mitral stenosis    Mitral regurgitation    Moderate to severe pulmonary hypertension (HCC)    Anemia  Resolved Problems:    * No resolved hospital problems.  *       Continue amio    Hold abx per ID    ICD today    DAPT, statin    Continue BB, entresto, spirono    DVT prophylaxis: UFH  Requires continued inpatient level of care     Lamine Ybarra MD    10:10 PM  12/20/2021

## 2021-12-21 NOTE — DISCHARGE SUMMARY
Physician Discharge Summary     Patient ID:  Fidel Bedoya  [id number]  77 y.o.  1945    Admit date: 12/12/2021    Discharge date and time:  12/21/2021     Admission Diagnoses:      STEMI (ST elevation myocardial infarction) [facility])     Discharge Diagnoses:   Principal Problem:    STEMI (ST elevation myocardial infarction) ([facility]  Active Problems:    CAD (coronary artery disease)s/p stent RCA    Valvular heart disease    H/O psoriatic arthritis    Cardiac arrest (HCC)    Acute systolic (congestive) heart failure (HCC)    Ventricular tachycardia (HCC)    Mitral stenosis    Mitral regurgitation    Moderate to severe pulmonary hypertension (HCC)    Anemia    Paroxysmal atrial fibrillation ([facility]  Resolved Problems:    * No resolved hospital problems. *       Consults: cardiology, EP, critical care, ID    Procedures:   [facility]  CONCLUSIONS:  1. Coronary artery disease. a. LEFT MAIN:  10% distal vessel tapering.  b.  LAD:  30% eccentric proximal vessel narrowing and 80% to 90% very  proximal eccentric bifurcation lesion at the site of takeoff of a large  diagonal branch. 90% ostial stenosis and 70% to 80% proximal stenosis  of a moderate-sized second diagonal branch. 90% ostial stenosis of a  small third diagonal branch. 50% disease of the LAD after the second  diagonal branch. 60% disease of the LAD after the third diagonal  branch. Long up to 60% to 70% disease of the apical LAD before the  mustache.  c.  LCX:  Basically a large lateral branch with 70% to 80% proximal  vessel disease and 80% mid vessel disease. d.  RCA:  Dominant vessel with 40% mid vessel narrowing and 70% ostial  narrowing of the largest subdivision of the posterolateral branch.   2.  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 3.  Hemodynamically significant proximal LAD disease with pressure wire  evaluation yielding an IFR of 0.70 and _____. 4.  Successful balloon angioplasty with deployment of drug-eluting  coronary stent to the proximal LAD and balloon angioplasty to the ostium  of the diagonal branch (bifurcation lesion; double wire; kissing  balloons) with very good results. ICD placement 12/20/2021    Hospital Course:   Patient presented with VT cardiac arrest.  ROSC was achieved in the ED. He was found to have STEMI. He was taken for emergent cardiac catheterization. Unfortunately he has severe ischemic cardiomyopathy. His medications were optimized as best as possible. He had leukocytosis of unclear etiology. He did have bacteriuria although unclear if symptomatic. ID was consulted and initially they recommended not treating it, but later they decided to give him a few doses of ceftriaxone. He had ICD placed which was uncomplicated.      Discharge Exam:  Vitals:    12/21/21 0636 12/21/21 0730 12/21/21 1120 12/21/21 1440   BP: 135/64 (!) 112/45 (!) 96/52 104/60   Pulse: 61 71 70 69   Resp:  16 18 13   Temp:  97.7 °F (36.5 °C) 97.7 °F (36.5 °C) 97.7 °F (36.5 °C)   TempSrc:  Temporal Temporal    SpO2:  100% 98% 99%   Weight:       Height:            General appearance: NAD, conversant  HEENT: AT/NC, MMM  Neck: FROM, supple  Lungs: Clear to auscultation, WOB normal  CV: RRR, no MRGs  Abdomen: Soft, non-tender; no masses or HSM, +BS  Extremities: No peripheral edema or digital cyanosis  Skin: no rash, lesions or ulcers  Psych: Calm and cooperative  Neuro: Alert and interactive, nonfocal     Condition:   Stable     Disposition: home    Patient Instructions:   Current Discharge Medication List      START taking these medications    Details   isosorbide mononitrate (IMDUR) 30 MG extended release tablet Take 1 tablet by mouth daily  Qty: 30 tablet, Refills: 3      rosuvastatin (CRESTOR) 40 MG tablet Take 1 tablet by mouth nightly  Qty: 30 tablet, Refills: 3      hydrALAZINE (APRESOLINE) 25 MG tablet Take 1 tablet by mouth every 8 hours  Qty: 90 tablet, Refills: 3      clopidogrel (PLAVIX) 75 MG tablet Take 1 tablet by mouth daily  Qty: 30 tablet, Refills: 3      amiodarone (CORDARONE) 200 MG tablet Take 1 tablet by mouth 2 times daily for 7 days, THEN 1 tablet daily. Qty: 60 tablet, Refills: 3      apixaban (ELIQUIS) 5 MG TABS tablet Take 1 tablet by mouth 2 times daily  Qty: 60 tablet, Refills: 3         CONTINUE these medications which have CHANGED    Details   furosemide (LASIX) 40 MG tablet Take 1 tablet by mouth daily  Qty: 30 tablet, Refills: 3         CONTINUE these medications which have NOT CHANGED    Details   ENTRESTO  MG per tablet TAKE ONE TABLET BY MOUTH TWO TIMES A DAY  Qty: 180 tablet, Refills: 1      Cetirizine HCl (ZYRTEC ALLERGY PO) Take by mouth daily      Acetaminophen (TYLENOL ARTHRITIS PAIN PO) Take by mouth daily      azelastine HCl 0.15 % SOLN 2 sprays by Nasal route daily      Respiratory Therapy Supplies MONAE 1 Device by Does not apply route every 3 hours as needed (increase pressure to 12)  Qty: 1 Device, Refills: 0      Ferrous Sulfate (IRON) 325 (65 Fe) MG TABS daily   Refills: 1      pantoprazole (PROTONIX) 40 MG tablet TAKE 1 TABLET BY MOUTH ONCE DAILY  Refills: 3      Cranberry 500 MG CAPS Take 500 mg by mouth    Associated Diagnoses: Hypertension, unspecified type; Coronary artery disease involving native coronary artery of native heart without angina pectoris; LV dysfunction; Chronic systolic heart failure (HCC); VHD (valvular heart disease);  Bradycardia      gabapentin (NEURONTIN) 300 MG capsule Take two capsules three times daily    Associated Diagnoses: Coronary artery disease involving native coronary artery of native heart without angina pectoris; Valvular heart disease; Acute upper GI bleed; SVT (supraventricular tachycardia) ([facility] [address] Ischemic cardiomyopathy      metoprolol succinate (TOPROL XL) 50 MG extended release tablet Take 1 tablet by mouth 2 times daily  Qty: 30 tablet, Refills: 3      spironolactone (ALDACTONE) 25 MG tablet Take 1 tablet by mouth daily  Qty: 30 tablet, Refills: 3      methotrexate (RHEUMATREX) 2.5 MG chemo tablet Take 7.5 mg by mouth once a week Indications: Thursday       folic acid (FOLVITE) 1 MG tablet Take 1 mg by mouth every morning       hydroxychloroquine (PLAQUENIL) 200 MG tablet Take 200 mg by mouth 2 times daily       DULoxetine (CYMBALTA) 60 MG extended release capsule Take 60 mg by mouth every morning       Multiple Vitamins-Minerals (MULTIVITAMIN ADULT PO) Take 1 tablet by mouth 2 times daily       Cholecalciferol (VITAMIN D3) 5000 UNITS TABS Take 1 tablet by mouth every morning       tamsulosin (FLOMAX) 0.4 MG capsule Take 0.4 mg by mouth daily       RESTASIS 0.05 % ophthalmic emulsion Place 1 drop into both eyes 2 times daily as needed          STOP taking these medications       simvastatin (ZOCOR) 20 MG tablet Comments:   Reason for Stopping:         risperiDONE (RISPERDAL) 1 MG tablet Comments:   Reason for Stopping:         aspirin 81 MG tablet Comments:   Reason for Stopping:                Activity: activity as tolerated  Diet: cardiac diet    Follow-up with PCP in 1 week.     Note that over 30 minutes was spent in preparing discharge papers, discussing discharge with patient, medication review, etc.    Signed:  Shaheed Nguyen MD    12/21/2021  3:41 PM

## 2021-12-22 ENCOUNTER — TELEPHONE (OUTPATIENT)
Dept: CARDIOLOGY CLINIC | Age: 76
End: 2021-12-22

## 2021-12-22 LAB
BLOOD CULTURE, ROUTINE: NORMAL
CULTURE, BLOOD 2: NORMAL

## 2021-12-27 ENCOUNTER — TELEPHONE (OUTPATIENT)
Dept: NON INVASIVE DIAGNOSTICS | Age: 76
End: 2021-12-27

## 2021-12-27 NOTE — TELEPHONE ENCOUNTER
Returned wife's call. Patient's ICD is beeping once a day. She was told since he does not have a remote transmitter yet the device will beep daily. She did confirm the device is only beeping once a day. Patient has an appointment next Monday, 1/3/22. I asked that she call the office sooner if the device beeps more than once daily. She voiced understanding and is agreeable to this plan.     César Gambino RN, BSN  [facility]

## 2022-01-03 ENCOUNTER — NURSE ONLY (OUTPATIENT)
Dept: NON INVASIVE DIAGNOSTICS | Age: 77
End: 2022-01-03

## 2022-01-03 ENCOUNTER — TELEPHONE (OUTPATIENT)
Dept: NON INVASIVE DIAGNOSTICS | Age: 77
End: 2022-01-03

## 2022-01-03 NOTE — TELEPHONE ENCOUNTER
Mrs. Ana Laura Solis called back. She will bring Jose M Pedro in tomorrow morning for pressure dressing to left chest incision.     Timoteo Burkett RN, BSN  [facility]

## 2022-01-03 NOTE — PATIENT INSTRUCTIONS
Continue arm restrictions until  1/10/2022      Call if any signs or symptoms of infection [phone] ext: [phone]  Fevers, chills, redness, swelling or drainage. Hook up home  Monitor  [employer] Stay connected: [phone]    If you receive a shock    1 shock and you feel fine send a home transmission and call the office [phone]   1 shock and you feel poorly after the shock go to the emergency room and do not drive. Multiple shocks report to the emergency room and do no drive.

## 2022-01-03 NOTE — TELEPHONE ENCOUNTER
Left message on TAD. Dr. Greg Angel would like us to place a pressure dressing over his ICD wound. Can bring him back in the office. Await return phone call.     Maikel Duque RN, BSN  [facility]

## 2022-01-04 ENCOUNTER — NURSE ONLY (OUTPATIENT)
Dept: NON INVASIVE DIAGNOSTICS | Age: 77
End: 2022-01-04

## 2022-01-04 NOTE — PROGRESS NOTES
Left chest incision remains well approximated without drainage. Pressure dressing applied to wound per order of Dr. Sagar Santiago. Patient will come back next Monday for wound reassessment. Instructed patient and his wife to keep the area clean and dry. Both verbalized understanding.     Kathia Bernstein RN, BSN  [facility]

## 2022-01-10 ENCOUNTER — NURSE ONLY (OUTPATIENT)
Dept: NON INVASIVE DIAGNOSTICS | Age: 77
End: 2022-01-10

## 2022-01-10 ENCOUNTER — OFFICE VISIT (OUTPATIENT)
Dept: CARDIOLOGY CLINIC | Age: 77
End: 2022-01-10
Payer: MEDICARE

## 2022-01-10 VITALS
RESPIRATION RATE: 12 BRPM | BODY MASS INDEX: 26.93 KG/M2 | SYSTOLIC BLOOD PRESSURE: 90 MMHG | HEART RATE: 50 BPM | HEIGHT: 66 IN | WEIGHT: 167.6 LBS | DIASTOLIC BLOOD PRESSURE: 52 MMHG

## 2022-01-10 DIAGNOSIS — I10 ESSENTIAL HYPERTENSION: ICD-10-CM

## 2022-01-10 DIAGNOSIS — I10 PRIMARY HYPERTENSION: ICD-10-CM

## 2022-01-10 DIAGNOSIS — I51.9 LV DYSFUNCTION: Primary | ICD-10-CM

## 2022-01-10 DIAGNOSIS — I38 VHD (VALVULAR HEART DISEASE): ICD-10-CM

## 2022-01-10 DIAGNOSIS — I50.22 CHRONIC SYSTOLIC HEART FAILURE (HCC): ICD-10-CM

## 2022-01-10 DIAGNOSIS — I25.2 MI, OLD: ICD-10-CM

## 2022-01-10 DIAGNOSIS — Z95.5 STENTED CORONARY ARTERY: ICD-10-CM

## 2022-01-10 DIAGNOSIS — I25.10 CORONARY ARTERY DISEASE INVOLVING NATIVE CORONARY ARTERY OF NATIVE HEART WITHOUT ANGINA PECTORIS: ICD-10-CM

## 2022-01-10 DIAGNOSIS — I27.20 MODERATE TO SEVERE PULMONARY HYPERTENSION (HCC): ICD-10-CM

## 2022-01-10 DIAGNOSIS — I48.0 PAROXYSMAL ATRIAL FIBRILLATION (HCC): ICD-10-CM

## 2022-01-10 DIAGNOSIS — E78.00 PURE HYPERCHOLESTEROLEMIA: ICD-10-CM

## 2022-01-10 PROCEDURE — 93000 ELECTROCARDIOGRAM COMPLETE: CPT | Performed by: INTERNAL MEDICINE

## 2022-01-10 PROCEDURE — 99213 OFFICE O/P EST LOW 20 MIN: CPT | Performed by: INTERNAL MEDICINE

## 2022-01-10 NOTE — PROGRESS NOTES
Subjective:      Patient ID: Darrian Cui is a 77 y.o. male. Patient Active Problem List    Diagnosis Date Noted    [facility]ox[facility]mal [facility] fibrillation ([facility]     Anemia 85/51/3891    [facility] 12/13/2021    Ventricular tachycardia ([facility] 12/13/2021    Mitral stenosis 12/13/2021    Mitral regurgitation 12/13/2021    Moderate to severe pulmonary hypertension ([facility] 12/13/2021    Cardiac arrest ([facility] 12/12/2021    LV dysfunction 01/15/2019    SVT (supraventricular tachycardia) ([facility]     Acute upper GI bleed 10/08/2018    H/O psoriatic arthritis 04/09/2018    Hyperlipidemia 10/27/2013    Stented coronary artery 10/25/2013    STEMI (ST elevation myocardial infarction) ([facility] 07/05/2012    CAD (coronary artery disease)s/p stent RCA     Valvular heart disease     Hypertension     Obesity, morbid s/p intestinal bypass        Current Outpatient Medications   Medication Sig Dispense Refill    Boswellia-Glucosamine-Vit D (OSTEO BI-FLEX ONE PER DAY PO) Take by mouth      apixaban (ELIQUIS) 5 MG TABS tablet Take 1 tablet by mouth 2 times daily . 60 tablet 3    isosorbide mononitrate (IMDUR) 30 MG extended release tablet Take 1 tablet by mouth daily . 30 tablet 3    rosuvastatin (CRESTOR) 40 MG tablet Take 1 tablet by mouth nightly . 30 tablet 3    hydrALAZINE (APRESOLINE) 25 MG tablet Take 1 tablet by mouth every 8 hours . 90 tablet 3    furosemide (LASIX) 40 MG tablet Take 1 tablet by mouth daily . 30 tablet 3    clopidogrel (PLAVIX) 75 MG tablet Take 1 tablet by mouth daily . 30 tablet 3    amiodarone (CORDARONE) 200 MG tablet Take 1 tablet by mouth 2 times daily for 7 days, THEN 1 tablet daily.  .. 60 tablet 3    ENTRESTO  MG per tablet TAKE ONE TABLET BY MOUTH TWO TIMES A  tablet 1    Cetirizine HCl (ZYRTEC ALLERGY PO) Take by mouth daily      Acetaminophen (TYLENOL ARTHRITIS PAIN PO) Take by mouth daily      azelastine HCl 0.15 % SOLN 2 sprays by Nasal route daily      Ferrous Sulfate (IRON) 325 (65 Fe) MG TABS daily   1    pantoprazole (PROTONIX) 40 MG tablet TAKE 1 TABLET BY MOUTH ONCE DAILY  3    Cranberry 500 MG CAPS Take 500 mg by mouth      gabapentin (NEURONTIN) 300 MG capsule Take two capsules three times daily      metoprolol succinate (TOPROL XL) 50 MG extended release tablet Take 1 tablet by mouth 2 times daily 30 tablet 3    spironolactone (ALDACTONE) 25 MG tablet Take 1 tablet by mouth daily 30 tablet 3    methotrexate (RHEUMATREX) 2.5 MG chemo tablet Take 7.5 mg by mouth once a week Indications: Thursday       folic acid (FOLVITE) 1 MG tablet Take 1 mg by mouth every morning       hydroxychloroquine (PLAQUENIL) 200 MG tablet Take 200 mg by mouth 2 times daily       DULoxetine (CYMBALTA) 60 MG extended release capsule Take 60 mg by mouth every morning       Multiple Vitamins-Minerals (MULTIVITAMIN ADULT PO) Take 1 tablet by mouth 2 times daily       Cholecalciferol (VITAMIN D3) 5000 UNITS TABS Take 1 tablet by mouth every morning       tamsulosin (FLOMAX) 0.4 MG capsule Take 0.4 mg by mouth daily       RESTASIS 0.05 % ophthalmic emulsion Place 1 drop into both eyes 2 times daily as needed       Respiratory Therapy Supplies MONAE 1 Device by Does not apply route every 3 hours as needed (increase pressure to 12) (Patient taking differently: 1 Device by Does not apply route every 3 hours as needed (increase pressure to 12) C-PaP EVERY NIGHT) 1 Device 0     No current facility-administered medications for this visit. CC: Patient is seen in follow-up for:  1. LV dysfunction    2. 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 SVT (supraventricular tachycardia) ([address]    4. Essential hypertension    5. Pure hypercholesterolemia    6. Stented coronary artery    7. Mitral valve regurgitation-moderate    HPI  Seen post hospitalization for acute myocardial infarction/V. fib arrest.  Had subsequent stents x2 and implantation of ICD.     Presently having some difficulties with ICD pocket hematoma. Denies any chest pain or shortness of breath. Does have difficulties with generalized fatigue/reduced functional capacity. Review of Systems   Constitutional: (+) fatigue. HENT: Negative for nosebleeds. Respiratory: Negative for cough. Cardiovascular: Negative for palpitations Less leg swelling. Gastrointestinal: Negative for blood in stool. Genitourinary: Negative for hematuria. Musculoskeletal: Negative for myalgias. Skin: Negative for color change. Neurological: Negative for dizziness, weakness and numbness. Hematological: Does not bruise/bleed easily. Psychiatric/Behavioral: Negative for sleep disturbance. The patient is not nervous/anxious. Objective:   Physical Exam  /78 pulse 70  NECK: No bruits. No JVD at 30 degrees. LUNGS: Clear to auscultation and percussion in all fields. HEART: Regular rate and rhythm. Quiet precordium. Normal S1-S2 in intensity and splitting. Gr I-II/VI HSM at LLSB   ABDOMEN: Soft without. masses or organomegaly. EXTREMITIES: 1+ left and 1+ right ankle edema No cyanosis. PERIPHERAL VASCULAR: Radial pulses are equal and symmetric. NEUROLOGIC: Grossly intact. HEMATOLOGIC:  No palpable nodes or splenomegaly. Assessment:      1. LV dysfunction    2. 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 SVT (supraventricular tachycardia) ([address]    4. Essential hypertension    5. Pure hypercholesterolemia    6. Stented coronary artery    7. Mitral valve regurgitation-moderate to severe      Chronic CHF /CAD/mitral valve regurgitation-moderate to severe   Plan:     1. Reviewed  EF  51% with severe MR  BNP  20,550 on 12/15/2021  2.   Labs 9/30/2021 creatinine 0.69, and LDL 42.  3. Same medications      Follow up in 4 weeks

## 2022-01-19 ENCOUNTER — NURSE ONLY (OUTPATIENT)
Dept: NON INVASIVE DIAGNOSTICS | Age: 77
End: 2022-01-19

## 2022-01-19 NOTE — PROGRESS NOTES
Incision well approximate. Swelling is receding. Dr. Miah Elizabeth came into to assess wound. Patient is to return to the office in 2 weeks. Notified patient and his wife to keep area clean and dry, no powders, ointments, or creams to incision. Contact the office for any fever, chills, or drainage. Patient and his wife verbalized understanding. See media wound picture. Plan: wound check in 2 weeks.     Blake Montilla RN, BSN  [facility]

## 2022-01-19 NOTE — PATIENT INSTRUCTIONS
Keep area open to air  May shower  Do not use lotions, creams, or powders on incision  Call office for fever, chills, or drainage.

## 2022-02-02 ENCOUNTER — NURSE ONLY (OUTPATIENT)
Dept: NON INVASIVE DIAGNOSTICS | Age: 77
End: 2022-02-02

## 2022-02-02 NOTE — PROGRESS NOTES
Incision well approximated, free of drainage, and redness. Swelling continues to improve. Notified patient to contact office for any fever, chills, redness or drainage. See wound pictures in Media. Will assess wound again next week when he sees  [employer].     Harleen Bay RN, BSN  [facility]

## 2022-02-10 ENCOUNTER — NURSE ONLY (OUTPATIENT)
Dept: NON INVASIVE DIAGNOSTICS | Age: 77
End: 2022-02-10

## 2022-02-10 ENCOUNTER — OFFICE VISIT (OUTPATIENT)
Dept: CARDIOLOGY CLINIC | Age: 77
End: 2022-02-10
Payer: MEDICARE

## 2022-02-10 VITALS
HEIGHT: 66 IN | RESPIRATION RATE: 16 BRPM | SYSTOLIC BLOOD PRESSURE: 84 MMHG | DIASTOLIC BLOOD PRESSURE: 52 MMHG | WEIGHT: 164.9 LBS | BODY MASS INDEX: 26.5 KG/M2 | HEART RATE: 53 BPM

## 2022-02-10 DIAGNOSIS — I10 PRIMARY HYPERTENSION: ICD-10-CM

## 2022-02-10 DIAGNOSIS — I50.22 CHRONIC SYSTOLIC HEART FAILURE (HCC): Primary | ICD-10-CM

## 2022-02-10 DIAGNOSIS — I38 VHD (VALVULAR HEART DISEASE): ICD-10-CM

## 2022-02-10 DIAGNOSIS — I47.20 VENTRICULAR TACHYCARDIA: ICD-10-CM

## 2022-02-10 DIAGNOSIS — I27.20 MODERATE TO SEVERE PULMONARY HYPERTENSION (HCC): ICD-10-CM

## 2022-02-10 DIAGNOSIS — I48.0 PAROXYSMAL ATRIAL FIBRILLATION (HCC): ICD-10-CM

## 2022-02-10 DIAGNOSIS — E78.00 PURE HYPERCHOLESTEROLEMIA: ICD-10-CM

## 2022-02-10 DIAGNOSIS — I25.2 MI, OLD: ICD-10-CM

## 2022-02-10 DIAGNOSIS — I51.9 LV DYSFUNCTION: ICD-10-CM

## 2022-02-10 DIAGNOSIS — Z95.5 STENTED CORONARY ARTERY: ICD-10-CM

## 2022-02-10 DIAGNOSIS — I25.10 CORONARY ARTERY DISEASE INVOLVING NATIVE CORONARY ARTERY OF NATIVE HEART WITHOUT ANGINA PECTORIS: ICD-10-CM

## 2022-02-10 DIAGNOSIS — I10 ESSENTIAL HYPERTENSION: ICD-10-CM

## 2022-02-10 PROCEDURE — 99214 OFFICE O/P EST MOD 30 MIN: CPT | Performed by: INTERNAL MEDICINE

## 2022-02-10 RX ORDER — AMIODARONE HYDROCHLORIDE 200 MG/1
200 TABLET ORAL DAILY
COMMUNITY
End: 2022-06-29 | Stop reason: SDUPTHER

## 2022-02-10 NOTE — PROGRESS NOTES
Subjective:      Patient ID: Keon Little is a 77 y.o. male. Patient Active Problem List    Diagnosis Date Noted    Paroxysmal atrial fibrillation ([facility]     Anemia 52/44/9437    Acute systolic (congestive) heart failure ([facility] 12/13/2021    [facility] 12/13/2021    Mitral stenosis 12/13/2021    Mitral regurgitation 12/13/2021    Moderate to severe pulmonary hypertension ([facility] 12/13/2021    Cardiac arrest ([facility] 12/12/2021    LV dysfunction 01/15/2019    [facility]     Acute upper GI bleed 10/08/2018    H/O psoriatic arthritis 04/09/2018    Hyperlipidemia 10/27/2013    Stented coronary artery 10/25/2013    STEMI (ST elevation myocardial infarction) ([facility] 07/05/2012    CAD (coronary artery disease)s/p stent RCA     Valvular heart disease     Hypertension     Obesity, morbid s/p intestinal bypass        Current Outpatient Medications   Medication Sig Dispense Refill    amiodarone (CORDARONE) 200 MG tablet Take 200 mg by mouth daily      Boswellia-Glucosamine-Vit D (OSTEO BI-FLEX ONE PER DAY PO) Take by mouth      apixaban (ELIQUIS) 5 MG TABS tablet Take 1 tablet by mouth 2 times daily . 60 tablet 3    rosuvastatin (CRESTOR) 40 MG tablet Take 1 tablet by mouth nightly . 30 tablet 3    furosemide (LASIX) 40 MG tablet Take 1 tablet by mouth daily . 30 tablet 3    clopidogrel (PLAVIX) 75 MG tablet Take 1 tablet by mouth daily .  30 tablet 3    ENTRESTO  MG per tablet TAKE ONE TABLET BY MOUTH TWO TIMES A  tablet 1    Cetirizine HCl (ZYRTEC ALLERGY PO) Take by mouth daily      Acetaminophen (TYLENOL ARTHRITIS PAIN PO) Take by mouth daily      azelastine HCl 0.15 % SOLN 2 sprays by Nasal route daily      Ferrous Sulfate (IRON) 325 (65 Fe) MG TABS daily   1    pantoprazole (PROTONIX) 40 MG tablet TAKE 1 TABLET BY MOUTH ONCE DAILY  3    Cranberry 500 MG CAPS Take 500 mg by mouth      gabapentin (NEURONTIN) 300 MG capsule Take two capsules three times daily      metoprolol succinate (TOPROL XL) 50 MG extended release tablet Take 1 tablet by mouth 2 times daily 30 tablet 3    spironolactone (ALDACTONE) 25 MG tablet Take 1 tablet by mouth daily 30 tablet 3    methotrexate (RHEUMATREX) 2.5 MG chemo tablet Take 7.5 mg by mouth once a week Indications: Thursday       folic acid (FOLVITE) 1 MG tablet Take 1 mg by mouth every morning       hydroxychloroquine (PLAQUENIL) 200 MG tablet Take 200 mg by mouth 2 times daily       DULoxetine (CYMBALTA) 60 MG extended release capsule Take 60 mg by mouth every morning       Multiple Vitamins-Minerals (MULTIVITAMIN ADULT PO) Take 1 tablet by mouth 2 times daily       Cholecalciferol (VITAMIN D3) 5000 UNITS TABS Take 1 tablet by mouth every morning       tamsulosin (FLOMAX) 0.4 MG capsule Take 0.4 mg by mouth daily       RESTASIS 0.05 % ophthalmic emulsion Place 1 drop into both eyes 2 times daily as needed       Respiratory Therapy Supplies MONAE 1 Device by Does not apply route every 3 hours as needed (increase pressure to 12) (Patient not taking: Reported on 2/10/2022) 1 Device 0     No current facility-administered medications for this visit. CC: Patient is seen for new problem of lightheadedness and in follow-up for:  1. LV dysfunction    2. 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 SVT (supraventricular tachycardia) ([address]    4. Essential hypertension    5. Pure hypercholesterolemia    6. Stented coronary artery    7. Mitral valve regurgitation-severe    HPI  Seen post hospitalization for acute myocardial infarction/V. fib arrest.  Had subsequent stents x2 and implantation of ICD. Having difficulties with lightheadedness when he stands up. Denies any chest pain or shortness of breath. Does have difficulties with generalized fatigue/reduced functional capacity. Review of Systems   Constitutional: (+) fatigue. HENT: Negative for nosebleeds. Respiratory: Negative for cough.     Cardiovascular: Negative for palpitations Less leg swelling. Gastrointestinal: Negative for blood in stool. Genitourinary: Negative for hematuria. Musculoskeletal: Negative for myalgias. Skin: Negative for color change. Neurological: Negative for dizziness, weakness and numbness. Hematological: Does not bruise/bleed easily. Psychiatric/Behavioral: Negative for sleep disturbance. The patient is not nervous/anxious. Objective:       Blood pressure 84/52 pulse 53 respiration 16 weight 164.9 pounds  HEAD & FACE: Normocephalic. Symmetric. EYES: No xanthelasma. Conjunctivae not injected. EARS, NOSE, MOUTH & THROAT: Good dentition. No oral pallor or cyanosis. NECK: No JVD at 30 degrees. No thyromegaly. RESPIRATORY: Clear to auscultation and percussion in all fields. No use of accessory muscle or intercostal retractions. CARDIOVASCULAR: Bradycardic. No lifts or thrills on palpitation. Auscultation with normal S1-S2 in intensity and splitting. No carotid bruits. Abdominal aorta not enlarged. Femoral arteries without bruits. Pedal pulses 1+. No edema. ABDOMEN: Soft without hepatic or splenic enlargement. No tenderness. MUSCULOSKELETAL: No kyphosis or scoliosis of the back. Good muscle strength and tone. No muscle atrophy. Slow gait and inability to undergo exercise stress testing. EXTREMITIES: No clubbing or cyanosis. SKIN: No Xanthomas or ulcerations. NEUROLOGIC: Oriented to time, place and person. Normal mood and affect. LYMPHATIC:  No palpable neck or supraclavicular nodes. No splenomegaly. Assessment:      1. LV dysfunction    2. 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 SVT (supraventricular tachycardia) ([address]    4. Essential hypertension    5. Pure hypercholesterolemia    6. Stented coronary artery    7. Mitral valve regurgitation- severe    Symptomatic hypotension secondary to meds           Plan:     1. Reviewed MRI:  EF  21% with severe MR  BNP  20,550 on 12/15/2021  2.   Labs 1/10/2022 creatinine 0.86, and LDL 50.  3. Discontinue isosorbide and Apresoline.       Follow up in 6 weeks

## 2022-02-11 ENCOUNTER — TELEPHONE (OUTPATIENT)
Dept: NON INVASIVE DIAGNOSTICS | Age: 77
End: 2022-02-11

## 2022-02-11 NOTE — TELEPHONE ENCOUNTER
March schedule is not available, patient is scheduled for 4/12/22, but will place the patient on a cancellation list.

## 2022-02-11 NOTE — TELEPHONE ENCOUNTER
Left message on patient's TAD to call office to schedule one month wound check.     Zack Morales RN, BSN  [facility]

## 2022-02-11 NOTE — TELEPHONE ENCOUNTER
Patient called back. Reviewed Dr. Tawana Stephens recommendations. Patient would like to have a visit with Dr. Tawana Stephens. Patient is due for an office visit with Dr. Tawana Stephens. Forwarding to scheduling.      Eufemia Mao

## 2022-02-11 NOTE — TELEPHONE ENCOUNTER
----- Message from Kaelyn Kenney MD sent at 2/11/2022  8:33 AM EST -----  Ok to follow up in one month. Swelling is improving. thx  ----- Message -----  From: Ruddy Hu RN  Sent: 2/10/2022   1:53 PM EST  To: Kaelyn Kenney MD    Please see wound picture from today's visit. Swelling is improving. No redness or drainage from incision.

## 2022-02-24 ENCOUNTER — OFFICE VISIT (OUTPATIENT)
Dept: NON INVASIVE DIAGNOSTICS | Age: 77
End: 2022-02-24
Payer: MEDICARE

## 2022-02-24 VITALS
WEIGHT: 172.6 LBS | DIASTOLIC BLOOD PRESSURE: 78 MMHG | SYSTOLIC BLOOD PRESSURE: 110 MMHG | HEART RATE: 52 BPM | RESPIRATION RATE: 18 BRPM | HEIGHT: 66 IN | BODY MASS INDEX: 27.74 KG/M2

## 2022-02-24 DIAGNOSIS — Z79.899 ON AMIODARONE THERAPY: ICD-10-CM

## 2022-02-24 DIAGNOSIS — I48.0 PAROXYSMAL ATRIAL FIBRILLATION (HCC): ICD-10-CM

## 2022-02-24 DIAGNOSIS — Z95.810 IMPLANTABLE CARDIOVERTER-DEFIBRILLATOR (ICD) IN SITU: Primary | ICD-10-CM

## 2022-02-24 PROCEDURE — 99215 OFFICE O/P EST HI 40 MIN: CPT | Performed by: INTERNAL MEDICINE

## 2022-02-24 NOTE — PROGRESS NOTES
Cardiac Electrophysiology Outpatient Progress Note    Isatu Portillo  1945  Date of Service: 2/24/2022  PCP: Ruben Dias MD  Cardiologist: Inga Breen MD   Primary Electrophysiologist: Braydon Modi MD         Subjective: Isatu Portillo is seen in hospital follow-up and management of: VT s/p dual chamber ICD implantation    Initial Hospital consult (12/13/21):  77year old male with known CAD, STEMI 1997 with PCI to the RCA,   LVEF 45-50% on TTE 03/30/17, 35-40% on TTE 10/09/18, LVEF of 29% on a Lexiscan stress test 07/01/20. Other hx includes HTN, HLD, obesity S/p gastric bypass, Carpal tunnel surgery. At baseline, he is on Entresto, spironolactone, Metoprolol 50 mg bid  He presented to ED 12/12 with palpitation, weakness for one hour and was found to be in sustained VT monomorphic, Rate 190, LBBB, superior axis. He was given Lidocaine and while preparing to cardiovert patient, he had pulseless VT and CPR was started, he was intubated, defibrillated x1. There was a concern for inferior STEMI thus Cath lab was activated. Cath showed prox LAD 80-90%, mod D2 90% ostial, apical LAD 70%, diffuse Lcx 70-80% lesions, mild diffuse RCA tsenosis  LV gram showed possible LV aneurysm, mod MR and inferior akinesis, dilated LV. FFR of LAD proved that it was significant thus he had simultaneous RYAN to prox LAD and angioplasty to ostium Diagonal branch     He is intubated and awake, not really following commands. He is on Lidocaine 1mg/min, no pressors, metoprolol 25 mg bid    (12/14/21): He converted to AF with a controled ventricular rate overnight without recurrent VT, he remains on IV Lidocaine 0.5 mg/min. Today he is confused to his situation and agreeable. His count dropped from 12 to 8.0 this morning.     (12/15/21): He has improved cognition today and is in sinus, he awaits EGD today due to ongoing anemia with a PRBC transfusion on 12/14. No recurrent VT and tolerating amiodarone well. (12/16/21): He had recurrent AF with RVR last night with rates into the 140's today his H/H continues to decline. (12/17/21): The patient is seen in hospital follow-up, he continues in AF with rates in the 90's he is tolerating the increased dose of Toprol as well as the increased dose of Entresto. Today he is awake and alert without cardiac complaints. 12/18/21: The patient is seen in the hospital for follow up. He remains in AFL with HR of  bpm. He denies any chest pain, dyspnea or palpitations. 12/19/21: The patient is seen in the hospital for follow up. He remains in AFL with HR of 60-80 bpm. He denies any chest pain, dyspnea or palpitations. 12/20/21: The patient is seen in the hospital for follow up. He remains in AF/AFL with HR 60-80 bpm. He denies any chest pain, dyspnea or palpitations. Final blood cultures are pending. 12/21/21: The patient is seen in hospital follow-up, he had a dual chamber ICD implanted yesterday today the site is soft compressible without edema or hematoma the Aquacel clean dry and intact. He has no complaints of pain at the incision site. 2/24/2022: Vitaliy Sherman presents today for follow up. Since the ICD insertion on 12/20/2021, he reports feeling overall well. His device site looks well healed and free from infection or erosion. He offers no complaints from a device POV. Currently denies any angina, syncope, dyspnea on exertion, paroxysmal nocturnal dyspnea and palpitations. The patient continues to be followed remotely.         Patient Active Problem List   Diagnosis    CAD (coronary artery disease)s/p stent RCA    Valvular heart disease    Hypertension    Obesity, morbid s/p intestinal bypass    [facility]) [facility]    Stented coronary artery    Hyperlipidemia    H/O psoriatic arthritis    Acute upper GI bleed    SVT (supraventricular tachycardia) (HCC)    LV dysfunction    Cardiac arrest ([facility] [facility]    Acute systolic (congestive) heart failure (HCC)    Ventricular tachycardia (HCC)    Mitral stenosis    Mitral regurgitation    Moderate to severe pulmonary hypertension (HCC)    Anemia    Paroxysmal atrial fibrillation (HCC)         Current Outpatient Medications   Medication Sig Dispense Refill    amiodarone (CORDARONE) 200 MG tablet Take 200 mg by mouth daily      Boswellia-Glucosamine-Vit D (OSTEO BI-FLEX ONE PER DAY PO) Take by mouth      apixaban (ELIQUIS) 5 MG TABS tablet Take 1 tablet by mouth 2 times daily . 60 tablet 3    rosuvastatin (CRESTOR) 40 MG tablet Take 1 tablet by mouth nightly . 30 tablet 3    furosemide (LASIX) 40 MG tablet Take 1 tablet by mouth daily . 30 tablet 3    clopidogrel (PLAVIX) 75 MG tablet Take 1 tablet by mouth daily .  30 tablet 3    ENTRESTO  MG per tablet TAKE ONE TABLET BY MOUTH TWO TIMES A  tablet 1    Cetirizine HCl (ZYRTEC ALLERGY PO) Take by mouth daily      Acetaminophen (TYLENOL ARTHRITIS PAIN PO) Take by mouth daily      azelastine HCl 0.15 % SOLN 2 sprays by Nasal route daily      Ferrous Sulfate (IRON) 325 (65 Fe) MG TABS daily   1    pantoprazole (PROTONIX) 40 MG tablet TAKE 1 TABLET BY MOUTH ONCE DAILY  3    Cranberry 500 MG CAPS Take 500 mg by mouth      gabapentin (NEURONTIN) 300 MG capsule Take two capsules three times daily      metoprolol succinate (TOPROL XL) 50 MG extended release tablet Take 1 tablet by mouth 2 times daily 30 tablet 3    spironolactone (ALDACTONE) 25 MG tablet Take 1 tablet by mouth daily 30 tablet 3    methotrexate (RHEUMATREX) 2.5 MG chemo tablet Take 7.5 mg by mouth once a week Indications: Thursday       folic acid (FOLVITE) 1 MG tablet Take 1 mg by mouth every morning       hydroxychloroquine (PLAQUENIL) 200 MG tablet Take 200 mg by mouth 2 times daily       DULoxetine (CYMBALTA) 60 MG extended release capsule Take 60 mg by mouth every morning       Multiple Vitamins-Minerals (MULTIVITAMIN ADULT PO) Take 1 tablet by mouth 2 times daily       Cholecalciferol (VITAMIN D3) 5000 UNITS TABS Take 1 tablet by mouth every morning       tamsulosin (FLOMAX) 0.4 MG capsule Take 0.4 mg by mouth daily       RESTASIS 0.05 % ophthalmic emulsion Place 1 drop into both eyes 2 times daily as needed       Respiratory Therapy Supplies MONAE 1 Device by Does not apply route every 3 hours as needed (increase pressure to 12) (Patient not taking: Reported on 2/10/2022) 1 Device 0     No current facility-administered medications for this visit. Allergies   Allergen Reactions    Dye [Iodides] Anaphylaxis     Passing out    Dust Mite Extract Other (See Comments)     All year around       [facility] Readings from Last 3 Encounters:   02/24/22 172 lb 9.6 oz (78.3 kg)   02/10/22 164 lb 14.4 oz (74.8 kg)   01/10/22 167 lb 9.6 oz (76 kg)     Temp Readings from Last 3 Encounters:   12/21/21 97.7 °F (36.5 °C)   12/12/21 97.8 °F (36.6 °C) (Temporal)   10/12/21 97.4 °F (36.3 °C)     BP Readings from Last 3 Encounters:   02/24/22 110/78   02/10/22 (!) 84/52   01/10/22 (!) 90/52     Pulse Readings from Last 3 Encounters:   02/24/22 52   02/10/22 53   01/10/22 50       No intake or output data in the 24 hours ending 02/24/22 1157    ROS:    Constitutional: Negative for fever. Positive for activity change. Negative for appetite change. HENT: Negative for epistaxis, difficulty swallowing. Eyes: Negative for blurred vision or double vision. Respiratory: Negative for cough, chest tightness, shortness of breath and wheezing. Cardiovascular: Negative for chest pain, palpitations and leg swelling. Gastrointestinal: Negative for abdominal pain, heartburn, or blood in stool. Genitourinary: Negative for hematuria, burning or frequency. Musculoskeletal: Negative for myalgias, stiffness, or swelling. Skin: Negative for rash, pain, or lumps. Neurological: Negative for syncope, seizures, or headaches.    Psychiatric/Behavioral: Negative for confusion and agitation. The patient is not nervous/anxious. PHYSICAL EXAM:  Vitals:    02/24/22 1127   BP: 110/78   Pulse: 52   Resp: 18   Weight: 172 lb 9.6 oz (78.3 kg)   Height: 5' 6\" (1.676 m)     Constitutional: Well-developed  Eyes: conjunctivae normal, no xanthelasma   Ears, Nose, Throat: oral mucosa moist, no cyanosis   CV: no JVD. IRIR. No murmurs, rubs, or gallops. PMI is nondisplaced  Lungs: clear to auscultation bilaterally, normal respiratory effort without used of accessory muscles  Abdomen: soft, non-tender, bowel sounds present, no masses or hepatomegaly   Musculoskeletal: no digital clubbing, no edema   Skin: warm, no rashes   CIED: The device site is clean dry and intact, there is no edema or ecchymosis. Pertinent Labs:    TSH:   Lab Results   Component Value Date    TSH 3.820 12/12/2021      Lipid Profile:   Lab Results   Component Value Date    TRIG 61 12/13/2021    HDL 39 12/13/2021    LDLCALC 49 12/13/2021    CHOL 100 12/13/2021      Pertinent Cardiac Testing:     EKG (12/12/21): VT rate 190 bpm, Please see scan in Cardiology. EKG (12/12/21): NSR rate 76 bpm, QRS 98ms, QTc 490ms  Please see scan in Cardiology. ECG (12/14/21): Atrial fibrillation vs fib/flutter rate 122 bpm, QRS 86 ms, QTc 493ms      Echocardiogram (12/12/21):    Micro-bubble contrast injected ( to try to define an anomaly seen on the   LV gram ) --> could not be defined --> consider cardiac MRI ( once / if   patient recovers / is stable ).   The left ventricle is dilated.   The inferior wall, the inferolateral wall, the basal lateral wall and the   posterior wall are thin, scarred and akinetic.   The right ventricle is dilated.   Right ventricle global systolic function is mildly reduced ( TAPSE = 1.6   ).   The left atrium is mildly dilated.   The right atrium is moderately dilated.   Focal calcification mitral valve leaflets.   The mitral valve leaflets are restricted in mobility   Mitral annular calcification is present.   Mitral stenosis is present ( peak / mean gradient 17.66 / 5.24 mmHg; area   1.0 cm2 ).  Moderate to Severe mitral regurgitation.   Trace aortic regurgitation.   Mild tricuspid regurgitation.   Moderate to severe pulmonary hypertension.     Cardiac Catheterization (12/12/21):   CONCLUSIONS:  1.  Coronary artery disease. a.  LEFT MAIN:  10% distal vessel tapering.  b.  LAD:  30% eccentric proximal vessel narrowing and 80% to 90% very  proximal eccentric bifurcation lesion at the site of takeoff of a large  diagonal branch.  90% ostial stenosis and 70% to 80% proximal stenosis  of a moderate-sized second diagonal branch.  90% ostial stenosis of a  small third diagonal branch.  50% disease of the LAD after the second  diagonal branch.  60% disease of the LAD after the third diagonal  branch.  Long up to 60% to 70% disease of the apical LAD before the  mustache.  c.  LCX:  Basically a large lateral branch with 70% to 80% proximal  vessel disease and 80% mid vessel disease. d.  RCA:  Dominant vessel with 40% mid vessel narrowing and 70% ostial  narrowing of the largest subdivision of the posterolateral branch. 2.  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 3.  Hemodynamically significant proximal LAD disease with pressure wire  evaluation yielding an IFR of 0.70 and _____. 4.  Successful balloon angioplasty with deployment of drug-eluting  coronary stent to the proximal LAD and balloon angioplasty to the ostium  of the diagonal branch (bifurcation lesion; double wire; kissing  balloons) with very good results.     Stress Test (07/01/20):   1. ECG during the infusion did not change.    2. The myocardial aberrant right subclavian artery that takes the typical   retroesophageal course.      Prior outpatient monitor (09/27/21): Patient had a min HR of 33 bpm, max HR of 133 bpm, and avg HR of 50 bpm. Predominant underlying rhythm was Sinus Rhythm. 2 Ventricular Tachycardia runs occurred, the run with the fastest interval lasting 5 beats with a max rate of 133 bpm (avg 108 bpm); the run with the fastest interval was also the longest. Idioventricular Rhythm was present. Isolated SVEs were frequent (6.2%, 6313), SVE Couplets were rare (<1.0%, 150), and SVE Triplets were rare (<1.0%, 10). Isolated VEs were frequent (10.0%, 71104), VE Couplets were occasional (1.6%, 812), and VE Triplets were rare (<1.0%, 30). Ventricular Bigeminy and Trigeminy were present. Device Interrogation: 2/24/22   Make/Model Medtronic Cobalt  Mode AAIR<--> DDDR 50/130  Battery Voltage/Longevity:  11.5 years    Pacing: A: 53.4%  RV: 39%    P wave: 0.8 mV  Impedance: 456 ohms   Threshold: 0.75 V @ 0.4 ms  RV R wave: 9.0 mV  Impedance: 456 ohms   Threshold: 1.0 V @ 0.4 ms  Episodes: 1 AT/AF - 23 hours on 12/20/2021  Reprogramming included: see below  Overall device function is normal    All device programmable settings were evaluated per above and in the scanned document, along with iterative adjustments (capture thresholds) to assess and select the most appropriate final programming to provide for consistent delivery of the appropriate therapy and to verify function of the device.           Impression:    1. VT arrest  - Known CAD  on Entresto, BB, Sprinolactone  - last LVEF 29% on SPECT with fixed inferior large scar  - With sustained MMVT with hemodynamic compromise and arrest rate 190 bpm  - Cath findings do not suggest ACS but more likely severe CAD and is s/p revascularization of LAD and ostial of diagonal with residual high burden diffuse multivessel CAD   - Cardiac MRI from 2019 shows scar in the inf/inflat areas  - LV EF 21% on cardiac MRI 12/17/21.  - Amiodarone started 12/14/21 400mg BID, currently on Amiodarone 200mg daily.   - No recurrent VT since 12/14/21     2. ICD in situ   - Medtronic   - DOI 12/20/21  - Secondary prevention    2. New onset persistent atrial  fibrillations/flutter  - First seen 12/14/21  - CHADS-VASc 5 (age, VASc, CHF, HTN)  - Currently not on anticoagulation due to anemia.  - On Toprol XL for rate control.  - Currently in AF with CVR  - Now on Eliquis    3.  HFrEF   - Ischemic   - LVEF 50% 10/03/09 on TTE   - LVEF 45-50% 03/30/17 on TTE   - LVEF 35-40% 10/09/19 on TTE   - LVEF 29% 07/01/20 on Lexiscan stress test   - LVEF 24% on TTE 12/12/21  - LV EF 21% on Cardiac MRI  - GDMT: Entresto,  Lasix, Toprol XL, and Aldactone.     4. Coronary artery disease   - Inferior STEMI 1997 s/p PCI RCA.  - Known Large sized inferolateral fixed defect suggesting prior MI.  - [facility] 12/12/21 patient RCA stent, high grade stenosis of the LAD-D1 S/p PCI. - On statin, BB, ASA, Imdur, Brilinta.      5. Hypertension    - BP remains elevated. - Management per Cardiology     6. HLD      7. Obesity s/p gastric bypass. - Body mass index is 27.86 kg/m².      8. IV dye allergy  - Anaphylaxis       9. psoriatic arthritis      10. Anemia   -  Negative EGD on 12/16/21    11. Possible aneurysm or a pseudoaneurysm seen on LV gram 12/12/21.   - Cardiac MRI showed no aneurysm. 12. Valvular heart disease  - Severe MR and moderate TR on Cardiac MRI. Recommendations:    1. Continue Amiodarone 200mg daily. 2. Continue Toprol XL 50 mg bid. 3. Continue Eliquis 5mg BID. 4. Remotes q 91 days. 5. No driving until June 7665. 6. OV in 6 months or sooner PRN. Encouraged the patient to call the office for any questions or concerns. Thank you for allowing me to participate in your patient's care. I have spent a total of 40 minutes with the patient and his/her family reviewing the above stated recommendations.  A total of >50% of that time involved face-to-face time providing counseling and or coordination of care with the other providers.     Clair Salinas MD  Cardiac Electrophysiology  [facility]) Physicians  [facility] [place]: [facility]  11:57 AM  2/24/2022

## 2022-03-15 ENCOUNTER — HOSPITAL ENCOUNTER (OUTPATIENT)
Age: 77
Discharge: HOME | End: 2022-03-17
Payer: MEDICARE

## 2022-03-15 ENCOUNTER — HOSPITAL ENCOUNTER (OUTPATIENT)
Dept: GENERAL RADIOLOGY | Age: 77
Discharge: HOME | End: 2022-03-17
Payer: MEDICARE

## 2022-03-15 DIAGNOSIS — M79.671 RIGHT FOOT PAIN: ICD-10-CM

## 2022-03-15 PROCEDURE — 73630 X-RAY EXAM OF FOOT: CPT

## 2022-04-04 ENCOUNTER — OFFICE VISIT (OUTPATIENT)
Dept: CARDIOLOGY CLINIC | Age: 77
End: 2022-04-04
Payer: MEDICARE

## 2022-04-04 VITALS
BODY MASS INDEX: 28.96 KG/M2 | HEART RATE: 62 BPM | DIASTOLIC BLOOD PRESSURE: 60 MMHG | SYSTOLIC BLOOD PRESSURE: 112 MMHG | WEIGHT: 180.2 LBS | RESPIRATION RATE: 16 BRPM | HEIGHT: 66 IN

## 2022-04-04 DIAGNOSIS — E78.00 PURE HYPERCHOLESTEROLEMIA: ICD-10-CM

## 2022-04-04 DIAGNOSIS — I50.22 CHRONIC SYSTOLIC HEART FAILURE (HCC): ICD-10-CM

## 2022-04-04 DIAGNOSIS — I51.9 LV DYSFUNCTION: Primary | ICD-10-CM

## 2022-04-04 DIAGNOSIS — I27.20 MODERATE TO SEVERE PULMONARY HYPERTENSION (HCC): ICD-10-CM

## 2022-04-04 DIAGNOSIS — I47.20 VENTRICULAR TACHYCARDIA: ICD-10-CM

## 2022-04-04 DIAGNOSIS — Z95.5 STENTED CORONARY ARTERY: ICD-10-CM

## 2022-04-04 DIAGNOSIS — I10 ESSENTIAL HYPERTENSION: ICD-10-CM

## 2022-04-04 DIAGNOSIS — I25.2 MI, OLD: ICD-10-CM

## 2022-04-04 DIAGNOSIS — I25.10 CORONARY ARTERY DISEASE INVOLVING NATIVE CORONARY ARTERY OF NATIVE HEART WITHOUT ANGINA PECTORIS: ICD-10-CM

## 2022-04-04 DIAGNOSIS — I48.0 PAROXYSMAL ATRIAL FIBRILLATION (HCC): ICD-10-CM

## 2022-04-04 DIAGNOSIS — I38 VHD (VALVULAR HEART DISEASE): ICD-10-CM

## 2022-04-04 DIAGNOSIS — I10 PRIMARY HYPERTENSION: ICD-10-CM

## 2022-04-04 PROCEDURE — 93000 ELECTROCARDIOGRAM COMPLETE: CPT | Performed by: INTERNAL MEDICINE

## 2022-04-04 PROCEDURE — 99213 OFFICE O/P EST LOW 20 MIN: CPT | Performed by: INTERNAL MEDICINE

## 2022-04-04 NOTE — PROGRESS NOTES
Subjective:      Patient ID: Sabiha Raphael is a 77 y.o. male. Patient Active Problem List    Diagnosis Date Noted    Paroxysmal atrial fibrillation ([facility] [address]     Anemia 12/92/9484    Acute systolic (congestive) heart failure ([facility] [address] 12/13/2021    [facility] 12/13/2021    Mitral stenosis 12/13/2021    Mitral regurgitation 12/13/2021    Moderate to severe pulmonary hypertension ([facility] [address] 12/13/2021    Cardiac arrest ([facility] 12/12/2021    LV dysfunction 01/15/2019    SVT (supraventricular tachycardia) ([facility])     Acute upper GI bleed 10/08/2018    H/O psoriatic arthritis 04/09/2018    Hyperlipidemia 10/27/2013    Stented coronary artery 10/25/2013    STEMI (ST elevation myocardial infarction) ([facility] 07/05/2012    CAD (coronary artery disease)s/p stent RCA     Valvular heart disease     Hypertension     Obesity, morbid s/p intestinal bypass        Current Outpatient Medications   Medication Sig Dispense Refill    Multiple Vitamins-Minerals (PRESERVISION AREDS 2+MULTI VIT PO) Take by mouth      amiodarone (CORDARONE) 200 MG tablet Take 200 mg by mouth daily      Boswellia-Glucosamine-Vit D (OSTEO BI-FLEX ONE PER DAY PO) Take by mouth      apixaban (ELIQUIS) 5 MG TABS tablet Take 1 tablet by mouth 2 times daily . 60 tablet 3    rosuvastatin (CRESTOR) 40 MG tablet Take 1 tablet by mouth nightly . 30 tablet 3    furosemide (LASIX) 40 MG tablet Take 1 tablet by mouth daily . 30 tablet 3    clopidogrel (PLAVIX) 75 MG tablet Take 1 tablet by mouth daily .  30 tablet 3    ENTRESTO  MG per tablet TAKE ONE TABLET BY MOUTH TWO TIMES A  tablet 1    Cetirizine HCl (ZYRTEC ALLERGY PO) Take by mouth daily      Acetaminophen (TYLENOL ARTHRITIS PAIN PO) Take by mouth daily      azelastine HCl 0.15 % SOLN 2 sprays by Nasal route daily      Respiratory Therapy Supplies MONAE 1 Device by Does not apply route every 3 hours as needed (increase pressure to 12) 1 Device 0    Ferrous Sulfate (IRON) 325 (65 Fe) MG TABS daily   1    pantoprazole (PROTONIX) 40 MG tablet TAKE 1 TABLET BY MOUTH ONCE DAILY  3    Cranberry 500 MG CAPS Take 500 mg by mouth      gabapentin (NEURONTIN) 300 MG capsule Take two capsules three times daily      metoprolol succinate (TOPROL XL) 50 MG extended release tablet Take 1 tablet by mouth 2 times daily 30 tablet 3    spironolactone (ALDACTONE) 25 MG tablet Take 1 tablet by mouth daily 30 tablet 3    methotrexate (RHEUMATREX) 2.5 MG chemo tablet Take 7.5 mg by mouth once a week Indications: Thursday       folic acid (FOLVITE) 1 MG tablet Take 1 mg by mouth every morning       hydroxychloroquine (PLAQUENIL) 200 MG tablet Take 200 mg by mouth 2 times daily       DULoxetine (CYMBALTA) 60 MG extended release capsule Take 60 mg by mouth every morning       Multiple Vitamins-Minerals (MULTIVITAMIN ADULT PO) Take 1 tablet by mouth 2 times daily       Cholecalciferol (VITAMIN D3) 5000 UNITS TABS Take 1 tablet by mouth every morning       tamsulosin (FLOMAX) 0.4 MG capsule Take 0.4 mg by mouth daily       RESTASIS 0.05 % ophthalmic emulsion Place 1 drop into both eyes 2 times daily as needed        No current facility-administered medications for this visit. CC: Patient is seen for new problem of lightheadedness and in follow-up for:  1. LV dysfunction    2. 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 SVT (supraventricular tachycardia) ([address]    4. Essential hypertension    5. Pure hypercholesterolemia    6. Stented coronary artery    7. Mitral valve regurgitation-severe    HPI  Significantly better off Apresoline and isosorbide. Resolution of orthostatic lightheadedness. Seen post hospitalization for acute myocardial infarction/V. fib arrest.  Had subsequent stents x2 and implantation of ICD. Denies any chest pain or shortness of breath. Review of Systems   Constitutional: (+) fatigue. HENT: Negative for nosebleeds. Respiratory: Negative for cough.     Cardiovascular: Negative for palpitations (+) leg swelling. Gastrointestinal: Negative for blood in stool. Genitourinary: Negative for hematuria. Musculoskeletal: Negative for myalgias. Skin: Negative for color change. Neurological: Negative for dizziness, weakness and numbness. Hematological: Does not bruise/bleed easily. Psychiatric/Behavioral: Negative for sleep disturbance. The patient is not nervous/anxious. Objective:       Blood pressure 84/52 pulse 53 respiration 16 weight 164.9 pounds  HEAD & FACE: Normocephalic. Symmetric. EYES: No xanthelasma. Conjunctivae not injected. EARS, NOSE, MOUTH & THROAT: Good dentition. No oral pallor or cyanosis. NECK: No JVD at 30 degrees. No thyromegaly. RESPIRATORY: Clear to auscultation and percussion in all fields. No use of accessory muscle or intercostal retractions. CARDIOVASCULAR: Bradycardic. No lifts or thrills on palpitation. Auscultation with normal S1-S2 in intensity and splitting. Grade 2/6 holosystolic murmur heard best at the apical area. No carotid bruits. Abdominal aorta not enlarged. Femoral arteries without bruits. Pedal pulses 1+. No edema. ABDOMEN: Soft without hepatic or splenic enlargement. No tenderness. MUSCULOSKELETAL: (+) kyphosis or scoliosis of the back. Good muscle strength and tone. No muscle atrophy. Slow gait and inability to undergo exercise stress testing. EXTREMITIES: No clubbing or cyanosis. SKIN: No Xanthomas or ulcerations. NEUROLOGIC: Oriented to time, place and person. Normal mood and affect. LYMPHATIC:  No palpable neck or supraclavicular nodes. No splenomegaly. EKG: Sinus. Nonspecific ST-T wave changes. QTc 371. Assessment:      1. LV dysfunction    2. 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 SVT (supraventricular tachycardia) ([address]    4. Essential hypertension    5. Pure hypercholesterolemia    6. Stented coronary artery    7. Mitral valve regurgitation- severe           Plan:     1.   Reviewed MRI:  EF  21% with severe MR  BNP  20,550 on 12/15/2021  2. Labs 1/10/2022 creatinine 0.86, and LDL 50.  3.  Scheduled for follow-up labs with PCP. 4.  Same medications.       Follow up in 3 mos

## 2022-05-20 ENCOUNTER — TELEPHONE (OUTPATIENT)
Dept: CARDIOLOGY CLINIC | Age: 77
End: 2022-05-20

## 2022-05-20 NOTE — TELEPHONE ENCOUNTER
Please have patient check with insurance company to see if Xarelto is any cheaper. If not, he may be have to be switched to warfarin and followed at the Anticoagulation clinic.

## 2022-05-20 NOTE — TELEPHONE ENCOUNTER
Patient called the office to say he has hit the donut hole and the Eliquis is now 426.00 per month. He cannot afford this medication and needs to know what he should do. I gave him 2 weeks of samples to hold him over  as he was out today.   Please advise

## 2022-06-13 ENCOUNTER — HOSPITAL ENCOUNTER (OUTPATIENT)
Dept: CARDIOLOGY | Age: 77
Discharge: HOME | End: 2022-06-13
Payer: MEDICARE

## 2022-06-13 LAB
LV EF: 35 %
LVEF MODALITY: NORMAL

## 2022-06-13 PROCEDURE — 93306 TTE W/DOPPLER COMPLETE: CPT

## 2022-06-22 ENCOUNTER — HOSPITAL ENCOUNTER (OUTPATIENT)
Dept: CT IMAGING | Age: 77
Discharge: HOME | End: 2022-06-24
Payer: MEDICARE

## 2022-06-22 DIAGNOSIS — R63.4 LOSS OF WEIGHT: ICD-10-CM

## 2022-06-22 DIAGNOSIS — R10.84 ABDOMINAL PAIN, GENERALIZED: ICD-10-CM

## 2022-06-22 DIAGNOSIS — I10 ESSENTIAL HYPERTENSION: ICD-10-CM

## 2022-06-22 PROCEDURE — 6360000004 HC RX CONTRAST MEDICATION: Performed by: RADIOLOGY

## 2022-06-22 PROCEDURE — 74176 CT ABD & PELVIS W/O CONTRAST: CPT

## 2022-06-22 RX ADMIN — IOHEXOL 50 ML: 240 INJECTION, SOLUTION INTRATHECAL; INTRAVASCULAR; INTRAVENOUS; ORAL at 11:54

## 2022-06-29 RX ORDER — ROSUVASTATIN CALCIUM 40 MG/1
40 TABLET, COATED ORAL NIGHTLY
Qty: 30 TABLET | Refills: 0 | Status: SHIPPED
Start: 2022-06-29 | End: 2022-07-25

## 2022-06-29 RX ORDER — AMIODARONE HYDROCHLORIDE 200 MG/1
200 TABLET ORAL DAILY
Qty: 30 TABLET | Refills: 0 | Status: SHIPPED
Start: 2022-06-29 | End: 2022-07-25

## 2022-07-25 RX ORDER — AMIODARONE HYDROCHLORIDE 200 MG/1
TABLET ORAL
Qty: 30 TABLET | Refills: 0 | OUTPATIENT
Start: 2022-07-25

## 2022-07-25 RX ORDER — AMIODARONE HYDROCHLORIDE 200 MG/1
TABLET ORAL
Qty: 30 TABLET | Refills: 0 | Status: SHIPPED
Start: 2022-07-25 | End: 2022-10-15 | Stop reason: SDUPTHER

## 2022-07-25 RX ORDER — ROSUVASTATIN CALCIUM 40 MG/1
TABLET, COATED ORAL
Qty: 30 TABLET | Refills: 0 | OUTPATIENT
Start: 2022-07-25

## 2022-07-25 RX ORDER — ROSUVASTATIN CALCIUM 40 MG/1
TABLET, COATED ORAL
Qty: 30 TABLET | Refills: 0 | Status: SHIPPED
Start: 2022-07-25 | End: 2022-09-24 | Stop reason: SDUPTHER

## 2022-08-03 ENCOUNTER — OFFICE VISIT (OUTPATIENT)
Dept: CARDIOLOGY CLINIC | Age: 77
End: 2022-08-03
Payer: MEDICARE

## 2022-08-03 ENCOUNTER — TELEPHONE (OUTPATIENT)
Dept: NON INVASIVE DIAGNOSTICS | Age: 77
End: 2022-08-03

## 2022-08-03 VITALS
RESPIRATION RATE: 18 BRPM | DIASTOLIC BLOOD PRESSURE: 50 MMHG | WEIGHT: 155.9 LBS | HEART RATE: 53 BPM | BODY MASS INDEX: 25.05 KG/M2 | SYSTOLIC BLOOD PRESSURE: 96 MMHG | HEIGHT: 66 IN

## 2022-08-03 DIAGNOSIS — I25.2 MI, OLD: ICD-10-CM

## 2022-08-03 DIAGNOSIS — I50.22 CHRONIC SYSTOLIC HEART FAILURE (HCC): ICD-10-CM

## 2022-08-03 DIAGNOSIS — I10 ESSENTIAL HYPERTENSION: ICD-10-CM

## 2022-08-03 DIAGNOSIS — I51.9 LV DYSFUNCTION: Primary | ICD-10-CM

## 2022-08-03 DIAGNOSIS — I48.0 PAROXYSMAL ATRIAL FIBRILLATION (HCC): ICD-10-CM

## 2022-08-03 DIAGNOSIS — E78.00 PURE HYPERCHOLESTEROLEMIA: ICD-10-CM

## 2022-08-03 DIAGNOSIS — I10 PRIMARY HYPERTENSION: ICD-10-CM

## 2022-08-03 DIAGNOSIS — Z95.5 STENTED CORONARY ARTERY: ICD-10-CM

## 2022-08-03 DIAGNOSIS — I27.20 MODERATE TO SEVERE PULMONARY HYPERTENSION (HCC): ICD-10-CM

## 2022-08-03 DIAGNOSIS — I25.10 CORONARY ARTERY DISEASE INVOLVING NATIVE CORONARY ARTERY OF NATIVE HEART WITHOUT ANGINA PECTORIS: ICD-10-CM

## 2022-08-03 DIAGNOSIS — R93.89 ABNORMAL FINDINGS ON DIAGNOSTIC IMAGING OF OTHER SPECIFIED BODY STRUCTURES: ICD-10-CM

## 2022-08-03 PROCEDURE — 93000 ELECTROCARDIOGRAM COMPLETE: CPT | Performed by: INTERNAL MEDICINE

## 2022-08-03 PROCEDURE — 1123F ACP DISCUSS/DSCN MKR DOCD: CPT | Performed by: INTERNAL MEDICINE

## 2022-08-03 PROCEDURE — 99214 OFFICE O/P EST MOD 30 MIN: CPT | Performed by: INTERNAL MEDICINE

## 2022-08-03 NOTE — TELEPHONE ENCOUNTER
The patient came upstairs from Dr Gail Ramoners office to get his device checked. I explained we would have to schedule on another day they said that they got the monitor at home finally and they would just unpack the monitor and sent us a remote transmission. The patient is a [facility] patient, explained that he is due and as soon as she comes back from leave we will get him on the schedule to see her.  He verbalized understanding

## 2022-08-03 NOTE — TELEPHONE ENCOUNTER
Left message for patient to call the office to schedule device check per medtronic guidelines.        Eufemia Mao

## 2022-08-03 NOTE — PROGRESS NOTES
Subjective:      Patient ID: Viktor Corona is a 69 y.o. male. Patient Active Problem List    Diagnosis Date Noted    Paroxysmal atrial fibrillation ([facility]     Anemia 77/66/0148    Acute systolic (congestive) heart failure ([facility] 12/13/2021    Ventricular tachycardia ([facility] 12/13/2021    Mitral stenosis 12/13/2021    Mitral regurgitation 12/13/2021    Moderate to severe pulmonary hypertension ([facility] 12/13/2021    Cardiac arrest ([facility] 12/12/2021    LV dysfunction 01/15/2019    SVT (supraventricular tachycardia) ([facility])     Acute upper GI bleed 10/08/2018    H/O psoriatic arthritis 04/09/2018    Hyperlipidemia 10/27/2013    Stented coronary artery 10/25/2013    STEMI (ST elevation myocardial infarction) ([facility] 07/05/2012    CAD (coronary artery disease)s/p stent RCA     Valvular heart disease     Hypertension     Obesity, morbid s/p intestinal bypass        Current Outpatient Medications   Medication Sig Dispense Refill    rosuvastatin (CRESTOR) 40 MG tablet TAKE ONE TABLET BY MOUTH ONCE NIGHTLY 30 tablet 0    amiodarone (CORDARONE) 200 MG tablet TAKE ONE TABLET BY MOUTH DAILY 30 tablet 0    Multiple Vitamins-Minerals (PRESERVISION AREDS 2+MULTI VIT PO) Take by mouth      Boswellia-Glucosamine-Vit D (OSTEO BI-FLEX ONE PER DAY PO) Take by mouth      apixaban (ELIQUIS) 5 MG TABS tablet Take 1 tablet by mouth 2 times daily . 60 tablet 3    furosemide (LASIX) 40 MG tablet Take 1 tablet by mouth daily . 30 tablet 3    clopidogrel (PLAVIX) 75 MG tablet Take 1 tablet by mouth daily .  30 tablet 3    ENTRESTO  MG per tablet TAKE ONE TABLET BY MOUTH TWO TIMES A  tablet 1    Cetirizine HCl (ZYRTEC ALLERGY PO) Take by mouth daily      Acetaminophen (TYLENOL ARTHRITIS PAIN PO) Take by mouth daily      azelastine HCl 0.15 % SOLN 2 sprays by Nasal route daily      Respiratory Therapy Supplies MONAE 1 Device by Does not apply route every 3 hours as needed (increase pressure to 12) 1 Device 0    Ferrous Sulfate (IRON) 325 (65 Fe) MG TABS daily   1    pantoprazole (PROTONIX) 40 MG tablet TAKE 1 TABLET BY MOUTH ONCE DAILY  3    Cranberry 500 MG CAPS Take 500 mg by mouth      gabapentin (NEURONTIN) 300 MG capsule Take two capsules three times daily      metoprolol succinate (TOPROL XL) 50 MG extended release tablet Take 1 tablet by mouth 2 times daily 30 tablet 3    spironolactone (ALDACTONE) 25 MG tablet Take 1 tablet by mouth daily 30 tablet 3    methotrexate (RHEUMATREX) 2.5 MG chemo tablet Take 7.5 mg by mouth once a week Indications: Thursday       folic acid (FOLVITE) 1 MG tablet Take 1 mg by mouth every morning       hydroxychloroquine (PLAQUENIL) 200 MG tablet Take 200 mg by mouth 2 times daily       DULoxetine (CYMBALTA) 60 MG extended release capsule Take 60 mg by mouth every morning       Multiple Vitamins-Minerals (MULTIVITAMIN ADULT PO) Take 1 tablet by mouth 2 times daily       Cholecalciferol (VITAMIN D3) 5000 UNITS TABS Take 1 tablet by mouth every morning       tamsulosin (FLOMAX) 0.4 MG capsule Take 0.4 mg by mouth daily       RESTASIS 0.05 % ophthalmic emulsion Place 1 drop into both eyes 2 times daily as needed        No current facility-administered medications for this visit. CC: Patient is seen  in follow-up for:  1. LV dysfunction    2. 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 SVT (supraventricular tachycardia) ([address]    4. Essential hypertension    5. Pure hypercholesterolemia    6. Stented coronary artery    7. Mitral valve regurgitation- moderate    HPI  Had some edema of the left leg and left arm since he was last seen that resolved with diuretic therapy. He is actually feeling somewhat improved overall. History of acute myocardial infarction/V. fib arrest.  Had subsequent stents x2 and implantation of ICD. Denies any chest pain or shortness of breath. Review of Systems   Constitutional: (+) fatigue. HENT: Negative for nosebleeds. Respiratory: Negative for cough.     Cardiovascular: Negative for palpitations (+) leg swelling. Gastrointestinal: Negative for blood in stool. (+) belching  Genitourinary: Negative for hematuria. Musculoskeletal: Negative for myalgias. Skin: Negative for color change. Neurological: Negative for dizziness, weakness and numbness. Hematological: Does not bruise/bleed easily. Psychiatric/Behavioral: Negative for sleep disturbance. The patient is not nervous/anxious. Objective:       Blood pressure 94/56 pulse 53 respiration 16 weight 155.9 pounds  HEAD & FACE: Normocephalic. Symmetric. EYES: No xanthelasma. Conjunctivae not injected. EARS, NOSE, MOUTH & THROAT: Good dentition. No oral pallor or cyanosis. NECK: No JVD at 30 degrees. No thyromegaly. RESPIRATORY: Clear to auscultation and percussion in all fields. No use of accessory muscle or intercostal retractions. CARDIOVASCULAR: Bradycardic. No lifts or thrills on palpitation. Auscultation with normal S1-S2 in intensity and splitting. Grade 2/6 holosystolic murmur heard best at the apical area. No carotid bruits. Abdominal aorta not enlarged. Femoral arteries without bruits. Pedal pulses 1+. No edema. ABDOMEN: Soft without hepatic or splenic enlargement. No tenderness. MUSCULOSKELETAL: (+) kyphosis or scoliosis of the back. Good muscle strength and tone. No muscle atrophy. Slow gait and inability to undergo exercise stress testing. EXTREMITIES: No clubbing or cyanosis. SKIN: No Xanthomas or ulcerations. NEUROLOGIC: Oriented to time, place and person. Normal mood and affect. LYMPHATIC:  No palpable neck or supraclavicular nodes. No splenomegaly. EKG: Sinus Bradycardia. Nonspecific ST-T wave changes. QTc 316. Assessment:      1. LV dysfunction    2. 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 SVT (supraventricular tachycardia) ([address]    4. Essential hypertension    5. Pure hypercholesterolemia    6. Stented coronary artery    7. Mitral valve regurgitation- moderate         Plan:     1.   Reviewed last echo :  EF  30% moderate MR  2. Labs for amiodarone surveillance ordered. 3.  Scheduled for routine follow-up labs with PCP. 4.  Same medications.       Follow up in 3 mos

## 2022-08-24 RX ORDER — SACUBITRIL AND VALSARTAN 97; 103 MG/1; MG/1
TABLET, FILM COATED ORAL
Qty: 60 TABLET | Refills: 5 | Status: SHIPPED | OUTPATIENT
Start: 2022-08-24

## 2022-09-01 ENCOUNTER — HOSPITAL ENCOUNTER (OUTPATIENT)
Dept: CT IMAGING | Age: 77
Discharge: HOME | End: 2022-09-03
Payer: MEDICARE

## 2022-09-01 DIAGNOSIS — R41.82 ALTERED MENTAL STATUS, UNSPECIFIED ALTERED MENTAL STATUS TYPE: ICD-10-CM

## 2022-09-01 PROCEDURE — 70450 CT HEAD/BRAIN W/O DYE: CPT

## 2022-09-19 ENCOUNTER — TELEPHONE (OUTPATIENT)
Dept: CARDIOLOGY CLINIC | Age: 77
End: 2022-09-19

## 2022-09-19 NOTE — TELEPHONE ENCOUNTER
Please call patient and add onto schedule for Thursday or Monday. Also please obtain recent blood work from his PCP's office, Dr. Leeann Casarez.

## 2022-09-19 NOTE — TELEPHONE ENCOUNTER
Pt's spouse states seen PCP for swelling concern from last visit 8/3/2022. PCP placed pt on water pill that he ended up having to go off last wk due to weight loss. Pt is now down to 145lbs. Spouse is extremely concerned for loss of weight and fatigue. Please advise and return call to spouse Kelly. Thank you.

## 2022-09-22 ENCOUNTER — NURSE ONLY (OUTPATIENT)
Dept: NON INVASIVE DIAGNOSTICS | Age: 77
End: 2022-09-22

## 2022-09-22 ENCOUNTER — OFFICE VISIT (OUTPATIENT)
Dept: CARDIOLOGY CLINIC | Age: 77
End: 2022-09-22
Payer: MEDICARE

## 2022-09-22 VITALS
HEART RATE: 51 BPM | HEIGHT: 66 IN | WEIGHT: 144.4 LBS | SYSTOLIC BLOOD PRESSURE: 98 MMHG | RESPIRATION RATE: 15 BRPM | BODY MASS INDEX: 23.21 KG/M2 | DIASTOLIC BLOOD PRESSURE: 60 MMHG | OXYGEN SATURATION: 80 %

## 2022-09-22 DIAGNOSIS — E78.00 PURE HYPERCHOLESTEROLEMIA: ICD-10-CM

## 2022-09-22 DIAGNOSIS — I48.0 PAROXYSMAL ATRIAL FIBRILLATION (HCC): ICD-10-CM

## 2022-09-22 DIAGNOSIS — I10 ESSENTIAL HYPERTENSION: ICD-10-CM

## 2022-09-22 DIAGNOSIS — I25.2 MI, OLD: ICD-10-CM

## 2022-09-22 DIAGNOSIS — Z95.5 STENTED CORONARY ARTERY: ICD-10-CM

## 2022-09-22 DIAGNOSIS — I50.22 CHRONIC SYSTOLIC HEART FAILURE (HCC): Primary | ICD-10-CM

## 2022-09-22 DIAGNOSIS — I27.20 MODERATE TO SEVERE PULMONARY HYPERTENSION (HCC): ICD-10-CM

## 2022-09-22 DIAGNOSIS — I25.10 CORONARY ARTERY DISEASE INVOLVING NATIVE CORONARY ARTERY OF NATIVE HEART WITHOUT ANGINA PECTORIS: ICD-10-CM

## 2022-09-22 DIAGNOSIS — I51.9 LV DYSFUNCTION: ICD-10-CM

## 2022-09-22 PROCEDURE — 93000 ELECTROCARDIOGRAM COMPLETE: CPT | Performed by: INTERNAL MEDICINE

## 2022-09-22 PROCEDURE — 1123F ACP DISCUSS/DSCN MKR DOCD: CPT | Performed by: INTERNAL MEDICINE

## 2022-09-22 PROCEDURE — 99214 OFFICE O/P EST MOD 30 MIN: CPT | Performed by: INTERNAL MEDICINE

## 2022-09-24 RX ORDER — ROSUVASTATIN CALCIUM 40 MG/1
TABLET, COATED ORAL
Qty: 90 TABLET | Refills: 1 | Status: SHIPPED | OUTPATIENT
Start: 2022-09-24

## 2022-10-15 RX ORDER — AMIODARONE HYDROCHLORIDE 200 MG/1
TABLET ORAL
Qty: 90 TABLET | Refills: 1 | Status: SHIPPED | OUTPATIENT
Start: 2022-10-15

## 2022-12-20 ENCOUNTER — OFFICE VISIT (OUTPATIENT)
Dept: CARDIOLOGY CLINIC | Age: 77
End: 2022-12-20
Payer: MEDICARE

## 2022-12-20 VITALS
HEART RATE: 50 BPM | DIASTOLIC BLOOD PRESSURE: 58 MMHG | HEIGHT: 66 IN | BODY MASS INDEX: 27.55 KG/M2 | WEIGHT: 171.4 LBS | SYSTOLIC BLOOD PRESSURE: 108 MMHG | RESPIRATION RATE: 16 BRPM

## 2022-12-20 DIAGNOSIS — I48.0 PAROXYSMAL ATRIAL FIBRILLATION (HCC): ICD-10-CM

## 2022-12-20 DIAGNOSIS — I25.10 CORONARY ARTERY DISEASE INVOLVING NATIVE CORONARY ARTERY OF NATIVE HEART WITHOUT ANGINA PECTORIS: ICD-10-CM

## 2022-12-20 DIAGNOSIS — I10 ESSENTIAL HYPERTENSION: ICD-10-CM

## 2022-12-20 DIAGNOSIS — E78.00 PURE HYPERCHOLESTEROLEMIA: ICD-10-CM

## 2022-12-20 DIAGNOSIS — Z95.5 STENTED CORONARY ARTERY: ICD-10-CM

## 2022-12-20 DIAGNOSIS — I50.22 CHRONIC SYSTOLIC HEART FAILURE (HCC): Primary | ICD-10-CM

## 2022-12-20 DIAGNOSIS — I27.20 MODERATE TO SEVERE PULMONARY HYPERTENSION (HCC): ICD-10-CM

## 2022-12-20 DIAGNOSIS — I25.2 MI, OLD: ICD-10-CM

## 2022-12-20 DIAGNOSIS — I10 PRIMARY HYPERTENSION: ICD-10-CM

## 2022-12-20 DIAGNOSIS — I51.9 LV DYSFUNCTION: ICD-10-CM

## 2022-12-20 PROCEDURE — 3078F DIAST BP <80 MM HG: CPT | Performed by: INTERNAL MEDICINE

## 2022-12-20 PROCEDURE — 1123F ACP DISCUSS/DSCN MKR DOCD: CPT | Performed by: INTERNAL MEDICINE

## 2022-12-20 PROCEDURE — 3074F SYST BP LT 130 MM HG: CPT | Performed by: INTERNAL MEDICINE

## 2022-12-20 PROCEDURE — 99213 OFFICE O/P EST LOW 20 MIN: CPT | Performed by: INTERNAL MEDICINE

## 2022-12-20 PROCEDURE — 93000 ELECTROCARDIOGRAM COMPLETE: CPT | Performed by: INTERNAL MEDICINE

## 2022-12-20 NOTE — PROGRESS NOTES
Subjective:      Patient ID: Adwoa Marc is a 69 y.o. male. Patient Active Problem List    Diagnosis Date Noted    Paroxysmal atrial fibrillation ([facility]     Anemia 43/99/4936    Acute systolic (congestive) heart failure ([facility] 12/13/2021    Ventricular tachycardia 12/13/2021    Mitral stenosis 12/13/2021    Mitral regurgitation 12/13/2021    Moderate to severe pulmonary hypertension ([facility] 12/13/2021    Cardiac arrest ([facility] 12/12/2021    LV dysfunction 01/15/2019    SVT (supraventricular tachycardia) ([facility]     Acute upper GI bleed 10/08/2018    H/O psoriatic arthritis 04/09/2018    Hyperlipidemia 10/27/2013    Stented coronary artery 10/25/2013    STEMI (ST elevation myocardial infarction) ([facility] 07/05/2012    CAD (coronary artery disease)s/p stent RCA     Valvular heart disease     Hypertension     Obesity, morbid s/p intestinal bypass        Current Outpatient Medications   Medication Sig Dispense Refill    amiodarone (CORDARONE) 200 MG tablet TAKE ONE TABLET BY MOUTH DAILY 90 tablet 1    sacubitril-valsartan (ENTRESTO)  MG per tablet       rosuvastatin (CRESTOR) 40 MG tablet TAKE ONE TABLET BY MOUTH ONCE NIGHTLY 90 tablet 1    apixaban (ELIQUIS) 5 MG TABS tablet Take 1 tablet by mouth 2 times daily . 60 tablet 5    sacubitril-valsartan (ENTRESTO)  MG per tablet TAKE ONE TABLET BY MOUTH TWO TIMES A DAY 60 tablet 5    Multiple Vitamins-Minerals (PRESERVISION AREDS 2+MULTI VIT PO) Take by mouth      Boswellia-Glucosamine-Vit D (OSTEO BI-FLEX ONE PER DAY PO) Take by mouth      furosemide (LASIX) 40 MG tablet Take 1 tablet by mouth daily . 30 tablet 3    clopidogrel (PLAVIX) 75 MG tablet Take 1 tablet by mouth daily .  30 tablet 3    Cetirizine HCl (ZYRTEC ALLERGY PO) Take by mouth daily      Acetaminophen (TYLENOL ARTHRITIS PAIN PO) Take by mouth daily      Respiratory Therapy Supplies MONAE 1 Device by Does not apply route every 3 hours as needed (increase pressure to 12) 1 Device 0    Ferrous Sulfate (IRON) 325 (65 Fe) MG TABS daily   1    pantoprazole (PROTONIX) 40 MG tablet TAKE 1 TABLET BY MOUTH ONCE DAILY  3    Cranberry 500 MG CAPS Take 500 mg by mouth      gabapentin (NEURONTIN) 300 MG capsule Take two capsules three times daily      metoprolol succinate (TOPROL XL) 50 MG extended release tablet Take 1 tablet by mouth 2 times daily 30 tablet 3    spironolactone (ALDACTONE) 25 MG tablet Take 1 tablet by mouth daily 30 tablet 3    methotrexate (RHEUMATREX) 2.5 MG chemo tablet Take 7.5 mg by mouth once a week Indications: Thursday       folic acid (FOLVITE) 1 MG tablet Take 1 mg by mouth every morning       hydroxychloroquine (PLAQUENIL) 200 MG tablet Take 200 mg by mouth 2 times daily       DULoxetine (CYMBALTA) 30 MG extended release capsule Take 30 mg by mouth every morning      Multiple Vitamins-Minerals (MULTIVITAMIN ADULT PO) Take 1 tablet by mouth 2 times daily       Cholecalciferol (VITAMIN D3) 5000 UNITS TABS Take 1 tablet by mouth every morning       tamsulosin (FLOMAX) 0.4 MG capsule Take 0.4 mg by mouth daily       RESTASIS 0.05 % ophthalmic emulsion Place 1 drop into both eyes 2 times daily as needed       azelastine HCl 0.15 % SOLN 2 sprays by Nasal route daily (Patient not taking: Reported on 12/20/2022)       No current facility-administered medications for this visit. CC: Patient is seen  in follow-up for:  1. LV dysfunction    2. 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 SVT (supraventricular tachycardia) ([address]    4. Essential hypertension    5. Pure hypercholesterolemia    6. Stented coronary artery    7. Mitral valve regurgitation- moderate    HPI  Feeling better on decreased Cymbalta. More alert and responsive. History of acute myocardial infarction/V. fib arrest.  Had subsequent stents x2 and implantation of ICD. Denies any chest pain or shortness of breath. Review of Systems   Constitutional: (+) fatigue. HENT: Negative for nosebleeds. Respiratory: Negative for cough. Cardiovascular: Negative for palpitations (+) leg swelling. Gastrointestinal: Negative for blood in stool. (+) belching  Genitourinary: Negative for hematuria. Musculoskeletal: Negative for myalgias. Skin: Negative for color change. Neurological: Negative for dizziness, weakness and numbness. Hematological: Does not bruise/bleed easily. Psychiatric/Behavioral: Negative for sleep disturbance. The patient is not nervous/anxious. Objective:       Blood pressure 108/68 pulse 50 respiration 16 weight 171 pounds  HEAD & FACE: Normocephalic. Symmetric. EYES: No xanthelasma. Conjunctivae not injected. EARS, NOSE, MOUTH & THROAT: Good dentition. No oral pallor or cyanosis. NECK: No JVD at 30 degrees. No thyromegaly. RESPIRATORY: Clear to auscultation and percussion in all fields. No use of accessory muscle or intercostal retractions. CARDIOVASCULAR: Bradycardic. No lifts or thrills on palpitation. Auscultation with normal S1-S2 in intensity and splitting. Grade 2/6 holosystolic murmur heard best at the apical area. No carotid bruits. Abdominal aorta not enlarged. Femoral arteries without bruits. Pedal pulses 1+. No edema. ABDOMEN: Soft without hepatic or splenic enlargement. No tenderness. MUSCULOSKELETAL: (+) kyphosis or scoliosis of the back. Good muscle strength and tone. No muscle atrophy. Slow gait and inability to undergo exercise stress testing. EXTREMITIES: No clubbing or cyanosis. SKIN: No Xanthomas or ulcerations. NEUROLOGIC: Oriented to time, place and person. Normal mood and affect. LYMPHATIC:  No palpable neck or supraclavicular nodes. No splenomegaly. EKG: Sinus Bradycardia. Nonspecific ST-T wave changes. QTc 471. Assessment:      1. LV dysfunction    2. 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 SVT (supraventricular tachycardia) ([address]    4. Essential hypertension    5. Pure hypercholesterolemia    6. Stented coronary artery    7.   Mitral valve regurgitation- moderate         Plan:     1. Reviewed last echo :  EF  35  2. Reviewed 11/10 labs: Cr 0.67  3. Same medications presently.       Follow up in 6 mos

## 2023-01-06 ENCOUNTER — TELEPHONE (OUTPATIENT)
Dept: CARDIOLOGY CLINIC | Age: 78
End: 2023-01-06

## 2023-01-06 NOTE — TELEPHONE ENCOUNTER
Patient called to let you know the physician at the [facility] change d his water pill and started him on Jardiance. Wanted to make sure you are ok with these.

## 2023-01-26 RX ORDER — SACUBITRIL AND VALSARTAN 97; 103 MG/1; MG/1
TABLET, FILM COATED ORAL
Qty: 180 TABLET | Refills: 1 | Status: SHIPPED | OUTPATIENT
Start: 2023-01-26

## 2023-03-15 ENCOUNTER — TELEPHONE (OUTPATIENT)
Dept: NON INVASIVE DIAGNOSTICS | Age: 78
End: 2023-03-15

## 2023-03-15 NOTE — TELEPHONE ENCOUNTER
Left message to call the office back    Former [facility] pt,overdue 1 mo OV (10/22/2022) MDT w/ TB

## 2023-04-17 RX ORDER — AMIODARONE HYDROCHLORIDE 200 MG/1
TABLET ORAL
Qty: 90 TABLET | Refills: 1 | Status: SHIPPED | OUTPATIENT
Start: 2023-04-17

## 2023-05-11 ENCOUNTER — HOSPITAL ENCOUNTER (OUTPATIENT)
Age: 78
Discharge: HOME | End: 2023-05-13

## 2023-07-07 ENCOUNTER — OFFICE VISIT (OUTPATIENT)
Dept: CARDIOLOGY CLINIC | Age: 78
End: 2023-07-07
Payer: MEDICARE

## 2023-07-07 VITALS
HEART RATE: 50 BPM | RESPIRATION RATE: 12 BRPM | BODY MASS INDEX: 27 KG/M2 | DIASTOLIC BLOOD PRESSURE: 70 MMHG | HEIGHT: 66 IN | WEIGHT: 168 LBS | SYSTOLIC BLOOD PRESSURE: 98 MMHG

## 2023-07-07 DIAGNOSIS — I50.22 CHRONIC SYSTOLIC HEART FAILURE (HCC): Primary | ICD-10-CM

## 2023-07-07 DIAGNOSIS — I10 ESSENTIAL HYPERTENSION: ICD-10-CM

## 2023-07-07 DIAGNOSIS — I48.0 PAROXYSMAL ATRIAL FIBRILLATION (HCC): ICD-10-CM

## 2023-07-07 DIAGNOSIS — Z95.5 STENTED CORONARY ARTERY: ICD-10-CM

## 2023-07-07 DIAGNOSIS — I27.20 MODERATE TO SEVERE PULMONARY HYPERTENSION (HCC): ICD-10-CM

## 2023-07-07 DIAGNOSIS — I25.10 CORONARY ARTERY DISEASE INVOLVING NATIVE CORONARY ARTERY OF NATIVE HEART WITHOUT ANGINA PECTORIS: ICD-10-CM

## 2023-07-07 DIAGNOSIS — I25.2 MI, OLD: ICD-10-CM

## 2023-07-07 DIAGNOSIS — E78.00 PURE HYPERCHOLESTEROLEMIA: ICD-10-CM

## 2023-07-07 DIAGNOSIS — I10 PRIMARY HYPERTENSION: ICD-10-CM

## 2023-07-07 DIAGNOSIS — I51.9 LV DYSFUNCTION: ICD-10-CM

## 2023-07-07 PROCEDURE — 3074F SYST BP LT 130 MM HG: CPT | Performed by: INTERNAL MEDICINE

## 2023-07-07 PROCEDURE — 99213 OFFICE O/P EST LOW 20 MIN: CPT | Performed by: INTERNAL MEDICINE

## 2023-07-07 PROCEDURE — 3078F DIAST BP <80 MM HG: CPT | Performed by: INTERNAL MEDICINE

## 2023-07-07 PROCEDURE — 93000 ELECTROCARDIOGRAM COMPLETE: CPT | Performed by: INTERNAL MEDICINE

## 2023-07-07 PROCEDURE — 1123F ACP DISCUSS/DSCN MKR DOCD: CPT | Performed by: INTERNAL MEDICINE

## 2023-07-07 NOTE — PROGRESS NOTES
Subjective:      Patient ID: Wendy Nelson is a 69 y.o. male. Patient Active Problem List    Diagnosis Date Noted    Paroxysmal atrial fibrillation ([address])     Anemia 45/82/0913    Acute systolic (congestive) heart failure ([address]) 12/13/2021    Ventricular tachycardia ([address]) 12/13/2021    Mitral stenosis 12/13/2021    Mitral regurgitation 12/13/2021    Moderate to severe pulmonary hypertension ([address]) 12/13/2021    Cardiac arrest ([address]) 12/12/2021    LV dysfunction 01/15/2019    SVT (supraventricular tachycardia) ([facility]     Acute upper GI bleed 10/08/2018    H/O psoriatic arthritis 04/09/2018    Hyperlipidemia 10/27/2013    Stented coronary artery 10/25/2013    STEMI (ST elevation myocardial infarction) ([address]) 07/05/2012    CAD (coronary artery disease)s/p stent RCA     Valvular heart disease     Hypertension     Obesity, morbid s/p intestinal bypass        Current Outpatient Medications   Medication Sig Dispense Refill    apixaban (ELIQUIS) 5 MG TABS tablet Take 1 tablet by mouth 2 times daily . 180 tablet 1    amiodarone (CORDARONE) 200 MG tablet TAKE ONE TABLET BY MOUTH DAILY 90 tablet 1    torsemide (DEMADEX) 20 MG tablet 2 tablets as needed      rosuvastatin (CRESTOR) 40 MG tablet TAKE ONE TABLET BY MOUTH ONCE NIGHTLY 90 tablet 1    Multiple Vitamins-Minerals (PRESERVISION AREDS 2+MULTI VIT PO) Take by mouth      Boswellia-Glucosamine-Vit D (OSTEO BI-FLEX ONE PER DAY PO) Take by mouth      clopidogrel (PLAVIX) 75 MG tablet Take 1 tablet by mouth daily .  30 tablet 3    Cetirizine HCl (ZYRTEC ALLERGY PO) Take by mouth daily      Acetaminophen (TYLENOL ARTHRITIS PAIN PO) Take by mouth daily      azelastine HCl 0.15 % SOLN 2 sprays by Nasal route daily      Ferrous Sulfate (IRON) 325 (65 Fe) MG TABS daily   1    pantoprazole (PROTONIX) 40 MG tablet TAKE 1 TABLET BY MOUTH ONCE DAILY  3    Cranberry 500 MG CAPS Take 1 capsule by mouth      gabapentin (NEURONTIN) 300 MG capsule Take two capsules three times daily

## 2023-07-27 NOTE — PROGRESS NOTES
at 50 bpm, inferior infarct, Qtc = 481 ms  Device Interrogation/Reprogramming  Make/Model: Medtronic cobalt  DOI: 12/20/2021  Battery: 10.3 years  [place] therapy: MVP  ppm  Pacing %: RA = 57.3%, RV = 4.8 %  Tachy therapy:  VF: >182 bpm, burst (1), 40 J x 6  FVT: Via VF, 182-231 bpm, burst (2), burst (2), 40 J x 4  VT: 171-182 bpm, burst (4), burst (4), 40 J x 4  Lead function:  RA lead: sensing = 0.4 mV, impedance = 342 ohms, threshold = 0.5 V @0.4 msec  RV lead: sensing = 8.0 mV, impedance = 399 ohms (HV = 43 ohms), threshold = 1.0 V @0.4 msec  Lead programming:  RA lead: sensitivity = 0.15 mV, output = 1.5 V @0.4 msec  RV lead: sensitivity = 0.3 mV, output = 2.0 V @0.4 msec  Arrhythmias: None  Reprogramming included:   Bradycardia: DDDR  bpm, AV delays 270 ms sensed and 300 ms paced  Tachycardia:  VF >230 bpm, ATP before charging, 40 J x 4  FVT: Off  VT: 171 bpm, Burst (4), Burst (4), 40 J x 4  Overall device function is normal  All device programmable settings were evaluated per above and in the scanned document, along with iterative adjustments (capture thresholds) to assess and select the most appropriate final programming to provide for consistent delivery of the appropriate therapy and to verify function of the device. Assessment/plan:  MMVT sp MDT DC ICD (DOI: 12/20/2021-Sandy Zaidi)  - In the setting of ischemic cardiomyopathy. - In 12/2021, patient had MMVT at 190 bpm with LBBB morphology. On amiodarone and metoprolol since that time. No recurrence. - Continue amiodarone 200 mg daily. While on amiodarone, recommend monitoring of TFTs and LFTs every 6 months, as well as annual chest x-ray and eye exam.  Patient reportedly has monitoring at PCP's office. I will have my office attempt to obtain copy of lab results. I will order chest x-ray today. REDUCE amiodarone to 200 mg every other day. - Continue metoprolol XL 50 mg twice daily.  - Stable device function. Chronic poor RA sensing.

## 2023-08-02 ENCOUNTER — OFFICE VISIT (OUTPATIENT)
Dept: NON INVASIVE DIAGNOSTICS | Age: 78
End: 2023-08-02
Payer: MEDICARE

## 2023-08-02 VITALS
SYSTOLIC BLOOD PRESSURE: 110 MMHG | DIASTOLIC BLOOD PRESSURE: 60 MMHG | HEIGHT: 64 IN | BODY MASS INDEX: 26.7 KG/M2 | WEIGHT: 156.4 LBS | HEART RATE: 50 BPM

## 2023-08-02 DIAGNOSIS — Z79.899 ENCOUNTER FOR MONITORING AMIODARONE THERAPY: ICD-10-CM

## 2023-08-02 DIAGNOSIS — Z51.81 ENCOUNTER FOR MONITORING AMIODARONE THERAPY: ICD-10-CM

## 2023-08-02 DIAGNOSIS — I25.10 CORONARY ARTERY DISEASE INVOLVING NATIVE CORONARY ARTERY OF NATIVE HEART WITHOUT ANGINA PECTORIS: Primary | ICD-10-CM

## 2023-08-02 DIAGNOSIS — I47.20 VENTRICULAR TACHYCARDIA (HCC): ICD-10-CM

## 2023-08-02 PROCEDURE — 3078F DIAST BP <80 MM HG: CPT | Performed by: STUDENT IN AN ORGANIZED HEALTH CARE EDUCATION/TRAINING PROGRAM

## 2023-08-02 PROCEDURE — 1123F ACP DISCUSS/DSCN MKR DOCD: CPT | Performed by: STUDENT IN AN ORGANIZED HEALTH CARE EDUCATION/TRAINING PROGRAM

## 2023-08-02 PROCEDURE — 99214 OFFICE O/P EST MOD 30 MIN: CPT | Performed by: STUDENT IN AN ORGANIZED HEALTH CARE EDUCATION/TRAINING PROGRAM

## 2023-08-02 PROCEDURE — 93000 ELECTROCARDIOGRAM COMPLETE: CPT | Performed by: STUDENT IN AN ORGANIZED HEALTH CARE EDUCATION/TRAINING PROGRAM

## 2023-08-02 PROCEDURE — 3074F SYST BP LT 130 MM HG: CPT | Performed by: STUDENT IN AN ORGANIZED HEALTH CARE EDUCATION/TRAINING PROGRAM

## 2023-08-02 RX ORDER — AMIODARONE HYDROCHLORIDE 200 MG/1
200 TABLET ORAL
Qty: 45 TABLET | Refills: 1 | Status: SHIPPED | OUTPATIENT
Start: 2023-08-02 | End: 2023-10-31

## 2023-08-02 NOTE — PATIENT INSTRUCTIONS
REDUCE amiodarone to 200 mg tablet by mouth every other day. Updated prescription sent to pharmacy. Continue remote monitoring of ICD every 91 days. Reprogramming today to improve device function. Obtain chest x-ray. [facility] is nearby. You do not need an appointment. Please present to the radiology department. Dr Carola Rodríguez office will attempt to obtain a copy of most recent lab work. Follow-up with this office in 1 year.

## 2023-08-22 ENCOUNTER — HOSPITAL ENCOUNTER (OUTPATIENT)
Dept: GENERAL RADIOLOGY | Age: 78
Discharge: HOME | End: 2023-08-24
Payer: MEDICARE

## 2023-08-22 ENCOUNTER — HOSPITAL ENCOUNTER (OUTPATIENT)
Age: 78
Discharge: HOME | End: 2023-08-24
Payer: MEDICARE

## 2023-08-22 DIAGNOSIS — Z79.899 ENCOUNTER FOR MONITORING AMIODARONE THERAPY: ICD-10-CM

## 2023-08-22 DIAGNOSIS — Z51.81 ENCOUNTER FOR MONITORING AMIODARONE THERAPY: ICD-10-CM

## 2023-08-22 PROCEDURE — 71045 X-RAY EXAM CHEST 1 VIEW: CPT

## 2023-10-04 ENCOUNTER — TELEPHONE (OUTPATIENT)
Dept: NON INVASIVE DIAGNOSTICS | Age: 78
End: 2023-10-04

## 2023-10-04 NOTE — TELEPHONE ENCOUNTER
Patient had ECHO 8/2023 at [facility] results are in [address] [facility].

## 2023-10-04 NOTE — TELEPHONE ENCOUNTER
----- Message from Wendi Griffith MA sent at 10/2/2023 11:37 AM EDT -----  Regarding: FW: TTE, appt, ? upgrade    ----- Message -----  From: Evie Echeverria DO  Sent: 9/29/2023  10:43 PM EDT  To: Wendi Griffith MA  Subject: TTE, appt, ? upgrade                              Remote with RV pacing > 40%. Recommend:  1. TTE  2.  Appt w/ me to discuss upgrade.    -Evie Echeverria DO

## 2023-10-06 PROCEDURE — 93295 DEV INTERROG REMOTE 1/2/MLT: CPT | Performed by: STUDENT IN AN ORGANIZED HEALTH CARE EDUCATION/TRAINING PROGRAM

## 2023-10-06 PROCEDURE — 93296 REM INTERROG EVL PM/IDS: CPT | Performed by: STUDENT IN AN ORGANIZED HEALTH CARE EDUCATION/TRAINING PROGRAM

## 2023-10-10 NOTE — TELEPHONE ENCOUNTER
L/m for patient to call back. Echo not needed due to having done at [facility] 8/2023. Needs appt to discuss upgrade.

## 2023-10-11 NOTE — TELEPHONE ENCOUNTER
Per Dr Inocente Lopez TTE not needed, reviewed one from [facility]. Patient is scheduled for f/u 10/26/23.

## 2023-10-18 ENCOUNTER — TELEPHONE (OUTPATIENT)
Dept: NON INVASIVE DIAGNOSTICS | Age: 78
End: 2023-10-18

## 2023-10-18 NOTE — TELEPHONE ENCOUNTER
Patient called back to reschedule appt made for 10/26 to discuss device upgrade due to scheduling conflict. Patient resheduled for 12/19/23.

## 2023-11-08 ENCOUNTER — OFFICE VISIT (OUTPATIENT)
Dept: CARDIOLOGY CLINIC | Age: 78
End: 2023-11-08
Payer: MEDICARE

## 2023-11-08 VITALS
BODY MASS INDEX: 26.14 KG/M2 | DIASTOLIC BLOOD PRESSURE: 64 MMHG | SYSTOLIC BLOOD PRESSURE: 118 MMHG | RESPIRATION RATE: 16 BRPM | WEIGHT: 153.1 LBS | HEART RATE: 60 BPM | HEIGHT: 64 IN

## 2023-11-08 DIAGNOSIS — E78.00 PURE HYPERCHOLESTEROLEMIA: ICD-10-CM

## 2023-11-08 DIAGNOSIS — Z95.5 STENTED CORONARY ARTERY: ICD-10-CM

## 2023-11-08 DIAGNOSIS — I25.10 CORONARY ARTERY DISEASE INVOLVING NATIVE CORONARY ARTERY OF NATIVE HEART WITHOUT ANGINA PECTORIS: ICD-10-CM

## 2023-11-08 DIAGNOSIS — I50.22 CHRONIC SYSTOLIC HEART FAILURE (HCC): Primary | ICD-10-CM

## 2023-11-08 DIAGNOSIS — I48.0 PAROXYSMAL ATRIAL FIBRILLATION (HCC): ICD-10-CM

## 2023-11-08 DIAGNOSIS — I10 PRIMARY HYPERTENSION: ICD-10-CM

## 2023-11-08 DIAGNOSIS — I51.9 LV DYSFUNCTION: ICD-10-CM

## 2023-11-08 DIAGNOSIS — I25.2 MI, OLD: ICD-10-CM

## 2023-11-08 DIAGNOSIS — I10 ESSENTIAL HYPERTENSION: ICD-10-CM

## 2023-11-08 DIAGNOSIS — I27.20 MODERATE TO SEVERE PULMONARY HYPERTENSION (HCC): ICD-10-CM

## 2023-11-08 PROCEDURE — G8484 FLU IMMUNIZE NO ADMIN: HCPCS | Performed by: INTERNAL MEDICINE

## 2023-11-08 PROCEDURE — 1036F TOBACCO NON-USER: CPT | Performed by: INTERNAL MEDICINE

## 2023-11-08 PROCEDURE — 93000 ELECTROCARDIOGRAM COMPLETE: CPT | Performed by: INTERNAL MEDICINE

## 2023-11-08 PROCEDURE — 1123F ACP DISCUSS/DSCN MKR DOCD: CPT | Performed by: INTERNAL MEDICINE

## 2023-11-08 PROCEDURE — G8417 CALC BMI ABV UP PARAM F/U: HCPCS | Performed by: INTERNAL MEDICINE

## 2023-11-08 PROCEDURE — 3078F DIAST BP <80 MM HG: CPT | Performed by: INTERNAL MEDICINE

## 2023-11-08 PROCEDURE — 99214 OFFICE O/P EST MOD 30 MIN: CPT | Performed by: INTERNAL MEDICINE

## 2023-11-08 PROCEDURE — G8427 DOCREV CUR MEDS BY ELIG CLIN: HCPCS | Performed by: INTERNAL MEDICINE

## 2023-11-08 PROCEDURE — 3074F SYST BP LT 130 MM HG: CPT | Performed by: INTERNAL MEDICINE

## 2023-11-08 NOTE — PROGRESS NOTES
Subjective:      Patient ID: Milton Babb is a 69 y.o. male. Patient Active Problem List    Diagnosis Date Noted    Paroxysmal atrial fibrillation ([address])     Anemia 80/07/9693    Acute systolic (congestive) heart failure ([address]) 12/13/2021    Ventricular tachycardia ([address]) 12/13/2021    Mitral stenosis 12/13/2021    Mitral regurgitation 12/13/2021    Moderate to severe pulmonary hypertension ([address]) 12/13/2021    Cardiac arrest ([address]) 12/12/2021    LV dysfunction 01/15/2019    SVT (supraventricular tachycardia)     Acute upper GI bleed 10/08/2018    H/O psoriatic arthritis 04/09/2018    Hyperlipidemia 10/27/2013    Stented coronary artery 10/25/2013    STEMI (ST elevation myocardial infarction) ([address]) 07/05/2012    CAD (coronary artery disease)s/p stent RCA     Valvular heart disease     Hypertension     Obesity, morbid s/p intestinal bypass        Current Outpatient Medications   Medication Sig Dispense Refill    amiodarone (CORDARONE) 200 MG tablet Take 1 tablet by mouth every 48 hours 45 tablet 1    apixaban (ELIQUIS) 5 MG TABS tablet Take 1 tablet by mouth 2 times daily . 180 tablet 1    torsemide (DEMADEX) 20 MG tablet 2 tablets as needed Two tablets once a week      rosuvastatin (CRESTOR) 40 MG tablet TAKE ONE TABLET BY MOUTH ONCE NIGHTLY 90 tablet 1    Multiple Vitamins-Minerals (PRESERVISION AREDS 2+MULTI VIT PO) Take by mouth      Boswellia-Glucosamine-Vit D (OSTEO BI-FLEX ONE PER DAY PO) Take by mouth      clopidogrel (PLAVIX) 75 MG tablet Take 1 tablet by mouth daily .  30 tablet 3    Cetirizine HCl (ZYRTEC ALLERGY PO) Take by mouth daily      Acetaminophen (TYLENOL ARTHRITIS PAIN PO) Take by mouth daily      azelastine HCl 0.15 % SOLN 2 sprays by Nasal route daily      Ferrous Sulfate (IRON) 325 (65 Fe) MG TABS daily   1    pantoprazole (PROTONIX) 40 MG tablet TAKE 1 TABLET BY MOUTH ONCE DAILY  3    Cranberry 500 MG CAPS Take 1 capsule by mouth      gabapentin (NEURONTIN) 300 MG capsule One capsule

## 2023-12-22 ENCOUNTER — PREP FOR PROCEDURE (OUTPATIENT)
Dept: NON INVASIVE DIAGNOSTICS | Age: 78
End: 2023-12-22

## 2023-12-22 ENCOUNTER — TELEPHONE (OUTPATIENT)
Dept: NON INVASIVE DIAGNOSTICS | Age: 78
End: 2023-12-22

## 2023-12-22 DIAGNOSIS — I50.21 ACUTE SYSTOLIC (CONGESTIVE) HEART FAILURE (HCC): Primary | ICD-10-CM

## 2023-12-22 NOTE — TELEPHONE ENCOUNTER
Patient's [employer] terms 12/31/2023.  Called to check with patient if he has a different number or new insurance for the new year.  Patient stated he will be with [place] next year.  Patient will have to call back with insurance number.  Patient also stated he needs to rescheduled procedure on 1/3/24 as he will be out of town.  Moved procedure to 1/11/24 at 2:30pm, arrival would be 12:30 pm.  Patient is scheduled for Echo on 1/5/24 at 8:30 am [facility].

## 2023-12-25 RX ORDER — SODIUM CHLORIDE 0.9 % (FLUSH) 0.9 %
5-40 SYRINGE (ML) INJECTION EVERY 12 HOURS SCHEDULED
Status: CANCELLED | OUTPATIENT
Start: 2023-12-25

## 2023-12-25 RX ORDER — SODIUM CHLORIDE 0.9 % (FLUSH) 0.9 %
5-40 SYRINGE (ML) INJECTION PRN
Status: CANCELLED | OUTPATIENT
Start: 2023-12-25

## 2023-12-25 RX ORDER — SODIUM CHLORIDE 9 MG/ML
INJECTION, SOLUTION INTRAVENOUS PRN
Status: CANCELLED | OUTPATIENT
Start: 2023-12-25

## 2024-01-02 ENCOUNTER — TELEPHONE (OUTPATIENT)
Dept: NON INVASIVE DIAGNOSTICS | Age: 79
End: 2024-01-02

## 2024-01-02 NOTE — TELEPHONE ENCOUNTER
The patient called to request ECHO be done at  because he does not want to do it at [facility]. Order faxed to . Patient will call back to cancel @ [facility] if  can get him in sooner.     Electronically signed by Maria Guadalupe Roche MA on 1/2/2024 at 2:24 PM

## 2024-01-03 NOTE — TELEPHONE ENCOUNTER
Echo authorization approved 1/3/24-4/1/24  Auth # [id number].  Patient requested to have done at [facility], they will contact patient to schedule.

## 2024-01-04 ENCOUNTER — TELEPHONE (OUTPATIENT)
Dept: NON INVASIVE DIAGNOSTICS | Age: 79
End: 2024-01-04

## 2024-01-04 NOTE — TELEPHONE ENCOUNTER
----- Message from Mariela Cuevas MA sent at 1/3/2024  2:26 PM EST -----  Regarding: Prior Auth  CPT: 07199, 90228  Dx: I50.2    CRT-D Upgrade w/Dr Luther 1/11/24.

## 2024-01-05 LAB
LEFT VENTRICULAR EJECTION FRACTION MODE: ABNORMAL
LV EF: 15 %

## 2024-01-08 ENCOUNTER — CLINICAL DOCUMENTATION (OUTPATIENT)
Dept: NON INVASIVE DIAGNOSTICS | Age: 79
End: 2024-01-08

## 2024-01-08 DIAGNOSIS — R94.31 ABNORMAL EKG: ICD-10-CM

## 2024-01-08 DIAGNOSIS — I50.21 ACUTE SYSTOLIC (CONGESTIVE) HEART FAILURE (HCC): ICD-10-CM

## 2024-01-08 NOTE — PROGRESS NOTES
TTE (1/5/24 at [facility]): LVEF = 15-20% with inferior akinesis, stage II LVDD, mild asymmetric septal LVH, RV dilation, severe AXEL, mild AI, moderate-severe MR, severe TR, and severe pulmonary HTN (RVSP = 65 - 70 mmHg).    -Viktor Luther, DO

## 2024-01-10 ENCOUNTER — ANESTHESIA EVENT (OUTPATIENT)
Age: 79
End: 2024-01-10
Payer: COMMERCIAL

## 2024-01-11 ENCOUNTER — ANESTHESIA (OUTPATIENT)
Age: 79
End: 2024-01-11
Payer: COMMERCIAL

## 2024-01-11 ENCOUNTER — HOSPITAL ENCOUNTER (OUTPATIENT)
Age: 79
Discharge: HOME | End: 2024-01-11
Attending: STUDENT IN AN ORGANIZED HEALTH CARE EDUCATION/TRAINING PROGRAM | Admitting: STUDENT IN AN ORGANIZED HEALTH CARE EDUCATION/TRAINING PROGRAM
Payer: COMMERCIAL

## 2024-01-11 ENCOUNTER — APPOINTMENT (OUTPATIENT)
Dept: GENERAL RADIOLOGY | Age: 79
End: 2024-01-11
Attending: STUDENT IN AN ORGANIZED HEALTH CARE EDUCATION/TRAINING PROGRAM
Payer: COMMERCIAL

## 2024-01-11 VITALS
OXYGEN SATURATION: 96 % | BODY MASS INDEX: 24.91 KG/M2 | WEIGHT: 155 LBS | TEMPERATURE: 97.2 F | DIASTOLIC BLOOD PRESSURE: 74 MMHG | RESPIRATION RATE: 20 BRPM | HEART RATE: 60 BPM | HEIGHT: 66 IN | SYSTOLIC BLOOD PRESSURE: 173 MMHG

## 2024-01-11 DIAGNOSIS — I51.9 HEART DISEASE, UNSPECIFIED: ICD-10-CM

## 2024-01-11 PROBLEM — Z45.02 ICD (IMPLANTABLE CARDIOVERTER-DEFIBRILLATOR) BATTERY DEPLETION: Status: ACTIVE | Noted: 2024-01-11

## 2024-01-11 LAB
ALBUMIN SERPL-MCNC: 4.4 G/DL (ref 3.5–5.2)
ALP SERPL-CCNC: 114 U/L (ref 40–129)
ALT SERPL-CCNC: 35 U/L (ref 0–40)
ANION GAP SERPL CALCULATED.3IONS-SCNC: 9 MMOL/L (ref 7–16)
AST SERPL-CCNC: 39 U/L (ref 0–39)
BILIRUB SERPL-MCNC: 1.2 MG/DL (ref 0–1.2)
BUN SERPL-MCNC: 14 MG/DL (ref 6–23)
CALCIUM SERPL-MCNC: 9.1 MG/DL (ref 8.6–10.2)
CHLORIDE SERPL-SCNC: 107 MMOL/L (ref 98–107)
CO2 SERPL-SCNC: 24 MMOL/L (ref 22–29)
CREAT SERPL-MCNC: 0.8 MG/DL (ref 0.7–1.2)
EKG ATRIAL RATE: 49 BPM
EKG Q-T INTERVAL: 462 MS
EKG QRS DURATION: 98 MS
EKG QTC CALCULATION (BAZETT): 472 MS
EKG R AXIS: 42 DEGREES
EKG T AXIS: 6 DEGREES
EKG VENTRICULAR RATE: 63 BPM
ERYTHROCYTE [DISTWIDTH] IN BLOOD BY AUTOMATED COUNT: 16 % (ref 11.5–15)
GFR SERPL CREATININE-BSD FRML MDRD: >60 ML/MIN/1.73M2
GLUCOSE SERPL-MCNC: 86 MG/DL (ref 74–99)
HCT VFR BLD AUTO: 39 % (ref 37–54)
HGB BLD-MCNC: 12.5 G/DL (ref 12.5–16.5)
MCH RBC QN AUTO: 29.7 PG (ref 26–35)
MCHC RBC AUTO-ENTMCNC: 32.1 G/DL (ref 32–34.5)
MCV RBC AUTO: 92.6 FL (ref 80–99.9)
PLATELET, FLUORESCENCE: 142 K/UL (ref 130–450)
PMV BLD AUTO: 10.1 FL (ref 7–12)
POTASSIUM SERPL-SCNC: 4.4 MMOL/L (ref 3.5–5)
PROT SERPL-MCNC: 7.3 G/DL (ref 6.4–8.3)
RBC # BLD AUTO: 4.21 M/UL (ref 3.8–5.8)
SODIUM SERPL-SCNC: 140 MMOL/L (ref 132–146)
WBC OTHER # BLD: 5.3 K/UL (ref 4.5–11.5)

## 2024-01-11 PROCEDURE — 2500000003 HC RX 250 WO HCPCS: Performed by: STUDENT IN AN ORGANIZED HEALTH CARE EDUCATION/TRAINING PROGRAM

## 2024-01-11 PROCEDURE — A4216 STERILE WATER/SALINE, 10 ML: HCPCS | Performed by: STUDENT IN AN ORGANIZED HEALTH CARE EDUCATION/TRAINING PROGRAM

## 2024-01-11 PROCEDURE — 6360000004 HC RX CONTRAST MEDICATION: Performed by: STUDENT IN AN ORGANIZED HEALTH CARE EDUCATION/TRAINING PROGRAM

## 2024-01-11 PROCEDURE — 2720000010 HC SURG SUPPLY STERILE: Performed by: STUDENT IN AN ORGANIZED HEALTH CARE EDUCATION/TRAINING PROGRAM

## 2024-01-11 PROCEDURE — C1894 INTRO/SHEATH, NON-LASER: HCPCS | Performed by: STUDENT IN AN ORGANIZED HEALTH CARE EDUCATION/TRAINING PROGRAM

## 2024-01-11 PROCEDURE — 3700000001 HC ADD 15 MINUTES (ANESTHESIA): Performed by: STUDENT IN AN ORGANIZED HEALTH CARE EDUCATION/TRAINING PROGRAM

## 2024-01-11 PROCEDURE — 80053 COMPREHEN METABOLIC PANEL: CPT

## 2024-01-11 PROCEDURE — 2709999900 HC NON-CHARGEABLE SUPPLY: Performed by: STUDENT IN AN ORGANIZED HEALTH CARE EDUCATION/TRAINING PROGRAM

## 2024-01-11 PROCEDURE — C1769 GUIDE WIRE: HCPCS | Performed by: STUDENT IN AN ORGANIZED HEALTH CARE EDUCATION/TRAINING PROGRAM

## 2024-01-11 PROCEDURE — 7100000010 HC PHASE II RECOVERY - FIRST 15 MIN: Performed by: STUDENT IN AN ORGANIZED HEALTH CARE EDUCATION/TRAINING PROGRAM

## 2024-01-11 PROCEDURE — 36005 INJECTION EXT VENOGRAPHY: CPT | Performed by: STUDENT IN AN ORGANIZED HEALTH CARE EDUCATION/TRAINING PROGRAM

## 2024-01-11 PROCEDURE — 6360000002 HC RX W HCPCS

## 2024-01-11 PROCEDURE — 75820 VEIN X-RAY ARM/LEG: CPT | Performed by: STUDENT IN AN ORGANIZED HEALTH CARE EDUCATION/TRAINING PROGRAM

## 2024-01-11 PROCEDURE — 2580000003 HC RX 258: Performed by: STUDENT IN AN ORGANIZED HEALTH CARE EDUCATION/TRAINING PROGRAM

## 2024-01-11 PROCEDURE — 33214 UPGRADE OF PACEMAKER SYSTEM: CPT

## 2024-01-11 PROCEDURE — 93010 ELECTROCARDIOGRAM REPORT: CPT | Performed by: INTERNAL MEDICINE

## 2024-01-11 PROCEDURE — 93005 ELECTROCARDIOGRAM TRACING: CPT | Performed by: STUDENT IN AN ORGANIZED HEALTH CARE EDUCATION/TRAINING PROGRAM

## 2024-01-11 PROCEDURE — 3700000000 HC ANESTHESIA ATTENDED CARE: Performed by: STUDENT IN AN ORGANIZED HEALTH CARE EDUCATION/TRAINING PROGRAM

## 2024-01-11 PROCEDURE — 6360000002 HC RX W HCPCS: Performed by: STUDENT IN AN ORGANIZED HEALTH CARE EDUCATION/TRAINING PROGRAM

## 2024-01-11 PROCEDURE — C1892 INTRO/SHEATH,FIXED,PEEL-AWAY: HCPCS | Performed by: STUDENT IN AN ORGANIZED HEALTH CARE EDUCATION/TRAINING PROGRAM

## 2024-01-11 PROCEDURE — 71045 X-RAY EXAM CHEST 1 VIEW: CPT

## 2024-01-11 PROCEDURE — 7100000011 HC PHASE II RECOVERY - ADDTL 15 MIN: Performed by: STUDENT IN AN ORGANIZED HEALTH CARE EDUCATION/TRAINING PROGRAM

## 2024-01-11 PROCEDURE — 2580000003 HC RX 258

## 2024-01-11 PROCEDURE — C1889 IMPLANT/INSERT DEVICE, NOC: HCPCS | Performed by: STUDENT IN AN ORGANIZED HEALTH CARE EDUCATION/TRAINING PROGRAM

## 2024-01-11 PROCEDURE — 85027 COMPLETE CBC AUTOMATED: CPT

## 2024-01-11 DEVICE — ENVELOPE CMRM6133 ABSORB LRG MR
Type: IMPLANTABLE DEVICE | Site: CHEST | Status: FUNCTIONAL
Brand: TYRX™

## 2024-01-11 RX ORDER — SODIUM CHLORIDE 9 MG/ML
INJECTION, SOLUTION INTRAVENOUS CONTINUOUS PRN
Status: DISCONTINUED | OUTPATIENT
Start: 2024-01-11 | End: 2024-01-11 | Stop reason: SDUPTHER

## 2024-01-11 RX ORDER — SODIUM CHLORIDE 0.9 % (FLUSH) 0.9 %
5-40 SYRINGE (ML) INJECTION PRN
Status: DISCONTINUED | OUTPATIENT
Start: 2024-01-11 | End: 2024-01-11 | Stop reason: HOSPADM

## 2024-01-11 RX ORDER — CEFAZOLIN SODIUM 1 G/3ML
INJECTION, POWDER, FOR SOLUTION INTRAMUSCULAR; INTRAVENOUS PRN
Status: DISCONTINUED | OUTPATIENT
Start: 2024-01-11 | End: 2024-01-11 | Stop reason: SDUPTHER

## 2024-01-11 RX ORDER — FENTANYL CITRATE 50 UG/ML
INJECTION, SOLUTION INTRAMUSCULAR; INTRAVENOUS PRN
Status: DISCONTINUED | OUTPATIENT
Start: 2024-01-11 | End: 2024-01-11 | Stop reason: SDUPTHER

## 2024-01-11 RX ORDER — ACETAMINOPHEN 325 MG/1
650 TABLET ORAL EVERY 4 HOURS PRN
Status: DISCONTINUED | OUTPATIENT
Start: 2024-01-11 | End: 2024-01-11 | Stop reason: HOSPADM

## 2024-01-11 RX ORDER — LIDOCAINE HYDROCHLORIDE 10 MG/ML
INJECTION, SOLUTION INFILTRATION; PERINEURAL PRN
Status: DISCONTINUED | OUTPATIENT
Start: 2024-01-11 | End: 2024-01-11 | Stop reason: HOSPADM

## 2024-01-11 RX ORDER — SODIUM CHLORIDE 9 MG/ML
INJECTION, SOLUTION INTRAVENOUS PRN
Status: DISCONTINUED | OUTPATIENT
Start: 2024-01-11 | End: 2024-01-11 | Stop reason: HOSPADM

## 2024-01-11 RX ORDER — DIPHENHYDRAMINE HYDROCHLORIDE 50 MG/ML
25 INJECTION INTRAMUSCULAR; INTRAVENOUS ONCE
Status: COMPLETED | OUTPATIENT
Start: 2024-01-11 | End: 2024-01-11

## 2024-01-11 RX ORDER — SODIUM CHLORIDE 0.9 % (FLUSH) 0.9 %
5-40 SYRINGE (ML) INJECTION EVERY 12 HOURS SCHEDULED
Status: DISCONTINUED | OUTPATIENT
Start: 2024-01-11 | End: 2024-01-11 | Stop reason: HOSPADM

## 2024-01-11 RX ORDER — MIDAZOLAM HYDROCHLORIDE 1 MG/ML
INJECTION INTRAMUSCULAR; INTRAVENOUS PRN
Status: DISCONTINUED | OUTPATIENT
Start: 2024-01-11 | End: 2024-01-11 | Stop reason: SDUPTHER

## 2024-01-11 RX ORDER — CEPHALEXIN 500 MG/1
500 CAPSULE ORAL 2 TIMES DAILY
Qty: 10 CAPSULE | Refills: 0 | Status: SHIPPED | OUTPATIENT
Start: 2024-01-11 | End: 2024-01-16

## 2024-01-11 RX ADMIN — SODIUM CHLORIDE: 9 INJECTION, SOLUTION INTRAVENOUS at 15:45

## 2024-01-11 RX ADMIN — MIDAZOLAM 1 MG: 1 INJECTION INTRAMUSCULAR; INTRAVENOUS at 16:08

## 2024-01-11 RX ADMIN — SODIUM CHLORIDE: 9 INJECTION, SOLUTION INTRAVENOUS at 17:01

## 2024-01-11 RX ADMIN — FENTANYL CITRATE 25 MCG: 50 INJECTION, SOLUTION INTRAMUSCULAR; INTRAVENOUS at 16:08

## 2024-01-11 RX ADMIN — FAMOTIDINE 20 MG: 10 INJECTION INTRAVENOUS at 13:33

## 2024-01-11 RX ADMIN — DIPHENHYDRAMINE HYDROCHLORIDE 25 MG: 50 INJECTION INTRAMUSCULAR; INTRAVENOUS at 13:34

## 2024-01-11 RX ADMIN — CEFAZOLIN 2 G: 1 INJECTION, POWDER, FOR SOLUTION INTRAMUSCULAR; INTRAVENOUS at 16:02

## 2024-01-11 RX ADMIN — VANCOMYCIN HYDROCHLORIDE 1000 MG: 1 INJECTION, POWDER, LYOPHILIZED, FOR SOLUTION INTRAVENOUS at 16:02

## 2024-01-11 RX ADMIN — FENTANYL CITRATE 25 MCG: 50 INJECTION, SOLUTION INTRAMUSCULAR; INTRAVENOUS at 16:29

## 2024-01-11 RX ADMIN — WATER 125 MG: 1 INJECTION INTRAMUSCULAR; INTRAVENOUS; SUBCUTANEOUS at 13:33

## 2024-01-11 NOTE — ANESTHESIA PRE PROCEDURE
Department of Anesthesiology  Preprocedure Note       Name:  Ruben Davidson   Age:  78 y.o.  :  1945                                          MRN:  [id number]         Date:  2024      Surgeon: Surgeon(s):  Viktor Luther DO    Procedure:     Medications prior to admission:   Prior to Admission medications    Medication Sig Start Date End Date Taking? Authorizing Provider   amiodarone (CORDARONE) 200 MG tablet Take 1 tablet by mouth every 48 hours 23  Viktor Luther DO   apixaban (ELIQUIS) 5 MG TABS tablet Take 1 tablet by mouth 2 times daily . 5/3/23   Tor Kathleen MD   torsemide (DEMADEX) 20 MG tablet 2 tablets as needed Two tablets once a week 22   Modesto Farias MD   rosuvastatin (CRESTOR) 40 MG tablet TAKE ONE TABLET BY MOUTH ONCE NIGHTLY 22   Tor Kathleen MD   Multiple Vitamins-Minerals (PRESERVISION AREDS 2+MULTI VIT PO) Take by mouth    Modesto Farias MD   Boswellia-Glucosamine-Vit D (OSTEO BI-FLEX ONE PER DAY PO) Take by mouth    Modesto Farias MD   clopidogrel (PLAVIX) 75 MG tablet Take 1 tablet by mouth daily . 21   Bobo Arvizu MD   Cetirizine HCl (ZYRTEC ALLERGY PO) Take by mouth daily    Modesto Farias MD   Acetaminophen (TYLENOL ARTHRITIS PAIN PO) Take by mouth daily    Modesto Farias MD   azelastine HCl 0.15 % SOLN 2 sprays by Nasal route daily    ProviderModesto MD   Ferrous Sulfate (IRON) 325 (65 Fe) MG TABS daily  19   Modesto Farias MD   pantoprazole (PROTONIX) 40 MG tablet TAKE 1 TABLET BY MOUTH ONCE DAILY 19   Modesto Farias MD   Cranberry 500 MG CAPS Take 1 capsule by mouth    Modesto Farias MD   gabapentin (NEURONTIN) 300 MG capsule One capsule three times daily 18   Modesto Farias MD   metoprolol succinate (TOPROL XL) 50 MG extended release tablet Take 1 tablet by mouth 2 times daily  Patient taking differently: Take 1 tablet by mouth daily 10/15/18  evidence for hemodynamically significant pericardial effusion.      Signature      ----------------------------------------------------------------   Electronically signed by Lokesh TALAVERAInterpreting   physician) on 06/14/2022 04:03 PM   ----------------------------------------------------------------     M-Mode/2D Measurements & Calculations      LV Diastolic    LV Systolic Dimension: 4.5   AV Cusp Separation: 1.5 cmLA   Dimension: 5.3  cm                           Dimension: 4.6 cmAO Root   cm              LV Volume Diastolic: 135.4   Dimension: 2.3 cm   LV FS:15.1 %    ml   LV PW           LV Volume Systolic: 90.8 ml   Diastolic: 0.8  LV EDV/LV EDV Index: 135.4   cm              ml/71 ml/m^2LV ESV/LV ESV    RV Diastolic Dimension: 4.2   LV PW Systolic: Index: 90.8 ml/48ml/ m^2     cm   1 cm            EF Calculated: 32.9 %   Septum          LV Mass Index: 91 l/min*m^2  LA/Aorta: 2   Diastolic: 1 cm LV Length: 9.5 cm            Ascending Aorta: 2.3 cm   Septum                                       LA volume/Index: 86.1 ml   Systolic: 1.3   LVOT: 1.9 cm                 /45.1ml/m^2   cm                                           RA Area: 26.7 cm^2      LV Mass: 174.52                              IVC Expiration: 2 cm   g     Doppler Measurements & Calculations      MV Peak E-Wave: 1 m/s      AV Peak Velocity:    LVOT Peak Velocity: 0.99   MV Peak A-Wave: 0.77 m/s   1.59 m/s             m/s   MV E/A Ratio: 1.3          AV Peak Gradient:    LVOT Mean Velocity: 0.64   MV Peak Gradient: 6 mmHg   10.1 mmHg            m/s   MV Mean Gradient: 2.3 mmHg AV Mean Velocity:    LVOT Peak Gradient: 3.9   MV Mean Velocity: 0.73 m/s 1.24 m/s             mmHgLVOT Mean Gradient:   MV Deceleration Time:      AV Mean Gradient:    1.9 mmHg   367.7 msec                 6.5 mmHg             Estimated RVSP: 43.5 mmHg   MV P1/2t: 142.1 msec       AV VTI: 36 cm        Estimated RAP:3 mmHg   MVA by PHT:1.55 cm^2       AV Area   MV Area  patient whom consented to blood products.    Plan discussed with attending.                    Bev Fields, APRN - CRNA   1/11/2024

## 2024-01-11 NOTE — DISCHARGE INSTRUCTIONS
[facility] ICD Discharge Instructions    Medications: Resume all home medications, EXCEPT apixaban (Eliquis). RESTART apixaban (Eliquis) on Sunday, 1/14/24. START cephalexin 500 mg tablet, take 1 tablet by mouth every 12 hours for 5 days. Prescription sent to your [facility] pharmacy.  - Dr Luther sent Dr Kathleen a message regarding your interest in Entresto. If no response after 5 business days, please contact his office to discuss.      Incision Care:  White bandage: Remove on evening of Friday 1/12/24.  Brown (Aquacel) bandage: Located under white bandage. This is a waterproof bandage. Remove in 1 week Thursday 1/18/24.  Surgical glue: Located underneath the brown bandage. Do NOT pick at, scratch or remove the surgical glue from your incision. This will fall off on its own over the next several weeks. It is there to prevent infection.  Bathing: Keep the incision dry. You may shower tomorrow evening, but do NOT spray the water directly at the site or scrub at the site. Pat dry only with a clean towel. No soaking in a bathtub, hot tub, or pool for 6 weeks.    Daily monitoring: Check the area daily. Notify the office at [phone] if you develop any redness, swelling, drainage, warmth, or fever greater than 100 degrees.       Activity:  You may continue regular activity; but limit strenuous movements of the arm closest to your ICD.    Prevent any hard blows to the ICD.      Driving: No driving or operating heavy machinery for 48 hours unless previously instructed to avoid for a longer period of time.    Possible Defibrillator Interferences:  Avoid high frequency ham radios, arc welders, battery powered tools, and strong electromagnetic fields.  Avoid any device that may electrically stimulate your body; such as electric muscle stimulators or TENS units.  Standing over a running car motor with the dickinson up may inhibit the ICD.  When using a cell phone, use the ear opposite the side of your ICD if  possible    Special Instructions:  Let your dentist, doctor, or medical specialist know that you have an ICD so precautions can be taken to protect the defibrillator.  You should NOT have an MRI if have defibrillator unless instructed by your physician.  Your defibrillator may set off a metal detector, such as those used in airports and courtrooms.  Let security know that you have an ICD & show them your card.    ID Card:  You will have a temporary ID card until a permanent card is sent to you by the device company.  The permanent card will look like a 's license or credit card and should arrive within 8 weeks.  Carry your ID card with you at all times    What else do I need to know about my pacemaker or defibrillator?  Do not carry a cellular phone in a pocket within six inches of the pacemaker or defibrillator as this can affect the operation of the unit.  Hold the cell phone on the opposite ear as the defibrillator or pacemaker insertion site.  Do not walk through airport security systems as these devices can detect the metal in your pacemaker or defibrillator and possibly alarm. The new full body scanners are safe for both pacemakers and defibrillators.  Keep your pacemaker/defibrillator ID card with you at all times and ask security to clear you with a hand search only if a full body scanner is not available.  Do not let security use a hand-held wand.  Avoid passing through metal detectors (for example in shopping malls and schools). If you must pass through, walk quickly through. These detectors can interfere with the pacemaker and defibrillator function.  Do not touch the spark plug or distributor on running car or  - turn engine off first.  Avoid holding magnets near your pacemaker or defibrillator.    ICD Shock:  If your defibrillator gives you a shock, please notify the [facility] office or answering service.  If you are shocked more than once in a day or several times in a

## 2024-01-11 NOTE — H&P
[facility] CARDIOLOGY  CARDIAC ELECTROPHYSIOLOGY DEPARTMENT/DIVISION OF CARDIOLOGY  Outpatient Progress Report  PATIENT: Ruben Davidson  MEDICAL RECORD NUMBER: [id number]  DATE OF SERVICE:  1/11/2024  ATTENDING ELECTROPHYSIOLOGIST:  Viktor Luther D.O.  REFERRING PHYSICIAN: Viktor Luther DO and Barrington Garcia MD  CHIEF COMPLAINT: VT s/p dual chamber ICD implantation    HPI: Ruben Davidson is a 78 y.o. male with a history of MMVT sp MDT DC ICD (DOI: 12/20/2021-Dr. Luther), nonvalvular paroxysmal AF/AFL, SVT, NYHA Class II HFrEF-ischemic sp PTCA/stent RCA (7/7/97) and RYAN x 1 proximal LAD (12/12/21), moderate MR, HTN, DM2, overweight sp gastric bypass, BETTIE, PUD/GI bleed sp clip/epinephrine injection (10/8/2018), and psoriatic arthritis. He is managed by Dr Kathleen with amiodarone 200 mg QOD, apixaban 5 mg twice daily, clopidogrel 75 mg daily, metoprolol XL 50 mg BID, rosuvastatin 40 mg daily, torsemide 40 mg every 7 days, and PPI.  In 1997, appears patient had PTCA/stent to RCA based on available records. LVEF prior to 2017 is unclear. Since 2017, LVEF declined from 45-50% to 29% in 2020.  In 12/2021, he presented with MMVT at 190 bpm with LBBB morphology, which was treated with external defibrillation and amiodarone.  Following defibrillation, he was diagnosed with STEMI, which was treated with RYAN x1 proximal LAD.  Following PCI, he was noted have paroxysms of AF/AFL, was treated with OAC.  Prior to discharge, a Medtronic dual-chamber ICD was implanted.  At the time of implant, he was noted to have low voltage throughout the RA chamber in the setting of AF.  Ultimately, the RA lead was positioned in the RAA as this had the high sensing after passive mapping of the RA chamber. In 8/2023, RV pacing increased from 5% to 60%. In 9/2023, TTE at [facility] reported LVEF of 35%. In 1/2024, TTE reported LVEF of 15-20%. He was referred for CRT-D upgrade. He presents today, 1/11/24; for

## 2024-01-12 ENCOUNTER — TELEPHONE (OUTPATIENT)
Dept: NON INVASIVE DIAGNOSTICS | Age: 79
End: 2024-01-12

## 2024-01-12 ENCOUNTER — HOSPITAL ENCOUNTER (EMERGENCY)
Age: 79
Discharge: HOME | End: 2024-01-12
Attending: EMERGENCY MEDICINE
Payer: COMMERCIAL

## 2024-01-12 VITALS
OXYGEN SATURATION: 100 % | DIASTOLIC BLOOD PRESSURE: 67 MMHG | SYSTOLIC BLOOD PRESSURE: 130 MMHG | RESPIRATION RATE: 19 BRPM | HEART RATE: 56 BPM | TEMPERATURE: 97.5 F

## 2024-01-12 DIAGNOSIS — S21.102A: Primary | ICD-10-CM

## 2024-01-12 LAB
ALBUMIN SERPL-MCNC: 4.3 G/DL (ref 3.5–5.2)
ALP SERPL-CCNC: 110 U/L (ref 40–129)
ALT SERPL-CCNC: 33 U/L (ref 0–40)
ANION GAP SERPL CALCULATED.3IONS-SCNC: 11 MMOL/L (ref 7–16)
AST SERPL-CCNC: 36 U/L (ref 0–39)
BASOPHILS # BLD: 0.01 K/UL (ref 0–0.2)
BASOPHILS NFR BLD: 0 % (ref 0–2)
BILIRUB SERPL-MCNC: 1.1 MG/DL (ref 0–1.2)
BUN SERPL-MCNC: 15 MG/DL (ref 6–23)
CALCIUM SERPL-MCNC: 9.1 MG/DL (ref 8.6–10.2)
CHLORIDE SERPL-SCNC: 107 MMOL/L (ref 98–107)
CO2 SERPL-SCNC: 21 MMOL/L (ref 22–29)
CREAT SERPL-MCNC: 0.7 MG/DL (ref 0.7–1.2)
EOSINOPHIL # BLD: 0 K/UL (ref 0.05–0.5)
EOSINOPHILS RELATIVE PERCENT: 0 % (ref 0–6)
ERYTHROCYTE [DISTWIDTH] IN BLOOD BY AUTOMATED COUNT: 16 % (ref 11.5–15)
GFR SERPL CREATININE-BSD FRML MDRD: >60 ML/MIN/1.73M2
GLUCOSE SERPL-MCNC: 138 MG/DL (ref 74–99)
HCT VFR BLD AUTO: 38.3 % (ref 37–54)
HGB BLD-MCNC: 12.2 G/DL (ref 12.5–16.5)
IMM GRANULOCYTES # BLD AUTO: 0.04 K/UL (ref 0–0.58)
IMM GRANULOCYTES NFR BLD: 1 % (ref 0–5)
LYMPHOCYTES NFR BLD: 0.52 K/UL (ref 1.5–4)
LYMPHOCYTES RELATIVE PERCENT: 6 % (ref 20–42)
MCH RBC QN AUTO: 29.5 PG (ref 26–35)
MCHC RBC AUTO-ENTMCNC: 31.9 G/DL (ref 32–34.5)
MCV RBC AUTO: 92.7 FL (ref 80–99.9)
MONOCYTES NFR BLD: 0.31 K/UL (ref 0.1–0.95)
MONOCYTES NFR BLD: 4 % (ref 2–12)
NEUTROPHILS NFR BLD: 89 % (ref 43–80)
NEUTS SEG NFR BLD: 7.36 K/UL (ref 1.8–7.3)
PLATELET, FLUORESCENCE: 125 K/UL (ref 130–450)
PMV BLD AUTO: 10.3 FL (ref 7–12)
POTASSIUM SERPL-SCNC: 4.5 MMOL/L (ref 3.5–5)
PROT SERPL-MCNC: 7 G/DL (ref 6.4–8.3)
RBC # BLD AUTO: 4.13 M/UL (ref 3.8–5.8)
SODIUM SERPL-SCNC: 139 MMOL/L (ref 132–146)
WBC OTHER # BLD: 8.2 K/UL (ref 4.5–11.5)

## 2024-01-12 PROCEDURE — 99283 EMERGENCY DEPT VISIT LOW MDM: CPT

## 2024-01-12 PROCEDURE — 80053 COMPREHEN METABOLIC PANEL: CPT

## 2024-01-12 PROCEDURE — 85025 COMPLETE CBC W/AUTO DIFF WBC: CPT

## 2024-01-12 PROCEDURE — 2500000003 HC RX 250 WO HCPCS: Performed by: NURSE PRACTITIONER

## 2024-01-12 RX ORDER — TRANEXAMIC ACID 100 MG/ML
1000 INJECTION, SOLUTION INTRAVENOUS ONCE
Status: COMPLETED | OUTPATIENT
Start: 2024-01-12 | End: 2024-01-12

## 2024-01-12 RX ADMIN — TRANEXAMIC ACID 1000 MG: 100 INJECTION, SOLUTION INTRAVENOUS at 04:53

## 2024-01-12 ASSESSMENT — LIFESTYLE VARIABLES: HOW OFTEN DO YOU HAVE A DRINK CONTAINING ALCOHOL: NEVER

## 2024-01-12 NOTE — ED NOTES
Er Dr and Np both in at chair side , large amt strike thru on dressing, unable to get gelfoam from central supply, quick clot and cyklokapron applied with pressure dressing, pt remains lying down in ST # 6. Will continue to monitor.

## 2024-01-12 NOTE — PROGRESS NOTES
Extended Stay Recovery Discharge Documentation    Final procedure site check completed, stable for discharge- Yes, dressing changed and site stable  Ambulated without issue post recovery completion, gait steady.   Peripheral IV sites removed (see IV Flowsheet) and site asymptomatic- Yes  Patient dressed self without assistance.   Discharge instructions reviewed with Patient and daughter and verbalized understanding.   Patient discharged Home with daughter at 715 PM    Discharge instructions provided to patient and daughter. Information regarding medications and follow up appointments given.    Importance of taking medications as prescribed discussed at length with verbalized understanding.    Patient was discharged from the floor with the following belongings:   Watch   Ring  Shirt  Pants  Shoes  Socks  Underwear  Coat  Discharge paperwork    Patient was transported to the [facility] via wheelchair by RN, and was assisted into the vehicle as needed.

## 2024-01-12 NOTE — ED PROVIDER NOTES
Shared KIM-ED Attending Visit.  CC: No         [facility] EMERGENCY DEPARTMENT  ED  Encounter Note  Admit Date/RoomTime: 2024  2:00 AM  ED Room: [place] [address]  NAME: Ruben Davidson  : 1945  MRN: [id number]  PCP: Barrington Garcia MD    CHIEF COMPLAINT     Wound Check (wound check ( just left about 630 pm had tried to adjust pacemaker incision is now bleeding large amts blood left chest))    HISTORY OF PRESENT ILLNESS        Ruben Davidson is a 78 y.o. male who presents to the ED via private vehicle with complaint of bleeding from pacemaker incision to the left chest.  States takes Eliquis but has not taken it for 2 days in preparation for this procedure.  Was having a pacemaker adjustment that was done.  Chest pain shortness of breath or dizziness.  REVIEW OF SYSTEMS     Pertinent positives and negatives are stated within HPI, all other systems reviewed and are negative.    Past Medical History:  has a past medical history of CAD (coronary artery disease), [place] (hard of hearing), Hypertension, Left ventricular systolic dysfunction, MI (myocardial infarction) (HCC), Mitral regurgitation, Obesity, morbid (HCC), Sleep apnea, and Valvular heart disease.  Surgical History:  has a past surgical history that includes Intestinal Bypass; Diagnostic Cardiac Cath Lab Procedure (1997); Diagnostic Cardiac Cath Lab Procedure (1997); Coronary angioplasty (1997); fracture surgery; Carpal tunnel release (Bilateral); Tonsillectomy; Appendectomy; back surgery; eye surgery (Bilateral); hip surgery (); joint replacement; Upper gastrointestinal endoscopy (N/A, 10/08/2018); Coronary angioplasty with stent (2021); Upper gastrointestinal endoscopy (N/A, 2021); and Cardiac defibrillator placement (2021).  Social History:  reports that he quit smoking about 23 years ago. His smoking use included cigarettes. He started smoking about 51 years ago.

## 2024-01-14 LAB — ECHO BSA: 1.81 M2

## 2024-01-15 ENCOUNTER — NURSE ONLY (OUTPATIENT)
Dept: NON INVASIVE DIAGNOSTICS | Age: 79
End: 2024-01-15

## 2024-01-15 NOTE — PROGRESS NOTES
Here for left upper chest incision assessment. Patient went to ED on 1/12/24 for bleeding at left upper chest incision following attempted CRT-D upgrade on 1/11/24. Patient's wife called late on Friday, 1/12/24 with some further drainage from incision. Today left upper chest incision is free of bleeding or other drainage. Dermabond is intact to the incision. Ecchymosis is present surrounding the incision (see Media for picture). KAYE Alfred came in to assess site. She asked about any afib episodes since procedure on 1/11/24. Device interrogated, no episodes of AF. Darby instructed the patient to hold the Eliquis until Wednesday, 1/17/24. They may use ice to area for incisional discomfort or swelling (no ice directly on the skin, apply for 20 minute intervals prn). Contact the office for any further bleeding, drainage, fever or chills. Keep appointment as scheduled 1/25/24. Patient and his wife voiced understanding.     Bhumi Silver RN, BSN  [facility] [place]   Device Clinic

## 2024-01-15 NOTE — PATIENT INSTRUCTIONS
Call if any signs or symptoms of infection [phone] ext: [phone]  Fevers, chills, redness, swelling or drainage. Call if you note any bleeding at the site.    Hold Eliquis until Wednesday, 1/17/24 per KAYE Alfred.     Keep appt on 1/25/2024    You may use ice to the area for twenty minutes at a time. Do not place the ice directly on your skin, wrap in a towel.      Hook up home  Monitor  [employer] Stay connected: [phone]

## 2024-01-18 LAB — TSH SERPL DL<=0.05 MIU/L-ACNC: 1.63 UIU/ML

## 2024-01-19 ENCOUNTER — TELEPHONE (OUTPATIENT)
Dept: CARDIOLOGY CLINIC | Age: 79
End: 2024-01-19

## 2024-01-19 NOTE — TELEPHONE ENCOUNTER
Patient called and would like to speak with you regarding his echo that he had with Dr. Luther with his EF and wants to know if he should go back on Entresto.

## 2024-01-22 NOTE — ANESTHESIA POSTPROCEDURE EVALUATION
Department of Anesthesiology  Postprocedure Note    Patient: Ruben Davidson  MRN: [id number]  YOB: 1945  Date of evaluation: 1/22/2024    Procedure Summary       Date: 01/11/24 Room / Location: [facility] EP LAB 1 / [facility] CARDIAC CATH LAB    Anesthesia Start: 1539 Anesthesia Stop: 1748    Procedure: Pacemaker lead revision Diagnosis:       Heart disease, unspecified      (Heart disease, unspecified [I51.9])    Providers: Viktor Luther DO Responsible Provider: Debbie Galvan MD    Anesthesia Type: MAC ASA Status: 4            Anesthesia Type: No value filed.    Mildred Phase I:      Mildred Phase II:      Anesthesia Post Evaluation    Patient location during evaluation: PACU  Patient participation: complete - patient participated  Level of consciousness: awake and alert  Airway patency: patent  Nausea & Vomiting: no nausea and no vomiting  Cardiovascular status: hemodynamically stable  Respiratory status: acceptable  Hydration status: euvolemic  Pain management: satisfactory to patient        There were no known notable events for this encounter.

## 2024-01-25 ENCOUNTER — NURSE ONLY (OUTPATIENT)
Dept: NON INVASIVE DIAGNOSTICS | Age: 79
End: 2024-01-25

## 2024-01-25 DIAGNOSIS — I46.9 CARDIAC ARREST (HCC): ICD-10-CM

## 2024-01-25 DIAGNOSIS — Z95.810 IMPLANTABLE CARDIOVERTER-DEFIBRILLATOR (ICD) IN SITU: Primary | ICD-10-CM

## 2024-01-25 DIAGNOSIS — I51.9 LV DYSFUNCTION: ICD-10-CM

## 2024-01-25 NOTE — PROGRESS NOTES
See Murj report. Picture of left chest incision in media.    Bhumi Silver RN, BSN  [facility] [place]   Device Clinic

## 2024-01-25 NOTE — PATIENT INSTRUCTIONS
Contact the Device Clinic at [phone], ext [phone] with any device concerns.    Call if any signs or symptoms of infection [phone] ext: [phone]  Fevers, chills, redness, swelling or drainage.

## 2024-02-16 ENCOUNTER — OFFICE VISIT (OUTPATIENT)
Dept: NON INVASIVE DIAGNOSTICS | Age: 79
End: 2024-02-16
Payer: MEDICARE

## 2024-02-16 VITALS
DIASTOLIC BLOOD PRESSURE: 64 MMHG | BODY MASS INDEX: 26.74 KG/M2 | RESPIRATION RATE: 18 BRPM | HEIGHT: 66 IN | WEIGHT: 166.4 LBS | HEART RATE: 58 BPM | SYSTOLIC BLOOD PRESSURE: 120 MMHG

## 2024-02-16 DIAGNOSIS — I25.10 CORONARY ARTERY DISEASE INVOLVING NATIVE CORONARY ARTERY OF NATIVE HEART WITHOUT ANGINA PECTORIS: Primary | ICD-10-CM

## 2024-02-16 DIAGNOSIS — Z95.810 IMPLANTABLE CARDIOVERTER-DEFIBRILLATOR (ICD) IN SITU: ICD-10-CM

## 2024-02-16 PROCEDURE — 1124F ACP DISCUSS-NO DSCNMKR DOCD: CPT | Performed by: NURSE PRACTITIONER

## 2024-02-16 PROCEDURE — 3078F DIAST BP <80 MM HG: CPT | Performed by: NURSE PRACTITIONER

## 2024-02-16 PROCEDURE — 93000 ELECTROCARDIOGRAM COMPLETE: CPT | Performed by: STUDENT IN AN ORGANIZED HEALTH CARE EDUCATION/TRAINING PROGRAM

## 2024-02-16 PROCEDURE — 99214 OFFICE O/P EST MOD 30 MIN: CPT | Performed by: NURSE PRACTITIONER

## 2024-02-16 PROCEDURE — 3074F SYST BP LT 130 MM HG: CPT | Performed by: NURSE PRACTITIONER

## 2024-02-16 NOTE — PROGRESS NOTES
factors:  -- Obesity: BMI goal <27.  Recommend diet and exercise.  -- BETTIE: Patient reportedly diagnosed with BETTIE in the past.  Recommend compliance with treatment.  -- HTN: BP goal <130 over 80 mmHg  -- EtOH: Continue to avoid.     SVT  - Reported in patient's chart.  I do not have any details regarding this.  - Continue to monitor via ICD.      NYHA Class II HFrEF-ischemic sp PTCA/stent RCA (7/7/97) and RYAN x 1 proximal LAD (12/12/21)  - Management per Dr. Kathleen ([facility] cardiology).     Moderate MR  -Management per Dr. Kathleen ([facility] cardiology).     PUD/GI bleed sp clip/epinephrine injection (10/8/2018)  - Continue PPI.      Recommendations:    1.Continue to monitor remotes   2.  Device clinic appt 2 weeks after CRT-D upgrade.  3. TTE 3 months after CRT-D upgrade.  Plan for below:     Unsuccessful upgrade of Medtronic dual chamber ICD to biventricular ICD due to occlusion of the left subclavian vein. Patient to be scheduled in 3 months when he returns from vacation for CRT-D upgrade via right axillary vein access and tunneling of the CS lead to the left pocket. He will need to hold apixaban for 48 hours and clopidogrel for 5 days prior to CRT-D upgrade.     Re-education on importance of well controlled HTN (goal BP < 130/80), adequate weight control (goal BMI of < 27), physical activity consisting of moderate cardiopulmonary exercise up to a goal of 250 min/wk, smoking/tobacco abstinence and limited ETOH intake.       I have spent a total of 31 minutes with the patient and the family reviewing the above stated recommendations. And a total of >50% of that time involved face-to-face time providing counseling and or coordination of care with the other providers.      Thank you for allowing me to participate in your patient's care.  Please call me if there are any questions or concerns.        Darby Barron, APRN - CNP  Cardiac Electrophysiology  [facility]  [facility] [place]: [place]

## 2024-03-08 ENCOUNTER — TELEPHONE (OUTPATIENT)
Dept: NON INVASIVE DIAGNOSTICS | Age: 79
End: 2024-03-08

## 2024-03-08 NOTE — TELEPHONE ENCOUNTER
----- Message from Darby Barron, KAYE - CNP sent at 2/16/2024  3:53 PM EST -----  Can you please schedule him for LV lead as TB described in the bottom of my note- he is supposed to have Right side tunneled to left device.   - he is going on vacation Tuesday and returns on May 2 . You can call him soon, but he needs scheduled after May 2 thanks.   Darby

## 2024-03-08 NOTE — TELEPHONE ENCOUNTER
I was able to get in contact with patient today but line disconnected.  Called patient again and left message to have patient to call back.

## 2024-04-05 PROCEDURE — 93295 DEV INTERROG REMOTE 1/2/MLT: CPT | Performed by: STUDENT IN AN ORGANIZED HEALTH CARE EDUCATION/TRAINING PROGRAM

## 2024-04-05 PROCEDURE — 93296 REM INTERROG EVL PM/IDS: CPT | Performed by: STUDENT IN AN ORGANIZED HEALTH CARE EDUCATION/TRAINING PROGRAM

## 2024-04-11 ENCOUNTER — PREP FOR PROCEDURE (OUTPATIENT)
Dept: NON INVASIVE DIAGNOSTICS | Age: 79
End: 2024-04-11

## 2024-04-11 DIAGNOSIS — I47.20 VENTRICULAR TACHYCARDIA (HCC): Primary | ICD-10-CM

## 2024-05-08 ENCOUNTER — OFFICE VISIT (OUTPATIENT)
Dept: CARDIOLOGY CLINIC | Age: 79
End: 2024-05-08
Payer: MEDICARE

## 2024-05-08 VITALS
HEIGHT: 66 IN | HEART RATE: 55 BPM | DIASTOLIC BLOOD PRESSURE: 58 MMHG | WEIGHT: 159.6 LBS | RESPIRATION RATE: 18 BRPM | SYSTOLIC BLOOD PRESSURE: 102 MMHG | BODY MASS INDEX: 25.65 KG/M2

## 2024-05-08 DIAGNOSIS — I25.10 CORONARY ARTERY DISEASE INVOLVING NATIVE CORONARY ARTERY OF NATIVE HEART WITHOUT ANGINA PECTORIS: ICD-10-CM

## 2024-05-08 DIAGNOSIS — I10 ESSENTIAL HYPERTENSION: ICD-10-CM

## 2024-05-08 DIAGNOSIS — I27.20 MODERATE TO SEVERE PULMONARY HYPERTENSION (HCC): ICD-10-CM

## 2024-05-08 DIAGNOSIS — I25.2 MI, OLD: ICD-10-CM

## 2024-05-08 DIAGNOSIS — E78.00 PURE HYPERCHOLESTEROLEMIA: ICD-10-CM

## 2024-05-08 DIAGNOSIS — Z95.5 STENTED CORONARY ARTERY: ICD-10-CM

## 2024-05-08 DIAGNOSIS — I51.9 LV DYSFUNCTION: Primary | ICD-10-CM

## 2024-05-08 DIAGNOSIS — I48.0 PAROXYSMAL ATRIAL FIBRILLATION (HCC): ICD-10-CM

## 2024-05-08 DIAGNOSIS — I50.22 CHRONIC SYSTOLIC HEART FAILURE (HCC): ICD-10-CM

## 2024-05-08 DIAGNOSIS — I47.20 VENTRICULAR TACHYARRHYTHMIA (HCC): ICD-10-CM

## 2024-05-08 PROCEDURE — 1124F ACP DISCUSS-NO DSCNMKR DOCD: CPT | Performed by: INTERNAL MEDICINE

## 2024-05-08 PROCEDURE — 3074F SYST BP LT 130 MM HG: CPT | Performed by: INTERNAL MEDICINE

## 2024-05-08 PROCEDURE — 93000 ELECTROCARDIOGRAM COMPLETE: CPT | Performed by: INTERNAL MEDICINE

## 2024-05-08 PROCEDURE — 3078F DIAST BP <80 MM HG: CPT | Performed by: INTERNAL MEDICINE

## 2024-05-08 PROCEDURE — 99214 OFFICE O/P EST MOD 30 MIN: CPT | Performed by: INTERNAL MEDICINE

## 2024-05-08 NOTE — PROGRESS NOTES
Subjective:      Patient ID: Ruben Davidson is a 78 y.o. male.  Patient Active Problem List    Diagnosis Date Noted    ICD (implantable cardioverter-defibrillator) battery depletion 01/11/2024     Priority: High    Paroxysmal atrial fibrillation ([facility])     Anemia 12/15/2021    Acute systolic (congestive) heart failure ([facility]) 12/13/2021    Ventricular tachycardia ([facility]) 12/13/2021    Mitral stenosis 12/13/2021    Mitral regurgitation 12/13/2021    Moderate to severe pulmonary hypertension ([facility]) 12/13/2021    Cardiac arrest ([facility]) 12/12/2021    LV dysfunction 01/15/2019    SVT (supraventricular tachycardia) ([facility])     Acute upper GI bleed 10/08/2018    H/O psoriatic arthritis 04/09/2018    Hyperlipidemia 10/27/2013    Stented coronary artery 10/25/2013    STEMI (ST elevation myocardial infarction) ([facility]) 07/05/2012    CAD (coronary artery disease)s/p stent RCA     Valvular heart disease     Hypertension     Obesity, morbid s/p intestinal bypass        Current Outpatient Medications   Medication Sig Dispense Refill    sacubitril-valsartan (ENTRESTO) 24-26 MG per tablet Take 1 tablet by mouth 2 times daily 60 tablet 5    metoprolol succinate (TOPROL XL) 25 MG extended release tablet Take 1 tablet by mouth daily      amiodarone (CORDARONE) 200 MG tablet Take 1 tablet by mouth every 48 hours 45 tablet 1    apixaban (ELIQUIS) 5 MG TABS tablet Take 1 tablet by mouth 2 times daily . 180 tablet 1    torsemide (DEMADEX) 20 MG tablet 2 tablets once a week Two tablets once a week      rosuvastatin (CRESTOR) 40 MG tablet TAKE ONE TABLET BY MOUTH ONCE NIGHTLY 90 tablet 1    Multiple Vitamins-Minerals (PRESERVISION AREDS 2+MULTI VIT PO) Take by mouth      Boswellia-Glucosamine-Vit D (OSTEO BI-FLEX ONE PER DAY PO) Take by mouth      clopidogrel (PLAVIX) 75 MG tablet Take 1 tablet by mouth daily . 30 tablet 3    Cetirizine HCl (ZYRTEC ALLERGY PO) Take by mouth daily      Acetaminophen (TYLENOL ARTHRITIS PAIN PO) Take by mouth daily

## 2024-05-10 LAB — T4 FREE: 1.11

## 2024-05-21 NOTE — PROGRESS NOTES
Nutrition consulted. Most recent weight and BMI monitored-     Measurements:  Wt Readings from Last 1 Encounters:   05/16/24 105.5 kg (232 lb 9.6 oz)   Body mass index is 36.43 kg/m².    Patient has been screened and assessed by RD.    Malnutrition Type:  Context: acute illness or injury  Level: moderate    Malnutrition Characteristic Summary:  Weight Loss (Malnutrition): 10% in 6 months  Subcutaneous Fat (Malnutrition): mild depletion  Muscle Mass (Malnutrition): mild depletion  Fluid Accumulation (Malnutrition): mild    Interventions/Recommendations (treatment strategy):  1.    -- TF  -- going for swallow study with SLP to assess possibility of pleasure oral feedings --> failed repeatedly   Subjective:      Patient ID: Jarek Bui is a 69 y.o. male. Patient Active Problem List    Diagnosis Date Noted    Paroxysmal atrial fibrillation ([facility]     Anemia 53/40/5052    Acute systolic (congestive) heart failure ([facility] 12/13/2021    Ventricular tachycardia ([facility] 12/13/2021    Mitral stenosis 12/13/2021    Mitral regurgitation 12/13/2021    Moderate to severe pulmonary hypertension ([facility] 12/13/2021    Cardiac arrest ([facility] 12/12/2021    LV dysfunction 01/15/2019    SVT (supraventricular tachycardia) ([facility])     Acute upper GI bleed 10/08/2018    H/O psoriatic arthritis 04/09/2018    Hyperlipidemia 10/27/2013    Stented coronary artery 10/25/2013    STEMI (ST elevation myocardial infarction) ([facility] 07/05/2012    CAD (coronary artery disease)s/p stent RCA     Valvular heart disease     Hypertension     Obesity, morbid s/p intestinal bypass        Current Outpatient Medications   Medication Sig Dispense Refill    apixaban (ELIQUIS) 5 MG TABS tablet Take 1 tablet by mouth 2 times daily . 60 tablet 5    sacubitril-valsartan (ENTRESTO)  MG per tablet TAKE ONE TABLET BY MOUTH TWO TIMES A DAY 60 tablet 5    rosuvastatin (CRESTOR) 40 MG tablet TAKE ONE TABLET BY MOUTH ONCE NIGHTLY 30 tablet 0    amiodarone (CORDARONE) 200 MG tablet TAKE ONE TABLET BY MOUTH DAILY 30 tablet 0    Multiple Vitamins-Minerals (PRESERVISION AREDS 2+MULTI VIT PO) Take by mouth      Boswellia-Glucosamine-Vit D (OSTEO BI-FLEX ONE PER DAY PO) Take by mouth      furosemide (LASIX) 40 MG tablet Take 1 tablet by mouth daily . 30 tablet 3    clopidogrel (PLAVIX) 75 MG tablet Take 1 tablet by mouth daily .  30 tablet 3    Cetirizine HCl (ZYRTEC ALLERGY PO) Take by mouth daily      Acetaminophen (TYLENOL ARTHRITIS PAIN PO) Take by mouth daily      azelastine HCl 0.15 % SOLN 2 sprays by Nasal route daily      Respiratory Therapy Supplies MONAE 1 Device by Does not apply route every 3 hours as needed (increase pressure to 12) 1 Device 0 Ferrous Sulfate (IRON) 325 (65 Fe) MG TABS daily   1    pantoprazole (PROTONIX) 40 MG tablet TAKE 1 TABLET BY MOUTH ONCE DAILY  3    Cranberry 500 MG CAPS Take 500 mg by mouth      gabapentin (NEURONTIN) 300 MG capsule Take two capsules three times daily      metoprolol succinate (TOPROL XL) 50 MG extended release tablet Take 1 tablet by mouth 2 times daily 30 tablet 3    spironolactone (ALDACTONE) 25 MG tablet Take 1 tablet by mouth daily 30 tablet 3    methotrexate (RHEUMATREX) 2.5 MG chemo tablet Take 7.5 mg by mouth once a week Indications: Thursday       folic acid (FOLVITE) 1 MG tablet Take 1 mg by mouth every morning       hydroxychloroquine (PLAQUENIL) 200 MG tablet Take 200 mg by mouth 2 times daily       DULoxetine (CYMBALTA) 60 MG extended release capsule Take 60 mg by mouth every morning       Multiple Vitamins-Minerals (MULTIVITAMIN ADULT PO) Take 1 tablet by mouth 2 times daily       Cholecalciferol (VITAMIN D3) 5000 UNITS TABS Take 1 tablet by mouth every morning       tamsulosin (FLOMAX) 0.4 MG capsule Take 0.4 mg by mouth daily       RESTASIS 0.05 % ophthalmic emulsion Place 1 drop into both eyes 2 times daily as needed        No current facility-administered medications for this visit. CC: Patient is seen  in follow-up for:  1. LV dysfunction    2. 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 SVT (supraventricular tachycardia) ([address]    4. Essential hypertension    5. Pure hypercholesterolemia    6. Stented coronary artery    7. Mitral valve regurgitation- moderate    HPI  Feeling weak and listless. Family is concerned because he appears to be increasingly weak. History of acute myocardial infarction/V. fib arrest.  Had subsequent stents x2 and implantation of ICD. Denies any chest pain or shortness of breath. Review of Systems   Constitutional: (+) fatigue. HENT: Negative for nosebleeds. Respiratory: Negative for cough. Cardiovascular: Negative for palpitations (+) leg swelling.    Gastrointestinal: Negative for blood in stool. (+) belching  Genitourinary: Negative for hematuria. Musculoskeletal: Negative for myalgias. Skin: Negative for color change. Neurological: Negative for dizziness, weakness and numbness. Hematological: Does not bruise/bleed easily. Psychiatric/Behavioral: Negative for sleep disturbance. The patient is not nervous/anxious. Objective:       Blood pressure 94/56 pulse 53 respiration 16 weight 155.9 pounds  HEAD & FACE: Normocephalic. Symmetric. EYES: No xanthelasma. Conjunctivae not injected. EARS, NOSE, MOUTH & THROAT: Good dentition. No oral pallor or cyanosis. NECK: No JVD at 30 degrees. No thyromegaly. RESPIRATORY: Clear to auscultation and percussion in all fields. No use of accessory muscle or intercostal retractions. CARDIOVASCULAR: Bradycardic. No lifts or thrills on palpitation. Auscultation with normal S1-S2 in intensity and splitting. Grade 2/6 holosystolic murmur heard best at the apical area. No carotid bruits. Abdominal aorta not enlarged. Femoral arteries without bruits. Pedal pulses 1+. No edema. ABDOMEN: Soft without hepatic or splenic enlargement. No tenderness. MUSCULOSKELETAL: (+) kyphosis or scoliosis of the back. Good muscle strength and tone. No muscle atrophy. Slow gait and inability to undergo exercise stress testing. EXTREMITIES: No clubbing or cyanosis. SKIN: No Xanthomas or ulcerations. NEUROLOGIC: Oriented to time, place and person. Normal mood and affect. LYMPHATIC:  No palpable neck or supraclavicular nodes. No splenomegaly. EKG: Sinus Bradycardia. Nonspecific ST-T wave changes. QTc 471. Assessment:      1. LV dysfunction    2. 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 SVT (supraventricular tachycardia) ([address]    4. Essential hypertension    5. Pure hypercholesterolemia    6. Stented coronary artery    7. Mitral valve regurgitation- moderate  Possible medication side effects. Plan:     1.   Reviewed last echo :  EF  30% moderate MR  2. Reviewed 8/29 labs. 3.  Check with PCP regarding possible discontinuation of Cymbalta . 4. Same medications presently.       Follow up in 3 mos

## 2024-05-22 RX ORDER — SODIUM CHLORIDE 9 MG/ML
INJECTION, SOLUTION INTRAVENOUS PRN
Status: CANCELLED | OUTPATIENT
Start: 2024-05-22

## 2024-05-22 RX ORDER — SODIUM CHLORIDE 0.9 % (FLUSH) 0.9 %
5-40 SYRINGE (ML) INJECTION PRN
Status: CANCELLED | OUTPATIENT
Start: 2024-05-22

## 2024-05-22 RX ORDER — SODIUM CHLORIDE 0.9 % (FLUSH) 0.9 %
5-40 SYRINGE (ML) INJECTION EVERY 12 HOURS SCHEDULED
Status: CANCELLED | OUTPATIENT
Start: 2024-05-22

## 2024-05-23 ENCOUNTER — HOSPITAL ENCOUNTER (INPATIENT)
Age: 79
LOS: 1 days | Discharge: HOME | DRG: 277 | End: 2024-05-24
Attending: STUDENT IN AN ORGANIZED HEALTH CARE EDUCATION/TRAINING PROGRAM | Admitting: STUDENT IN AN ORGANIZED HEALTH CARE EDUCATION/TRAINING PROGRAM
Payer: MEDICARE

## 2024-05-23 ENCOUNTER — ANESTHESIA EVENT (OUTPATIENT)
Age: 79
DRG: 265 | End: 2024-05-23
Payer: MEDICARE

## 2024-05-23 ENCOUNTER — ANESTHESIA (OUTPATIENT)
Age: 79
DRG: 265 | End: 2024-05-23
Payer: MEDICARE

## 2024-05-23 ENCOUNTER — APPOINTMENT (OUTPATIENT)
Dept: GENERAL RADIOLOGY | Age: 79
DRG: 277 | End: 2024-05-23
Attending: STUDENT IN AN ORGANIZED HEALTH CARE EDUCATION/TRAINING PROGRAM
Payer: MEDICARE

## 2024-05-23 ENCOUNTER — TELEPHONE (OUTPATIENT)
Dept: NON INVASIVE DIAGNOSTICS | Age: 79
End: 2024-05-23

## 2024-05-23 DIAGNOSIS — I47.20 VENTRICULAR TACHYARRHYTHMIA (HCC): ICD-10-CM

## 2024-05-23 DIAGNOSIS — R94.31 ABNORMAL EKG: ICD-10-CM

## 2024-05-23 DIAGNOSIS — R94.31 ABNORMAL EKG: Primary | ICD-10-CM

## 2024-05-23 PROBLEM — Z95.810 S/P ICD (INTERNAL CARDIAC DEFIBRILLATOR) PROCEDURE: Status: ACTIVE | Noted: 2024-05-23

## 2024-05-23 PROBLEM — I50.20 HFREF (HEART FAILURE WITH REDUCED EJECTION FRACTION) (HCC): Status: ACTIVE | Noted: 2024-05-23

## 2024-05-23 LAB
ALBUMIN SERPL-MCNC: 4.4 G/DL (ref 3.5–5.2)
ALP SERPL-CCNC: 110 U/L (ref 40–129)
ALT SERPL-CCNC: 55 U/L (ref 0–40)
ANION GAP SERPL CALCULATED.3IONS-SCNC: 11 MMOL/L (ref 7–16)
AST SERPL-CCNC: 69 U/L (ref 0–39)
BILIRUB SERPL-MCNC: 0.9 MG/DL (ref 0–1.2)
BUN SERPL-MCNC: 18 MG/DL (ref 6–23)
CALCIUM SERPL-MCNC: 8.9 MG/DL (ref 8.6–10.2)
CHLORIDE SERPL-SCNC: 106 MMOL/L (ref 98–107)
CO2 SERPL-SCNC: 25 MMOL/L (ref 22–29)
CREAT SERPL-MCNC: 0.9 MG/DL (ref 0.7–1.2)
ECHO BSA: 1.81 M2
EKG ATRIAL RATE: 52 BPM
EKG P AXIS: 70 DEGREES
EKG P-R INTERVAL: 308 MS
EKG Q-T INTERVAL: 532 MS
EKG QRS DURATION: 126 MS
EKG QTC CALCULATION (BAZETT): 494 MS
EKG R AXIS: 53 DEGREES
EKG T AXIS: -14 DEGREES
EKG VENTRICULAR RATE: 52 BPM
ERYTHROCYTE [DISTWIDTH] IN BLOOD BY AUTOMATED COUNT: 16.4 % (ref 11.5–15)
GFR, ESTIMATED: 89 ML/MIN/1.73M2
GLUCOSE SERPL-MCNC: 87 MG/DL (ref 74–99)
HCT VFR BLD AUTO: 38.9 % (ref 37–54)
HGB BLD-MCNC: 13.1 G/DL (ref 12.5–16.5)
MCH RBC QN AUTO: 30.1 PG (ref 26–35)
MCHC RBC AUTO-ENTMCNC: 33.7 G/DL (ref 32–34.5)
MCV RBC AUTO: 89.4 FL (ref 80–99.9)
PLATELET # BLD AUTO: 146 K/UL (ref 130–450)
PMV BLD AUTO: 10.9 FL (ref 7–12)
POTASSIUM SERPL-SCNC: 4.2 MMOL/L (ref 3.5–5)
PROT SERPL-MCNC: 7.4 G/DL (ref 6.4–8.3)
RBC # BLD AUTO: 4.35 M/UL (ref 3.8–5.8)
SODIUM SERPL-SCNC: 142 MMOL/L (ref 132–146)
WBC OTHER # BLD: 4.6 K/UL (ref 4.5–11.5)

## 2024-05-23 PROCEDURE — 0JH608Z INSERTION OF DEFIBRILLATOR GENERATOR INTO CHEST SUBCUTANEOUS TISSUE AND FASCIA, OPEN APPROACH: ICD-10-PCS | Performed by: STUDENT IN AN ORGANIZED HEALTH CARE EDUCATION/TRAINING PROGRAM

## 2024-05-23 PROCEDURE — 2500000003 HC RX 250 WO HCPCS: Performed by: STUDENT IN AN ORGANIZED HEALTH CARE EDUCATION/TRAINING PROGRAM

## 2024-05-23 PROCEDURE — 85027 COMPLETE CBC AUTOMATED: CPT

## 2024-05-23 PROCEDURE — 99223 1ST HOSP IP/OBS HIGH 75: CPT | Performed by: STUDENT IN AN ORGANIZED HEALTH CARE EDUCATION/TRAINING PROGRAM

## 2024-05-23 PROCEDURE — 71045 X-RAY EXAM CHEST 1 VIEW: CPT

## 2024-05-23 PROCEDURE — C1894 INTRO/SHEATH, NON-LASER: HCPCS | Performed by: STUDENT IN AN ORGANIZED HEALTH CARE EDUCATION/TRAINING PROGRAM

## 2024-05-23 PROCEDURE — 2580000003 HC RX 258

## 2024-05-23 PROCEDURE — 2060000000 HC ICU INTERMEDIATE R&B

## 2024-05-23 PROCEDURE — 02HL3KZ INSERTION OF DEFIBRILLATOR LEAD INTO LEFT VENTRICLE, PERCUTANEOUS APPROACH: ICD-10-PCS | Performed by: STUDENT IN AN ORGANIZED HEALTH CARE EDUCATION/TRAINING PROGRAM

## 2024-05-23 PROCEDURE — 2580000003 HC RX 258: Performed by: STUDENT IN AN ORGANIZED HEALTH CARE EDUCATION/TRAINING PROGRAM

## 2024-05-23 PROCEDURE — 3700000001 HC ADD 15 MINUTES (ANESTHESIA): Performed by: STUDENT IN AN ORGANIZED HEALTH CARE EDUCATION/TRAINING PROGRAM

## 2024-05-23 PROCEDURE — C1898 LEAD, PMKR, OTHER THAN TRANS: HCPCS | Performed by: STUDENT IN AN ORGANIZED HEALTH CARE EDUCATION/TRAINING PROGRAM

## 2024-05-23 PROCEDURE — G0378 HOSPITAL OBSERVATION PER HR: HCPCS

## 2024-05-23 PROCEDURE — 93010 ELECTROCARDIOGRAM REPORT: CPT | Performed by: INTERNAL MEDICINE

## 2024-05-23 PROCEDURE — 6360000002 HC RX W HCPCS: Performed by: STUDENT IN AN ORGANIZED HEALTH CARE EDUCATION/TRAINING PROGRAM

## 2024-05-23 PROCEDURE — 3700000000 HC ANESTHESIA ATTENDED CARE: Performed by: STUDENT IN AN ORGANIZED HEALTH CARE EDUCATION/TRAINING PROGRAM

## 2024-05-23 PROCEDURE — 93799 UNLISTED CV SVC/PROCEDURE: CPT | Performed by: STUDENT IN AN ORGANIZED HEALTH CARE EDUCATION/TRAINING PROGRAM

## 2024-05-23 PROCEDURE — C1769 GUIDE WIRE: HCPCS | Performed by: STUDENT IN AN ORGANIZED HEALTH CARE EDUCATION/TRAINING PROGRAM

## 2024-05-23 PROCEDURE — 6370000000 HC RX 637 (ALT 250 FOR IP): Performed by: STUDENT IN AN ORGANIZED HEALTH CARE EDUCATION/TRAINING PROGRAM

## 2024-05-23 PROCEDURE — 6360000002 HC RX W HCPCS

## 2024-05-23 PROCEDURE — 02H63KZ INSERTION OF DEFIBRILLATOR LEAD INTO RIGHT ATRIUM, PERCUTANEOUS APPROACH: ICD-10-PCS | Performed by: STUDENT IN AN ORGANIZED HEALTH CARE EDUCATION/TRAINING PROGRAM

## 2024-05-23 PROCEDURE — 2709999900 HC NON-CHARGEABLE SUPPLY: Performed by: STUDENT IN AN ORGANIZED HEALTH CARE EDUCATION/TRAINING PROGRAM

## 2024-05-23 PROCEDURE — 33225 L VENTRIC PACING LEAD ADD-ON: CPT | Performed by: STUDENT IN AN ORGANIZED HEALTH CARE EDUCATION/TRAINING PROGRAM

## 2024-05-23 PROCEDURE — 33264 RMVL & RPLCMT DFB GEN MLT LD: CPT | Performed by: STUDENT IN AN ORGANIZED HEALTH CARE EDUCATION/TRAINING PROGRAM

## 2024-05-23 PROCEDURE — 02HK3KZ INSERTION OF DEFIBRILLATOR LEAD INTO RIGHT VENTRICLE, PERCUTANEOUS APPROACH: ICD-10-PCS | Performed by: STUDENT IN AN ORGANIZED HEALTH CARE EDUCATION/TRAINING PROGRAM

## 2024-05-23 PROCEDURE — 36558 INSERT TUNNELED CV CATH: CPT | Performed by: STUDENT IN AN ORGANIZED HEALTH CARE EDUCATION/TRAINING PROGRAM

## 2024-05-23 PROCEDURE — 2720000010 HC SURG SUPPLY STERILE: Performed by: STUDENT IN AN ORGANIZED HEALTH CARE EDUCATION/TRAINING PROGRAM

## 2024-05-23 PROCEDURE — 02PA3MZ REMOVAL OF CARDIAC LEAD FROM HEART, PERCUTANEOUS APPROACH: ICD-10-PCS | Performed by: STUDENT IN AN ORGANIZED HEALTH CARE EDUCATION/TRAINING PROGRAM

## 2024-05-23 PROCEDURE — 7100000010 HC PHASE II RECOVERY - FIRST 15 MIN: Performed by: STUDENT IN AN ORGANIZED HEALTH CARE EDUCATION/TRAINING PROGRAM

## 2024-05-23 PROCEDURE — 93005 ELECTROCARDIOGRAM TRACING: CPT | Performed by: STUDENT IN AN ORGANIZED HEALTH CARE EDUCATION/TRAINING PROGRAM

## 2024-05-23 PROCEDURE — 7100000011 HC PHASE II RECOVERY - ADDTL 15 MIN: Performed by: STUDENT IN AN ORGANIZED HEALTH CARE EDUCATION/TRAINING PROGRAM

## 2024-05-23 PROCEDURE — C1882 AICD, OTHER THAN SING/DUAL: HCPCS | Performed by: STUDENT IN AN ORGANIZED HEALTH CARE EDUCATION/TRAINING PROGRAM

## 2024-05-23 PROCEDURE — C1889 IMPLANT/INSERT DEVICE, NOC: HCPCS | Performed by: STUDENT IN AN ORGANIZED HEALTH CARE EDUCATION/TRAINING PROGRAM

## 2024-05-23 PROCEDURE — 80053 COMPREHEN METABOLIC PANEL: CPT

## 2024-05-23 PROCEDURE — C1892 INTRO/SHEATH,FIXED,PEEL-AWAY: HCPCS | Performed by: STUDENT IN AN ORGANIZED HEALTH CARE EDUCATION/TRAINING PROGRAM

## 2024-05-23 DEVICE — ENVELOPE CMRM6133 ABSORB LRG MR
Type: IMPLANTABLE DEVICE | Site: CHEST | Status: FUNCTIONAL
Brand: TYRX™

## 2024-05-23 DEVICE — LEAD 3830 US MKT/ 69CM MRI LBBAP
Type: IMPLANTABLE DEVICE | Site: CHEST | Status: FUNCTIONAL
Brand: SELECTSECURE™ MRI SURESCAN™

## 2024-05-23 DEVICE — ENVELOPE CMRM6122 ABSORB MED MR
Type: IMPLANTABLE DEVICE | Site: CHEST | Status: FUNCTIONAL
Brand: TYRX™

## 2024-05-23 DEVICE — CRTD DTPA2D4 COBALT XT HF MRI DF4 USA
Type: IMPLANTABLE DEVICE | Site: CHEST | Status: FUNCTIONAL
Brand: COBALT™ XT HF CRT-D MRI SURESCAN™

## 2024-05-23 RX ORDER — DIPHENHYDRAMINE HYDROCHLORIDE 50 MG/ML
50 INJECTION INTRAMUSCULAR; INTRAVENOUS ONCE
Status: COMPLETED | OUTPATIENT
Start: 2024-05-23 | End: 2024-05-23

## 2024-05-23 RX ORDER — ACETAMINOPHEN 325 MG/1
650 TABLET ORAL EVERY 6 HOURS PRN
Status: DISCONTINUED | OUTPATIENT
Start: 2024-05-23 | End: 2024-05-23

## 2024-05-23 RX ORDER — POLYETHYLENE GLYCOL 3350 17 G/17G
17 POWDER, FOR SOLUTION ORAL DAILY PRN
Status: DISCONTINUED | OUTPATIENT
Start: 2024-05-23 | End: 2024-05-24 | Stop reason: HOSPADM

## 2024-05-23 RX ORDER — SODIUM CHLORIDE 0.9 % (FLUSH) 0.9 %
5-40 SYRINGE (ML) INJECTION EVERY 12 HOURS SCHEDULED
Status: DISCONTINUED | OUTPATIENT
Start: 2024-05-23 | End: 2024-05-24 | Stop reason: HOSPADM

## 2024-05-23 RX ORDER — ONDANSETRON 4 MG/1
4 TABLET, ORALLY DISINTEGRATING ORAL EVERY 8 HOURS PRN
Status: DISCONTINUED | OUTPATIENT
Start: 2024-05-23 | End: 2024-05-24 | Stop reason: HOSPADM

## 2024-05-23 RX ORDER — TAMSULOSIN HYDROCHLORIDE 0.4 MG/1
0.4 CAPSULE ORAL DAILY
Status: DISCONTINUED | OUTPATIENT
Start: 2024-05-23 | End: 2024-05-24 | Stop reason: HOSPADM

## 2024-05-23 RX ORDER — SODIUM CHLORIDE 9 MG/ML
INJECTION, SOLUTION INTRAVENOUS PRN
Status: DISCONTINUED | OUTPATIENT
Start: 2024-05-23 | End: 2024-05-24 | Stop reason: HOSPADM

## 2024-05-23 RX ORDER — FENTANYL CITRATE 50 UG/ML
INJECTION, SOLUTION INTRAMUSCULAR; INTRAVENOUS PRN
Status: DISCONTINUED | OUTPATIENT
Start: 2024-05-23 | End: 2024-05-23 | Stop reason: SDUPTHER

## 2024-05-23 RX ORDER — ACETAMINOPHEN 650 MG/1
650 SUPPOSITORY RECTAL EVERY 6 HOURS PRN
Status: DISCONTINUED | OUTPATIENT
Start: 2024-05-23 | End: 2024-05-23

## 2024-05-23 RX ORDER — SODIUM CHLORIDE 0.9 % (FLUSH) 0.9 %
5-40 SYRINGE (ML) INJECTION EVERY 12 HOURS SCHEDULED
Status: DISCONTINUED | OUTPATIENT
Start: 2024-05-23 | End: 2024-05-23 | Stop reason: HOSPADM

## 2024-05-23 RX ORDER — LIDOCAINE HYDROCHLORIDE 10 MG/ML
INJECTION, SOLUTION INFILTRATION; PERINEURAL PRN
Status: DISCONTINUED | OUTPATIENT
Start: 2024-05-23 | End: 2024-05-23 | Stop reason: HOSPADM

## 2024-05-23 RX ORDER — SODIUM CHLORIDE 0.9 % (FLUSH) 0.9 %
5-40 SYRINGE (ML) INJECTION PRN
Status: DISCONTINUED | OUTPATIENT
Start: 2024-05-23 | End: 2024-05-23 | Stop reason: HOSPADM

## 2024-05-23 RX ORDER — ROSUVASTATIN CALCIUM 20 MG/1
40 TABLET, COATED ORAL NIGHTLY
Status: DISCONTINUED | OUTPATIENT
Start: 2024-05-23 | End: 2024-05-24 | Stop reason: HOSPADM

## 2024-05-23 RX ORDER — PANTOPRAZOLE SODIUM 40 MG/1
40 TABLET, DELAYED RELEASE ORAL DAILY
Status: DISCONTINUED | OUTPATIENT
Start: 2024-05-23 | End: 2024-05-24 | Stop reason: HOSPADM

## 2024-05-23 RX ORDER — SODIUM CHLORIDE 0.9 % (FLUSH) 0.9 %
5-40 SYRINGE (ML) INJECTION PRN
Status: DISCONTINUED | OUTPATIENT
Start: 2024-05-23 | End: 2024-05-24 | Stop reason: HOSPADM

## 2024-05-23 RX ORDER — HYDROXYCHLOROQUINE SULFATE 200 MG/1
200 TABLET, FILM COATED ORAL 2 TIMES DAILY
Status: DISCONTINUED | OUTPATIENT
Start: 2024-05-23 | End: 2024-05-24 | Stop reason: HOSPADM

## 2024-05-23 RX ORDER — AZELASTINE HCL 205.5 UG/1
2 SPRAY NASAL DAILY
Status: DISCONTINUED | OUTPATIENT
Start: 2024-05-23 | End: 2024-05-23

## 2024-05-23 RX ORDER — ONDANSETRON 2 MG/ML
4 INJECTION INTRAMUSCULAR; INTRAVENOUS EVERY 6 HOURS PRN
Status: DISCONTINUED | OUTPATIENT
Start: 2024-05-23 | End: 2024-05-24 | Stop reason: HOSPADM

## 2024-05-23 RX ORDER — ACETAMINOPHEN 325 MG/1
650 TABLET ORAL EVERY 4 HOURS PRN
Status: DISCONTINUED | OUTPATIENT
Start: 2024-05-23 | End: 2024-05-24 | Stop reason: HOSPADM

## 2024-05-23 RX ORDER — FOLIC ACID 1 MG/1
1 TABLET ORAL EVERY MORNING
Status: DISCONTINUED | OUTPATIENT
Start: 2024-05-24 | End: 2024-05-24 | Stop reason: HOSPADM

## 2024-05-23 RX ORDER — SODIUM CHLORIDE 0.9 % (FLUSH) 0.9 %
5-40 SYRINGE (ML) INJECTION PRN
Status: DISCONTINUED | OUTPATIENT
Start: 2024-05-23 | End: 2024-05-23

## 2024-05-23 RX ORDER — SODIUM CHLORIDE 0.9 % (FLUSH) 0.9 %
5-40 SYRINGE (ML) INJECTION EVERY 12 HOURS SCHEDULED
Status: DISCONTINUED | OUTPATIENT
Start: 2024-05-23 | End: 2024-05-23

## 2024-05-23 RX ORDER — METOPROLOL SUCCINATE 25 MG/1
25 TABLET, EXTENDED RELEASE ORAL DAILY
Status: DISCONTINUED | OUTPATIENT
Start: 2024-05-23 | End: 2024-05-24 | Stop reason: HOSPADM

## 2024-05-23 RX ORDER — PROPOFOL 10 MG/ML
INJECTION, EMULSION INTRAVENOUS CONTINUOUS PRN
Status: DISCONTINUED | OUTPATIENT
Start: 2024-05-23 | End: 2024-05-23 | Stop reason: SDUPTHER

## 2024-05-23 RX ORDER — SODIUM CHLORIDE 9 MG/ML
INJECTION, SOLUTION INTRAVENOUS PRN
Status: DISCONTINUED | OUTPATIENT
Start: 2024-05-23 | End: 2024-05-23 | Stop reason: HOSPADM

## 2024-05-23 RX ORDER — SODIUM CHLORIDE 9 MG/ML
INJECTION, SOLUTION INTRAVENOUS CONTINUOUS PRN
Status: DISCONTINUED | OUTPATIENT
Start: 2024-05-23 | End: 2024-05-23 | Stop reason: SDUPTHER

## 2024-05-23 RX ORDER — CETIRIZINE HYDROCHLORIDE 5 MG/1
5 TABLET ORAL DAILY
Status: DISCONTINUED | OUTPATIENT
Start: 2024-05-23 | End: 2024-05-24 | Stop reason: HOSPADM

## 2024-05-23 RX ORDER — GABAPENTIN 300 MG/1
300 CAPSULE ORAL 3 TIMES DAILY
Status: DISCONTINUED | OUTPATIENT
Start: 2024-05-23 | End: 2024-05-24 | Stop reason: HOSPADM

## 2024-05-23 RX ORDER — DULOXETIN HYDROCHLORIDE 30 MG/1
30 CAPSULE, DELAYED RELEASE ORAL EVERY MORNING
Status: DISCONTINUED | OUTPATIENT
Start: 2024-05-24 | End: 2024-05-24 | Stop reason: HOSPADM

## 2024-05-23 RX ADMIN — HYDROXYCHLOROQUINE SULFATE 200 MG: 200 TABLET ORAL at 21:40

## 2024-05-23 RX ADMIN — FENTANYL CITRATE 10 MCG: 50 INJECTION, SOLUTION INTRAMUSCULAR; INTRAVENOUS at 15:43

## 2024-05-23 RX ADMIN — FENTANYL CITRATE 20 MCG: 50 INJECTION, SOLUTION INTRAMUSCULAR; INTRAVENOUS at 14:08

## 2024-05-23 RX ADMIN — TAMSULOSIN HYDROCHLORIDE 0.4 MG: 0.4 CAPSULE ORAL at 19:49

## 2024-05-23 RX ADMIN — ROSUVASTATIN 40 MG: 20 TABLET, FILM COATED ORAL at 19:43

## 2024-05-23 RX ADMIN — SODIUM CHLORIDE: 9 INJECTION, SOLUTION INTRAVENOUS at 13:48

## 2024-05-23 RX ADMIN — SODIUM CHLORIDE, PRESERVATIVE FREE 10 ML: 5 INJECTION INTRAVENOUS at 19:50

## 2024-05-23 RX ADMIN — SACUBITRIL AND VALSARTAN 1 TABLET: 24; 26 TABLET, FILM COATED ORAL at 19:44

## 2024-05-23 RX ADMIN — FENTANYL CITRATE 20 MCG: 50 INJECTION, SOLUTION INTRAMUSCULAR; INTRAVENOUS at 16:20

## 2024-05-23 RX ADMIN — ACETAMINOPHEN 650 MG: 325 TABLET ORAL at 21:42

## 2024-05-23 RX ADMIN — GABAPENTIN 300 MG: 300 CAPSULE ORAL at 19:44

## 2024-05-23 RX ADMIN — VANCOMYCIN HYDROCHLORIDE 1000 MG: 1 INJECTION, POWDER, LYOPHILIZED, FOR SOLUTION INTRAVENOUS at 13:14

## 2024-05-23 RX ADMIN — METOPROLOL SUCCINATE 25 MG: 25 TABLET, EXTENDED RELEASE ORAL at 19:49

## 2024-05-23 RX ADMIN — CEFAZOLIN 2000 MG: 1 INJECTION, POWDER, FOR SOLUTION INTRAMUSCULAR; INTRAVENOUS at 13:07

## 2024-05-23 RX ADMIN — FENTANYL CITRATE 10 MCG: 50 INJECTION, SOLUTION INTRAMUSCULAR; INTRAVENOUS at 15:36

## 2024-05-23 RX ADMIN — CETIRIZINE HYDROCHLORIDE 5 MG: 5 TABLET ORAL at 21:40

## 2024-05-23 RX ADMIN — FENTANYL CITRATE 20 MCG: 50 INJECTION, SOLUTION INTRAMUSCULAR; INTRAVENOUS at 13:57

## 2024-05-23 RX ADMIN — SODIUM CHLORIDE: 9 INJECTION, SOLUTION INTRAVENOUS at 13:56

## 2024-05-23 RX ADMIN — WATER 200 MG: 1 INJECTION INTRAMUSCULAR; INTRAVENOUS; SUBCUTANEOUS at 08:45

## 2024-05-23 RX ADMIN — PANTOPRAZOLE SODIUM 40 MG: 40 TABLET, DELAYED RELEASE ORAL at 19:49

## 2024-05-23 RX ADMIN — PROPOFOL 25 MCG/KG/MIN: 10 INJECTION, EMULSION INTRAVENOUS at 13:57

## 2024-05-23 RX ADMIN — FENTANYL CITRATE 20 MCG: 50 INJECTION, SOLUTION INTRAMUSCULAR; INTRAVENOUS at 16:51

## 2024-05-23 RX ADMIN — WATER 100 MG: 1 INJECTION INTRAMUSCULAR; INTRAVENOUS; SUBCUTANEOUS at 13:06

## 2024-05-23 RX ADMIN — DIPHENHYDRAMINE HYDROCHLORIDE 50 MG: 50 INJECTION INTRAMUSCULAR; INTRAVENOUS at 13:05

## 2024-05-23 ASSESSMENT — PAIN DESCRIPTION - DESCRIPTORS: DESCRIPTORS: ACHING

## 2024-05-23 ASSESSMENT — PAIN SCALES - GENERAL
PAINLEVEL_OUTOF10: 5
PAINLEVEL_OUTOF10: 2

## 2024-05-23 ASSESSMENT — PAIN DESCRIPTION - LOCATION: LOCATION: CHEST

## 2024-05-23 ASSESSMENT — PAIN - FUNCTIONAL ASSESSMENT: PAIN_FUNCTIONAL_ASSESSMENT: ACTIVITIES ARE NOT PREVENTED

## 2024-05-23 ASSESSMENT — PAIN DESCRIPTION - ORIENTATION: ORIENTATION: RIGHT

## 2024-05-23 NOTE — DISCHARGE INSTRUCTIONS
[facility] Defibrillator Instructions    Medications:  Resume all home medications EXCEPT apixaban (Eliquis) and clopidogrel (Plavix). RESTART apixaban (Eliquis) if cleared by Device Clinic. RESTART clopidogrel (Plavix) on 5/29/24. START cephalexin 500 mg tablet, take 1 tablet by mouth every 12 hours for 5 days.  Prescription sent to your [facility] pharmacy in [place], [place].    Incision Care:  White bandage: remove on 5/25/24. Do NOT remove brown bandage at that time.  Brown Aquacel dressing: Located over your incision. Remove in 1 week, Thursday, 5/30/24.   Surgical glue: Located under the brown dressings and over your incision. This glue is there to prevent infection. Do NOT scrub, itch, pick, or remove the glue as this could lead to infection. The glue will fall off on its own over the next 6 weeks.  Daily monitoring: Check incision sites on chest daily. Notify the office at [phone] if you develop any redness, swelling, drainage, warmth, or fever greater than 100 degrees.     Bathing:  Keep the incision dry. You may shower tomorrow evening after you remove the white pressure dressing, but do NOT spray the water directly at the site or scrub at the site. Pat dry only with a clean towel. No soaking in a bathtub, hot tub, or pool for 6 weeks.     Activity:  You may resume normal activity with left arm restrictions.  Arm restrictions:  Arm immobilizer: Wear the arm immobilizer at all times for 48 hours except to shower, toilet, and eat. After 48 hours, wear the arm immobilizer at night for the next 4 weeks. After 4 weeks you do not need to wear the arm immobilizer anymore. The immobilizer should be loose, not tight in order to avoid restriction of blood flow.  Avoid pulling yourself up with your arm, pushing or stretching motions.   Do not raise left elbow higher than shoulder height and do not lift anything weighing more than 3 pounds for 6 weeks.    Do not do any activities such as golfing, vacuuming,  an ana paula can be used with newer smartphones to provide monitoring services. Please discuss this with the device clinic at your follow up appointment in 2 weeks.     ICD Shock:  If your defibrillator gives you a shock, please notify the [facility] office or answering service.  If you are shocked more than once in a day or several times in a week, go the nearest Emergency Room and have the physician page the Cardiac Electrophysiology Doctor on call at [phone].    ID Card:  You will have a temporary ID card until a permanent card is sent to you by the device company.  The permanent card will look like a 's license or credit card and should arrive within 8 weeks.  Carry your ID card with you at all times.    Follow Up:  Device Clinic: You will be seen on 6/6/24 at 10:00 AM at the Device Clinic for  incision check and brief Pacemaker evaluation.  If you need to reschedule this appointment, call the office at [phone].  Echocardiogram: you will be contacted to schedule an echocardiogram in ~3 months. If you are not contacted within 10 business days, please call [phone].  Dr. Luther Office: You will be seen on 8/26/24 at 12:30 PM for a follow up appointment with the nurse practitioner. If you need to reschedule this appointment please call the office at [phone].  Dr Yanes: You are currently scheduled for an appointment on 7/10/2024  7:15 AM.  Please reschedule this appointment from to 10/2024. Please do NOT pursue intervention of valve dysfunction until cleared by Dr Luther office.    [facility]  [address] [place] [address], [place]  [address]  [phone]

## 2024-05-23 NOTE — PROGRESS NOTES
4 Eyes Skin Assessment     NAME:  Ruben Davidson  YOB: 1945  MEDICAL RECORD NUMBER:  [id number]    The patient is being assessed for  Admission    I agree that at least one RN has performed a thorough Head to Toe Skin Assessment on the patient. ALL assessment sites listed below have been assessed.      Areas assessed by both nurses:    Head, Face, Ears, Shoulders, Back, Chest, Arms, Elbows, Hands, Sacrum. Buttock, Coccyx, Ischium, Legs. Feet and Heels, and Under Medical Devices         Does the Patient have a Wound? No noted wound(s)       Avelino Prevention initiated by RN: No  Wound Care Orders initiated by RN: No    Pressure Injury (Stage 3,4, Unstageable, DTI, NWPT, and Complex wounds) if present, place Wound referral order by RN under : No    New Ostomies, if present place, Ostomy referral order under : No     Nurse 1 eSignature: Electronically signed by Miya Juarez RN on 5/23/24 at 6:41 PM EDT    **SHARE this note so that the co-signing nurse can place an eSignature**    Nurse 2 eSignature: Electronically signed by Lucia Alcantar RN on 5/23/24 at 6:42 PM EDT

## 2024-05-23 NOTE — ANESTHESIA POSTPROCEDURE EVALUATION
Department of Anesthesiology  Postprocedure Note    Patient: Ruben Davidson  MRN: [id number]  YOB: 1945  Date of evaluation: 5/23/2024    Procedure Summary       Date: 05/23/24 Room / Location: [facility] EP LAB 1 [place] [facility]    Anesthesia Start: 1348 Anesthesia Stop:     Procedure: Remove & replace ICD biv multi leads Diagnosis:       Ventricular tachyarrhythmia (HCC)      (Ventricular tachyarrhythmia (HCC) [I47.20])    Providers: Viktor Luther DO Responsible Provider: Vickey Martin MD    Anesthesia Type: MAC ASA Status: 4            Anesthesia Type: No value filed.    Mildred Phase I:      Mildred Phase II:      Anesthesia Post Evaluation    Patient location during evaluation: bedside  Patient participation: complete - patient cannot participate  Level of consciousness: awake and alert  Airway patency: patent  Nausea & Vomiting: no nausea and no vomiting  Cardiovascular status: blood pressure returned to baseline  Respiratory status: acceptable  Hydration status: euvolemic  Multimodal analgesia pain management approach    No notable events documented.

## 2024-05-23 NOTE — PROGRESS NOTES
[employer] identification card given to patient and wife at this time     Electronically signed by Miya Juarez RN on 5/23/2024 at 6:43 PM

## 2024-05-23 NOTE — ANESTHESIA PRE PROCEDURE
Department of Anesthesiology  Preprocedure Note       Name:  Ruben Davidson   Age:  78 y.o.  :  1945                                          MRN:  [id number]         Date:  2024      Surgeon: Surgeon(s):  Viktor Luther DO    Procedure:     Medications prior to admission:   Prior to Admission medications    Medication Sig Start Date End Date Taking? Authorizing Provider   sacubitril-valsartan (ENTRESTO) 24-26 MG per tablet Take 1 tablet by mouth 2 times daily 24   Tor Kathleen MD   metoprolol succinate (TOPROL XL) 25 MG extended release tablet Take 1 tablet by mouth daily 10/24/23   Modesto Farias MD   amiodarone (CORDARONE) 200 MG tablet Take 1 tablet by mouth every 48 hours 23  Viktor Luther DO   apixaban (ELIQUIS) 5 MG TABS tablet Take 1 tablet by mouth 2 times daily . 5/3/23   Tor Kathleen MD   torsemide (DEMADEX) 20 MG tablet 2 tablets once a week Two tablets once a week 22   Modesto Farias MD   rosuvastatin (CRESTOR) 40 MG tablet TAKE ONE TABLET BY MOUTH ONCE NIGHTLY 22   Tor Kathleen MD   Multiple Vitamins-Minerals (PRESERVISION AREDS 2+MULTI VIT PO) Take by mouth    Modesto Farias MD   Boswellia-Glucosamine-Vit D (OSTEO BI-FLEX ONE PER DAY PO) Take by mouth    Modesto Farias MD   clopidogrel (PLAVIX) 75 MG tablet Take 1 tablet by mouth daily . 21   Bobo Arvizu MD   Cetirizine HCl (ZYRTEC ALLERGY PO) Take by mouth daily    Modesto Farias MD   Acetaminophen (TYLENOL ARTHRITIS PAIN PO) Take by mouth daily    Modesto Farias MD   azelastine HCl 0.15 % SOLN 2 sprays by Nasal route daily    Modesto Farias MD   Ferrous Sulfate (IRON) 325 (65 Fe) MG TABS daily  19   Modesto Farias MD   pantoprazole (PROTONIX) 40 MG tablet TAKE 1 TABLET BY MOUTH ONCE DAILY 19   Modesto Farias MD   Cranberry 500 MG CAPS Take 1 capsule by mouth    Modesto Farias MD   gabapentin

## 2024-05-23 NOTE — PROGRESS NOTES
Patient arrived to unit by cath lab staff, vss, telepack applied, database completed by this RN patient and wife. No issues or concerns at this time     Electronically signed by Miya Juarez RN on 5/23/2024 at 6:40 PM

## 2024-05-23 NOTE — H&P
[facility] CARDIOLOGY  CARDIAC ELECTROPHYSIOLOGY DEPARTMENT/DIVISION OF CARDIOLOGY  Outpatient Progress Report  PATIENT: Ruben Davidson  MEDICAL RECORD NUMBER: [id number]  DATE OF SERVICE:  5/22/2024  ATTENDING ELECTROPHYSIOLOGIST:  Viktor Luther D.O.  REFERRING PHYSICIAN: Viktor Luther DO and Barrington Garcia MD  CHIEF COMPLAINT: VT s/p dual chamber ICD implantation    HPI: Ruben Davidson is a 78 y.o. male with a history of MMVT sp MDT DC ICD (DOI: 12/20/2021-Dr. Luther), nonvalvular paroxysmal AF/AFL, SVT, NYHA Class II HFrEF-mixed (ischemic sp PTCA/stent RCA (7/7/97) and RYAN x 1 proximal LAD (12/12/21), moderate MR, HTN, DM2, overweight sp gastric bypass, BETTIE, PUD/GI bleed sp clip/epinephrine injection (10/8/2018), and psoriatic arthritis. He is managed by Dr Kathleen with amiodarone 200 mg QOD, apixaban 5 mg twice daily, clopidogrel 75 mg daily, metoprolol XL 50 mg BID, rosuvastatin 40 mg daily, torsemide 40 mg every 7 days, and PPI.  In 1997, appears patient had PTCA/stent to RCA based on available records. LVEF prior to 2017 is unclear. Since 2017, LVEF declined from 45-50% to 29% in 2020.  In 12/2021, he presented with MMVT at 190 bpm with LBBB morphology, which was treated with external defibrillation and amiodarone.  Following defibrillation, he was diagnosed with STEMI, which was treated with RYAN x1 proximal LAD.  Following PCI, he was noted have paroxysms of AF/AFL, was treated with OAC.  Prior to discharge, a Medtronic dual-chamber ICD was implanted.  At the time of implant, he was noted to have low voltage throughout the RA chamber in the setting of AF.  Ultimately, the RA lead was positioned in the RAA as this had the best sensing on passive mapping of the RA chamber. In 12/2022, TTE reported LVEF of 42%. In 8/2023, RV pacing increased from 5% to 60%. In 9/2023, TTE reported LVEF reduced to 35%. In 1/2024, TTE reported LVEF of 15-20%. He was referred for CRT-D    Cardiovascular: pertinent positives in HPI  Gastrointestinal: Negative for abdominal pain and blood in stool.   Genitourinary: Negative for hematuria and difficulty urinating.   Musculoskeletal: Negative for myalgias and gait problem.   Skin: Negative for color change and rash.   Neurological: Negative for syncope and light-headedness.   Psychiatric/Behavioral: Negative for confusion and agitation. The patient is not nervous/anxious.  Heme: no bleeding disorders, no melena or hematochezia  All other review of systems are negative     PHYSICAL EXAM:  Constitutional /73   Pulse 62   Temp 97 °F (36.1 °C) (Temporal)   Resp 18   Ht 1.676 m (5' 6\")   Wt 70.3 kg (155 lb)   SpO2 98%   BMI 25.02 kg/m²    Well-developed, no acute distress, well groomed  Eyes: conjunctivae normal, no xanthelasma   Ears, Nose, Throat: oral mucosa moist, no cyanosis   Neck: supple, no JVD, no bruits, no thyromegaly   CV: normal rate, regular rhythm,  no murmurs, rubs, or gallops. PMI is nondisplaced, Peripheral pulses normal including carotid auscultation, no noted aortic bruit, bilateral femoral and pedal pulses are normal in quality  Lungs: clear to auscultation bilaterally, normal respiratory effort without used of accessory muscles, no wheezes  Abdomen: soft, non-tender, bowel sounds present, no masses or hepatomegaly   Extremities: no digital clubbing, no edema   Skin: warm, no rashes   Neuro/Psych: A&O x 3, normal mood and affect  ICD site: Clean, dry, intact; no erythema, edema, or drainage    Assessment/plan:  MMVT sp MDT DC ICD (DOI: 12/20/2021-Dr. Luther)  - In the setting of ischemic cardiomyopathy.  - In 12/2021, patient had MMVT at 190 bpm with LBBB morphology.  On amiodarone and metoprolol since that time.  No recurrence.  - Continue amiodarone 200 mg QOD.  While on amiodarone, recommend monitoring of TFTs and LFTs every 6 months, as well as annual chest x-ray and eye exam.  Patient reportedly has monitoring at PCP's  office.  - Continue metoprolol XL 50 mg BID.  - Stable device function.  Chronic poor RA sensing.  At time of implant, extensive passive mapping of the RA chamber was performed and noted to have low voltage throughout the RA chamber.  RA lead tip positioned in the RAA as this was the best sensing.  -- RV pacing > 20% with LVEF < 50%. Left subclavian vein occluded. Recommend upgrade to CRT-D via CS lead implant right axillary and tunneling to left anterior chest pocket. I reviewed indications, material risks, benefits, and alternatives with patient. He is agreeable to proceed. His last dose of apixaban and clopidogrel was yesterday. Hold both agents until instructed to resume by Device Clinic.  - Continue remote monitoring every 91 days.  -- Follow-up:  ---- Device clinic: 6/6/24 at 10:00 AM  ---- TTE: I will message office to arrange in ~3 months.  ---- EP appt: 8/26/24 at 12:30 PM  - Cephalexin 500 mg BID x 5 days at discharge.    nonvalvular paroxysmal AF/AFL  - Initially diagnosed in 12/2021.  - [id number] score = 6 (age, HF, CAD, HTN, DM).  Recommend OAC and may also score of 1 or more.  DOAC preferred.  - Continue apixaban 5 mg twice daily.  While on DOAC, recommend annual monitoring of CBC and CMP.  Patient is also on clopidogrel per cardiology for CAD.  While on multiple antithrombotic agents, recommend PPI to reduce risk of major GI bleed.  Continue PPI.  - Recommend rhythm control in the setting of HFrEF.  AF burden on interrogation today is < 1%.  Continue to monitor via ICD.  - Modifiable risk factors:  -- Obesity: BMI goal <27.  Recommend diet and exercise.  -- BETTIE: Patient reportedly diagnosed with BETTIE in the past.  Recommend compliance with treatment.  -- HTN: BP goal <130 over 80 mmHg  -- EtOH: Continue to avoid.    SVT  - Reported in patient's chart.  I do not have any details regarding this.  - Continue to monitor via ICD.     NYHA Class II HFrEF- mixed (ischemic and RV pacing) sp PTCA/stent RCA

## 2024-05-23 NOTE — ANESTHESIA POSTPROCEDURE EVALUATION
Department of Anesthesiology  Postprocedure Note    Patient: Ruben Davidson  MRN: [id number]  YOB: 1945  Date of evaluation: 5/23/2024    Procedure Summary       Date: 05/23/24 [place] [facility] [place] [facility] [place] [facility]    Anesthesia Start:  Anesthesia Stop:     Procedure: Remove & replace ICD biv multi leads Diagnosis:       Ventricular tachyarrhythmia (HCC)      (Ventricular tachyarrhythmia (HCC) [I47.20])    Providers: Viktor Luther DO Responsible Provider:     Anesthesia Type: MAC ASA Status: 4            Anesthesia Type: No value filed.    Mildred Phase I:      Mildred Phase II:      Anesthesia Post Evaluation    Patient location during evaluation: PACU  Patient participation: complete - patient participated  Level of consciousness: awake  Pain score: 3  Airway patency: patent  Nausea & Vomiting: no nausea and no vomiting  Cardiovascular status: blood pressure returned to baseline  Respiratory status: acceptable  Hydration status: euvolemic    No notable events documented.

## 2024-05-23 NOTE — PLAN OF CARE
Problem: Chronic Conditions and Co-morbidities  Goal: Patient's chronic conditions and co-morbidity symptoms are monitored and maintained or improved  Outcome: Progressing     Problem: Discharge Planning  Goal: Discharge to home or other facility with appropriate resources  Outcome: Progressing     Problem: Cardiovascular - Adult  Goal: Absence of cardiac dysrhythmias or at baseline  Outcome: Progressing     Problem: Cardiovascular - Adult  Goal: Maintains optimal cardiac output and hemodynamic stability  Outcome: Progressing

## 2024-05-23 NOTE — TELEPHONE ENCOUNTER
----- Message from Viktor Luther DO sent at 5/22/2024 10:13 PM EDT -----  Regarding: TTE  Please arrange TTE in ~3 months.    -Viktor Luther DO

## 2024-05-24 ENCOUNTER — APPOINTMENT (OUTPATIENT)
Dept: GENERAL RADIOLOGY | Age: 79
DRG: 277 | End: 2024-05-24
Attending: STUDENT IN AN ORGANIZED HEALTH CARE EDUCATION/TRAINING PROGRAM
Payer: MEDICARE

## 2024-05-24 ENCOUNTER — TELEPHONE (OUTPATIENT)
Dept: NON INVASIVE DIAGNOSTICS | Age: 79
End: 2024-05-24

## 2024-05-24 VITALS
TEMPERATURE: 97 F | DIASTOLIC BLOOD PRESSURE: 55 MMHG | SYSTOLIC BLOOD PRESSURE: 111 MMHG | OXYGEN SATURATION: 94 % | WEIGHT: 155 LBS | HEIGHT: 66 IN | HEART RATE: 71 BPM | BODY MASS INDEX: 24.91 KG/M2 | RESPIRATION RATE: 16 BRPM

## 2024-05-24 LAB
EKG ATRIAL RATE: 60 BPM
EKG P AXIS: 63 DEGREES
EKG P-R INTERVAL: 122 MS
EKG Q-T INTERVAL: 536 MS
EKG QRS DURATION: 152 MS
EKG QTC CALCULATION (BAZETT): 536 MS
EKG R AXIS: 51 DEGREES
EKG T AXIS: -76 DEGREES
EKG VENTRICULAR RATE: 60 BPM

## 2024-05-24 PROCEDURE — 6370000000 HC RX 637 (ALT 250 FOR IP): Performed by: STUDENT IN AN ORGANIZED HEALTH CARE EDUCATION/TRAINING PROGRAM

## 2024-05-24 PROCEDURE — 99024 POSTOP FOLLOW-UP VISIT: CPT | Performed by: STUDENT IN AN ORGANIZED HEALTH CARE EDUCATION/TRAINING PROGRAM

## 2024-05-24 PROCEDURE — 2580000003 HC RX 258: Performed by: STUDENT IN AN ORGANIZED HEALTH CARE EDUCATION/TRAINING PROGRAM

## 2024-05-24 PROCEDURE — 71045 X-RAY EXAM CHEST 1 VIEW: CPT

## 2024-05-24 PROCEDURE — G0378 HOSPITAL OBSERVATION PER HR: HCPCS

## 2024-05-24 PROCEDURE — 93010 ELECTROCARDIOGRAM REPORT: CPT | Performed by: INTERNAL MEDICINE

## 2024-05-24 PROCEDURE — 93005 ELECTROCARDIOGRAM TRACING: CPT | Performed by: STUDENT IN AN ORGANIZED HEALTH CARE EDUCATION/TRAINING PROGRAM

## 2024-05-24 RX ORDER — CEPHALEXIN 500 MG/1
500 CAPSULE ORAL 2 TIMES DAILY
Qty: 10 CAPSULE | Refills: 0 | Status: SHIPPED | OUTPATIENT
Start: 2024-05-24 | End: 2024-05-29

## 2024-05-24 RX ADMIN — PANTOPRAZOLE SODIUM 40 MG: 40 TABLET, DELAYED RELEASE ORAL at 06:21

## 2024-05-24 RX ADMIN — ACETAMINOPHEN 650 MG: 325 TABLET ORAL at 06:21

## 2024-05-24 RX ADMIN — METOPROLOL SUCCINATE 25 MG: 25 TABLET, EXTENDED RELEASE ORAL at 08:15

## 2024-05-24 RX ADMIN — SACUBITRIL AND VALSARTAN 1 TABLET: 24; 26 TABLET, FILM COATED ORAL at 08:15

## 2024-05-24 RX ADMIN — GABAPENTIN 300 MG: 300 CAPSULE ORAL at 08:15

## 2024-05-24 RX ADMIN — FOLIC ACID 1 MG: 1 TABLET ORAL at 08:15

## 2024-05-24 RX ADMIN — SODIUM CHLORIDE, PRESERVATIVE FREE 10 ML: 5 INJECTION INTRAVENOUS at 08:19

## 2024-05-24 RX ADMIN — HYDROXYCHLOROQUINE SULFATE 200 MG: 200 TABLET ORAL at 08:16

## 2024-05-24 RX ADMIN — CHOLECALCIFEROL TAB 125 MCG (5000 UNIT) 5000 UNITS: 125 TAB at 08:16

## 2024-05-24 RX ADMIN — DULOXETINE HYDROCHLORIDE 30 MG: 30 CAPSULE, DELAYED RELEASE ORAL at 08:15

## 2024-05-24 ASSESSMENT — PAIN DESCRIPTION - LOCATION: LOCATION: CHEST

## 2024-05-24 ASSESSMENT — PAIN SCALES - GENERAL
PAINLEVEL_OUTOF10: 0
PAINLEVEL_OUTOF10: 5

## 2024-05-24 ASSESSMENT — PAIN DESCRIPTION - DESCRIPTORS: DESCRIPTORS: ACHING

## 2024-05-24 ASSESSMENT — PAIN - FUNCTIONAL ASSESSMENT: PAIN_FUNCTIONAL_ASSESSMENT: PREVENTS OR INTERFERES SOME ACTIVE ACTIVITIES AND ADLS

## 2024-05-24 ASSESSMENT — PAIN DESCRIPTION - ORIENTATION: ORIENTATION: RIGHT

## 2024-05-24 NOTE — TELEPHONE ENCOUNTER
----- Message from Viktor Luther DO sent at 5/24/2024  9:46 AM EDT -----  Regarding: ecg  At device clinic appointment please perform 12 lead ECG at multiple different outputs of LBB area lead only pacing to assess conduction system capture threshold.    Please review these with me.    -DO Homa Chaves Timothy J, DO Truhan, Tiffany, RN  If no hematoma/bleeding at device clinic appt, he can restart apixaban at that time.    Please call me with lead function. If stable LBB area lead function, please reprogram to LBB area only pacing.    -DO Homa Chaves Timothy J, DO Wolfe, Amber, MA; Monalisa Mckeon, RN  Please place 7 day zio xt at time of device clinic in ~2 weeks.    -Viktor Luther DO

## 2024-05-24 NOTE — CARE COORDINATION
5/24/24 CM Note.  PT admitted s/p CRT-D upgrade via LBB area lead implant from right axillary vein tunneled to left anterior chest. Device interrogation with stable lead function.  Discharge order noted. Pt from home and is independent with ADLs, CPAP is only DME. PCP Dr Garcia, preferred pharmacy is [facility]. Plans to return home, wife will provide transport.   Megan REEVES RN-BC  [phone]

## 2024-05-24 NOTE — PLAN OF CARE
Problem: Chronic Conditions and Co-morbidities  Goal: Patient's chronic conditions and co-morbidity symptoms are monitored and maintained or improved  5/23/2024 2252 by Tor Amador RN  Outcome: Progressing  Flowsheets (Taken 5/23/2024 1945)  Care Plan - Patient's Chronic Conditions and Co-Morbidity Symptoms are Monitored and Maintained or Improved: Monitor and assess patient's chronic conditions and comorbid symptoms for stability, deterioration, or improvement  5/23/2024 1821 by Miya Juarez RN  Outcome: Progressing     Problem: Discharge Planning  Goal: Discharge to home or other facility with appropriate resources  5/23/2024 2252 by Tor Amador RN  Outcome: Progressing  Flowsheets (Taken 5/23/2024 1945)  Discharge to home or other facility with appropriate resources:   Identify barriers to discharge with patient and caregiver   Arrange for needed discharge resources and transportation as appropriate  5/23/2024 1821 by Miya Juarez RN  Outcome: Progressing     Problem: Cardiovascular - Adult  Goal: Maintains optimal cardiac output and hemodynamic stability  5/23/2024 2252 by Tor Amador RN  Outcome: Progressing  Flowsheets (Taken 5/23/2024 1945)  Maintains optimal cardiac output and hemodynamic stability:   Monitor blood pressure and heart rate   Monitor urine output and notify Licensed Independent Practitioner for values outside of normal range   Assess for signs of decreased cardiac output  5/23/2024 1821 by Miya Juarez RN  Outcome: Progressing  Goal: Absence of cardiac dysrhythmias or at baseline  5/23/2024 2252 by Tor Amador RN  Outcome: Progressing  Flowsheets (Taken 5/23/2024 1945)  Absence of cardiac dysrhythmias or at baseline: Monitor cardiac rate and rhythm  5/23/2024 1821 by Miya Juarez RN  Outcome: Progressing     Problem: Safety - Adult  Goal: Free from fall injury  Outcome: Progressing  Flowsheets (Taken 5/23/2024 2252)  Free From Fall Injury: Instruct  family/caregiver on patient safety

## 2024-05-24 NOTE — DISCHARGE SUMMARY
[facility] Cardiac Electrophysiology Discharge Summary    Ruben Davidson  1945    78 y.o.  male  PCP: Barrington Garcia MD    Admission Date:5/23/2024      Discharge Date:  5/24/2024     Patient Active Problem List   Diagnosis    CAD (coronary artery disease)s/p stent RCA    Valvular heart disease    Hypertension    Obesity, morbid s/p intestinal bypass    STEMI (ST elevation myocardial infarction) ([facility])    Stented coronary artery    Hyperlipidemia    H/O psoriatic arthritis    Acute upper GI bleed    SVT (supraventricular tachycardia) ([facility])    LV dysfunction    Cardiac arrest ([facility])    Acute systolic (congestive) heart failure ([facility])    Ventricular tachycardia ([facility])    Mitral stenosis    Mitral regurgitation    Moderate to severe pulmonary hypertension ([facility])    Anemia    Paroxysmal atrial fibrillation ([facility])    ICD (implantable cardioverter-defibrillator) battery depletion    S/P ICD (internal cardiac defibrillator) procedure    HFrEF (heart failure with reduced ejection fraction) ([facility])          Medication List        START taking these medications      cephALEXin 500 MG capsule  Commonly known as: KEFLEX  Take 1 capsule by mouth 2 times daily for 5 days            CONTINUE taking these medications      amiodarone 200 MG tablet  Commonly known as: CORDARONE  Take 1 tablet by mouth every 48 hours     apixaban 5 MG Tabs tablet  Commonly known as: Eliquis  Take 1 tablet by mouth 2 times daily .     azelastine HCl 0.15 % Soln     clopidogrel 75 MG tablet  Commonly known as: PLAVIX  Take 1 tablet by mouth daily .     Cranberry 500 MG Caps     DULoxetine 30 MG extended release capsule  Commonly known as: CYMBALTA     folic acid 1 MG tablet  Commonly known as: FOLVITE     gabapentin 300 MG capsule  Commonly known as: NEURONTIN     hydroxychloroquine 200 MG tablet  Commonly known as: PLAQUENIL     Iron 325 (65 Fe) MG Tabs     metoprolol succinate 25 MG extended release tablet  Commonly known as: TOPROL XL     *  PRESERVISION AREDS 2+MULTI VIT PO     * MULTIVITAMIN ADULT PO     OSTEO BI-FLEX ONE PER DAY PO     pantoprazole 40 MG tablet  Commonly known as: PROTONIX     Restasis 0.05 % ophthalmic emulsion  Generic drug: cycloSPORINE     rosuvastatin 40 MG tablet  Commonly known as: CRESTOR  TAKE ONE TABLET BY MOUTH ONCE NIGHTLY     sacubitril-valsartan 24-26 MG per tablet  Commonly known as: ENTRESTO  Take 1 tablet by mouth 2 times daily     tamsulosin 0.4 MG capsule  Commonly known as: FLOMAX     torsemide 20 MG tablet  Commonly known as: DEMADEX     TYLENOL ARTHRITIS PAIN PO     Vitamin D3 125 MCG (5000 UT) Tabs     ZYRTEC ALLERGY PO           * This list has 2 medication(s) that are the same as other medications prescribed for you. Read the directions carefully, and ask your doctor or other care provider to review them with you.                   Where to Get Your Medications        These medications were sent to [facility] [address] [place] [address], [place] [address] -  [phone] - F [phone]  [facility] [address]      Phone: [phone]   cephALEXin 500 MG capsule         Hospital Course: Patient had CRT-D upgrade via LBB area lead implant from right axillary vein tunneled to left anterior chest due to left subclavian vein stenosis. He was monitored overnight. No events. CXR with stable lead position and no PTX. Device interrogation with stable lead function and no dysrhythmias. Site stable. Due to issues with hemostasis in the past, patient's wife to remove pressure dressings tomorrow. Additionally, hold clopidogrel for 5 days. Restart apixaban if pocket site stable at 2 week device check. I counseled patient not to proceed with possible percutaneous MV/TV clip at this time until cleared by my office.    Procedures Performed: CRT-D upgrade via LBB area lead implant from right axillary vein tunneled to left anterior chest     Consultants: none    Disposition: The patient was discharged to home in

## 2024-05-24 NOTE — TELEPHONE ENCOUNTER
Patient scheduled for device clinic on 6.6.2024.    Monalisa BALDWINN,RN   [facility] Heart and Vascular [place]   Device Clinic

## 2024-05-28 ENCOUNTER — TELEPHONE (OUTPATIENT)
Dept: NON INVASIVE DIAGNOSTICS | Age: 79
End: 2024-05-28

## 2024-05-28 NOTE — TELEPHONE ENCOUNTER
----- Message from Viktor Luther DO sent at 5/24/2024 11:25 AM EDT -----  Regarding: zio  Please place 7 day zio xt at time of device clinic in ~2 weeks.    -Viktor Luther DO

## 2024-05-28 NOTE — TELEPHONE ENCOUNTER
----- Message from Viktor Luther DO sent at 5/25/2024 10:32 AM EDT -----  Regarding: appt  A cardiac monitor is to be placed at time of device clinic check in 2 weeks. Please make sure PVC trigger pacing is OFF at device clinic check.    Please schedule for appt w/ ME in ~6 weeks (can use WATCHMAN day), so I can discuss monitor results.    -Viktor Luther DO

## 2024-05-30 NOTE — TELEPHONE ENCOUNTER
Patient called back and left message to get a call back again.  I called patient back and left message again.

## 2024-06-06 ENCOUNTER — TELEPHONE (OUTPATIENT)
Dept: NON INVASIVE DIAGNOSTICS | Age: 79
End: 2024-06-06

## 2024-06-06 NOTE — TELEPHONE ENCOUNTER
I left a message for the patient. I want to know if we can move his incision check to Friday, 6/7/24. Dr. Luther wants us to do some testing on his device.Dr. Luther is not in the office today. He will be here tomorrow. I was hoping to be able to change the appointment to tomorrow. I will await his call.    Bhumi Silver RN, BSN  [facility] Heart and Vascular [place]   Device Clinic

## 2024-06-06 NOTE — TELEPHONE ENCOUNTER
Patient called back. He will come in tomorrow morning at 1000.    Bhumi Silver RN, BSN  [facility] Heart and Vascular [place]   Device Clinic

## 2024-06-07 ENCOUNTER — NURSE ONLY (OUTPATIENT)
Dept: NON INVASIVE DIAGNOSTICS | Age: 79
End: 2024-06-07

## 2024-06-07 DIAGNOSIS — I47.20 VENTRICULAR TACHYCARDIA (HCC): ICD-10-CM

## 2024-06-07 DIAGNOSIS — I25.10 CORONARY ARTERY DISEASE INVOLVING NATIVE CORONARY ARTERY OF NATIVE HEART WITHOUT ANGINA PECTORIS: Primary | ICD-10-CM

## 2024-06-07 DIAGNOSIS — Z45.02 ICD (IMPLANTABLE CARDIOVERTER-DEFIBRILLATOR) BATTERY DEPLETION: Primary | ICD-10-CM

## 2024-06-07 DIAGNOSIS — I49.3 PVC (PREMATURE VENTRICULAR CONTRACTION): ICD-10-CM

## 2024-06-07 NOTE — PROGRESS NOTES
Patient was seen today as per Dr Viktor Ltuher, for EKG & Cardiac Event Monitor.   No vitals were given due to Device Clinic doing testings.  Pt states he feels well.    EKG done, discussed with Dr Viktor Luther    Patient was seen today and a 7 day Zio XT monitor was placed. Monitor was ordered by Dr. Viktor Luther. The monitor was applied. Instructions were given to the patient. Patient stated understanding and gave verbalize feed back.     Serial number [id number]      Lety Mary MA     Electronically signed by Lety Mary MA on 6/7/2024 at 11:40 AM

## 2024-06-07 NOTE — PROGRESS NOTES
See murj    LV vectors tested with EKG'ss in every vector and EKG's at output of 1.5 @ 0.4 and 3.0 @ 0.4    LV Lead Pacing Vector Myocardial Capture Threshold   LVtip1- LVring 1.0 @ 0.4   LVtip1- RVcoil 0.5 @ 0.4    LVring- RVcoil 1.0 @ 0.4   Lvring- LVtip 1.0 @ 0.4           Reviewed testing with Dr. Luther.   No vector change today,. LV output programmed to 2.0 @ 0.4      Plan:   Wound recheck on Tuesday.   Continue to hold Eliquis per Dr. Luther.   Zio monitor   Office follow up. 7/24/2024.     Monalisa BALDWINN,RN   [facility] Heart and Vascular [place]   Device Clinic

## 2024-06-11 ENCOUNTER — NURSE ONLY (OUTPATIENT)
Dept: NON INVASIVE DIAGNOSTICS | Age: 79
End: 2024-06-11

## 2024-06-11 NOTE — PATIENT INSTRUCTIONS
Continue arm restrictions until 7/5/2024      Call if any signs or symptoms of infection [phone] ext: [phone]  Fevers, chills, redness, swelling or drainage.       Hook up home  Monitor    [employer] Stay connected: [phone]

## 2024-06-11 NOTE — PROGRESS NOTES
Here for one week incision assessment. Both left and right upper chest incisions have dermabond present. Hematoma and ecchymosis still present but improving. Patient is leaving for [place] and [place] this evening. Instructed on wound care, arm restrictions, and use of ice to areas for twenty minute intervals. Instructed not to place the ice directly on his skin. He verbalized understanding of above.   Patient is still not taking his Eliquis. I will forward a message to Dr Luther regarding this. I interrogated his device. There were no episodes of AF noted on his device.    Bhumi Silver RN, BSN  [facility] Heart and Vascular [place]   Device Clinic

## 2024-06-11 NOTE — PROGRESS NOTES
I called Mr. Davidson and informed him Dr. Luther would like him to remain off the Eliquis. Patient informed me he always takes his Latitude monitor when he travels. He will also have his Eliquis with him in case we need to have him restart. I verified his return date. He will be back in [place] on 6/29/24.    Bhumi Silver RN, BSN  [facility] Heart and Vascular [place]   Device Clinic

## 2024-06-24 ENCOUNTER — TELEPHONE (OUTPATIENT)
Dept: CARDIOLOGY CLINIC | Age: 79
End: 2024-06-24

## 2024-06-25 ENCOUNTER — TELEPHONE (OUTPATIENT)
Dept: CARDIOLOGY | Age: 79
End: 2024-06-25

## 2024-07-01 DIAGNOSIS — I47.20 VENTRICULAR TACHYCARDIA (HCC): ICD-10-CM

## 2024-07-01 DIAGNOSIS — Z79.899 ON AMIODARONE THERAPY: Primary | ICD-10-CM

## 2024-07-01 RX ORDER — AMIODARONE HYDROCHLORIDE 200 MG/1
200 TABLET ORAL
Qty: 15 TABLET | Refills: 0 | Status: SHIPPED | OUTPATIENT
Start: 2024-07-01 | End: 2024-09-29

## 2024-07-09 ENCOUNTER — NURSE ONLY (OUTPATIENT)
Dept: NON INVASIVE DIAGNOSTICS | Age: 79
End: 2024-07-09
Payer: MEDICARE

## 2024-07-09 DIAGNOSIS — Z95.810 ICD (IMPLANTABLE CARDIOVERTER-DEFIBRILLATOR) IN PLACE: Primary | ICD-10-CM

## 2024-07-09 PROCEDURE — 93284 PRGRMG EVAL IMPLANTABLE DFB: CPT | Performed by: STUDENT IN AN ORGANIZED HEALTH CARE EDUCATION/TRAINING PROGRAM

## 2024-07-09 NOTE — PROGRESS NOTES
Wound check: Hematoma slowing resolving. See media for photo.   No AF:   Reviewed with Dr. Luther, Continue to hold Eliquis.     See murj for device report.       Plan:   Keep presviosly scheduled appointment with Dr. Luther on 7.23.2024.  Echo scheduled on 7.23.2024. Ok to keep morning appointment with Dr. Luther prior to repeat echo.       Monalisa BALDWINN,RN   [facility] Heart and Vascular [place]   Device Clinic

## 2024-07-11 ENCOUNTER — TELEPHONE (OUTPATIENT)
Dept: NON INVASIVE DIAGNOSTICS | Age: 79
End: 2024-07-11

## 2024-07-11 NOTE — TELEPHONE ENCOUNTER
LM to call office for results.    Electronically signed by Maria Guadalupe Roche MA on 7/11/2024 at 10:09 AM

## 2024-07-11 NOTE — TELEPHONE ENCOUNTER
Pt notified of results and verbalized understanding.    Electronically signed by Maria Guadalupe Roche MA on 7/11/2024 at 10:15 AM

## 2024-07-11 NOTE — TELEPHONE ENCOUNTER
----- Message from Viktor Luther DO sent at 7/10/2024  9:06 PM EDT -----  Regarding: monitor  monitor: VE burden = 13.4% (predominantly single morphology)    Follow-up as previously instructed.    -Viktor Luther DO

## 2024-07-23 ENCOUNTER — HOSPITAL ENCOUNTER (OUTPATIENT)
Dept: CARDIOLOGY | Age: 79
Discharge: HOME | End: 2024-07-25
Payer: MEDICARE

## 2024-07-23 ENCOUNTER — NURSE ONLY (OUTPATIENT)
Dept: NON INVASIVE DIAGNOSTICS | Age: 79
End: 2024-07-23
Payer: MEDICARE

## 2024-07-23 ENCOUNTER — OFFICE VISIT (OUTPATIENT)
Dept: NON INVASIVE DIAGNOSTICS | Age: 79
End: 2024-07-23
Payer: MEDICARE

## 2024-07-23 VITALS — BODY MASS INDEX: 27.49 KG/M2 | HEIGHT: 64 IN | WEIGHT: 161 LBS

## 2024-07-23 VITALS
HEART RATE: 70 BPM | WEIGHT: 161 LBS | OXYGEN SATURATION: 97 % | BODY MASS INDEX: 27.49 KG/M2 | RESPIRATION RATE: 16 BRPM | SYSTOLIC BLOOD PRESSURE: 100 MMHG | DIASTOLIC BLOOD PRESSURE: 60 MMHG | HEIGHT: 64 IN

## 2024-07-23 DIAGNOSIS — R94.31 ABNORMAL EKG: ICD-10-CM

## 2024-07-23 DIAGNOSIS — I25.10 CORONARY ARTERY DISEASE INVOLVING NATIVE CORONARY ARTERY OF NATIVE HEART WITHOUT ANGINA PECTORIS: Primary | ICD-10-CM

## 2024-07-23 DIAGNOSIS — Z95.810 ICD (IMPLANTABLE CARDIOVERTER-DEFIBRILLATOR) IN PLACE: Primary | ICD-10-CM

## 2024-07-23 LAB
ECHO AO ASC DIAM: 2.3 CM
ECHO AO ASCENDING AORTA INDEX: 1.29 CM/M2
ECHO AR MAX VEL PISA: 3.7 M/S
ECHO AV AREA PEAK VELOCITY: 0.9 CM2
ECHO AV AREA VTI: 0.9 CM2
ECHO AV AREA/BSA PEAK VELOCITY: 0.5 CM2/M2
ECHO AV AREA/BSA VTI: 0.5 CM2/M2
ECHO AV CUSP MM: 1.4 CM
ECHO AV MEAN GRADIENT: 5 MMHG
ECHO AV MEAN VELOCITY: 1.1 M/S
ECHO AV PEAK GRADIENT: 9 MMHG
ECHO AV PEAK VELOCITY: 1.5 M/S
ECHO AV REGURGITANT PHT: 537.1 MILLISECOND
ECHO AV VELOCITY RATIO: 0.33
ECHO AV VTI: 31.4 CM
ECHO BSA: 1.82 M2
ECHO EST RA PRESSURE: 8 MMHG
ECHO LA DIAMETER INDEX: 3.31 CM/M2
ECHO LA DIAMETER: 5.9 CM
ECHO LA VOL A-L A2C: 126 ML (ref 18–58)
ECHO LA VOL A-L A4C: 105 ML (ref 18–58)
ECHO LA VOL MOD A2C: 125 ML (ref 18–58)
ECHO LA VOL MOD A4C: 102 ML (ref 18–58)
ECHO LA VOLUME AREA LENGTH: 117 ML
ECHO LA VOLUME INDEX A-L A2C: 71 ML/M2 (ref 16–34)
ECHO LA VOLUME INDEX A-L A4C: 59 ML/M2 (ref 16–34)
ECHO LA VOLUME INDEX AREA LENGTH: 66 ML/M2 (ref 16–34)
ECHO LA VOLUME INDEX MOD A2C: 70 ML/M2 (ref 16–34)
ECHO LA VOLUME INDEX MOD A4C: 57 ML/M2 (ref 16–34)
ECHO LV EF PHYSICIAN: 25 %
ECHO LV EJECTION FRACTION A2C: 30 %
ECHO LV EJECTION FRACTION A4C: 37 %
ECHO LV FRACTIONAL SHORTENING: 15 % (ref 28–44)
ECHO LV INTERNAL DIMENSION DIASTOLE INDEX: 3.43 CM/M2
ECHO LV INTERNAL DIMENSION DIASTOLIC: 6.1 CM (ref 4.2–5.9)
ECHO LV INTERNAL DIMENSION SYSTOLIC INDEX: 2.92 CM/M2
ECHO LV INTERNAL DIMENSION SYSTOLIC: 5.2 CM
ECHO LV ISOVOLUMETRIC RELAXATION TIME (IVRT): 64.6 MS
ECHO LV IVSD: 1 CM (ref 0.6–1)
ECHO LV IVSS: 1 CM
ECHO LV MASS 2D: 191.6 G (ref 88–224)
ECHO LV MASS INDEX 2D: 107.6 G/M2 (ref 49–115)
ECHO LV POSTERIOR WALL DIASTOLIC: 0.6 CM (ref 0.6–1)
ECHO LV POSTERIOR WALL SYSTOLIC: 0.6 CM
ECHO LV RELATIVE WALL THICKNESS RATIO: 0.2
ECHO LVOT AREA: 2.8 CM2
ECHO LVOT AV VTI INDEX: 0.33
ECHO LVOT DIAM: 1.9 CM
ECHO LVOT MEAN GRADIENT: 1 MMHG
ECHO LVOT PEAK GRADIENT: 1 MMHG
ECHO LVOT PEAK VELOCITY: 0.5 M/S
ECHO LVOT STROKE VOLUME INDEX: 16.6 ML/M2
ECHO LVOT SV: 29.5 ML
ECHO LVOT VTI: 10.4 CM
ECHO MV "A" WAVE DURATION: 143 MSEC
ECHO MV A VELOCITY: 0.39 M/S
ECHO MV AREA PHT: 2.6 CM2
ECHO MV AREA VTI: 0.6 CM2
ECHO MV E DECELERATION TIME (DT): 276.7 MS
ECHO MV E VELOCITY: 1.75 M/S
ECHO MV E/A RATIO: 4.49
ECHO MV EROA PISA: 0.1 CM2
ECHO MV LVOT VTI INDEX: 4.63
ECHO MV MAX VELOCITY: 2 M/S
ECHO MV MEAN GRADIENT: 4 MMHG
ECHO MV MEAN VELOCITY: 0.9 M/S
ECHO MV PEAK GRADIENT: 16 MMHG
ECHO MV PRESSURE HALF TIME (PHT): 85.4 MS
ECHO MV REGURGITANT ALIASING (NYQUIST) VELOCITY: 17 CM/S
ECHO MV REGURGITANT RADIUS PISA: 0.74 CM
ECHO MV REGURGITANT VELOCITY PISA: 4.9 M/S
ECHO MV REGURGITANT VOLUME PISA: 20.01 ML
ECHO MV REGURGITANT VTIA: 167.7 CM
ECHO MV VTI: 48.2 CM
ECHO PV MAX VELOCITY: 0.6 M/S
ECHO PV MEAN GRADIENT: 1 MMHG
ECHO PV MEAN VELOCITY: 0.4 M/S
ECHO PV PEAK GRADIENT: 2 MMHG
ECHO PV VTI: 16.2 CM
ECHO PVEIN A VELOCITY: 0 M/S
ECHO PVEIN PEAK D VELOCITY: 0.6 M/S
ECHO PVEIN PEAK S VELOCITY: 0.4 M/S
ECHO PVEIN S/D RATIO: 0.7
ECHO RIGHT VENTRICULAR SYSTOLIC PRESSURE (RVSP): 76 MMHG
ECHO RV INTERNAL DIMENSION: 4 CM
ECHO TV REGURGITANT MAX VELOCITY: 4.12 M/S
ECHO TV REGURGITANT PEAK GRADIENT: 68 MMHG

## 2024-07-23 PROCEDURE — 99215 OFFICE O/P EST HI 40 MIN: CPT | Performed by: STUDENT IN AN ORGANIZED HEALTH CARE EDUCATION/TRAINING PROGRAM

## 2024-07-23 PROCEDURE — 93000 ELECTROCARDIOGRAM COMPLETE: CPT | Performed by: STUDENT IN AN ORGANIZED HEALTH CARE EDUCATION/TRAINING PROGRAM

## 2024-07-23 PROCEDURE — 3074F SYST BP LT 130 MM HG: CPT | Performed by: STUDENT IN AN ORGANIZED HEALTH CARE EDUCATION/TRAINING PROGRAM

## 2024-07-23 PROCEDURE — 93306 TTE W/DOPPLER COMPLETE: CPT

## 2024-07-23 PROCEDURE — 93284 PRGRMG EVAL IMPLANTABLE DFB: CPT | Performed by: STUDENT IN AN ORGANIZED HEALTH CARE EDUCATION/TRAINING PROGRAM

## 2024-07-23 PROCEDURE — 1123F ACP DISCUSS/DSCN MKR DOCD: CPT | Performed by: STUDENT IN AN ORGANIZED HEALTH CARE EDUCATION/TRAINING PROGRAM

## 2024-07-23 PROCEDURE — 93306 TTE W/DOPPLER COMPLETE: CPT | Performed by: INTERNAL MEDICINE

## 2024-07-23 PROCEDURE — 3078F DIAST BP <80 MM HG: CPT | Performed by: STUDENT IN AN ORGANIZED HEALTH CARE EDUCATION/TRAINING PROGRAM

## 2024-07-23 NOTE — PROGRESS NOTES
Patient was seen by Dr. Luther today.   Programming included:   RV lead programmed off.   Lv vector: LVtip to RV coil.   See Dr. Luther and Murj report for full report.       Monalisa BALDWINN,RN   [facility] Heart and Vascular [place]   Device Clinic

## 2024-07-23 NOTE — PATIENT INSTRUCTIONS
No medication changes.  Continue remote monitoring of ICD every 91 days.  Await echocardiogram results.  Dr Luther may request another echocardiogram be performed in ~3 months depending on result of this echocardiogram.  Follow-up with Dr Luther office in ~3-4 months.

## 2024-07-23 NOTE — PROGRESS NOTES
capsule Take 1 capsule by mouth daily      RESTASIS 0.05 % ophthalmic emulsion Place 1 drop into both eyes 2 times daily as needed      apixaban (ELIQUIS) 5 MG TABS tablet Take 1 tablet by mouth 2 times daily . (Patient not taking: Reported on 7/23/2024) 180 tablet 1     No current facility-administered medications for this visit.        Allergies   Allergen Reactions    Dye [Iodides] Anaphylaxis     Passing out    Dust Mite Extract Other (See Comments)     All year around       ROS:   Constitutional: Negative for fever, activity change and appetite change.   HENT: Negative for epistaxis.   Eyes: Negative for diploplia, blurred vision.   Respiratory: Negative for cough, chest tightness, shortness of breath and wheezing.   Cardiovascular: pertinent positives in HPI  Gastrointestinal: Negative for abdominal pain and blood in stool.   Genitourinary: Negative for hematuria and difficulty urinating.   Musculoskeletal: Negative for myalgias and gait problem.   Skin: Negative for color change and rash.   Neurological: Negative for syncope and light-headedness.   Psychiatric/Behavioral: Negative for confusion and agitation. The patient is not nervous/anxious.  Heme: no bleeding disorders, no melena or hematochezia  All other review of systems are negative     PHYSICAL EXAM:  Constitutional /60   Ht 1.626 m (5' 4\")   Wt 73 kg (161 lb)   SpO2 97%   BMI 27.64 kg/m²    Well-developed, no acute distress, well groomed  Eyes: conjunctivae normal, no xanthelasma   Ears, Nose, Throat: oral mucosa moist, no cyanosis   Neck: supple, no JVD, no bruits, no thyromegaly   CV: normal rate, regular rhythm,  no murmurs, rubs, or gallops. PMI is nondisplaced, Peripheral pulses normal including carotid auscultation, no noted aortic bruit, bilateral femoral and pedal pulses are normal in quality  Lungs: clear to auscultation bilaterally, normal respiratory effort without used of accessory muscles, no wheezes  Abdomen: soft,

## 2024-07-25 ENCOUNTER — TELEPHONE (OUTPATIENT)
Dept: NON INVASIVE DIAGNOSTICS | Age: 79
End: 2024-07-25

## 2024-07-25 DIAGNOSIS — R94.31 ABNORMAL EKG: Primary | ICD-10-CM

## 2024-07-25 NOTE — TELEPHONE ENCOUNTER
Pt notified of results and verbalized understanding.    Electronically signed by Maria Guadalupe Roche MA on 7/25/2024 at 10:21 AM      The patient said he is scheduled for an appt at [facility] to have his valves checked on 10/29/2024. The patient wants to know if he should reschedule that appointment and wait to see his Echo results and follow up with EP on 10/29/2024. Message sent to Dr. Luther for advisement.

## 2024-07-25 NOTE — TELEPHONE ENCOUNTER
----- Message from Viktor Luther DO sent at 7/23/2024  5:32 PM EDT -----  Regarding: echo  TTE: LVEF = 25% with akinetic basal inferior wall, moderate LV dilation, mild RV dilation, mild RVSD, mild-moderate AI, moderate-severe MR, moderate-severe TR, moderate-severe AXEL.    Echo reports LVEF improved with upgrade to CRT device.    Recommend:  1. TTE in 3 months to reassess cardiac structure/function as CRT programming was optimized earlier today.   2. Follow-up appt w/ me shortly after TTE.    -Viktor Luther DO

## 2024-07-26 ENCOUNTER — TELEPHONE (OUTPATIENT)
Dept: NON INVASIVE DIAGNOSTICS | Age: 79
End: 2024-07-26

## 2024-07-26 NOTE — TELEPHONE ENCOUNTER
----- Message from Maria Guadalupe Roche MA sent at 7/25/2024  3:54 PM EDT -----  Regarding: FW: F valve appt    ----- Message -----  From: Viktor Luther DO  Sent: 7/25/2024   3:07 PM EDT  To: Maria Guadalupe Roche MA  Subject: RE: [facility] valve appt                               Please arrange TTE in mid 10/2024 so done before [facility] appt. He can keep appt with [facility] for now.    -Viktor Luther DO   ----- Message -----  From: Maria Guadalupe Roche MA  Sent: 7/25/2024  10:24 AM EDT  To: Viktor Luther DO  Subject: [facility] valve appt                                   The patient said he is scheduled for an appt at [facility] to have his valves checked on 10/29/2024. The patient wants to know if he should reschedule that appointment and wait to see his Echo results in three months and follow up with EP on 10/29/2024. Please advise.,     Electronically signed by Maria Guadalupe Roche MA on 7/25/2024 at 10:24 AM

## 2024-07-26 NOTE — TELEPHONE ENCOUNTER
Spoke with patient and he verbalized understanding.  Patient is going to push out the appt a little bit because he wishes to follow up here for his normal f/u in October.

## 2024-08-02 DIAGNOSIS — I47.20 VENTRICULAR TACHYCARDIA (HCC): ICD-10-CM

## 2024-08-02 RX ORDER — AMIODARONE HYDROCHLORIDE 200 MG/1
200 TABLET ORAL
Qty: 45 TABLET | Refills: 1 | Status: CANCELLED | OUTPATIENT
Start: 2024-08-02 | End: 2025-01-29

## 2024-08-03 RX ORDER — AMIODARONE HYDROCHLORIDE 200 MG/1
200 TABLET ORAL
Qty: 15 TABLET | Refills: 0 | Status: SHIPPED | OUTPATIENT
Start: 2024-08-03 | End: 2024-11-01

## 2024-08-06 DIAGNOSIS — Z79.899 ON AMIODARONE THERAPY: ICD-10-CM

## 2024-08-06 DIAGNOSIS — I47.20 VENTRICULAR TACHYCARDIA (HCC): ICD-10-CM

## 2024-08-07 ENCOUNTER — TELEPHONE (OUTPATIENT)
Dept: NON INVASIVE DIAGNOSTICS | Age: 79
End: 2024-08-07

## 2024-08-07 DIAGNOSIS — I47.20 VENTRICULAR TACHYCARDIA (HCC): ICD-10-CM

## 2024-08-07 RX ORDER — AMIODARONE HYDROCHLORIDE 200 MG/1
200 TABLET ORAL
Qty: 45 TABLET | Refills: 1 | Status: SHIPPED | OUTPATIENT
Start: 2024-08-07 | End: 2025-02-03

## 2024-08-07 NOTE — TELEPHONE ENCOUNTER
The patient called to see if his blood work results were received. I advised yes and Mariela is going to send his 90 day refill to [facility] on [address]. I advised his short script was sent to [facility] on [address]. Patient verbalized understanding.     Electronically signed by Maria Guadalupe Roche MA on 8/7/2024 at 3:46 PM

## 2024-08-08 ENCOUNTER — TELEPHONE (OUTPATIENT)
Dept: NON INVASIVE DIAGNOSTICS | Age: 79
End: 2024-08-08

## 2024-08-08 NOTE — TELEPHONE ENCOUNTER
----- Message from Viktor Luther DO sent at 8/6/2024 10:11 PM EDT -----  This is not readable.    Can we get a better copy?    -Viktor Luther DO   ----- Message -----  From: Quynh Emanuel  Sent: 8/6/2024   3:03 PM EDT  To: Viktor Luther DO

## 2024-08-14 ENCOUNTER — TELEPHONE (OUTPATIENT)
Dept: NON INVASIVE DIAGNOSTICS | Age: 79
End: 2024-08-14

## 2024-08-14 NOTE — TELEPHONE ENCOUNTER
Spoke with patient and they verbalized understanding.  Patient will have labs drawn on Friday 8/16/24.

## 2024-08-14 NOTE — TELEPHONE ENCOUNTER
----- Message from Dr. Viktor Luther DO sent at 8/13/2024 10:36 PM EDT -----  Regarding: low platelet  [facility].    New thrombocytopenia.    Recommend repeat CBC in 1 week.    Consider stopping antiplatelet in the future.    -Viktor Luther DO

## 2024-09-23 ENCOUNTER — TELEPHONE (OUTPATIENT)
Dept: NON INVASIVE DIAGNOSTICS | Age: 79
End: 2024-09-23

## 2024-10-01 ENCOUNTER — HOSPITAL ENCOUNTER (OUTPATIENT)
Dept: CARDIOLOGY | Age: 79
Discharge: HOME | End: 2024-10-03
Attending: STUDENT IN AN ORGANIZED HEALTH CARE EDUCATION/TRAINING PROGRAM
Payer: MEDICARE

## 2024-10-01 VITALS
DIASTOLIC BLOOD PRESSURE: 60 MMHG | WEIGHT: 161 LBS | BODY MASS INDEX: 27.49 KG/M2 | HEIGHT: 64 IN | SYSTOLIC BLOOD PRESSURE: 100 MMHG

## 2024-10-01 DIAGNOSIS — R94.31 ABNORMAL EKG: ICD-10-CM

## 2024-10-01 LAB
ECHO AO ASC DIAM: 1.9 CM
ECHO AO ASCENDING AORTA INDEX: 1.07 CM/M2
ECHO AR MAX VEL PISA: 3.7 M/S
ECHO AV AREA PEAK VELOCITY: 1.4 CM2
ECHO AV AREA VTI: 1.3 CM2
ECHO AV AREA/BSA PEAK VELOCITY: 0.8 CM2/M2
ECHO AV AREA/BSA VTI: 0.7 CM2/M2
ECHO AV CUSP MM: 1.6 CM
ECHO AV MEAN GRADIENT: 5 MMHG
ECHO AV MEAN VELOCITY: 1.1 M/S
ECHO AV PEAK GRADIENT: 10 MMHG
ECHO AV PEAK VELOCITY: 1.6 M/S
ECHO AV REGURGITANT PHT: 437.9 MILLISECOND
ECHO AV VELOCITY RATIO: 0.5
ECHO AV VTI: 30.1 CM
ECHO BSA: 1.82 M2
ECHO EST RA PRESSURE: 3 MMHG
ECHO LA DIAMETER INDEX: 2.53 CM/M2
ECHO LA DIAMETER: 4.5 CM
ECHO LA VOL A-L A2C: 84 ML (ref 18–58)
ECHO LA VOL A-L A4C: 76 ML (ref 18–58)
ECHO LA VOL MOD A2C: 80 ML (ref 18–58)
ECHO LA VOL MOD A4C: 72 ML (ref 18–58)
ECHO LA VOLUME AREA LENGTH: 85 ML
ECHO LA VOLUME INDEX A-L A2C: 47 ML/M2 (ref 16–34)
ECHO LA VOLUME INDEX A-L A4C: 43 ML/M2 (ref 16–34)
ECHO LA VOLUME INDEX AREA LENGTH: 48 ML/M2 (ref 16–34)
ECHO LA VOLUME INDEX MOD A2C: 45 ML/M2 (ref 16–34)
ECHO LA VOLUME INDEX MOD A4C: 40 ML/M2 (ref 16–34)
ECHO LV EDV A2C: 145 ML
ECHO LV EDV A4C: 112 ML
ECHO LV EDV BP: 132 ML (ref 67–155)
ECHO LV EDV INDEX A4C: 63 ML/M2
ECHO LV EDV INDEX BP: 74 ML/M2
ECHO LV EDV NDEX A2C: 81 ML/M2
ECHO LV EF PHYSICIAN: 35 %
ECHO LV EJECTION FRACTION A2C: 37 %
ECHO LV EJECTION FRACTION A4C: 36 %
ECHO LV EJECTION FRACTION BIPLANE: 36 % (ref 55–100)
ECHO LV ESV A2C: 92 ML
ECHO LV ESV A4C: 72 ML
ECHO LV ESV BP: 84 ML (ref 22–58)
ECHO LV ESV INDEX A2C: 52 ML/M2
ECHO LV ESV INDEX A4C: 40 ML/M2
ECHO LV ESV INDEX BP: 47 ML/M2
ECHO LV FRACTIONAL SHORTENING: 24 % (ref 28–44)
ECHO LV INTERNAL DIMENSION DIASTOLE INDEX: 2.87 CM/M2
ECHO LV INTERNAL DIMENSION DIASTOLIC: 5.1 CM (ref 4.2–5.9)
ECHO LV INTERNAL DIMENSION SYSTOLIC INDEX: 2.19 CM/M2
ECHO LV INTERNAL DIMENSION SYSTOLIC: 3.9 CM
ECHO LV ISOVOLUMETRIC RELAXATION TIME (IVRT): 90 MS
ECHO LV IVSD: 0.8 CM (ref 0.6–1)
ECHO LV IVSS: 1.1 CM
ECHO LV MASS 2D: 140.5 G (ref 88–224)
ECHO LV MASS INDEX 2D: 78.9 G/M2 (ref 49–115)
ECHO LV POSTERIOR WALL DIASTOLIC: 0.8 CM (ref 0.6–1)
ECHO LV POSTERIOR WALL SYSTOLIC: 1.1 CM
ECHO LV RELATIVE WALL THICKNESS RATIO: 0.31
ECHO LVOT AREA: 2.8 CM2
ECHO LVOT AV VTI INDEX: 0.45
ECHO LVOT DIAM: 1.9 CM
ECHO LVOT MEAN GRADIENT: 1 MMHG
ECHO LVOT PEAK GRADIENT: 2 MMHG
ECHO LVOT PEAK VELOCITY: 0.8 M/S
ECHO LVOT STROKE VOLUME INDEX: 21.5 ML/M2
ECHO LVOT SV: 38.3 ML
ECHO LVOT VTI: 13.5 CM
ECHO MV AREA PHT: 6.2 CM2
ECHO MV AREA VTI: 1 CM2
ECHO MV E DECELERATION TIME (DT): 140.3 MS
ECHO MV EROA PISA: 0.1 CM2
ECHO MV LVOT VTI INDEX: 2.93
ECHO MV MAX VELOCITY: 2.1 M/S
ECHO MV MEAN GRADIENT: 3 MMHG
ECHO MV MEAN VELOCITY: 0.8 M/S
ECHO MV PEAK GRADIENT: 17 MMHG
ECHO MV PRESSURE HALF TIME (PHT): 35.5 MS
ECHO MV REGURGITANT ALIASING (NYQUIST) VELOCITY: 31 CM/S
ECHO MV REGURGITANT RADIUS PISA: 0.49 CM
ECHO MV REGURGITANT VELOCITY PISA: 3.9 M/S
ECHO MV REGURGITANT VOLUME PISA: 14.31 ML
ECHO MV REGURGITANT VTIA: 119.4 CM
ECHO MV VTI: 39.6 CM
ECHO PULMONARY ARTERY END DIASTOLIC PRESSURE: 10 MMHG
ECHO PV MAX VELOCITY: 1 M/S
ECHO PV MEAN GRADIENT: 2 MMHG
ECHO PV MEAN VELOCITY: 0.6 M/S
ECHO PV PEAK GRADIENT: 4 MMHG
ECHO PV REGURGITANT MAX VELOCITY: 1.6 M/S
ECHO PV VTI: 13.9 CM
ECHO PVEIN PEAK D VELOCITY: 0.6 M/S
ECHO PVEIN PEAK S VELOCITY: 0.4 M/S
ECHO PVEIN S/D RATIO: 0.7
ECHO RIGHT VENTRICULAR SYSTOLIC PRESSURE (RVSP): 40 MMHG
ECHO TV REGURGITANT MAX VELOCITY: 3.06 M/S
ECHO TV REGURGITANT PEAK GRADIENT: 37 MMHG

## 2024-10-01 PROCEDURE — 93306 TTE W/DOPPLER COMPLETE: CPT | Performed by: INTERNAL MEDICINE

## 2024-10-01 PROCEDURE — 93306 TTE W/DOPPLER COMPLETE: CPT

## 2024-10-03 ENCOUNTER — TELEPHONE (OUTPATIENT)
Dept: NON INVASIVE DIAGNOSTICS | Age: 79
End: 2024-10-03

## 2024-10-03 RX ORDER — METOPROLOL SUCCINATE 25 MG/1
25 TABLET, EXTENDED RELEASE ORAL 2 TIMES DAILY
Qty: 180 TABLET | Refills: 3 | Status: SHIPPED | OUTPATIENT
Start: 2024-10-03

## 2024-10-03 NOTE — TELEPHONE ENCOUNTER
----- Message from Dr. Viktor Luther DO sent at 10/1/2024  9:01 PM EDT -----  Regarding: TTE  TTE (10/1/24): LVEF = 35%, moderate AI, mild AS, moderate-severe MR, moderate TR, moderate AXEL.    In comparison to prior TTE, LVEF has improved from 15% to 35% after the LBB area lead implant.    Additionally, TR improved from severe to moderate, but MR remains moderate-severe.    He is scheduled on 10/29/24 for eval with [facility] regarding valvular regurgitation.    -Viktor Luther DO

## 2024-10-03 NOTE — TELEPHONE ENCOUNTER
----- Message from Darby RICHARDS sent at 10/2/2024 11:30 AM EDT -----  Can you please call him and have him increase his metoprolol to 25 mg BID, (it is listed that he is taking it once a day)  He is in afib since ~ at least end of Sept, can you see if he is having symptoms also. Thanks   Darby Barron, APRN - CNP

## 2024-10-11 ENCOUNTER — TELEPHONE (OUTPATIENT)
Dept: NON INVASIVE DIAGNOSTICS | Age: 79
End: 2024-10-11

## 2024-10-11 NOTE — TELEPHONE ENCOUNTER
I was able to get in contact with patient today.  He would like to wait for appt on 10/23/24 to have device reprogrammed.  Patient will let office know if he has any complaints or concerns before appt.

## 2024-10-11 NOTE — TELEPHONE ENCOUNTER
----- Message from Dr. Viktor Luther DO sent at 10/3/2024  9:19 AM EDT -----  Regarding: VT  VT with TCL of 320 ms treated with ATP x 2 which slowed the VT to TCL of 360 ms.  VT slow to 400 ms and and persisted for another hour and 7 minutes prior to spontaneously terminating.     Please bring patient into the device clinic to reprogram ICD:  - Add fast VT zone at 185 bpm via VT.  - Reduce VT zone from 171 bpm to 140 bpm  - Reduce URL to 120 bpm.    -Viktor Luther DO

## 2024-10-23 ENCOUNTER — OFFICE VISIT (OUTPATIENT)
Dept: NON INVASIVE DIAGNOSTICS | Age: 79
End: 2024-10-23

## 2024-10-23 ENCOUNTER — NURSE ONLY (OUTPATIENT)
Dept: NON INVASIVE DIAGNOSTICS | Age: 79
End: 2024-10-23

## 2024-10-23 VITALS
HEART RATE: 60 BPM | RESPIRATION RATE: 18 BRPM | BODY MASS INDEX: 29.16 KG/M2 | WEIGHT: 170.8 LBS | HEIGHT: 64 IN | DIASTOLIC BLOOD PRESSURE: 62 MMHG | SYSTOLIC BLOOD PRESSURE: 104 MMHG

## 2024-10-23 DIAGNOSIS — I47.20 VENTRICULAR TACHYCARDIA (HCC): Primary | ICD-10-CM

## 2024-10-23 DIAGNOSIS — Z95.810 ICD (IMPLANTABLE CARDIOVERTER-DEFIBRILLATOR) IN PLACE: Primary | ICD-10-CM

## 2024-10-23 DIAGNOSIS — Z79.899 ON AMIODARONE THERAPY: ICD-10-CM

## 2024-10-23 NOTE — PROGRESS NOTES
8/2023, RV pacing increased from 5% to 60%. In 9/2023, TTE reported LVEF reduced to 35%. In 1/2024, TTE reported LVEF of 15-20%. He was referred for CRT-D upgrade, but left subclavian vein occluded, so rescheduled for CS lead implant via right axillary vein with tunneling to left anterior chest pocket. In 5/2024, he had LBB area lead implanted via right axillary vein then tunneled to left chest pocket as unable to place CS lead. Multiple deployments of LBB area lead with best LVAT of ~100 msec. Post-op course complicated by pocket hematoma requiring hold of apixaban. In 7/2024, TTE reported LVEF improved to 25%. Device reprogrammed to functionally LBB area only pacing (RV lead programmed subthreshold). In 9/2024, he had recurrence of AF. Per device nurse, patient was back on OAC at that time. In 10/2024, TTE reported LVEF improved to 35%. He presents today, 10/24/2024; for outpatient follow-up with my office. His device is enrolled in remote monitoring, which most recently reported stable device function, functionally LBB area only pacing = 91% (RV lead programmed output subthreshold), and AF ongoing since 9/25/24.  He will be following up with [facility] structural heart team in the near future for possible MitraClip. He denies any other complaints this time.     Prior cardiac testing:  TTE (10/1/2024): LVEF = 35% with inferior akinesis, moderate AI, moderate-severe MR, moderate TR, moderately AXEL.  TTE (7/23/2024): LVEF = 25% with akinetic basal inferior wall, moderate LV dilation, mild RV dilation, mild RVSD, mild-moderate AI, moderate-severe MR, moderate-severe TR, moderate-severe AXEL.  TTE (1/5/24 at [facility]): LVEF = 15-20% with inferior akinesis, stage II LVDD, mild asymmetric septal LVH, RV dilation, severe AXEL, mild AI, moderate-severe MR, severe TR, and severe pulmonary HTN (RVSP = 65 - 70 mmHg).  JIMBO (9/15/23 at [facility]): LVEF = 35%, moderate LV dilation, mild RVSD, RV dilation, moderate-severe secondary MR,

## 2024-10-23 NOTE — PATIENT INSTRUCTIONS
RESTART apixaban (Eliquis) 5 mg tablet, take 1 tablet by mouth twice a day. HOLD as instructed by [facility] provider for your upcoming procedure.  Continue remote monitoring of ICD every 91 days.  Obtain thyroid function blood test at your desired location. You mentioned Dr Garcia's office at your visit.  Follow-up with this office in ~6 months.

## 2024-11-12 ENCOUNTER — OFFICE VISIT (OUTPATIENT)
Dept: CARDIOLOGY CLINIC | Age: 79
End: 2024-11-12

## 2024-11-12 VITALS
HEIGHT: 64 IN | RESPIRATION RATE: 18 BRPM | DIASTOLIC BLOOD PRESSURE: 58 MMHG | WEIGHT: 170 LBS | OXYGEN SATURATION: 89 % | HEART RATE: 61 BPM | BODY MASS INDEX: 29.02 KG/M2 | TEMPERATURE: 97.2 F | SYSTOLIC BLOOD PRESSURE: 94 MMHG

## 2024-11-12 DIAGNOSIS — I48.0 PAROXYSMAL ATRIAL FIBRILLATION (HCC): ICD-10-CM

## 2024-11-12 DIAGNOSIS — I34.0 NONRHEUMATIC MITRAL VALVE REGURGITATION: ICD-10-CM

## 2024-11-12 DIAGNOSIS — I25.2 MI, OLD: ICD-10-CM

## 2024-11-12 DIAGNOSIS — I50.22 CHRONIC SYSTOLIC HEART FAILURE (HCC): Primary | ICD-10-CM

## 2024-11-12 DIAGNOSIS — I10 ESSENTIAL HYPERTENSION: ICD-10-CM

## 2024-11-12 DIAGNOSIS — I51.9 LV DYSFUNCTION: ICD-10-CM

## 2024-11-12 DIAGNOSIS — I25.10 CORONARY ARTERY DISEASE INVOLVING NATIVE CORONARY ARTERY OF NATIVE HEART WITHOUT ANGINA PECTORIS: ICD-10-CM

## 2024-11-12 DIAGNOSIS — E78.00 PURE HYPERCHOLESTEROLEMIA: ICD-10-CM

## 2024-11-12 RX ORDER — METHYLPREDNISOLONE 4 MG/1
2 TABLET ORAL SEE ADMIN INSTRUCTIONS
COMMUNITY
Start: 2024-11-11

## 2024-11-12 RX ORDER — SPIRONOLACTONE 25 MG/1
25 TABLET ORAL DAILY
COMMUNITY

## 2024-11-12 RX ORDER — CEPHALEXIN 500 MG/1
CAPSULE ORAL 2 TIMES DAILY
COMMUNITY
Start: 2024-11-11

## 2024-11-12 NOTE — PROGRESS NOTES
neck or supraclavicular nodes.  No splenomegaly.     EKG: the EKG tracing was reviewed and found to reveal: Atrial fibrillation.  QTc 458 ms.  No change compared to prior tracing.        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            .    Follow-up office visit in 6 months.

## 2024-11-15 ENCOUNTER — TELEPHONE (OUTPATIENT)
Dept: NON INVASIVE DIAGNOSTICS | Age: 79
End: 2024-11-15

## 2024-11-15 NOTE — TELEPHONE ENCOUNTER
Patient called in for update.  Patient states he is scheduled for a heart cath with [facility] for his valve issue.  Patient would like to know if anything could be done in the mean time regarding afib, he saw Dr Kathleen on 11/12/24 (EKG in chart) and he is in afib.

## 2024-11-18 ENCOUNTER — PREP FOR PROCEDURE (OUTPATIENT)
Dept: NON INVASIVE DIAGNOSTICS | Age: 79
End: 2024-11-18

## 2024-11-18 NOTE — TELEPHONE ENCOUNTER
Spoke with patient and they verbalized understanding.  Patient has not missed any doses of Eliquis and his appt at [facility] is 1/10/25.  Patient given instructions and verbalized understanding, copy sent through [employer].

## 2024-11-19 ENCOUNTER — NURSE ONLY (OUTPATIENT)
Dept: NON INVASIVE DIAGNOSTICS | Age: 79
End: 2024-11-19
Payer: MEDICARE

## 2024-11-19 DIAGNOSIS — Z79.899 ON AMIODARONE THERAPY: ICD-10-CM

## 2024-11-19 DIAGNOSIS — I48.0 PAROXYSMAL ATRIAL FIBRILLATION (HCC): Primary | ICD-10-CM

## 2024-11-19 DIAGNOSIS — I47.20 VENTRICULAR TACHYCARDIA (HCC): ICD-10-CM

## 2024-11-19 LAB
T4 FREE: 1.5 NG/DL (ref 0.9–1.7)
TSH SERPL DL<=0.05 MIU/L-ACNC: 4.38 UIU/ML (ref 0.27–4.2)

## 2024-11-19 PROCEDURE — 36415 COLL VENOUS BLD VENIPUNCTURE: CPT | Performed by: STUDENT IN AN ORGANIZED HEALTH CARE EDUCATION/TRAINING PROGRAM

## 2024-11-19 NOTE — PROGRESS NOTES
Patient Labs drawn form Left arm   Labs ordered by Dr Viktor Luther  Patient tolerated well.   TSH, T4,FREE  Electronically signed by ISIDORO PAIGE MA on 11/19/2024 at 3:03 PM

## 2024-11-21 RX ORDER — SODIUM CHLORIDE 0.9 % (FLUSH) 0.9 %
5-40 SYRINGE (ML) INJECTION PRN
Status: CANCELLED | OUTPATIENT
Start: 2024-11-21

## 2024-11-21 RX ORDER — SODIUM CHLORIDE 9 MG/ML
INJECTION, SOLUTION INTRAVENOUS PRN
Status: CANCELLED | OUTPATIENT
Start: 2024-11-21

## 2024-11-21 RX ORDER — SODIUM CHLORIDE 0.9 % (FLUSH) 0.9 %
5-40 SYRINGE (ML) INJECTION EVERY 12 HOURS SCHEDULED
Status: CANCELLED | OUTPATIENT
Start: 2024-11-21

## 2024-11-22 ENCOUNTER — ANESTHESIA EVENT (OUTPATIENT)
Age: 79
End: 2024-11-22
Payer: MEDICARE

## 2024-11-22 ENCOUNTER — ANESTHESIA (OUTPATIENT)
Age: 79
End: 2024-11-22
Payer: MEDICARE

## 2024-11-22 ENCOUNTER — HOSPITAL ENCOUNTER (OUTPATIENT)
Age: 79
Discharge: HOME | End: 2024-11-22
Attending: STUDENT IN AN ORGANIZED HEALTH CARE EDUCATION/TRAINING PROGRAM
Payer: MEDICARE

## 2024-11-22 VITALS
TEMPERATURE: 98.9 F | DIASTOLIC BLOOD PRESSURE: 81 MMHG | RESPIRATION RATE: 12 BRPM | BODY MASS INDEX: 26.26 KG/M2 | HEART RATE: 60 BPM | OXYGEN SATURATION: 96 % | SYSTOLIC BLOOD PRESSURE: 123 MMHG | WEIGHT: 153 LBS

## 2024-11-22 DIAGNOSIS — I48.0 PAROXYSMAL ATRIAL FIBRILLATION (HCC): ICD-10-CM

## 2024-11-22 LAB
ANION GAP SERPL CALCULATED.3IONS-SCNC: 6 MMOL/L (ref 7–16)
BASOPHILS # BLD: 0.05 K/UL (ref 0–0.2)
BASOPHILS NFR BLD: 1 % (ref 0–2)
BUN SERPL-MCNC: 18 MG/DL (ref 6–23)
CALCIUM SERPL-MCNC: 8.7 MG/DL (ref 8.6–10.2)
CHLORIDE SERPL-SCNC: 103 MMOL/L (ref 98–107)
CO2 SERPL-SCNC: 32 MMOL/L (ref 22–29)
CREAT SERPL-MCNC: 0.8 MG/DL (ref 0.7–1.2)
EKG ATRIAL RATE: 62 BPM
EKG ATRIAL RATE: 63 BPM
EKG P AXIS: 118 DEGREES
EKG P AXIS: 31 DEGREES
EKG P-R INTERVAL: 188 MS
EKG Q-T INTERVAL: 438 MS
EKG Q-T INTERVAL: 496 MS
EKG QRS DURATION: 114 MS
EKG QRS DURATION: 118 MS
EKG QTC CALCULATION (BAZETT): 444 MS
EKG QTC CALCULATION (BAZETT): 507 MS
EKG R AXIS: 38 DEGREES
EKG R AXIS: 50 DEGREES
EKG T AXIS: 71 DEGREES
EKG T AXIS: 78 DEGREES
EKG VENTRICULAR RATE: 62 BPM
EKG VENTRICULAR RATE: 63 BPM
EOSINOPHIL # BLD: 0.09 K/UL (ref 0.05–0.5)
EOSINOPHILS RELATIVE PERCENT: 1 % (ref 0–6)
ERYTHROCYTE [DISTWIDTH] IN BLOOD BY AUTOMATED COUNT: 16.9 % (ref 11.5–15)
GFR, ESTIMATED: 90 ML/MIN/1.73M2
GLUCOSE SERPL-MCNC: 85 MG/DL (ref 74–99)
HCT VFR BLD AUTO: 42.1 % (ref 37–54)
HGB BLD-MCNC: 13.8 G/DL (ref 12.5–16.5)
IMM GRANULOCYTES # BLD AUTO: 0.04 K/UL (ref 0–0.58)
IMM GRANULOCYTES NFR BLD: 1 % (ref 0–5)
LYMPHOCYTES NFR BLD: 0.81 K/UL (ref 1.5–4)
LYMPHOCYTES RELATIVE PERCENT: 11 % (ref 20–42)
MCH RBC QN AUTO: 29.7 PG (ref 26–35)
MCHC RBC AUTO-ENTMCNC: 32.8 G/DL (ref 32–34.5)
MCV RBC AUTO: 90.7 FL (ref 80–99.9)
MONOCYTES NFR BLD: 0.89 K/UL (ref 0.1–0.95)
MONOCYTES NFR BLD: 12 % (ref 2–12)
NEUTROPHILS NFR BLD: 75 % (ref 43–80)
NEUTS SEG NFR BLD: 5.67 K/UL (ref 1.8–7.3)
PLATELET # BLD AUTO: 162 K/UL (ref 130–450)
PMV BLD AUTO: 10.1 FL (ref 7–12)
POTASSIUM SERPL-SCNC: 4.3 MMOL/L (ref 3.5–5)
RBC # BLD AUTO: 4.64 M/UL (ref 3.8–5.8)
SODIUM SERPL-SCNC: 141 MMOL/L (ref 132–146)
WBC OTHER # BLD: 7.6 K/UL (ref 4.5–11.5)

## 2024-11-22 PROCEDURE — 80048 BASIC METABOLIC PNL TOTAL CA: CPT

## 2024-11-22 PROCEDURE — 92960 CARDIOVERSION ELECTRIC EXT: CPT

## 2024-11-22 PROCEDURE — 6360000002 HC RX W HCPCS: Performed by: NURSE ANESTHETIST, CERTIFIED REGISTERED

## 2024-11-22 PROCEDURE — 2580000003 HC RX 258: Performed by: STUDENT IN AN ORGANIZED HEALTH CARE EDUCATION/TRAINING PROGRAM

## 2024-11-22 PROCEDURE — 92960 CARDIOVERSION ELECTRIC EXT: CPT | Performed by: STUDENT IN AN ORGANIZED HEALTH CARE EDUCATION/TRAINING PROGRAM

## 2024-11-22 PROCEDURE — 85025 COMPLETE CBC W/AUTO DIFF WBC: CPT

## 2024-11-22 PROCEDURE — 3700000000 HC ANESTHESIA ATTENDED CARE: Performed by: STUDENT IN AN ORGANIZED HEALTH CARE EDUCATION/TRAINING PROGRAM

## 2024-11-22 RX ORDER — SODIUM CHLORIDE 0.9 % (FLUSH) 0.9 %
5-40 SYRINGE (ML) INJECTION EVERY 12 HOURS SCHEDULED
Status: ACTIVE | OUTPATIENT
Start: 2024-11-22

## 2024-11-22 RX ORDER — SODIUM CHLORIDE 0.9 % (FLUSH) 0.9 %
5-40 SYRINGE (ML) INJECTION PRN
Status: ACTIVE | OUTPATIENT
Start: 2024-11-22

## 2024-11-22 RX ORDER — PROPOFOL 10 MG/ML
INJECTION, EMULSION INTRAVENOUS
Status: DISCONTINUED | OUTPATIENT
Start: 2024-11-22 | End: 2024-11-22 | Stop reason: SDUPTHER

## 2024-11-22 RX ORDER — SODIUM CHLORIDE 9 MG/ML
INJECTION, SOLUTION INTRAVENOUS PRN
Status: ACTIVE | OUTPATIENT
Start: 2024-11-22

## 2024-11-22 RX ADMIN — PROPOFOL 5 MG: 10 INJECTION, EMULSION INTRAVENOUS at 15:02

## 2024-11-22 RX ADMIN — PROPOFOL 15 MG: 10 INJECTION, EMULSION INTRAVENOUS at 14:59

## 2024-11-22 RX ADMIN — SODIUM CHLORIDE: 9 INJECTION, SOLUTION INTRAVENOUS at 14:56

## 2024-11-22 NOTE — ANESTHESIA POSTPROCEDURE EVALUATION
Department of Anesthesiology  Postprocedure Note    Patient: Ruben Davidson  MRN: [id number]  YOB: 1945  Date of evaluation: 11/22/2024    Procedure Summary       Date: 11/22/24 Room / Location: [facility] Cardiac Cath Lab    Anesthesia Start: 1456 Anesthesia Stop: 1504    Procedure: CARDIOVERSION EXTERNAL Diagnosis: Paroxysmal atrial fibrillation (HCC)    Scheduled Providers: Barrington Mcdaniel DO Responsible Provider: Barrington Mcdaniel DO    Anesthesia Type: MAC ASA Status: 4            Anesthesia Type: No value filed.    Mildred Phase I:      Mildred Phase II:      Anesthesia Post Evaluation    Patient location during evaluation: bedside  Patient participation: complete - patient cannot participate  Level of consciousness: awake and alert  Airway patency: patent  Nausea & Vomiting: no nausea and no vomiting  Cardiovascular status: blood pressure returned to baseline  Respiratory status: acceptable  Hydration status: euvolemic  Multimodal analgesia pain management approach    No notable events documented.

## 2024-11-22 NOTE — ANESTHESIA PRE PROCEDURE
pacemaker: AICD, past MI: > 6 months, CAD:, CABG/stent:, dysrhythmias: SVT, CHF (EF 35%):, pulmonary hypertension: moderate      ECG reviewed  Rhythm: irregular  Rate: normal  Echocardiogram reviewed         Beta Blocker:  Dose within 24 Hrs      ROS comment: PTCA 12/12/21    pulm htn     Neuro/Psych:   Negative Neuro/Psych ROS              GI/Hepatic/Renal:   (+) GERD: well controlled         ROS comment: S/p gastric bypass  .   Endo/Other:    (+) blood dyscrasia: anticoagulation therapy and anemia, arthritis (psoriatic):., no malignancy/cancer.    (-) no malignancy/cancer                ROS comment: Steroid pack Abdominal: normal exam        (-) obese Abdomen: soft.      Vascular: negative vascular ROS.         Other Findings:             Anesthesia Plan      MAC     ASA 4       Induction: intravenous.      Anesthetic plan and risks discussed with patient.      Plan discussed with attending.                    KAYE Grimaldo - CRNA   11/22/2024

## 2024-11-22 NOTE — DISCHARGE INSTRUCTIONS
No medication changes.  Remote transmission from your pacemaker will be obtained on 1/25/2024.  You will be contacted to schedule an appointment with Dr. Luther office.  No driving or operating heavy machinery for 24 hours.  If you have any questions/concerns, please call [phone], extension #[phone].

## 2024-11-25 ENCOUNTER — TELEPHONE (OUTPATIENT)
Dept: NON INVASIVE DIAGNOSTICS | Age: 79
End: 2024-11-25

## 2024-11-25 ENCOUNTER — HOSPITAL ENCOUNTER (OUTPATIENT)
Age: 79
Discharge: HOME | End: 2024-11-27
Payer: MEDICARE

## 2024-11-25 ENCOUNTER — HOSPITAL ENCOUNTER (OUTPATIENT)
Dept: GENERAL RADIOLOGY | Age: 79
Discharge: HOME | End: 2024-11-27
Payer: MEDICARE

## 2024-11-25 DIAGNOSIS — R07.9 CARDIAC CHEST PAIN: ICD-10-CM

## 2024-11-25 PROCEDURE — 71046 X-RAY EXAM CHEST 2 VIEWS: CPT

## 2024-11-25 NOTE — TELEPHONE ENCOUNTER
Patient sending remote today per Dr Luther request.  Patient had DCCV 11/22/24.  Patient is leaving 11/27 and will be gone until 12/10.

## 2024-11-26 LAB
EKG ATRIAL RATE: 62 BPM
EKG ATRIAL RATE: 63 BPM
EKG P AXIS: 118 DEGREES
EKG P AXIS: 31 DEGREES
EKG P-R INTERVAL: 188 MS
EKG Q-T INTERVAL: 438 MS
EKG Q-T INTERVAL: 496 MS
EKG QRS DURATION: 114 MS
EKG QRS DURATION: 118 MS
EKG QTC CALCULATION (BAZETT): 444 MS
EKG QTC CALCULATION (BAZETT): 507 MS
EKG R AXIS: 38 DEGREES
EKG R AXIS: 50 DEGREES
EKG T AXIS: 71 DEGREES
EKG T AXIS: 78 DEGREES
EKG VENTRICULAR RATE: 62 BPM
EKG VENTRICULAR RATE: 63 BPM

## 2024-11-26 NOTE — H&P
LBBB morphology.  On amiodarone and metoprolol since that time.  No recurrence.  - Continue metoprolol XL 25 mg BID.  - Continue amiodarone 200 mg QOD.  While on amiodarone, recommend monitoring of TFTs and LFTs every 6 months, as well as annual chest x-ray and eye exam. I will order TFTs today.  - Follow-up with this office in ~4 months.    NYHA Class II HFrEF- mixed (ischemic and RV pacing) sp PTCA/stent RCA (97) and RYAN x 1 proximal LAD (21) and MDT CRT-D with LBB area lead via tunneling from right axillary vein (dc ICD DOI: 2021-Dr. Luther; CRT-D upgrade with LBB area lead tunneled from right side: 24 - Dr Luther)  - Stable device function with chronic poor RA sensing.  At time of implant, extensive passive mapping of the RA chamber was performed and noted to have low voltage throughout the RA chamber.  RA lead tip positioned in the RAA as this was the best sensing.  - RV pacing > 20% despite extended AV delays with LVEF < 50%, so upgraded to CRT. Left subclavian vein occluded. LBB area implanted via right axillary vein and tunneled to left chest pocket as unable to place CS lead. At time of LBB lead placement, multiple deployments attempted, but best result was LVAT of ~100 msec.  - CRT eval:  -- Indication: RV pacing > 20% with LVEF < 50%  -- LBB area lead capture: tip to RV coil with narrowest QRS (110 msec), V1 qR, LVAT V5 ~100 msec  -- % pacin%  -- LVEF response: pre-CRT = 15-20% -> post-CRT = 35%  - Continue remote monitoring every 91 days.    SVT  - Reported in patient's chart.  I do not have any details regarding this.  - Continue to monitor via ICD.    moderate AI, moderate-severe MR, moderate TR  - He is being evaluated by [facility] at [facility].  - Established with Dr. Kathleen ([facility] cardiology).     PUD/GI bleed sp clip/epinephrine injection (10/8/2018)  - Continue PPI.    Thank you for allowing me to participate in your patient's care.  Please call me if there are any

## 2024-12-02 NOTE — TELEPHONE ENCOUNTER
Reviewed carelink from 11.28.2024.  Patient had CV on 11.22.2024.  Patient is back in AF since 11/28/2024. On Eliquis.       Plan:   Forwarding to Dr. Luther.   Patient is out of town from 11.27.2024 until 12.10.2024.    Monalisa REEVES,RN   [facility] [place]   Device Clinic

## 2024-12-18 NOTE — TELEPHONE ENCOUNTER
Viktor Luther DO Truhan, Tiffany, RN  Cc: Mariela Cuevas MA  Caller: Unspecified (3 weeks ago)  He has a procedure scheduled at [facility] in 1/2025.    Please arrange follow-up appointment with our office in 3/2025.    -Viktor Luther DO

## 2024-12-24 NOTE — TELEPHONE ENCOUNTER
Spoke with patient scheduled for 2/17/25 states he leaves end of feb and wont be back home until May

## 2025-01-01 ENCOUNTER — PREP FOR PROCEDURE (OUTPATIENT)
Dept: NON INVASIVE DIAGNOSTICS | Age: 80
End: 2025-01-01

## 2025-01-01 ENCOUNTER — RESULTS FOLLOW-UP (OUTPATIENT)
Age: 80
End: 2025-01-01

## 2025-01-01 RX ORDER — SODIUM CHLORIDE 0.9 % (FLUSH) 0.9 %
5-40 SYRINGE (ML) INJECTION EVERY 12 HOURS SCHEDULED
Status: CANCELLED | OUTPATIENT
Start: 2025-01-01

## 2025-01-01 RX ORDER — SODIUM CHLORIDE 9 MG/ML
INJECTION, SOLUTION INTRAVENOUS PRN
Status: CANCELLED | OUTPATIENT
Start: 2025-01-01

## 2025-01-01 RX ORDER — SODIUM CHLORIDE 0.9 % (FLUSH) 0.9 %
5-40 SYRINGE (ML) INJECTION PRN
Status: CANCELLED | OUTPATIENT
Start: 2025-01-01

## 2025-02-06 ENCOUNTER — APPOINTMENT (OUTPATIENT)
Dept: CT IMAGING | Age: 80
DRG: 853 | End: 2025-02-06
Payer: MEDICARE

## 2025-02-06 ENCOUNTER — HOSPITAL ENCOUNTER (INPATIENT)
Age: 80
LOS: 7 days | Discharge: HOME HEALTH | DRG: 853 | End: 2025-02-13
Attending: EMERGENCY MEDICINE | Admitting: INTERNAL MEDICINE
Payer: MEDICARE

## 2025-02-06 ENCOUNTER — APPOINTMENT (OUTPATIENT)
Dept: GENERAL RADIOLOGY | Age: 80
DRG: 853 | End: 2025-02-06
Payer: MEDICARE

## 2025-02-06 DIAGNOSIS — R78.81 BACTEREMIA: ICD-10-CM

## 2025-02-06 DIAGNOSIS — N17.9 AKI (ACUTE KIDNEY INJURY): ICD-10-CM

## 2025-02-06 DIAGNOSIS — I48.0 PAROXYSMAL ATRIAL FIBRILLATION (HCC): Primary | ICD-10-CM

## 2025-02-06 DIAGNOSIS — S09.90XA INJURY OF HEAD, INITIAL ENCOUNTER: ICD-10-CM

## 2025-02-06 DIAGNOSIS — N20.0 KIDNEY STONE: ICD-10-CM

## 2025-02-06 DIAGNOSIS — J10.1 INFLUENZA A: ICD-10-CM

## 2025-02-06 DIAGNOSIS — I33.0 ACUTE BACTERIAL ENDOCARDITIS: ICD-10-CM

## 2025-02-06 PROBLEM — J18.9 PNEUMONIA DUE TO ORGANISM: Status: ACTIVE | Noted: 2025-02-06
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
## 2025-02-06 LAB
ALBUMIN SERPL-MCNC: 3.1 G/DL (ref 3.5–5.2)
ALP SERPL-CCNC: 94 U/L (ref 40–129)
ALT SERPL-CCNC: 108 U/L (ref 0–40)
ANION GAP SERPL CALCULATED.3IONS-SCNC: 11 MMOL/L (ref 7–16)
AST SERPL-CCNC: 70 U/L (ref 0–39)
B PARAP IS1001 DNA NPH QL NAA+NON-PROBE: NOT DETECTED
B PERT DNA SPEC QL NAA+PROBE: NOT DETECTED
BACTERIA URNS QL MICRO: ABNORMAL
BASOPHILS # BLD: 0 K/UL (ref 0–0.2)
BASOPHILS NFR BLD: 0 % (ref 0–2)
BILIRUB SERPL-MCNC: 0.5 MG/DL (ref 0–1.2)
BILIRUB UR QL STRIP: NEGATIVE
BUN SERPL-MCNC: 39 MG/DL (ref 6–23)
C PNEUM DNA NPH QL NAA+NON-PROBE: NOT DETECTED
CALCIUM SERPL-MCNC: 7.2 MG/DL (ref 8.6–10.2)
CHLORIDE SERPL-SCNC: 107 MMOL/L (ref 98–107)
CLARITY UR: ABNORMAL
CO2 SERPL-SCNC: 17 MMOL/L (ref 22–29)
COLOR UR: YELLOW
CREAT SERPL-MCNC: 1.7 MG/DL (ref 0.7–1.2)
EOSINOPHIL # BLD: 0 K/UL (ref 0.05–0.5)
EOSINOPHILS RELATIVE PERCENT: 0 % (ref 0–6)
EPI CELLS #/AREA URNS HPF: ABNORMAL /HPF
ERYTHROCYTE [DISTWIDTH] IN BLOOD BY AUTOMATED COUNT: 16.5 % (ref 11.5–15)
FLUAV H3 RNA NPH QL NAA+NON-PROBE: DETECTED
FLUAV RNA NPH QL NAA+NON-PROBE: DETECTED
FLUBV RNA NPH QL NAA+NON-PROBE: NOT DETECTED
GFR, ESTIMATED: 41 ML/MIN/1.73M2
GLUCOSE SERPL-MCNC: 76 MG/DL (ref 74–99)
GLUCOSE UR STRIP-MCNC: NEGATIVE MG/DL
HADV DNA NPH QL NAA+NON-PROBE: NOT DETECTED
HCOV 229E RNA NPH QL NAA+NON-PROBE: NOT DETECTED
HCOV HKU1 RNA NPH QL NAA+NON-PROBE: NOT DETECTED
HCOV NL63 RNA NPH QL NAA+NON-PROBE: NOT DETECTED
HCOV OC43 RNA NPH QL NAA+NON-PROBE: NOT DETECTED
HCT VFR BLD AUTO: 35.9 % (ref 37–54)
HGB BLD-MCNC: 11.8 G/DL (ref 12.5–16.5)
HGB UR QL STRIP.AUTO: ABNORMAL
HMPV RNA NPH QL NAA+NON-PROBE: NOT DETECTED
HPIV1 RNA NPH QL NAA+NON-PROBE: NOT DETECTED
HPIV2 RNA NPH QL NAA+NON-PROBE: NOT DETECTED
HPIV3 RNA NPH QL NAA+NON-PROBE: NOT DETECTED
HPIV4 RNA NPH QL NAA+NON-PROBE: NOT DETECTED
KETONES UR STRIP-MCNC: NEGATIVE MG/DL
LACTATE BLDV-SCNC: 1.6 MMOL/L (ref 0.5–1.9)
LACTATE BLDV-SCNC: 2.1 MMOL/L (ref 0.5–1.9)
LEUKOCYTE ESTERASE UR QL STRIP: NEGATIVE
LIPASE SERPL-CCNC: 12 U/L (ref 13–60)
LYMPHOCYTES NFR BLD: 0.47 K/UL (ref 1.5–4)
LYMPHOCYTES RELATIVE PERCENT: 3 % (ref 20–42)
M PNEUMO DNA NPH QL NAA+NON-PROBE: NOT DETECTED
MCH RBC QN AUTO: 28.7 PG (ref 26–35)
MCHC RBC AUTO-ENTMCNC: 32.9 G/DL (ref 32–34.5)
MCV RBC AUTO: 87.3 FL (ref 80–99.9)
MONOCYTES NFR BLD: 1.05 K/UL (ref 0.1–0.95)
MONOCYTES NFR BLD: 8 % (ref 2–12)
NEUTROPHILS NFR BLD: 89 % (ref 43–80)
NEUTS SEG NFR BLD: 11.99 K/UL (ref 1.8–7.3)
NITRITE UR QL STRIP: NEGATIVE
PH UR STRIP: 5.5 [PH] (ref 5–8)
PLATELET # BLD AUTO: 174 K/UL (ref 130–450)
PMV BLD AUTO: 10.5 FL (ref 7–12)
POTASSIUM SERPL-SCNC: 4.1 MMOL/L (ref 3.5–5)
PROCALCITONIN SERPL-MCNC: 1.27 NG/ML (ref 0–0.08)
PROT SERPL-MCNC: 5.3 G/DL (ref 6.4–8.3)
PROT UR STRIP-MCNC: 30 MG/DL
RBC # BLD AUTO: 4.11 M/UL (ref 3.8–5.8)
RBC # BLD: ABNORMAL 10*6/UL
RBC #/AREA URNS HPF: ABNORMAL /HPF
RSV RNA NPH QL NAA+NON-PROBE: NOT DETECTED
RV+EV RNA NPH QL NAA+NON-PROBE: NOT DETECTED
SARS-COV-2 RNA NPH QL NAA+NON-PROBE: NOT DETECTED
SODIUM SERPL-SCNC: 135 MMOL/L (ref 132–146)
SP GR UR STRIP: 1.02 (ref 1–1.03)
SPECIMEN DESCRIPTION: ABNORMAL
TROPONIN I SERPL HS-MCNC: 109 NG/L (ref 0–11)
TROPONIN I SERPL HS-MCNC: 90 NG/L (ref 0–11)
TROPONIN I SERPL HS-MCNC: 96 NG/L (ref 0–11)
UROBILINOGEN UR STRIP-ACNC: 0.2 EU/DL (ref 0–1)
WBC #/AREA URNS HPF: ABNORMAL /HPF
WBC OTHER # BLD: 13.5 K/UL (ref 4.5–11.5)

## 2025-02-06 PROCEDURE — 71045 X-RAY EXAM CHEST 1 VIEW: CPT

## 2025-02-06 PROCEDURE — 96365 THER/PROPH/DIAG IV INF INIT: CPT

## 2025-02-06 PROCEDURE — 81001 URINALYSIS AUTO W/SCOPE: CPT

## 2025-02-06 PROCEDURE — 85025 COMPLETE CBC W/AUTO DIFF WBC: CPT

## 2025-02-06 PROCEDURE — 84484 ASSAY OF TROPONIN QUANT: CPT

## 2025-02-06 PROCEDURE — 74176 CT ABD & PELVIS W/O CONTRAST: CPT

## 2025-02-06 PROCEDURE — 6370000000 HC RX 637 (ALT 250 FOR IP)

## 2025-02-06 PROCEDURE — 83690 ASSAY OF LIPASE: CPT

## 2025-02-06 PROCEDURE — 70450 CT HEAD/BRAIN W/O DYE: CPT

## 2025-02-06 PROCEDURE — 2500000003 HC RX 250 WO HCPCS

## 2025-02-06 PROCEDURE — 83605 ASSAY OF LACTIC ACID: CPT

## 2025-02-06 PROCEDURE — 87150 DNA/RNA AMPLIFIED PROBE: CPT

## 2025-02-06 PROCEDURE — 0HQ1XZZ REPAIR FACE SKIN, EXTERNAL APPROACH: ICD-10-PCS | Performed by: NURSE PRACTITIONER

## 2025-02-06 PROCEDURE — 84145 PROCALCITONIN (PCT): CPT

## 2025-02-06 PROCEDURE — 2060000000 HC ICU INTERMEDIATE R&B

## 2025-02-06 PROCEDURE — 6360000002 HC RX W HCPCS

## 2025-02-06 PROCEDURE — 96361 HYDRATE IV INFUSION ADD-ON: CPT

## 2025-02-06 PROCEDURE — 80053 COMPREHEN METABOLIC PANEL: CPT

## 2025-02-06 PROCEDURE — 96375 TX/PRO/DX INJ NEW DRUG ADDON: CPT

## 2025-02-06 PROCEDURE — 2580000003 HC RX 258

## 2025-02-06 PROCEDURE — 72125 CT NECK SPINE W/O DYE: CPT

## 2025-02-06 PROCEDURE — 99285 EMERGENCY DEPT VISIT HI MDM: CPT

## 2025-02-06 PROCEDURE — 93005 ELECTROCARDIOGRAM TRACING: CPT

## 2025-02-06 PROCEDURE — 0202U NFCT DS 22 TRGT SARS-COV-2: CPT

## 2025-02-06 PROCEDURE — 87040 BLOOD CULTURE FOR BACTERIA: CPT

## 2025-02-06 PROCEDURE — 71250 CT THORAX DX C-: CPT

## 2025-02-06 PROCEDURE — 87086 URINE CULTURE/COLONY COUNT: CPT

## 2025-02-06 RX ORDER — FERROUS SULFATE 325(65) MG
325 TABLET ORAL DAILY
Status: DISCONTINUED | OUTPATIENT
Start: 2025-02-07 | End: 2025-02-13 | Stop reason: HOSPADM

## 2025-02-06 RX ORDER — 0.9 % SODIUM CHLORIDE 0.9 %
500 INTRAVENOUS SOLUTION INTRAVENOUS ONCE
Status: COMPLETED | OUTPATIENT
Start: 2025-02-06 | End: 2025-02-06

## 2025-02-06 RX ORDER — ACETAMINOPHEN 325 MG/1
650 TABLET ORAL ONCE
Status: COMPLETED | OUTPATIENT
Start: 2025-02-06 | End: 2025-02-06

## 2025-02-06 RX ORDER — POLYETHYLENE GLYCOL 3350 17 G/17G
17 POWDER, FOR SOLUTION ORAL DAILY PRN
Status: DISCONTINUED | OUTPATIENT
Start: 2025-02-06 | End: 2025-02-13 | Stop reason: HOSPADM

## 2025-02-06 RX ORDER — FOLIC ACID 1 MG/1
1 TABLET ORAL EVERY MORNING
Status: DISCONTINUED | OUTPATIENT
Start: 2025-02-07 | End: 2025-02-13 | Stop reason: HOSPADM

## 2025-02-06 RX ORDER — HYDROXYCHLOROQUINE SULFATE 200 MG/1
200 TABLET, FILM COATED ORAL 2 TIMES DAILY
Status: DISCONTINUED | OUTPATIENT
Start: 2025-02-07 | End: 2025-02-13 | Stop reason: HOSPADM

## 2025-02-06 RX ORDER — PROCHLORPERAZINE EDISYLATE 5 MG/ML
5 INJECTION INTRAMUSCULAR; INTRAVENOUS EVERY 6 HOURS PRN
Status: DISCONTINUED | OUTPATIENT
Start: 2025-02-06 | End: 2025-02-13 | Stop reason: HOSPADM

## 2025-02-06 RX ORDER — SODIUM CHLORIDE 9 MG/ML
INJECTION, SOLUTION INTRAVENOUS CONTINUOUS
Status: DISCONTINUED | OUTPATIENT
Start: 2025-02-06 | End: 2025-02-11

## 2025-02-06 RX ORDER — SODIUM CHLORIDE 9 MG/ML
INJECTION, SOLUTION INTRAVENOUS PRN
Status: DISCONTINUED | OUTPATIENT
Start: 2025-02-06 | End: 2025-02-13 | Stop reason: HOSPADM

## 2025-02-06 RX ORDER — 0.9 % SODIUM CHLORIDE 0.9 %
1000 INTRAVENOUS SOLUTION INTRAVENOUS ONCE
Status: COMPLETED | OUTPATIENT
Start: 2025-02-06 | End: 2025-02-06

## 2025-02-06 RX ORDER — SODIUM CHLORIDE 0.9 % (FLUSH) 0.9 %
5-40 SYRINGE (ML) INJECTION EVERY 12 HOURS SCHEDULED
Status: DISCONTINUED | OUTPATIENT
Start: 2025-02-06 | End: 2025-02-13 | Stop reason: HOSPADM

## 2025-02-06 RX ORDER — OSELTAMIVIR PHOSPHATE 30 MG/1
30 CAPSULE ORAL 2 TIMES DAILY
Status: DISCONTINUED | OUTPATIENT
Start: 2025-02-06 | End: 2025-02-06 | Stop reason: SDUPTHER

## 2025-02-06 RX ORDER — GUAIFENESIN 200 MG/10ML
200 LIQUID ORAL EVERY 6 HOURS PRN
Status: DISCONTINUED | OUTPATIENT
Start: 2025-02-06 | End: 2025-02-07

## 2025-02-06 RX ORDER — OSELTAMIVIR PHOSPHATE 75 MG/1
75 CAPSULE ORAL ONCE
Status: COMPLETED | OUTPATIENT
Start: 2025-02-06 | End: 2025-02-07

## 2025-02-06 RX ORDER — LIDOCAINE HYDROCHLORIDE AND EPINEPHRINE 10; 10 MG/ML; UG/ML
20 INJECTION, SOLUTION INFILTRATION; PERINEURAL ONCE
Status: COMPLETED | OUTPATIENT
Start: 2025-02-06 | End: 2025-02-06

## 2025-02-06 RX ORDER — SODIUM CHLORIDE 0.9 % (FLUSH) 0.9 %
5-40 SYRINGE (ML) INJECTION PRN
Status: DISCONTINUED | OUTPATIENT
Start: 2025-02-06 | End: 2025-02-13 | Stop reason: HOSPADM

## 2025-02-06 RX ORDER — CLOPIDOGREL BISULFATE 75 MG/1
75 TABLET ORAL DAILY
Status: DISCONTINUED | OUTPATIENT
Start: 2025-02-07 | End: 2025-02-13 | Stop reason: HOSPADM

## 2025-02-06 RX ORDER — GABAPENTIN 300 MG/1
300 CAPSULE ORAL 3 TIMES DAILY
Status: DISCONTINUED | OUTPATIENT
Start: 2025-02-06 | End: 2025-02-13 | Stop reason: HOSPADM

## 2025-02-06 RX ORDER — 0.9 % SODIUM CHLORIDE 0.9 %
1000 INTRAVENOUS SOLUTION INTRAVENOUS ONCE
Status: DISCONTINUED | OUTPATIENT
Start: 2025-02-06 | End: 2025-02-06

## 2025-02-06 RX ORDER — DULOXETIN HYDROCHLORIDE 30 MG/1
30 CAPSULE, DELAYED RELEASE ORAL EVERY MORNING
Status: DISCONTINUED | OUTPATIENT
Start: 2025-02-07 | End: 2025-02-13 | Stop reason: HOSPADM

## 2025-02-06 RX ORDER — OSELTAMIVIR PHOSPHATE 30 MG/1
30 CAPSULE ORAL 2 TIMES DAILY
Status: COMPLETED | OUTPATIENT
Start: 2025-02-07 | End: 2025-02-11

## 2025-02-06 RX ADMIN — DOXYCYCLINE 100 MG: 100 INJECTION, POWDER, LYOPHILIZED, FOR SOLUTION INTRAVENOUS at 16:49

## 2025-02-06 RX ADMIN — SODIUM CHLORIDE 1000 ML: 0.9 INJECTION, SOLUTION INTRAVENOUS at 14:47

## 2025-02-06 RX ADMIN — WATER 2000 MG: 1 INJECTION INTRAMUSCULAR; INTRAVENOUS; SUBCUTANEOUS at 16:27

## 2025-02-06 RX ADMIN — LIDOCAINE HYDROCHLORIDE AND EPINEPHRINE 20 ML: 10; 10 INJECTION, SOLUTION INFILTRATION; PERINEURAL at 21:42

## 2025-02-06 RX ADMIN — SODIUM CHLORIDE: 9 INJECTION, SOLUTION INTRAVENOUS at 16:48

## 2025-02-06 RX ADMIN — SODIUM CHLORIDE 500 ML: 9 INJECTION, SOLUTION INTRAVENOUS at 15:30

## 2025-02-06 RX ADMIN — ACETAMINOPHEN 650 MG: 325 TABLET ORAL at 19:24

## 2025-02-06 NOTE — ED PROVIDER NOTES
[facility] EMERGENCY DEPARTMENT  EMERGENCY DEPARTMENT ENCOUNTER        Pt Name: Ruben Davidson  MRN: [id number]  Birthdate 1945  Date of evaluation: 2/6/2025  Provider: Cody Garcia DO  PCP: Barrington Garcia MD  Note Started: 2:25 PM EST 2/6/25    CHIEF COMPLAINT       Chief Complaint   Patient presents with    Fall     Fall this AM- hit head, + thinners, no LOC, sent from [facility] urgent care due to dizziness and thinners       HISTORY OF PRESENT ILLNESS: 1 or more Elements   History From: Patient    Limitations to history : None    Ruben Davidson is a 79 y.o. male who presents to the emergency department for fall that occurred this morning.  Patient reports that he went into the bathroom, was about to sit down to use the restroom when he fell forward and hit the front of his head on the shower.  No loss of consciousness.  He is on Eliquis.  He reports that all day today he has been feeling generalized fatigue and went to urgent care, his blood pressure was low so this send Emergency Department for evaluation.  He reports that there was not much bleeding from the forehead where he cut his forehead open.  He states he has not been feeling well for weeks including some intermittent fever and chills.  Did not check his temperature at home. Patient denies headache, shortness of breath, chest pain, abdominal pain, nausea, vomiting, diarrhea, dysuria, hematuria, hematochezia, and melena.    Nursing Notes were all reviewed and agreed with or any disagreements were addressed in the HPI.          REVIEW OF SYSTEMS :           Positives and Pertinent negatives as per HPI.     SURGICAL HISTORY     Past Surgical History:   Procedure Laterality Date    APPENDECTOMY      BACK SURGERY      CARDIAC DEFIBRILLATOR PLACEMENT  12/20/2021    Medtronic dual chamber ICD by Dr. Luther    CARPAL TUNNEL RELEASE Bilateral     CORONARY ANGIOPLASTY  07/07/1997    PTCA/stent of the RCA   administration in time range)   sodium chloride flush 0.9 % injection 5-40 mL (has no administration in time range)   0.9 % sodium chloride infusion (has no administration in time range)   polyethylene glycol (GLYCOLAX) packet 17 g (has no administration in time range)   prochlorperazine (COMPAZINE) injection 5 mg (has no administration in time range)   vitamin D3 (CHOLECALCIFEROL) tablet 5,000 Units (has no administration in time range)   hydroxychloroquine (PLAQUENIL) tablet 200 mg (has no administration in time range)   gabapentin (NEURONTIN) capsule 300 mg (has no administration in time range)   folic acid (FOLVITE) tablet 1 mg (has no administration in time range)   Iron TABS 1 tablet (has no administration in time range)   DULoxetine (CYMBALTA) extended release capsule 30 mg (has no administration in time range)   clopidogrel (PLAVIX) tablet 75 mg (has no administration in time range)   guaiFENesin (ROBITUSSIN) 100 MG/5ML liquid 200 mg (has no administration in time range)   cefTRIAXone (ROCEPHIN) 1,000 mg in sterile water 10 mL IV syringe (has no administration in time range)   doxycycline (VIBRAMYCIN) 100 mg in sodium chloride 0.9 % 100 mL IVPB ([id number]) (has no administration in time range)   oseltamivir (TAMIFLU) capsule 75 mg (has no administration in time range)     Followed by   oseltamivir (TAMIFLU) capsule 30 mg (has no administration in time range)   sodium chloride 0.9 % bolus 1,000 mL (0 mLs IntraVENous Stopped 2/6/25 1526)   sodium chloride 0.9 % bolus 500 mL (0 mLs IntraVENous Stopped 2/6/25 1731)   cefTRIAXone (ROCEPHIN) 2,000 mg in sterile water 20 mL IV syringe (2,000 mg IntraVENous Given 2/6/25 1627)   doxycycline (VIBRAMYCIN) 100 mg in sodium chloride 0.9 % 100 mL IVPB ([id number]) (0 mg IntraVENous Stopped 2/6/25 1749)   acetaminophen (TYLENOL) tablet 650 mg (650 mg Oral Given 2/6/25 1924)   lidocaine-EPINEPHrine 1 %-[id number] injection 20 mL (20 mLs IntraDERmal Given 2/6/25 2142)

## 2025-02-07 ENCOUNTER — ANESTHESIA EVENT (OUTPATIENT)
Dept: OPERATING ROOM | Age: 80
End: 2025-02-07
Payer: MEDICARE

## 2025-02-07 ENCOUNTER — APPOINTMENT (OUTPATIENT)
Dept: GENERAL RADIOLOGY | Age: 80
DRG: 853 | End: 2025-02-07
Payer: MEDICARE

## 2025-02-07 ENCOUNTER — ANESTHESIA (OUTPATIENT)
Dept: OPERATING ROOM | Age: 80
End: 2025-02-07
Payer: MEDICARE

## 2025-02-07 PROBLEM — S09.90XA HEAD INJURY: Status: ACTIVE | Noted: 2025-02-07

## 2025-02-07 PROBLEM — N20.1 CALCULUS OF LEFT URETER: Status: ACTIVE | Noted: 2025-02-07

## 2025-02-07 PROBLEM — N18.2 CKD (CHRONIC KIDNEY DISEASE) STAGE 2, GFR 60-89 ML/MIN: Status: ACTIVE | Noted: 2025-02-07

## 2025-02-07 PROBLEM — R74.8 ELEVATED LIVER ENZYMES: Status: ACTIVE | Noted: 2025-02-07

## 2025-02-07 LAB
ALBUMIN SERPL-MCNC: 2.9 G/DL (ref 3.5–5.2)
ALP SERPL-CCNC: 109 U/L (ref 40–129)
ALT SERPL-CCNC: 98 U/L (ref 0–40)
ANION GAP SERPL CALCULATED.3IONS-SCNC: 13 MMOL/L (ref 7–16)
ANION GAP SERPL CALCULATED.3IONS-SCNC: 15 MMOL/L (ref 7–16)
AST SERPL-CCNC: 78 U/L (ref 0–39)
BASOPHILS # BLD: 0 K/UL (ref 0–0.2)
BASOPHILS NFR BLD: 0 % (ref 0–2)
BILIRUB SERPL-MCNC: 0.5 MG/DL (ref 0–1.2)
BUN SERPL-MCNC: 41 MG/DL (ref 6–23)
BUN SERPL-MCNC: 41 MG/DL (ref 6–23)
CALCIUM SERPL-MCNC: 8.2 MG/DL (ref 8.6–10.2)
CALCIUM SERPL-MCNC: 8.3 MG/DL (ref 8.6–10.2)
CHLORIDE SERPL-SCNC: 106 MMOL/L (ref 98–107)
CHLORIDE SERPL-SCNC: 106 MMOL/L (ref 98–107)
CO2 SERPL-SCNC: 11 MMOL/L (ref 22–29)
CO2 SERPL-SCNC: 17 MMOL/L (ref 22–29)
CREAT SERPL-MCNC: 1.8 MG/DL (ref 0.7–1.2)
CREAT SERPL-MCNC: 1.9 MG/DL (ref 0.7–1.2)
CRP SERPL HS-MCNC: 104 MG/L (ref 0–5)
EKG ATRIAL RATE: 70 BPM
EKG Q-T INTERVAL: 432 MS
EKG QRS DURATION: 108 MS
EKG QTC CALCULATION (BAZETT): 482 MS
EKG R AXIS: 35 DEGREES
EKG T AXIS: 126 DEGREES
EKG VENTRICULAR RATE: 75 BPM
EOSINOPHIL # BLD: 0 K/UL (ref 0.05–0.5)
EOSINOPHILS RELATIVE PERCENT: 0 % (ref 0–6)
ERYTHROCYTE [DISTWIDTH] IN BLOOD BY AUTOMATED COUNT: 16.1 % (ref 11.5–15)
ERYTHROCYTE [SEDIMENTATION RATE] IN BLOOD BY WESTERGREN METHOD: 8 MM/HR (ref 0–15)
GFR, ESTIMATED: 36 ML/MIN/1.73M2
GFR, ESTIMATED: 39 ML/MIN/1.73M2
GLUCOSE SERPL-MCNC: 62 MG/DL (ref 74–99)
GLUCOSE SERPL-MCNC: 70 MG/DL (ref 74–99)
HCT VFR BLD AUTO: 36.5 % (ref 37–54)
HGB BLD-MCNC: 12.6 G/DL (ref 12.5–16.5)
LYMPHOCYTES NFR BLD: 0.48 K/UL (ref 1.5–4)
LYMPHOCYTES RELATIVE PERCENT: 4 % (ref 20–42)
MCH RBC QN AUTO: 29.4 PG (ref 26–35)
MCHC RBC AUTO-ENTMCNC: 34.5 G/DL (ref 32–34.5)
MCV RBC AUTO: 85.1 FL (ref 80–99.9)
MICROORGANISM SPEC CULT: NO GROWTH
MONOCYTES NFR BLD: 0.24 K/UL (ref 0.1–0.95)
MONOCYTES NFR BLD: 2 % (ref 2–12)
NEUTROPHILS NFR BLD: 95 % (ref 43–80)
NEUTS SEG NFR BLD: 12.97 K/UL (ref 1.8–7.3)
PLATELET # BLD AUTO: 162 K/UL (ref 130–450)
PMV BLD AUTO: 10.3 FL (ref 7–12)
POTASSIUM SERPL-SCNC: 4.6 MMOL/L (ref 3.5–5)
POTASSIUM SERPL-SCNC: 4.7 MMOL/L (ref 3.5–5)
POTASSIUM SERPL-SCNC: 6.5 MMOL/L (ref 3.5–5)
PROT SERPL-MCNC: 6.2 G/DL (ref 6.4–8.3)
RBC # BLD AUTO: 4.29 M/UL (ref 3.8–5.8)
RBC # BLD: ABNORMAL 10*6/UL
SERVICE CMNT-IMP: NORMAL
SODIUM SERPL-SCNC: 132 MMOL/L (ref 132–146)
SODIUM SERPL-SCNC: 136 MMOL/L (ref 132–146)
SPECIMEN DESCRIPTION: NORMAL
WBC OTHER # BLD: 13.7 K/UL (ref 4.5–11.5)

## 2025-02-07 PROCEDURE — 87040 BLOOD CULTURE FOR BACTERIA: CPT

## 2025-02-07 PROCEDURE — 74420 UROGRAPHY RTRGR +-KUB: CPT

## 2025-02-07 PROCEDURE — 0T778DZ DILATION OF LEFT URETER WITH INTRALUMINAL DEVICE, VIA NATURAL OR ARTIFICIAL OPENING ENDOSCOPIC: ICD-10-PCS | Performed by: UROLOGY

## 2025-02-07 PROCEDURE — 36415 COLL VENOUS BLD VENIPUNCTURE: CPT

## 2025-02-07 PROCEDURE — 6360000002 HC RX W HCPCS: Performed by: NURSE PRACTITIONER

## 2025-02-07 PROCEDURE — 3600000003 HC SURGERY LEVEL 3 BASE: Performed by: UROLOGY

## 2025-02-07 PROCEDURE — 97530 THERAPEUTIC ACTIVITIES: CPT

## 2025-02-07 PROCEDURE — 86140 C-REACTIVE PROTEIN: CPT

## 2025-02-07 PROCEDURE — 3600000013 HC SURGERY LEVEL 3 ADDTL 15MIN: Performed by: UROLOGY

## 2025-02-07 PROCEDURE — 2060000000 HC ICU INTERMEDIATE R&B

## 2025-02-07 PROCEDURE — 6360000002 HC RX W HCPCS

## 2025-02-07 PROCEDURE — 6360000002 HC RX W HCPCS: Performed by: INTERNAL MEDICINE

## 2025-02-07 PROCEDURE — APPSS60 APP SPLIT SHARED TIME 46-60 MINUTES

## 2025-02-07 PROCEDURE — 80048 BASIC METABOLIC PNL TOTAL CA: CPT

## 2025-02-07 PROCEDURE — 2580000003 HC RX 258

## 2025-02-07 PROCEDURE — 2709999900 HC NON-CHARGEABLE SUPPLY: Performed by: UROLOGY

## 2025-02-07 PROCEDURE — 93010 ELECTROCARDIOGRAM REPORT: CPT | Performed by: INTERNAL MEDICINE

## 2025-02-07 PROCEDURE — C2617 STENT, NON-COR, TEM W/O DEL: HCPCS | Performed by: UROLOGY

## 2025-02-07 PROCEDURE — 97161 PT EVAL LOW COMPLEX 20 MIN: CPT

## 2025-02-07 PROCEDURE — 99222 1ST HOSP IP/OBS MODERATE 55: CPT | Performed by: INTERNAL MEDICINE

## 2025-02-07 PROCEDURE — BT1F1ZZ FLUOROSCOPY OF LEFT KIDNEY, URETER AND BLADDER USING LOW OSMOLAR CONTRAST: ICD-10-PCS | Performed by: UROLOGY

## 2025-02-07 PROCEDURE — 6370000000 HC RX 637 (ALT 250 FOR IP): Performed by: NURSE PRACTITIONER

## 2025-02-07 PROCEDURE — 2500000003 HC RX 250 WO HCPCS: Performed by: NURSE PRACTITIONER

## 2025-02-07 PROCEDURE — 85652 RBC SED RATE AUTOMATED: CPT

## 2025-02-07 PROCEDURE — 6370000000 HC RX 637 (ALT 250 FOR IP): Performed by: INTERNAL MEDICINE

## 2025-02-07 PROCEDURE — 3700000000 HC ANESTHESIA ATTENDED CARE: Performed by: UROLOGY

## 2025-02-07 PROCEDURE — 80053 COMPREHEN METABOLIC PANEL: CPT

## 2025-02-07 PROCEDURE — 2580000003 HC RX 258: Performed by: INTERNAL MEDICINE

## 2025-02-07 PROCEDURE — 3700000001 HC ADD 15 MINUTES (ANESTHESIA): Performed by: UROLOGY

## 2025-02-07 PROCEDURE — 2580000003 HC RX 258: Performed by: NURSE PRACTITIONER

## 2025-02-07 PROCEDURE — 7100000001 HC PACU RECOVERY - ADDTL 15 MIN: Performed by: UROLOGY

## 2025-02-07 PROCEDURE — 84132 ASSAY OF SERUM POTASSIUM: CPT

## 2025-02-07 PROCEDURE — 7100000000 HC PACU RECOVERY - FIRST 15 MIN: Performed by: UROLOGY

## 2025-02-07 PROCEDURE — 2500000003 HC RX 250 WO HCPCS

## 2025-02-07 PROCEDURE — 85025 COMPLETE CBC W/AUTO DIFF WBC: CPT

## 2025-02-07 DEVICE — URETERAL STENT
Type: IMPLANTABLE DEVICE | Site: URETER | Status: FUNCTIONAL
Brand: PERCUFLEX™

## 2025-02-07 RX ORDER — SODIUM CHLORIDE 0.9 % (FLUSH) 0.9 %
5-40 SYRINGE (ML) INJECTION EVERY 12 HOURS SCHEDULED
Status: DISCONTINUED | OUTPATIENT
Start: 2025-02-07 | End: 2025-02-07 | Stop reason: HOSPADM

## 2025-02-07 RX ORDER — PANTOPRAZOLE SODIUM 40 MG/1
40 TABLET, DELAYED RELEASE ORAL DAILY
Status: DISCONTINUED | OUTPATIENT
Start: 2025-02-07 | End: 2025-02-13 | Stop reason: HOSPADM

## 2025-02-07 RX ORDER — PROPOFOL 10 MG/ML
INJECTION, EMULSION INTRAVENOUS
Status: DISCONTINUED | OUTPATIENT
Start: 2025-02-07 | End: 2025-02-07 | Stop reason: SDUPTHER

## 2025-02-07 RX ORDER — GUAIFENESIN 400 MG/1
400 TABLET ORAL 3 TIMES DAILY
Status: DISCONTINUED | OUTPATIENT
Start: 2025-02-07 | End: 2025-02-13 | Stop reason: HOSPADM

## 2025-02-07 RX ORDER — SODIUM CHLORIDE 0.9 % (FLUSH) 0.9 %
5-40 SYRINGE (ML) INJECTION PRN
Status: CANCELLED | OUTPATIENT
Start: 2025-02-07

## 2025-02-07 RX ORDER — TORSEMIDE 20 MG/1
20 TABLET ORAL WEEKLY
Status: DISCONTINUED | OUTPATIENT
Start: 2025-02-07 | End: 2025-02-12

## 2025-02-07 RX ORDER — METOPROLOL SUCCINATE 25 MG/1
25 TABLET, EXTENDED RELEASE ORAL 2 TIMES DAILY
Status: DISCONTINUED | OUTPATIENT
Start: 2025-02-07 | End: 2025-02-13 | Stop reason: HOSPADM

## 2025-02-07 RX ORDER — SPIRONOLACTONE 25 MG/1
25 TABLET ORAL DAILY
Status: DISCONTINUED | OUTPATIENT
Start: 2025-02-07 | End: 2025-02-13 | Stop reason: HOSPADM

## 2025-02-07 RX ORDER — SODIUM CHLORIDE 9 MG/ML
INJECTION, SOLUTION INTRAVENOUS PRN
Status: CANCELLED | OUTPATIENT
Start: 2025-02-07

## 2025-02-07 RX ORDER — TAMSULOSIN HYDROCHLORIDE 0.4 MG/1
0.4 CAPSULE ORAL DAILY
Status: DISCONTINUED | OUTPATIENT
Start: 2025-02-07 | End: 2025-02-13 | Stop reason: HOSPADM

## 2025-02-07 RX ORDER — TRAMADOL HYDROCHLORIDE 50 MG/1
50 TABLET ORAL EVERY 6 HOURS PRN
Status: DISCONTINUED | OUTPATIENT
Start: 2025-02-07 | End: 2025-02-13 | Stop reason: HOSPADM

## 2025-02-07 RX ORDER — SODIUM CHLORIDE 0.9 % (FLUSH) 0.9 %
5-40 SYRINGE (ML) INJECTION PRN
Status: DISCONTINUED | OUTPATIENT
Start: 2025-02-07 | End: 2025-02-07 | Stop reason: HOSPADM

## 2025-02-07 RX ORDER — SACUBITRIL AND VALSARTAN 24; 26 MG/1; MG/1
1 TABLET, FILM COATED ORAL 2 TIMES DAILY
Status: DISCONTINUED | OUTPATIENT
Start: 2025-02-07 | End: 2025-02-13 | Stop reason: HOSPADM

## 2025-02-07 RX ORDER — CEFAZOLIN SODIUM 1 G/3ML
INJECTION, POWDER, FOR SOLUTION INTRAMUSCULAR; INTRAVENOUS
Status: DISCONTINUED | OUTPATIENT
Start: 2025-02-07 | End: 2025-02-07 | Stop reason: SDUPTHER

## 2025-02-07 RX ORDER — SODIUM CHLORIDE 9 MG/ML
INJECTION, SOLUTION INTRAVENOUS
Status: DISCONTINUED | OUTPATIENT
Start: 2025-02-07 | End: 2025-02-07 | Stop reason: SDUPTHER

## 2025-02-07 RX ORDER — SODIUM CHLORIDE 0.9 % (FLUSH) 0.9 %
5-40 SYRINGE (ML) INJECTION EVERY 12 HOURS SCHEDULED
Status: CANCELLED | OUTPATIENT
Start: 2025-02-07

## 2025-02-07 RX ORDER — PHENYLEPHRINE HCL IN 0.9% NACL 1 MG/10 ML
SYRINGE (ML) INTRAVENOUS
Status: DISCONTINUED | OUTPATIENT
Start: 2025-02-07 | End: 2025-02-07 | Stop reason: SDUPTHER

## 2025-02-07 RX ORDER — ROSUVASTATIN CALCIUM 20 MG/1
40 TABLET, COATED ORAL NIGHTLY
Status: DISCONTINUED | OUTPATIENT
Start: 2025-02-07 | End: 2025-02-13 | Stop reason: HOSPADM

## 2025-02-07 RX ORDER — SODIUM CHLORIDE 9 MG/ML
INJECTION, SOLUTION INTRAVENOUS PRN
Status: DISCONTINUED | OUTPATIENT
Start: 2025-02-07 | End: 2025-02-07 | Stop reason: HOSPADM

## 2025-02-07 RX ORDER — FENTANYL CITRATE 50 UG/ML
INJECTION, SOLUTION INTRAMUSCULAR; INTRAVENOUS
Status: DISCONTINUED | OUTPATIENT
Start: 2025-02-07 | End: 2025-02-07 | Stop reason: SDUPTHER

## 2025-02-07 RX ORDER — AMIODARONE HYDROCHLORIDE 200 MG/1
200 TABLET ORAL
Status: DISCONTINUED | OUTPATIENT
Start: 2025-02-08 | End: 2025-02-13 | Stop reason: HOSPADM

## 2025-02-07 RX ORDER — CALCIUM CHLORIDE 100 MG/ML
INJECTION INTRAVENOUS; INTRAVENTRICULAR
Status: DISCONTINUED | OUTPATIENT
Start: 2025-02-07 | End: 2025-02-07 | Stop reason: SDUPTHER

## 2025-02-07 RX ADMIN — TRAMADOL HYDROCHLORIDE 50 MG: 50 TABLET, COATED ORAL at 20:05

## 2025-02-07 RX ADMIN — CEFAZOLIN 2 G: 1 INJECTION, POWDER, FOR SOLUTION INTRAMUSCULAR; INTRAVENOUS at 13:20

## 2025-02-07 RX ADMIN — APIXABAN 5 MG: 5 TABLET, FILM COATED ORAL at 21:38

## 2025-02-07 RX ADMIN — SODIUM CHLORIDE: 9 INJECTION, SOLUTION INTRAVENOUS at 13:07

## 2025-02-07 RX ADMIN — SODIUM CHLORIDE: 9 INJECTION, SOLUTION INTRAVENOUS at 18:52

## 2025-02-07 RX ADMIN — HYDROXYCHLOROQUINE SULFATE 200 MG: 200 TABLET ORAL at 20:06

## 2025-02-07 RX ADMIN — SODIUM CHLORIDE, PRESERVATIVE FREE 10 ML: 5 INJECTION INTRAVENOUS at 09:19

## 2025-02-07 RX ADMIN — GUAIFENESIN 400 MG: 400 TABLET ORAL at 20:06

## 2025-02-07 RX ADMIN — WATER 1000 MG: 1 INJECTION INTRAMUSCULAR; INTRAVENOUS; SUBCUTANEOUS at 14:58

## 2025-02-07 RX ADMIN — OSELTAMIVIR PHOSPHATE 30 MG: 30 CAPSULE ORAL at 21:38

## 2025-02-07 RX ADMIN — FERROUS SULFATE TAB 325 MG (65 MG ELEMENTAL FE) 325 MG: 325 (65 FE) TAB at 09:17

## 2025-02-07 RX ADMIN — DOXYCYCLINE 100 MG: 100 INJECTION, POWDER, LYOPHILIZED, FOR SOLUTION INTRAVENOUS at 04:12

## 2025-02-07 RX ADMIN — FENTANYL CITRATE 50 MCG: 50 INJECTION, SOLUTION INTRAMUSCULAR; INTRAVENOUS at 13:19

## 2025-02-07 RX ADMIN — PROPOFOL 20 MG: 10 INJECTION, EMULSION INTRAVENOUS at 13:19

## 2025-02-07 RX ADMIN — GUAIFENESIN 400 MG: 400 TABLET ORAL at 14:57

## 2025-02-07 RX ADMIN — SODIUM CHLORIDE 1500 MG: 0.9 INJECTION, SOLUTION INTRAVENOUS at 15:09

## 2025-02-07 RX ADMIN — FOLIC ACID 1 MG: 1 TABLET ORAL at 09:17

## 2025-02-07 RX ADMIN — DULOXETINE HYDROCHLORIDE 30 MG: 30 CAPSULE, DELAYED RELEASE ORAL at 09:17

## 2025-02-07 RX ADMIN — PANTOPRAZOLE SODIUM 40 MG: 40 TABLET, DELAYED RELEASE ORAL at 09:17

## 2025-02-07 RX ADMIN — GABAPENTIN 300 MG: 300 CAPSULE ORAL at 01:08

## 2025-02-07 RX ADMIN — CALCIUM CHLORIDE 0.5 G: 100 INJECTION, SOLUTION INTRAVENOUS at 13:34

## 2025-02-07 RX ADMIN — TAMSULOSIN HYDROCHLORIDE 0.4 MG: 0.4 CAPSULE ORAL at 09:16

## 2025-02-07 RX ADMIN — METOPROLOL SUCCINATE 25 MG: 25 TABLET, EXTENDED RELEASE ORAL at 09:16

## 2025-02-07 RX ADMIN — METOPROLOL SUCCINATE 25 MG: 25 TABLET, EXTENDED RELEASE ORAL at 20:06

## 2025-02-07 RX ADMIN — OSELTAMIVIR PHOSPHATE 30 MG: 30 CAPSULE ORAL at 09:18

## 2025-02-07 RX ADMIN — PROPOFOL 80 MCG/KG/MIN: 10 INJECTION, EMULSION INTRAVENOUS at 13:20

## 2025-02-07 RX ADMIN — GABAPENTIN 300 MG: 300 CAPSULE ORAL at 09:17

## 2025-02-07 RX ADMIN — GABAPENTIN 300 MG: 300 CAPSULE ORAL at 14:57

## 2025-02-07 RX ADMIN — SODIUM CHLORIDE, PRESERVATIVE FREE 10 ML: 5 INJECTION INTRAVENOUS at 12:01

## 2025-02-07 RX ADMIN — GABAPENTIN 300 MG: 300 CAPSULE ORAL at 20:06

## 2025-02-07 RX ADMIN — OSELTAMAVIR PHOSPHATE 75 MG: 75 CAPSULE ORAL at 01:08

## 2025-02-07 RX ADMIN — DOXYCYCLINE 100 MG: 100 INJECTION, POWDER, LYOPHILIZED, FOR SOLUTION INTRAVENOUS at 15:14

## 2025-02-07 RX ADMIN — Medication 150 MCG: at 13:34

## 2025-02-07 ASSESSMENT — LIFESTYLE VARIABLES
HOW OFTEN DO YOU HAVE A DRINK CONTAINING ALCOHOL: NEVER
HOW MANY STANDARD DRINKS CONTAINING ALCOHOL DO YOU HAVE ON A TYPICAL DAY: PATIENT DOES NOT DRINK

## 2025-02-07 ASSESSMENT — PAIN SCALES - GENERAL
PAINLEVEL_OUTOF10: 6
PAINLEVEL_OUTOF10: 0

## 2025-02-07 ASSESSMENT — PAIN DESCRIPTION - LOCATION: LOCATION: GENERALIZED

## 2025-02-07 NOTE — BRIEF OP NOTE
Brief Postoperative Note      Patient: Ruben Davidson  YOB: 1945  MRN: [id number]    Date of Procedure: 2/7/2025    Pre-Op Diagnosis Codes:      * Kidney stone [N20.0]    Post-Op Diagnosis: Same       Procedure(s):  LEFT CYSTOSCOPY RETROGRADE PYELOGRAM STENT INSERTION    Surgeon(s):  Dany Starr DO    Assistant:  * No surgical staff found *    Anesthesia: Monitor Anesthesia Care    Estimated Blood Loss (mL): Minimal    Complications: None    Specimens:   ID Type Source Tests Collected by Time Destination   1 : URINE CULTURE Urine Urine, Cystoscopic CULTURE, URINE Dany Starr DO 2/7/2025 1329        Implants:  Implant Name Type Inv. Item Serial No.  Lot No. LRB No. Used Action   STENT URET 6FR L26CM PERCFLX FIRM DUROMETER DBL PGTL TAPR - [id number]  STENT URET 6FR L26CM PERCFLX FIRM DUROMETER DBL PGTL TAPR  [employer]- [id number] Left 1 Implanted         Drains:   Ureteral Drain/Stent 02/07/25 Left Ureter (Active)       Findings:  Infection Present At Time Of Surgery (PATOS) (choose all levels that have infection present):  - Deep Infection (muscle/fascia) present as evidenced by abscess  Other Findings: see operative report     Electronically signed by Dany Satrr DO on 2/7/2025 at 1:51 PM

## 2025-02-07 NOTE — CARE COORDINATION
02/07/2025 Pt admitted 02/06/2025 DX: RADHA.  Pt had a fall at home w/ laceration to forehead.  Also dx w/ Influenza A. Positive blood cultures-Doxycycline every 12 hrs & Tamiflu. Pt is independent at home, no services and no DME use.  No needs identified for home going DC. Wife to provide transport when DC is given. Acacia Santos RN CM P: [phone]

## 2025-02-07 NOTE — PROGRESS NOTES
called 7WE revealing blood cultures are positive.    Call made to Dr Ewing's answering service.  Message left informing (four of four) blood cultures are positive.      6:53 AM  Call made to Dr Casper's answering service.  Message left updating on new ID consult order.

## 2025-02-07 NOTE — CONSULTS
Department of Internal Medicine  Infectious Diseases   Consult Note      Reason for Consult: Bacteremia, sepsis, UTI  , flu A infection       Requesting Physician:  Dr Ewing         HISTORY OF PRESENT ILLNESS:    This is a 79 yrs old male with hx of CAD, HTN,  Sleep apnea ,  psoriatic arthritis presented to the ER with cough, chills, weakness, not feeling well for ~ 3 months, progressively he became weaker over past 2 weeks - He fell down and cut his fore head -and pt came to the ER . He denied shortness of breath ,chest pain .  WBC was 13 K, procalcitonin 1.27, lactic acid 2.1   CT scan of abdomen and pelvis - obstructing 6 mm calculus mid left ureter with moderate left   hydronephrosis and proximal hydroureter.   Pt was given doxy and rocephin   Pt was taken to the OR for cystoscopy with left ureter stent insertion ( 2/7/2025), suppurative material drained, consistent with pyonephrosis.   Blood cx - Enterococcus fecalis - vanco started     Past Medical History:      Past Medical History:   Diagnosis Date    CAD (coronary artery disease)     s/p stent RCA    [place] (hard of hearing)     WEARS HEARING AIDES    Hypertension     Left ventricular systolic dysfunction     MI (myocardial infarction) ([facility]) 1997    Mitral regurgitation     Obesity, morbid     s/p intestinal bypass    Sleep apnea     Valvular heart disease          Past Surgical History:      Past Surgical History:   Procedure Laterality Date    APPENDECTOMY      BACK SURGERY      CARDIAC DEFIBRILLATOR PLACEMENT  12/20/2021    Medtronic dual chamber ICD by Dr. Luther    CARPAL TUNNEL RELEASE Bilateral     CORONARY ANGIOPLASTY  07/07/1997    PTCA/stent of the RCA     CORONARY ANGIOPLASTY WITH STENT PLACEMENT  12/12/2021    3.5 x 15 Resolute [place] to the prox LAD by Dr. Pablo    DIAGNOSTIC CARDIAC CATH LAB PROCEDURE  02/24/1997    DIAGNOSTIC CARDIAC CATH LAB PROCEDURE  07/07/1997    EP DEVICE PROCEDURE N/A 1/11/2024    Pacemaker lead revision performed by Homa

## 2025-02-07 NOTE — PLAN OF CARE
Problem: Chronic Conditions and Co-morbidities  Goal: Patient's chronic conditions and co-morbidity symptoms are monitored and maintained or improved  2/7/2025 1012 by Teri Handy RN  Outcome: Progressing  2/7/2025 0215 by Gilligan, Melissa, RN  Outcome: Progressing     Problem: Discharge Planning  Goal: Discharge to home or other facility with appropriate resources  2/7/2025 1012 by Teri Handy RN  Outcome: Progressing  2/7/2025 0215 by Gilligan, Melissa, RN  Outcome: Progressing  Flowsheets  Taken 2/7/2025 0208  Discharge to home or other facility with appropriate resources: Identify barriers to discharge with patient and caregiver  Taken 2/7/2025 0205  Discharge to home or other facility with appropriate resources: Identify barriers to discharge with patient and caregiver     Problem: Safety - Adult  Goal: Free from fall injury  2/7/2025 1012 by Teri Handy RN  Outcome: Progressing  2/7/2025 0215 by Gilligan, Melissa, RN  Outcome: Progressing     Problem: Skin/Tissue Integrity  Goal: Skin integrity remains intact  Description: 1.  Monitor for areas of redness and/or skin breakdown  2.  Assess vascular access sites hourly  3.  Every 4-6 hours minimum:  Change oxygen saturation probe site  4.  Every 4-6 hours:  If on nasal continuous positive airway pressure, respiratory therapy assess nares and determine need for appliance change or resting period  Outcome: Progressing

## 2025-02-07 NOTE — PLAN OF CARE
Problem: Chronic Conditions and Co-morbidities  Goal: Patient's chronic conditions and co-morbidity symptoms are monitored and maintained or improved  Outcome: Progressing     Problem: Discharge Planning  Goal: Discharge to home or other facility with appropriate resources  Outcome: Progressing  Flowsheets  Taken 2/7/2025 0208  Discharge to home or other facility with appropriate resources: Identify barriers to discharge with patient and caregiver  Taken 2/7/2025 0205  Discharge to home or other facility with appropriate resources: Identify barriers to discharge with patient and caregiver     Problem: Safety - Adult  Goal: Free from fall injury  Outcome: Progressing

## 2025-02-07 NOTE — H&P
Hospital Medicine History & Physical      PCP: Barrington Garcia MD    Date of Admission: 2/6/2025    Date of Service: . FEB. 7, 2025    Chief Complaint:  FALL      History Of Present Illness:     79 y.o. male presented with FALL.  HAS NOT BEEN FEELING WELL FOR OVER A WEEK.  WENT TO THE BATHROOM FELL FORWARD AND SUSTAINED A LACERATION OF HIS FOREHEAD.   HAS HAD A PRODUCTIVE COUGH OF GREEN MUCUS. , LETHARGY, NO FEVER, NV OR DIARRHEA    Past Medical History:          Diagnosis Date    CAD (coronary artery disease)     s/p stent RCA    [place] (hard of hearing)     WEARS HEARING AIDES    Hypertension     Left ventricular systolic dysfunction     MI (myocardial infarction) ([facility]) 1997    Mitral regurgitation     Obesity, morbid     s/p intestinal bypass    Sleep apnea     Valvular heart disease        Past Surgical History:          Procedure Laterality Date    APPENDECTOMY      BACK SURGERY      CARDIAC DEFIBRILLATOR PLACEMENT  12/20/2021    Medtronic dual chamber ICD by Dr. Luther    CARPAL TUNNEL RELEASE Bilateral     CORONARY ANGIOPLASTY  07/07/1997    PTCA/stent of the RCA     CORONARY ANGIOPLASTY WITH STENT PLACEMENT  12/12/2021    3.5 x 15 Resolute [place] to the prox LAD by Dr. Pablo    DIAGNOSTIC CARDIAC CATH LAB PROCEDURE  02/24/1997    DIAGNOSTIC CARDIAC CATH LAB PROCEDURE  07/07/1997    EP DEVICE PROCEDURE N/A 1/11/2024    Pacemaker lead revision performed by Viktor Luther DO [place] [facility]    EP DEVICE PROCEDURE N/A 5/23/2024    Remove & replace ICD biv multi leads performed by Viktor Luther DO at [facility]    EYE SURGERY Bilateral     cataract removal w lense implants    FRACTURE SURGERY      Clayton in left femur    HIP SURGERY  2014    fx, clayton placed    INTESTINAL BYPASS      JOINT REPLACEMENT      TONSILLECTOMY      UPPER GASTROINTESTINAL ENDOSCOPY N/A 10/08/2018     EGD CONTROL HEMORRHAGE at bedside performed by Luis Alberto Vargas MD at [facility] ENDOSCOPY    UPPER GASTROINTESTINAL ENDOSCOPY N/A 12/16/2021    EGD ESOPHAGOGASTRODUODENOSCOPY performed by Dariel Alfredo MD at [facility] ENDOSCOPY       Medications Prior to Admission:      Prior to Admission medications    Medication Sig Start Date End Date Taking? Authorizing Provider   spironolactone (ALDACTONE) 25 MG tablet Take 1 tablet by mouth daily   Yes Provider, MD [place]   apixaban (ELIQUIS) 5 MG TABS tablet Take 1 tablet by mouth 2 times daily Indications: Do NOT restart until cleared by Device Clinic at appointment in 2 weeks. . 10/24/24 2/7/25 Yes Viktor Luther DO   metoprolol succinate (TOPROL XL) 25 MG extended release tablet Take 1 tablet by mouth in the morning and at bedtime 10/3/24  Yes Darby Barron, APRN - CNP   amiodarone (CORDARONE) 200 MG tablet Take 1 tablet by mouth every 48 hours 8/7/24 2/7/25 Yes Darby Barron, APRN - CNP   sacubitril-valsartan (ENTRESTO) 24-26 MG per tablet Take 1 tablet by mouth 2 times daily 1/24/24  Yes Tor Kathleen MD   torsemide (DEMADEX) 20 MG tablet 2 tablets once a week Two tablets once a week 12/22/22  Yes Provider, MD [place]   rosuvastatin (CRESTOR) 40 MG tablet TAKE ONE TABLET BY MOUTH ONCE NIGHTLY 9/24/22  Yes Tor Kathleen MD   Multiple Vitamins-Minerals (PRESERVISION AREDS 2+MULTI VIT PO) Take by mouth   Yes Provider, MD [place]   Boswellia-Glucosamine-Vit D (OSTEO BI-FLEX ONE PER DAY PO) Take by mouth   Yes Provider, Historical, MD   clopidogrel (PLAVIX) 75 MG tablet Take 1 tablet by mouth daily . 12/22/21  Yes Bobo Arvizu MD   Cetirizine HCl (ZYRTEC ALLERGY PO) Take by mouth daily   Yes Provider, MD [place]   Acetaminophen (TYLENOL ARTHRITIS PAIN PO) Take by mouth daily Takes 2 tablets twice daily   Yes Provider, MD [place]   Ferrous Sulfate (IRON) 325 (65 Fe) MG TABS daily  7/23/19  Yes Provider, MD [place]   pantoprazole (PROTONIX) 40 MG

## 2025-02-07 NOTE — PROGRESS NOTES
Occupational Therapy  Attempted OT assessment at b/s, however, pt currently off unit for surgery.  Will attempt assessment at a later time.  Thank you for this referral Radha Morris, JAMILA, OTR/L  # [id number]

## 2025-02-07 NOTE — ANESTHESIA POSTPROCEDURE EVALUATION
Department of Anesthesiology  Postprocedure Note    Patient: Ruben Davidson  MRN: [id number]  YOB: 1945  Date of evaluation: 2/7/2025    Procedure Summary       Date: 02/07/25 Room / Location: [address]    Anesthesia Start: 1307 Anesthesia Stop: 1344    Procedure: LEFT CYSTOSCOPY RETROGRADE PYELOGRAM STENT INSERTION (Left: Pelvis) Diagnosis:       Kidney stone      (Kidney stone [N20.0])    Surgeons: Dany Starr DO Responsible Provider: Blaire August MD    Anesthesia Type: MAC ASA Status: 4            Anesthesia Type: No value filed.    Mildred Phase I: Mildred Score: 10    Mildred Phase II:      Anesthesia Post Evaluation    Patient location during evaluation: PACU  Patient participation: complete - patient participated  Level of consciousness: awake  Pain score: 0  Airway patency: patent  Nausea & Vomiting: no nausea  Cardiovascular status: hemodynamically stable  Respiratory status: acceptable  Hydration status: stable  Multimodal analgesia pain management approach    No notable events documented.

## 2025-02-07 NOTE — PROGRESS NOTES
Perfect Serve sent to Imani Barrett.  Informed of new Cardiology consult.  Per Perfect Serve:  this will be addressed in the morning.

## 2025-02-07 NOTE — PROGRESS NOTES
Call made to [facility] Urology answering service.  Message left informing Dr Adrian of new consult.

## 2025-02-07 NOTE — OP NOTE
[facility]              [address] [place] [address]                            OPERATIVE REPORT      PATIENT NAME: FRANTZ HUNT       : 1945  MED REC NO: [id number]                        ROOM: [id number]  ACCOUNT NO: [id number]                       ADMIT DATE: 2025  PROVIDER: Dany Starr DO      DATE OF PROCEDURE:  2025    SURGEON:  Dany Starr DO    PREOPERATIVE DIAGNOSES:    1. 6 mm left proximal ureteral stone.  2. Left hydronephrosis.  3. Left flank pain.  4. Influenza.    POSTOPERATIVE DIAGNOSES:    1. 6 mm left proximal ureteral stone.  2. Left hydronephrosis.  3. Left flank pain.  4. Influenza.  5. Pyonephrosis.    PROCEDURES:  The patient had:  1. Cystoscopy.  2. Left stent insertion.    ANESTHESIA:  Monitored anesthesia.    ESTIMATED BLOOD LOSS:  None.    CONDITION:  PACU, stable.    PREOPERATIVE ANTIBIOTICS:  Administered.    PATHOLOGIC SPECIMENS:  Urine culture.    HISTORY:  Pleasant 79-year-old  male, known to our practice, has a history of BPH, seen by my dad, Dr. Jeffery Starr in the past, presented here with influenza.  Imaging was performed.  Imaging was consistent with a left proximal ureteral stone with associated hydro.  Options were discussed with him as well as the daughter, which included, but not limited to, the risk of the anesthesia and you could see the consult for the all the details which were discussed with the understanding that he would need a second procedure in the future once he was free of the influenza.  He understood this and elected to progress.    DESCRIPTION OF PROCEDURE:  This pleasant 79-year-old  male was brought to operating room #11 at [facility], placed in the supine position, induced with monitored anesthesia.  Anesthesia monitored head and neck areas, IV access, vital signs throughout the course of the case.  Status post induction, the

## 2025-02-07 NOTE — PROGRESS NOTES
Pharmacy Consultation Note  (Antibiotic Dosing and Monitoring)    Initial consult date: 2/7/2025  Consulting physician/provider: Dr. Casper  Drug: Vancomycin  Indication: Bloodstream infection    Age/  Gender Height Weight IBW  Allergy Information   79 y.o./male 167.6 cm (5' 6\") 66.7 kg (147 lb)     Ideal body weight: 63.8 kg (140 lb 10.5 oz)  Adjusted ideal body weight: 66.3 kg (146 lb 1.9 oz)   Iodides and Dust mite extract      Renal Function:  Recent Labs     02/06/25  1410 02/07/25  0646   BUN 39* 41*   CREATININE 1.7* 1.8*       Intake/Output Summary (Last 24 hours) at 2/7/2025 1430  Last data filed at 2/7/2025 1200  Gross per 24 hour   Intake 1197.36 ml   Output 600 ml   Net 597.36 ml       Vancomycin Monitoring:  Trough:  No results for input(s): \"VANCOTROUGH\" in the last 72 hours.  Random:  No results for input(s): \"VANCORANDOM\" in the last 72 hours.    Vancomycin Administration Times:  Recent vancomycin administrations        No vancomycin IV orders with administrations found.            Orders not given:            vancomycin (VANCOCIN) 1,500 mg in sodium chloride 0.9 % 250 mL IVPB ([id number])    vancomycin (VANCOCIN) 1,000 mg in sodium chloride 0.9 % 250 mL IVPB ([id number])                    Assessment:  Patient is a 79 y.o. male who has been initiated on vancomycin  Estimated Creatinine Clearance: 31 mL/min (A) (based on SCr of 1.8 mg/dL (H)).  To dose vancomycin, pharmacy will be utilizing [employer] calculation software for goal AUC/ALIYAH 400-600 mg/L-hr (predicted AUC/ALIYAH = 561, Tr =18.6 mcg/mL)    Plan:  Vancomycin 1500 mg x1 followed by 1000 mg IV every 24 hours  Will check vancomycin level tomorrow AM   Will continue to monitor renal function   Pharmacy to follow      Lucia Arzola, Pharm D 2/7/2025 2:30 PM     MIHIR: [phone]  SEY: [phone]  SJW: [phone]

## 2025-02-07 NOTE — PROGRESS NOTES
Physical Therapy  Physical Therapy Initial Assessment     Name: Ruben Davidson  : 1945  MRN: [id number]      Date of Service: 2025    Evaluating PT:  Horacio Dhaliwal PT, DPT    Room #:  7410/7410-A  Diagnosis:  Influenza A [J10.1]  RADHA (acute kidney injury) (HCC) [N17.9]  Injury of head, initial encounter [S09.90XA]  PMHx/PSHx:  CAD, MI, HTN  Procedure/Surgery:  N/A  Precautions:  fall risk, droplet, bed/chair alarm, [place]  Equipment Owned: ww, cane  Equipment Needs:  TBD    SUBJECTIVE:    Pt lives with spouse in a 1 story home with 3 steps to enter and 1 handrail.  Bed/bath is on 1st floor.  Pt ambulated with no AD PTA.    OBJECTIVE:   Initial Evaluation  Date: 25 Treatment Short Term/ Long Term   Goals   AM-PAC 6 Clicks      Was pt agreeable to Eval/treatment? yes     Does pt have pain? No pain     Bed Mobility  Rolling: SBA  Supine to sit: SBA  Sit to supine: NT  Scooting: SBA  Rolling: IND  Supine to sit: IND  Sit to supine: IND  Scooting: IND   Transfers Sit to stand: CGA  Stand to sit: CGA  Stand pivot: CGA with no AD  Sit to stand: IND  Stand to sit: IND  Stand pivot: IND with no AD   Ambulation    200' with no AD CGA  400'+ with no AD IND   Stair negotiation: ascended and descended  NT  3 steps with 1 handrail mod I     Strength/ROM:   BLE grossly 4/5  BLE AROM WFL    Balance:   Static Sitting: Supervision  Dynamic Sitting: Supervision  Static Standing: SBA with no AD  Dynamic Standing: CGA with no AD    Pt is A & O x 3  Sensation:  Pt denies numbness and tingling to extremities  Edema:  unremarkable    Vitals:  SpO2 was stable during session  BP in supine: 103/80,  bpm  BP in sittin/63,  bpm  BP in standin/55,  bpm    Therapeutic Exercises:    Bed mobility: supine>sit, cued for EOB positioning  Transfers: STS x2, cued for hand placement and postural correction  Ambulation: 200' with no AD  BLE AROM    Patient education  Pt educated on role of PT, importance  therapy 14972 -- minutes  [x] Therapeutic activities 44497 15 minutes  [] Therapeutic exercises 77642 -- minutes  [] Neuromuscular reeducation 80096 -- minutes     Horacio Dhaliwal, PT, DPT  [id number]

## 2025-02-07 NOTE — CONSULTS
Inpatient Cardiology Consultation      Reason for Consult: Elevated troponin    Consulting Physician: Dr. Yates    Requesting Physician: Denia Diaz, KAYE-MEHRAN    Date of Consultation: 2/7/2025    History of Present Illness:   Ruben Davidson  is a 79 y.o. year old male known to [facility] cardiology and follows with Dr. Kathleen (last OV 11/12/2024).  He also follows with Dr. Luther (last OV 10/24/2024.  Recently had a cardioversion completed 11/22/2024 with Dr. Luther.  Patient recently saw [facility] on 1/16/2025 regarding mitral regurgitation for surgical evaluation.  Patient had a JIMBO which was reviewed by the valve team at [facility] and deemed not a great anatomy for clipping and he was recommended screening for [place].  Recent heart cath and echocardiogram completed in January (details below).    Patient presented to the ED on 2/6/2025 with complaints of fall. Patient reports that he went into the bathroom, was about to sit down to use the restroom when he fell forward and hit the front of his head on the shower.  No loss of consciousness.  On arrival blood pressure 84/56, heart rate 92, 96% on room air and afebrile.  Labs include sodium 135, potassium 4.1, BUN/creatinine 39/1.7, lactic acid 2.1, troponin 109> 96> 90, WBC 13.5, Hgb 11.8, procalcitonin 1.27.  Respiratory panel positive for influenza A.  Blood cultures obtained in the emergency department positive for gram-positive cocci 2/2.  Chest XR revealed mild cardiomegaly with no acute process.  CT of the head reveals no acute intracranial abnormality.  CT chest reveals basilar interstitial opacification and mild bronchiectasis suggestive of primary ILD, component of edema is not excluded.  In the ED patient received Tylenol, Rocephin, doxycycline, 1.5 L bolus and started on continuous IV infusion of normal saline at 75 mL/hour.    Patient is lying flat in bed and appears to be in no acute distress.  He reports that a few weeks ago he started  acute cardiopulmonary process.             I have personally reviewed the laboratory, cardiac diagnostic and radiographic testing as outlined above:  Records from [facility] reviewed and summarized as above    IMPRESSION:  Elevated troponin: Chronic, flat pattern, not consistent with ACS type I  CAD: As above  Persistent atrial fibrillation: Now in paced rhythm  Ischemic cardiomyopathy  Mitral valve regurgitation: Moderate  Tricuspid valve regurgitation: Moderate  AICD in situ  Hypertension: Controlled  Hyperlipidemia  Type 2 diabetes mellitus  Bacteremia    RECOMMENDATIONS:   Continue current treatment  Will arrange for JIMBO requested by ID  No active general cardiology problems, will see as needed, thank you    I have reviewed my findings and recommendations with patient and family at bedside    Thank you for the consult  Electronically signed by Joanie Yates MD on 2/7/2025 at 6:05 PM    All of the above was discussed with the patient  reviewing the above stated recommendations. I directly participated in the medical-decision making, ordering tests, and medication adjustments as documented today. I contributed >50% to the overall encounter time including face-to-face time providing counseling and or coordination of care with the other providers, reviewing records/tests, counseling/education of the patient, ordering medications/tests/procedures, coordinating care, and documenting clinical information in the EHR.  I also discussed the differential diagnosis and all of the proposed management plans with the patient and individuals accompanying the patient.     NOTE: This report was transcribed using voice recognition software. Every effort was made to ensure accuracy; however, inadvertent computerized transcription errors may be present

## 2025-02-07 NOTE — DISCHARGE INSTRUCTIONS
A ureteral stent (also know as a double J stent) was inserted during your recent procedure.  Unlike a heart \"stent\" which is metal, short, and permanent, this ureteral stent plastic, and temporary.  It spans from your kidney, down the ureter, and into your bladder.  This will need to be removed either via a procedure in the office or a string in the next week or two.  Sometime these stents are left in for long term drainage, but they need to changed every few months. The instructions will be given to you with regards to removal by Dr. Starr/Fausto    IMPORTANT - This ureteral stent will likely cause some frequency with urination, urgency with urination, back/flank pain with urination, and/or blood in the urine.  This is very normal.  Taking the pain medications and/or anti-inflammatories will help to manage this discomfort if present.  If you have any questions or concerns you can contact Dr. Starr/Jocy/Nisha's office at [phone].

## 2025-02-07 NOTE — PROGRESS NOTES
4 Eyes Skin Assessment     NAME:  Ruben Davidson  YOB: 1945  MEDICAL RECORD NUMBER:  [id number]    The patient is being assessed for  Admission    I agree that at least one RN has performed a thorough Head to Toe Skin Assessment on the patient. ALL assessment sites listed below have been assessed.      Areas assessed by both nurses:    Head, Face, Ears, Shoulders, Back, Chest, Arms, Elbows, Hands, Sacrum. Buttock, Coccyx, Ischium, and Legs. Feet and Heels        Does the Patient have a Wound? No noted wound(s)    *laceration above right eye brow*       Avelino Prevention initiated by RN: No  Wound Care Orders initiated by RN: No    Pressure Injury (Stage 3,4, Unstageable, DTI, NWPT, and Complex wounds) if present, place Wound referral order by RN under : No    New Ostomies, if present place, Ostomy referral order under : No     Nurse 1 eSignature: Electronically signed by Melissa Gilligan, RN on 2/7/25 at 2:17 AM EST    **SHARE this note so that the co-signing nurse can place an eSignature**    Nurse 2 eSignature: Electronically signed by Lisbeth Redd RN on 2/7/25 at 5:20 AM EST

## 2025-02-07 NOTE — CONSULTS
2/7/2025 9:05 AM  Service: Urology  Group: [facility] (Matty/Jocy/Nisha)    Ruben Davidson  [id number]     Chief Complaint: left proximal ureteral stone     History of Present Illness:  The patient is a 79 y.o. male patient who presents with the above  He has not seen a urologist in the past  He did have a CT done and was found to have left proximal ureteral stone with left hydronephrosis  He does have azotemia  He has not had a fever  The risk of the surgery was discussed at length.  The risk of the anesthesia and loss of airway.  Cardiovascular risks, myocardial infraction, cerebral vascular accident, pulmonary embolism, deep vein thrombosis.  Injury risk, bowel injury, bladder injury, ureteral injury, blood vessel injury, delayed presentation of the injury (ie scar tissue or stricture formation).  These can lead to bleeding, requiring transfusion.  He risk of infection with can lead to sepsis.  The risk of death.  The may be other issues that present that are not listed above which can occur.  The patient understood these risks, they understood the benefits, they understood the alternatives and fully consent to proceed.  The needs for a second procedure may also be required.  We discussed placing to sleep  Looking in the bladder shooting x-rays if needed  Seeing the stone  Getting to the stone  Placement of laser on the stone  Sometimes we use a stone basket  90% of the time being able to get the stone managed  10% we see the stone but cannot get to the stone  We place a stent in those situations and represent for another procedure in 2-3 weeks  If we see infected urine, we place a stent an come back for a second procedure once treated  1/1000 the stone is so impacted we have to abort the surgery and place a PNT  Then additional procedures will need to be preformed      Past Medical History:   Diagnosis Date    CAD (coronary artery disease)     s/p stent RCA    [place] (hard of hearing)     WEARS  mouth every 48 hours  sacubitril-valsartan (ENTRESTO) 24-26 MG per tablet, Take 1 tablet by mouth 2 times daily  torsemide (DEMADEX) 20 MG tablet, 2 tablets once a week Two tablets once a week  rosuvastatin (CRESTOR) 40 MG tablet, TAKE ONE TABLET BY MOUTH ONCE NIGHTLY  Multiple Vitamins-Minerals (PRESERVISION AREDS 2+MULTI VIT PO), Take by mouth  Boswellia-Glucosamine-Vit D (OSTEO BI-FLEX ONE PER DAY PO), Take by mouth  clopidogrel (PLAVIX) 75 MG tablet, Take 1 tablet by mouth daily .  Cetirizine HCl (ZYRTEC ALLERGY PO), Take by mouth daily  Acetaminophen (TYLENOL ARTHRITIS PAIN PO), Take by mouth daily Takes 2 tablets twice daily  Ferrous Sulfate (IRON) 325 (65 Fe) MG TABS, daily   pantoprazole (PROTONIX) 40 MG tablet, TAKE 1 TABLET BY MOUTH ONCE DAILY  Cranberry 500 MG CAPS, Take 1 capsule by mouth  gabapentin (NEURONTIN) 300 MG capsule, One capsule three times daily  folic acid (FOLVITE) 1 MG tablet, Take 1 tablet by mouth every morning  hydroxychloroquine (PLAQUENIL) 200 MG tablet, Take 1 tablet by mouth 2 times daily  DULoxetine (CYMBALTA) 30 MG extended release capsule, Take 1 capsule by mouth every morning  Cholecalciferol (VITAMIN D3) 5000 UNITS TABS, Take 1 tablet by mouth every morning  tamsulosin (FLOMAX) 0.4 MG capsule, Take 1 capsule by mouth daily  cephALEXin (KEFLEX) 500 MG capsule, Take by mouth 2 times daily (Patient not taking: Reported on 1/29/2025)  methylPREDNISolone (MEDROL DOSEPACK) 4 MG tablet, Take 0.5 tablets by mouth See Admin Instructions (Patient not taking: Reported on 1/29/2025)  RESTASIS 0.05 % ophthalmic emulsion, Place 1 drop into both eyes 2 times daily as needed    Allergies:    Iodides and Dust mite extract    Social History:    reports that he quit smoking about 24 years ago. His smoking use included cigarettes. He started smoking about 52 years ago. He has a 15 pack-year smoking history. He has never used smokeless tobacco. He reports current alcohol use of about 3.0 - 4.0

## 2025-02-07 NOTE — PROCEDURES
PROCEDURE NOTE  Date: 2/6/2025   Name: Ruben Davidson  YOB: 1945    Procedures      LACERATION REPAIR  PROCEDURE NOTE:  Unless otherwise indicated, this procedure was done or directly supervised by the ED attending. Performed By: KAYE Patel.    Laceration #: 1.  Location: Right forehead above eyebrow  Length: 1.5cm.  The wound area was cleansed with povidone iodine, cleansend with shur-clens and draped in a sterile fashion.  The wound area was anesthetized with Lidocaine 1% with epinephrine.  WOUND COMPLEXITY:    Debridement: None.  Undermining: None.  Wound Margins Revised: None.  Flaps Aligned: yes.  The wound was explored with the following results No foreign bodies found.  The wound was closed with 6-0 Prolene using interrupted sutures.  Dressing:  bacitracin was placed.    Total number suture: 5

## 2025-02-07 NOTE — PROGRESS NOTES
Renal Dose Adjustment Policy (Generic)     This patient is on medication that requires renal, weight, and/or indication dose adjustment.      Date Body Weight IBW  Adjusted BW SCr  CrCl Dialysis status   2/6/2025 66.7 kg (147 lb) Ideal body weight: 63.8 kg (140 lb 10.5 oz)  Adjusted ideal body weight: 65 kg (143 lb 3.1 oz) Serum creatinine: 1.7 mg/dL (H) 02/06/25 1410  Estimated creatinine clearance: 32 mL/min (A) N/a       Pharmacy has dose-adjusted the following medication(s):    Date Previous Order Adjusted Order   2/6/2025 Oseltamivir 30 mg BID Oseltamivir 75 mg once then 30 mg BID        These changes were made per protocol according to the [facility]   Automatic Renal Dose Adjustment Policy.     *Please note this dose may need readjusted if patient's condition changes.    Please contact pharmacy with any questions regarding these changes.    Yosvany Olmedo, PharmD   2/6/2025  11:33 PM

## 2025-02-07 NOTE — ANESTHESIA PRE PROCEDURE
Department of Anesthesiology  Preprocedure Note       Name:  Ruben Davidson   Age:  79 y.o.  :  1945                                          MRN:  [id number]         Date:  2025      Surgeon: Surgeon(s):  Dany Starr DO    Procedure:Left cystoscopy retrograde pyelogram, stent insertion    Medications prior to admission:   Prior to Admission medications    Medication Sig Start Date End Date Taking? Authorizing Provider   spironolactone (ALDACTONE) 25 MG tablet Take 1 tablet by mouth daily   Yes Provider, MD [place]   apixaban (ELIQUIS) 5 MG TABS tablet Take 1 tablet by mouth 2 times daily Indications: Do NOT restart until cleared by [facility] at appointment in 2 weeks. . 10/24/24 2/7/25 Yes Viktor Luther DO   metoprolol succinate (TOPROL XL) 25 MG extended release tablet Take 1 tablet by mouth in the morning and at bedtime 10/3/24  Yes Darby Barron, APRN - CNP   amiodarone (CORDARONE) 200 MG tablet Take 1 tablet by mouth every 48 hours 24 Yes Darby Barron, APRN - CNP   sacubitril-valsartan (ENTRESTO) 24-26 MG per tablet Take 1 tablet by mouth 2 times daily 24  Yes Tor Kathleen MD   torsemide (DEMADEX) 20 MG tablet 2 tablets once a week Two tablets once a week 22  Yes Provider, MD [place]   rosuvastatin (CRESTOR) 40 MG tablet TAKE ONE TABLET BY MOUTH ONCE NIGHTLY 22  Yes Tor Kathleen MD   Multiple Vitamins-Minerals (PRESERVISION AREDS 2+MULTI VIT PO) Take by mouth   Yes Provider, Historical, MD   Boswellia-Glucosamine-Vit D (OSTEO BI-FLEX ONE PER DAY PO) Take by mouth   Yes Provider, Historical, MD   clopidogrel (PLAVIX) 75 MG tablet Take 1 tablet by mouth daily . 21  Yes Bobo Arvizu MD   Cetirizine HCl (ZYRTEC ALLERGY PO) Take by mouth daily   Yes Provider, MD [place]   Acetaminophen (TYLENOL ARTHRITIS PAIN PO) Take by mouth daily Takes 2 tablets twice daily   Yes Provider, Historical, MD   Ferrous Sulfate (IRON) 325 (65 Fe) MG TABS

## 2025-02-07 NOTE — PROGRESS NOTES
TELEMETRY PACK AND SILVER COLOR WATCH SENT TO PACU IN A LABELED CLEAR PLASTIC BAG LABELED WITH PATIENT LABELS

## 2025-02-07 NOTE — CONSULTS
Consult Note      Reason for Consult: RADHA  Date of Service: 2/7/2025   Chief Complaint: had concerns including Fall (Fall this AM- hit head, + thinners, no LOC, sent from [facility] urgent care due to dizziness and thinners).  History Obtained From: patient electronic medical record       HISTORY OF PRESENT ILLNESS:     Ruben Davidson is a 79 y.o. male with a past medical history of hypertension, heart failure with reduced ejection fraction, persistent atrial fibrillation, history of coronary artery disease, obstructive sleep apnea, morbid obesity.     They presented on 2/6/2025 complaining of fall at home when about to sit to the rest room and hitting his head on the shower without loss of consciousness. Of note, he takes eliquis for his atrial fibrillation. On arrival their vitals BP 84/56, HR 92, RR 18,T 99.3. Initial labs were pertinent for elevated creatinine (1.7, baseline 0.9), lactic acidosis (2.1), elevated procalcitonin (1.27),elevated troponin at 109, hypoalbuminemia (3.1), elevated ALT (108), elevated AST (70). CBC showed leukocytosis (13.5) and anemia (11.8). Respiratory panel was positive for influenza A. CXR did not show any acute process. CT head did not show any acute intracranial abnormality. CT chest showed concerns for primary ILD . CT A/P showed obstructing 6mm calculus mid left ureter with moderate left hydronephrosis and proximal hydroureter and simple, benign left renal cysts measuring up to 7.3 cm at the upper pole. Cardiology was consulted for elevated troponin. ID was consulted for bacteremia. Urology was consulted for 6 mm calculus mid left ureter.  Nephrology was consulted for acute kidney injury.     Past Medical History:   Past Medical History:   Diagnosis Date    CAD (coronary artery disease)     s/p stent RCA    [place] (hard of hearing)     WEARS HEARING AIDES    Hypertension     Left ventricular systolic dysfunction     MI (myocardial infarction) (HCC) 1997    Mitral

## 2025-02-08 LAB
ANION GAP SERPL CALCULATED.3IONS-SCNC: 12 MMOL/L (ref 7–16)
BASOPHILS # BLD: 0.03 K/UL (ref 0–0.2)
BASOPHILS NFR BLD: 0 % (ref 0–2)
BUN SERPL-MCNC: 39 MG/DL (ref 6–23)
CALCIUM SERPL-MCNC: 8.2 MG/DL (ref 8.6–10.2)
CHLORIDE SERPL-SCNC: 109 MMOL/L (ref 98–107)
CO2 SERPL-SCNC: 14 MMOL/L (ref 22–29)
CREAT SERPL-MCNC: 1.7 MG/DL (ref 0.7–1.2)
DATE LAST DOSE: NORMAL
EOSINOPHIL # BLD: 0.06 K/UL (ref 0.05–0.5)
EOSINOPHILS RELATIVE PERCENT: 1 % (ref 0–6)
ERYTHROCYTE [DISTWIDTH] IN BLOOD BY AUTOMATED COUNT: 17.1 % (ref 11.5–15)
GFR, ESTIMATED: 40 ML/MIN/1.73M2
GLUCOSE SERPL-MCNC: 72 MG/DL (ref 74–99)
HCT VFR BLD AUTO: 37.9 % (ref 37–54)
HGB BLD-MCNC: 12.1 G/DL (ref 12.5–16.5)
IMM GRANULOCYTES # BLD AUTO: 0.06 K/UL (ref 0–0.58)
IMM GRANULOCYTES NFR BLD: 1 % (ref 0–5)
LYMPHOCYTES NFR BLD: 0.92 K/UL (ref 1.5–4)
LYMPHOCYTES RELATIVE PERCENT: 9 % (ref 20–42)
MCH RBC QN AUTO: 29.2 PG (ref 26–35)
MCHC RBC AUTO-ENTMCNC: 31.9 G/DL (ref 32–34.5)
MCV RBC AUTO: 91.3 FL (ref 80–99.9)
MONOCYTES NFR BLD: 1.35 K/UL (ref 0.1–0.95)
MONOCYTES NFR BLD: 14 % (ref 2–12)
NEUTROPHILS NFR BLD: 75 % (ref 43–80)
NEUTS SEG NFR BLD: 7.32 K/UL (ref 1.8–7.3)
PLATELET # BLD AUTO: 166 K/UL (ref 130–450)
PMV BLD AUTO: 10.8 FL (ref 7–12)
POTASSIUM SERPL-SCNC: 4.6 MMOL/L (ref 3.5–5)
RBC # BLD AUTO: 4.15 M/UL (ref 3.8–5.8)
SODIUM SERPL-SCNC: 135 MMOL/L (ref 132–146)
TME LAST DOSE: NORMAL H
VANCOMYCIN DOSE: NORMAL MG
VANCOMYCIN SERPL-MCNC: 11.4 UG/ML (ref 5–40)
WBC OTHER # BLD: 9.7 K/UL (ref 4.5–11.5)

## 2025-02-08 PROCEDURE — 2580000003 HC RX 258: Performed by: NURSE PRACTITIONER

## 2025-02-08 PROCEDURE — 2060000000 HC ICU INTERMEDIATE R&B

## 2025-02-08 PROCEDURE — 6370000000 HC RX 637 (ALT 250 FOR IP): Performed by: NURSE PRACTITIONER

## 2025-02-08 PROCEDURE — 6370000000 HC RX 637 (ALT 250 FOR IP): Performed by: CLINICAL NURSE SPECIALIST

## 2025-02-08 PROCEDURE — 6370000000 HC RX 637 (ALT 250 FOR IP): Performed by: INTERNAL MEDICINE

## 2025-02-08 PROCEDURE — 36415 COLL VENOUS BLD VENIPUNCTURE: CPT

## 2025-02-08 PROCEDURE — 97166 OT EVAL MOD COMPLEX 45 MIN: CPT

## 2025-02-08 PROCEDURE — 6360000002 HC RX W HCPCS: Performed by: NURSE PRACTITIONER

## 2025-02-08 PROCEDURE — 6360000002 HC RX W HCPCS: Performed by: INTERNAL MEDICINE

## 2025-02-08 PROCEDURE — 2500000003 HC RX 250 WO HCPCS: Performed by: NURSE PRACTITIONER

## 2025-02-08 PROCEDURE — 85025 COMPLETE CBC W/AUTO DIFF WBC: CPT

## 2025-02-08 PROCEDURE — 80202 ASSAY OF VANCOMYCIN: CPT

## 2025-02-08 PROCEDURE — 2580000003 HC RX 258: Performed by: INTERNAL MEDICINE

## 2025-02-08 PROCEDURE — 80048 BASIC METABOLIC PNL TOTAL CA: CPT

## 2025-02-08 PROCEDURE — 97535 SELF CARE MNGMENT TRAINING: CPT

## 2025-02-08 RX ORDER — SODIUM BICARBONATE 650 MG/1
1300 TABLET ORAL 3 TIMES DAILY
Status: DISCONTINUED | OUTPATIENT
Start: 2025-02-08 | End: 2025-02-13

## 2025-02-08 RX ADMIN — CLOPIDOGREL BISULFATE 75 MG: 75 TABLET ORAL at 09:04

## 2025-02-08 RX ADMIN — APIXABAN 5 MG: 5 TABLET, FILM COATED ORAL at 09:00

## 2025-02-08 RX ADMIN — WATER 1000 MG: 1 INJECTION INTRAMUSCULAR; INTRAVENOUS; SUBCUTANEOUS at 16:00

## 2025-02-08 RX ADMIN — GABAPENTIN 300 MG: 300 CAPSULE ORAL at 09:05

## 2025-02-08 RX ADMIN — TAMSULOSIN HYDROCHLORIDE 0.4 MG: 0.4 CAPSULE ORAL at 09:05

## 2025-02-08 RX ADMIN — METOPROLOL SUCCINATE 25 MG: 25 TABLET, EXTENDED RELEASE ORAL at 09:04

## 2025-02-08 RX ADMIN — FERROUS SULFATE TAB 325 MG (65 MG ELEMENTAL FE) 325 MG: 325 (65 FE) TAB at 09:04

## 2025-02-08 RX ADMIN — FOLIC ACID 1 MG: 1 TABLET ORAL at 09:04

## 2025-02-08 RX ADMIN — SODIUM BICARBONATE 1300 MG: 650 TABLET ORAL at 20:30

## 2025-02-08 RX ADMIN — GUAIFENESIN 400 MG: 400 TABLET ORAL at 14:04

## 2025-02-08 RX ADMIN — GUAIFENESIN 400 MG: 400 TABLET ORAL at 09:04

## 2025-02-08 RX ADMIN — GABAPENTIN 300 MG: 300 CAPSULE ORAL at 20:30

## 2025-02-08 RX ADMIN — TRAMADOL HYDROCHLORIDE 50 MG: 50 TABLET, COATED ORAL at 06:08

## 2025-02-08 RX ADMIN — SODIUM BICARBONATE 1300 MG: 650 TABLET ORAL at 14:04

## 2025-02-08 RX ADMIN — SODIUM CHLORIDE: 9 INJECTION, SOLUTION INTRAVENOUS at 20:29

## 2025-02-08 RX ADMIN — PANTOPRAZOLE SODIUM 40 MG: 40 TABLET, DELAYED RELEASE ORAL at 09:05

## 2025-02-08 RX ADMIN — APIXABAN 5 MG: 5 TABLET, FILM COATED ORAL at 20:30

## 2025-02-08 RX ADMIN — VANCOMYCIN HYDROCHLORIDE 1000 MG: 1 INJECTION, POWDER, LYOPHILIZED, FOR SOLUTION INTRAVENOUS at 14:12

## 2025-02-08 RX ADMIN — GABAPENTIN 300 MG: 300 CAPSULE ORAL at 14:04

## 2025-02-08 RX ADMIN — SODIUM CHLORIDE, PRESERVATIVE FREE 10 ML: 5 INJECTION INTRAVENOUS at 14:14

## 2025-02-08 RX ADMIN — DOXYCYCLINE 100 MG: 100 INJECTION, POWDER, LYOPHILIZED, FOR SOLUTION INTRAVENOUS at 04:13

## 2025-02-08 RX ADMIN — CHOLECALCIFEROL TAB 125 MCG (5000 UNIT) 5000 UNITS: 125 TAB at 09:06

## 2025-02-08 RX ADMIN — HYDROXYCHLOROQUINE SULFATE 200 MG: 200 TABLET ORAL at 09:06

## 2025-02-08 RX ADMIN — GUAIFENESIN 400 MG: 400 TABLET ORAL at 20:30

## 2025-02-08 RX ADMIN — SODIUM BICARBONATE 1300 MG: 650 TABLET ORAL at 09:04

## 2025-02-08 RX ADMIN — DULOXETINE HYDROCHLORIDE 30 MG: 30 CAPSULE, DELAYED RELEASE ORAL at 09:04

## 2025-02-08 RX ADMIN — OSELTAMIVIR PHOSPHATE 30 MG: 30 CAPSULE ORAL at 20:33

## 2025-02-08 RX ADMIN — DOXYCYCLINE 100 MG: 100 INJECTION, POWDER, LYOPHILIZED, FOR SOLUTION INTRAVENOUS at 16:08

## 2025-02-08 RX ADMIN — AMIODARONE HYDROCHLORIDE 200 MG: 200 TABLET ORAL at 09:05

## 2025-02-08 RX ADMIN — OSELTAMIVIR PHOSPHATE 30 MG: 30 CAPSULE ORAL at 09:07

## 2025-02-08 RX ADMIN — METOPROLOL SUCCINATE 25 MG: 25 TABLET, EXTENDED RELEASE ORAL at 20:30

## 2025-02-08 ASSESSMENT — PAIN SCALES - GENERAL
PAINLEVEL_OUTOF10: 0
PAINLEVEL_OUTOF10: 0
PAINLEVEL_OUTOF10: 6
PAINLEVEL_OUTOF10: 0
PAINLEVEL_OUTOF10: 0

## 2025-02-08 ASSESSMENT — PAIN DESCRIPTION - DESCRIPTORS: DESCRIPTORS: BURNING;TINGLING;SORE

## 2025-02-08 ASSESSMENT — PAIN DESCRIPTION - ORIENTATION: ORIENTATION: RIGHT

## 2025-02-08 ASSESSMENT — PAIN DESCRIPTION - LOCATION: LOCATION: ARM

## 2025-02-08 NOTE — PROGRESS NOTES
[facility]  [facility]  Progress Note    SUBJECTIVE:  Chief Complaint   Patient presents with    Fall     Fall this AM- hit head, + thinners, no LOC, sent from [facility] urgent care due to dizziness and thinners       Patient resting in bed. Denies fever or chills. No nausea, vomiting, rash or diarrhea      Review of systems:  As stated above in the chief complaint, otherwise negative.    Medications:  Scheduled Meds:   [id number]        sodium bicarbonate  1,300 mg Oral TID    amiodarone  200 mg Oral Q48H    metoprolol succinate  25 mg Oral BID    pantoprazole  40 mg Oral Daily    [Held by provider] rosuvastatin  40 mg Oral Nightly    [Held by provider] sacubitril-valsartan  1 tablet Oral BID    [Held by provider] spironolactone  25 mg Oral Daily    tamsulosin  0.4 mg Oral Daily    [Held by provider] torsemide  20 mg Oral Weekly    apixaban  5 mg Oral BID    guaiFENesin  400 mg Oral TID    sodium chloride flush  5-40 mL IntraVENous 2 times per day    vitamin D3  1 tablet Oral QAM    hydroxychloroquine  200 mg Oral BID    gabapentin  300 mg Oral TID    folic acid  1 mg Oral QAM    ferrous sulfate  325 mg Oral Daily    DULoxetine  30 mg Oral QAM    clopidogrel  75 mg Oral Daily    cefTRIAXone (ROCEPHIN) IV  1,000 mg IntraVENous Q24H    doxycycline (VIBRAMYCIN) IV  100 mg IntraVENous Q12H    oseltamivir  30 mg Oral BID     Continuous Infusions:   sodium chloride 75 mL/hr at 25 1852    sodium chloride       PRN Meds:[id number], Polyvinyl Alcohol-Povidone PF, traMADol, sodium chloride flush, sodium chloride, polyethylene glycol, prochlorperazine    OBJECTIVE:  /68   Pulse 78   Temp 98 °F (36.7 °C) (Temporal)   Resp 18   Ht 1.676 m (5' 6\")   Wt 70 kg (154 lb 5.2 oz)   SpO2 100%   BMI 24.91 kg/m²   Temp  Av.3 °F (36.8 °C)  Min: 97.2 °F (36.2 °C)  Max: 98.8 °F (37.1 °C)  Constitutional: NAD  Skin: Warm and dry. No rashes were noted.   Neuro: Alert and oriented  HEENT:  Round and reactive pupils.  Moist mucous membranes.  No ulcerations or thrush.  Chest: .Respirations unlabored. Breath sounds clear.   Cardiovascular:  RRR  Abdomen: Soft. Bowel sounds present.   Extremities: LE edema.  Bilateral upper arms edematous from IV infiltrations    Lines: PIV      Laboratory and Tests:  Lab Results   Component Value Date    WBC 9.7 02/08/2025    WBC 13.7 (H) 02/07/2025    WBC 13.5 (H) 02/06/2025    HGB 12.1 (L) 02/08/2025    HCT 37.9 02/08/2025    MCV 91.3 02/08/2025     02/08/2025     Lab Results   Component Value Date    .0 (H) 02/07/2025     Lab Results   Component Value Date    SEDRATE 8 02/07/2025         Radiology   Reviewed       Microbiology  Blood cultures 2/6/2025 Enterococcus faecalis  Blood cultures JIMBO 725 no growth  Urine culture 2/6/2025 no growth   Respiratory panel 2/6/2025 influenza A      IMPRESSION:      Enterococcus bacteremia r/o CIED infection   Complicated UTI s/p cystoscopy with stent insertion ( cloudy urine ) 2/7/25  Influenza A infection      RECOMMENDATIONS:       Vancomycin 1 gram IV q 24 hr, monitor Cr, Vanco level   Continue rocephin 1 gram IV q 24 hrs pending urine cx   Tamiflu 30 mg po q 12 hr   Follow up blood cx   JIMBO ( to rule out endocarditis - s/p AICD insertion ) Monday 2/10/25  Infection control : Droplet isolation         KAYE Hutchinson - CNP  5:49 PM  2/8/2025

## 2025-02-08 NOTE — PROGRESS NOTES
[facility]  Nephrology Attending Progress Note  Jerzy Francis MD        SUBJECTIVE:   Per 2/7 Consult HPI:  \"Ruben Davidson is a 79 y.o. male with a past medical history of hypertension, heart failure with reduced ejection fraction, persistent atrial fibrillation, history of coronary artery disease, obstructive sleep apnea, morbid obesity.      They presented on 2/6/2025 complaining of fall at home when about to sit to the rest room and hitting his head on the shower without loss of consciousness. Of note, he takes eliquis for his atrial fibrillation. On arrival their vitals BP 84/56, HR 92, RR 18,T 99.3. Initial labs were pertinent for elevated creatinine (1.7, baseline 0.9), lactic acidosis (2.1), elevated procalcitonin (1.27),elevated troponin at 109, hypoalbuminemia (3.1), elevated ALT (108), elevated AST (70). CBC showed leukocytosis (13.5) and anemia (11.8). Respiratory panel was positive for influenza A. CXR did not show any acute process. CT head did not show any acute intracranial abnormality. CT chest showed concerns for primary ILD . CT A/P showed obstructing 6mm calculus mid left ureter with moderate left hydronephrosis and proximal hydroureter and simple, benign left renal cysts measuring up to 7.3 cm at the upper pole. Cardiology was consulted for elevated troponin. ID was consulted for bacteremia. Urology was consulted for 6 mm calculus mid left ureter.  Nephrology was consulted for acute kidney injury. \"    2/8/2025:Resting in bed. Daughter visiting. States he is feeling better, No complaints. No shortness of breath, no pain. No difficulty with urination, although he is having some hematuria post cysto.     PROBLEM LIST:    Patient Active Problem List   Diagnosis    CAD (coronary artery disease)s/p stent RCA    Valvular heart disease    Hypertension    Obesity, morbid s/p intestinal bypass    STEMI (ST elevation myocardial infarction) (HCC)    Stented coronary artery     Hyperlipidemia    H/O psoriatic arthritis    Acute upper GI bleed    SVT (supraventricular tachycardia) (HCC)    Elevated troponin    LV dysfunction    Cardiac arrest    Acute systolic (congestive) heart failure (HCC)    Ventricular tachycardia (HCC)    Mitral stenosis    Mitral regurgitation    Moderate to severe pulmonary hypertension (HCC)    Anemia    Paroxysmal atrial fibrillation (HCC)    ICD (implantable cardioverter-defibrillator) battery depletion    S/P ICD (internal cardiac defibrillator) procedure    HFrEF (heart failure with reduced ejection fraction) (HCC)    RADHA (acute kidney injury) (HCC)    Influenza A    Fall    Pneumonia due to organism    Calculus of left ureter    Elevated liver enzymes    CKD (chronic kidney disease) stage 2, GFR 60-89 ml/min    Head injury        PAST MEDICAL HISTORY:    Past Medical History:   Diagnosis Date    CAD (coronary artery disease)     s/p stent RCA    [place] (hard of hearing)     WEARS HEARING AIDES    Hypertension     Left ventricular systolic dysfunction     MI (myocardial infarction) ([facility]) 1997    Mitral regurgitation     Obesity, morbid     s/p intestinal bypass    Sleep apnea     Valvular heart disease        DIET:    ADULT DIET; Regular; Low Fat/Low Chol/High Fiber/TIMOTHY     PHYSICAL EXAM:     Patient Vitals for the past 24 hrs:   BP Temp Temp src Pulse Resp SpO2   02/08/25 0841 108/64 97.2 °F (36.2 °C) Temporal 82 18 99 %   02/08/25 0400 131/78 98.8 °F (37.1 °C) Oral 70 18 97 %   02/07/25 2300 125/77 98.8 °F (37.1 °C) Oral 75 20 97 %   02/07/25 2035 -- -- -- -- 20 --   02/07/25 2000 105/67 98.8 °F (37.1 °C) Oral (!) 102 20 96 %   02/07/25 1600 (!) 146/81 99.4 °F (37.4 °C) Temporal 76 18 100 %   02/07/25 1402 110/65 99 °F (37.2 °C) -- 86 16 100 %   02/07/25 1349 110/62 -- -- 80 15 100 %   02/07/25 1345 103/62 98.3 °F (36.8 °C) -- 85 14 100 %   02/07/25 1344 104/63 98.3 °F (36.8 °C) -- 78 14 99 %   @      Intake/Output Summary (Last 24 hours) at 2/8/2025 1209  Last

## 2025-02-08 NOTE — PROGRESS NOTES
stand tolerance for increased safety and independence with ADLs  * Positioning to improve skin integrity, interaction with environment and functional independence    Recommended Adaptive Equipment: TBD      Home Living:  Pt lives with spouse   in a 1 story house with 3 steps to enter  and 1 hand rail    Bedroom setup: main level    Bathroom setup: main level  walk in shower   Equipment owned: front wheeled walker  shower chair  - does not use     Prior Level of Function:  Pt I with ADLs , I with IADLs, and completed functional mobility with no AD   Falls: yes   Driving: yes   Occupation/leisure: traveling     Pleasant and cooperative throughout session       Pain Level: did not rate pain when questioned  and tolerable for treatment   Location: discomfort  2/2 needed to use restroom   OT provided: repositioning and diversion     Cognition: A&O: 4/4;    Follows single step directions: Good   Memory: Good   Sequencing: Fair +   Problem solving: Fair +   Judgement/safety: Fair +   Attention:  Fair +     Functional Assessment: AM-PAC Inpatient Daily Activity Raw Score: 18 /24     Initial Eval Status  Date: 2/8/2025   Treatment Status  Date: STG=LTG  Time frame: 10-14 days   Feeding Setup    Mod I    Grooming Stand by assistance  Standing at the sink for hand hygiene   Mod I    UB Dressing Minimal assistance  Gown management - A to tie in the back   Mod I    LB Dressing Minimal assistance  Simulated   Mod I    Bathing Minimal assistance  simulated  Mod I    Toileting Minimal assistance  Min A at commode for clothing management   Mod I    Bed Mobility  Rolling L/R: NT   Supine to sit: Stand by assist    Sit to supine: NT   Supine to sit: Mod I   Sit to supine: Mod I    Functional Transfers Sit to stand: CGA    Stand to sit: CGA    Stand pivot: NT   Mod I    Functional Mobility CGA  With no AD  to and from the restroom   Mod I   with AAD    Balance Sitting - static: Supervision    Sitting - dynamic: Stand by assist  during  functional activity      Standing- static: Stand by assist    Standing- dynamic: CGA  during functional activity   Sitting: Mod I       Standing: Mod I    Activity Tolerance Fair -  pain  and fatigue   Good   Visual/  Perceptual no glasses present               BUE  ROM/Strength/  Fine motor Coordination Hand dominance: right     RUE: ROM WFL      Strength: grossly functional      Strength: WFL      Coordination:  WFL     LUE: ROM WFL      Strength: grossly functional      Strength: WFL      Coordination: WFL      Safety   Fair +  Good during functional activity      Hearing: impaired, needs increased volume , and hearing aids   Sensation: no c/o numbness or tingling   Tone: WFL   Edema: unremarkable     Comments:   RN cleared patient for OT.  Upon arrival, patient was semi-supine in bed  and agreeable to OT session. dtr present  throughout session . At end of session, patient sitting in chair with arms ; with call light and phone within reach; all lines and tubes intact.   Patient presents with decreased safety awareness, activity tolerance , balance , and strength . Pt demonstrated decreased independence during ADLs, bed mobility , functional transfers, and functional mobility. Pt would benefit from continued skilled OT to increase safety and independence with completion of ADL tasks and functional mobility for improved quality of life and return to PLOF.       Treatment: OT treatment provided this date includes:  OT edu pt/family on role of OT in the acute care setting. Pt  verbalized understanding   Therapist facilitated and instructed pt on adapted techniques & compensatory strategies to improve safety and independence with ADLs, bed mobility , functional transfers, and functional mobility as noted above to allow pt to achieve highest level of independence and safely. Pt provided  min cuing , verbal instructions , extended time , and encouragement  in order to promote maximum level of

## 2025-02-08 NOTE — PROGRESS NOTES
[facility]                                Progress note    Subjective:  Patient resting comfortably in bed  Family at bedside  States he has been coughing some with some sputum however clear    Objective:    /64   Pulse 82   Temp 97.2 °F (36.2 °C) (Temporal)   Resp 18   Ht 1.676 m (5' 6\")   Wt 70 kg (154 lb 5.2 oz)   SpO2 99%   BMI 24.91 kg/m²     In: 800 [I.V.:450]  Out: 750   In: 800   Out: 750 [Urine:750]    General appearance:  No apparent distress, appears stated age.  HEENT:  Normal cephalic, atraumatic without obvious deformity. Pupils equal, round, and reactive to light.  Extra ocular muscles intact. Conjunctivae/corneas clear. SUTURE LINE DRY AND WELL APPROX ON FOREHEAD  Neck: Supple, with full range of motion. No jugular venous distention. Trachea midline.  Respiratory:  Normal respiratory effort.  Diminished  Cardiovascular:  RRR  Abdomen: Soft, non-tender, non-distended with normal bowel sounds.  Musculoskeletal:  No clubbing, cyanosis or edema bilaterally.    Skin: smooth and dry   Neurologic:   Cranial nerves: II-XII intact,   Psychiatric:  Alert and oriented x 3     Recent Labs     02/06/25  1410 02/07/25  0828 02/08/25  0618   WBC 13.5* 13.7* 9.7   HGB 11.8* 12.6 12.1*   HCT 35.9* 36.5* 37.9    162 166       Recent Labs     02/07/25  0646 02/07/25  1153 02/07/25  1649 02/08/25  0618     --  136 135   K 6.5* 4.7 4.6 4.6     --  106 109*   CO2 11*  --  17* 14*   BUN 41*  --  41* 39*   CREATININE 1.8*  --  1.9* 1.7*   CALCIUM 8.2*  --  8.3* 8.2*       Assessment:    Principal Problem:    RADHA (acute kidney injury) (HCC)  Active Problems:    S/P ICD (internal cardiac defibrillator) procedure    Elevated troponin    Paroxysmal atrial fibrillation (HCC)    Influenza A    Fall    Pneumonia due to organism    Calculus of left ureter    Elevated liver enzymes    CKD (chronic kidney disease) stage 2, GFR 60-89 ml/min    Head injury  Resolved Problems:    * No

## 2025-02-08 NOTE — PLAN OF CARE
Problem: Chronic Conditions and Co-morbidities  Goal: Patient's chronic conditions and co-morbidity symptoms are monitored and maintained or improved  2/8/2025 1203 by Violeta Adhikari RN  Outcome: Progressing  2/8/2025 1203 by Violeta Adhikari RN  Outcome: Progressing  Flowsheets (Taken 2/8/2025 0800)  Care Plan - Patient's Chronic Conditions and Co-Morbidity Symptoms are Monitored and Maintained or Improved: Monitor and assess patient's chronic conditions and comorbid symptoms for stability, deterioration, or improvement  2/8/2025 0058 by Niya Salvador RN  Outcome: Progressing     Problem: Discharge Planning  Goal: Discharge to home or other facility with appropriate resources  2/8/2025 1203 by Violeta Adhikari RN  Outcome: Progressing  2/8/2025 1203 by Violeta Adhikari RN  Outcome: Progressing  Flowsheets (Taken 2/8/2025 0800)  Discharge to home or other facility with appropriate resources: Identify barriers to discharge with patient and caregiver  2/8/2025 0058 by Niya Salvador RN  Outcome: Progressing     Problem: Safety - Adult  Goal: Free from fall injury  2/8/2025 1203 by Violeta Adhikari RN  Outcome: Progressing  2/8/2025 0058 by Niya Salvador RN  Outcome: Progressing     Problem: Skin/Tissue Integrity  Goal: Skin integrity remains intact  Description: 1.  Monitor for areas of redness and/or skin breakdown  2.  Assess vascular access sites hourly  3.  Every 4-6 hours minimum:  Change oxygen saturation probe site  4.  Every 4-6 hours:  If on nasal continuous positive airway pressure, respiratory therapy assess nares and determine need for appliance change or resting period  2/8/2025 1203 by Violeta Adhikari RN  Outcome: Progressing  Flowsheets (Taken 2/8/2025 0800)  Skin Integrity Remains Intact: Monitor for areas of redness and/or skin breakdown  2/8/2025 0058 by Niya Salvador RN  Outcome: Progressing

## 2025-02-08 NOTE — PROGRESS NOTES
Pharmacy Consultation Note  (Antibiotic Dosing and Monitoring)    Initial consult date: 2/7/2025  Consulting physician/provider: Dr. Casper  Drug: Vancomycin  Indication: Bloodstream infection    Age/  Gender Height Weight IBW  Allergy Information   79 y.o./male 167.6 cm (5' 6\") 66.7 kg (147 lb)     Ideal body weight: 63.8 kg (140 lb 10.5 oz)  Adjusted ideal body weight: 66.3 kg (146 lb 1.9 oz)   Iodides and Dust mite extract      Renal Function:  Recent Labs     02/06/25  1410 02/07/25  0646 02/07/25  1649   BUN 39* 41* 41*   CREATININE 1.7* 1.8* 1.9*       Intake/Output Summary (Last 24 hours) at 2/8/2025 0715  Last data filed at 2/7/2025 1640  Gross per 24 hour   Intake 800 ml   Output 750 ml   Net 50 ml       Vancomycin Monitoring:  Trough:  No results for input(s): \"VANCOTROUGH\" in the last 72 hours.  Random:    Recent Labs     02/08/25  0618   VANCORANDOM PENDING       Vancomycin Administration Times:  Recent vancomycin administrations        No vancomycin IV orders with administrations found.            Orders not given:            vancomycin (VANCOCIN) 1,500 mg in sodium chloride 0.9 % 250 mL IVPB ([id number])    vancomycin (VANCOCIN) 1,000 mg in sodium chloride 0.9 % 250 mL IVPB ([id number])                    Assessment:  Patient is a 79 y.o. male who has been initiated on vancomycin  Estimated Creatinine Clearance: 29 mL/min (A) (based on SCr of 1.9 mg/dL (H)).  To dose vancomycin, pharmacy will be utilizing [employer] calculation software for goal AUC/ALIYAH 400-600 mg/L-hr (predicted AUC/ALIYAH = 561, Tr =18.6 mcg/mL)  2/8:  Random level 11.4 (~15 hours post dose).  Scr elevated at 1.7 (baseline around 0.8).      Plan:  Will give vancomycin 1000 mg today x 1  Will check vancomycin level tomorrow with am labs   Will continue to monitor renal function   Pharmacy to follow      Corey KwonD  PGY-1 Pharmacy Practice Resident, [phone]  2/8/2025 7:15 AM

## 2025-02-09 ENCOUNTER — APPOINTMENT (OUTPATIENT)
Dept: ULTRASOUND IMAGING | Age: 80
DRG: 853 | End: 2025-02-09
Payer: MEDICARE

## 2025-02-09 LAB
ACB COMPLEX DNA BLD POS QL NAA+NON-PROBE: NOT DETECTED
ANION GAP SERPL CALCULATED.3IONS-SCNC: 12 MMOL/L (ref 7–16)
B FRAGILIS DNA BLD POS QL NAA+NON-PROBE: NOT DETECTED
BASOPHILS # BLD: 0.03 K/UL (ref 0–0.2)
BASOPHILS NFR BLD: 0 % (ref 0–2)
BIOFIRE TEST COMMENT: ABNORMAL
BUN SERPL-MCNC: 30 MG/DL (ref 6–23)
C ALBICANS DNA BLD POS QL NAA+NON-PROBE: NOT DETECTED
C AURIS DNA BLD POS QL NAA+NON-PROBE: NOT DETECTED
C GATTII+NEOFOR DNA BLD POS QL NAA+N-PRB: NOT DETECTED
C GLABRATA DNA BLD POS QL NAA+NON-PROBE: NOT DETECTED
C KRUSEI DNA BLD POS QL NAA+NON-PROBE: NOT DETECTED
C PARAP DNA BLD POS QL NAA+NON-PROBE: NOT DETECTED
C TROPICLS DNA BLD POS QL NAA+NON-PROBE: NOT DETECTED
CALCIUM SERPL-MCNC: 8.1 MG/DL (ref 8.6–10.2)
CHLORIDE SERPL-SCNC: 107 MMOL/L (ref 98–107)
CO2 SERPL-SCNC: 16 MMOL/L (ref 22–29)
CREAT SERPL-MCNC: 1.4 MG/DL (ref 0.7–1.2)
DATE LAST DOSE: NORMAL
E CLOAC COMP DNA BLD POS NAA+NON-PROBE: NOT DETECTED
E COLI DNA BLD POS QL NAA+NON-PROBE: NOT DETECTED
E FAECALIS DNA BLD POS QL NAA+NON-PROBE: DETECTED
E FAECIUM DNA BLD POS QL NAA+NON-PROBE: NOT DETECTED
ENTEROBACTERALES DNA BLD POS NAA+N-PRB: NOT DETECTED
EOSINOPHIL # BLD: 0.06 K/UL (ref 0.05–0.5)
EOSINOPHILS RELATIVE PERCENT: 1 % (ref 0–6)
ERYTHROCYTE [DISTWIDTH] IN BLOOD BY AUTOMATED COUNT: 16.2 % (ref 11.5–15)
GFR, ESTIMATED: 50 ML/MIN/1.73M2
GLUCOSE SERPL-MCNC: 74 MG/DL (ref 74–99)
GP B STREP DNA BLD POS QL NAA+NON-PROBE: NOT DETECTED
HAEM INFLU DNA BLD POS QL NAA+NON-PROBE: NOT DETECTED
HCT VFR BLD AUTO: 33.6 % (ref 37–54)
HGB BLD-MCNC: 11.2 G/DL (ref 12.5–16.5)
IMM GRANULOCYTES # BLD AUTO: 0.06 K/UL (ref 0–0.58)
IMM GRANULOCYTES NFR BLD: 1 % (ref 0–5)
K OXYTOCA DNA BLD POS QL NAA+NON-PROBE: NOT DETECTED
KLEBSIELLA SP DNA BLD POS QL NAA+NON-PRB: NOT DETECTED
KLEBSIELLA SP DNA BLD POS QL NAA+NON-PRB: NOT DETECTED
L MONOCYTOG DNA BLD POS QL NAA+NON-PROBE: NOT DETECTED
LYMPHOCYTES NFR BLD: 0.84 K/UL (ref 1.5–4)
LYMPHOCYTES RELATIVE PERCENT: 8 % (ref 20–42)
MCH RBC QN AUTO: 29.3 PG (ref 26–35)
MCHC RBC AUTO-ENTMCNC: 33.3 G/DL (ref 32–34.5)
MCV RBC AUTO: 88 FL (ref 80–99.9)
MICROORGANISM SPEC CULT: ABNORMAL
MICROORGANISM SPEC CULT: ABNORMAL
MICROORGANISM/AGENT SPEC: ABNORMAL
MICROORGANISM/AGENT SPEC: ABNORMAL
MONOCYTES NFR BLD: 1.29 K/UL (ref 0.1–0.95)
MONOCYTES NFR BLD: 13 % (ref 2–12)
N MEN DNA BLD POS QL NAA+NON-PROBE: NOT DETECTED
NEUTROPHILS NFR BLD: 78 % (ref 43–80)
NEUTS SEG NFR BLD: 7.93 K/UL (ref 1.8–7.3)
P AERUGINOSA DNA BLD POS NAA+NON-PROBE: NOT DETECTED
PLATELET # BLD AUTO: 161 K/UL (ref 130–450)
PMV BLD AUTO: 10.5 FL (ref 7–12)
POTASSIUM SERPL-SCNC: 4.1 MMOL/L (ref 3.5–5)
PROTEUS SP DNA BLD POS QL NAA+NON-PROBE: NOT DETECTED
RBC # BLD AUTO: 3.82 M/UL (ref 3.8–5.8)
S AUREUS DNA BLD POS QL NAA+NON-PROBE: NOT DETECTED
S AUREUS+CONS DNA BLD POS NAA+NON-PROBE: NOT DETECTED
S EPIDERMIDIS DNA BLD POS QL NAA+NON-PRB: NOT DETECTED
S LUGDUNENSIS DNA BLD POS QL NAA+NON-PRB: NOT DETECTED
S MALTOPHILIA DNA BLD POS QL NAA+NON-PRB: NOT DETECTED
S MARCESCENS DNA BLD POS NAA+NON-PROBE: NOT DETECTED
S PNEUM DNA BLD POS QL NAA+NON-PROBE: NOT DETECTED
S PYO DNA BLD POS QL NAA+NON-PROBE: NOT DETECTED
SALMONELLA DNA BLD POS QL NAA+NON-PROBE: NOT DETECTED
SERVICE CMNT-IMP: ABNORMAL
SERVICE CMNT-IMP: ABNORMAL
SODIUM SERPL-SCNC: 135 MMOL/L (ref 132–146)
SPECIMEN DESCRIPTION: ABNORMAL
SPECIMEN DESCRIPTION: ABNORMAL
STREPTOCOCCUS DNA BLD POS NAA+NON-PROBE: NOT DETECTED
TME LAST DOSE: NORMAL H
VANA+VANB ISLT/SPM QL: NOT DETECTED
VANCOMYCIN DOSE: NORMAL MG
VANCOMYCIN SERPL-MCNC: 12 UG/ML (ref 5–40)
WBC OTHER # BLD: 10.2 K/UL (ref 4.5–11.5)

## 2025-02-09 PROCEDURE — 2500000003 HC RX 250 WO HCPCS: Performed by: NURSE PRACTITIONER

## 2025-02-09 PROCEDURE — 6370000000 HC RX 637 (ALT 250 FOR IP): Performed by: CLINICAL NURSE SPECIALIST

## 2025-02-09 PROCEDURE — 2580000003 HC RX 258: Performed by: NURSE PRACTITIONER

## 2025-02-09 PROCEDURE — 6370000000 HC RX 637 (ALT 250 FOR IP): Performed by: INTERNAL MEDICINE

## 2025-02-09 PROCEDURE — 6370000000 HC RX 637 (ALT 250 FOR IP): Performed by: NURSE PRACTITIONER

## 2025-02-09 PROCEDURE — 85025 COMPLETE CBC W/AUTO DIFF WBC: CPT

## 2025-02-09 PROCEDURE — 6360000002 HC RX W HCPCS: Performed by: INTERNAL MEDICINE

## 2025-02-09 PROCEDURE — 2580000003 HC RX 258: Performed by: INTERNAL MEDICINE

## 2025-02-09 PROCEDURE — 93970 EXTREMITY STUDY: CPT

## 2025-02-09 PROCEDURE — 36415 COLL VENOUS BLD VENIPUNCTURE: CPT

## 2025-02-09 PROCEDURE — 80202 ASSAY OF VANCOMYCIN: CPT

## 2025-02-09 PROCEDURE — 80048 BASIC METABOLIC PNL TOTAL CA: CPT

## 2025-02-09 PROCEDURE — 2060000000 HC ICU INTERMEDIATE R&B

## 2025-02-09 RX ADMIN — FOLIC ACID 1 MG: 1 TABLET ORAL at 09:26

## 2025-02-09 RX ADMIN — GUAIFENESIN 400 MG: 400 TABLET ORAL at 15:15

## 2025-02-09 RX ADMIN — GUAIFENESIN 400 MG: 400 TABLET ORAL at 20:25

## 2025-02-09 RX ADMIN — APIXABAN 5 MG: 5 TABLET, FILM COATED ORAL at 09:25

## 2025-02-09 RX ADMIN — SODIUM CHLORIDE 1500 MG: 0.9 INJECTION, SOLUTION INTRAVENOUS at 13:55

## 2025-02-09 RX ADMIN — APIXABAN 5 MG: 5 TABLET, FILM COATED ORAL at 20:25

## 2025-02-09 RX ADMIN — SODIUM BICARBONATE 1300 MG: 650 TABLET ORAL at 15:13

## 2025-02-09 RX ADMIN — DOXYCYCLINE 100 MG: 100 INJECTION, POWDER, LYOPHILIZED, FOR SOLUTION INTRAVENOUS at 15:20

## 2025-02-09 RX ADMIN — CHOLECALCIFEROL TAB 125 MCG (5000 UNIT) 5000 UNITS: 125 TAB at 09:27

## 2025-02-09 RX ADMIN — GUAIFENESIN 400 MG: 400 TABLET ORAL at 09:25

## 2025-02-09 RX ADMIN — GABAPENTIN 300 MG: 300 CAPSULE ORAL at 14:00

## 2025-02-09 RX ADMIN — GABAPENTIN 300 MG: 300 CAPSULE ORAL at 20:24

## 2025-02-09 RX ADMIN — SODIUM BICARBONATE 1300 MG: 650 TABLET ORAL at 20:24

## 2025-02-09 RX ADMIN — OSELTAMIVIR PHOSPHATE 30 MG: 30 CAPSULE ORAL at 20:24

## 2025-02-09 RX ADMIN — CLOPIDOGREL BISULFATE 75 MG: 75 TABLET ORAL at 09:26

## 2025-02-09 RX ADMIN — TRAMADOL HYDROCHLORIDE 50 MG: 50 TABLET, COATED ORAL at 15:13

## 2025-02-09 RX ADMIN — GABAPENTIN 300 MG: 300 CAPSULE ORAL at 09:26

## 2025-02-09 RX ADMIN — PANTOPRAZOLE SODIUM 40 MG: 40 TABLET, DELAYED RELEASE ORAL at 09:26

## 2025-02-09 RX ADMIN — METOPROLOL SUCCINATE 25 MG: 25 TABLET, EXTENDED RELEASE ORAL at 09:27

## 2025-02-09 RX ADMIN — SODIUM BICARBONATE 1300 MG: 650 TABLET ORAL at 09:26

## 2025-02-09 RX ADMIN — OSELTAMIVIR PHOSPHATE 30 MG: 30 CAPSULE ORAL at 09:27

## 2025-02-09 RX ADMIN — HYDROXYCHLOROQUINE SULFATE 200 MG: 200 TABLET ORAL at 09:27

## 2025-02-09 RX ADMIN — DOXYCYCLINE 100 MG: 100 INJECTION, POWDER, LYOPHILIZED, FOR SOLUTION INTRAVENOUS at 04:24

## 2025-02-09 RX ADMIN — TAMSULOSIN HYDROCHLORIDE 0.4 MG: 0.4 CAPSULE ORAL at 09:26

## 2025-02-09 RX ADMIN — SODIUM CHLORIDE: 9 INJECTION, SOLUTION INTRAVENOUS at 13:46

## 2025-02-09 RX ADMIN — HYDROXYCHLOROQUINE SULFATE 200 MG: 200 TABLET ORAL at 20:24

## 2025-02-09 RX ADMIN — METOPROLOL SUCCINATE 25 MG: 25 TABLET, EXTENDED RELEASE ORAL at 20:23

## 2025-02-09 RX ADMIN — FERROUS SULFATE TAB 325 MG (65 MG ELEMENTAL FE) 325 MG: 325 (65 FE) TAB at 09:25

## 2025-02-09 RX ADMIN — SODIUM CHLORIDE, PRESERVATIVE FREE 10 ML: 5 INJECTION INTRAVENOUS at 20:40

## 2025-02-09 RX ADMIN — DULOXETINE HYDROCHLORIDE 30 MG: 30 CAPSULE, DELAYED RELEASE ORAL at 09:26

## 2025-02-09 ASSESSMENT — PAIN DESCRIPTION - LOCATION
LOCATION: LEG
LOCATION: LEG

## 2025-02-09 ASSESSMENT — PAIN SCALES - GENERAL
PAINLEVEL_OUTOF10: 0
PAINLEVEL_OUTOF10: 0
PAINLEVEL_OUTOF10: 7
PAINLEVEL_OUTOF10: 5
PAINLEVEL_OUTOF10: 0
PAINLEVEL_OUTOF10: 10

## 2025-02-09 ASSESSMENT — PAIN DESCRIPTION - ORIENTATION: ORIENTATION: LEFT

## 2025-02-09 ASSESSMENT — PAIN - FUNCTIONAL ASSESSMENT: PAIN_FUNCTIONAL_ASSESSMENT: PREVENTS OR INTERFERES WITH MANY ACTIVE NOT PASSIVE ACTIVITIES

## 2025-02-09 ASSESSMENT — PAIN DESCRIPTION - DESCRIPTORS
DESCRIPTORS: CRAMPING
DESCRIPTORS: DISCOMFORT;SHOOTING

## 2025-02-09 NOTE — PROGRESS NOTES
Pharmacy Consultation Note  (Antibiotic Dosing and Monitoring)    Initial consult date: 2/7/2025  Consulting physician/provider: Dr. Casper  Drug: Vancomycin  Indication: Bloodstream infection    Age/  Gender Height Weight IBW  Allergy Information   79 y.o./male 167.6 cm (5' 6\") 66.7 kg (147 lb)     Ideal body weight: 63.8 kg (140 lb 10.5 oz)  Adjusted ideal body weight: 67.1 kg (147 lb 14.2 oz)   Iodides and Dust mite extract      Renal Function:  Recent Labs     02/07/25  1649 02/08/25  0618 02/09/25  0604   BUN 41* 39* 30*   CREATININE 1.9* 1.7* 1.4*       Intake/Output Summary (Last 24 hours) at 2/9/2025 0827  Last data filed at 2/9/2025 0806  Gross per 24 hour   Intake 1907.53 ml   Output 1675 ml   Net 232.53 ml       Vancomycin Monitoring:  Trough:  No results for input(s): \"VANCOTROUGH\" in the last 72 hours.  Random:    Recent Labs     02/08/25  0618 02/09/25  0604   VANCORANDOM 11.4 12.0       Vancomycin Administration Times:  Recent vancomycin administrations      Recent vancomycin administrations                     vancomycin (VANCOCIN) 1,000 mg in sodium chloride 0.9 % 250 mL IVPB ([id number]) (mg) 1,000 mg New Bag 02/08/25 1412    vancomycin (VANCOCIN) 1,500 mg in sodium chloride 0.9 % 250 mL IVPB ([id number]) (mg) 1,500 mg New Bag 02/07/25 1509                      Assessment:  Patient is a 79 y.o. male who has been initiated on vancomycin  Estimated Creatinine Clearance: 39 mL/min (A) (based on SCr of 1.4 mg/dL (H)).  To dose vancomycin, pharmacy will be utilizing [employer] calculation software for goal AUC/ALIYAH 400-600 mg/L-hr (predicted AUC/ALIYAH = 561, Tr =18.6 mcg/mL)  2/8:  Random level 11.4 (~15 hours post dose).  Scr elevated at 1.7 (baseline around 0.8).    2/9:  Blood cultures growing enterococcus faecalis.  SCR decreased to 1.4 (baseline 0.8).  Random level 12.0 (~16 hours post dose).    Plan:  Will give vancomycin 1500 mg today x 1  Will check vancomycin level tomorrow with am labs   Will continue

## 2025-02-09 NOTE — PLAN OF CARE
Problem: Chronic Conditions and Co-morbidities  Goal: Patient's chronic conditions and co-morbidity symptoms are monitored and maintained or improved  2/8/2025 2137 by Gilligan, Melissa, RN  Outcome: Progressing  2/8/2025 1203 by Violeta Adhikari RN  Outcome: Progressing  2/8/2025 1203 by Violeta Adhikari RN  Outcome: Progressing  Flowsheets (Taken 2/8/2025 0800)  Care Plan - Patient's Chronic Conditions and Co-Morbidity Symptoms are Monitored and Maintained or Improved: Monitor and assess patient's chronic conditions and comorbid symptoms for stability, deterioration, or improvement     Problem: Discharge Planning  Goal: Discharge to home or other facility with appropriate resources  2/8/2025 2137 by Gilligan, Melissa, RN  Outcome: Progressing  Flowsheets (Taken 2/8/2025 2000)  Discharge to home or other facility with appropriate resources: Identify barriers to discharge with patient and caregiver  2/8/2025 1203 by Violeta Adhikari RN  Outcome: Progressing  2/8/2025 1203 by Violeat Adhikari RN  Outcome: Progressing  Flowsheets (Taken 2/8/2025 0800)  Discharge to home or other facility with appropriate resources: Identify barriers to discharge with patient and caregiver     Problem: Safety - Adult  Goal: Free from fall injury  2/8/2025 2137 by Gilligan, Melissa, RN  Outcome: Progressing  Flowsheets (Taken 2/8/2025 2133)  Free From Fall Injury: Instruct family/caregiver on patient safety  2/8/2025 1203 by Violeta Adhikari RN  Outcome: Progressing     Problem: Skin/Tissue Integrity  Goal: Skin integrity remains intact  Description: 1.  Monitor for areas of redness and/or skin breakdown  2.  Assess vascular access sites hourly  3.  Every 4-6 hours minimum:  Change oxygen saturation probe site  4.  Every 4-6 hours:  If on nasal continuous positive airway pressure, respiratory therapy assess nares and determine need for appliance change or resting period  2/8/2025 2137 by Gilligan, Melissa, RN  Outcome:  Progressing  Flowsheets  Taken 2/8/2025 2133  Skin Integrity Remains Intact: Monitor for areas of redness and/or skin breakdown  Taken 2/8/2025 2000  Skin Integrity Remains Intact: Monitor for areas of redness and/or skin breakdown  2/8/2025 1203 by Violeta Adhikari, RN  Outcome: Progressing  Flowsheets (Taken 2/8/2025 0800)  Skin Integrity Remains Intact: Monitor for areas of redness and/or skin breakdown

## 2025-02-09 NOTE — PLAN OF CARE
Problem: Chronic Conditions and Co-morbidities  Goal: Patient's chronic conditions and co-morbidity symptoms are monitored and maintained or improved  Outcome: Progressing  Flowsheets  Taken 2/9/2025 1600  Care Plan - Patient's Chronic Conditions and Co-Morbidity Symptoms are Monitored and Maintained or Improved: Monitor and assess patient's chronic conditions and comorbid symptoms for stability, deterioration, or improvement  Taken 2/9/2025 0800  Care Plan - Patient's Chronic Conditions and Co-Morbidity Symptoms are Monitored and Maintained or Improved: Monitor and assess patient's chronic conditions and comorbid symptoms for stability, deterioration, or improvement     Problem: Discharge Planning  Goal: Discharge to home or other facility with appropriate resources  Outcome: Progressing  Flowsheets  Taken 2/9/2025 1600  Discharge to home or other facility with appropriate resources: Identify barriers to discharge with patient and caregiver  Taken 2/9/2025 0800  Discharge to home or other facility with appropriate resources: Identify barriers to discharge with patient and caregiver     Problem: Safety - Adult  Goal: Free from fall injury  Outcome: Progressing

## 2025-02-09 NOTE — PROGRESS NOTES
[facility]                                Progress note    Subjective:  Patient resting in bed no family at bedside without pain     Objective:    BP (!) 97/56   Pulse 70   Temp 99 °F (37.2 °C) (Temporal)   Resp 20   Ht 1.676 m (5' 6\")   Wt 72 kg (158 lb 11.7 oz)   SpO2 100%   BMI 25.62 kg/m²     In: 2387.5 [P.O.:820; I.V.:1022.9]  Out: 1675   In: 2387.5   Out: 1675 [Urine:1675]    General appearance:  No apparent distress, appears stated age.  HEENT:  Normal cephalic, atraumatic without obvious deformity. Pupils equal, round, and reactive to light.  Extra ocular muscles intact. Conjunctivae/corneas clear. SUTURE LINE DRY AND WELL APPROX ON FOREHEAD  Neck: Supple, with full range of motion. No jugular venous distention. Trachea midline.  Respiratory:  Normal respiratory effort.  Diminished  Cardiovascular:  RRR  Abdomen: Soft, non-tender, non-distended with normal bowel sounds.  Musculoskeletal:  No clubbing, cyanosis,    Skin: smooth and dry   Neurologic:   Cranial nerves: II-XII intact,   Psychiatric:  Alert and oriented x 3     Recent Labs     02/07/25  0828 02/08/25  0618 02/09/25  0604   WBC 13.7* 9.7 10.2   HGB 12.6 12.1* 11.2*   HCT 36.5* 37.9 33.6*    166 161       Recent Labs     02/07/25  1649 02/08/25  0618 02/09/25  0604    135 135   K 4.6 4.6 4.1    109* 107   CO2 17* 14* 16*   BUN 41* 39* 30*   CREATININE 1.9* 1.7* 1.4*   CALCIUM 8.3* 8.2* 8.1*       Assessment:    Principal Problem:    RADHA (acute kidney injury) (HCC)  Active Problems:    S/P ICD (internal cardiac defibrillator) procedure    Elevated troponin    Paroxysmal atrial fibrillation (HCC)    Influenza A    Fall    Pneumonia due to organism    Calculus of left ureter    Elevated liver enzymes    CKD (chronic kidney disease) stage 2, GFR 60-89 ml/min    Head injury  Resolved Problems:    * No resolved hospital problems. *      Plan:    Patient is a 79-year-old male admitted to [facility] for

## 2025-02-09 NOTE — PROGRESS NOTES
[facility]  Nephrology Attending Progress Note  Jerzy Francis MD        SUBJECTIVE:   Per 2/7 Consult HPI:  \"Ruben Davidson is a 79 y.o. male with a past medical history of hypertension, heart failure with reduced ejection fraction, persistent atrial fibrillation, history of coronary artery disease, obstructive sleep apnea, morbid obesity.      They presented on 2/6/2025 complaining of fall at home when about to sit to the rest room and hitting his head on the shower without loss of consciousness. Of note, he takes eliquis for his atrial fibrillation. On arrival their vitals BP 84/56, HR 92, RR 18,T 99.3. Initial labs were pertinent for elevated creatinine (1.7, baseline 0.9), lactic acidosis (2.1), elevated procalcitonin (1.27),elevated troponin at 109, hypoalbuminemia (3.1), elevated ALT (108), elevated AST (70). CBC showed leukocytosis (13.5) and anemia (11.8). Respiratory panel was positive for influenza A. CXR did not show any acute process. CT head did not show any acute intracranial abnormality. CT chest showed concerns for primary ILD . CT A/P showed obstructing 6mm calculus mid left ureter with moderate left hydronephrosis and proximal hydroureter and simple, benign left renal cysts measuring up to 7.3 cm at the upper pole. Cardiology was consulted for elevated troponin. ID was consulted for bacteremia. Urology was consulted for 6 mm calculus mid left ureter.  Nephrology was consulted for acute kidney injury. \"    2/8/2025:Resting in bed. Daughter visiting. States he is feeling better, No complaints. No shortness of breath, no pain. No difficulty with urination, although he is having some hematuria post cysto.     2/9/2025: Resting in bed. States he feels ok, no new issues overnight. Continues to have some burning with urination and hematuria.     PROBLEM LIST:    Patient Active Problem List   Diagnosis    CAD (coronary artery disease)s/p stent RCA    Valvular heart disease    Hypertension

## 2025-02-09 NOTE — PROGRESS NOTES
[facility]  [facility]  Progress Note    SUBJECTIVE:  Chief Complaint   Patient presents with    Fall     Fall this AM- hit head, + thinners, no LOC, sent from [facility] urgent care due to dizziness and thinners       Patient resting in bed. Feeling better. Family visiting. Denies fever or chills. No nausea, vomiting, rash or diarrhea      Review of systems:  As stated above in the chief complaint, otherwise negative.    Medications:  Scheduled Meds:   sodium bicarbonate  1,300 mg Oral TID    amiodarone  200 mg Oral Q48H    metoprolol succinate  25 mg Oral BID    pantoprazole  40 mg Oral Daily    [Held by provider] rosuvastatin  40 mg Oral Nightly    [Held by provider] sacubitril-valsartan  1 tablet Oral BID    [Held by provider] spironolactone  25 mg Oral Daily    tamsulosin  0.4 mg Oral Daily    [Held by provider] torsemide  20 mg Oral Weekly    apixaban  5 mg Oral BID    guaiFENesin  400 mg Oral TID    sodium chloride flush  5-40 mL IntraVENous 2 times per day    vitamin D3  1 tablet Oral QAM    hydroxychloroquine  200 mg Oral BID    gabapentin  300 mg Oral TID    folic acid  1 mg Oral QAM    ferrous sulfate  325 mg Oral Daily    DULoxetine  30 mg Oral QAM    clopidogrel  75 mg Oral Daily    cefTRIAXone (ROCEPHIN) IV  1,000 mg IntraVENous Q24H    doxycycline (VIBRAMYCIN) IV  100 mg IntraVENous Q12H    oseltamivir  30 mg Oral BID     Continuous Infusions:   sodium chloride 75 mL/hr at 25 1346    sodium chloride       PRN Meds:Polyvinyl Alcohol-Povidone PF, traMADol, sodium chloride flush, sodium chloride, polyethylene glycol, prochlorperazine    OBJECTIVE:  /62   Pulse 76   Temp 98.9 °F (37.2 °C) (Temporal)   Resp 20   Ht 1.676 m (5' 6\")   Wt 72 kg (158 lb 11.7 oz)   SpO2 100%   BMI 25.62 kg/m²   Temp  Av.9 °F (36.6 °C)  Min: 96.9 °F (36.1 °C)  Max: 99 °F (37.2 °C)  Constitutional: NAD  Skin: Warm and dry. No rashes were noted.   Neuro: Alert and  oriented  HEENT: Round and reactive pupils.  Moist mucous membranes.  No ulcerations or thrush.  Chest: .Respirations unlabored. Breath sounds clear.   Cardiovascular:  RRR  Abdomen: Soft. Bowel sounds present.   Extremities: LE edema.    Lines: PIV      Laboratory and Tests:  Lab Results   Component Value Date    WBC 10.2 02/09/2025    WBC 9.7 02/08/2025    WBC 13.7 (H) 02/07/2025    HGB 11.2 (L) 02/09/2025    HCT 33.6 (L) 02/09/2025    MCV 88.0 02/09/2025     02/09/2025     Lab Results   Component Value Date    .0 (H) 02/07/2025     Lab Results   Component Value Date    SEDRATE 8 02/07/2025         Radiology   Reviewed       Microbiology  Blood cultures 2/6/2025 Enterococcus faecalis  Blood cultures 2/725 no growth 1D  Urine culture 2/6/2025 no growth   Respiratory panel 2/6/2025 influenza A      IMPRESSION:      Enterococcus bacteremia r/o CIED infection   Complicated UTI s/p cystoscopy with stent insertion ( cloudy urine ) 2/7/25  Influenza A infection      RECOMMENDATIONS:       Vancomycin 1 gram IV q 24 hr, monitor Cr, Vanco level  SCr 1.4, Vanc 12  Continue rocephin 1 gram IV q 24 hrs pending urine cx   Tamiflu 30 mg po q 12 hr   Follow up blood cx   JIMBO ( to rule out endocarditis - s/p AICD insertion ) Monday 2/10/25  Infection control : Droplet isolation         KAYE Hutchinson - CNP  3:51 PM  2/9/2025

## 2025-02-09 NOTE — PROGRESS NOTES
Asked to place a midline . Patient has swelling and bruising on both arms recommend Ultrasound of bilateral arms. Will place a piv until Id recommends need for antibiotics and us results.

## 2025-02-10 ENCOUNTER — ANESTHESIA EVENT (OUTPATIENT)
Age: 80
DRG: 853 | End: 2025-02-10
Payer: MEDICARE

## 2025-02-10 ENCOUNTER — ANESTHESIA (OUTPATIENT)
Age: 80
DRG: 853 | End: 2025-02-10
Payer: MEDICARE

## 2025-02-10 ENCOUNTER — APPOINTMENT (OUTPATIENT)
Dept: GENERAL RADIOLOGY | Age: 80
DRG: 853 | End: 2025-02-10
Payer: MEDICARE

## 2025-02-10 ENCOUNTER — APPOINTMENT (OUTPATIENT)
Age: 80
DRG: 853 | End: 2025-02-10
Attending: INTERNAL MEDICINE
Payer: MEDICARE

## 2025-02-10 LAB
DATE LAST DOSE: NORMAL
LEFT VENTRICULAR EJECTION FRACTION HIGH VALUE: 40 %
LEFT VENTRICULAR EJECTION FRACTION MODE: NORMAL
LV EF: 35 %
TME LAST DOSE: NORMAL H
VANCOMYCIN DOSE: NORMAL MG
VANCOMYCIN SERPL-MCNC: 10 UG/ML (ref 5–40)

## 2025-02-10 PROCEDURE — 7100000010 HC PHASE II RECOVERY - FIRST 15 MIN

## 2025-02-10 PROCEDURE — 80202 ASSAY OF VANCOMYCIN: CPT

## 2025-02-10 PROCEDURE — 6370000000 HC RX 637 (ALT 250 FOR IP): Performed by: NURSE PRACTITIONER

## 2025-02-10 PROCEDURE — 93325 DOPPLER ECHO COLOR FLOW MAPG: CPT

## 2025-02-10 PROCEDURE — 76937 US GUIDE VASCULAR ACCESS: CPT

## 2025-02-10 PROCEDURE — 2060000000 HC ICU INTERMEDIATE R&B

## 2025-02-10 PROCEDURE — 36415 COLL VENOUS BLD VENIPUNCTURE: CPT

## 2025-02-10 PROCEDURE — 2580000003 HC RX 258: Performed by: NURSE PRACTITIONER

## 2025-02-10 PROCEDURE — 6370000000 HC RX 637 (ALT 250 FOR IP): Performed by: CLINICAL NURSE SPECIALIST

## 2025-02-10 PROCEDURE — 2500000003 HC RX 250 WO HCPCS: Performed by: NURSE PRACTITIONER

## 2025-02-10 PROCEDURE — 2500000003 HC RX 250 WO HCPCS: Performed by: INTERNAL MEDICINE

## 2025-02-10 PROCEDURE — 73560 X-RAY EXAM OF KNEE 1 OR 2: CPT

## 2025-02-10 PROCEDURE — 6360000002 HC RX W HCPCS: Performed by: NURSE ANESTHETIST, CERTIFIED REGISTERED

## 2025-02-10 PROCEDURE — B24BZZ4 ULTRASONOGRAPHY OF HEART WITH AORTA, TRANSESOPHAGEAL: ICD-10-PCS | Performed by: INTERNAL MEDICINE

## 2025-02-10 PROCEDURE — 6370000000 HC RX 637 (ALT 250 FOR IP): Performed by: INTERNAL MEDICINE

## 2025-02-10 PROCEDURE — 6360000002 HC RX W HCPCS: Performed by: NURSE PRACTITIONER

## 2025-02-10 PROCEDURE — 6360000002 HC RX W HCPCS: Performed by: INTERNAL MEDICINE

## 2025-02-10 PROCEDURE — 2580000003 HC RX 258: Performed by: INTERNAL MEDICINE

## 2025-02-10 PROCEDURE — 3700000001 HC ADD 15 MINUTES (ANESTHESIA)

## 2025-02-10 PROCEDURE — 3700000000 HC ANESTHESIA ATTENDED CARE

## 2025-02-10 PROCEDURE — 2580000003 HC RX 258: Performed by: NURSE ANESTHETIST, CERTIFIED REGISTERED

## 2025-02-10 RX ORDER — SODIUM CHLORIDE 9 MG/ML
INJECTION, SOLUTION INTRAVENOUS
Status: DISCONTINUED | OUTPATIENT
Start: 2025-02-10 | End: 2025-02-10 | Stop reason: SDUPTHER

## 2025-02-10 RX ORDER — SODIUM CHLORIDE 0.9 % (FLUSH) 0.9 %
5-40 SYRINGE (ML) INJECTION EVERY 12 HOURS SCHEDULED
Status: DISCONTINUED | OUTPATIENT
Start: 2025-02-10 | End: 2025-02-13 | Stop reason: HOSPADM

## 2025-02-10 RX ORDER — PROPOFOL 10 MG/ML
INJECTION, EMULSION INTRAVENOUS
Status: DISCONTINUED | OUTPATIENT
Start: 2025-02-10 | End: 2025-02-10 | Stop reason: SDUPTHER

## 2025-02-10 RX ORDER — LIDOCAINE HYDROCHLORIDE 10 MG/ML
50 INJECTION, SOLUTION EPIDURAL; INFILTRATION; INTRACAUDAL; PERINEURAL ONCE
Status: DISCONTINUED | OUTPATIENT
Start: 2025-02-10 | End: 2025-02-13 | Stop reason: HOSPADM

## 2025-02-10 RX ORDER — SODIUM CHLORIDE 9 MG/ML
INJECTION, SOLUTION INTRAVENOUS PRN
Status: DISCONTINUED | OUTPATIENT
Start: 2025-02-10 | End: 2025-02-13 | Stop reason: HOSPADM

## 2025-02-10 RX ORDER — SODIUM CHLORIDE 0.9 % (FLUSH) 0.9 %
5-40 SYRINGE (ML) INJECTION PRN
Status: DISCONTINUED | OUTPATIENT
Start: 2025-02-10 | End: 2025-02-13 | Stop reason: HOSPADM

## 2025-02-10 RX ADMIN — FERROUS SULFATE TAB 325 MG (65 MG ELEMENTAL FE) 325 MG: 325 (65 FE) TAB at 10:19

## 2025-02-10 RX ADMIN — DOXYCYCLINE 100 MG: 100 INJECTION, POWDER, LYOPHILIZED, FOR SOLUTION INTRAVENOUS at 04:23

## 2025-02-10 RX ADMIN — SODIUM CHLORIDE 1500 MG: 0.9 INJECTION, SOLUTION INTRAVENOUS at 23:41

## 2025-02-10 RX ADMIN — DULOXETINE HYDROCHLORIDE 30 MG: 30 CAPSULE, DELAYED RELEASE ORAL at 10:19

## 2025-02-10 RX ADMIN — APIXABAN 5 MG: 5 TABLET, FILM COATED ORAL at 10:19

## 2025-02-10 RX ADMIN — GUAIFENESIN 400 MG: 400 TABLET ORAL at 20:19

## 2025-02-10 RX ADMIN — GABAPENTIN 300 MG: 300 CAPSULE ORAL at 10:18

## 2025-02-10 RX ADMIN — GUAIFENESIN 400 MG: 400 TABLET ORAL at 14:30

## 2025-02-10 RX ADMIN — TRAMADOL HYDROCHLORIDE 50 MG: 50 TABLET, COATED ORAL at 04:32

## 2025-02-10 RX ADMIN — FOLIC ACID 1 MG: 1 TABLET ORAL at 10:19

## 2025-02-10 RX ADMIN — AMPICILLIN SODIUM 2000 MG: 2 INJECTION, POWDER, FOR SOLUTION INTRAMUSCULAR; INTRAVENOUS at 18:06

## 2025-02-10 RX ADMIN — WATER 1000 MG: 1 INJECTION INTRAMUSCULAR; INTRAVENOUS; SUBCUTANEOUS at 18:01

## 2025-02-10 RX ADMIN — SODIUM BICARBONATE 1300 MG: 650 TABLET ORAL at 10:19

## 2025-02-10 RX ADMIN — OSELTAMIVIR PHOSPHATE 30 MG: 30 CAPSULE ORAL at 10:19

## 2025-02-10 RX ADMIN — AMPICILLIN SODIUM 2000 MG: 2 INJECTION, POWDER, FOR SOLUTION INTRAMUSCULAR; INTRAVENOUS at 23:38

## 2025-02-10 RX ADMIN — DOXYCYCLINE 100 MG: 100 INJECTION, POWDER, LYOPHILIZED, FOR SOLUTION INTRAVENOUS at 16:26

## 2025-02-10 RX ADMIN — AMIODARONE HYDROCHLORIDE 200 MG: 200 TABLET ORAL at 10:18

## 2025-02-10 RX ADMIN — TAMSULOSIN HYDROCHLORIDE 0.4 MG: 0.4 CAPSULE ORAL at 10:19

## 2025-02-10 RX ADMIN — SODIUM CHLORIDE: 9 INJECTION, SOLUTION INTRAVENOUS at 23:40

## 2025-02-10 RX ADMIN — SODIUM CHLORIDE: 9 INJECTION, SOLUTION INTRAVENOUS at 16:56

## 2025-02-10 RX ADMIN — SODIUM CHLORIDE, PRESERVATIVE FREE 10 ML: 5 INJECTION INTRAVENOUS at 20:19

## 2025-02-10 RX ADMIN — TRAMADOL HYDROCHLORIDE 50 MG: 50 TABLET, COATED ORAL at 23:41

## 2025-02-10 RX ADMIN — OSELTAMIVIR PHOSPHATE 30 MG: 30 CAPSULE ORAL at 20:19

## 2025-02-10 RX ADMIN — SODIUM BICARBONATE 1300 MG: 650 TABLET ORAL at 20:18

## 2025-02-10 RX ADMIN — GUAIFENESIN 400 MG: 400 TABLET ORAL at 10:19

## 2025-02-10 RX ADMIN — HYDROXYCHLOROQUINE SULFATE 200 MG: 200 TABLET ORAL at 10:18

## 2025-02-10 RX ADMIN — GABAPENTIN 300 MG: 300 CAPSULE ORAL at 20:19

## 2025-02-10 RX ADMIN — METOPROLOL SUCCINATE 25 MG: 25 TABLET, EXTENDED RELEASE ORAL at 10:19

## 2025-02-10 RX ADMIN — SODIUM CHLORIDE, PRESERVATIVE FREE 10 ML: 5 INJECTION INTRAVENOUS at 20:21

## 2025-02-10 RX ADMIN — SODIUM BICARBONATE 1300 MG: 650 TABLET ORAL at 14:30

## 2025-02-10 RX ADMIN — SODIUM CHLORIDE, PRESERVATIVE FREE 10 ML: 5 INJECTION INTRAVENOUS at 10:20

## 2025-02-10 RX ADMIN — METOPROLOL SUCCINATE 25 MG: 25 TABLET, EXTENDED RELEASE ORAL at 20:19

## 2025-02-10 RX ADMIN — GABAPENTIN 300 MG: 300 CAPSULE ORAL at 14:30

## 2025-02-10 RX ADMIN — SODIUM CHLORIDE: 9 INJECTION, SOLUTION INTRAVENOUS at 04:22

## 2025-02-10 RX ADMIN — WATER 1000 MG: 1 INJECTION INTRAMUSCULAR; INTRAVENOUS; SUBCUTANEOUS at 16:23

## 2025-02-10 RX ADMIN — APIXABAN 5 MG: 5 TABLET, FILM COATED ORAL at 20:19

## 2025-02-10 RX ADMIN — CLOPIDOGREL BISULFATE 75 MG: 75 TABLET ORAL at 10:18

## 2025-02-10 RX ADMIN — CHOLECALCIFEROL TAB 125 MCG (5000 UNIT) 5000 UNITS: 125 TAB at 10:18

## 2025-02-10 RX ADMIN — PANTOPRAZOLE SODIUM 40 MG: 40 TABLET, DELAYED RELEASE ORAL at 10:19

## 2025-02-10 RX ADMIN — PROPOFOL 140 MG: 10 INJECTION, EMULSION INTRAVENOUS at 17:10

## 2025-02-10 RX ADMIN — HYDROXYCHLOROQUINE SULFATE 200 MG: 200 TABLET ORAL at 20:18

## 2025-02-10 ASSESSMENT — PAIN DESCRIPTION - ORIENTATION
ORIENTATION: LEFT
ORIENTATION: RIGHT

## 2025-02-10 ASSESSMENT — PAIN SCALES - GENERAL
PAINLEVEL_OUTOF10: 9
PAINLEVEL_OUTOF10: 9

## 2025-02-10 ASSESSMENT — PAIN DESCRIPTION - DESCRIPTORS
DESCRIPTORS: SHARP;SHOOTING;DISCOMFORT
DESCRIPTORS: DISCOMFORT;SHARP;SHOOTING

## 2025-02-10 ASSESSMENT — PAIN DESCRIPTION - LOCATION
LOCATION: LEG
LOCATION: LEG

## 2025-02-10 ASSESSMENT — PAIN - FUNCTIONAL ASSESSMENT
PAIN_FUNCTIONAL_ASSESSMENT: PREVENTS OR INTERFERES SOME ACTIVE ACTIVITIES AND ADLS
PAIN_FUNCTIONAL_ASSESSMENT: PREVENTS OR INTERFERES SOME ACTIVE ACTIVITIES AND ADLS

## 2025-02-10 NOTE — PLAN OF CARE
Problem: Chronic Conditions and Co-morbidities  Goal: Patient's chronic conditions and co-morbidity symptoms are monitored and maintained or improved  2/10/2025 0206 by Kallie Sheikh RN  Outcome: Progressing     Problem: Discharge Planning  Goal: Discharge to home or other facility with appropriate resources  2/10/2025 0206 by Kallie Sheikh RN  Outcome: Progressing     Problem: Safety - Adult  Goal: Free from fall injury  2/10/2025 0206 by Kallie Sheikh RN  Outcome: Progressing     Problem: Skin/Tissue Integrity  Goal: Skin integrity remains intact  Description: 1.  Monitor for areas of redness and/or skin breakdown  2.  Assess vascular access sites hourly  3.  Every 4-6 hours minimum:  Change oxygen saturation probe site  4.  Every 4-6 hours:  If on nasal continuous positive airway pressure, respiratory therapy assess nares and determine need for appliance change or resting period  Outcome: Progressing     Problem: Respiratory - Adult  Goal: Achieves optimal ventilation and oxygenation  Outcome: Progressing

## 2025-02-10 NOTE — PROCEDURES
PROCEDURE NOTE  Date: 2/10/2025   Name: Ruben Davidson  YOB: 1945    Procedures:      Preprocedure diagnosis: Bacteremia    Procedures: JIMBO    Primary procedure  Written informed consent was obtained, the JIMBO was performed under stable fasting condition. The patient was set up for monitoring of surface EKG and pulse oximetry continuously. Blood pressure was monitored and with automatic cuff measurements. Supplemental oxygen was administered. The procedure was performed under anesthesia by anesthesiology. After ensuring adequate anesthesia, JIMBO probe was intubated without difficulty.     Result: No echocardiographic evidence of endocarditis.    Complication: None    Patient was monitored until awake and vitals remained stable.    Teresa Wright M.D.  [facility] cardiology

## 2025-02-10 NOTE — CARE COORDINATION
02/10/2025 Pt admitted for RADHA but also had a fall at home and has laceration to head. DX of Influenza A as well. Pt is to have a JIMBO today to r/o endocarditis. Also to receive a PICC line for home infusion of IV ABX-will set up for out patient infusion center once RX is issued. Will confirm if patient wants to go to [place] or [place]. Family t provide transport home upon DC.   Acacia Santos RN CM

## 2025-02-10 NOTE — PROGRESS NOTES
[facility]                                Progress note    Subjective:  Not available during rounds, reviewed chart    Objective:    /61   Pulse 83   Temp 97.9 °F (36.6 °C) (Temporal)   Resp 20   Ht 1.676 m (5' 6\")   Wt 76.4 kg (168 lb 6.9 oz)   SpO2 98%   BMI 27.19 kg/m²     In: 1454.5 [P.O.:960; I.V.:144.5]  Out: 125   In: 1454.5   Out: 125 [Urine:125]         Recent Labs     02/08/25  0618 02/09/25  0604   WBC 9.7 10.2   HGB 12.1* 11.2*   HCT 37.9 33.6*    161       Recent Labs     02/07/25  1649 02/08/25  0618 02/09/25  0604    135 135   K 4.6 4.6 4.1    109* 107   CO2 17* 14* 16*   BUN 41* 39* 30*   CREATININE 1.9* 1.7* 1.4*   CALCIUM 8.3* 8.2* 8.1*       Assessment:    Principal Problem:    RADHA (acute kidney injury) (HCC)  Active Problems:    S/P ICD (internal cardiac defibrillator) procedure    Elevated troponin    Paroxysmal atrial fibrillation (HCC)    Influenza A    Fall    Pneumonia due to organism    Calculus of left ureter    Elevated liver enzymes    CKD (chronic kidney disease) stage 2, GFR 60-89 ml/min    Head injury  Resolved Problems:    * No resolved hospital problems. *      Plan:    Patient is a 79-year-old male admitted to [facility] for RADHA  -Monitor labs  -Improving kidney function, 39/1.7, nephrology following  -Nephrotoxic agents held at this time  -s/p cystoscopy with left stent placement yesterday with urology  -Continue NS @ 75   -Blood cultures with Enterococcus faecalis  -Continue IV Rocephin, vancomycin and Doxy per infectious disease  -Echo ordered to rule out endocarditis  -Will need JIMBO, to be performed Monday  -Continue Tamiflu for influenza infection  -Saturating well on room air    2/9/25  -Continue IV Vancomycin, Rocephin and vancomycin per infectious disease  -JIMBO scheduled for 2/10/25 endocarditis  -N.p.o. after midnight sips with meds  -Nephrology following  -Soft pressures on room air, asymptomatic   -Kidney function

## 2025-02-10 NOTE — PROGRESS NOTES
Department of Internal Medicine  Infectious Diseases  Progress  Note      C/C: Enterococcus bacteremia , sepsis, flu A infection       Pt is awake and alert  Denies fever or chills  Afebrile        Current Facility-Administered Medications   Medication Dose Route Frequency Provider Last Rate Last Admin    [id number] adaptor             sodium bicarbonate tablet 1,300 mg  1,300 mg Oral TID Carito HurleyKAYE - CNS   1,300 mg at 02/10/25 1430    amiodarone (CORDARONE) tablet 200 mg  200 mg Oral Q48H [facility], April, APRN - CNP   200 mg at 02/10/25 1018    metoprolol succinate (TOPROL XL) extended release tablet 25 mg  25 mg Oral BID [place], April, APRN - CNP   25 mg at 02/10/25 1019    pantoprazole (PROTONIX) tablet 40 mg  40 mg Oral Daily [place], April, APRN - CNP   40 mg at 02/10/25 1019    Polyvinyl Alcohol-Povidone PF (REFRESH) 1.4-0.6 % ophthalmic solution 1 drop  1 drop Both Eyes Q12H [facility], April, APRN - CNP        [Held by provider] rosuvastatin (CRESTOR) tablet 40 mg  40 mg Oral Nightly [facility], April, APRN - CNP        [Held by provider] sacubitril-valsartan (ENTRESTO) 24-26 MG per tablet 1 tablet  1 tablet Oral BID [place], April, APRN - CNP        [Held by provider] spironolactone (ALDACTONE) tablet 25 mg  25 mg Oral Daily [place]us, April, APRN - CNP        tamsulosin (FLOMAX) capsule 0.4 mg  0.4 mg Oral Daily [place], April, APRN - CNP   0.4 mg at 02/10/25 1019    [Held by provider] torsemide (DEMADEX) tablet 20 mg  20 mg Oral Weekly [place]Sat[place]us, April, APRN - CNP        apixaban (ELIQUIS) tablet 5 mg  5 mg Oral BID Efrain Ewing DO   5 mg at 02/10/25 1019    traMADol (ULTRAM) tablet 50 mg  50 mg Oral Q6H PRN Efrain Ewing DO   50 mg at 02/10/25 0432    guaiFENesin tablet 400 mg  400 mg Oral TID Efrain Ewing DO   400 mg at 02/10/25 1430    0.9 % sodium chloride infusion   IntraVENous Continuous

## 2025-02-10 NOTE — PROGRESS NOTES
[facility]  Nephrology Progress Note    Patient's Name: Ruben Davidson    History of Present Illness from 2/7 Consult Note:    \"Ruben Davidson is a 79 y.o. male with a past medical history of hypertension, heart failure with reduced ejection fraction, persistent atrial fibrillation, history of coronary artery disease, obstructive sleep apnea, morbid obesity.      They presented on 2/6/2025 complaining of fall at home when about to sit to the rest room and hitting his head on the shower without loss of consciousness. Of note, he takes eliquis for his atrial fibrillation. On arrival their vitals BP 84/56, HR 92, RR 18,T 99.3. Initial labs were pertinent for elevated creatinine (1.7, baseline 0.9), lactic acidosis (2.1), elevated procalcitonin (1.27),elevated troponin at 109, hypoalbuminemia (3.1), elevated ALT (108), elevated AST (70). CBC showed leukocytosis (13.5) and anemia (11.8). Respiratory panel was positive for influenza A. CXR did not show any acute process. CT head did not show any acute intracranial abnormality. CT chest showed concerns for primary ILD . CT A/P showed obstructing 6mm calculus mid left ureter with moderate left hydronephrosis and proximal hydroureter and simple, benign left renal cysts measuring up to 7.3 cm at the upper pole. Cardiology was consulted for elevated troponin. ID was consulted for bacteremia. Urology was consulted for 6 mm calculus mid left ureter.  Nephrology was consulted for acute kidney injury.\"    Subjective:    2/10/2025: Patient was seen and examined.  He reports that he feels okay and notes that his breathing is good.  He denies any nausea.  He is for possible JIMBO.  Visitor at bedside.    PMH:    Past Medical History:   Diagnosis Date    CAD (coronary artery disease)     s/p stent RCA    [place] (hard of hearing)     WEARS HEARING AIDES    Hypertension     Left ventricular systolic dysfunction     MI (myocardial infarction) ([facility]) 1997    Mitral     clopidogrel  75 mg Oral Daily    cefTRIAXone (ROCEPHIN) IV  1,000 mg IntraVENous Q24H    doxycycline (VIBRAMYCIN) IV  100 mg IntraVENous Q12H    oseltamivir  30 mg Oral BID        sodium chloride 75 mL/hr at 02/10/25 0422    sodium chloride         Meds prn:     Polyvinyl Alcohol-Povidone PF, traMADol, sodium chloride flush, sodium chloride, polyethylene glycol, prochlorperazine    Meds prior to admission:     No current facility-administered medications on file prior to encounter.     Current Outpatient Medications on File Prior to Encounter   Medication Sig Dispense Refill    spironolactone (ALDACTONE) 25 MG tablet Take 1 tablet by mouth daily      apixaban (ELIQUIS) 5 MG TABS tablet Take 1 tablet by mouth 2 times daily Indications: Do NOT restart until cleared by Device Clinic at appointment in 2 weeks. . 180 tablet 1    metoprolol succinate (TOPROL XL) 25 MG extended release tablet Take 1 tablet by mouth in the morning and at bedtime 180 tablet 3    amiodarone (CORDARONE) 200 MG tablet Take 1 tablet by mouth every 48 hours 45 tablet 1    sacubitril-valsartan (ENTRESTO) 24-26 MG per tablet Take 1 tablet by mouth 2 times daily 60 tablet 5    torsemide (DEMADEX) 20 MG tablet 2 tablets once a week Two tablets once a week      rosuvastatin (CRESTOR) 40 MG tablet TAKE ONE TABLET BY MOUTH ONCE NIGHTLY 90 tablet 1    Multiple Vitamins-Minerals (PRESERVISION AREDS 2+MULTI VIT PO) Take by mouth      Boswellia-Glucosamine-Vit D (OSTEO BI-FLEX ONE PER DAY PO) Take by mouth      clopidogrel (PLAVIX) 75 MG tablet Take 1 tablet by mouth daily . 30 tablet 3    Cetirizine HCl (ZYRTEC ALLERGY PO) Take by mouth daily      Acetaminophen (TYLENOL ARTHRITIS PAIN PO) Take by mouth daily Takes 2 tablets twice daily      Ferrous Sulfate (IRON) 325 (65 Fe) MG TABS daily   1    pantoprazole (PROTONIX) 40 MG tablet TAKE 1 TABLET BY MOUTH ONCE DAILY  3    Cranberry 500 MG CAPS Take 1 capsule by mouth      gabapentin (NEURONTIN) 300 MG

## 2025-02-10 NOTE — PLAN OF CARE
Problem: Chronic Conditions and Co-morbidities  Goal: Patient's chronic conditions and co-morbidity symptoms are monitored and maintained or improved  Outcome: Progressing  Flowsheets (Taken 2/10/2025 0745)  Care Plan - Patient's Chronic Conditions and Co-Morbidity Symptoms are Monitored and Maintained or Improved: Monitor and assess patient's chronic conditions and comorbid symptoms for stability, deterioration, or improvement     Problem: Discharge Planning  Goal: Discharge to home or other facility with appropriate resources  Outcome: Progressing  Flowsheets (Taken 2/10/2025 0745)  Discharge to home or other facility with appropriate resources: Identify barriers to discharge with patient and caregiver     Problem: Safety - Adult  Goal: Free from fall injury  Outcome: Progressing  Flowsheets (Taken 2/10/2025 0800)  Free From Fall Injury: Instruct family/caregiver on patient safety     Problem: Skin/Tissue Integrity  Goal: Skin integrity remains intact  Description: 1.  Monitor for areas of redness and/or skin breakdown  2.  Assess vascular access sites hourly  3.  Every 4-6 hours minimum:  Change oxygen saturation probe site  4.  Every 4-6 hours:  If on nasal continuous positive airway pressure, respiratory therapy assess nares and determine need for appliance change or resting period  Outcome: Progressing  Flowsheets  Taken 2/10/2025 0800  Skin Integrity Remains Intact: Monitor for areas of redness and/or skin breakdown  Taken 2/10/2025 0745  Skin Integrity Remains Intact: Monitor for areas of redness and/or skin breakdown     Problem: Respiratory - Adult  Goal: Achieves optimal ventilation and oxygenation  Outcome: Progressing

## 2025-02-10 NOTE — ANESTHESIA PRE PROCEDURE
Department of Anesthesiology  Preprocedure Note       Name:  Ruben Davidson   Age:  79 y.o.  :  1945                                          MRN:  [id number]         Date:  2/10/2025      Surgeon: Kyle    Procedure: JIMBO    Medications prior to admission:   Prior to Admission medications    Medication Sig Start Date End Date Taking? Authorizing Provider   spironolactone (ALDACTONE) 25 MG tablet Take 1 tablet by mouth daily   Yes Provider, MD [place]   apixaban (ELIQUIS) 5 MG TABS tablet Take 1 tablet by mouth 2 times daily Indications: Do NOT restart until cleared by Device Clinic at appointment in 2 weeks. . 10/24/24 2/7/25 Yes Viktor Luther DO   metoprolol succinate (TOPROL XL) 25 MG extended release tablet Take 1 tablet by mouth in the morning and at bedtime 10/3/24  Yes Darby Barron, APRN - CNP   amiodarone (CORDARONE) 200 MG tablet Take 1 tablet by mouth every 48 hours 24 Yes Darby Barron, APRN - CNP   sacubitril-valsartan (ENTRESTO) 24-26 MG per tablet Take 1 tablet by mouth 2 times daily 24  Yes Tor Kathleen MD   torsemide (DEMADEX) 20 MG tablet 2 tablets once a week Two tablets once a week 22  Yes Provider, MD [place]   rosuvastatin (CRESTOR) 40 MG tablet TAKE ONE TABLET BY MOUTH ONCE NIGHTLY 22  Yes Tor Kathleen MD   Multiple Vitamins-Minerals (PRESERVISION AREDS 2+MULTI VIT PO) Take by mouth   Yes Provider, Historical, MD   Boswellia-Glucosamine-Vit D (OSTEO BI-FLEX ONE PER DAY PO) Take by mouth   Yes Provider, Historical, MD   clopidogrel (PLAVIX) 75 MG tablet Take 1 tablet by mouth daily . 21  Yes Bobo Arvizu MD   Cetirizine HCl (ZYRTEC ALLERGY PO) Take by mouth daily   Yes Provider, MD [place]   Acetaminophen (TYLENOL ARTHRITIS PAIN PO) Take by mouth daily Takes 2 tablets twice daily   Yes Provider, MD [place]   Ferrous Sulfate (IRON) 325 (65 Fe) MG TABS daily  19  Yes Provider, Historical, MD   pantoprazole (PROTONIX)

## 2025-02-11 ENCOUNTER — APPOINTMENT (OUTPATIENT)
Dept: GENERAL RADIOLOGY | Age: 80
DRG: 853 | End: 2025-02-11
Payer: MEDICARE

## 2025-02-11 LAB
ALBUMIN SERPL-MCNC: 2.6 G/DL (ref 3.5–5.2)
ALP SERPL-CCNC: 94 U/L (ref 40–129)
ALT SERPL-CCNC: 51 U/L (ref 0–40)
ANION GAP SERPL CALCULATED.3IONS-SCNC: 10 MMOL/L (ref 7–16)
AST SERPL-CCNC: 53 U/L (ref 0–39)
BILIRUB SERPL-MCNC: 1 MG/DL (ref 0–1.2)
BUN SERPL-MCNC: 17 MG/DL (ref 6–23)
CALCIUM SERPL-MCNC: 7.8 MG/DL (ref 8.6–10.2)
CHLORIDE SERPL-SCNC: 109 MMOL/L (ref 98–107)
CO2 SERPL-SCNC: 18 MMOL/L (ref 22–29)
CREAT SERPL-MCNC: 1.1 MG/DL (ref 0.7–1.2)
ECHO BSA: 1.88 M2
ECHO EST RA PRESSURE: 8 MMHG
ECHO LV EF PHYSICIAN: 38 %
ECHO RIGHT VENTRICULAR SYSTOLIC PRESSURE (RVSP): 26 MMHG
ECHO TV REGURGITANT MAX VELOCITY: 2.1 M/S
ERYTHROCYTE [DISTWIDTH] IN BLOOD BY AUTOMATED COUNT: 15.9 % (ref 11.5–15)
GFR, ESTIMATED: 72 ML/MIN/1.73M2
GLUCOSE SERPL-MCNC: 69 MG/DL (ref 74–99)
HCT VFR BLD AUTO: 29.2 % (ref 37–54)
HGB BLD-MCNC: 10 G/DL (ref 12.5–16.5)
MAGNESIUM SERPL-MCNC: 1.5 MG/DL (ref 1.6–2.6)
MCH RBC QN AUTO: 29.4 PG (ref 26–35)
MCHC RBC AUTO-ENTMCNC: 34.2 G/DL (ref 32–34.5)
MCV RBC AUTO: 85.9 FL (ref 80–99.9)
PHOSPHATE SERPL-MCNC: 2.7 MG/DL (ref 2.5–4.5)
PLATELET # BLD AUTO: 140 K/UL (ref 130–450)
PMV BLD AUTO: 10.4 FL (ref 7–12)
POTASSIUM SERPL-SCNC: 3.5 MMOL/L (ref 3.5–5)
PROT SERPL-MCNC: 5.2 G/DL (ref 6.4–8.3)
RBC # BLD AUTO: 3.4 M/UL (ref 3.8–5.8)
SODIUM SERPL-SCNC: 137 MMOL/L (ref 132–146)
WBC OTHER # BLD: 10.5 K/UL (ref 4.5–11.5)

## 2025-02-11 PROCEDURE — 6370000000 HC RX 637 (ALT 250 FOR IP): Performed by: NURSE PRACTITIONER

## 2025-02-11 PROCEDURE — 85027 COMPLETE CBC AUTOMATED: CPT

## 2025-02-11 PROCEDURE — 80053 COMPREHEN METABOLIC PANEL: CPT

## 2025-02-11 PROCEDURE — 2580000003 HC RX 258: Performed by: INTERNAL MEDICINE

## 2025-02-11 PROCEDURE — 6370000000 HC RX 637 (ALT 250 FOR IP): Performed by: CLINICAL NURSE SPECIALIST

## 2025-02-11 PROCEDURE — 6370000000 HC RX 637 (ALT 250 FOR IP): Performed by: INTERNAL MEDICINE

## 2025-02-11 PROCEDURE — 71045 X-RAY EXAM CHEST 1 VIEW: CPT

## 2025-02-11 PROCEDURE — 93320 DOPPLER ECHO COMPLETE: CPT | Performed by: INTERNAL MEDICINE

## 2025-02-11 PROCEDURE — 2500000003 HC RX 250 WO HCPCS: Performed by: NURSE PRACTITIONER

## 2025-02-11 PROCEDURE — 93312 ECHO TRANSESOPHAGEAL: CPT | Performed by: INTERNAL MEDICINE

## 2025-02-11 PROCEDURE — 84100 ASSAY OF PHOSPHORUS: CPT

## 2025-02-11 PROCEDURE — 2500000003 HC RX 250 WO HCPCS: Performed by: INTERNAL MEDICINE

## 2025-02-11 PROCEDURE — 6360000002 HC RX W HCPCS

## 2025-02-11 PROCEDURE — 2060000000 HC ICU INTERMEDIATE R&B

## 2025-02-11 PROCEDURE — 36415 COLL VENOUS BLD VENIPUNCTURE: CPT

## 2025-02-11 PROCEDURE — 6360000002 HC RX W HCPCS: Performed by: INTERNAL MEDICINE

## 2025-02-11 PROCEDURE — 83735 ASSAY OF MAGNESIUM: CPT

## 2025-02-11 PROCEDURE — 93325 DOPPLER ECHO COLOR FLOW MAPG: CPT | Performed by: INTERNAL MEDICINE

## 2025-02-11 RX ORDER — MAGNESIUM SULFATE IN WATER 40 MG/ML
2000 INJECTION, SOLUTION INTRAVENOUS ONCE
Status: COMPLETED | OUTPATIENT
Start: 2025-02-11 | End: 2025-02-11

## 2025-02-11 RX ADMIN — METOPROLOL SUCCINATE 25 MG: 25 TABLET, EXTENDED RELEASE ORAL at 08:21

## 2025-02-11 RX ADMIN — WATER 2000 MG: 1 INJECTION INTRAMUSCULAR; INTRAVENOUS; SUBCUTANEOUS at 06:04

## 2025-02-11 RX ADMIN — FERROUS SULFATE TAB 325 MG (65 MG ELEMENTAL FE) 325 MG: 325 (65 FE) TAB at 08:22

## 2025-02-11 RX ADMIN — WATER 2000 MG: 1 INJECTION INTRAMUSCULAR; INTRAVENOUS; SUBCUTANEOUS at 18:00

## 2025-02-11 RX ADMIN — APIXABAN 5 MG: 5 TABLET, FILM COATED ORAL at 08:22

## 2025-02-11 RX ADMIN — HYDROXYCHLOROQUINE SULFATE 200 MG: 200 TABLET ORAL at 20:22

## 2025-02-11 RX ADMIN — DULOXETINE HYDROCHLORIDE 30 MG: 30 CAPSULE, DELAYED RELEASE ORAL at 08:21

## 2025-02-11 RX ADMIN — SODIUM CHLORIDE, PRESERVATIVE FREE 10 ML: 5 INJECTION INTRAVENOUS at 08:27

## 2025-02-11 RX ADMIN — SODIUM BICARBONATE 1300 MG: 650 TABLET ORAL at 08:22

## 2025-02-11 RX ADMIN — HYDROXYCHLOROQUINE SULFATE 200 MG: 200 TABLET ORAL at 08:24

## 2025-02-11 RX ADMIN — MAGNESIUM SULFATE HEPTAHYDRATE 2000 MG: 40 INJECTION, SOLUTION INTRAVENOUS at 10:15

## 2025-02-11 RX ADMIN — SODIUM BICARBONATE 1300 MG: 650 TABLET ORAL at 14:11

## 2025-02-11 RX ADMIN — GUAIFENESIN 400 MG: 400 TABLET ORAL at 14:11

## 2025-02-11 RX ADMIN — SODIUM CHLORIDE, PRESERVATIVE FREE 10 ML: 5 INJECTION INTRAVENOUS at 20:22

## 2025-02-11 RX ADMIN — AMPICILLIN SODIUM 2000 MG: 2 INJECTION, POWDER, FOR SOLUTION INTRAMUSCULAR; INTRAVENOUS at 18:03

## 2025-02-11 RX ADMIN — AMPICILLIN SODIUM 2000 MG: 2 INJECTION, POWDER, FOR SOLUTION INTRAMUSCULAR; INTRAVENOUS at 12:19

## 2025-02-11 RX ADMIN — GABAPENTIN 300 MG: 300 CAPSULE ORAL at 20:22

## 2025-02-11 RX ADMIN — AMPICILLIN SODIUM 2000 MG: 2 INJECTION, POWDER, FOR SOLUTION INTRAMUSCULAR; INTRAVENOUS at 06:06

## 2025-02-11 RX ADMIN — SODIUM BICARBONATE 1300 MG: 650 TABLET ORAL at 20:21

## 2025-02-11 RX ADMIN — FOLIC ACID 1 MG: 1 TABLET ORAL at 08:22

## 2025-02-11 RX ADMIN — CHOLECALCIFEROL TAB 125 MCG (5000 UNIT) 5000 UNITS: 125 TAB at 08:24

## 2025-02-11 RX ADMIN — OSELTAMIVIR PHOSPHATE 30 MG: 30 CAPSULE ORAL at 20:21

## 2025-02-11 RX ADMIN — CLOPIDOGREL BISULFATE 75 MG: 75 TABLET ORAL at 08:22

## 2025-02-11 RX ADMIN — GUAIFENESIN 400 MG: 400 TABLET ORAL at 20:21

## 2025-02-11 RX ADMIN — GABAPENTIN 300 MG: 300 CAPSULE ORAL at 14:10

## 2025-02-11 RX ADMIN — PANTOPRAZOLE SODIUM 40 MG: 40 TABLET, DELAYED RELEASE ORAL at 08:21

## 2025-02-11 RX ADMIN — GUAIFENESIN 400 MG: 400 TABLET ORAL at 08:21

## 2025-02-11 RX ADMIN — AMPICILLIN SODIUM 2000 MG: 2 INJECTION, POWDER, FOR SOLUTION INTRAMUSCULAR; INTRAVENOUS at 23:22

## 2025-02-11 RX ADMIN — TAMSULOSIN HYDROCHLORIDE 0.4 MG: 0.4 CAPSULE ORAL at 08:20

## 2025-02-11 RX ADMIN — OSELTAMIVIR PHOSPHATE 30 MG: 30 CAPSULE ORAL at 08:24

## 2025-02-11 RX ADMIN — APIXABAN 5 MG: 5 TABLET, FILM COATED ORAL at 20:21

## 2025-02-11 RX ADMIN — METOPROLOL SUCCINATE 25 MG: 25 TABLET, EXTENDED RELEASE ORAL at 20:21

## 2025-02-11 RX ADMIN — GABAPENTIN 300 MG: 300 CAPSULE ORAL at 08:21

## 2025-02-11 ASSESSMENT — PAIN SCALES - GENERAL: PAINLEVEL_OUTOF10: 7

## 2025-02-11 NOTE — PLAN OF CARE
Problem: Chronic Conditions and Co-morbidities  Goal: Patient's chronic conditions and co-morbidity symptoms are monitored and maintained or improved  2/10/2025 2353 by Kallie Sheikh RN  Outcome: Progressing     Problem: Discharge Planning  Goal: Discharge to home or other facility with appropriate resources  2/10/2025 2353 by Kallie Sheikh RN  Outcome: Progressing     Problem: Safety - Adult  Goal: Free from fall injury  2/10/2025 2353 by Kallie Sheikh RN  Outcome: Progressing     Problem: Skin/Tissue Integrity  Goal: Skin integrity remains intact  Description: 1.  Monitor for areas of redness and/or skin breakdown  2.  Assess vascular access sites hourly  3.  Every 4-6 hours minimum:  Change oxygen saturation probe site  4.  Every 4-6 hours:  If on nasal continuous positive airway pressure, respiratory therapy assess nares and determine need for appliance change or resting period  2/10/2025 2353 by Kallie Sheikh RN  Outcome: Progressing     Problem: Respiratory - Adult  Goal: Achieves optimal ventilation and oxygenation  2/10/2025 2353 by Kallie Sheikh RN  Outcome: Progressing

## 2025-02-11 NOTE — PLAN OF CARE
Problem: Chronic Conditions and Co-morbidities  Goal: Patient's chronic conditions and co-morbidity symptoms are monitored and maintained or improved  2/11/2025 1025 by Lucia Alcantar RN  Outcome: Progressing  2/10/2025 2353 by Kallie Sheikh RN  Outcome: Progressing     Problem: Discharge Planning  Goal: Discharge to home or other facility with appropriate resources  2/11/2025 1025 by Lucia Alcantar RN  Outcome: Progressing  2/10/2025 2353 by Kallie Sheikh RN  Outcome: Progressing     Problem: Safety - Adult  Goal: Free from fall injury  2/11/2025 1025 by Lucia Alcantar RN  Outcome: Progressing  2/10/2025 2353 by Kallie Sheikh RN  Outcome: Progressing     Problem: Skin/Tissue Integrity  Goal: Skin integrity remains intact  Description: 1.  Monitor for areas of redness and/or skin breakdown  2.  Assess vascular access sites hourly  3.  Every 4-6 hours minimum:  Change oxygen saturation probe site  4.  Every 4-6 hours:  If on nasal continuous positive airway pressure, respiratory therapy assess nares and determine need for appliance change or resting period  2/11/2025 1025 by Lucia Alcantar RN  Outcome: Progressing  2/10/2025 2353 by Kallie Sheikh RN  Outcome: Progressing     Problem: Respiratory - Adult  Goal: Achieves optimal ventilation and oxygenation  2/11/2025 1025 by Lucia Alcantar RN  Outcome: Progressing  2/10/2025 2353 by Kallie Sheikh RN  Outcome: Progressing

## 2025-02-11 NOTE — PROGRESS NOTES
Pharmacy Consultation Note  (Antibiotic Dosing and Monitoring)    Initial consult date: 2/7/2025  Consulting physician/provider: Dr. Casper  Drug: Vancomycin  Indication: Bloodstream infection    Age/  Gender Height Weight IBW  Allergy Information   79 y.o./male 167.6 cm (5' 6\") 66.7 kg (147 lb)     Ideal body weight: 63.8 kg (140 lb 10.5 oz)  Adjusted ideal body weight: 68.8 kg (151 lb 9.5 oz)   Iodides and Dust mite extract      Renal Function:  Recent Labs     02/08/25  0618 02/09/25  0604   BUN 39* 30*   CREATININE 1.7* 1.4*       Intake/Output Summary (Last 24 hours) at 2/10/2025 2206  Last data filed at 2/10/2025 1719  Gross per 24 hour   Intake 50 ml   Output 450 ml   Net -400 ml       Vancomycin Monitoring:  Trough:  No results for input(s): \"VANCOTROUGH\" in the last 72 hours.  Random:    Recent Labs     02/08/25  0618 02/09/25  0604 02/10/25  1530   VANCORANDOM 11.4 12.0 10.0       Vancomycin Administration Times:  Recent vancomycin administrations      Recent vancomycin administrations                     vancomycin (VANCOCIN) 1,000 mg in sodium chloride 0.9 % 250 mL IVPB ([id number]) (mg) 1,000 mg New Bag 02/08/25 1412    vancomycin (VANCOCIN) 1,500 mg in sodium chloride 0.9 % 250 mL IVPB ([id number]) (mg) 1,500 mg New Bag 02/07/25 1509                      Assessment:  Patient is a 79 y.o. male who has been initiated on vancomycin  Estimated Creatinine Clearance: 39 mL/min (A) (based on SCr of 1.4 mg/dL (H)).  2/8:  Random level 11.4 (~15 hours post dose).  Scr elevated at 1.7 (baseline around 0.8).    2/9:  Blood cultures growing enterococcus faecalis.  SCR decreased to 1.4 (baseline 0.8).  Random level 12.0 (~16 hours post dose).  2/10 Random level 10.0 (~25.5 hours post dose).  To dose vancomycin, pharmacy will be utilizing Tem[employer] calculation software for goal AUC/AILYAH 400-600 mg/L-hr (predicted AUC/ALIYAH = 531, Tr =13.9 mcg/mL)  Plan:  Will continue vancomycin 1500 mg every 24 hours  Will check vancomycin

## 2025-02-11 NOTE — PROGRESS NOTES
Vascular Access Procedure Note    Procedure Date:   2/11/2025    Pre-procedure Verification/Time-Out:  The proposed risks versus benefits of this procedure were discussed in detail by the physician with patient. written consent was obtained from the patient. Relevant documentation was reviewed prior to procedure including signed consent form and medications.   All necessary equipment for procedure is available at time of procedure yes.  An audible time out was done at 0835AM by team members, correctly identifying patients name, medical record number, correct side, correct site, and correct procedure to be performed with registered nurse members of the procedure team all in agreement.        Indication for Procedure:   Reason for Insertion: intravenous antibiotics    ASA Assessment (Required for Moderate & Deep Sedation):      Procedure:   Reason for Consultation: power PICC line    Clinician Performing Procedure:   BRINA Morales RN    Assistant:  none    Sedation:   Analgesia Used: lidocaine 1%    Procedure Details/Findings:  Catheter Greenlandic Size: 5.5  Lot Number: [id number]  Product #: [id number]  Expiration Date: 10/09/2025  Maximum Barrier Precautions: cap, eye shield, full body drape, gloves, gown, handwashing, and mask  Skin Prep: chlorhexidine  Technique: modified seldinger/  Attempts: 1  Exposed (cm): 2  Total (cm): 40  Placement Site: right brachial vein  Vessel Size: 0.52  Dressing: securement device, transparent dressing, and biopatch  Blood Return: Yes   Ultrasound Guidance: Yes   Arm Circumference Mid-Bicep (cm): 25  Chest X-Ray Ordered: chest x-ray  End Placement: svc    Complications:   none     Post-operative Condition:  stable  Patient Tolerated Procedure: well     Comments/Post-operative Education:   Post Procedure Interventions: patient verbalized understanding of education    Robin Morales RN  02/11/25  9:24 AM

## 2025-02-11 NOTE — PROGRESS NOTES
Department of Internal Medicine  Infectious Diseases  Progress  Note      C/C: Enterococcus bacteremia , sepsis, flu A infection       Pt is awake and alert  Denies fever or chills  Afebrile        Current Facility-Administered Medications   Medication Dose Route Frequency Provider Last Rate Last Admin    [id number] adaptor             ampicillin (OMNIPEN) 2,000 mg in sodium chloride 0.9 % 100 mL IVPB ([id number])  2,000 mg IntraVENous Q6H Limbu, Ramos ROCHE MD   Stopped at 02/11/25 1250    sodium chloride flush 0.9 % injection 5-40 mL  5-40 mL IntraVENous 2 times per day Limbu, Ramos ROCHE MD   10 mL at 02/11/25 0827    sodium chloride flush 0.9 % injection 5-40 mL  5-40 mL IntraVENous PRN Limbu, Ramos ROCHE MD        0.9 % sodium chloride infusion   IntraVENous PRN Limbu, Ramos ROCHE MD        lidocaine PF 1 % injection 50 mg  50 mg IntraDERmal Once Limbu, Ramos ROCHE MD        cefTRIAXone (ROCEPHIN) 2,000 mg in sterile water 20 mL IV syringe  2,000 mg IntraVENous Q12H Limbu, Ramos ROCHE MD   2,000 mg at 02/11/25 0604    sodium bicarbonate tablet 1,300 mg  1,300 mg Oral TID Carito Hurley APRN - CNS   1,300 mg at 02/11/25 1411    amiodarone (CORDARONE) tablet 200 mg  200 mg Oral Q48H Inés-Satish, April, APRN - CNP   200 mg at 02/10/25 1018    metoprolol succinate (TOPROL XL) extended release tablet 25 mg  25 mg Oral BID Hart-[place] Franken, April, APRN - CNP   25 mg at 02/11/25 0821    pantoprazole (PROTONIX) tablet 40 mg  40 mg Oral Daily Hart-Satish, April, APRN - CNP   40 mg at 02/11/25 0821    Polyvinyl Alcohol-Povidone PF (REFRESH) 1.4-0.6 % ophthalmic solution 1 drop  1 drop Both Eyes Q12H PRN Hart-[place], April, APRN - CNP        [Held by provider] rosuvastatin (CRESTOR) tablet 40 mg  40 mg Oral Nightly Hart-[place], April, APRN - CNP        [Held by provider] sacubitril-valsartan (ENTRESTO) 24-26 MG per tablet 1 tablet  1 tablet Oral BID Joe, April, APRN - CNP        [Held by provider] spironolactone    Influenza A infection     RECOMMENDATIONS:      IV rocephin 2 grams q 12 hrs and Ampicillin  2 grams IV q 4 hrs   Tamiflu 30 mg po q 12 hr   PICC line   Infection control : Droplet isolation

## 2025-02-11 NOTE — ANESTHESIA POSTPROCEDURE EVALUATION
Department of Anesthesiology  Postprocedure Note    Patient: Ruben Davidson  MRN: [id number]  YOB: 1945  Date of evaluation: 2/10/2025    Procedure Summary       Date: 02/10/25 Room / Location: [facility] Cardiac Cath Lab; [facility] Noninvasive Cardiology    Anesthesia Start: 1653 Anesthesia Stop: 1729    Procedure: JIMBO (PRN CONTRAST/BUBBLE/3D) Diagnosis: Bacteremia    Scheduled Providers: Barrington Mcdaniel DO Responsible Provider: Favian Gale DO    Anesthesia Type: MAC ASA Status: 4            Anesthesia Type: No value filed.    Mildred Phase I: Mildred Score: 10    Mildred Phase II:      Anesthesia Post Evaluation    Patient location during evaluation: PACU  Patient participation: complete - patient participated  Level of consciousness: awake  Cardiovascular status: blood pressure returned to baseline  Respiratory status: acceptable  Hydration status: euvolemic  Multimodal analgesia pain management approach  Pain management: adequate        No notable events documented.

## 2025-02-11 NOTE — PROGRESS NOTES
[facility]  Nephrology Progress Note    Patient's Name: Ruben Davidson    History of Present Illness from 2/7 Consult Note:    \"Ruben Davidson is a 79 y.o. male with a past medical history of hypertension, heart failure with reduced ejection fraction, persistent atrial fibrillation, history of coronary artery disease, obstructive sleep apnea, morbid obesity.      They presented on 2/6/2025 complaining of fall at home when about to sit to the rest room and hitting his head on the shower without loss of consciousness. Of note, he takes eliquis for his atrial fibrillation. On arrival their vitals BP 84/56, HR 92, RR 18,T 99.3. Initial labs were pertinent for elevated creatinine (1.7, baseline 0.9), lactic acidosis (2.1), elevated procalcitonin (1.27),elevated troponin at 109, hypoalbuminemia (3.1), elevated ALT (108), elevated AST (70). CBC showed leukocytosis (13.5) and anemia (11.8). Respiratory panel was positive for influenza A. CXR did not show any acute process. CT head did not show any acute intracranial abnormality. CT chest showed concerns for primary ILD . CT A/P showed obstructing 6mm calculus mid left ureter with moderate left hydronephrosis and proximal hydroureter and simple, benign left renal cysts measuring up to 7.3 cm at the upper pole. Cardiology was consulted for elevated troponin. ID was consulted for bacteremia. Urology was consulted for 6 mm calculus mid left ureter.  Nephrology was consulted for acute kidney injury.\"    Subjective:    2/11/2025: Patient was seen and examined.  He is sitting in the chair and reports that he feels okay.  He denies any nausea    PMH:    Past Medical History:   Diagnosis Date    CAD (coronary artery disease)     s/p stent RCA    [place] (hard of hearing)     WEARS HEARING AIDES    Hypertension     Left ventricular systolic dysfunction     MI (myocardial infarction) ([facility]) 1997    Mitral regurgitation     Obesity, morbid     s/p intestinal bypass     110/62 -- -- 78 20 99 % -- --   02/10/25 1733 102/65 -- -- 88 20 96 % -- --   02/10/25 1731 135/84 -- -- 82 -- -- 1.676 m (5' 6\") 76.2 kg (168 lb)   02/10/25 1655 135/84 98 °F (36.7 °C) -- 82 17 -- -- --   02/10/25 1545 112/60 98.7 °F (37.1 °C) Temporal 86 20 98 % -- --   02/10/25 1030 115/61 97.9 °F (36.6 °C) Temporal 83 20 98 % -- --         Intake/Output Summary (Last 24 hours) at 2/11/2025 1002  Last data filed at 2/11/2025 0812  Gross per 24 hour   Intake 50 ml   Output 750 ml   Net -700 ml       General: Awake, alert, no acute distress  Neck: No JVD noted  Lungs: Clear bilaterally upper, diminished to the bases bilaterally.  Unlabored  CV: Regular rate and rhythm.  No rub  Abd: Soft, nontender, nondistended.  Active bowel sounds  Skin: Warm and dry.  No rash on exposed extremities  Ext: Trace BLE edema   Neuro: Awake, answers questions appropriately    Data:    Recent Labs     02/09/25  0604 02/11/25  0644   WBC 10.2 10.5   HGB 11.2* 10.0*   HCT 33.6* 29.2*   MCV 88.0 85.9    140       Recent Labs     02/09/25  0604 02/11/25  0644    137   K 4.1 3.5    109*   CO2 16* 18*   CREATININE 1.4* 1.1   BUN 30* 17   LABGLOM 50* 72   GLUCOSE 74 69*   CALCIUM 8.1* 7.8*   PHOS  --  2.7   MG  --  1.5*       No results found for: \"VITD25\"    No results found for: \"PTH\"    Recent Labs     02/11/25  0644   ALT 51*   AST 53*   ALKPHOS 94   BILITOT 1.0       No results for input(s): \"LABALBU\" in the last 72 hours.    No results found for: \"FERRITIN\", \"IRON\", \"TIBC\"    No results found for: \"ATYUHTPF41\"    No results found for: \"FOLATE\"    Lab Results   Component Value Date/Time    COLORU Yellow 02/06/2025 04:31 PM    NITRU NEGATIVE 02/06/2025 04:31 PM    GLUCOSEU NEGATIVE 02/06/2025 04:31 PM    KETUA NEGATIVE 02/06/2025 04:31 PM    UROBILINOGEN 0.2 02/06/2025 04:31 PM    BILIRUBINUR NEGATIVE 02/06/2025 04:31 PM       Lab Results   Component Value Date/Time    PROTEIN 5.2 (L) 02/11/2025 06:44 AM       No

## 2025-02-11 NOTE — CARE COORDINATION
02/11/2025 Cm Note: Pt admitted for RADHA but also had a fall at home and has laceration to head. DX of Influenza A as well. D/T frequency of Iv meds infusion center will not be appropriate.  List given to pt and wife to review for HHC. Pt getting mag replacement today.  Once HHC set up achieved then DC can occur-likely tomorrow. Family will transport home. Acacia Santos RN CM    Update: Pt and wife asked for referral to [facility]-given to Elicia. MUKUND

## 2025-02-11 NOTE — PROGRESS NOTES
Vancomycin has been discontinued   Clinical Pharmacy to sign-off  Physician to re-consult pharmacy if future dosing is needed    Thank you for the consult,      Pharmacy Consultation Note  (Antibiotic Dosing and Monitoring)    Initial consult date: 2/7/2025  Consulting physician/provider: Dr. Casper  Drug: Vancomycin  Indication: Bloodstream infection    Age/  Gender Height Weight IBW  Allergy Information   79 y.o./male 167.6 cm (5' 6\") 66.7 kg (147 lb)     Ideal body weight: 63.8 kg (140 lb 10.5 oz)  Adjusted ideal body weight: 68.8 kg (151 lb 9.5 oz)   Iodides and Dust mite extract      Renal Function:  Recent Labs     02/09/25  0604   BUN 30*   CREATININE 1.4*       Intake/Output Summary (Last 24 hours) at 2/11/2025 0730  Last data filed at 2/10/2025 1719  Gross per 24 hour   Intake 50 ml   Output 450 ml   Net -400 ml       Vancomycin Monitoring:  Trough:  No results for input(s): \"VANCOTROUGH\" in the last 72 hours.  Random:    Recent Labs     02/09/25  0604 02/10/25  1530   VANCORANDOM 12.0 10.0       Recent vancomycin administrations                     vancomycin (VANCOCIN) 1,500 mg in sodium chloride 0.9 % 250 mL IVPB ([id number]) (mg) 1,500 mg New Bag 02/10/25 2341    vancomycin (VANCOCIN) 1,500 mg in sodium chloride 0.9 % 250 mL IVPB ([id number]) (mg) 1,500 mg New Bag 02/09/25 1355    vancomycin (VANCOCIN) 1,000 mg in sodium chloride 0.9 % 250 mL IVPB ([id number]) (mg) 1,000 mg New Bag 02/08/25 1412                        Assessment:  Patient is a 79 y.o. male who has been initiated on vancomycin  Estimated Creatinine Clearance: 39 mL/min (A) (based on SCr of 1.4 mg/dL (H)).  2/10 Random level 10.0 (~25.5 hours post dose).  To dose vancomycin, pharmacy will be utilizing [employer] calculation software for goal AUC/ALIYAH 400-600 mg/L-hr (predicted AUC/ALIYAH = 535, Tr =14 mcg/mL)  Plan:  Will continue vancomycin 1500 mg every 24 hours  Will check vancomycin level when appropriate   Will continue to monitor renal function

## 2025-02-11 NOTE — PROGRESS NOTES
[facility]                                Progress note    Subjective:    The patient resting in bed with wife at bedside   No significant complaints on assessment    Objective:    /67   Pulse 89   Temp 97.8 °F (36.6 °C) (Temporal)   Resp 18   Ht 1.676 m (5' 6\")   Wt 76.2 kg (168 lb)   SpO2 99%   BMI 27.12 kg/m²     In: 170 [P.O.:120; I.V.:50]  Out: 1050   In: 170   Out: 1050 [Urine:1050]    General appearance:  No apparent distress, appears stated age.  HEENT:  Normal cephalic, atraumatic without obvious deformity. Pupils equal, round, and reactive to light.  Extra ocular muscles intact. Conjunctivae/corneas clear. SUTURE LINE DRY AND WELL APPROX ON FOREHEAD  Neck: Supple, with full range of motion. No jugular venous distention. Trachea midline.  Respiratory:  Normal respiratory effort.  Diminished  Cardiovascular:  RRR  Abdomen: Soft, non-tender, non-distended with normal bowel sounds.  Musculoskeletal:  No clubbing, cyanosis,    Skin: smooth and dry   Neurologic:   Cranial nerves: II-XII intact,   Psychiatric:  Alert and oriented x 3     Recent Labs     02/09/25  0604 02/11/25  0644   WBC 10.2 10.5   HGB 11.2* 10.0*   HCT 33.6* 29.2*    140       Recent Labs     02/09/25  0604 02/11/25  0644    137   K 4.1 3.5    109*   CO2 16* 18*   BUN 30* 17   CREATININE 1.4* 1.1   CALCIUM 8.1* 7.8*       Assessment:    Principal Problem:    RADHA (acute kidney injury) ([facility])  Active Problems:    S/P ICD (internal cardiac defibrillator) procedure    Elevated troponin    Paroxysmal atrial fibrillation (HCC)    Influenza A    Fall    Pneumonia due to organism    Calculus of left ureter    Elevated liver enzymes    CKD (chronic kidney disease) stage 2, GFR 60-89 ml/min    Head injury  Resolved Problems:    * No resolved hospital problems. *      Plan:    Patient is a 79-year-old male admitted to [facility] for RADHA  -Monitor labs  -Improving kidney function, 39/1.7, nephrology

## 2025-02-12 LAB
ALBUMIN SERPL-MCNC: 2.8 G/DL (ref 3.5–5.2)
ALP SERPL-CCNC: 104 U/L (ref 40–129)
ALT SERPL-CCNC: 52 U/L (ref 0–40)
ANION GAP SERPL CALCULATED.3IONS-SCNC: 8 MMOL/L (ref 7–16)
AST SERPL-CCNC: 51 U/L (ref 0–39)
BILIRUB SERPL-MCNC: 0.9 MG/DL (ref 0–1.2)
BUN SERPL-MCNC: 14 MG/DL (ref 6–23)
CALCIUM SERPL-MCNC: 7.7 MG/DL (ref 8.6–10.2)
CHLORIDE SERPL-SCNC: 106 MMOL/L (ref 98–107)
CO2 SERPL-SCNC: 21 MMOL/L (ref 22–29)
CREAT SERPL-MCNC: 1.1 MG/DL (ref 0.7–1.2)
ERYTHROCYTE [DISTWIDTH] IN BLOOD BY AUTOMATED COUNT: 15.6 % (ref 11.5–15)
GFR, ESTIMATED: 73 ML/MIN/1.73M2
GLUCOSE SERPL-MCNC: 85 MG/DL (ref 74–99)
HCT VFR BLD AUTO: 27.4 % (ref 37–54)
HGB BLD-MCNC: 9.5 G/DL (ref 12.5–16.5)
MAGNESIUM SERPL-MCNC: 2 MG/DL (ref 1.6–2.6)
MCH RBC QN AUTO: 29.8 PG (ref 26–35)
MCHC RBC AUTO-ENTMCNC: 34.7 G/DL (ref 32–34.5)
MCV RBC AUTO: 85.9 FL (ref 80–99.9)
MICROORGANISM SPEC CULT: NORMAL
MICROORGANISM SPEC CULT: NORMAL
PLATELET # BLD AUTO: 145 K/UL (ref 130–450)
PMV BLD AUTO: 10.9 FL (ref 7–12)
POTASSIUM SERPL-SCNC: 3.3 MMOL/L (ref 3.5–5)
PROT SERPL-MCNC: 5.1 G/DL (ref 6.4–8.3)
RBC # BLD AUTO: 3.19 M/UL (ref 3.8–5.8)
SERVICE CMNT-IMP: NORMAL
SERVICE CMNT-IMP: NORMAL
SODIUM SERPL-SCNC: 135 MMOL/L (ref 132–146)
SPECIMEN DESCRIPTION: NORMAL
SPECIMEN DESCRIPTION: NORMAL
WBC OTHER # BLD: 11.1 K/UL (ref 4.5–11.5)

## 2025-02-12 PROCEDURE — 6370000000 HC RX 637 (ALT 250 FOR IP): Performed by: INTERNAL MEDICINE

## 2025-02-12 PROCEDURE — 2500000003 HC RX 250 WO HCPCS: Performed by: NURSE PRACTITIONER

## 2025-02-12 PROCEDURE — 2580000003 HC RX 258: Performed by: INTERNAL MEDICINE

## 2025-02-12 PROCEDURE — 6360000002 HC RX W HCPCS: Performed by: INTERNAL MEDICINE

## 2025-02-12 PROCEDURE — 80053 COMPREHEN METABOLIC PANEL: CPT

## 2025-02-12 PROCEDURE — 6370000000 HC RX 637 (ALT 250 FOR IP): Performed by: NURSE PRACTITIONER

## 2025-02-12 PROCEDURE — 2060000000 HC ICU INTERMEDIATE R&B

## 2025-02-12 PROCEDURE — 2500000003 HC RX 250 WO HCPCS: Performed by: INTERNAL MEDICINE

## 2025-02-12 PROCEDURE — 36592 COLLECT BLOOD FROM PICC: CPT

## 2025-02-12 PROCEDURE — 6370000000 HC RX 637 (ALT 250 FOR IP): Performed by: CLINICAL NURSE SPECIALIST

## 2025-02-12 PROCEDURE — 6370000000 HC RX 637 (ALT 250 FOR IP)

## 2025-02-12 PROCEDURE — 83735 ASSAY OF MAGNESIUM: CPT

## 2025-02-12 PROCEDURE — 85027 COMPLETE CBC AUTOMATED: CPT

## 2025-02-12 RX ORDER — TORSEMIDE 20 MG/1
10 TABLET ORAL DAILY
Status: DISCONTINUED | OUTPATIENT
Start: 2025-02-12 | End: 2025-02-13 | Stop reason: HOSPADM

## 2025-02-12 RX ADMIN — TORSEMIDE 10 MG: 20 TABLET ORAL at 14:26

## 2025-02-12 RX ADMIN — AMIODARONE HYDROCHLORIDE 200 MG: 200 TABLET ORAL at 09:07

## 2025-02-12 RX ADMIN — SODIUM CHLORIDE, PRESERVATIVE FREE 10 ML: 5 INJECTION INTRAVENOUS at 16:21

## 2025-02-12 RX ADMIN — SODIUM CHLORIDE, PRESERVATIVE FREE 10 ML: 5 INJECTION INTRAVENOUS at 20:55

## 2025-02-12 RX ADMIN — GUAIFENESIN 400 MG: 400 TABLET ORAL at 16:21

## 2025-02-12 RX ADMIN — DULOXETINE HYDROCHLORIDE 30 MG: 30 CAPSULE, DELAYED RELEASE ORAL at 09:07

## 2025-02-12 RX ADMIN — METOPROLOL SUCCINATE 25 MG: 25 TABLET, EXTENDED RELEASE ORAL at 09:08

## 2025-02-12 RX ADMIN — GABAPENTIN 300 MG: 300 CAPSULE ORAL at 09:09

## 2025-02-12 RX ADMIN — APIXABAN 5 MG: 5 TABLET, FILM COATED ORAL at 20:56

## 2025-02-12 RX ADMIN — AMPICILLIN SODIUM 2000 MG: 2 INJECTION, POWDER, FOR SOLUTION INTRAMUSCULAR; INTRAVENOUS at 11:54

## 2025-02-12 RX ADMIN — HYDROXYCHLOROQUINE SULFATE 200 MG: 200 TABLET ORAL at 20:56

## 2025-02-12 RX ADMIN — SODIUM BICARBONATE 1300 MG: 650 TABLET ORAL at 09:09

## 2025-02-12 RX ADMIN — GUAIFENESIN 400 MG: 400 TABLET ORAL at 20:56

## 2025-02-12 RX ADMIN — SODIUM BICARBONATE 1300 MG: 650 TABLET ORAL at 20:56

## 2025-02-12 RX ADMIN — SODIUM BICARBONATE 1300 MG: 650 TABLET ORAL at 14:26

## 2025-02-12 RX ADMIN — PANTOPRAZOLE SODIUM 40 MG: 40 TABLET, DELAYED RELEASE ORAL at 09:08

## 2025-02-12 RX ADMIN — GUAIFENESIN 400 MG: 400 TABLET ORAL at 09:08

## 2025-02-12 RX ADMIN — POTASSIUM BICARBONATE 20 MEQ: 782 TABLET, EFFERVESCENT ORAL at 12:32

## 2025-02-12 RX ADMIN — TRAMADOL HYDROCHLORIDE 50 MG: 50 TABLET, COATED ORAL at 09:14

## 2025-02-12 RX ADMIN — FOLIC ACID 1 MG: 1 TABLET ORAL at 09:09

## 2025-02-12 RX ADMIN — SODIUM CHLORIDE, PRESERVATIVE FREE 10 ML: 5 INJECTION INTRAVENOUS at 09:10

## 2025-02-12 RX ADMIN — WATER 2000 MG: 1 INJECTION INTRAMUSCULAR; INTRAVENOUS; SUBCUTANEOUS at 05:13

## 2025-02-12 RX ADMIN — SODIUM CHLORIDE, PRESERVATIVE FREE 10 ML: 5 INJECTION INTRAVENOUS at 20:54

## 2025-02-12 RX ADMIN — GABAPENTIN 300 MG: 300 CAPSULE ORAL at 14:26

## 2025-02-12 RX ADMIN — DAPTOMYCIN 500 MG: 500 INJECTION, POWDER, LYOPHILIZED, FOR SOLUTION INTRAVENOUS at 16:21

## 2025-02-12 RX ADMIN — APIXABAN 5 MG: 5 TABLET, FILM COATED ORAL at 09:09

## 2025-02-12 RX ADMIN — CLOPIDOGREL BISULFATE 75 MG: 75 TABLET ORAL at 09:10

## 2025-02-12 RX ADMIN — CHOLECALCIFEROL TAB 125 MCG (5000 UNIT) 5000 UNITS: 125 TAB at 09:10

## 2025-02-12 RX ADMIN — HYDROXYCHLOROQUINE SULFATE 200 MG: 200 TABLET ORAL at 09:09

## 2025-02-12 RX ADMIN — TAMSULOSIN HYDROCHLORIDE 0.4 MG: 0.4 CAPSULE ORAL at 09:09

## 2025-02-12 RX ADMIN — GABAPENTIN 300 MG: 300 CAPSULE ORAL at 20:56

## 2025-02-12 RX ADMIN — FERROUS SULFATE TAB 325 MG (65 MG ELEMENTAL FE) 325 MG: 325 (65 FE) TAB at 09:09

## 2025-02-12 RX ADMIN — AMPICILLIN SODIUM 2000 MG: 2 INJECTION, POWDER, FOR SOLUTION INTRAMUSCULAR; INTRAVENOUS at 05:16

## 2025-02-12 ASSESSMENT — PAIN DESCRIPTION - PAIN TYPE: TYPE: CHRONIC PAIN

## 2025-02-12 ASSESSMENT — PAIN SCALES - GENERAL
PAINLEVEL_OUTOF10: 3
PAINLEVEL_OUTOF10: 7
PAINLEVEL_OUTOF10: 0
PAINLEVEL_OUTOF10: 6

## 2025-02-12 ASSESSMENT — PAIN DESCRIPTION - DESCRIPTORS: DESCRIPTORS: ACHING;DISCOMFORT;SORE

## 2025-02-12 ASSESSMENT — PAIN DESCRIPTION - ONSET: ONSET: ON-GOING

## 2025-02-12 ASSESSMENT — PAIN DESCRIPTION - LOCATION: LOCATION: GENERALIZED

## 2025-02-12 ASSESSMENT — PAIN - FUNCTIONAL ASSESSMENT: PAIN_FUNCTIONAL_ASSESSMENT: ACTIVITIES ARE NOT PREVENTED

## 2025-02-12 ASSESSMENT — PAIN DESCRIPTION - FREQUENCY: FREQUENCY: CONTINUOUS

## 2025-02-12 NOTE — CARE COORDINATION
02/12/2025 CM Note: Pt admitted for RADHA but also had a fall at home and has laceration to head.  Pt has been accepted by [facility] and [facility] is working on set up of IV antibiotics. [facility] asked if the ampicillin could be run over 24 hrs so it could be set up on a pump-per Dr. Casper this is acceptable.  Orders will be written.  Family will transport to home once all set up is completed. Acacia Santos RN CM  11:35 AM  Update:  [facility] ran benefits and the pt will be assessed over $900 a week between the 2 meds.  Spoke with wife and she said they can not afford that. SNF list presented to wife-will get choices and make referrals.   The Plan for Transition of Care is related to the following treatment goals: IV antibiotics for Enterococcus bacteremia    The Patient and/or patient representative Saira was provided with a choice of provider and agrees   with the discharge plan. [x] Yes [] No    Freedom of choice list was provided with basic dialogue that supports the patient's individualized plan of care/goals, treatment preferences and shares the quality data associated with the providers. [x] Yes [] No   LB

## 2025-02-12 NOTE — PROGRESS NOTES
DETECTED Abnormal      Influenza A H3 PCR DETECTED Abnormal      Susceptibility    Enterococcus faecalis (1)    Antibiotic Interpretation ALIYAH Method Status    ampicillin Sensitive <=2 BACTERIAL SUSCEPTIBILITY PANEL ALIYAH Final    Gentamicin, High Level Sensitive  BACTERIAL SUSCEPTIBILITY PANEL ALIYAH Final    linezolid Sensitive 2 BACTERIAL SUSCEPTIBILITY PANEL ALIYAH Final    Streptomycin, Hi Level Sensitive  BACTERIAL SUSCEPTIBILITY PANEL ALIYAH Final    vancomycin Sensitive 1 BACTERIAL SUSCEPTIBILITY PANEL ALIYAH Final          Vanco -    Vancomycin Rm 10.0       JIMBO-      Left Ventricle: Moderately reduced left ventricular systolic function with a visually estimated EF of 35 - 40%. Left ventricle size is normal. Normal wall thickness. Normal wall motion.    Right Ventricle: Right ventricle size is normal. Lead present in the right ventricle. Normal systolic function.    Aortic Valve: Mild sclerosis of the aortic valve cusps. No stenosis.    Mitral Valve: Mild annular calcification.    Tricuspid Valve: Mild regurgitation. Normal RVSP. The estimated RVSP is 26 mmHg.    Left Atrium: Normal sized appendage. No left atrial appendage thrombus noted. No left atrial appendage mass noted.    Interatrial Septum: No interatrial shunt visualized with color Doppler. Agitated saline study was negative with and without provocation.    Pericardium: No pericardial effusion.    Image quality is adequate.    No echocardiographic evidence of endocarditis.    Radiology :    Chest X ray    CT scan of chest -  1. No acute infiltrate   2. Basilar interstitial opacification and mild bronchiectasis suggestive of   primary ILD, component of edema is not excluded       IMPRESSION:     Enterococcus bacteremia- JIMBO no vegetation  - follow up cx neg on 2/7  Complicated UTI s/p cystoscopy with stent insertion ( cloudy urine )   Influenza A infection - treated     RECOMMENDATIONS:      Stop ampicillin and rocephin   IV daptomycin 6 mg / kg IV q 24 hrs for  now ( called micro to check daptomycin sensitivity- Dapto ALIYAH 2 (sensitive  ) until 3/21/2025   Monitor labs, CPK   Infection control : Droplet isolation

## 2025-02-12 NOTE — PROGRESS NOTES
[facility]  Nephrology Progress Note    Patient's Name: Ruben Davidson    History of Present Illness from 2/7 Consult Note:    \"Ruben Davidson is a 79 y.o. male with a past medical history of hypertension, heart failure with reduced ejection fraction, persistent atrial fibrillation, history of coronary artery disease, obstructive sleep apnea, morbid obesity.      They presented on 2/6/2025 complaining of fall at home when about to sit to the rest room and hitting his head on the shower without loss of consciousness. Of note, he takes eliquis for his atrial fibrillation. On arrival their vitals BP 84/56, HR 92, RR 18,T 99.3. Initial labs were pertinent for elevated creatinine (1.7, baseline 0.9), lactic acidosis (2.1), elevated procalcitonin (1.27),elevated troponin at 109, hypoalbuminemia (3.1), elevated ALT (108), elevated AST (70). CBC showed leukocytosis (13.5) and anemia (11.8). Respiratory panel was positive for influenza A. CXR did not show any acute process. CT head did not show any acute intracranial abnormality. CT chest showed concerns for primary ILD . CT A/P showed obstructing 6mm calculus mid left ureter with moderate left hydronephrosis and proximal hydroureter and simple, benign left renal cysts measuring up to 7.3 cm at the upper pole. Cardiology was consulted for elevated troponin. ID was consulted for bacteremia. Urology was consulted for 6 mm calculus mid left ureter.  Nephrology was consulted for acute kidney injury.\"    Subjective:    2/12/2025: Patient was seen and examined.  He reports that he feels okay and notes that his appetite is okay.  He denies any shortness of breath.  Visitor at bedside    PMH:    Past Medical History:   Diagnosis Date    CAD (coronary artery disease)     s/p stent RCA    [place] (hard of hearing)     WEARS HEARING AIDES    Hypertension     Left ventricular systolic dysfunction     MI (myocardial infarction) ([facility]) 1997    Mitral regurgitation      Cranberry 500 MG CAPS Take 1 capsule by mouth      gabapentin (NEURONTIN) 300 MG capsule One capsule three times daily      folic acid (FOLVITE) 1 MG tablet Take 1 tablet by mouth every morning      hydroxychloroquine (PLAQUENIL) 200 MG tablet Take 1 tablet by mouth 2 times daily      DULoxetine (CYMBALTA) 30 MG extended release capsule Take 1 capsule by mouth every morning      Cholecalciferol (VITAMIN D3) 5000 UNITS TABS Take 1 tablet by mouth every morning      tamsulosin (FLOMAX) 0.4 MG capsule Take 1 capsule by mouth daily      cephALEXin (KEFLEX) 500 MG capsule Take by mouth 2 times daily (Patient not taking: Reported on 1/29/2025)      methylPREDNISolone (MEDROL DOSEPACK) 4 MG tablet Take 0.5 tablets by mouth See Admin Instructions (Patient not taking: Reported on 1/29/2025)      RESTASIS 0.05 % ophthalmic emulsion Place 1 drop into both eyes 2 times daily as needed         Allergies:    Iodides and Dust mite extract    Social History:     reports that he quit smoking about 24 years ago. His smoking use included cigarettes. He started smoking about 52 years ago. He has a 15 pack-year smoking history. He has never used smokeless tobacco. He reports current alcohol use of about 3.0 - 4.0 standard drinks of alcohol per week. He reports that he does not use drugs.    Family History:         Problem Relation Age of Onset    Hypertension Mother     Hypertension Father     Other Sister         back problems    Heart Disease Brother     Other Sister         MS    Other Brother         aneurysm CLOSE TO HIS HEART       Physical Exam:      Patient Vitals for the past 24 hrs:   BP Temp Temp src Pulse Resp SpO2 Weight   02/12/25 0914 -- -- -- -- 17 -- --   02/12/25 0747 122/60 97.7 °F (36.5 °C) Temporal 78 16 96 % --   02/12/25 0521 -- -- -- -- -- -- 85 kg (187 lb 6.3 oz)   02/12/25 0419 110/76 97 °F (36.1 °C) Temporal 83 18 96 % --   02/11/25 2323 (!) 102/56 98.3 °F (36.8 °C) Temporal 83 18 97 % --   02/11/25 1941 (!)

## 2025-02-12 NOTE — PROGRESS NOTES
Patient received the Sacrament of the Anointing of the Sick by Father Misha Hollins on Tuesday, February 11, 2025.    If additional support is requested or needed please reach out to Spiritual Health ([phone]).    Chap. Edmundo Toribio MDIV, BCC

## 2025-02-12 NOTE — PROGRESS NOTES
[facility]                                Progress note    Subjective:    The patient resting in bed with wife at bedside   No significant complaints on assessment    Objective:    BP 99/67   Pulse 78   Temp 98.3 °F (36.8 °C) (Temporal)   Resp 17   Ht 1.676 m (5' 6\")   Wt 85 kg (187 lb 6.3 oz)   SpO2 99%   BMI 30.25 kg/m²     In: 945 [P.O.:745]  Out: 1320   In: 945   Out: 1320 [Urine:1320]    General appearance:  No apparent distress, appears stated age.  HEENT:  Normal cephalic, atraumatic without obvious deformity. Pupils equal, round, and reactive to light.  Extra ocular muscles intact. Conjunctivae/corneas clear. SUTURE LINE DRY AND WELL APPROX ON FOREHEAD  Neck: Supple, with full range of motion. No jugular venous distention. Trachea midline.  Respiratory:  Normal respiratory effort.  Diminished  Cardiovascular:  RRR  Abdomen: Soft, non-tender, non-distended with normal bowel sounds.  Musculoskeletal:  No clubbing, cyanosis,    Skin: smooth and dry   Neurologic:   Cranial nerves: II-XII intact,   Psychiatric:  Alert and oriented x 3     Recent Labs     02/11/25  0644 02/12/25  0515   WBC 10.5 11.1   HGB 10.0* 9.5*   HCT 29.2* 27.4*    145       Recent Labs     02/11/25  0644 02/12/25  0515    135   K 3.5 3.3*   * 106   CO2 18* 21*   BUN 17 14   CREATININE 1.1 1.1   CALCIUM 7.8* 7.7*       Assessment:    Principal Problem:    RADHA (acute kidney injury) ([facility])  Active Problems:    S/P ICD (internal cardiac defibrillator) procedure    Elevated troponin    Paroxysmal atrial fibrillation (HCC)    Influenza A    Fall    Pneumonia due to organism    Calculus of left ureter    Elevated liver enzymes    CKD (chronic kidney disease) stage 2, GFR 60-89 ml/min    Head injury  Resolved Problems:    * No resolved hospital problems. *      Plan:    Patient is a 79-year-old male admitted to [facility] for RADHA  -Monitor labs  -Improving kidney function, 39/1.7, nephrology

## 2025-02-12 NOTE — PLAN OF CARE
Problem: Chronic Conditions and Co-morbidities  Goal: Patient's chronic conditions and co-morbidity symptoms are monitored and maintained or improved  Outcome: Progressing     Problem: Discharge Planning  Goal: Discharge to home or other facility with appropriate resources  Outcome: Progressing     Problem: Safety - Adult  Goal: Free from fall injury  Outcome: Progressing     Problem: Skin/Tissue Integrity  Goal: Skin integrity remains intact  Description: 1.  Monitor for areas of redness and/or skin breakdown  2.  Assess vascular access sites hourly  3.  Every 4-6 hours minimum:  Change oxygen saturation probe site  4.  Every 4-6 hours:  If on nasal continuous positive airway pressure, respiratory therapy assess nares and determine need for appliance change or resting period  Outcome: Progressing  Flowsheets (Taken 2/12/2025 1756)  Skin Integrity Remains Intact: Monitor for areas of redness and/or skin breakdown     Problem: Respiratory - Adult  Goal: Achieves optimal ventilation and oxygenation  Outcome: Progressing

## 2025-02-13 VITALS
SYSTOLIC BLOOD PRESSURE: 104 MMHG | HEART RATE: 81 BPM | RESPIRATION RATE: 16 BRPM | OXYGEN SATURATION: 99 % | DIASTOLIC BLOOD PRESSURE: 63 MMHG | BODY MASS INDEX: 30.12 KG/M2 | HEIGHT: 66 IN | WEIGHT: 187.39 LBS | TEMPERATURE: 97.8 F

## 2025-02-13 DIAGNOSIS — B95.2 BACTEREMIA DUE TO ENTEROCOCCUS: Primary | ICD-10-CM

## 2025-02-13 DIAGNOSIS — R78.81 BACTEREMIA DUE TO ENTEROCOCCUS: Primary | ICD-10-CM

## 2025-02-13 LAB
ALBUMIN SERPL-MCNC: 2.4 G/DL (ref 3.5–5.2)
ALP SERPL-CCNC: 93 U/L (ref 40–129)
ALT SERPL-CCNC: 49 U/L (ref 0–40)
ANION GAP SERPL CALCULATED.3IONS-SCNC: 10 MMOL/L (ref 7–16)
AST SERPL-CCNC: 48 U/L (ref 0–39)
BILIRUB SERPL-MCNC: 0.8 MG/DL (ref 0–1.2)
BUN SERPL-MCNC: 14 MG/DL (ref 6–23)
CALCIUM SERPL-MCNC: 7.8 MG/DL (ref 8.6–10.2)
CHLORIDE SERPL-SCNC: 106 MMOL/L (ref 98–107)
CO2 SERPL-SCNC: 23 MMOL/L (ref 22–29)
CREAT SERPL-MCNC: 1.1 MG/DL (ref 0.7–1.2)
ERYTHROCYTE [DISTWIDTH] IN BLOOD BY AUTOMATED COUNT: 15.7 % (ref 11.5–15)
GFR, ESTIMATED: 69 ML/MIN/1.73M2
GLUCOSE SERPL-MCNC: 79 MG/DL (ref 74–99)
HCT VFR BLD AUTO: 26.7 % (ref 37–54)
HGB BLD-MCNC: 9.2 G/DL (ref 12.5–16.5)
MAGNESIUM SERPL-MCNC: 1.7 MG/DL (ref 1.6–2.6)
MCH RBC QN AUTO: 29.6 PG (ref 26–35)
MCHC RBC AUTO-ENTMCNC: 34.5 G/DL (ref 32–34.5)
MCV RBC AUTO: 85.9 FL (ref 80–99.9)
PHOSPHATE SERPL-MCNC: 2.8 MG/DL (ref 2.5–4.5)
PLATELET # BLD AUTO: 144 K/UL (ref 130–450)
PMV BLD AUTO: 10.7 FL (ref 7–12)
POTASSIUM SERPL-SCNC: 3.4 MMOL/L (ref 3.5–5)
PROT SERPL-MCNC: 5 G/DL (ref 6.4–8.3)
RBC # BLD AUTO: 3.11 M/UL (ref 3.8–5.8)
SODIUM SERPL-SCNC: 139 MMOL/L (ref 132–146)
WBC OTHER # BLD: 11.1 K/UL (ref 4.5–11.5)

## 2025-02-13 PROCEDURE — 2500000003 HC RX 250 WO HCPCS: Performed by: INTERNAL MEDICINE

## 2025-02-13 PROCEDURE — 84100 ASSAY OF PHOSPHORUS: CPT

## 2025-02-13 PROCEDURE — 6370000000 HC RX 637 (ALT 250 FOR IP): Performed by: NURSE PRACTITIONER

## 2025-02-13 PROCEDURE — 6370000000 HC RX 637 (ALT 250 FOR IP): Performed by: INTERNAL MEDICINE

## 2025-02-13 PROCEDURE — 6370000000 HC RX 637 (ALT 250 FOR IP)

## 2025-02-13 PROCEDURE — 2580000003 HC RX 258: Performed by: INTERNAL MEDICINE

## 2025-02-13 PROCEDURE — 83735 ASSAY OF MAGNESIUM: CPT

## 2025-02-13 PROCEDURE — 80053 COMPREHEN METABOLIC PANEL: CPT

## 2025-02-13 PROCEDURE — 6360000002 HC RX W HCPCS: Performed by: INTERNAL MEDICINE

## 2025-02-13 PROCEDURE — 6370000000 HC RX 637 (ALT 250 FOR IP): Performed by: CLINICAL NURSE SPECIALIST

## 2025-02-13 PROCEDURE — 85027 COMPLETE CBC AUTOMATED: CPT

## 2025-02-13 PROCEDURE — 2500000003 HC RX 250 WO HCPCS: Performed by: NURSE PRACTITIONER

## 2025-02-13 RX ORDER — GUAIFENESIN 400 MG/1
400 TABLET ORAL 3 TIMES DAILY
Qty: 56 TABLET | Refills: 0 | Status: SHIPPED | OUTPATIENT
Start: 2025-02-13 | End: 2025-02-14 | Stop reason: ALTCHOICE

## 2025-02-13 RX ORDER — SODIUM BICARBONATE 650 MG/1
650 TABLET ORAL 3 TIMES DAILY
Qty: 90 TABLET | Refills: 3 | Status: SHIPPED | OUTPATIENT
Start: 2025-02-13

## 2025-02-13 RX ORDER — DIPHENHYDRAMINE HCL 25 MG
50 TABLET ORAL ONCE
Status: CANCELLED
Start: 2025-02-14 | End: 2025-02-14

## 2025-02-13 RX ORDER — SODIUM BICARBONATE 650 MG/1
650 TABLET ORAL 3 TIMES DAILY
Status: DISCONTINUED | OUTPATIENT
Start: 2025-02-13 | End: 2025-02-13 | Stop reason: HOSPADM

## 2025-02-13 RX ORDER — EPINEPHRINE 1 MG/ML
0.3 INJECTION, SOLUTION, CONCENTRATE INTRAVENOUS PRN
Status: CANCELLED | OUTPATIENT
Start: 2025-02-14

## 2025-02-13 RX ORDER — HEPARIN 100 UNIT/ML
500 SYRINGE INTRAVENOUS PRN
Status: CANCELLED | OUTPATIENT
Start: 2025-02-14

## 2025-02-13 RX ORDER — SODIUM CHLORIDE 0.9 % (FLUSH) 0.9 %
5-40 SYRINGE (ML) INJECTION PRN
Status: CANCELLED | OUTPATIENT
Start: 2025-02-14

## 2025-02-13 RX ORDER — HYDROCORTISONE SODIUM SUCCINATE 100 MG/2ML
100 INJECTION INTRAMUSCULAR; INTRAVENOUS
Status: CANCELLED | OUTPATIENT
Start: 2025-02-14

## 2025-02-13 RX ORDER — SODIUM CHLORIDE 9 MG/ML
5-250 INJECTION, SOLUTION INTRAVENOUS PRN
Status: CANCELLED | OUTPATIENT
Start: 2025-02-14

## 2025-02-13 RX ORDER — DIPHENHYDRAMINE HYDROCHLORIDE 50 MG/ML
50 INJECTION INTRAMUSCULAR; INTRAVENOUS
Status: CANCELLED | OUTPATIENT
Start: 2025-02-14

## 2025-02-13 RX ORDER — POTASSIUM CHLORIDE 1500 MG/1
40 TABLET, EXTENDED RELEASE ORAL ONCE
Status: COMPLETED | OUTPATIENT
Start: 2025-02-13 | End: 2025-02-13

## 2025-02-13 RX ADMIN — FERROUS SULFATE TAB 325 MG (65 MG ELEMENTAL FE) 325 MG: 325 (65 FE) TAB at 09:31

## 2025-02-13 RX ADMIN — TAMSULOSIN HYDROCHLORIDE 0.4 MG: 0.4 CAPSULE ORAL at 09:36

## 2025-02-13 RX ADMIN — SODIUM BICARBONATE 650 MG: 650 TABLET ORAL at 14:02

## 2025-02-13 RX ADMIN — GABAPENTIN 300 MG: 300 CAPSULE ORAL at 09:32

## 2025-02-13 RX ADMIN — CHOLECALCIFEROL TAB 125 MCG (5000 UNIT) 5000 UNITS: 125 TAB at 09:36

## 2025-02-13 RX ADMIN — DULOXETINE HYDROCHLORIDE 30 MG: 30 CAPSULE, DELAYED RELEASE ORAL at 09:31

## 2025-02-13 RX ADMIN — GUAIFENESIN 400 MG: 400 TABLET ORAL at 14:02

## 2025-02-13 RX ADMIN — SODIUM CHLORIDE, PRESERVATIVE FREE 10 ML: 5 INJECTION INTRAVENOUS at 14:03

## 2025-02-13 RX ADMIN — POTASSIUM CHLORIDE 40 MEQ: 1500 TABLET, EXTENDED RELEASE ORAL at 09:39

## 2025-02-13 RX ADMIN — FOLIC ACID 1 MG: 1 TABLET ORAL at 09:31

## 2025-02-13 RX ADMIN — TORSEMIDE 10 MG: 20 TABLET ORAL at 09:32

## 2025-02-13 RX ADMIN — GUAIFENESIN 400 MG: 400 TABLET ORAL at 09:31

## 2025-02-13 RX ADMIN — GABAPENTIN 300 MG: 300 CAPSULE ORAL at 14:02

## 2025-02-13 RX ADMIN — PANTOPRAZOLE SODIUM 40 MG: 40 TABLET, DELAYED RELEASE ORAL at 09:32

## 2025-02-13 RX ADMIN — SODIUM CHLORIDE, PRESERVATIVE FREE 10 ML: 5 INJECTION INTRAVENOUS at 09:36

## 2025-02-13 RX ADMIN — APIXABAN 5 MG: 5 TABLET, FILM COATED ORAL at 09:32

## 2025-02-13 RX ADMIN — METOPROLOL SUCCINATE 25 MG: 25 TABLET, EXTENDED RELEASE ORAL at 09:31

## 2025-02-13 RX ADMIN — CLOPIDOGREL BISULFATE 75 MG: 75 TABLET ORAL at 09:32

## 2025-02-13 RX ADMIN — DAPTOMYCIN 500 MG: 500 INJECTION, POWDER, LYOPHILIZED, FOR SOLUTION INTRAVENOUS at 14:02

## 2025-02-13 RX ADMIN — SODIUM BICARBONATE 1300 MG: 650 TABLET ORAL at 09:31

## 2025-02-13 RX ADMIN — HYDROXYCHLOROQUINE SULFATE 200 MG: 200 TABLET ORAL at 09:32

## 2025-02-13 RX ADMIN — SODIUM CHLORIDE, PRESERVATIVE FREE 10 ML: 5 INJECTION INTRAVENOUS at 09:37

## 2025-02-13 ASSESSMENT — PAIN SCALES - GENERAL
PAINLEVEL_OUTOF10: 0
PAINLEVEL_OUTOF10: 0

## 2025-02-13 NOTE — PROGRESS NOTES
Department of Internal Medicine  Infectious Diseases  Progress  Note      C/C: Enterococcus bacteremia , sepsis, flu A infection       Pt is awake and alert  Denies fever or chills  Afebrile        Current Facility-Administered Medications   Medication Dose Route Frequency Provider Last Rate Last Admin    sodium bicarbonate tablet 650 mg  650 mg Oral TID Melanie Solis APRN - CNP        torsemide (DEMADEX) tablet 10 mg  10 mg Oral Daily Melanie Solis APRN - CNP   10 mg at 02/13/25 0932    DAPTOmycin (CUBICIN) 500 mg in sodium chloride (PF) 0.9 % 10 mL IV syringe  6 mg/kg IntraVENous Q24H Limbu, Ramos ROCHE MD   500 mg at 02/12/25 1621    sodium chloride flush 0.9 % injection 5-40 mL  5-40 mL IntraVENous 2 times per day Limbu, Ramos ROCHE MD   10 mL at 02/13/25 0936    sodium chloride flush 0.9 % injection 5-40 mL  5-40 mL IntraVENous PRN Limbu, Ramos ROCHE MD   10 mL at 02/12/25 1621    0.9 % sodium chloride infusion   IntraVENous PRN Limbu, Ramos ROCHE MD        lidocaine PF 1 % injection 50 mg  50 mg IntraDERmal Once Limbu, Ramos ROCHE MD        amiodarone (CORDARONE) tablet 200 mg  200 mg Oral Q48H [place], April, APRN - CNP   200 mg at 02/12/25 0907    metoprolol succinate (TOPROL XL) extended release tablet 25 mg  25 mg Oral BID [place], April, APRN - CNP   25 mg at 02/13/25 0931    pantoprazole (PROTONIX) tablet 40 mg  40 mg Oral Daily [place]-Satish, April, APRN - CNP   40 mg at 02/13/25 0932    Polyvinyl Alcohol-Povidone PF (REFRESH) 1.4-0.6 % ophthalmic solution 1 drop  1 drop Both Eyes Q12H PRN [place]-Satish, April, APRN - CNP        [Held by provider] rosuvastatin (CRESTOR) tablet 40 mg  40 mg Oral Nightly [place], April, APRN - CNP        [Held by provider] sacubitril-valsartan (ENTRESTO) 24-26 MG per tablet 1 tablet  1 tablet Oral BID Joe, April, APRN - CNP        [Held by provider] spironolactone (ALDACTONE) tablet 25 mg  25 mg Oral Daily Joe,

## 2025-02-13 NOTE — PLAN OF CARE
Problem: Chronic Conditions and Co-morbidities  Goal: Patient's chronic conditions and co-morbidity symptoms are monitored and maintained or improved  2/12/2025 2249 by Lucia Alcantar RN  Outcome: Progressing  2/12/2025 1756 by Terri Watts RN  Outcome: Progressing     Problem: Discharge Planning  Goal: Discharge to home or other facility with appropriate resources  2/12/2025 2249 by Lucia Alcantar RN  Outcome: Progressing  2/12/2025 1756 by Terri Watts RN  Outcome: Progressing     Problem: Safety - Adult  Goal: Free from fall injury  2/12/2025 2249 by Lucia Alcantar RN  Outcome: Progressing  2/12/2025 1756 by Terri Watts RN  Outcome: Progressing     Problem: Skin/Tissue Integrity  Goal: Skin integrity remains intact  Description: 1.  Monitor for areas of redness and/or skin breakdown  2.  Assess vascular access sites hourly  3.  Every 4-6 hours minimum:  Change oxygen saturation probe site  4.  Every 4-6 hours:  If on nasal continuous positive airway pressure, respiratory therapy assess nares and determine need for appliance change or resting period  2/12/2025 2249 by Lucia Alcantar, RN  Outcome: Progressing  2/12/2025 1756 by Terri Watts RN  Outcome: Progressing  Flowsheets (Taken 2/12/2025 1756)  Skin Integrity Remains Intact: Monitor for areas of redness and/or skin breakdown     Problem: Respiratory - Adult  Goal: Achieves optimal ventilation and oxygenation  2/12/2025 2249 by Lucia Alcantar RN  Outcome: Progressing  2/12/2025 1756 by Terri Watts, RN  Outcome: Progressing

## 2025-02-13 NOTE — PROGRESS NOTES
Physician Progress Note      PATIENT:               FRANTZ HUNT #:                  [id number]  :                       1945  ADMIT DATE:       2025 1:40 PM  DISCH DATE:  RESPONDING  PROVIDER #:        Cynthia Hurtado MD          QUERY TEXT:    Noted documentation of Sepsis by ID and Renal consults.  If possible, please   document in progress notes and discharge summary:    The medical record reflects the following:  Risk Factors: Enterococcus bacteremia r/o CIED infection, complicated UTI, Flu   A  Clinical Indicators:  ID \"Bacteremia, sepsis, UTI  , flu A infection\".    Renal \"Sepsis/UTI. Blood culture growing gram positive cocci in pairs\". WBC   13.5 () - 13.7 () - 9.7 () - 10.2 () - 10.5 ().  ().   Lactic 2.1 - 1.6 (). Procal 1.27 (). Documented RADHA.  Treatment: Vanc, stop Doxy, continue Rocephin, Tamuflu, Droplet iso, labs,   vitals, cultures  Options provided:  -- Sepsis confirmed present on admission  -- Sepsis ruled out  -- Other - I will add my own diagnosis  -- Disagree - Not applicable / Not valid  -- Disagree - Clinically unable to determine / Unknown  -- Refer to Clinical Documentation Reviewer    PROVIDER RESPONSE TEXT:    Sepsis confirmed present on admission.    Query created by: Diana Winn on 2025 1:16 PM      Electronically signed by:  Cynthia Hurtado MD 2025 8:14 AM

## 2025-02-13 NOTE — PROGRESS NOTES
(myocardial infarction) ([facility]) 1997    Mitral regurgitation     Obesity, morbid     s/p intestinal bypass    Sleep apnea     Valvular heart disease        Patient Active Problem List   Diagnosis    CAD (coronary artery disease)s/p stent RCA    Valvular heart disease    Hypertension    Obesity, morbid s/p intestinal bypass    STEMI (ST elevation myocardial infarction) ([facility])    Stented coronary artery    Hyperlipidemia    H/O psoriatic arthritis    Acute upper GI bleed    SVT (supraventricular tachycardia) ([facility])    Elevated troponin    LV dysfunction    Cardiac arrest    Acute systolic (congestive) heart failure ([facility])    Ventricular tachycardia ([facility])    Mitral stenosis    Mitral regurgitation    Moderate to severe pulmonary hypertension ([facility])    Anemia    Paroxysmal atrial fibrillation ([facility])    ICD (implantable cardioverter-defibrillator) battery depletion    S/P ICD (internal cardiac defibrillator) procedure    HFrEF (heart failure with reduced ejection fraction) ([facility])    RADHA (acute kidney injury) ([facility])    Influenza A    Fall    Pneumonia due to organism    Calculus of left ureter    Elevated liver enzymes    CKD (chronic kidney disease) stage 2, GFR 60-89 ml/min    Head injury    Bacteremia due to Enterococcus       Diet:    ADULT DIET; Regular; Low Fat/Low Chol/High Fiber/TIMOTHY    Meds:     potassium chloride  40 mEq Oral Once    torsemide  10 mg Oral Daily    DAPTOmycin (CUBICIN) 500 mg in sodium chloride (PF) 0.9 % 10 mL IV syringe  6 mg/kg IntraVENous Q24H    sodium chloride flush  5-40 mL IntraVENous 2 times per day    lidocaine 1 % injection  50 mg IntraDERmal Once    sodium bicarbonate  1,300 mg Oral TID    amiodarone  200 mg Oral Q48H    metoprolol succinate  25 mg Oral BID    pantoprazole  40 mg Oral Daily    [Held by provider] rosuvastatin  40 mg Oral Nightly    [Held by provider] sacubitril-valsartan  1 tablet Oral BID    [Held by provider] spironolactone  25 mg Oral Daily    tamsulosin  0.4 mg Oral Daily

## 2025-02-13 NOTE — CARE COORDINATION
02/13/2025 CM Note: Dr. Casper changed IV med to Daptomycin so now pt can go to the infusion center. Pt will receive today's dose this afternoon then possible DC. Order was faxed to Infusion center downstairs and pt should be able to DC today providing medical clearance is granted then he can start with the infusion center on Friday. Wife will transport home. Acacia Santos RN CM  Pt will start with out-pt infusion center @ 12 PM tomorrow. MUKUND  Pt  asking for a walker for DC-order in and call placed to Zabrina DUVAL

## 2025-02-14 ENCOUNTER — HOSPITAL ENCOUNTER (OUTPATIENT)
Dept: INFUSION THERAPY | Age: 80
Setting detail: INFUSION SERIES
Discharge: HOME | End: 2025-02-14
Payer: MEDICARE

## 2025-02-14 VITALS
BODY MASS INDEX: 27.1 KG/M2 | WEIGHT: 168.6 LBS | DIASTOLIC BLOOD PRESSURE: 58 MMHG | HEART RATE: 63 BPM | HEIGHT: 66 IN | SYSTOLIC BLOOD PRESSURE: 95 MMHG | RESPIRATION RATE: 12 BRPM | TEMPERATURE: 97.7 F | OXYGEN SATURATION: 100 %

## 2025-02-14 DIAGNOSIS — B95.2 BACTEREMIA DUE TO ENTEROCOCCUS: Primary | ICD-10-CM

## 2025-02-14 DIAGNOSIS — R78.81 BACTEREMIA DUE TO ENTEROCOCCUS: Primary | ICD-10-CM

## 2025-02-14 PROCEDURE — 6360000002 HC RX W HCPCS: Performed by: INTERNAL MEDICINE

## 2025-02-14 PROCEDURE — 96374 THER/PROPH/DIAG INJ IV PUSH: CPT

## 2025-02-14 PROCEDURE — 2580000003 HC RX 258: Performed by: INTERNAL MEDICINE

## 2025-02-14 PROCEDURE — 2500000003 HC RX 250 WO HCPCS: Performed by: INTERNAL MEDICINE

## 2025-02-14 RX ORDER — EPINEPHRINE 1 MG/ML
0.3 INJECTION, SOLUTION, CONCENTRATE INTRAVENOUS PRN
Status: CANCELLED | OUTPATIENT
Start: 2025-02-15

## 2025-02-14 RX ORDER — GUAIFENESIN 400 MG/1
400 TABLET ORAL 3 TIMES DAILY
COMMUNITY

## 2025-02-14 RX ORDER — HEPARIN 100 UNIT/ML
500 SYRINGE INTRAVENOUS PRN
Status: CANCELLED | OUTPATIENT
Start: 2025-02-15

## 2025-02-14 RX ORDER — SODIUM CHLORIDE 9 MG/ML
5-250 INJECTION, SOLUTION INTRAVENOUS PRN
Status: CANCELLED | OUTPATIENT
Start: 2025-02-15

## 2025-02-14 RX ORDER — HEPARIN 100 UNIT/ML
500 SYRINGE INTRAVENOUS PRN
Status: DISCONTINUED | OUTPATIENT
Start: 2025-02-14 | End: 2025-02-15 | Stop reason: HOSPADM

## 2025-02-14 RX ORDER — HYDROCORTISONE SODIUM SUCCINATE 100 MG/2ML
100 INJECTION INTRAMUSCULAR; INTRAVENOUS
Status: CANCELLED | OUTPATIENT
Start: 2025-02-15

## 2025-02-14 RX ORDER — DIPHENHYDRAMINE HCL 25 MG
50 TABLET ORAL ONCE
Status: CANCELLED
Start: 2025-02-15 | End: 2025-02-15

## 2025-02-14 RX ORDER — SODIUM CHLORIDE 0.9 % (FLUSH) 0.9 %
5-40 SYRINGE (ML) INJECTION PRN
Status: CANCELLED | OUTPATIENT
Start: 2025-02-15

## 2025-02-14 RX ORDER — SODIUM CHLORIDE 0.9 % (FLUSH) 0.9 %
5-40 SYRINGE (ML) INJECTION PRN
Status: DISCONTINUED | OUTPATIENT
Start: 2025-02-14 | End: 2025-02-15 | Stop reason: HOSPADM

## 2025-02-14 RX ORDER — DIPHENHYDRAMINE HYDROCHLORIDE 50 MG/ML
50 INJECTION INTRAMUSCULAR; INTRAVENOUS
Status: CANCELLED | OUTPATIENT
Start: 2025-02-15

## 2025-02-14 RX ADMIN — SODIUM CHLORIDE, PRESERVATIVE FREE 10 ML: 5 INJECTION INTRAVENOUS at 13:04

## 2025-02-14 RX ADMIN — HEPARIN 500 UNITS: 100 SYRINGE at 13:04

## 2025-02-14 RX ADMIN — DAPTOMYCIN 510 MG: 500 INJECTION, POWDER, LYOPHILIZED, FOR SOLUTION INTRAVENOUS at 13:06

## 2025-02-14 RX ADMIN — SODIUM CHLORIDE, PRESERVATIVE FREE 10 ML: 5 INJECTION INTRAVENOUS at 13:11

## 2025-02-14 RX ADMIN — HEPARIN 500 UNITS: 100 SYRINGE at 13:11

## 2025-02-14 RX ADMIN — SODIUM CHLORIDE, PRESERVATIVE FREE 10 ML: 5 INJECTION INTRAVENOUS at 13:05

## 2025-02-14 NOTE — PROGRESS NOTES
Patient tolerated daptomycin iv push and picc dressing change well. Receievd in the hospital and had no issue. Patient alert and oriented x3. No distress noted. Vital signs stable. Patient denies any new or worsening pain. Educated patient on possible side effects and treatment of medication. Patient verbalized understanding. Offered patient education and/or discharge material. Patient provided d/c instructions and education handout on dapto. Patient denies any needs. All questions answered. D/C in stable condition with his wife.

## 2025-02-15 ENCOUNTER — HOSPITAL ENCOUNTER (OUTPATIENT)
Dept: INFUSION THERAPY | Age: 80
Setting detail: INFUSION SERIES
Discharge: HOME | End: 2025-02-15
Payer: MEDICARE

## 2025-02-15 VITALS
OXYGEN SATURATION: 100 % | TEMPERATURE: 98.2 F | DIASTOLIC BLOOD PRESSURE: 64 MMHG | SYSTOLIC BLOOD PRESSURE: 96 MMHG | HEART RATE: 71 BPM | RESPIRATION RATE: 12 BRPM

## 2025-02-15 DIAGNOSIS — R78.81 BACTEREMIA DUE TO ENTEROCOCCUS: Primary | ICD-10-CM

## 2025-02-15 DIAGNOSIS — B95.2 BACTEREMIA DUE TO ENTEROCOCCUS: Primary | ICD-10-CM

## 2025-02-15 PROCEDURE — 2500000003 HC RX 250 WO HCPCS: Performed by: INTERNAL MEDICINE

## 2025-02-15 PROCEDURE — 6360000002 HC RX W HCPCS: Performed by: INTERNAL MEDICINE

## 2025-02-15 PROCEDURE — 96374 THER/PROPH/DIAG INJ IV PUSH: CPT

## 2025-02-15 PROCEDURE — 2580000003 HC RX 258: Performed by: INTERNAL MEDICINE

## 2025-02-15 RX ORDER — SODIUM CHLORIDE 9 MG/ML
5-250 INJECTION, SOLUTION INTRAVENOUS PRN
Status: CANCELLED | OUTPATIENT
Start: 2025-02-16

## 2025-02-15 RX ORDER — EPINEPHRINE 1 MG/ML
0.3 INJECTION, SOLUTION, CONCENTRATE INTRAVENOUS PRN
Status: CANCELLED | OUTPATIENT
Start: 2025-02-16

## 2025-02-15 RX ORDER — DIPHENHYDRAMINE HYDROCHLORIDE 50 MG/ML
50 INJECTION INTRAMUSCULAR; INTRAVENOUS
Status: CANCELLED | OUTPATIENT
Start: 2025-02-16

## 2025-02-15 RX ORDER — DIPHENHYDRAMINE HCL 25 MG
50 TABLET ORAL ONCE
Status: CANCELLED
Start: 2025-02-16 | End: 2025-02-16

## 2025-02-15 RX ORDER — SODIUM CHLORIDE 0.9 % (FLUSH) 0.9 %
5-40 SYRINGE (ML) INJECTION PRN
Status: DISCONTINUED | OUTPATIENT
Start: 2025-02-15 | End: 2025-02-16 | Stop reason: HOSPADM

## 2025-02-15 RX ORDER — HEPARIN 100 UNIT/ML
500 SYRINGE INTRAVENOUS PRN
Status: CANCELLED | OUTPATIENT
Start: 2025-02-16

## 2025-02-15 RX ORDER — SODIUM CHLORIDE 0.9 % (FLUSH) 0.9 %
5-40 SYRINGE (ML) INJECTION PRN
Status: CANCELLED | OUTPATIENT
Start: 2025-02-16

## 2025-02-15 RX ORDER — HEPARIN 100 UNIT/ML
500 SYRINGE INTRAVENOUS PRN
Status: DISCONTINUED | OUTPATIENT
Start: 2025-02-15 | End: 2025-02-16 | Stop reason: HOSPADM

## 2025-02-15 RX ORDER — HYDROCORTISONE SODIUM SUCCINATE 100 MG/2ML
100 INJECTION INTRAMUSCULAR; INTRAVENOUS
Status: CANCELLED | OUTPATIENT
Start: 2025-02-16

## 2025-02-15 RX ADMIN — SODIUM CHLORIDE, PRESERVATIVE FREE 10 ML: 5 INJECTION INTRAVENOUS at 11:07

## 2025-02-15 RX ADMIN — SODIUM CHLORIDE, PRESERVATIVE FREE 10 ML: 5 INJECTION INTRAVENOUS at 10:53

## 2025-02-15 RX ADMIN — SODIUM CHLORIDE 500 MG: 9 INJECTION INTRAMUSCULAR; INTRAVENOUS; SUBCUTANEOUS at 10:56

## 2025-02-15 RX ADMIN — HEPARIN 500 UNITS: 100 SYRINGE at 10:53

## 2025-02-15 RX ADMIN — HEPARIN 500 UNITS: 100 SYRINGE at 11:08

## 2025-02-15 RX ADMIN — SODIUM CHLORIDE, PRESERVATIVE FREE 10 ML: 5 INJECTION INTRAVENOUS at 10:55

## 2025-02-15 NOTE — FLOWSHEET NOTE
Patient tolerated infusion well. Remained on unit for 10 minutes after treatment. Patient alert and oriented x3. No distress noted. Vital signs stable. Patient denies any new or worsening pain. Educated patient on possible side effects and treatment of medication.     Patient verbalized understanding. Offered patient education and/or discharge material. Patient declined. Patient denies any needs. All questions answered. D/C in stable condition.

## 2025-02-16 ENCOUNTER — HOSPITAL ENCOUNTER (OUTPATIENT)
Dept: INFUSION THERAPY | Age: 80
Setting detail: INFUSION SERIES
Discharge: HOME | End: 2025-02-16
Payer: MEDICARE

## 2025-02-16 VITALS
HEART RATE: 87 BPM | SYSTOLIC BLOOD PRESSURE: 102 MMHG | TEMPERATURE: 97.6 F | DIASTOLIC BLOOD PRESSURE: 69 MMHG | OXYGEN SATURATION: 100 % | RESPIRATION RATE: 16 BRPM

## 2025-02-16 DIAGNOSIS — B95.2 BACTEREMIA DUE TO ENTEROCOCCUS: Primary | ICD-10-CM

## 2025-02-16 DIAGNOSIS — R78.81 BACTEREMIA DUE TO ENTEROCOCCUS: Primary | ICD-10-CM

## 2025-02-16 PROCEDURE — 2580000003 HC RX 258: Performed by: INTERNAL MEDICINE

## 2025-02-16 PROCEDURE — 96374 THER/PROPH/DIAG INJ IV PUSH: CPT

## 2025-02-16 PROCEDURE — 2500000003 HC RX 250 WO HCPCS: Performed by: INTERNAL MEDICINE

## 2025-02-16 PROCEDURE — 6360000002 HC RX W HCPCS: Performed by: INTERNAL MEDICINE

## 2025-02-16 RX ORDER — SODIUM CHLORIDE 0.9 % (FLUSH) 0.9 %
5-40 SYRINGE (ML) INJECTION PRN
Status: DISCONTINUED | OUTPATIENT
Start: 2025-02-16 | End: 2025-02-17 | Stop reason: HOSPADM

## 2025-02-16 RX ORDER — SODIUM CHLORIDE 9 MG/ML
5-250 INJECTION, SOLUTION INTRAVENOUS PRN
Status: CANCELLED | OUTPATIENT
Start: 2025-02-17

## 2025-02-16 RX ORDER — HEPARIN 100 UNIT/ML
500 SYRINGE INTRAVENOUS PRN
Status: CANCELLED | OUTPATIENT
Start: 2025-02-17

## 2025-02-16 RX ORDER — EPINEPHRINE 1 MG/ML
0.3 INJECTION, SOLUTION, CONCENTRATE INTRAVENOUS PRN
Status: CANCELLED | OUTPATIENT
Start: 2025-02-17

## 2025-02-16 RX ORDER — SODIUM CHLORIDE 0.9 % (FLUSH) 0.9 %
5-40 SYRINGE (ML) INJECTION PRN
Status: CANCELLED | OUTPATIENT
Start: 2025-02-17

## 2025-02-16 RX ORDER — DIPHENHYDRAMINE HCL 25 MG
50 TABLET ORAL ONCE
Status: CANCELLED
Start: 2025-02-17 | End: 2025-02-17

## 2025-02-16 RX ORDER — HEPARIN 100 UNIT/ML
500 SYRINGE INTRAVENOUS PRN
Status: DISCONTINUED | OUTPATIENT
Start: 2025-02-16 | End: 2025-02-17 | Stop reason: HOSPADM

## 2025-02-16 RX ORDER — HYDROCORTISONE SODIUM SUCCINATE 100 MG/2ML
100 INJECTION INTRAMUSCULAR; INTRAVENOUS
Status: CANCELLED | OUTPATIENT
Start: 2025-02-17

## 2025-02-16 RX ORDER — DIPHENHYDRAMINE HYDROCHLORIDE 50 MG/ML
50 INJECTION INTRAMUSCULAR; INTRAVENOUS
Status: CANCELLED | OUTPATIENT
Start: 2025-02-17

## 2025-02-16 RX ADMIN — SODIUM CHLORIDE, PRESERVATIVE FREE 10 ML: 5 INJECTION INTRAVENOUS at 08:42

## 2025-02-16 RX ADMIN — DAPTOMYCIN 500 MG: 500 INJECTION, POWDER, LYOPHILIZED, FOR SOLUTION INTRAVENOUS at 08:39

## 2025-02-16 RX ADMIN — SODIUM CHLORIDE, PRESERVATIVE FREE 10 ML: 5 INJECTION INTRAVENOUS at 08:38

## 2025-02-16 RX ADMIN — HEPARIN 500 UNITS: 100 SYRINGE at 08:43

## 2025-02-16 RX ADMIN — SODIUM CHLORIDE, PRESERVATIVE FREE 10 ML: 5 INJECTION INTRAVENOUS at 08:43

## 2025-02-16 RX ADMIN — HEPARIN 500 UNITS: 100 SYRINGE at 08:40

## 2025-02-16 NOTE — FLOWSHEET NOTE
Patient tolerated injection well. Patient alert and oriented x3. No distress noted. Vital signs stable. Patient denies any new or worsening pain. Educated patient on possible side effects and treatment of medication.     Patient verbalized understanding. Offered patient education and/or discharge material. Patient declined.. Patient denies any needs. All questions answered. D/C in stable condition.

## 2025-02-17 ENCOUNTER — HOSPITAL ENCOUNTER (OUTPATIENT)
Dept: INFUSION THERAPY | Age: 80
Setting detail: INFUSION SERIES
Discharge: HOME | End: 2025-02-17
Payer: MEDICARE

## 2025-02-17 ENCOUNTER — NURSE ONLY (OUTPATIENT)
Dept: NON INVASIVE DIAGNOSTICS | Age: 80
End: 2025-02-17

## 2025-02-17 ENCOUNTER — OFFICE VISIT (OUTPATIENT)
Dept: NON INVASIVE DIAGNOSTICS | Age: 80
End: 2025-02-17
Payer: MEDICARE

## 2025-02-17 VITALS
DIASTOLIC BLOOD PRESSURE: 62 MMHG | BODY MASS INDEX: 29.98 KG/M2 | WEIGHT: 169.2 LBS | HEIGHT: 63 IN | HEART RATE: 60 BPM | RESPIRATION RATE: 16 BRPM | TEMPERATURE: 98.9 F | SYSTOLIC BLOOD PRESSURE: 118 MMHG

## 2025-02-17 VITALS
SYSTOLIC BLOOD PRESSURE: 106 MMHG | HEART RATE: 75 BPM | RESPIRATION RATE: 16 BRPM | TEMPERATURE: 97 F | DIASTOLIC BLOOD PRESSURE: 66 MMHG

## 2025-02-17 DIAGNOSIS — Z95.810 ICD (IMPLANTABLE CARDIOVERTER-DEFIBRILLATOR) IN PLACE: Primary | ICD-10-CM

## 2025-02-17 DIAGNOSIS — R78.81 BACTEREMIA DUE TO ENTEROCOCCUS: Primary | ICD-10-CM

## 2025-02-17 DIAGNOSIS — B95.2 BACTEREMIA DUE TO ENTEROCOCCUS: Primary | ICD-10-CM

## 2025-02-17 DIAGNOSIS — I47.20 VENTRICULAR TACHYCARDIA (HCC): ICD-10-CM

## 2025-02-17 DIAGNOSIS — I48.0 PAROXYSMAL ATRIAL FIBRILLATION (HCC): Primary | ICD-10-CM

## 2025-02-17 LAB
ALBUMIN SERPL-MCNC: 3.1 G/DL (ref 3.5–5.2)
ALP SERPL-CCNC: 103 U/L (ref 40–129)
ALT SERPL-CCNC: 31 U/L (ref 0–40)
ANION GAP SERPL CALCULATED.3IONS-SCNC: 8 MMOL/L (ref 7–16)
AST SERPL-CCNC: 32 U/L (ref 0–39)
BASOPHILS # BLD: 0.03 K/UL (ref 0–0.2)
BASOPHILS NFR BLD: 0 % (ref 0–2)
BILIRUB SERPL-MCNC: 0.5 MG/DL (ref 0–1.2)
BUN SERPL-MCNC: 14 MG/DL (ref 6–23)
CALCIUM SERPL-MCNC: 7.9 MG/DL (ref 8.6–10.2)
CHLORIDE SERPL-SCNC: 105 MMOL/L (ref 98–107)
CK SERPL-CCNC: 98 U/L (ref 20–200)
CO2 SERPL-SCNC: 23 MMOL/L (ref 22–29)
CREAT SERPL-MCNC: 1.1 MG/DL (ref 0.7–1.2)
CRP SERPL HS-MCNC: 54 MG/L (ref 0–5)
EOSINOPHIL # BLD: 0.13 K/UL (ref 0.05–0.5)
EOSINOPHILS RELATIVE PERCENT: 2 % (ref 0–6)
ERYTHROCYTE [DISTWIDTH] IN BLOOD BY AUTOMATED COUNT: 16 % (ref 11.5–15)
ERYTHROCYTE [SEDIMENTATION RATE] IN BLOOD BY WESTERGREN METHOD: 19 MM/HR (ref 0–15)
GFR, ESTIMATED: 70 ML/MIN/1.73M2
GLUCOSE SERPL-MCNC: 99 MG/DL (ref 74–99)
HCT VFR BLD AUTO: 28.7 % (ref 37–54)
HGB BLD-MCNC: 9.7 G/DL (ref 12.5–16.5)
IMM GRANULOCYTES # BLD AUTO: 0.03 K/UL (ref 0–0.58)
IMM GRANULOCYTES NFR BLD: 0 % (ref 0–5)
LYMPHOCYTES NFR BLD: 0.84 K/UL (ref 1.5–4)
LYMPHOCYTES RELATIVE PERCENT: 11 % (ref 20–42)
MCH RBC QN AUTO: 29.5 PG (ref 26–35)
MCHC RBC AUTO-ENTMCNC: 33.8 G/DL (ref 32–34.5)
MCV RBC AUTO: 87.2 FL (ref 80–99.9)
MONOCYTES NFR BLD: 0.66 K/UL (ref 0.1–0.95)
MONOCYTES NFR BLD: 9 % (ref 2–12)
NEUTROPHILS NFR BLD: 78 % (ref 43–80)
NEUTS SEG NFR BLD: 5.91 K/UL (ref 1.8–7.3)
PLATELET # BLD AUTO: 206 K/UL (ref 130–450)
PMV BLD AUTO: 10.7 FL (ref 7–12)
POTASSIUM SERPL-SCNC: 3.5 MMOL/L (ref 3.5–5)
PROT SERPL-MCNC: 5.5 G/DL (ref 6.4–8.3)
RBC # BLD AUTO: 3.29 M/UL (ref 3.8–5.8)
SODIUM SERPL-SCNC: 136 MMOL/L (ref 132–146)
WBC OTHER # BLD: 7.6 K/UL (ref 4.5–11.5)

## 2025-02-17 PROCEDURE — 2500000003 HC RX 250 WO HCPCS: Performed by: INTERNAL MEDICINE

## 2025-02-17 PROCEDURE — 2580000003 HC RX 258: Performed by: INTERNAL MEDICINE

## 2025-02-17 PROCEDURE — 93000 ELECTROCARDIOGRAM COMPLETE: CPT | Performed by: STUDENT IN AN ORGANIZED HEALTH CARE EDUCATION/TRAINING PROGRAM

## 2025-02-17 PROCEDURE — 6360000002 HC RX W HCPCS: Performed by: INTERNAL MEDICINE

## 2025-02-17 PROCEDURE — 82550 ASSAY OF CK (CPK): CPT

## 2025-02-17 PROCEDURE — 3074F SYST BP LT 130 MM HG: CPT | Performed by: STUDENT IN AN ORGANIZED HEALTH CARE EDUCATION/TRAINING PROGRAM

## 2025-02-17 PROCEDURE — 3078F DIAST BP <80 MM HG: CPT | Performed by: STUDENT IN AN ORGANIZED HEALTH CARE EDUCATION/TRAINING PROGRAM

## 2025-02-17 PROCEDURE — 1123F ACP DISCUSS/DSCN MKR DOCD: CPT | Performed by: STUDENT IN AN ORGANIZED HEALTH CARE EDUCATION/TRAINING PROGRAM

## 2025-02-17 PROCEDURE — 85652 RBC SED RATE AUTOMATED: CPT

## 2025-02-17 PROCEDURE — 86140 C-REACTIVE PROTEIN: CPT

## 2025-02-17 PROCEDURE — 1159F MED LIST DOCD IN RCRD: CPT | Performed by: STUDENT IN AN ORGANIZED HEALTH CARE EDUCATION/TRAINING PROGRAM

## 2025-02-17 PROCEDURE — 36592 COLLECT BLOOD FROM PICC: CPT

## 2025-02-17 PROCEDURE — 80053 COMPREHEN METABOLIC PANEL: CPT

## 2025-02-17 PROCEDURE — 99215 OFFICE O/P EST HI 40 MIN: CPT | Performed by: STUDENT IN AN ORGANIZED HEALTH CARE EDUCATION/TRAINING PROGRAM

## 2025-02-17 PROCEDURE — 85025 COMPLETE CBC W/AUTO DIFF WBC: CPT

## 2025-02-17 PROCEDURE — 96374 THER/PROPH/DIAG INJ IV PUSH: CPT

## 2025-02-17 RX ORDER — EPINEPHRINE 1 MG/ML
0.3 INJECTION, SOLUTION, CONCENTRATE INTRAVENOUS PRN
Status: CANCELLED | OUTPATIENT
Start: 2025-02-18

## 2025-02-17 RX ORDER — HYDROCORTISONE SODIUM SUCCINATE 100 MG/2ML
100 INJECTION INTRAMUSCULAR; INTRAVENOUS
Status: CANCELLED | OUTPATIENT
Start: 2025-02-18

## 2025-02-17 RX ORDER — DIPHENHYDRAMINE HCL 25 MG
50 TABLET ORAL ONCE
Status: CANCELLED
Start: 2025-02-18 | End: 2025-02-18

## 2025-02-17 RX ORDER — SODIUM CHLORIDE 0.9 % (FLUSH) 0.9 %
5-40 SYRINGE (ML) INJECTION PRN
Status: DISCONTINUED | OUTPATIENT
Start: 2025-02-17 | End: 2025-02-18 | Stop reason: HOSPADM

## 2025-02-17 RX ORDER — HEPARIN 100 UNIT/ML
500 SYRINGE INTRAVENOUS PRN
Status: DISCONTINUED | OUTPATIENT
Start: 2025-02-17 | End: 2025-02-18 | Stop reason: HOSPADM

## 2025-02-17 RX ORDER — SODIUM CHLORIDE 0.9 % (FLUSH) 0.9 %
5-40 SYRINGE (ML) INJECTION PRN
Status: CANCELLED | OUTPATIENT
Start: 2025-02-18

## 2025-02-17 RX ORDER — AMIODARONE HYDROCHLORIDE 200 MG/1
200 TABLET ORAL DAILY
Qty: 90 TABLET | Refills: 1 | Status: SHIPPED | OUTPATIENT
Start: 2025-02-17 | End: 2025-05-18

## 2025-02-17 RX ORDER — HEPARIN 100 UNIT/ML
500 SYRINGE INTRAVENOUS PRN
Status: CANCELLED | OUTPATIENT
Start: 2025-02-18

## 2025-02-17 RX ORDER — SODIUM CHLORIDE 9 MG/ML
5-250 INJECTION, SOLUTION INTRAVENOUS PRN
Status: CANCELLED | OUTPATIENT
Start: 2025-02-18

## 2025-02-17 RX ORDER — DIPHENHYDRAMINE HYDROCHLORIDE 50 MG/ML
50 INJECTION INTRAMUSCULAR; INTRAVENOUS
Status: CANCELLED | OUTPATIENT
Start: 2025-02-18

## 2025-02-17 RX ADMIN — SODIUM CHLORIDE, PRESERVATIVE FREE 10 ML: 5 INJECTION INTRAVENOUS at 14:03

## 2025-02-17 RX ADMIN — DAPTOMYCIN 500 MG: 500 INJECTION, POWDER, LYOPHILIZED, FOR SOLUTION INTRAVENOUS at 14:01

## 2025-02-17 RX ADMIN — SODIUM CHLORIDE, PRESERVATIVE FREE 10 ML: 5 INJECTION INTRAVENOUS at 14:08

## 2025-02-17 RX ADMIN — HEPARIN 500 UNITS: 100 SYRINGE at 14:03

## 2025-02-17 RX ADMIN — SODIUM CHLORIDE, PRESERVATIVE FREE 10 ML: 5 INJECTION INTRAVENOUS at 14:01

## 2025-02-17 RX ADMIN — HEPARIN 500 UNITS: 100 SYRINGE at 14:02

## 2025-02-17 ASSESSMENT — PAIN SCALES - GENERAL
PAINLEVEL_OUTOF10: 9
PAINLEVEL_OUTOF10: 9

## 2025-02-17 ASSESSMENT — PAIN DESCRIPTION - ONSET: ONSET: ON-GOING

## 2025-02-17 ASSESSMENT — PAIN DESCRIPTION - PAIN TYPE: TYPE: ACUTE PAIN

## 2025-02-17 ASSESSMENT — PAIN - FUNCTIONAL ASSESSMENT: PAIN_FUNCTIONAL_ASSESSMENT: ACTIVITIES ARE NOT PREVENTED

## 2025-02-17 ASSESSMENT — PAIN DESCRIPTION - LOCATION: LOCATION: OTHER (COMMENT)

## 2025-02-17 ASSESSMENT — PAIN DESCRIPTION - ORIENTATION: ORIENTATION: LEFT

## 2025-02-17 ASSESSMENT — PAIN DESCRIPTION - DESCRIPTORS: DESCRIPTORS: SHARP

## 2025-02-17 ASSESSMENT — PAIN DESCRIPTION - FREQUENCY: FREQUENCY: INTERMITTENT

## 2025-02-17 NOTE — PATIENT INSTRUCTIONS
INCREASE amiodarone to 200 mg tablet DAILY.  Continue remote monitoring of ICD every 91 days.  Follow-up with this office in ~3 months.

## 2025-02-17 NOTE — PROGRESS NOTES
[facility] CARDIOLOGY  CARDIAC ELECTROPHYSIOLOGY DEPARTMENT/DIVISION OF CARDIOLOGY  Outpatient office note  PATIENT: Ruben Davidson  MEDICAL RECORD NUMBER: [id number]  DATE OF SERVICE:  2/17/2025  ATTENDING ELECTROPHYSIOLOGIST:  Viktor Luther D.O.  REFERRING PHYSICIAN: No ref. provider found and Barrington Garcia MD  CHIEF COMPLAINT: VT s/p dual chamber ICD implantation, CRT-D upgrade: 5/23/24    HPI: Ruben Davidson is a 79 y.o. 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 Luther; CRT-D upgrade with LBB area lead: 5/23/24 - Dr Luther), nonvalvular paroxysmal AF/AFL sp DCCV (11/22/2024), SVT, NYHA Class II HFrEF-mixed (ischemic sp PTCA/stent RCA (7/7/97) and RYAN x 1 proximal LAD (12/12/21), moderate MR, HTN, Enterococcus faecalis bacteremia 2/2 complicated UTI/obstructing kidney stone sp cystoscopy/stent (2/8/2025), DM2, overweight sp gastric bypass, BETTIE, PUD/GI bleed sp clip/epinephrine injection (10/8/2018), and psoriatic arthritis. He is managed by Dr Kathleen with amiodarone 200 mg every 48 hours, apixaban 5 mg BID, clopidogrel 75 mg daily, metoprolol XL 25 mg BID, rosuvastatin 40 mg daily, Entresto 24 - 26 mg BID, spironolactone 25 mg daily, torsemide 40 mg every 7 days, and PPI.  In 1997, appears patient had PTCA/stent to RCA based on available records. LVEF prior to 2017 is unclear. Since 2017, LVEF declined from 45-50% to 29% in 2020.  In 12/2021, he presented with MMVT at 190 bpm with LBBB morphology, which was treated with external defibrillation and amiodarone.  Following defibrillation, he was diagnosed with STEMI, which was treated with RYAN x1 proximal LAD.  Following PCI, he was noted have paroxysms of AF/AFL, was treated with OAC.  Prior to discharge, a Medtronic dual-chamber ICD was implanted.  At the time of implant, he was noted to have low voltage throughout the RA chamber in the

## 2025-02-17 NOTE — PROGRESS NOTES
Patient tolerated injection & blood draw via PICC well. Patient alert and oriented x3. No distress noted. Vital signs stable. Patient denies any new or worsening pain. Educated patient on possible side effects and treatment of medication.     Patient verbalized understanding. Offered patient education and/or discharge material. Patient declined.. Patient denies any needs. All questions answered. D/C in stable condition.

## 2025-02-18 ENCOUNTER — HOSPITAL ENCOUNTER (OUTPATIENT)
Dept: INFUSION THERAPY | Age: 80
Setting detail: INFUSION SERIES
Discharge: HOME | End: 2025-02-18
Payer: MEDICARE

## 2025-02-18 VITALS
SYSTOLIC BLOOD PRESSURE: 106 MMHG | HEART RATE: 63 BPM | TEMPERATURE: 97.9 F | OXYGEN SATURATION: 100 % | RESPIRATION RATE: 16 BRPM | DIASTOLIC BLOOD PRESSURE: 75 MMHG

## 2025-02-18 DIAGNOSIS — R78.81 BACTEREMIA DUE TO ENTEROCOCCUS: Primary | ICD-10-CM

## 2025-02-18 DIAGNOSIS — B95.2 BACTEREMIA DUE TO ENTEROCOCCUS: Primary | ICD-10-CM

## 2025-02-18 PROCEDURE — 6360000002 HC RX W HCPCS: Performed by: INTERNAL MEDICINE

## 2025-02-18 PROCEDURE — 96374 THER/PROPH/DIAG INJ IV PUSH: CPT

## 2025-02-18 PROCEDURE — 2580000003 HC RX 258: Performed by: INTERNAL MEDICINE

## 2025-02-18 PROCEDURE — 2500000003 HC RX 250 WO HCPCS: Performed by: INTERNAL MEDICINE

## 2025-02-18 RX ORDER — HEPARIN 100 UNIT/ML
500 SYRINGE INTRAVENOUS PRN
Status: CANCELLED | OUTPATIENT
Start: 2025-02-19

## 2025-02-18 RX ORDER — EPINEPHRINE 1 MG/ML
0.3 INJECTION, SOLUTION, CONCENTRATE INTRAVENOUS PRN
Status: CANCELLED | OUTPATIENT
Start: 2025-02-19

## 2025-02-18 RX ORDER — HEPARIN 100 UNIT/ML
500 SYRINGE INTRAVENOUS PRN
Status: DISCONTINUED | OUTPATIENT
Start: 2025-02-18 | End: 2025-02-19 | Stop reason: HOSPADM

## 2025-02-18 RX ORDER — SODIUM CHLORIDE 0.9 % (FLUSH) 0.9 %
5-40 SYRINGE (ML) INJECTION PRN
Status: CANCELLED | OUTPATIENT
Start: 2025-02-19

## 2025-02-18 RX ORDER — DIPHENHYDRAMINE HYDROCHLORIDE 50 MG/ML
50 INJECTION INTRAMUSCULAR; INTRAVENOUS
Status: CANCELLED | OUTPATIENT
Start: 2025-02-19

## 2025-02-18 RX ORDER — SODIUM CHLORIDE 0.9 % (FLUSH) 0.9 %
5-40 SYRINGE (ML) INJECTION PRN
Status: DISCONTINUED | OUTPATIENT
Start: 2025-02-18 | End: 2025-02-19 | Stop reason: HOSPADM

## 2025-02-18 RX ORDER — DIPHENHYDRAMINE HCL 25 MG
50 TABLET ORAL ONCE
Status: CANCELLED
Start: 2025-02-19 | End: 2025-02-19

## 2025-02-18 RX ORDER — HYDROCORTISONE SODIUM SUCCINATE 100 MG/2ML
100 INJECTION INTRAMUSCULAR; INTRAVENOUS
Status: CANCELLED | OUTPATIENT
Start: 2025-02-19

## 2025-02-18 RX ORDER — SODIUM CHLORIDE 9 MG/ML
5-250 INJECTION, SOLUTION INTRAVENOUS PRN
Status: CANCELLED | OUTPATIENT
Start: 2025-02-19

## 2025-02-18 RX ADMIN — SODIUM CHLORIDE, PRESERVATIVE FREE 10 ML: 5 INJECTION INTRAVENOUS at 13:40

## 2025-02-18 RX ADMIN — HEPARIN 500 UNITS: 100 SYRINGE at 13:39

## 2025-02-18 RX ADMIN — SODIUM CHLORIDE 500 MG: 9 INJECTION INTRAMUSCULAR; INTRAVENOUS; SUBCUTANEOUS at 13:41

## 2025-02-18 RX ADMIN — SODIUM CHLORIDE, PRESERVATIVE FREE 10 ML: 5 INJECTION INTRAVENOUS at 13:47

## 2025-02-18 RX ADMIN — HEPARIN 500 UNITS: 100 SYRINGE at 13:47

## 2025-02-18 RX ADMIN — SODIUM CHLORIDE, PRESERVATIVE FREE 10 ML: 5 INJECTION INTRAVENOUS at 13:39

## 2025-02-19 ENCOUNTER — HOSPITAL ENCOUNTER (OUTPATIENT)
Dept: INFUSION THERAPY | Age: 80
Setting detail: INFUSION SERIES
Discharge: HOME | End: 2025-02-19
Payer: MEDICARE

## 2025-02-19 VITALS
DIASTOLIC BLOOD PRESSURE: 64 MMHG | TEMPERATURE: 97.4 F | RESPIRATION RATE: 16 BRPM | OXYGEN SATURATION: 100 % | HEART RATE: 71 BPM | SYSTOLIC BLOOD PRESSURE: 108 MMHG

## 2025-02-19 DIAGNOSIS — R78.81 BACTEREMIA DUE TO ENTEROCOCCUS: Primary | ICD-10-CM

## 2025-02-19 DIAGNOSIS — B95.2 BACTEREMIA DUE TO ENTEROCOCCUS: Primary | ICD-10-CM

## 2025-02-19 PROCEDURE — 96374 THER/PROPH/DIAG INJ IV PUSH: CPT

## 2025-02-19 PROCEDURE — 2500000003 HC RX 250 WO HCPCS: Performed by: INTERNAL MEDICINE

## 2025-02-19 PROCEDURE — 6360000002 HC RX W HCPCS: Performed by: INTERNAL MEDICINE

## 2025-02-19 PROCEDURE — 2580000003 HC RX 258: Performed by: INTERNAL MEDICINE

## 2025-02-19 RX ORDER — SODIUM CHLORIDE 0.9 % (FLUSH) 0.9 %
5-40 SYRINGE (ML) INJECTION PRN
Status: CANCELLED | OUTPATIENT
Start: 2025-02-20

## 2025-02-19 RX ORDER — SODIUM CHLORIDE 0.9 % (FLUSH) 0.9 %
5-40 SYRINGE (ML) INJECTION PRN
Status: DISCONTINUED | OUTPATIENT
Start: 2025-02-19 | End: 2025-02-20 | Stop reason: HOSPADM

## 2025-02-19 RX ORDER — HYDROCORTISONE SODIUM SUCCINATE 100 MG/2ML
100 INJECTION INTRAMUSCULAR; INTRAVENOUS
Status: CANCELLED | OUTPATIENT
Start: 2025-02-20

## 2025-02-19 RX ORDER — DIPHENHYDRAMINE HCL 25 MG
50 TABLET ORAL ONCE
Status: CANCELLED
Start: 2025-02-20 | End: 2025-02-20

## 2025-02-19 RX ORDER — SODIUM CHLORIDE 9 MG/ML
5-250 INJECTION, SOLUTION INTRAVENOUS PRN
Status: CANCELLED | OUTPATIENT
Start: 2025-02-20

## 2025-02-19 RX ORDER — HEPARIN 100 UNIT/ML
500 SYRINGE INTRAVENOUS PRN
Status: CANCELLED | OUTPATIENT
Start: 2025-02-20

## 2025-02-19 RX ORDER — DIPHENHYDRAMINE HYDROCHLORIDE 50 MG/ML
50 INJECTION INTRAMUSCULAR; INTRAVENOUS
Status: CANCELLED | OUTPATIENT
Start: 2025-02-20

## 2025-02-19 RX ORDER — EPINEPHRINE 1 MG/ML
0.3 INJECTION, SOLUTION, CONCENTRATE INTRAVENOUS PRN
Status: CANCELLED | OUTPATIENT
Start: 2025-02-20

## 2025-02-19 RX ORDER — HEPARIN 100 UNIT/ML
500 SYRINGE INTRAVENOUS PRN
Status: DISCONTINUED | OUTPATIENT
Start: 2025-02-19 | End: 2025-02-20 | Stop reason: HOSPADM

## 2025-02-19 RX ADMIN — SODIUM CHLORIDE, PRESERVATIVE FREE 10 ML: 5 INJECTION INTRAVENOUS at 13:43

## 2025-02-19 RX ADMIN — SODIUM CHLORIDE, PRESERVATIVE FREE 10 ML: 5 INJECTION INTRAVENOUS at 13:32

## 2025-02-19 RX ADMIN — SODIUM CHLORIDE, PRESERVATIVE FREE 10 ML: 5 INJECTION INTRAVENOUS at 13:36

## 2025-02-19 RX ADMIN — HEPARIN 500 UNITS: 100 SYRINGE at 13:43

## 2025-02-19 RX ADMIN — HEPARIN 500 UNITS: 100 SYRINGE at 13:36

## 2025-02-19 RX ADMIN — SODIUM CHLORIDE 500 MG: 9 INJECTION INTRAMUSCULAR; INTRAVENOUS; SUBCUTANEOUS at 13:32

## 2025-02-19 NOTE — PROGRESS NOTES
Patient tolerated IV daptomycin push well. Patient alert and oriented x3. No distress noted. Vital signs stable. Patient denies any new or worsening pain. Offered patient education and/or discharge material. Patient declined. Patient denies any needs. All questions answered. D/C in stable condition.

## 2025-02-20 ENCOUNTER — HOSPITAL ENCOUNTER (OUTPATIENT)
Dept: INFUSION THERAPY | Age: 80
Setting detail: INFUSION SERIES
Discharge: HOME | End: 2025-02-20
Payer: MEDICARE

## 2025-02-20 VITALS
DIASTOLIC BLOOD PRESSURE: 76 MMHG | RESPIRATION RATE: 17 BRPM | HEART RATE: 65 BPM | SYSTOLIC BLOOD PRESSURE: 114 MMHG | TEMPERATURE: 98 F

## 2025-02-20 DIAGNOSIS — R78.81 BACTEREMIA DUE TO ENTEROCOCCUS: Primary | ICD-10-CM

## 2025-02-20 DIAGNOSIS — B95.2 BACTEREMIA DUE TO ENTEROCOCCUS: Primary | ICD-10-CM

## 2025-02-20 LAB
ALBUMIN SERPL-MCNC: 3 G/DL (ref 3.5–5.2)
ALP SERPL-CCNC: 104 U/L (ref 40–129)
ALT SERPL-CCNC: 25 U/L (ref 0–40)
ANION GAP SERPL CALCULATED.3IONS-SCNC: 9 MMOL/L (ref 7–16)
AST SERPL-CCNC: 36 U/L (ref 0–39)
BASOPHILS # BLD: 0.04 K/UL (ref 0–0.2)
BASOPHILS NFR BLD: 1 % (ref 0–2)
BILIRUB SERPL-MCNC: 0.5 MG/DL (ref 0–1.2)
BUN SERPL-MCNC: 12 MG/DL (ref 6–23)
CALCIUM SERPL-MCNC: 7.9 MG/DL (ref 8.6–10.2)
CHLORIDE SERPL-SCNC: 109 MMOL/L (ref 98–107)
CO2 SERPL-SCNC: 23 MMOL/L (ref 22–29)
CREAT SERPL-MCNC: 1 MG/DL (ref 0.7–1.2)
EOSINOPHIL # BLD: 0.13 K/UL (ref 0.05–0.5)
EOSINOPHILS RELATIVE PERCENT: 2 % (ref 0–6)
ERYTHROCYTE [DISTWIDTH] IN BLOOD BY AUTOMATED COUNT: 16.8 % (ref 11.5–15)
GFR, ESTIMATED: 77 ML/MIN/1.73M2
GLUCOSE SERPL-MCNC: 88 MG/DL (ref 74–99)
HCT VFR BLD AUTO: 29.1 % (ref 37–54)
HGB BLD-MCNC: 9.7 G/DL (ref 12.5–16.5)
IMM GRANULOCYTES # BLD AUTO: <0.03 K/UL (ref 0–0.58)
IMM GRANULOCYTES NFR BLD: 0 % (ref 0–5)
LYMPHOCYTES NFR BLD: 0.77 K/UL (ref 1.5–4)
LYMPHOCYTES RELATIVE PERCENT: 12 % (ref 20–42)
MCH RBC QN AUTO: 29.5 PG (ref 26–35)
MCHC RBC AUTO-ENTMCNC: 33.3 G/DL (ref 32–34.5)
MCV RBC AUTO: 88.4 FL (ref 80–99.9)
MONOCYTES NFR BLD: 0.86 K/UL (ref 0.1–0.95)
MONOCYTES NFR BLD: 14 % (ref 2–12)
NEUTROPHILS NFR BLD: 72 % (ref 43–80)
NEUTS SEG NFR BLD: 4.56 K/UL (ref 1.8–7.3)
PLATELET # BLD AUTO: 233 K/UL (ref 130–450)
PMV BLD AUTO: 10.4 FL (ref 7–12)
POTASSIUM SERPL-SCNC: 3.5 MMOL/L (ref 3.5–5)
PROT SERPL-MCNC: 5.4 G/DL (ref 6.4–8.3)
RBC # BLD AUTO: 3.29 M/UL (ref 3.8–5.8)
SODIUM SERPL-SCNC: 141 MMOL/L (ref 132–146)
WBC OTHER # BLD: 6.4 K/UL (ref 4.5–11.5)

## 2025-02-20 PROCEDURE — 80053 COMPREHEN METABOLIC PANEL: CPT

## 2025-02-20 PROCEDURE — 2500000003 HC RX 250 WO HCPCS: Performed by: INTERNAL MEDICINE

## 2025-02-20 PROCEDURE — 2580000003 HC RX 258: Performed by: INTERNAL MEDICINE

## 2025-02-20 PROCEDURE — 96374 THER/PROPH/DIAG INJ IV PUSH: CPT

## 2025-02-20 PROCEDURE — 6360000002 HC RX W HCPCS: Performed by: INTERNAL MEDICINE

## 2025-02-20 PROCEDURE — 85025 COMPLETE CBC W/AUTO DIFF WBC: CPT

## 2025-02-20 PROCEDURE — 36592 COLLECT BLOOD FROM PICC: CPT

## 2025-02-20 RX ORDER — HYDROCORTISONE SODIUM SUCCINATE 100 MG/2ML
100 INJECTION INTRAMUSCULAR; INTRAVENOUS
Status: CANCELLED | OUTPATIENT
Start: 2025-02-21

## 2025-02-20 RX ORDER — SODIUM CHLORIDE 9 MG/ML
5-250 INJECTION, SOLUTION INTRAVENOUS PRN
Status: CANCELLED | OUTPATIENT
Start: 2025-02-21

## 2025-02-20 RX ORDER — EPINEPHRINE 1 MG/ML
0.3 INJECTION, SOLUTION, CONCENTRATE INTRAVENOUS PRN
Status: CANCELLED | OUTPATIENT
Start: 2025-02-21

## 2025-02-20 RX ORDER — METOPROLOL SUCCINATE 25 MG/1
25 TABLET, EXTENDED RELEASE ORAL 2 TIMES DAILY
Qty: 180 TABLET | Refills: 3 | Status: SHIPPED | OUTPATIENT
Start: 2025-02-20

## 2025-02-20 RX ORDER — DIPHENHYDRAMINE HCL 25 MG
50 TABLET ORAL ONCE
Status: CANCELLED
Start: 2025-02-21 | End: 2025-02-21

## 2025-02-20 RX ORDER — SODIUM CHLORIDE 0.9 % (FLUSH) 0.9 %
5-40 SYRINGE (ML) INJECTION PRN
Status: CANCELLED | OUTPATIENT
Start: 2025-02-21

## 2025-02-20 RX ORDER — HEPARIN 100 UNIT/ML
500 SYRINGE INTRAVENOUS PRN
Status: DISCONTINUED | OUTPATIENT
Start: 2025-02-20 | End: 2025-02-21 | Stop reason: HOSPADM

## 2025-02-20 RX ORDER — HEPARIN 100 UNIT/ML
500 SYRINGE INTRAVENOUS PRN
Status: CANCELLED | OUTPATIENT
Start: 2025-02-21

## 2025-02-20 RX ORDER — DIPHENHYDRAMINE HYDROCHLORIDE 50 MG/ML
50 INJECTION INTRAMUSCULAR; INTRAVENOUS
Status: CANCELLED | OUTPATIENT
Start: 2025-02-21

## 2025-02-20 RX ORDER — SODIUM CHLORIDE 0.9 % (FLUSH) 0.9 %
5-40 SYRINGE (ML) INJECTION PRN
Status: DISCONTINUED | OUTPATIENT
Start: 2025-02-20 | End: 2025-02-21 | Stop reason: HOSPADM

## 2025-02-20 RX ADMIN — HEPARIN 500 UNITS: 100 SYRINGE at 14:01

## 2025-02-20 RX ADMIN — SODIUM CHLORIDE, PRESERVATIVE FREE 10 ML: 5 INJECTION INTRAVENOUS at 13:56

## 2025-02-20 RX ADMIN — HEPARIN 500 UNITS: 100 SYRINGE at 13:54

## 2025-02-20 RX ADMIN — SODIUM CHLORIDE, PRESERVATIVE FREE 10 ML: 5 INJECTION INTRAVENOUS at 13:53

## 2025-02-20 RX ADMIN — SODIUM CHLORIDE, PRESERVATIVE FREE 10 ML: 5 INJECTION INTRAVENOUS at 13:52

## 2025-02-20 RX ADMIN — SODIUM CHLORIDE, PRESERVATIVE FREE 10 ML: 5 INJECTION INTRAVENOUS at 14:01

## 2025-02-20 RX ADMIN — SODIUM CHLORIDE 500 MG: 9 INJECTION INTRAMUSCULAR; INTRAVENOUS; SUBCUTANEOUS at 13:56

## 2025-02-20 NOTE — PROGRESS NOTES
Physician Progress Note      PATIENT:               FRANTZ HUNT  CSN #:                  [id number]  :                       1945  ADMIT DATE:       2025 1:40 PM  DISCH DATE:        2025 3:30 PM  RESPONDING  PROVIDER #:        Carole Roche          QUERY TEXT:    Per procedure note dated  documentation of laceration repair. To accurately   reflect the procedure performed please document the deepest depth of   laceration which was repaired:    The medical record reflects the following:  Risk Factors: Right forehead above eyebrow  Clinical Indicators: The wound was explored with the following results No   foreign bodies found. The wound was closed with 6-0 Prolene using interrupted   sutures. Debridement: None.  Treatment: laceration repair, sutures, bacitracin  Options provided:  -- Laceration repair of skin  -- Laceration repair of subcutaneous tissue  -- Laceration repair of fascia  -- Other - I will add my own diagnosis  -- Disagree - Not applicable / Not valid  -- Disagree - Clinically unable to determine / Unknown  -- Refer to Clinical Documentation Reviewer    PROVIDER RESPONSE TEXT:    Laceration repair of skin.    Query created by: Diana Winn on 2025 1:22 PM      Electronically signed by:  Carole Roche 2025 2:34 PM

## 2025-02-20 NOTE — PROGRESS NOTES
Patient tolerated IV dapto push and blood draw well.  Patient alert and oriented x3. No distress noted. Vital signs stable. Patient denies any new or worsening pain.  Patient denies any needs. All questions answered. D/C in stable condition.

## 2025-02-21 ENCOUNTER — HOSPITAL ENCOUNTER (OUTPATIENT)
Dept: INFUSION THERAPY | Age: 80
Setting detail: INFUSION SERIES
Discharge: HOME | End: 2025-02-21
Payer: MEDICARE

## 2025-02-21 VITALS
DIASTOLIC BLOOD PRESSURE: 76 MMHG | HEIGHT: 63 IN | WEIGHT: 168 LBS | BODY MASS INDEX: 29.77 KG/M2 | HEART RATE: 85 BPM | OXYGEN SATURATION: 96 % | SYSTOLIC BLOOD PRESSURE: 109 MMHG | RESPIRATION RATE: 16 BRPM | TEMPERATURE: 97.4 F

## 2025-02-21 DIAGNOSIS — B95.2 BACTEREMIA DUE TO ENTEROCOCCUS: Primary | ICD-10-CM

## 2025-02-21 DIAGNOSIS — R78.81 BACTEREMIA DUE TO ENTEROCOCCUS: Primary | ICD-10-CM

## 2025-02-21 PROCEDURE — 2500000003 HC RX 250 WO HCPCS: Performed by: INTERNAL MEDICINE

## 2025-02-21 PROCEDURE — 6360000002 HC RX W HCPCS: Performed by: INTERNAL MEDICINE

## 2025-02-21 PROCEDURE — 96374 THER/PROPH/DIAG INJ IV PUSH: CPT

## 2025-02-21 PROCEDURE — 2580000003 HC RX 258: Performed by: INTERNAL MEDICINE

## 2025-02-21 RX ORDER — DIPHENHYDRAMINE HCL 25 MG
50 TABLET ORAL ONCE
Status: CANCELLED
Start: 2025-02-22 | End: 2025-02-22

## 2025-02-21 RX ORDER — SODIUM CHLORIDE 0.9 % (FLUSH) 0.9 %
5-40 SYRINGE (ML) INJECTION PRN
Status: DISCONTINUED | OUTPATIENT
Start: 2025-02-21 | End: 2025-02-22 | Stop reason: HOSPADM

## 2025-02-21 RX ORDER — DIPHENHYDRAMINE HYDROCHLORIDE 50 MG/ML
50 INJECTION INTRAMUSCULAR; INTRAVENOUS
Status: CANCELLED | OUTPATIENT
Start: 2025-02-22

## 2025-02-21 RX ORDER — EPINEPHRINE 1 MG/ML
0.3 INJECTION, SOLUTION, CONCENTRATE INTRAVENOUS PRN
Status: CANCELLED | OUTPATIENT
Start: 2025-02-22

## 2025-02-21 RX ORDER — SODIUM CHLORIDE 9 MG/ML
5-250 INJECTION, SOLUTION INTRAVENOUS PRN
Status: CANCELLED | OUTPATIENT
Start: 2025-02-22

## 2025-02-21 RX ORDER — HYDROCORTISONE SODIUM SUCCINATE 100 MG/2ML
100 INJECTION INTRAMUSCULAR; INTRAVENOUS
Status: CANCELLED | OUTPATIENT
Start: 2025-02-22

## 2025-02-21 RX ORDER — HEPARIN 100 UNIT/ML
500 SYRINGE INTRAVENOUS PRN
Status: CANCELLED | OUTPATIENT
Start: 2025-02-22

## 2025-02-21 RX ORDER — SODIUM CHLORIDE 0.9 % (FLUSH) 0.9 %
5-40 SYRINGE (ML) INJECTION PRN
Status: CANCELLED | OUTPATIENT
Start: 2025-02-22

## 2025-02-21 RX ORDER — HEPARIN 100 UNIT/ML
500 SYRINGE INTRAVENOUS PRN
Status: DISCONTINUED | OUTPATIENT
Start: 2025-02-21 | End: 2025-02-22 | Stop reason: HOSPADM

## 2025-02-21 RX ADMIN — HEPARIN 500 UNITS: 100 SYRINGE at 14:18

## 2025-02-21 RX ADMIN — SODIUM CHLORIDE 500 MG: 9 INJECTION INTRAMUSCULAR; INTRAVENOUS; SUBCUTANEOUS at 14:06

## 2025-02-21 RX ADMIN — SODIUM CHLORIDE, PRESERVATIVE FREE 10 ML: 5 INJECTION INTRAVENOUS at 14:17

## 2025-02-21 RX ADMIN — HEPARIN 500 UNITS: 100 SYRINGE at 14:19

## 2025-02-21 RX ADMIN — SODIUM CHLORIDE, PRESERVATIVE FREE 10 ML: 5 INJECTION INTRAVENOUS at 14:19

## 2025-02-21 RX ADMIN — SODIUM CHLORIDE, PRESERVATIVE FREE 10 ML: 5 INJECTION INTRAVENOUS at 14:06

## 2025-02-21 NOTE — FLOWSHEET NOTE
Patient tolerated infusion well. Remained on unit for 10 minutes after treatment. Patient alert and oriented x3. No distress noted. Vital signs stable. Patient denies any new or worsening pain. Educated patient on possible side effects and treatment of medication. Patient tolerating IV antibiotics very well    Patient verbalized understanding. Offered patient education and/or discharge material. Patient declined. Patient denies any needs. All questions answered. D/C in stable condition.

## 2025-02-22 ENCOUNTER — HOSPITAL ENCOUNTER (OUTPATIENT)
Dept: INFUSION THERAPY | Age: 80
Setting detail: INFUSION SERIES
Discharge: HOME | End: 2025-02-22
Payer: MEDICARE

## 2025-02-22 VITALS
HEART RATE: 82 BPM | OXYGEN SATURATION: 100 % | DIASTOLIC BLOOD PRESSURE: 65 MMHG | RESPIRATION RATE: 16 BRPM | TEMPERATURE: 97.8 F | SYSTOLIC BLOOD PRESSURE: 93 MMHG

## 2025-02-22 DIAGNOSIS — B95.2 BACTEREMIA DUE TO ENTEROCOCCUS: Primary | ICD-10-CM

## 2025-02-22 DIAGNOSIS — R78.81 BACTEREMIA DUE TO ENTEROCOCCUS: Primary | ICD-10-CM

## 2025-02-22 PROCEDURE — 2500000003 HC RX 250 WO HCPCS: Performed by: INTERNAL MEDICINE

## 2025-02-22 PROCEDURE — 6360000002 HC RX W HCPCS: Performed by: INTERNAL MEDICINE

## 2025-02-22 PROCEDURE — 2580000003 HC RX 258: Performed by: INTERNAL MEDICINE

## 2025-02-22 PROCEDURE — 96374 THER/PROPH/DIAG INJ IV PUSH: CPT

## 2025-02-22 RX ORDER — HEPARIN 100 UNIT/ML
500 SYRINGE INTRAVENOUS PRN
Status: DISCONTINUED | OUTPATIENT
Start: 2025-02-22 | End: 2025-02-23 | Stop reason: HOSPADM

## 2025-02-22 RX ORDER — HYDROCORTISONE SODIUM SUCCINATE 100 MG/2ML
100 INJECTION INTRAMUSCULAR; INTRAVENOUS
Status: CANCELLED | OUTPATIENT
Start: 2025-02-23

## 2025-02-22 RX ORDER — EPINEPHRINE 1 MG/ML
0.3 INJECTION, SOLUTION, CONCENTRATE INTRAVENOUS PRN
Status: CANCELLED | OUTPATIENT
Start: 2025-02-23

## 2025-02-22 RX ORDER — DIPHENHYDRAMINE HCL 25 MG
50 TABLET ORAL ONCE
Status: CANCELLED
Start: 2025-02-23 | End: 2025-02-23

## 2025-02-22 RX ORDER — DIPHENHYDRAMINE HYDROCHLORIDE 50 MG/ML
50 INJECTION INTRAMUSCULAR; INTRAVENOUS
Status: CANCELLED | OUTPATIENT
Start: 2025-02-23

## 2025-02-22 RX ORDER — SODIUM CHLORIDE 9 MG/ML
5-250 INJECTION, SOLUTION INTRAVENOUS PRN
Status: CANCELLED | OUTPATIENT
Start: 2025-02-23

## 2025-02-22 RX ORDER — SODIUM CHLORIDE 0.9 % (FLUSH) 0.9 %
5-40 SYRINGE (ML) INJECTION PRN
Status: CANCELLED | OUTPATIENT
Start: 2025-02-23

## 2025-02-22 RX ORDER — SODIUM CHLORIDE 0.9 % (FLUSH) 0.9 %
5-40 SYRINGE (ML) INJECTION PRN
Status: DISCONTINUED | OUTPATIENT
Start: 2025-02-22 | End: 2025-02-23 | Stop reason: HOSPADM

## 2025-02-22 RX ORDER — HEPARIN 100 UNIT/ML
500 SYRINGE INTRAVENOUS PRN
Status: CANCELLED | OUTPATIENT
Start: 2025-02-23

## 2025-02-22 RX ADMIN — DAPTOMYCIN 500 MG: 500 INJECTION, POWDER, LYOPHILIZED, FOR SOLUTION INTRAVENOUS at 08:10

## 2025-02-22 RX ADMIN — SODIUM CHLORIDE, PRESERVATIVE FREE 10 ML: 5 INJECTION INTRAVENOUS at 08:17

## 2025-02-22 RX ADMIN — HEPARIN 500 UNITS: 100 SYRINGE at 08:11

## 2025-02-22 RX ADMIN — SODIUM CHLORIDE, PRESERVATIVE FREE 10 ML: 5 INJECTION INTRAVENOUS at 08:09

## 2025-02-22 RX ADMIN — HEPARIN 500 UNITS: 100 SYRINGE at 08:17

## 2025-02-22 RX ADMIN — SODIUM CHLORIDE, PRESERVATIVE FREE 10 ML: 5 INJECTION INTRAVENOUS at 08:11

## 2025-02-23 ENCOUNTER — HOSPITAL ENCOUNTER (OUTPATIENT)
Dept: INFUSION THERAPY | Age: 80
Setting detail: INFUSION SERIES
Discharge: HOME | End: 2025-02-23
Payer: MEDICARE

## 2025-02-23 VITALS
RESPIRATION RATE: 16 BRPM | DIASTOLIC BLOOD PRESSURE: 52 MMHG | SYSTOLIC BLOOD PRESSURE: 98 MMHG | TEMPERATURE: 98.1 F | OXYGEN SATURATION: 99 % | HEART RATE: 64 BPM

## 2025-02-23 DIAGNOSIS — R78.81 BACTEREMIA DUE TO ENTEROCOCCUS: Primary | ICD-10-CM

## 2025-02-23 DIAGNOSIS — B95.2 BACTEREMIA DUE TO ENTEROCOCCUS: Primary | ICD-10-CM

## 2025-02-23 PROCEDURE — 6360000002 HC RX W HCPCS: Performed by: INTERNAL MEDICINE

## 2025-02-23 PROCEDURE — 96374 THER/PROPH/DIAG INJ IV PUSH: CPT

## 2025-02-23 PROCEDURE — 2500000003 HC RX 250 WO HCPCS: Performed by: INTERNAL MEDICINE

## 2025-02-23 PROCEDURE — 2580000003 HC RX 258: Performed by: INTERNAL MEDICINE

## 2025-02-23 RX ORDER — SODIUM CHLORIDE 0.9 % (FLUSH) 0.9 %
5-40 SYRINGE (ML) INJECTION PRN
Status: CANCELLED | OUTPATIENT
Start: 2025-02-24

## 2025-02-23 RX ORDER — HEPARIN 100 UNIT/ML
500 SYRINGE INTRAVENOUS PRN
Status: DISCONTINUED | OUTPATIENT
Start: 2025-02-23 | End: 2025-02-24 | Stop reason: HOSPADM

## 2025-02-23 RX ORDER — EPINEPHRINE 1 MG/ML
0.3 INJECTION, SOLUTION, CONCENTRATE INTRAVENOUS PRN
Status: CANCELLED | OUTPATIENT
Start: 2025-02-24

## 2025-02-23 RX ORDER — DIPHENHYDRAMINE HYDROCHLORIDE 50 MG/ML
50 INJECTION INTRAMUSCULAR; INTRAVENOUS
Status: CANCELLED | OUTPATIENT
Start: 2025-02-24

## 2025-02-23 RX ORDER — SODIUM CHLORIDE 9 MG/ML
5-250 INJECTION, SOLUTION INTRAVENOUS PRN
Status: CANCELLED | OUTPATIENT
Start: 2025-02-24

## 2025-02-23 RX ORDER — DIPHENHYDRAMINE HCL 25 MG
50 TABLET ORAL ONCE
Status: CANCELLED
Start: 2025-02-24 | End: 2025-02-24

## 2025-02-23 RX ORDER — SODIUM CHLORIDE 0.9 % (FLUSH) 0.9 %
5-40 SYRINGE (ML) INJECTION PRN
Status: DISCONTINUED | OUTPATIENT
Start: 2025-02-23 | End: 2025-02-24 | Stop reason: HOSPADM

## 2025-02-23 RX ORDER — HEPARIN 100 UNIT/ML
500 SYRINGE INTRAVENOUS PRN
Status: CANCELLED | OUTPATIENT
Start: 2025-02-24

## 2025-02-23 RX ORDER — HYDROCORTISONE SODIUM SUCCINATE 100 MG/2ML
100 INJECTION INTRAMUSCULAR; INTRAVENOUS
Status: CANCELLED | OUTPATIENT
Start: 2025-02-24

## 2025-02-23 RX ADMIN — SODIUM CHLORIDE, PRESERVATIVE FREE 10 ML: 5 INJECTION INTRAVENOUS at 09:18

## 2025-02-23 RX ADMIN — HEPARIN 500 UNITS: 100 SYRINGE at 09:24

## 2025-02-23 RX ADMIN — SODIUM CHLORIDE 500 MG: 9 INJECTION INTRAMUSCULAR; INTRAVENOUS; SUBCUTANEOUS at 09:19

## 2025-02-23 RX ADMIN — HEPARIN 500 UNITS: 100 SYRINGE at 09:17

## 2025-02-23 RX ADMIN — SODIUM CHLORIDE, PRESERVATIVE FREE 10 ML: 5 INJECTION INTRAVENOUS at 09:24

## 2025-02-23 RX ADMIN — SODIUM CHLORIDE, PRESERVATIVE FREE 10 ML: 5 INJECTION INTRAVENOUS at 09:17

## 2025-02-24 ENCOUNTER — HOSPITAL ENCOUNTER (OUTPATIENT)
Dept: INFUSION THERAPY | Age: 80
Setting detail: INFUSION SERIES
Discharge: HOME | End: 2025-02-24
Payer: MEDICARE

## 2025-02-24 VITALS
RESPIRATION RATE: 16 BRPM | DIASTOLIC BLOOD PRESSURE: 78 MMHG | HEART RATE: 73 BPM | SYSTOLIC BLOOD PRESSURE: 111 MMHG | OXYGEN SATURATION: 99 % | TEMPERATURE: 97.5 F

## 2025-02-24 DIAGNOSIS — B95.2 BACTEREMIA DUE TO ENTEROCOCCUS: Primary | ICD-10-CM

## 2025-02-24 DIAGNOSIS — R78.81 BACTEREMIA DUE TO ENTEROCOCCUS: Primary | ICD-10-CM

## 2025-02-24 LAB
ALBUMIN SERPL-MCNC: 3.2 G/DL (ref 3.5–5.2)
ALP SERPL-CCNC: 107 U/L (ref 40–129)
ALT SERPL-CCNC: 24 U/L (ref 0–40)
ANION GAP SERPL CALCULATED.3IONS-SCNC: 8 MMOL/L (ref 7–16)
AST SERPL-CCNC: 35 U/L (ref 0–39)
BASOPHILS # BLD: 0.06 K/UL (ref 0–0.2)
BASOPHILS NFR BLD: 1 % (ref 0–2)
BILIRUB SERPL-MCNC: 0.4 MG/DL (ref 0–1.2)
BUN SERPL-MCNC: 16 MG/DL (ref 6–23)
CALCIUM SERPL-MCNC: 7.9 MG/DL (ref 8.6–10.2)
CHLORIDE SERPL-SCNC: 105 MMOL/L (ref 98–107)
CK SERPL-CCNC: 92 U/L (ref 20–200)
CO2 SERPL-SCNC: 26 MMOL/L (ref 22–29)
CREAT SERPL-MCNC: 1.1 MG/DL (ref 0.7–1.2)
CRP SERPL HS-MCNC: 23 MG/L (ref 0–5)
EOSINOPHIL # BLD: 0.24 K/UL (ref 0.05–0.5)
EOSINOPHILS RELATIVE PERCENT: 3 % (ref 0–6)
ERYTHROCYTE [DISTWIDTH] IN BLOOD BY AUTOMATED COUNT: 18.3 % (ref 11.5–15)
ERYTHROCYTE [SEDIMENTATION RATE] IN BLOOD BY WESTERGREN METHOD: 1 MM/HR (ref 0–15)
GFR, ESTIMATED: 73 ML/MIN/1.73M2
GLUCOSE SERPL-MCNC: 87 MG/DL (ref 74–99)
HCT VFR BLD AUTO: 30.5 % (ref 37–54)
HGB BLD-MCNC: 10 G/DL (ref 12.5–16.5)
IMM GRANULOCYTES # BLD AUTO: 0.03 K/UL (ref 0–0.58)
IMM GRANULOCYTES NFR BLD: 0 % (ref 0–5)
LYMPHOCYTES NFR BLD: 1.01 K/UL (ref 1.5–4)
LYMPHOCYTES RELATIVE PERCENT: 14 % (ref 20–42)
MCH RBC QN AUTO: 29.3 PG (ref 26–35)
MCHC RBC AUTO-ENTMCNC: 32.8 G/DL (ref 32–34.5)
MCV RBC AUTO: 89.4 FL (ref 80–99.9)
MONOCYTES NFR BLD: 0.87 K/UL (ref 0.1–0.95)
MONOCYTES NFR BLD: 12 % (ref 2–12)
NEUTROPHILS NFR BLD: 70 % (ref 43–80)
NEUTS SEG NFR BLD: 5.24 K/UL (ref 1.8–7.3)
PLATELET # BLD AUTO: 263 K/UL (ref 130–450)
PMV BLD AUTO: 9.5 FL (ref 7–12)
POTASSIUM SERPL-SCNC: 3.8 MMOL/L (ref 3.5–5)
PROT SERPL-MCNC: 5.5 G/DL (ref 6.4–8.3)
RBC # BLD AUTO: 3.41 M/UL (ref 3.8–5.8)
SODIUM SERPL-SCNC: 139 MMOL/L (ref 132–146)
WBC OTHER # BLD: 7.5 K/UL (ref 4.5–11.5)

## 2025-02-24 PROCEDURE — 85652 RBC SED RATE AUTOMATED: CPT

## 2025-02-24 PROCEDURE — 96374 THER/PROPH/DIAG INJ IV PUSH: CPT

## 2025-02-24 PROCEDURE — 36592 COLLECT BLOOD FROM PICC: CPT

## 2025-02-24 PROCEDURE — 2580000003 HC RX 258: Performed by: INTERNAL MEDICINE

## 2025-02-24 PROCEDURE — 2500000003 HC RX 250 WO HCPCS: Performed by: INTERNAL MEDICINE

## 2025-02-24 PROCEDURE — 6360000002 HC RX W HCPCS: Performed by: INTERNAL MEDICINE

## 2025-02-24 PROCEDURE — 86140 C-REACTIVE PROTEIN: CPT

## 2025-02-24 PROCEDURE — 80053 COMPREHEN METABOLIC PANEL: CPT

## 2025-02-24 PROCEDURE — 82550 ASSAY OF CK (CPK): CPT

## 2025-02-24 PROCEDURE — 85025 COMPLETE CBC W/AUTO DIFF WBC: CPT

## 2025-02-24 RX ORDER — DIPHENHYDRAMINE HYDROCHLORIDE 50 MG/ML
50 INJECTION INTRAMUSCULAR; INTRAVENOUS
Status: CANCELLED | OUTPATIENT
Start: 2025-02-25

## 2025-02-24 RX ORDER — HEPARIN 100 UNIT/ML
500 SYRINGE INTRAVENOUS PRN
Status: DISCONTINUED | OUTPATIENT
Start: 2025-02-24 | End: 2025-02-25 | Stop reason: HOSPADM

## 2025-02-24 RX ORDER — SODIUM CHLORIDE 0.9 % (FLUSH) 0.9 %
5-40 SYRINGE (ML) INJECTION PRN
Status: DISCONTINUED | OUTPATIENT
Start: 2025-02-24 | End: 2025-02-25 | Stop reason: HOSPADM

## 2025-02-24 RX ORDER — EPINEPHRINE 1 MG/ML
0.3 INJECTION, SOLUTION, CONCENTRATE INTRAVENOUS PRN
Status: CANCELLED | OUTPATIENT
Start: 2025-02-25

## 2025-02-24 RX ORDER — DIPHENHYDRAMINE HCL 25 MG
50 TABLET ORAL ONCE
Status: CANCELLED
Start: 2025-02-25 | End: 2025-02-25

## 2025-02-24 RX ORDER — SODIUM CHLORIDE 0.9 % (FLUSH) 0.9 %
5-40 SYRINGE (ML) INJECTION PRN
Status: CANCELLED | OUTPATIENT
Start: 2025-02-25

## 2025-02-24 RX ORDER — HYDROCORTISONE SODIUM SUCCINATE 100 MG/2ML
100 INJECTION INTRAMUSCULAR; INTRAVENOUS
Status: CANCELLED | OUTPATIENT
Start: 2025-02-25

## 2025-02-24 RX ORDER — HEPARIN 100 UNIT/ML
500 SYRINGE INTRAVENOUS PRN
Status: CANCELLED | OUTPATIENT
Start: 2025-02-25

## 2025-02-24 RX ORDER — SODIUM CHLORIDE 9 MG/ML
5-250 INJECTION, SOLUTION INTRAVENOUS PRN
Status: CANCELLED | OUTPATIENT
Start: 2025-02-25

## 2025-02-24 RX ADMIN — SODIUM CHLORIDE, PRESERVATIVE FREE 10 ML: 5 INJECTION INTRAVENOUS at 13:40

## 2025-02-24 RX ADMIN — SODIUM CHLORIDE 500 MG: 9 INJECTION INTRAMUSCULAR; INTRAVENOUS; SUBCUTANEOUS at 13:34

## 2025-02-24 RX ADMIN — SODIUM CHLORIDE, PRESERVATIVE FREE 10 ML: 5 INJECTION INTRAVENOUS at 13:34

## 2025-02-24 RX ADMIN — SODIUM CHLORIDE, PRESERVATIVE FREE 10 ML: 5 INJECTION INTRAVENOUS at 13:35

## 2025-02-24 RX ADMIN — HEPARIN 500 UNITS: 100 SYRINGE at 13:35

## 2025-02-24 RX ADMIN — SODIUM CHLORIDE, PRESERVATIVE FREE 10 ML: 5 INJECTION INTRAVENOUS at 13:32

## 2025-02-24 RX ADMIN — HEPARIN 500 UNITS: 100 SYRINGE at 13:41

## 2025-02-24 NOTE — PROGRESS NOTES
Patient tolerated IV daptomycin push and blood draw per line well.  Patient alert and oriented x3. No distress noted. Vital signs stable. Patient denies any new or worsening pain. Offered patient education and/or discharge material. Patient declined. Patient denies any needs. All questions answered. D/C in stable condition.

## 2025-02-25 ENCOUNTER — HOSPITAL ENCOUNTER (OUTPATIENT)
Dept: INFUSION THERAPY | Age: 80
Setting detail: INFUSION SERIES
Discharge: HOME | End: 2025-02-25
Payer: MEDICARE

## 2025-02-25 VITALS
DIASTOLIC BLOOD PRESSURE: 79 MMHG | HEART RATE: 72 BPM | TEMPERATURE: 97.8 F | RESPIRATION RATE: 16 BRPM | OXYGEN SATURATION: 99 % | SYSTOLIC BLOOD PRESSURE: 127 MMHG

## 2025-02-25 DIAGNOSIS — R78.81 BACTEREMIA DUE TO ENTEROCOCCUS: Primary | ICD-10-CM

## 2025-02-25 DIAGNOSIS — B95.2 BACTEREMIA DUE TO ENTEROCOCCUS: Primary | ICD-10-CM

## 2025-02-25 PROCEDURE — 2580000003 HC RX 258: Performed by: INTERNAL MEDICINE

## 2025-02-25 PROCEDURE — 6360000002 HC RX W HCPCS: Performed by: INTERNAL MEDICINE

## 2025-02-25 PROCEDURE — 96374 THER/PROPH/DIAG INJ IV PUSH: CPT

## 2025-02-25 PROCEDURE — 2500000003 HC RX 250 WO HCPCS: Performed by: INTERNAL MEDICINE

## 2025-02-25 RX ORDER — DIPHENHYDRAMINE HCL 25 MG
50 TABLET ORAL ONCE
Status: CANCELLED
Start: 2025-02-26 | End: 2025-02-26

## 2025-02-25 RX ORDER — EPINEPHRINE 1 MG/ML
0.3 INJECTION, SOLUTION, CONCENTRATE INTRAVENOUS PRN
Status: CANCELLED | OUTPATIENT
Start: 2025-02-26

## 2025-02-25 RX ORDER — SODIUM CHLORIDE 9 MG/ML
5-250 INJECTION, SOLUTION INTRAVENOUS PRN
Status: CANCELLED | OUTPATIENT
Start: 2025-02-26

## 2025-02-25 RX ORDER — HEPARIN 100 UNIT/ML
500 SYRINGE INTRAVENOUS PRN
Status: DISCONTINUED | OUTPATIENT
Start: 2025-02-25 | End: 2025-02-26 | Stop reason: HOSPADM

## 2025-02-25 RX ORDER — SODIUM CHLORIDE 0.9 % (FLUSH) 0.9 %
5-40 SYRINGE (ML) INJECTION PRN
Status: CANCELLED | OUTPATIENT
Start: 2025-02-26

## 2025-02-25 RX ORDER — DIPHENHYDRAMINE HYDROCHLORIDE 50 MG/ML
50 INJECTION INTRAMUSCULAR; INTRAVENOUS
Status: CANCELLED | OUTPATIENT
Start: 2025-02-26

## 2025-02-25 RX ORDER — HEPARIN 100 UNIT/ML
500 SYRINGE INTRAVENOUS PRN
Status: CANCELLED | OUTPATIENT
Start: 2025-02-26

## 2025-02-25 RX ORDER — SODIUM CHLORIDE 0.9 % (FLUSH) 0.9 %
5-40 SYRINGE (ML) INJECTION PRN
Status: DISCONTINUED | OUTPATIENT
Start: 2025-02-25 | End: 2025-02-26 | Stop reason: HOSPADM

## 2025-02-25 RX ORDER — HYDROCORTISONE SODIUM SUCCINATE 100 MG/2ML
100 INJECTION INTRAMUSCULAR; INTRAVENOUS
Status: CANCELLED | OUTPATIENT
Start: 2025-02-26

## 2025-02-25 RX ADMIN — SODIUM CHLORIDE, PRESERVATIVE FREE 10 ML: 5 INJECTION INTRAVENOUS at 14:00

## 2025-02-25 RX ADMIN — HEPARIN 500 UNITS: 100 SYRINGE at 14:10

## 2025-02-25 RX ADMIN — SODIUM CHLORIDE 500 MG: 9 INJECTION INTRAMUSCULAR; INTRAVENOUS; SUBCUTANEOUS at 14:01

## 2025-02-25 RX ADMIN — SODIUM CHLORIDE, PRESERVATIVE FREE 10 ML: 5 INJECTION INTRAVENOUS at 14:10

## 2025-02-25 ASSESSMENT — PAIN SCALES - GENERAL: PAINLEVEL_OUTOF10: 7

## 2025-02-25 ASSESSMENT — PAIN DESCRIPTION - PAIN TYPE: TYPE: ACUTE PAIN

## 2025-02-25 ASSESSMENT — PAIN DESCRIPTION - ORIENTATION: ORIENTATION: LEFT

## 2025-02-25 ASSESSMENT — PAIN DESCRIPTION - ONSET: ONSET: SUDDEN

## 2025-02-25 ASSESSMENT — PAIN - FUNCTIONAL ASSESSMENT: PAIN_FUNCTIONAL_ASSESSMENT: PREVENTS OR INTERFERES SOME ACTIVE ACTIVITIES AND ADLS

## 2025-02-25 ASSESSMENT — PAIN DESCRIPTION - LOCATION: LOCATION: BACK;FLANK

## 2025-02-25 ASSESSMENT — PAIN DESCRIPTION - FREQUENCY: FREQUENCY: INTERMITTENT

## 2025-02-25 ASSESSMENT — PAIN DESCRIPTION - DESCRIPTORS: DESCRIPTORS: JABBING;PRESSURE

## 2025-02-26 ENCOUNTER — HOSPITAL ENCOUNTER (OUTPATIENT)
Dept: INFUSION THERAPY | Age: 80
Setting detail: INFUSION SERIES
Discharge: HOME | End: 2025-02-26
Payer: MEDICARE

## 2025-02-26 VITALS
OXYGEN SATURATION: 100 % | DIASTOLIC BLOOD PRESSURE: 70 MMHG | RESPIRATION RATE: 16 BRPM | HEART RATE: 75 BPM | SYSTOLIC BLOOD PRESSURE: 115 MMHG | TEMPERATURE: 97.6 F

## 2025-02-26 DIAGNOSIS — B95.2 BACTEREMIA DUE TO ENTEROCOCCUS: Primary | ICD-10-CM

## 2025-02-26 DIAGNOSIS — R78.81 BACTEREMIA DUE TO ENTEROCOCCUS: Primary | ICD-10-CM

## 2025-02-26 PROCEDURE — 6360000002 HC RX W HCPCS: Performed by: INTERNAL MEDICINE

## 2025-02-26 PROCEDURE — 2500000003 HC RX 250 WO HCPCS: Performed by: INTERNAL MEDICINE

## 2025-02-26 PROCEDURE — 2580000003 HC RX 258: Performed by: INTERNAL MEDICINE

## 2025-02-26 PROCEDURE — 96374 THER/PROPH/DIAG INJ IV PUSH: CPT

## 2025-02-26 RX ORDER — SODIUM CHLORIDE 9 MG/ML
5-250 INJECTION, SOLUTION INTRAVENOUS PRN
Status: CANCELLED | OUTPATIENT
Start: 2025-02-27

## 2025-02-26 RX ORDER — HEPARIN 100 UNIT/ML
500 SYRINGE INTRAVENOUS PRN
Status: DISCONTINUED | OUTPATIENT
Start: 2025-02-26 | End: 2025-02-27 | Stop reason: HOSPADM

## 2025-02-26 RX ORDER — HYDROCORTISONE SODIUM SUCCINATE 100 MG/2ML
100 INJECTION INTRAMUSCULAR; INTRAVENOUS
Status: CANCELLED | OUTPATIENT
Start: 2025-02-27

## 2025-02-26 RX ORDER — EPINEPHRINE 1 MG/ML
0.3 INJECTION, SOLUTION, CONCENTRATE INTRAVENOUS PRN
Status: CANCELLED | OUTPATIENT
Start: 2025-02-27

## 2025-02-26 RX ORDER — SODIUM CHLORIDE 0.9 % (FLUSH) 0.9 %
5-40 SYRINGE (ML) INJECTION PRN
Status: DISCONTINUED | OUTPATIENT
Start: 2025-02-26 | End: 2025-02-27 | Stop reason: HOSPADM

## 2025-02-26 RX ORDER — HEPARIN 100 UNIT/ML
500 SYRINGE INTRAVENOUS PRN
Status: CANCELLED | OUTPATIENT
Start: 2025-02-27

## 2025-02-26 RX ORDER — DIPHENHYDRAMINE HYDROCHLORIDE 50 MG/ML
50 INJECTION INTRAMUSCULAR; INTRAVENOUS
Status: CANCELLED | OUTPATIENT
Start: 2025-02-27

## 2025-02-26 RX ORDER — DIPHENHYDRAMINE HCL 25 MG
50 TABLET ORAL ONCE
Status: CANCELLED
Start: 2025-02-27 | End: 2025-02-27

## 2025-02-26 RX ORDER — SODIUM CHLORIDE 0.9 % (FLUSH) 0.9 %
5-40 SYRINGE (ML) INJECTION PRN
Status: CANCELLED | OUTPATIENT
Start: 2025-02-27

## 2025-02-26 RX ADMIN — HEPARIN 500 UNITS: 100 SYRINGE at 13:36

## 2025-02-26 RX ADMIN — SODIUM CHLORIDE 500 MG: 9 INJECTION INTRAMUSCULAR; INTRAVENOUS; SUBCUTANEOUS at 13:30

## 2025-02-26 RX ADMIN — SODIUM CHLORIDE, PRESERVATIVE FREE 10 ML: 5 INJECTION INTRAVENOUS at 13:36

## 2025-02-26 RX ADMIN — SODIUM CHLORIDE, PRESERVATIVE FREE 10 ML: 5 INJECTION INTRAVENOUS at 13:35

## 2025-02-26 RX ADMIN — SODIUM CHLORIDE, PRESERVATIVE FREE 10 ML: 5 INJECTION INTRAVENOUS at 13:30

## 2025-02-26 RX ADMIN — HEPARIN 500 UNITS: 100 SYRINGE at 13:35

## 2025-02-26 NOTE — PROGRESS NOTES
Patient tolerated IV dapto push well.  Patient alert and oriented x3. No distress noted. Vital signs stable. Patient denies any new or worsening pain. Patient denies any needs. All questions answered. D/C in stable condition.

## 2025-02-27 ENCOUNTER — HOSPITAL ENCOUNTER (OUTPATIENT)
Dept: INFUSION THERAPY | Age: 80
Setting detail: INFUSION SERIES
Discharge: HOME | End: 2025-02-27
Payer: MEDICARE

## 2025-02-27 VITALS
RESPIRATION RATE: 16 BRPM | TEMPERATURE: 98.3 F | OXYGEN SATURATION: 98 % | HEART RATE: 64 BPM | SYSTOLIC BLOOD PRESSURE: 108 MMHG | DIASTOLIC BLOOD PRESSURE: 68 MMHG

## 2025-02-27 DIAGNOSIS — R78.81 BACTEREMIA DUE TO ENTEROCOCCUS: Primary | ICD-10-CM

## 2025-02-27 DIAGNOSIS — B95.2 BACTEREMIA DUE TO ENTEROCOCCUS: Primary | ICD-10-CM

## 2025-02-27 LAB
ALBUMIN SERPL-MCNC: 3.1 G/DL (ref 3.5–5.2)
ALP SERPL-CCNC: 105 U/L (ref 40–129)
ALT SERPL-CCNC: 26 U/L (ref 0–40)
ANION GAP SERPL CALCULATED.3IONS-SCNC: 11 MMOL/L (ref 7–16)
AST SERPL-CCNC: 39 U/L (ref 0–39)
BASOPHILS # BLD: 0.06 K/UL (ref 0–0.2)
BASOPHILS NFR BLD: 1 % (ref 0–2)
BILIRUB SERPL-MCNC: 0.5 MG/DL (ref 0–1.2)
BUN SERPL-MCNC: 15 MG/DL (ref 6–23)
CALCIUM SERPL-MCNC: 8.2 MG/DL (ref 8.6–10.2)
CHLORIDE SERPL-SCNC: 106 MMOL/L (ref 98–107)
CO2 SERPL-SCNC: 24 MMOL/L (ref 22–29)
CREAT SERPL-MCNC: 1 MG/DL (ref 0.7–1.2)
EOSINOPHIL # BLD: 0.08 K/UL (ref 0.05–0.5)
EOSINOPHILS RELATIVE PERCENT: 1 % (ref 0–6)
ERYTHROCYTE [DISTWIDTH] IN BLOOD BY AUTOMATED COUNT: 19 % (ref 11.5–15)
GFR, ESTIMATED: 78 ML/MIN/1.73M2
GLUCOSE SERPL-MCNC: 78 MG/DL (ref 74–99)
HCT VFR BLD AUTO: 31.3 % (ref 37–54)
HGB BLD-MCNC: 10.1 G/DL (ref 12.5–16.5)
IMM GRANULOCYTES # BLD AUTO: 0.04 K/UL (ref 0–0.58)
IMM GRANULOCYTES NFR BLD: 1 % (ref 0–5)
LYMPHOCYTES NFR BLD: 0.85 K/UL (ref 1.5–4)
LYMPHOCYTES RELATIVE PERCENT: 10 % (ref 20–42)
MCH RBC QN AUTO: 29.8 PG (ref 26–35)
MCHC RBC AUTO-ENTMCNC: 32.3 G/DL (ref 32–34.5)
MCV RBC AUTO: 92.3 FL (ref 80–99.9)
MONOCYTES NFR BLD: 0.94 K/UL (ref 0.1–0.95)
MONOCYTES NFR BLD: 11 % (ref 2–12)
NEUTROPHILS NFR BLD: 76 % (ref 43–80)
NEUTS SEG NFR BLD: 6.36 K/UL (ref 1.8–7.3)
PLATELET # BLD AUTO: 238 K/UL (ref 130–450)
PMV BLD AUTO: 10.3 FL (ref 7–12)
POTASSIUM SERPL-SCNC: 3.8 MMOL/L (ref 3.5–5)
PROT SERPL-MCNC: 5.7 G/DL (ref 6.4–8.3)
RBC # BLD AUTO: 3.39 M/UL (ref 3.8–5.8)
SODIUM SERPL-SCNC: 141 MMOL/L (ref 132–146)
WBC OTHER # BLD: 8.3 K/UL (ref 4.5–11.5)

## 2025-02-27 PROCEDURE — 96374 THER/PROPH/DIAG INJ IV PUSH: CPT

## 2025-02-27 PROCEDURE — 80053 COMPREHEN METABOLIC PANEL: CPT

## 2025-02-27 PROCEDURE — 2500000003 HC RX 250 WO HCPCS: Performed by: INTERNAL MEDICINE

## 2025-02-27 PROCEDURE — 6360000002 HC RX W HCPCS: Performed by: INTERNAL MEDICINE

## 2025-02-27 PROCEDURE — 2580000003 HC RX 258: Performed by: INTERNAL MEDICINE

## 2025-02-27 PROCEDURE — 36592 COLLECT BLOOD FROM PICC: CPT

## 2025-02-27 PROCEDURE — 85025 COMPLETE CBC W/AUTO DIFF WBC: CPT

## 2025-02-27 RX ORDER — EPINEPHRINE 1 MG/ML
0.3 INJECTION, SOLUTION, CONCENTRATE INTRAVENOUS PRN
Status: CANCELLED | OUTPATIENT
Start: 2025-02-28

## 2025-02-27 RX ORDER — DIPHENHYDRAMINE HCL 25 MG
50 TABLET ORAL ONCE
Status: CANCELLED
Start: 2025-02-28 | End: 2025-02-28

## 2025-02-27 RX ORDER — HEPARIN 100 UNIT/ML
500 SYRINGE INTRAVENOUS PRN
Status: DISCONTINUED | OUTPATIENT
Start: 2025-02-27 | End: 2025-02-28 | Stop reason: HOSPADM

## 2025-02-27 RX ORDER — HYDROCORTISONE SODIUM SUCCINATE 100 MG/2ML
100 INJECTION INTRAMUSCULAR; INTRAVENOUS
Status: CANCELLED | OUTPATIENT
Start: 2025-02-28

## 2025-02-27 RX ORDER — HEPARIN 100 UNIT/ML
500 SYRINGE INTRAVENOUS PRN
Status: CANCELLED | OUTPATIENT
Start: 2025-02-28

## 2025-02-27 RX ORDER — SODIUM CHLORIDE 9 MG/ML
5-250 INJECTION, SOLUTION INTRAVENOUS PRN
Status: CANCELLED | OUTPATIENT
Start: 2025-02-28

## 2025-02-27 RX ORDER — SODIUM CHLORIDE 0.9 % (FLUSH) 0.9 %
5-40 SYRINGE (ML) INJECTION PRN
Status: DISCONTINUED | OUTPATIENT
Start: 2025-02-27 | End: 2025-02-28 | Stop reason: HOSPADM

## 2025-02-27 RX ORDER — SODIUM CHLORIDE 0.9 % (FLUSH) 0.9 %
5-40 SYRINGE (ML) INJECTION PRN
Status: CANCELLED | OUTPATIENT
Start: 2025-02-28

## 2025-02-27 RX ORDER — DIPHENHYDRAMINE HYDROCHLORIDE 50 MG/ML
50 INJECTION INTRAMUSCULAR; INTRAVENOUS
Status: CANCELLED | OUTPATIENT
Start: 2025-02-28

## 2025-02-27 RX ADMIN — SODIUM CHLORIDE, PRESERVATIVE FREE 10 ML: 5 INJECTION INTRAVENOUS at 13:48

## 2025-02-27 RX ADMIN — HEPARIN 500 UNITS: 100 SYRINGE at 13:47

## 2025-02-27 RX ADMIN — SODIUM CHLORIDE, PRESERVATIVE FREE 10 ML: 5 INJECTION INTRAVENOUS at 13:40

## 2025-02-27 RX ADMIN — SODIUM CHLORIDE 500 MG: 9 INJECTION INTRAMUSCULAR; INTRAVENOUS; SUBCUTANEOUS at 13:43

## 2025-02-27 RX ADMIN — SODIUM CHLORIDE, PRESERVATIVE FREE 10 ML: 5 INJECTION INTRAVENOUS at 13:47

## 2025-02-27 RX ADMIN — HEPARIN 500 UNITS: 100 SYRINGE at 13:48

## 2025-02-27 RX ADMIN — SODIUM CHLORIDE, PRESERVATIVE FREE 10 ML: 5 INJECTION INTRAVENOUS at 13:42

## 2025-02-28 ENCOUNTER — TELEPHONE (OUTPATIENT)
Dept: NON INVASIVE DIAGNOSTICS | Age: 80
End: 2025-02-28

## 2025-02-28 ENCOUNTER — HOSPITAL ENCOUNTER (OUTPATIENT)
Dept: INFUSION THERAPY | Age: 80
Setting detail: INFUSION SERIES
Discharge: HOME | End: 2025-02-28
Payer: MEDICARE

## 2025-02-28 VITALS
WEIGHT: 171.2 LBS | TEMPERATURE: 97.2 F | DIASTOLIC BLOOD PRESSURE: 70 MMHG | SYSTOLIC BLOOD PRESSURE: 116 MMHG | HEIGHT: 63 IN | OXYGEN SATURATION: 100 % | RESPIRATION RATE: 16 BRPM | BODY MASS INDEX: 30.33 KG/M2 | HEART RATE: 63 BPM

## 2025-02-28 DIAGNOSIS — B95.2 BACTEREMIA DUE TO ENTEROCOCCUS: Primary | ICD-10-CM

## 2025-02-28 DIAGNOSIS — R78.81 BACTEREMIA DUE TO ENTEROCOCCUS: Primary | ICD-10-CM

## 2025-02-28 PROCEDURE — 2580000003 HC RX 258: Performed by: INTERNAL MEDICINE

## 2025-02-28 PROCEDURE — 96374 THER/PROPH/DIAG INJ IV PUSH: CPT

## 2025-02-28 PROCEDURE — 6360000002 HC RX W HCPCS: Performed by: INTERNAL MEDICINE

## 2025-02-28 PROCEDURE — 2500000003 HC RX 250 WO HCPCS: Performed by: INTERNAL MEDICINE

## 2025-02-28 RX ORDER — SODIUM CHLORIDE 0.9 % (FLUSH) 0.9 %
5-40 SYRINGE (ML) INJECTION PRN
Status: DISCONTINUED | OUTPATIENT
Start: 2025-02-28 | End: 2025-03-01 | Stop reason: HOSPADM

## 2025-02-28 RX ORDER — SODIUM CHLORIDE 9 MG/ML
5-250 INJECTION, SOLUTION INTRAVENOUS PRN
Status: CANCELLED | OUTPATIENT
Start: 2025-03-01

## 2025-02-28 RX ORDER — SODIUM CHLORIDE 0.9 % (FLUSH) 0.9 %
5-40 SYRINGE (ML) INJECTION PRN
Status: CANCELLED | OUTPATIENT
Start: 2025-03-01

## 2025-02-28 RX ORDER — DIPHENHYDRAMINE HYDROCHLORIDE 50 MG/ML
50 INJECTION INTRAMUSCULAR; INTRAVENOUS
Status: CANCELLED | OUTPATIENT
Start: 2025-03-01

## 2025-02-28 RX ORDER — EPINEPHRINE 1 MG/ML
0.3 INJECTION, SOLUTION, CONCENTRATE INTRAVENOUS PRN
Status: CANCELLED | OUTPATIENT
Start: 2025-03-01

## 2025-02-28 RX ORDER — HYDROCORTISONE SODIUM SUCCINATE 100 MG/2ML
100 INJECTION INTRAMUSCULAR; INTRAVENOUS
Status: CANCELLED | OUTPATIENT
Start: 2025-03-01

## 2025-02-28 RX ORDER — HEPARIN 100 UNIT/ML
500 SYRINGE INTRAVENOUS PRN
Status: DISCONTINUED | OUTPATIENT
Start: 2025-02-28 | End: 2025-03-01 | Stop reason: HOSPADM

## 2025-02-28 RX ORDER — DIPHENHYDRAMINE HCL 25 MG
50 TABLET ORAL ONCE
Status: CANCELLED
Start: 2025-03-01 | End: 2025-03-01

## 2025-02-28 RX ORDER — HEPARIN 100 UNIT/ML
500 SYRINGE INTRAVENOUS PRN
Status: CANCELLED | OUTPATIENT
Start: 2025-03-01

## 2025-02-28 RX ADMIN — SODIUM CHLORIDE, PRESERVATIVE FREE 10 ML: 5 INJECTION INTRAVENOUS at 13:59

## 2025-02-28 RX ADMIN — SODIUM CHLORIDE 500 MG: 9 INJECTION INTRAMUSCULAR; INTRAVENOUS; SUBCUTANEOUS at 13:56

## 2025-02-28 RX ADMIN — HEPARIN 500 UNITS: 100 SYRINGE at 13:59

## 2025-02-28 RX ADMIN — SODIUM CHLORIDE, PRESERVATIVE FREE 10 ML: 5 INJECTION INTRAVENOUS at 13:56

## 2025-02-28 RX ADMIN — HEPARIN 500 UNITS: 100 SYRINGE at 14:02

## 2025-02-28 RX ADMIN — SODIUM CHLORIDE, PRESERVATIVE FREE 10 ML: 5 INJECTION INTRAVENOUS at 14:02

## 2025-02-28 NOTE — PROGRESS NOTES
Patient tolerated IV daptomycin push and picc line dressing change well.  Patient alert and oriented x3. No distress noted. Vital signs stable. Patient denies any new or worsening pain.  Offered patient education and/or discharge material. Patient declined. Patient denies any needs. All questions answered. D/C in stable condition.

## 2025-03-01 ENCOUNTER — HOSPITAL ENCOUNTER (OUTPATIENT)
Dept: INFUSION THERAPY | Age: 80
Setting detail: INFUSION SERIES
Discharge: HOME | End: 2025-03-01
Payer: MEDICARE

## 2025-03-01 VITALS
RESPIRATION RATE: 16 BRPM | SYSTOLIC BLOOD PRESSURE: 116 MMHG | HEART RATE: 69 BPM | DIASTOLIC BLOOD PRESSURE: 77 MMHG | TEMPERATURE: 97.5 F | OXYGEN SATURATION: 93 %

## 2025-03-01 DIAGNOSIS — R78.81 BACTEREMIA DUE TO ENTEROCOCCUS: Primary | ICD-10-CM

## 2025-03-01 DIAGNOSIS — B95.2 BACTEREMIA DUE TO ENTEROCOCCUS: Primary | ICD-10-CM

## 2025-03-01 PROCEDURE — 6360000002 HC RX W HCPCS: Performed by: INTERNAL MEDICINE

## 2025-03-01 PROCEDURE — 96374 THER/PROPH/DIAG INJ IV PUSH: CPT

## 2025-03-01 PROCEDURE — 2500000003 HC RX 250 WO HCPCS: Performed by: INTERNAL MEDICINE

## 2025-03-01 PROCEDURE — 2580000003 HC RX 258: Performed by: INTERNAL MEDICINE

## 2025-03-01 RX ORDER — HYDROCORTISONE SODIUM SUCCINATE 100 MG/2ML
100 INJECTION INTRAMUSCULAR; INTRAVENOUS
Status: CANCELLED | OUTPATIENT
Start: 2025-03-02

## 2025-03-01 RX ORDER — EPINEPHRINE 1 MG/ML
0.3 INJECTION, SOLUTION, CONCENTRATE INTRAVENOUS PRN
Status: CANCELLED | OUTPATIENT
Start: 2025-03-02

## 2025-03-01 RX ORDER — HEPARIN 100 UNIT/ML
500 SYRINGE INTRAVENOUS PRN
Status: DISCONTINUED | OUTPATIENT
Start: 2025-03-01 | End: 2025-03-02 | Stop reason: HOSPADM

## 2025-03-01 RX ORDER — SODIUM CHLORIDE 9 MG/ML
5-250 INJECTION, SOLUTION INTRAVENOUS PRN
Status: CANCELLED | OUTPATIENT
Start: 2025-03-02

## 2025-03-01 RX ORDER — DIPHENHYDRAMINE HYDROCHLORIDE 50 MG/ML
50 INJECTION INTRAMUSCULAR; INTRAVENOUS
Status: CANCELLED | OUTPATIENT
Start: 2025-03-02

## 2025-03-01 RX ORDER — SODIUM CHLORIDE 0.9 % (FLUSH) 0.9 %
5-40 SYRINGE (ML) INJECTION PRN
Status: CANCELLED | OUTPATIENT
Start: 2025-03-02

## 2025-03-01 RX ORDER — HEPARIN 100 UNIT/ML
500 SYRINGE INTRAVENOUS PRN
Status: CANCELLED | OUTPATIENT
Start: 2025-03-02

## 2025-03-01 RX ORDER — SODIUM CHLORIDE 0.9 % (FLUSH) 0.9 %
5-40 SYRINGE (ML) INJECTION PRN
Status: DISCONTINUED | OUTPATIENT
Start: 2025-03-01 | End: 2025-03-02 | Stop reason: HOSPADM

## 2025-03-01 RX ORDER — DIPHENHYDRAMINE HCL 25 MG
50 TABLET ORAL ONCE
Status: CANCELLED
Start: 2025-03-02 | End: 2025-03-02

## 2025-03-01 RX ADMIN — SODIUM CHLORIDE, PRESERVATIVE FREE 10 ML: 5 INJECTION INTRAVENOUS at 09:05

## 2025-03-01 RX ADMIN — SODIUM CHLORIDE, PRESERVATIVE FREE 10 ML: 5 INJECTION INTRAVENOUS at 09:12

## 2025-03-01 RX ADMIN — HEPARIN 500 UNITS: 100 SYRINGE at 09:11

## 2025-03-01 RX ADMIN — DAPTOMYCIN 500 MG: 500 INJECTION, POWDER, LYOPHILIZED, FOR SOLUTION INTRAVENOUS at 09:06

## 2025-03-01 RX ADMIN — HEPARIN 500 UNITS: 100 SYRINGE at 09:12

## 2025-03-01 RX ADMIN — SODIUM CHLORIDE, PRESERVATIVE FREE 10 ML: 5 INJECTION INTRAVENOUS at 09:11

## 2025-03-01 ASSESSMENT — PAIN DESCRIPTION - FREQUENCY: FREQUENCY: INTERMITTENT

## 2025-03-01 ASSESSMENT — PAIN SCALES - GENERAL: PAINLEVEL_OUTOF10: 7

## 2025-03-01 ASSESSMENT — PAIN - FUNCTIONAL ASSESSMENT: PAIN_FUNCTIONAL_ASSESSMENT: PREVENTS OR INTERFERES SOME ACTIVE ACTIVITIES AND ADLS

## 2025-03-01 ASSESSMENT — PAIN DESCRIPTION - PAIN TYPE: TYPE: ACUTE PAIN

## 2025-03-01 ASSESSMENT — PAIN DESCRIPTION - LOCATION: LOCATION: BACK

## 2025-03-01 ASSESSMENT — PAIN DESCRIPTION - ONSET: ONSET: ON-GOING

## 2025-03-01 ASSESSMENT — PAIN DESCRIPTION - DESCRIPTORS: DESCRIPTORS: ACHING

## 2025-03-01 ASSESSMENT — PAIN DESCRIPTION - ORIENTATION: ORIENTATION: LOWER

## 2025-03-02 ENCOUNTER — HOSPITAL ENCOUNTER (OUTPATIENT)
Dept: INFUSION THERAPY | Age: 80
Setting detail: INFUSION SERIES
Discharge: HOME | End: 2025-03-02
Payer: MEDICARE

## 2025-03-02 VITALS
SYSTOLIC BLOOD PRESSURE: 113 MMHG | DIASTOLIC BLOOD PRESSURE: 64 MMHG | RESPIRATION RATE: 12 BRPM | HEART RATE: 59 BPM | TEMPERATURE: 97.6 F | OXYGEN SATURATION: 97 %

## 2025-03-02 DIAGNOSIS — B95.2 BACTEREMIA DUE TO ENTEROCOCCUS: Primary | ICD-10-CM

## 2025-03-02 DIAGNOSIS — R78.81 BACTEREMIA DUE TO ENTEROCOCCUS: Primary | ICD-10-CM

## 2025-03-02 PROCEDURE — 2500000003 HC RX 250 WO HCPCS: Performed by: INTERNAL MEDICINE

## 2025-03-02 PROCEDURE — 6360000002 HC RX W HCPCS: Performed by: INTERNAL MEDICINE

## 2025-03-02 PROCEDURE — 96374 THER/PROPH/DIAG INJ IV PUSH: CPT

## 2025-03-02 PROCEDURE — 2580000003 HC RX 258: Performed by: INTERNAL MEDICINE

## 2025-03-02 RX ORDER — SODIUM CHLORIDE 9 MG/ML
5-250 INJECTION, SOLUTION INTRAVENOUS PRN
Status: CANCELLED | OUTPATIENT
Start: 2025-03-03

## 2025-03-02 RX ORDER — SODIUM CHLORIDE 0.9 % (FLUSH) 0.9 %
5-40 SYRINGE (ML) INJECTION PRN
Status: CANCELLED | OUTPATIENT
Start: 2025-03-03

## 2025-03-02 RX ORDER — HYDROCORTISONE SODIUM SUCCINATE 100 MG/2ML
100 INJECTION INTRAMUSCULAR; INTRAVENOUS
Status: CANCELLED | OUTPATIENT
Start: 2025-03-03

## 2025-03-02 RX ORDER — SODIUM CHLORIDE 0.9 % (FLUSH) 0.9 %
5-40 SYRINGE (ML) INJECTION PRN
Status: DISCONTINUED | OUTPATIENT
Start: 2025-03-02 | End: 2025-03-03 | Stop reason: HOSPADM

## 2025-03-02 RX ORDER — EPINEPHRINE 1 MG/ML
0.3 INJECTION, SOLUTION, CONCENTRATE INTRAVENOUS PRN
Status: CANCELLED | OUTPATIENT
Start: 2025-03-03

## 2025-03-02 RX ORDER — DIPHENHYDRAMINE HYDROCHLORIDE 50 MG/ML
50 INJECTION INTRAMUSCULAR; INTRAVENOUS
Status: CANCELLED | OUTPATIENT
Start: 2025-03-03

## 2025-03-02 RX ORDER — HEPARIN 100 UNIT/ML
500 SYRINGE INTRAVENOUS PRN
Status: CANCELLED | OUTPATIENT
Start: 2025-03-03

## 2025-03-02 RX ORDER — DIPHENHYDRAMINE HCL 25 MG
50 TABLET ORAL ONCE
Status: CANCELLED
Start: 2025-03-03 | End: 2025-03-03

## 2025-03-02 RX ORDER — HEPARIN 100 UNIT/ML
500 SYRINGE INTRAVENOUS PRN
Status: DISCONTINUED | OUTPATIENT
Start: 2025-03-02 | End: 2025-03-03 | Stop reason: HOSPADM

## 2025-03-02 RX ADMIN — HEPARIN 500 UNITS: 100 SYRINGE at 09:19

## 2025-03-02 RX ADMIN — SODIUM CHLORIDE, PRESERVATIVE FREE 10 ML: 5 INJECTION INTRAVENOUS at 09:19

## 2025-03-02 RX ADMIN — SODIUM CHLORIDE, PRESERVATIVE FREE 10 ML: 5 INJECTION INTRAVENOUS at 09:14

## 2025-03-02 RX ADMIN — HEPARIN 500 UNITS: 100 SYRINGE at 09:12

## 2025-03-02 RX ADMIN — DAPTOMYCIN 500 MG: 500 INJECTION, POWDER, LYOPHILIZED, FOR SOLUTION INTRAVENOUS at 09:14

## 2025-03-02 RX ADMIN — SODIUM CHLORIDE, PRESERVATIVE FREE 10 ML: 5 INJECTION INTRAVENOUS at 09:11

## 2025-03-03 ENCOUNTER — HOSPITAL ENCOUNTER (OUTPATIENT)
Dept: INFUSION THERAPY | Age: 80
Setting detail: INFUSION SERIES
Discharge: HOME | End: 2025-03-03
Payer: MEDICARE

## 2025-03-03 VITALS
OXYGEN SATURATION: 99 % | TEMPERATURE: 98.2 F | HEART RATE: 80 BPM | SYSTOLIC BLOOD PRESSURE: 95 MMHG | RESPIRATION RATE: 16 BRPM | DIASTOLIC BLOOD PRESSURE: 54 MMHG

## 2025-03-03 DIAGNOSIS — B95.2 BACTEREMIA DUE TO ENTEROCOCCUS: Primary | ICD-10-CM

## 2025-03-03 DIAGNOSIS — R78.81 BACTEREMIA DUE TO ENTEROCOCCUS: Primary | ICD-10-CM

## 2025-03-03 LAB
ALBUMIN SERPL-MCNC: 3.2 G/DL (ref 3.5–5.2)
ALP SERPL-CCNC: 100 U/L (ref 40–129)
ALT SERPL-CCNC: 25 U/L (ref 0–40)
ANION GAP SERPL CALCULATED.3IONS-SCNC: 12 MMOL/L (ref 7–16)
AST SERPL-CCNC: 32 U/L (ref 0–39)
BASOPHILS # BLD: 0.07 K/UL (ref 0–0.2)
BASOPHILS NFR BLD: 1 % (ref 0–2)
BILIRUB SERPL-MCNC: 0.5 MG/DL (ref 0–1.2)
BUN SERPL-MCNC: 17 MG/DL (ref 6–23)
CALCIUM SERPL-MCNC: 8 MG/DL (ref 8.6–10.2)
CHLORIDE SERPL-SCNC: 104 MMOL/L (ref 98–107)
CK SERPL-CCNC: 88 U/L (ref 20–200)
CO2 SERPL-SCNC: 24 MMOL/L (ref 22–29)
CREAT SERPL-MCNC: 1.1 MG/DL (ref 0.7–1.2)
CRP SERPL HS-MCNC: 16 MG/L (ref 0–5)
EOSINOPHIL # BLD: 0.22 K/UL (ref 0.05–0.5)
EOSINOPHILS RELATIVE PERCENT: 2 % (ref 0–6)
ERYTHROCYTE [DISTWIDTH] IN BLOOD BY AUTOMATED COUNT: 19.7 % (ref 11.5–15)
ERYTHROCYTE [SEDIMENTATION RATE] IN BLOOD BY WESTERGREN METHOD: 5 MM/HR (ref 0–15)
GFR, ESTIMATED: 69 ML/MIN/1.73M2
GLUCOSE SERPL-MCNC: 200 MG/DL (ref 74–99)
HCT VFR BLD AUTO: 31.4 % (ref 37–54)
HGB BLD-MCNC: 10.1 G/DL (ref 12.5–16.5)
IMM GRANULOCYTES # BLD AUTO: 0.04 K/UL (ref 0–0.58)
IMM GRANULOCYTES NFR BLD: 0 % (ref 0–5)
LYMPHOCYTES NFR BLD: 0.95 K/UL (ref 1.5–4)
LYMPHOCYTES RELATIVE PERCENT: 10 % (ref 20–42)
MCH RBC QN AUTO: 29.7 PG (ref 26–35)
MCHC RBC AUTO-ENTMCNC: 32.2 G/DL (ref 32–34.5)
MCV RBC AUTO: 92.4 FL (ref 80–99.9)
MONOCYTES NFR BLD: 0.78 K/UL (ref 0.1–0.95)
MONOCYTES NFR BLD: 8 % (ref 2–12)
NEUTROPHILS NFR BLD: 79 % (ref 43–80)
NEUTS SEG NFR BLD: 7.49 K/UL (ref 1.8–7.3)
PLATELET # BLD AUTO: 203 K/UL (ref 130–450)
PMV BLD AUTO: 10.5 FL (ref 7–12)
POTASSIUM SERPL-SCNC: 3.8 MMOL/L (ref 3.5–5)
PROT SERPL-MCNC: 5.5 G/DL (ref 6.4–8.3)
RBC # BLD AUTO: 3.4 M/UL (ref 3.8–5.8)
SODIUM SERPL-SCNC: 140 MMOL/L (ref 132–146)
WBC OTHER # BLD: 9.6 K/UL (ref 4.5–11.5)

## 2025-03-03 PROCEDURE — 36592 COLLECT BLOOD FROM PICC: CPT

## 2025-03-03 PROCEDURE — 82550 ASSAY OF CK (CPK): CPT

## 2025-03-03 PROCEDURE — 2580000003 HC RX 258: Performed by: INTERNAL MEDICINE

## 2025-03-03 PROCEDURE — 6360000002 HC RX W HCPCS: Performed by: INTERNAL MEDICINE

## 2025-03-03 PROCEDURE — 86140 C-REACTIVE PROTEIN: CPT

## 2025-03-03 PROCEDURE — 85025 COMPLETE CBC W/AUTO DIFF WBC: CPT

## 2025-03-03 PROCEDURE — 96374 THER/PROPH/DIAG INJ IV PUSH: CPT

## 2025-03-03 PROCEDURE — 85652 RBC SED RATE AUTOMATED: CPT

## 2025-03-03 PROCEDURE — 2500000003 HC RX 250 WO HCPCS: Performed by: INTERNAL MEDICINE

## 2025-03-03 PROCEDURE — 80053 COMPREHEN METABOLIC PANEL: CPT

## 2025-03-03 RX ORDER — HYDROCORTISONE SODIUM SUCCINATE 100 MG/2ML
100 INJECTION INTRAMUSCULAR; INTRAVENOUS
Status: CANCELLED | OUTPATIENT
Start: 2025-03-04

## 2025-03-03 RX ORDER — DIPHENHYDRAMINE HCL 25 MG
50 TABLET ORAL ONCE
Status: CANCELLED
Start: 2025-03-04 | End: 2025-03-04

## 2025-03-03 RX ORDER — HEPARIN 100 UNIT/ML
500 SYRINGE INTRAVENOUS PRN
Status: DISCONTINUED | OUTPATIENT
Start: 2025-03-03 | End: 2025-03-04 | Stop reason: HOSPADM

## 2025-03-03 RX ORDER — DIPHENHYDRAMINE HYDROCHLORIDE 50 MG/ML
50 INJECTION INTRAMUSCULAR; INTRAVENOUS
Status: CANCELLED | OUTPATIENT
Start: 2025-03-04

## 2025-03-03 RX ORDER — SODIUM CHLORIDE 0.9 % (FLUSH) 0.9 %
5-40 SYRINGE (ML) INJECTION PRN
Status: CANCELLED | OUTPATIENT
Start: 2025-03-04

## 2025-03-03 RX ORDER — SODIUM CHLORIDE 0.9 % (FLUSH) 0.9 %
5-40 SYRINGE (ML) INJECTION PRN
Status: DISCONTINUED | OUTPATIENT
Start: 2025-03-03 | End: 2025-03-04 | Stop reason: HOSPADM

## 2025-03-03 RX ORDER — SODIUM CHLORIDE 9 MG/ML
5-250 INJECTION, SOLUTION INTRAVENOUS PRN
Status: CANCELLED | OUTPATIENT
Start: 2025-03-04

## 2025-03-03 RX ORDER — HEPARIN 100 UNIT/ML
500 SYRINGE INTRAVENOUS PRN
Status: CANCELLED | OUTPATIENT
Start: 2025-03-04

## 2025-03-03 RX ORDER — EPINEPHRINE 1 MG/ML
0.3 INJECTION, SOLUTION, CONCENTRATE INTRAVENOUS PRN
Status: CANCELLED | OUTPATIENT
Start: 2025-03-04

## 2025-03-03 RX ADMIN — SODIUM CHLORIDE, PRESERVATIVE FREE 10 ML: 5 INJECTION INTRAVENOUS at 13:44

## 2025-03-03 RX ADMIN — SODIUM CHLORIDE, PRESERVATIVE FREE 10 ML: 5 INJECTION INTRAVENOUS at 13:52

## 2025-03-03 RX ADMIN — HEPARIN 500 UNITS: 100 SYRINGE at 13:44

## 2025-03-03 RX ADMIN — SODIUM CHLORIDE, PRESERVATIVE FREE 10 ML: 5 INJECTION INTRAVENOUS at 13:42

## 2025-03-03 RX ADMIN — HEPARIN 500 UNITS: 100 SYRINGE at 13:52

## 2025-03-03 RX ADMIN — SODIUM CHLORIDE 500 MG: 9 INJECTION INTRAMUSCULAR; INTRAVENOUS; SUBCUTANEOUS at 13:47

## 2025-03-03 RX ADMIN — SODIUM CHLORIDE, PRESERVATIVE FREE 10 ML: 5 INJECTION INTRAVENOUS at 13:46

## 2025-03-04 ENCOUNTER — HOSPITAL ENCOUNTER (OUTPATIENT)
Dept: INFUSION THERAPY | Age: 80
Setting detail: INFUSION SERIES
Discharge: HOME | End: 2025-03-04
Payer: MEDICARE

## 2025-03-04 VITALS
SYSTOLIC BLOOD PRESSURE: 120 MMHG | TEMPERATURE: 98.1 F | RESPIRATION RATE: 16 BRPM | OXYGEN SATURATION: 100 % | DIASTOLIC BLOOD PRESSURE: 76 MMHG | HEART RATE: 80 BPM

## 2025-03-04 DIAGNOSIS — B95.2 BACTEREMIA DUE TO ENTEROCOCCUS: Primary | ICD-10-CM

## 2025-03-04 DIAGNOSIS — R78.81 BACTEREMIA DUE TO ENTEROCOCCUS: Primary | ICD-10-CM

## 2025-03-04 PROCEDURE — 6360000002 HC RX W HCPCS: Performed by: INTERNAL MEDICINE

## 2025-03-04 PROCEDURE — 2580000003 HC RX 258: Performed by: INTERNAL MEDICINE

## 2025-03-04 PROCEDURE — 96374 THER/PROPH/DIAG INJ IV PUSH: CPT

## 2025-03-04 PROCEDURE — 2500000003 HC RX 250 WO HCPCS: Performed by: INTERNAL MEDICINE

## 2025-03-04 RX ORDER — HEPARIN 100 UNIT/ML
500 SYRINGE INTRAVENOUS PRN
Status: CANCELLED | OUTPATIENT
Start: 2025-03-05

## 2025-03-04 RX ORDER — SODIUM CHLORIDE 9 MG/ML
5-250 INJECTION, SOLUTION INTRAVENOUS PRN
Status: CANCELLED | OUTPATIENT
Start: 2025-03-05

## 2025-03-04 RX ORDER — DIPHENHYDRAMINE HCL 25 MG
50 TABLET ORAL ONCE
Status: CANCELLED
Start: 2025-03-05 | End: 2025-03-05

## 2025-03-04 RX ORDER — HEPARIN 100 UNIT/ML
500 SYRINGE INTRAVENOUS PRN
Status: DISCONTINUED | OUTPATIENT
Start: 2025-03-04 | End: 2025-03-05 | Stop reason: HOSPADM

## 2025-03-04 RX ORDER — SODIUM CHLORIDE 0.9 % (FLUSH) 0.9 %
5-40 SYRINGE (ML) INJECTION PRN
Status: CANCELLED | OUTPATIENT
Start: 2025-03-05

## 2025-03-04 RX ORDER — DIPHENHYDRAMINE HYDROCHLORIDE 50 MG/ML
50 INJECTION INTRAMUSCULAR; INTRAVENOUS
Status: CANCELLED | OUTPATIENT
Start: 2025-03-05

## 2025-03-04 RX ORDER — SODIUM CHLORIDE 0.9 % (FLUSH) 0.9 %
5-40 SYRINGE (ML) INJECTION PRN
Status: DISCONTINUED | OUTPATIENT
Start: 2025-03-04 | End: 2025-03-05 | Stop reason: HOSPADM

## 2025-03-04 RX ORDER — HYDROCORTISONE SODIUM SUCCINATE 100 MG/2ML
100 INJECTION INTRAMUSCULAR; INTRAVENOUS
Status: CANCELLED | OUTPATIENT
Start: 2025-03-05

## 2025-03-04 RX ORDER — EPINEPHRINE 1 MG/ML
0.3 INJECTION, SOLUTION, CONCENTRATE INTRAVENOUS PRN
Status: CANCELLED | OUTPATIENT
Start: 2025-03-05

## 2025-03-04 RX ADMIN — SODIUM CHLORIDE 500 MG: 9 INJECTION INTRAMUSCULAR; INTRAVENOUS; SUBCUTANEOUS at 13:35

## 2025-03-04 RX ADMIN — SODIUM CHLORIDE, PRESERVATIVE FREE 10 ML: 5 INJECTION INTRAVENOUS at 13:35

## 2025-03-04 RX ADMIN — HEPARIN 500 UNITS: 100 SYRINGE at 13:42

## 2025-03-04 RX ADMIN — HEPARIN 500 UNITS: 100 SYRINGE at 13:34

## 2025-03-04 RX ADMIN — SODIUM CHLORIDE, PRESERVATIVE FREE 10 ML: 5 INJECTION INTRAVENOUS at 13:34

## 2025-03-04 RX ADMIN — SODIUM CHLORIDE, PRESERVATIVE FREE 10 ML: 5 INJECTION INTRAVENOUS at 13:42

## 2025-03-05 ENCOUNTER — HOSPITAL ENCOUNTER (OUTPATIENT)
Dept: INFUSION THERAPY | Age: 80
Setting detail: INFUSION SERIES
Discharge: HOME | End: 2025-03-05
Payer: MEDICARE

## 2025-03-05 VITALS
HEART RATE: 66 BPM | DIASTOLIC BLOOD PRESSURE: 70 MMHG | TEMPERATURE: 98.1 F | SYSTOLIC BLOOD PRESSURE: 109 MMHG | RESPIRATION RATE: 16 BRPM | OXYGEN SATURATION: 100 %

## 2025-03-05 DIAGNOSIS — R78.81 BACTEREMIA DUE TO ENTEROCOCCUS: Primary | ICD-10-CM

## 2025-03-05 DIAGNOSIS — B95.2 BACTEREMIA DUE TO ENTEROCOCCUS: Primary | ICD-10-CM

## 2025-03-05 PROCEDURE — 2500000003 HC RX 250 WO HCPCS: Performed by: INTERNAL MEDICINE

## 2025-03-05 PROCEDURE — 96374 THER/PROPH/DIAG INJ IV PUSH: CPT

## 2025-03-05 PROCEDURE — 2580000003 HC RX 258: Performed by: INTERNAL MEDICINE

## 2025-03-05 PROCEDURE — 6360000002 HC RX W HCPCS: Performed by: INTERNAL MEDICINE

## 2025-03-05 RX ORDER — EPINEPHRINE 1 MG/ML
0.3 INJECTION, SOLUTION, CONCENTRATE INTRAVENOUS PRN
Status: CANCELLED | OUTPATIENT
Start: 2025-03-06

## 2025-03-05 RX ORDER — DIPHENHYDRAMINE HYDROCHLORIDE 50 MG/ML
50 INJECTION INTRAMUSCULAR; INTRAVENOUS
Status: CANCELLED | OUTPATIENT
Start: 2025-03-06

## 2025-03-05 RX ORDER — DIPHENHYDRAMINE HCL 25 MG
50 TABLET ORAL ONCE
Status: CANCELLED
Start: 2025-03-06 | End: 2025-03-06

## 2025-03-05 RX ORDER — HEPARIN 100 UNIT/ML
500 SYRINGE INTRAVENOUS PRN
Status: CANCELLED | OUTPATIENT
Start: 2025-03-06

## 2025-03-05 RX ORDER — SODIUM CHLORIDE 0.9 % (FLUSH) 0.9 %
5-40 SYRINGE (ML) INJECTION PRN
Status: DISCONTINUED | OUTPATIENT
Start: 2025-03-05 | End: 2025-03-06 | Stop reason: HOSPADM

## 2025-03-05 RX ORDER — SODIUM CHLORIDE 9 MG/ML
5-250 INJECTION, SOLUTION INTRAVENOUS PRN
Status: CANCELLED | OUTPATIENT
Start: 2025-03-06

## 2025-03-05 RX ORDER — SODIUM CHLORIDE 0.9 % (FLUSH) 0.9 %
5-40 SYRINGE (ML) INJECTION PRN
Status: CANCELLED | OUTPATIENT
Start: 2025-03-06

## 2025-03-05 RX ORDER — HEPARIN 100 UNIT/ML
500 SYRINGE INTRAVENOUS PRN
Status: DISCONTINUED | OUTPATIENT
Start: 2025-03-05 | End: 2025-03-06 | Stop reason: HOSPADM

## 2025-03-05 RX ORDER — HYDROCORTISONE SODIUM SUCCINATE 100 MG/2ML
100 INJECTION INTRAMUSCULAR; INTRAVENOUS
Status: CANCELLED | OUTPATIENT
Start: 2025-03-06

## 2025-03-05 RX ADMIN — HEPARIN 500 UNITS: 100 SYRINGE at 14:07

## 2025-03-05 RX ADMIN — SODIUM CHLORIDE, PRESERVATIVE FREE 10 ML: 5 INJECTION INTRAVENOUS at 14:08

## 2025-03-05 RX ADMIN — SODIUM CHLORIDE, PRESERVATIVE FREE 10 ML: 5 INJECTION INTRAVENOUS at 14:06

## 2025-03-05 RX ADMIN — SODIUM CHLORIDE 500 MG: 9 INJECTION INTRAMUSCULAR; INTRAVENOUS; SUBCUTANEOUS at 14:09

## 2025-03-05 RX ADMIN — HEPARIN 500 UNITS: 100 SYRINGE at 14:18

## 2025-03-05 RX ADMIN — SODIUM CHLORIDE, PRESERVATIVE FREE 10 ML: 5 INJECTION INTRAVENOUS at 14:18

## 2025-03-05 NOTE — PROGRESS NOTES
[facility] PRE-ADMISSION TESTING INSTRUCTIONS    The Preadmission Testing patient is instructed accordingly using the following criteria (check applicable):    Procedure date:3/7/2025  Arrival time:0845  Start time:1047    ARRIVAL INSTRUCTIONS:  [x] Parking the day of Surgery is located in the Main Entrance lot.  Upon entering at Entrance A, make an immediate right to the surgery reception desk    [x] Bring photo ID and insurance card    [x] Bring in a copy of Living will or Durable Power of  papers.    [x] Please be sure to arrange for a responsible adult to provide transportation to and from the hospital    [x] Please arrange for a responsible adult to be with you for the 24 hour period post procedure due to having anesthesia    [x] If you awake morning of surgery not feeling well or have temperature >100 please call [phone]    GENERAL INSTRUCTIONS:    [x] No solid foods after midnight, may have up to 8oz of water until 4 hours prior to surgery. No gum, no candy, no mints.        [x] You may brush your teeth, do not swallow any toothpaste    [x] Take medications as instructed    Read back / verified medication instructions with patient  Amiodarone  Duloxetine  Gabapentin  Metoprolol  Pantoprazole    [x] Stop herbal supplements and vitamins within 5 days (or as of now) prior to procedure    [x] Follow preop dosing of blood thinners (Plavix, Eliquis) per physician instructions    [x] Morning of surgery, Shower or bathe with your usual products - wash hair, face and body - do not apply any products to any area after washing - you may use deodorant    [x] No tobacco products within 24 hours of surgery     [x] No alcohol or illegal drug use, marijuana included, within 24 hours of surgery.    [x] Leave all Jewelry and valuables at home, no type of jewelry, no body piercing are allowed in the surgery room  / Bring cases for eyeglasses, contact lenses and dentures       [x] No nail

## 2025-03-05 NOTE — FLOWSHEET NOTE
Patient tolerated infusion well. . Patient alert and oriented x3. No distress noted. Vital signs stable. Patient denies any new or worsening pain. Educated patient on possible side effects and treatment of medication.  Patient is tolerating IV antibiotics very well.   Patient verbalized understanding. Offered patient education and/or discharge material. Patient declined. Patient denies any needs. All questions answered. D/C in stable condition.

## 2025-03-06 ENCOUNTER — ANESTHESIA EVENT (OUTPATIENT)
Dept: OPERATING ROOM | Age: 80
End: 2025-03-06
Payer: MEDICARE

## 2025-03-06 ENCOUNTER — HOSPITAL ENCOUNTER (OUTPATIENT)
Dept: INFUSION THERAPY | Age: 80
Setting detail: INFUSION SERIES
Discharge: HOME | End: 2025-03-06
Payer: MEDICARE

## 2025-03-06 ENCOUNTER — PREP FOR PROCEDURE (OUTPATIENT)
Dept: UROLOGY | Age: 80
End: 2025-03-06

## 2025-03-06 VITALS
SYSTOLIC BLOOD PRESSURE: 116 MMHG | OXYGEN SATURATION: 98 % | HEART RATE: 71 BPM | TEMPERATURE: 97.7 F | RESPIRATION RATE: 16 BRPM | DIASTOLIC BLOOD PRESSURE: 75 MMHG

## 2025-03-06 DIAGNOSIS — B95.2 BACTEREMIA DUE TO ENTEROCOCCUS: Primary | ICD-10-CM

## 2025-03-06 DIAGNOSIS — R78.81 BACTEREMIA DUE TO ENTEROCOCCUS: Primary | ICD-10-CM

## 2025-03-06 LAB
ALBUMIN SERPL-MCNC: 3.2 G/DL (ref 3.5–5.2)
ALP SERPL-CCNC: 96 U/L (ref 40–129)
ALT SERPL-CCNC: 22 U/L (ref 0–40)
ANION GAP SERPL CALCULATED.3IONS-SCNC: 13 MMOL/L (ref 7–16)
AST SERPL-CCNC: 29 U/L (ref 0–39)
BASOPHILS # BLD: 0.09 K/UL (ref 0–0.2)
BASOPHILS NFR BLD: 1 % (ref 0–2)
BILIRUB SERPL-MCNC: 0.7 MG/DL (ref 0–1.2)
BUN SERPL-MCNC: 16 MG/DL (ref 6–23)
CALCIUM SERPL-MCNC: 8.1 MG/DL (ref 8.6–10.2)
CHLORIDE SERPL-SCNC: 107 MMOL/L (ref 98–107)
CO2 SERPL-SCNC: 21 MMOL/L (ref 22–29)
CREAT SERPL-MCNC: 1 MG/DL (ref 0.7–1.2)
EOSINOPHIL # BLD: 0.26 K/UL (ref 0.05–0.5)
EOSINOPHILS RELATIVE PERCENT: 3 % (ref 0–6)
ERYTHROCYTE [DISTWIDTH] IN BLOOD BY AUTOMATED COUNT: 19.9 % (ref 11.5–15)
GFR, ESTIMATED: 78 ML/MIN/1.73M2
GLUCOSE SERPL-MCNC: 129 MG/DL (ref 74–99)
HCT VFR BLD AUTO: 32.2 % (ref 37–54)
HGB BLD-MCNC: 10.3 G/DL (ref 12.5–16.5)
LYMPHOCYTES NFR BLD: 0.61 K/UL (ref 1.5–4)
LYMPHOCYTES RELATIVE PERCENT: 6 % (ref 20–42)
MCH RBC QN AUTO: 29.9 PG (ref 26–35)
MCHC RBC AUTO-ENTMCNC: 32 G/DL (ref 32–34.5)
MCV RBC AUTO: 93.3 FL (ref 80–99.9)
MONOCYTES NFR BLD: 0.7 K/UL (ref 0.1–0.95)
MONOCYTES NFR BLD: 7 % (ref 2–12)
NEUTROPHILS NFR BLD: 83 % (ref 43–80)
NEUTS SEG NFR BLD: 8.24 K/UL (ref 1.8–7.3)
PLATELET # BLD AUTO: 173 K/UL (ref 130–450)
PMV BLD AUTO: 10.2 FL (ref 7–12)
POTASSIUM SERPL-SCNC: 3.9 MMOL/L (ref 3.5–5)
PROT SERPL-MCNC: 5.7 G/DL (ref 6.4–8.3)
RBC # BLD AUTO: 3.45 M/UL (ref 3.8–5.8)
RBC # BLD: ABNORMAL 10*6/UL
SODIUM SERPL-SCNC: 141 MMOL/L (ref 132–146)
WBC OTHER # BLD: 9.9 K/UL (ref 4.5–11.5)

## 2025-03-06 PROCEDURE — 85025 COMPLETE CBC W/AUTO DIFF WBC: CPT

## 2025-03-06 PROCEDURE — 96374 THER/PROPH/DIAG INJ IV PUSH: CPT

## 2025-03-06 PROCEDURE — 36415 COLL VENOUS BLD VENIPUNCTURE: CPT

## 2025-03-06 PROCEDURE — 2580000003 HC RX 258: Performed by: INTERNAL MEDICINE

## 2025-03-06 PROCEDURE — 36591 DRAW BLOOD OFF VENOUS DEVICE: CPT

## 2025-03-06 PROCEDURE — 6360000002 HC RX W HCPCS: Performed by: INTERNAL MEDICINE

## 2025-03-06 PROCEDURE — 2500000003 HC RX 250 WO HCPCS: Performed by: INTERNAL MEDICINE

## 2025-03-06 PROCEDURE — 80053 COMPREHEN METABOLIC PANEL: CPT

## 2025-03-06 RX ORDER — SODIUM CHLORIDE 0.9 % (FLUSH) 0.9 %
5-40 SYRINGE (ML) INJECTION PRN
Status: DISCONTINUED | OUTPATIENT
Start: 2025-03-06 | End: 2025-03-07 | Stop reason: HOSPADM

## 2025-03-06 RX ORDER — HEPARIN 100 UNIT/ML
500 SYRINGE INTRAVENOUS PRN
Status: CANCELLED | OUTPATIENT
Start: 2025-03-07

## 2025-03-06 RX ORDER — SODIUM CHLORIDE 0.9 % (FLUSH) 0.9 %
5-40 SYRINGE (ML) INJECTION EVERY 12 HOURS SCHEDULED
Status: CANCELLED | OUTPATIENT
Start: 2025-03-06

## 2025-03-06 RX ORDER — SODIUM CHLORIDE 0.9 % (FLUSH) 0.9 %
5-40 SYRINGE (ML) INJECTION PRN
Status: CANCELLED | OUTPATIENT
Start: 2025-03-07

## 2025-03-06 RX ORDER — SODIUM CHLORIDE 0.9 % (FLUSH) 0.9 %
5-40 SYRINGE (ML) INJECTION PRN
Status: CANCELLED | OUTPATIENT
Start: 2025-03-06

## 2025-03-06 RX ORDER — SODIUM CHLORIDE 9 MG/ML
5-250 INJECTION, SOLUTION INTRAVENOUS PRN
Status: CANCELLED | OUTPATIENT
Start: 2025-03-07

## 2025-03-06 RX ORDER — SODIUM CHLORIDE 9 MG/ML
INJECTION, SOLUTION INTRAVENOUS CONTINUOUS
Status: CANCELLED | OUTPATIENT
Start: 2025-03-06

## 2025-03-06 RX ORDER — HEPARIN 100 UNIT/ML
500 SYRINGE INTRAVENOUS PRN
Status: DISCONTINUED | OUTPATIENT
Start: 2025-03-06 | End: 2025-03-07 | Stop reason: HOSPADM

## 2025-03-06 RX ORDER — HYDROCORTISONE SODIUM SUCCINATE 100 MG/2ML
100 INJECTION INTRAMUSCULAR; INTRAVENOUS
Status: CANCELLED | OUTPATIENT
Start: 2025-03-07

## 2025-03-06 RX ORDER — EPINEPHRINE 1 MG/ML
0.3 INJECTION, SOLUTION, CONCENTRATE INTRAVENOUS PRN
Status: CANCELLED | OUTPATIENT
Start: 2025-03-07

## 2025-03-06 RX ORDER — SODIUM CHLORIDE 9 MG/ML
INJECTION, SOLUTION INTRAVENOUS PRN
Status: CANCELLED | OUTPATIENT
Start: 2025-03-06

## 2025-03-06 RX ORDER — DIPHENHYDRAMINE HYDROCHLORIDE 50 MG/ML
50 INJECTION INTRAMUSCULAR; INTRAVENOUS
Status: CANCELLED | OUTPATIENT
Start: 2025-03-07

## 2025-03-06 RX ORDER — DIPHENHYDRAMINE HCL 25 MG
50 TABLET ORAL ONCE
Status: CANCELLED
Start: 2025-03-07 | End: 2025-03-07

## 2025-03-06 RX ADMIN — SODIUM CHLORIDE 500 MG: 9 INJECTION INTRAMUSCULAR; INTRAVENOUS; SUBCUTANEOUS at 13:49

## 2025-03-06 RX ADMIN — SODIUM CHLORIDE, PRESERVATIVE FREE 10 ML: 5 INJECTION INTRAVENOUS at 13:53

## 2025-03-06 RX ADMIN — HEPARIN 500 UNITS: 100 SYRINGE at 13:53

## 2025-03-06 RX ADMIN — SODIUM CHLORIDE, PRESERVATIVE FREE 10 ML: 5 INJECTION INTRAVENOUS at 13:49

## 2025-03-06 RX ADMIN — SODIUM CHLORIDE, PRESERVATIVE FREE 10 ML: 5 INJECTION INTRAVENOUS at 13:54

## 2025-03-06 RX ADMIN — HEPARIN 500 UNITS: 100 SYRINGE at 13:54

## 2025-03-06 ASSESSMENT — LIFESTYLE VARIABLES: SMOKING_STATUS: 0

## 2025-03-07 ENCOUNTER — ANESTHESIA (OUTPATIENT)
Dept: OPERATING ROOM | Age: 80
End: 2025-03-07
Payer: MEDICARE

## 2025-03-07 ENCOUNTER — HOSPITAL ENCOUNTER (OUTPATIENT)
Dept: GENERAL RADIOLOGY | Age: 80
Discharge: HOME | End: 2025-03-09
Attending: UROLOGY
Payer: MEDICARE

## 2025-03-07 ENCOUNTER — HOSPITAL ENCOUNTER (OUTPATIENT)
Dept: INFUSION THERAPY | Age: 80
Setting detail: INFUSION SERIES
Discharge: HOME | End: 2025-03-07
Payer: MEDICARE

## 2025-03-07 ENCOUNTER — HOSPITAL ENCOUNTER (OUTPATIENT)
Age: 80
Setting detail: OUTPATIENT SURGERY
Discharge: HOME | End: 2025-03-07
Attending: UROLOGY | Admitting: UROLOGY
Payer: MEDICARE

## 2025-03-07 VITALS
SYSTOLIC BLOOD PRESSURE: 132 MMHG | WEIGHT: 164 LBS | RESPIRATION RATE: 18 BRPM | HEIGHT: 63 IN | TEMPERATURE: 98.3 F | DIASTOLIC BLOOD PRESSURE: 89 MMHG | BODY MASS INDEX: 29.06 KG/M2 | HEART RATE: 67 BPM | OXYGEN SATURATION: 92 %

## 2025-03-07 VITALS
TEMPERATURE: 99.2 F | DIASTOLIC BLOOD PRESSURE: 56 MMHG | SYSTOLIC BLOOD PRESSURE: 94 MMHG | HEART RATE: 72 BPM | OXYGEN SATURATION: 94 % | RESPIRATION RATE: 18 BRPM

## 2025-03-07 DIAGNOSIS — B95.2 BACTEREMIA DUE TO ENTEROCOCCUS: Primary | ICD-10-CM

## 2025-03-07 DIAGNOSIS — R78.81 BACTEREMIA DUE TO ENTEROCOCCUS: Primary | ICD-10-CM

## 2025-03-07 DIAGNOSIS — N20.1 CALCULUS OF URETER: ICD-10-CM

## 2025-03-07 DIAGNOSIS — R52 PAIN: ICD-10-CM

## 2025-03-07 PROCEDURE — 96374 THER/PROPH/DIAG INJ IV PUSH: CPT

## 2025-03-07 PROCEDURE — C1769 GUIDE WIRE: HCPCS | Performed by: UROLOGY

## 2025-03-07 PROCEDURE — 6360000002 HC RX W HCPCS: Performed by: INTERNAL MEDICINE

## 2025-03-07 PROCEDURE — 2500000003 HC RX 250 WO HCPCS: Performed by: NURSE PRACTITIONER

## 2025-03-07 PROCEDURE — 82365 CALCULUS SPECTROSCOPY: CPT

## 2025-03-07 PROCEDURE — 2500000003 HC RX 250 WO HCPCS: Performed by: INTERNAL MEDICINE

## 2025-03-07 PROCEDURE — 3700000000 HC ANESTHESIA ATTENDED CARE: Performed by: UROLOGY

## 2025-03-07 PROCEDURE — 6370000000 HC RX 637 (ALT 250 FOR IP)

## 2025-03-07 PROCEDURE — 6360000002 HC RX W HCPCS: Performed by: NURSE PRACTITIONER

## 2025-03-07 PROCEDURE — 2580000003 HC RX 258: Performed by: INTERNAL MEDICINE

## 2025-03-07 PROCEDURE — 7100000000 HC PACU RECOVERY - FIRST 15 MIN: Performed by: UROLOGY

## 2025-03-07 PROCEDURE — 7100000010 HC PHASE II RECOVERY - FIRST 15 MIN: Performed by: UROLOGY

## 2025-03-07 PROCEDURE — 2709999900 HC NON-CHARGEABLE SUPPLY: Performed by: UROLOGY

## 2025-03-07 PROCEDURE — 6360000002 HC RX W HCPCS: Performed by: NURSE ANESTHETIST, CERTIFIED REGISTERED

## 2025-03-07 PROCEDURE — 3700000001 HC ADD 15 MINUTES (ANESTHESIA): Performed by: UROLOGY

## 2025-03-07 PROCEDURE — 3600000003 HC SURGERY LEVEL 3 BASE: Performed by: UROLOGY

## 2025-03-07 PROCEDURE — 2580000003 HC RX 258: Performed by: NURSE PRACTITIONER

## 2025-03-07 PROCEDURE — 3600000013 HC SURGERY LEVEL 3 ADDTL 15MIN: Performed by: UROLOGY

## 2025-03-07 PROCEDURE — C2617 STENT, NON-COR, TEM W/O DEL: HCPCS | Performed by: UROLOGY

## 2025-03-07 PROCEDURE — 7100000011 HC PHASE II RECOVERY - ADDTL 15 MIN: Performed by: UROLOGY

## 2025-03-07 PROCEDURE — 7100000001 HC PACU RECOVERY - ADDTL 15 MIN: Performed by: UROLOGY

## 2025-03-07 DEVICE — URETERAL STENT
Type: IMPLANTABLE DEVICE | Site: URETER | Status: FUNCTIONAL
Brand: PERCUFLEX™

## 2025-03-07 RX ORDER — ACETAMINOPHEN 500 MG
TABLET ORAL
Status: COMPLETED
Start: 2025-03-07 | End: 2025-03-07

## 2025-03-07 RX ORDER — PROPOFOL 10 MG/ML
INJECTION, EMULSION INTRAVENOUS
Status: DISCONTINUED | OUTPATIENT
Start: 2025-03-07 | End: 2025-03-07 | Stop reason: SDUPTHER

## 2025-03-07 RX ORDER — SODIUM CHLORIDE 0.9 % (FLUSH) 0.9 %
5-40 SYRINGE (ML) INJECTION PRN
Status: DISCONTINUED | OUTPATIENT
Start: 2025-03-07 | End: 2025-03-08 | Stop reason: HOSPADM

## 2025-03-07 RX ORDER — SODIUM CHLORIDE 9 MG/ML
5-250 INJECTION, SOLUTION INTRAVENOUS PRN
Status: CANCELLED | OUTPATIENT
Start: 2025-03-08

## 2025-03-07 RX ORDER — HEPARIN 100 UNIT/ML
500 SYRINGE INTRAVENOUS PRN
Status: DISCONTINUED | OUTPATIENT
Start: 2025-03-07 | End: 2025-03-08 | Stop reason: HOSPADM

## 2025-03-07 RX ORDER — EPINEPHRINE 1 MG/ML
0.3 INJECTION, SOLUTION, CONCENTRATE INTRAVENOUS PRN
Status: CANCELLED | OUTPATIENT
Start: 2025-03-08

## 2025-03-07 RX ORDER — HEPARIN 100 UNIT/ML
500 SYRINGE INTRAVENOUS PRN
Status: CANCELLED | OUTPATIENT
Start: 2025-03-08

## 2025-03-07 RX ORDER — DIPHENHYDRAMINE HCL 25 MG
50 TABLET ORAL ONCE
Status: CANCELLED
Start: 2025-03-08 | End: 2025-03-08

## 2025-03-07 RX ORDER — FENTANYL CITRATE 50 UG/ML
INJECTION, SOLUTION INTRAMUSCULAR; INTRAVENOUS
Status: DISCONTINUED | OUTPATIENT
Start: 2025-03-07 | End: 2025-03-07 | Stop reason: SDUPTHER

## 2025-03-07 RX ORDER — SODIUM CHLORIDE 0.9 % (FLUSH) 0.9 %
5-40 SYRINGE (ML) INJECTION PRN
Status: CANCELLED | OUTPATIENT
Start: 2025-03-08

## 2025-03-07 RX ORDER — HYDROCORTISONE SODIUM SUCCINATE 100 MG/2ML
100 INJECTION INTRAMUSCULAR; INTRAVENOUS
Status: CANCELLED | OUTPATIENT
Start: 2025-03-08

## 2025-03-07 RX ORDER — SODIUM CHLORIDE 0.9 % (FLUSH) 0.9 %
5-40 SYRINGE (ML) INJECTION PRN
Status: DISCONTINUED | OUTPATIENT
Start: 2025-03-07 | End: 2025-03-07 | Stop reason: HOSPADM

## 2025-03-07 RX ORDER — SODIUM CHLORIDE 9 MG/ML
INJECTION, SOLUTION INTRAVENOUS CONTINUOUS
Status: DISCONTINUED | OUTPATIENT
Start: 2025-03-07 | End: 2025-03-07 | Stop reason: HOSPADM

## 2025-03-07 RX ORDER — ACETAMINOPHEN 500 MG
1000 TABLET ORAL ONCE
Status: COMPLETED | OUTPATIENT
Start: 2025-03-07 | End: 2025-03-07

## 2025-03-07 RX ORDER — SODIUM CHLORIDE 9 MG/ML
INJECTION, SOLUTION INTRAVENOUS PRN
Status: DISCONTINUED | OUTPATIENT
Start: 2025-03-07 | End: 2025-03-07 | Stop reason: HOSPADM

## 2025-03-07 RX ORDER — DIPHENHYDRAMINE HYDROCHLORIDE 50 MG/ML
50 INJECTION INTRAMUSCULAR; INTRAVENOUS
Status: CANCELLED | OUTPATIENT
Start: 2025-03-08

## 2025-03-07 RX ORDER — SODIUM CHLORIDE 0.9 % (FLUSH) 0.9 %
5-40 SYRINGE (ML) INJECTION EVERY 12 HOURS SCHEDULED
Status: DISCONTINUED | OUTPATIENT
Start: 2025-03-07 | End: 2025-03-07 | Stop reason: HOSPADM

## 2025-03-07 RX ORDER — ONDANSETRON 2 MG/ML
INJECTION INTRAMUSCULAR; INTRAVENOUS
Status: DISCONTINUED | OUTPATIENT
Start: 2025-03-07 | End: 2025-03-07 | Stop reason: SDUPTHER

## 2025-03-07 RX ADMIN — PROPOFOL 50 MG: 10 INJECTION, EMULSION INTRAVENOUS at 09:09

## 2025-03-07 RX ADMIN — FENTANYL CITRATE 100 MCG: 50 INJECTION, SOLUTION INTRAMUSCULAR; INTRAVENOUS at 09:09

## 2025-03-07 RX ADMIN — HEPARIN 500 UNITS: 100 SYRINGE at 13:32

## 2025-03-07 RX ADMIN — ACETAMINOPHEN 1000 MG: 500 TABLET ORAL at 11:27

## 2025-03-07 RX ADMIN — PROPOFOL 80 MG: 10 INJECTION, EMULSION INTRAVENOUS at 09:23

## 2025-03-07 RX ADMIN — PROPOFOL 150 MCG/KG/MIN: 10 INJECTION, EMULSION INTRAVENOUS at 09:10

## 2025-03-07 RX ADMIN — ONDANSETRON 4 MG: 2 INJECTION INTRAMUSCULAR; INTRAVENOUS at 09:09

## 2025-03-07 RX ADMIN — Medication 1000 MG: at 11:27

## 2025-03-07 RX ADMIN — SODIUM CHLORIDE, PRESERVATIVE FREE 10 ML: 5 INJECTION INTRAVENOUS at 13:25

## 2025-03-07 RX ADMIN — SODIUM CHLORIDE, PRESERVATIVE FREE 10 ML: 5 INJECTION INTRAVENOUS at 13:32

## 2025-03-07 RX ADMIN — WATER 2000 MG: 1 INJECTION INTRAMUSCULAR; INTRAVENOUS; SUBCUTANEOUS at 09:03

## 2025-03-07 RX ADMIN — SODIUM CHLORIDE 500 MG: 9 INJECTION INTRAMUSCULAR; INTRAVENOUS; SUBCUTANEOUS at 13:26

## 2025-03-07 RX ADMIN — SODIUM CHLORIDE: 9 INJECTION, SOLUTION INTRAVENOUS at 07:35

## 2025-03-07 RX ADMIN — HEPARIN 500 UNITS: 100 SYRINGE at 13:33

## 2025-03-07 ASSESSMENT — PAIN - FUNCTIONAL ASSESSMENT: PAIN_FUNCTIONAL_ASSESSMENT: 0-10

## 2025-03-07 NOTE — ANESTHESIA PRE PROCEDURE
Department of Anesthesiology  Preprocedure Note       Name:  Ruben Davidson   Age:  79 y.o.  :  1945                                          MRN:  [id number]         Date:  3/6/2025      Surgeon: Surgeon(s):  Albert Sandra MD    Procedure: Procedure(s):  CYSTOSCOPY RETROGRADE PYELOGRAM URETEROSCOPY J STENT LASER LITHOTRIPSY LEFT        +++IODINE ALLERGY+++    Medications prior to admission:   Prior to Admission medications    Medication Sig Start Date End Date Taking? Authorizing Provider   metoprolol succinate (TOPROL XL) 25 MG extended release tablet Take 1 tablet by mouth in the morning and at bedtime 25   Ti Pittman APRN - CNP   amiodarone (CORDARONE) 200 MG tablet Take 1 tablet by mouth daily  Patient taking differently: Take 1 tablet by mouth every morning 25  Viktor Luther DO   sodium bicarbonate 650 MG tablet Take 1 tablet by mouth 3 times daily 25   Megan Spivey APRN - CNP   DAPTOmycin (CUBICIN) infusion Infuse 510 mg intravenously every 24 hours Until 3/21/2025 2/12/25 3/21/25  Ramos Casper MD   apixaban (ELIQUIS) 5 MG TABS tablet Take 1 tablet by mouth 2 times daily Indications: Do NOT restart until cleared by Device Clinic at appointment in 2 weeks. . 10/24/24 2/17/25  Viktor Luther DO   torsemide (DEMADEX) 20 MG tablet Take 2 tablets by mouth once a week Two tablets once a week-Saturday or 22   ProviderModesto MD   Multiple Vitamins-Minerals (PRESERVISION AREDS 2+MULTI VIT PO) Take 1 tablet by mouth in the morning and at bedtime    Modesto Farias MD   Boswellia-Glucosamine-Vit D (OSTEO BI-FLEX ONE PER DAY PO) Take 1 tablet by mouth every morning    Modesto Farias MD   clopidogrel (PLAVIX) 75 MG tablet Take 1 tablet by mouth daily . 21   Bobo Arvizu MD   Cetirizine HCl (ZYRTEC ALLERGY PO) Take 1 tablet by mouth every evening    Modesto Farias MD   Acetaminophen (TYLENOL ARTHRITIS

## 2025-03-07 NOTE — ANESTHESIA POSTPROCEDURE EVALUATION
Department of Anesthesiology  Postprocedure Note    Patient: Ruben Davidson  MRN: [id number]  YOB: 1945  Date of evaluation: 3/7/2025    Procedure Summary       Date: 03/07/25 Room / Location: [address]    Anesthesia Start: 0901 Anesthesia Stop:     Procedure: CYSTOSCOPY RETROGRADE PYELOGRAM URETEROSCOPY J STENT LASER LITHOTRIPSY LEFT        +++IODINE ALLERGY+++ (Left) Diagnosis:       Calculus of ureter      (Calculus of ureter [N20.1])    Surgeons: Albert Sandra MD Responsible Provider: Shana Delacruz MD    Anesthesia Type: MAC ASA Status: 4            Anesthesia Type: No value filed.    Mildred Phase I: Mildred Score: 10    Mildred Phase II:      Anesthesia Post Evaluation    Patient location during evaluation: PACU  Patient participation: complete - patient participated  Level of consciousness: awake  Airway patency: patent  Nausea & Vomiting: no nausea and no vomiting  Cardiovascular status: hemodynamically stable  Respiratory status: acceptable, nonlabored ventilation and spontaneous ventilation  Hydration status: euvolemic  Pain management: adequate and satisfactory to patient        No notable events documented.

## 2025-03-07 NOTE — DISCHARGE INSTRUCTIONS
[facility]  [address]  [place] [address]  [phone]  FAX [phone]      Discharge instructions for Dr. Albert Sandra     General:   -You will be groggy throughout the day due to the effects of anesthesia.  Have a responsible adult monitor you for the next 24 hours.  -Call if fever or chills  -It is very normal to have burning and pain.  If the pain is severe and out of your control contact Dr. Sandra.    Diet: You may eat or drink whatever you like today.  You may experience some nausea.  Call if you have vomiting or inability to tolerate food or drink.    Urine: Expect blood in your urine.  This is normal.  This may be very traumatic in appearance but is not concerning unless there are large amount of clots that prevent you from being able to urinate.  If you have any questions or concerns contact Dr. Sandra's office.    Driving: You may not drive for 24 hours.  You also may not drive if you are taking narcotic pain medications.    Activity: There are no restrictions on your activity.

## 2025-03-07 NOTE — OP NOTE
stent from the left ureteral orifice was grasped and delivered to the urethral meatus.  Wire was passed through the stent into the renal pelvis under fluoroscopic guidance.    Semirigid ureteroscope was passed in the left ureter and the entire length the ureter was examined and found to be free of stone.  An additional wire was left indwelling.  A 12 x 36 ureteral access sheath was passed without difficulty.  A flexible ureteroscope was passed through the sheath and into the renal pelvis.  All calyces were examined and there was a stone in lower pole calyx which was grasped with a [place] basket and removed in entirety.  There were no other calyces with any stones.    A 4.8 x 24 double-J ureteral stent was passed over the wire, with good curl seen in the kidney as well as in the bladder. The bladder was drained. The patient was awakened from anesthesia and taken to recovery in stable condition.     PLAN: The patient is stone-free on the left side. An indwelling stent was left the patient will follow-up in the office for stent removal.      Electronically signed by Albert Sandra MD on 3/7/2025 at 10:18 AM

## 2025-03-07 NOTE — H&P
symptoms including polydipsia and polyuria  All other systems negative    Physical Exam:  Vitals:    03/07/25 0730   BP: 136/75   Pulse: 65   Resp: 20   Temp: 99.4 °F (37.4 °C)   SpO2: 94%      Skin:  Warm and dry.  No rash or bruises  HEENT:  PERRLA, EOMI  Neck:  No JVD, No thyromegaly  Cardiac:  RRR  Lungs:  No audible wheezes, symmetric respirations, non-labored  Abdomen: Soft, non-tender, non-distended  Extremities:  No clubbing, edema or cyanosis  Neurological:  Moves all extremities, normal DTR    Lab Results   Component Value Date    WBC 9.9 03/06/2025    HGB 10.3 (L) 03/06/2025    HCT 32.2 (L) 03/06/2025    MCV 93.3 03/06/2025     03/06/2025       Lab Results   Component Value Date    CREATININE 1.0 03/06/2025       Lab Results   Component Value Date    PSA 1.49 07/17/2023    PSA 0.90 05/11/2020    PSA 1.03 10/28/2019       No results for input(s): \"LABURIN\" in the last 72 hours.    No results for input(s): \"BC\" in the last 72 hours.    No results for input(s): \"BLOODCULT2\" in the last 72 hours.    Assessment and Plan:  1. To OR for Cystoscopy, Retrograde Pyelograms, Ureteroscopy, Laser Lithotripsy, Stone Extraction, Stent Placement, left.

## 2025-03-07 NOTE — FLOWSHEET NOTE
Patient tolerated injection well.  Patient alert and oriented x3. No distress noted. Vital signs stable. Patient denies any new or worsening pain. Educated patient on possible side effects and treatment of medication.     Patient verbalized understanding. Offered patient education and/or discharge material. Patient declined. Patient denies any needs. All questions answered. D/C in stable condition.

## 2025-03-08 ENCOUNTER — HOSPITAL ENCOUNTER (OUTPATIENT)
Dept: INFUSION THERAPY | Age: 80
Setting detail: INFUSION SERIES
Discharge: HOME | End: 2025-03-08
Payer: MEDICARE

## 2025-03-08 VITALS
RESPIRATION RATE: 16 BRPM | BODY MASS INDEX: 30.47 KG/M2 | DIASTOLIC BLOOD PRESSURE: 72 MMHG | HEART RATE: 64 BPM | SYSTOLIC BLOOD PRESSURE: 129 MMHG | WEIGHT: 172 LBS | TEMPERATURE: 98 F | OXYGEN SATURATION: 98 %

## 2025-03-08 DIAGNOSIS — B95.2 BACTEREMIA DUE TO ENTEROCOCCUS: Primary | ICD-10-CM

## 2025-03-08 DIAGNOSIS — R78.81 BACTEREMIA DUE TO ENTEROCOCCUS: Primary | ICD-10-CM

## 2025-03-08 PROBLEM — J10.1 INFLUENZA A: Status: RESOLVED | Noted: 2025-02-06 | Resolved: 2025-03-08
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
## 2025-03-08 PROCEDURE — 96374 THER/PROPH/DIAG INJ IV PUSH: CPT

## 2025-03-08 PROCEDURE — 6360000002 HC RX W HCPCS: Performed by: INTERNAL MEDICINE

## 2025-03-08 PROCEDURE — 2580000003 HC RX 258: Performed by: INTERNAL MEDICINE

## 2025-03-08 PROCEDURE — 2500000003 HC RX 250 WO HCPCS: Performed by: INTERNAL MEDICINE

## 2025-03-08 RX ORDER — DIPHENHYDRAMINE HYDROCHLORIDE 50 MG/ML
50 INJECTION INTRAMUSCULAR; INTRAVENOUS
Status: CANCELLED | OUTPATIENT
Start: 2025-03-09

## 2025-03-08 RX ORDER — DIPHENHYDRAMINE HCL 25 MG
50 TABLET ORAL ONCE
Status: CANCELLED
Start: 2025-03-09 | End: 2025-03-09

## 2025-03-08 RX ORDER — EPINEPHRINE 1 MG/ML
0.3 INJECTION, SOLUTION, CONCENTRATE INTRAVENOUS PRN
Status: CANCELLED | OUTPATIENT
Start: 2025-03-09

## 2025-03-08 RX ORDER — SODIUM CHLORIDE 0.9 % (FLUSH) 0.9 %
5-40 SYRINGE (ML) INJECTION PRN
Status: DISCONTINUED | OUTPATIENT
Start: 2025-03-08 | End: 2025-03-09 | Stop reason: HOSPADM

## 2025-03-08 RX ORDER — HEPARIN 100 UNIT/ML
500 SYRINGE INTRAVENOUS PRN
Status: CANCELLED | OUTPATIENT
Start: 2025-03-09

## 2025-03-08 RX ORDER — HEPARIN 100 UNIT/ML
500 SYRINGE INTRAVENOUS PRN
Status: DISCONTINUED | OUTPATIENT
Start: 2025-03-08 | End: 2025-03-09 | Stop reason: HOSPADM

## 2025-03-08 RX ORDER — HYDROCORTISONE SODIUM SUCCINATE 100 MG/2ML
100 INJECTION INTRAMUSCULAR; INTRAVENOUS
Status: CANCELLED | OUTPATIENT
Start: 2025-03-09

## 2025-03-08 RX ORDER — SODIUM CHLORIDE 0.9 % (FLUSH) 0.9 %
5-40 SYRINGE (ML) INJECTION PRN
Status: CANCELLED | OUTPATIENT
Start: 2025-03-09

## 2025-03-08 RX ORDER — SODIUM CHLORIDE 9 MG/ML
5-250 INJECTION, SOLUTION INTRAVENOUS PRN
Status: CANCELLED | OUTPATIENT
Start: 2025-03-09

## 2025-03-08 RX ADMIN — HEPARIN 500 UNITS: 100 SYRINGE at 09:14

## 2025-03-08 RX ADMIN — HEPARIN 500 UNITS: 100 SYRINGE at 09:17

## 2025-03-08 RX ADMIN — SODIUM CHLORIDE, PRESERVATIVE FREE 10 ML: 5 INJECTION INTRAVENOUS at 09:14

## 2025-03-08 RX ADMIN — SODIUM CHLORIDE, PRESERVATIVE FREE 10 ML: 5 INJECTION INTRAVENOUS at 09:00

## 2025-03-08 RX ADMIN — SODIUM CHLORIDE, PRESERVATIVE FREE 10 ML: 5 INJECTION INTRAVENOUS at 09:17

## 2025-03-08 RX ADMIN — DAPTOMYCIN 500 MG: 500 INJECTION, POWDER, LYOPHILIZED, FOR SOLUTION INTRAVENOUS at 09:12

## 2025-03-08 ASSESSMENT — PAIN DESCRIPTION - ORIENTATION: ORIENTATION: LEFT

## 2025-03-08 ASSESSMENT — PAIN SCALES - GENERAL: PAINLEVEL_OUTOF10: 8

## 2025-03-08 ASSESSMENT — PAIN DESCRIPTION - LOCATION: LOCATION: FLANK

## 2025-03-08 ASSESSMENT — PAIN DESCRIPTION - DIRECTION: RADIATING_TOWARDS: BACK

## 2025-03-08 ASSESSMENT — PAIN DESCRIPTION - PAIN TYPE: TYPE: SURGICAL PAIN

## 2025-03-08 NOTE — FLOWSHEET NOTE
Discharged to home in stable condition, tolerated iv push med well, all questions answered, VS stable, does not want dc instructions

## 2025-03-09 ENCOUNTER — HOSPITAL ENCOUNTER (OUTPATIENT)
Dept: INFUSION THERAPY | Age: 80
Setting detail: INFUSION SERIES
Discharge: HOME | End: 2025-03-09
Payer: MEDICARE

## 2025-03-09 VITALS
OXYGEN SATURATION: 100 % | DIASTOLIC BLOOD PRESSURE: 76 MMHG | BODY MASS INDEX: 29.44 KG/M2 | TEMPERATURE: 97.8 F | WEIGHT: 166.2 LBS | RESPIRATION RATE: 16 BRPM | HEART RATE: 65 BPM | SYSTOLIC BLOOD PRESSURE: 145 MMHG

## 2025-03-09 DIAGNOSIS — R78.81 BACTEREMIA DUE TO ENTEROCOCCUS: Primary | ICD-10-CM

## 2025-03-09 DIAGNOSIS — B95.2 BACTEREMIA DUE TO ENTEROCOCCUS: Primary | ICD-10-CM

## 2025-03-09 PROCEDURE — 2580000003 HC RX 258: Performed by: INTERNAL MEDICINE

## 2025-03-09 PROCEDURE — 6360000002 HC RX W HCPCS: Performed by: INTERNAL MEDICINE

## 2025-03-09 PROCEDURE — 96374 THER/PROPH/DIAG INJ IV PUSH: CPT

## 2025-03-09 PROCEDURE — 2500000003 HC RX 250 WO HCPCS: Performed by: INTERNAL MEDICINE

## 2025-03-09 RX ORDER — SODIUM CHLORIDE 9 MG/ML
5-250 INJECTION, SOLUTION INTRAVENOUS PRN
Status: CANCELLED | OUTPATIENT
Start: 2025-03-10

## 2025-03-09 RX ORDER — HYDROCORTISONE SODIUM SUCCINATE 100 MG/2ML
100 INJECTION INTRAMUSCULAR; INTRAVENOUS
Status: CANCELLED | OUTPATIENT
Start: 2025-03-10

## 2025-03-09 RX ORDER — SODIUM CHLORIDE 0.9 % (FLUSH) 0.9 %
5-40 SYRINGE (ML) INJECTION PRN
Status: CANCELLED | OUTPATIENT
Start: 2025-03-10

## 2025-03-09 RX ORDER — HEPARIN 100 UNIT/ML
500 SYRINGE INTRAVENOUS PRN
Status: DISCONTINUED | OUTPATIENT
Start: 2025-03-09 | End: 2025-03-10 | Stop reason: HOSPADM

## 2025-03-09 RX ORDER — EPINEPHRINE 1 MG/ML
0.3 INJECTION, SOLUTION, CONCENTRATE INTRAVENOUS PRN
Status: CANCELLED | OUTPATIENT
Start: 2025-03-10

## 2025-03-09 RX ORDER — DIPHENHYDRAMINE HYDROCHLORIDE 50 MG/ML
50 INJECTION INTRAMUSCULAR; INTRAVENOUS
Status: CANCELLED | OUTPATIENT
Start: 2025-03-10

## 2025-03-09 RX ORDER — SODIUM CHLORIDE 0.9 % (FLUSH) 0.9 %
5-40 SYRINGE (ML) INJECTION PRN
Status: DISCONTINUED | OUTPATIENT
Start: 2025-03-09 | End: 2025-03-10 | Stop reason: HOSPADM

## 2025-03-09 RX ORDER — HEPARIN 100 UNIT/ML
500 SYRINGE INTRAVENOUS PRN
Status: CANCELLED | OUTPATIENT
Start: 2025-03-10

## 2025-03-09 RX ORDER — DIPHENHYDRAMINE HCL 25 MG
50 TABLET ORAL ONCE
Status: CANCELLED
Start: 2025-03-10 | End: 2025-03-10

## 2025-03-09 RX ADMIN — SODIUM CHLORIDE, PRESERVATIVE FREE 10 ML: 5 INJECTION INTRAVENOUS at 09:30

## 2025-03-09 RX ADMIN — HEPARIN 500 UNITS: 100 SYRINGE at 09:30

## 2025-03-09 RX ADMIN — SODIUM CHLORIDE, PRESERVATIVE FREE 10 ML: 5 INJECTION INTRAVENOUS at 09:23

## 2025-03-09 RX ADMIN — SODIUM CHLORIDE 500 MG: 9 INJECTION INTRAMUSCULAR; INTRAVENOUS; SUBCUTANEOUS at 09:23

## 2025-03-09 RX ADMIN — HEPARIN 500 UNITS: 100 SYRINGE at 09:22

## 2025-03-09 RX ADMIN — SODIUM CHLORIDE, PRESERVATIVE FREE 10 ML: 5 INJECTION INTRAVENOUS at 09:22

## 2025-03-09 ASSESSMENT — PAIN DESCRIPTION - FREQUENCY: FREQUENCY: CONTINUOUS

## 2025-03-09 ASSESSMENT — PAIN DESCRIPTION - PAIN TYPE: TYPE: ACUTE PAIN;SURGICAL PAIN

## 2025-03-09 ASSESSMENT — PAIN - FUNCTIONAL ASSESSMENT: PAIN_FUNCTIONAL_ASSESSMENT: PREVENTS OR INTERFERES WITH ALL ACTIVE AND SOME PASSIVE ACTIVITIES

## 2025-03-09 ASSESSMENT — PAIN SCALES - GENERAL: PAINLEVEL_OUTOF10: 8

## 2025-03-09 ASSESSMENT — PAIN DESCRIPTION - DESCRIPTORS: DESCRIPTORS: DISCOMFORT;ACHING;SHARP

## 2025-03-09 ASSESSMENT — PAIN DESCRIPTION - ORIENTATION: ORIENTATION: LOWER;MID;RIGHT;LEFT

## 2025-03-09 ASSESSMENT — PAIN DESCRIPTION - DIRECTION: RADIATING_TOWARDS: NON RADIATING

## 2025-03-09 ASSESSMENT — PAIN DESCRIPTION - ONSET: ONSET: ON-GOING

## 2025-03-09 ASSESSMENT — PAIN DESCRIPTION - LOCATION: LOCATION: ABDOMEN

## 2025-03-09 NOTE — PROGRESS NOTES
Patient tolerated dapto iv push well. Patient alert and oriented x3. No distress noted. Vital signs stable. Patient is complaining of abdominal pain and nausea and inability to keep fluids or food down since his urology procedure on Friday. He has a lot of gas and belching. Patient's wife called urology on call doctor Dr. Sandra and notified him. I also spoke to him and he states pain could be from stent, and gi issues could be anesthesia related or due to possible bug, and that patient should try to stay hydrated and if he cannot they should go to ER otherwise ok to keep an eye on him for now. Message relayed to wife. They will try to give him tums and different fluids at home and do their best to keep him hydrated. His wife will call Dr. Sandra office in the morning if they are still having issues. Offered patient education and/or discharge material. Patient declined. Patient denies any needs. All questions answered. D/C in stable condition.

## 2025-03-10 ENCOUNTER — HOSPITAL ENCOUNTER (OUTPATIENT)
Dept: INFUSION THERAPY | Age: 80
Setting detail: INFUSION SERIES
Discharge: HOME | End: 2025-03-10
Payer: MEDICARE

## 2025-03-10 VITALS
TEMPERATURE: 98.1 F | RESPIRATION RATE: 16 BRPM | OXYGEN SATURATION: 100 % | DIASTOLIC BLOOD PRESSURE: 87 MMHG | HEART RATE: 86 BPM | SYSTOLIC BLOOD PRESSURE: 148 MMHG

## 2025-03-10 DIAGNOSIS — R78.81 BACTEREMIA DUE TO ENTEROCOCCUS: Primary | ICD-10-CM

## 2025-03-10 DIAGNOSIS — B95.2 BACTEREMIA DUE TO ENTEROCOCCUS: Primary | ICD-10-CM

## 2025-03-10 LAB
ALBUMIN SERPL-MCNC: 3.5 G/DL (ref 3.5–5.2)
ALP SERPL-CCNC: 84 U/L (ref 40–129)
ALT SERPL-CCNC: 14 U/L (ref 0–40)
ANION GAP SERPL CALCULATED.3IONS-SCNC: 19 MMOL/L (ref 7–16)
AST SERPL-CCNC: 26 U/L (ref 0–39)
BASOPHILS # BLD: 0.03 K/UL (ref 0–0.2)
BASOPHILS NFR BLD: 1 % (ref 0–2)
BILIRUB SERPL-MCNC: 1.2 MG/DL (ref 0–1.2)
BUN SERPL-MCNC: 21 MG/DL (ref 6–23)
CALCIUM SERPL-MCNC: 8.7 MG/DL (ref 8.6–10.2)
CHLORIDE SERPL-SCNC: 104 MMOL/L (ref 98–107)
CK SERPL-CCNC: 121 U/L (ref 20–200)
CO2 SERPL-SCNC: 19 MMOL/L (ref 22–29)
CREAT SERPL-MCNC: 1.1 MG/DL (ref 0.7–1.2)
CRP SERPL HS-MCNC: 116 MG/L (ref 0–5)
EOSINOPHIL # BLD: 0.03 K/UL (ref 0.05–0.5)
EOSINOPHILS RELATIVE PERCENT: 1 % (ref 0–6)
ERYTHROCYTE [DISTWIDTH] IN BLOOD BY AUTOMATED COUNT: 19.6 % (ref 11.5–15)
ERYTHROCYTE [SEDIMENTATION RATE] IN BLOOD BY WESTERGREN METHOD: 8 MM/HR (ref 0–15)
GFR, ESTIMATED: 69 ML/MIN/1.73M2
GLUCOSE SERPL-MCNC: 86 MG/DL (ref 74–99)
HCT VFR BLD AUTO: 34.3 % (ref 37–54)
HGB BLD-MCNC: 11.2 G/DL (ref 12.5–16.5)
LYMPHOCYTES NFR BLD: 0.3 K/UL (ref 1.5–4)
LYMPHOCYTES RELATIVE PERCENT: 8 % (ref 20–42)
MCH RBC QN AUTO: 30.2 PG (ref 26–35)
MCHC RBC AUTO-ENTMCNC: 32.7 G/DL (ref 32–34.5)
MCV RBC AUTO: 92.5 FL (ref 80–99.9)
MONOCYTES NFR BLD: 0.59 K/UL (ref 0.1–0.95)
MONOCYTES NFR BLD: 16 % (ref 2–12)
NEUTROPHILS NFR BLD: 75 % (ref 43–80)
NEUTS SEG NFR BLD: 2.84 K/UL (ref 1.8–7.3)
PLATELET # BLD AUTO: 166 K/UL (ref 130–450)
PMV BLD AUTO: 10.4 FL (ref 7–12)
POTASSIUM SERPL-SCNC: 3.6 MMOL/L (ref 3.5–5)
PROT SERPL-MCNC: 6.1 G/DL (ref 6.4–8.3)
RBC # BLD AUTO: 3.71 M/UL (ref 3.8–5.8)
RBC # BLD: ABNORMAL 10*6/UL
SODIUM SERPL-SCNC: 142 MMOL/L (ref 132–146)
WBC OTHER # BLD: 3.8 K/UL (ref 4.5–11.5)

## 2025-03-10 PROCEDURE — 82550 ASSAY OF CK (CPK): CPT

## 2025-03-10 PROCEDURE — 85025 COMPLETE CBC W/AUTO DIFF WBC: CPT

## 2025-03-10 PROCEDURE — 2580000003 HC RX 258: Performed by: INTERNAL MEDICINE

## 2025-03-10 PROCEDURE — 2500000003 HC RX 250 WO HCPCS: Performed by: INTERNAL MEDICINE

## 2025-03-10 PROCEDURE — 6360000002 HC RX W HCPCS: Performed by: INTERNAL MEDICINE

## 2025-03-10 PROCEDURE — 85652 RBC SED RATE AUTOMATED: CPT

## 2025-03-10 PROCEDURE — 80053 COMPREHEN METABOLIC PANEL: CPT

## 2025-03-10 PROCEDURE — 36592 COLLECT BLOOD FROM PICC: CPT

## 2025-03-10 PROCEDURE — 86140 C-REACTIVE PROTEIN: CPT

## 2025-03-10 PROCEDURE — 96374 THER/PROPH/DIAG INJ IV PUSH: CPT

## 2025-03-10 RX ORDER — HEPARIN 100 UNIT/ML
500 SYRINGE INTRAVENOUS PRN
Status: DISCONTINUED | OUTPATIENT
Start: 2025-03-10 | End: 2025-03-11 | Stop reason: HOSPADM

## 2025-03-10 RX ORDER — EPINEPHRINE 1 MG/ML
0.3 INJECTION, SOLUTION, CONCENTRATE INTRAVENOUS PRN
Status: CANCELLED | OUTPATIENT
Start: 2025-03-11

## 2025-03-10 RX ORDER — SODIUM CHLORIDE 9 MG/ML
5-250 INJECTION, SOLUTION INTRAVENOUS PRN
Status: CANCELLED | OUTPATIENT
Start: 2025-03-11

## 2025-03-10 RX ORDER — HYDROCORTISONE SODIUM SUCCINATE 100 MG/2ML
100 INJECTION INTRAMUSCULAR; INTRAVENOUS
Status: CANCELLED | OUTPATIENT
Start: 2025-03-11

## 2025-03-10 RX ORDER — DIPHENHYDRAMINE HCL 25 MG
50 TABLET ORAL ONCE
Status: CANCELLED
Start: 2025-03-11 | End: 2025-03-11

## 2025-03-10 RX ORDER — HEPARIN 100 UNIT/ML
500 SYRINGE INTRAVENOUS PRN
Status: CANCELLED | OUTPATIENT
Start: 2025-03-11

## 2025-03-10 RX ORDER — DIPHENHYDRAMINE HYDROCHLORIDE 50 MG/ML
50 INJECTION, SOLUTION INTRAMUSCULAR; INTRAVENOUS
Status: CANCELLED | OUTPATIENT
Start: 2025-03-11

## 2025-03-10 RX ORDER — SODIUM CHLORIDE 0.9 % (FLUSH) 0.9 %
5-40 SYRINGE (ML) INJECTION PRN
Status: DISCONTINUED | OUTPATIENT
Start: 2025-03-10 | End: 2025-03-11 | Stop reason: HOSPADM

## 2025-03-10 RX ORDER — SODIUM CHLORIDE 0.9 % (FLUSH) 0.9 %
5-40 SYRINGE (ML) INJECTION PRN
Status: CANCELLED | OUTPATIENT
Start: 2025-03-11

## 2025-03-10 RX ADMIN — HEPARIN 500 UNITS: 100 SYRINGE at 13:55

## 2025-03-10 RX ADMIN — SODIUM CHLORIDE, PRESERVATIVE FREE 10 ML: 5 INJECTION INTRAVENOUS at 14:04

## 2025-03-10 RX ADMIN — SODIUM CHLORIDE, PRESERVATIVE FREE 20 ML: 5 INJECTION INTRAVENOUS at 13:55

## 2025-03-10 RX ADMIN — HEPARIN 500 UNITS: 100 SYRINGE at 14:04

## 2025-03-10 RX ADMIN — SODIUM CHLORIDE, PRESERVATIVE FREE 10 ML: 5 INJECTION INTRAVENOUS at 13:56

## 2025-03-10 RX ADMIN — SODIUM CHLORIDE 500 MG: 9 INJECTION INTRAMUSCULAR; INTRAVENOUS; SUBCUTANEOUS at 13:57

## 2025-03-10 ASSESSMENT — PAIN DESCRIPTION - ORIENTATION: ORIENTATION: LOWER

## 2025-03-10 ASSESSMENT — PAIN DESCRIPTION - FREQUENCY: FREQUENCY: CONTINUOUS

## 2025-03-10 ASSESSMENT — PAIN DESCRIPTION - DESCRIPTORS: DESCRIPTORS: DISCOMFORT;ACHING

## 2025-03-10 ASSESSMENT — PAIN DESCRIPTION - LOCATION: LOCATION: BACK

## 2025-03-10 ASSESSMENT — PAIN - FUNCTIONAL ASSESSMENT: PAIN_FUNCTIONAL_ASSESSMENT: ACTIVITIES ARE NOT PREVENTED

## 2025-03-10 ASSESSMENT — PAIN SCALES - GENERAL
PAINLEVEL_OUTOF10: 4
PAINLEVEL_OUTOF10: 4

## 2025-03-10 ASSESSMENT — PAIN DESCRIPTION - ONSET: ONSET: ON-GOING

## 2025-03-10 ASSESSMENT — PAIN DESCRIPTION - DIRECTION: RADIATING_TOWARDS: BOTH SIDES

## 2025-03-10 ASSESSMENT — PAIN DESCRIPTION - PAIN TYPE: TYPE: ACUTE PAIN;SURGICAL PAIN

## 2025-03-10 NOTE — PROGRESS NOTES
Patient tolerated dapto iv push and lab draw well. Patient alert and oriented x3. No distress noted. Vital signs stable. Patient denies any new or worsening pain. Offered patient education and/or discharge material. Patient declined. Patient denies any needs. All questions answered. D/C in stable condition.

## 2025-03-11 ENCOUNTER — APPOINTMENT (OUTPATIENT)
Dept: GENERAL RADIOLOGY | Age: 80
End: 2025-03-11
Payer: MEDICARE

## 2025-03-11 ENCOUNTER — APPOINTMENT (OUTPATIENT)
Dept: CT IMAGING | Age: 80
End: 2025-03-11
Payer: MEDICARE

## 2025-03-11 ENCOUNTER — HOSPITAL ENCOUNTER (INPATIENT)
Age: 80
LOS: 10 days | Discharge: NURSING, REHAB OR LONG-TERM CARE | End: 2025-03-21
Attending: EMERGENCY MEDICINE | Admitting: INTERNAL MEDICINE
Payer: MEDICARE

## 2025-03-11 ENCOUNTER — HOSPITAL ENCOUNTER (OUTPATIENT)
Dept: INFUSION THERAPY | Age: 80
Setting detail: INFUSION SERIES
Discharge: HOME | End: 2025-03-11

## 2025-03-11 DIAGNOSIS — J18.9 PNEUMONIA DUE TO INFECTIOUS ORGANISM, UNSPECIFIED LATERALITY, UNSPECIFIED PART OF LUNG: Primary | ICD-10-CM

## 2025-03-11 DIAGNOSIS — I47.29 NON-SUSTAINED VENTRICULAR TACHYCARDIA (HCC): ICD-10-CM

## 2025-03-11 DIAGNOSIS — R78.81 BACTEREMIA DUE TO ENTEROCOCCUS: Primary | ICD-10-CM

## 2025-03-11 DIAGNOSIS — B95.2 BACTEREMIA DUE TO ENTEROCOCCUS: Primary | ICD-10-CM

## 2025-03-11 DIAGNOSIS — A41.9 SEPSIS, DUE TO UNSPECIFIED ORGANISM, UNSPECIFIED WHETHER ACUTE ORGAN DYSFUNCTION PRESENT (HCC): ICD-10-CM

## 2025-03-11 DIAGNOSIS — J96.01 ACUTE HYPOXIC RESPIRATORY FAILURE: ICD-10-CM

## 2025-03-11 DIAGNOSIS — K56.609 SBO (SMALL BOWEL OBSTRUCTION) (HCC): ICD-10-CM

## 2025-03-11 LAB
ALBUMIN SERPL-MCNC: 3.5 G/DL (ref 3.5–5.2)
ALP SERPL-CCNC: 88 U/L (ref 40–129)
ALT SERPL-CCNC: 15 U/L (ref 0–40)
ANION GAP SERPL CALCULATED.3IONS-SCNC: 22 MMOL/L (ref 7–16)
AST SERPL-CCNC: 35 U/L (ref 0–39)
B PARAP IS1001 DNA NPH QL NAA+NON-PROBE: NOT DETECTED
B PERT DNA SPEC QL NAA+PROBE: NOT DETECTED
BACTERIA URNS QL MICRO: ABNORMAL
BASOPHILS # BLD: 0 K/UL (ref 0–0.2)
BASOPHILS NFR BLD: 0 % (ref 0–2)
BILIRUB SERPL-MCNC: 1.6 MG/DL (ref 0–1.2)
BILIRUB UR QL STRIP: ABNORMAL
BNP SERPL-MCNC: ABNORMAL PG/ML (ref 0–450)
BUN SERPL-MCNC: 21 MG/DL (ref 6–23)
C PNEUM DNA NPH QL NAA+NON-PROBE: NOT DETECTED
CALCIUM SERPL-MCNC: 9.6 MG/DL (ref 8.6–10.2)
CHLORIDE SERPL-SCNC: 103 MMOL/L (ref 98–107)
CK SERPL-CCNC: 137 U/L (ref 20–200)
CLARITY UR: CLEAR
CO2 SERPL-SCNC: 16 MMOL/L (ref 22–29)
COLOR UR: ABNORMAL
CREAT SERPL-MCNC: 1 MG/DL (ref 0.7–1.2)
EKG ATRIAL RATE: 120 BPM
EKG Q-T INTERVAL: 354 MS
EKG QRS DURATION: 80 MS
EKG QTC CALCULATION (BAZETT): 487 MS
EKG R AXIS: -90 DEGREES
EKG T AXIS: 117 DEGREES
EKG VENTRICULAR RATE: 114 BPM
EOSINOPHIL # BLD: 0 K/UL (ref 0.05–0.5)
EOSINOPHILS RELATIVE PERCENT: 0 % (ref 0–6)
EPI CELLS #/AREA URNS HPF: ABNORMAL /HPF
ERYTHROCYTE [DISTWIDTH] IN BLOOD BY AUTOMATED COUNT: 20.1 % (ref 11.5–15)
FLUAV RNA NPH QL NAA+NON-PROBE: NOT DETECTED
FLUAV RNA RESP QL NAA+PROBE: NOT DETECTED
FLUBV RNA NPH QL NAA+NON-PROBE: NOT DETECTED
FLUBV RNA RESP QL NAA+PROBE: NOT DETECTED
GFR, ESTIMATED: 73 ML/MIN/1.73M2
GLUCOSE SERPL-MCNC: 73 MG/DL (ref 74–99)
GLUCOSE UR STRIP-MCNC: 100 MG/DL
HADV DNA NPH QL NAA+NON-PROBE: NOT DETECTED
HCOV 229E RNA NPH QL NAA+NON-PROBE: NOT DETECTED
HCOV HKU1 RNA NPH QL NAA+NON-PROBE: NOT DETECTED
HCOV NL63 RNA NPH QL NAA+NON-PROBE: NOT DETECTED
HCOV OC43 RNA NPH QL NAA+NON-PROBE: NOT DETECTED
HCT VFR BLD AUTO: 41.2 % (ref 37–54)
HGB BLD-MCNC: 13.1 G/DL (ref 12.5–16.5)
HGB UR QL STRIP.AUTO: ABNORMAL
HMPV RNA NPH QL NAA+NON-PROBE: NOT DETECTED
HPIV1 RNA NPH QL NAA+NON-PROBE: NOT DETECTED
HPIV2 RNA NPH QL NAA+NON-PROBE: NOT DETECTED
HPIV3 RNA NPH QL NAA+NON-PROBE: NOT DETECTED
HPIV4 RNA NPH QL NAA+NON-PROBE: NOT DETECTED
KETONES UR STRIP-MCNC: 15 MG/DL
LACTATE BLDV-SCNC: 3.1 MMOL/L (ref 0.5–1.9)
LACTATE BLDV-SCNC: 4.4 MMOL/L (ref 0.5–1.9)
LEUKOCYTE ESTERASE UR QL STRIP: ABNORMAL
LYMPHOCYTES NFR BLD: 0.37 K/UL (ref 1.5–4)
LYMPHOCYTES RELATIVE PERCENT: 15 % (ref 20–42)
M PNEUMO DNA NPH QL NAA+NON-PROBE: NOT DETECTED
MAGNESIUM SERPL-MCNC: 1.7 MG/DL (ref 1.6–2.6)
MCH RBC QN AUTO: 30.2 PG (ref 26–35)
MCHC RBC AUTO-ENTMCNC: 31.8 G/DL (ref 32–34.5)
MCV RBC AUTO: 94.9 FL (ref 80–99.9)
MONOCYTES NFR BLD: 0.17 K/UL (ref 0.1–0.95)
MONOCYTES NFR BLD: 7 % (ref 2–12)
NEUTROPHILS NFR BLD: 78 % (ref 43–80)
NEUTS SEG NFR BLD: 1.96 K/UL (ref 1.8–7.3)
NITRITE UR QL STRIP: POSITIVE
NUCLEATED RED BLOOD CELLS: 2 PER 100 WBC
PH UR STRIP: 5 [PH] (ref 5–8)
PLATELET # BLD AUTO: 175 K/UL (ref 130–450)
PMV BLD AUTO: 10.7 FL (ref 7–12)
POTASSIUM SERPL-SCNC: 3.4 MMOL/L (ref 3.5–5)
PROCALCITONIN SERPL-MCNC: 2.03 NG/ML (ref 0–0.08)
PROT SERPL-MCNC: 7.1 G/DL (ref 6.4–8.3)
PROT UR STRIP-MCNC: >=300 MG/DL
RBC # BLD AUTO: 4.34 M/UL (ref 3.8–5.8)
RBC # BLD: ABNORMAL 10*6/UL
RBC #/AREA URNS HPF: ABNORMAL /HPF
RSV RNA NPH QL NAA+NON-PROBE: NOT DETECTED
RV+EV RNA NPH QL NAA+NON-PROBE: NOT DETECTED
SARS-COV-2 RNA NPH QL NAA+NON-PROBE: NOT DETECTED
SARS-COV-2 RNA RESP QL NAA+PROBE: NOT DETECTED
SODIUM SERPL-SCNC: 141 MMOL/L (ref 132–146)
SOURCE: NORMAL
SP GR UR STRIP: 1.02 (ref 1–1.03)
SPECIMEN DESCRIPTION: NORMAL
SPECIMEN DESCRIPTION: NORMAL
TROPONIN I SERPL HS-MCNC: 57 NG/L (ref 0–11)
TROPONIN I SERPL HS-MCNC: 61 NG/L (ref 0–11)
UROBILINOGEN UR STRIP-ACNC: 1 EU/DL (ref 0–1)
WBC #/AREA URNS HPF: ABNORMAL /HPF
WBC OTHER # BLD: 2.5 K/UL (ref 4.5–11.5)

## 2025-03-11 PROCEDURE — 6370000000 HC RX 637 (ALT 250 FOR IP): Performed by: NURSE PRACTITIONER

## 2025-03-11 PROCEDURE — 2580000003 HC RX 258: Performed by: NURSE PRACTITIONER

## 2025-03-11 PROCEDURE — 93284 PRGRMG EVAL IMPLANTABLE DFB: CPT | Performed by: INTERNAL MEDICINE

## 2025-03-11 PROCEDURE — 83880 ASSAY OF NATRIURETIC PEPTIDE: CPT

## 2025-03-11 PROCEDURE — 87636 SARSCOV2 & INF A&B AMP PRB: CPT

## 2025-03-11 PROCEDURE — 83735 ASSAY OF MAGNESIUM: CPT

## 2025-03-11 PROCEDURE — 2500000003 HC RX 250 WO HCPCS

## 2025-03-11 PROCEDURE — 71275 CT ANGIOGRAPHY CHEST: CPT

## 2025-03-11 PROCEDURE — 2580000003 HC RX 258: Performed by: INTERNAL MEDICINE

## 2025-03-11 PROCEDURE — 93010 ELECTROCARDIOGRAM REPORT: CPT | Performed by: INTERNAL MEDICINE

## 2025-03-11 PROCEDURE — 6370000000 HC RX 637 (ALT 250 FOR IP)

## 2025-03-11 PROCEDURE — 2580000003 HC RX 258

## 2025-03-11 PROCEDURE — 87449 NOS EACH ORGANISM AG IA: CPT

## 2025-03-11 PROCEDURE — 99222 1ST HOSP IP/OBS MODERATE 55: CPT | Performed by: INTERNAL MEDICINE

## 2025-03-11 PROCEDURE — 0202U NFCT DS 22 TRGT SARS-COV-2: CPT

## 2025-03-11 PROCEDURE — 2700000000 HC OXYGEN THERAPY PER DAY

## 2025-03-11 PROCEDURE — 87899 AGENT NOS ASSAY W/OPTIC: CPT

## 2025-03-11 PROCEDURE — 99285 EMERGENCY DEPT VISIT HI MDM: CPT

## 2025-03-11 PROCEDURE — 81001 URINALYSIS AUTO W/SCOPE: CPT

## 2025-03-11 PROCEDURE — 71045 X-RAY EXAM CHEST 1 VIEW: CPT

## 2025-03-11 PROCEDURE — 74177 CT ABD & PELVIS W/CONTRAST: CPT

## 2025-03-11 PROCEDURE — 96365 THER/PROPH/DIAG IV INF INIT: CPT

## 2025-03-11 PROCEDURE — 87040 BLOOD CULTURE FOR BACTERIA: CPT

## 2025-03-11 PROCEDURE — 93005 ELECTROCARDIOGRAM TRACING: CPT

## 2025-03-11 PROCEDURE — 6360000002 HC RX W HCPCS

## 2025-03-11 PROCEDURE — 96375 TX/PRO/DX INJ NEW DRUG ADDON: CPT

## 2025-03-11 PROCEDURE — 84484 ASSAY OF TROPONIN QUANT: CPT

## 2025-03-11 PROCEDURE — 6360000004 HC RX CONTRAST MEDICATION: Performed by: RADIOLOGY

## 2025-03-11 PROCEDURE — 94640 AIRWAY INHALATION TREATMENT: CPT

## 2025-03-11 PROCEDURE — 74018 RADEX ABDOMEN 1 VIEW: CPT

## 2025-03-11 PROCEDURE — 83605 ASSAY OF LACTIC ACID: CPT

## 2025-03-11 PROCEDURE — 2580000003 HC RX 258: Performed by: EMERGENCY MEDICINE

## 2025-03-11 PROCEDURE — 82550 ASSAY OF CK (CPK): CPT

## 2025-03-11 PROCEDURE — 6360000002 HC RX W HCPCS: Performed by: INTERNAL MEDICINE

## 2025-03-11 PROCEDURE — 80053 COMPREHEN METABOLIC PANEL: CPT

## 2025-03-11 PROCEDURE — 85025 COMPLETE CBC W/AUTO DIFF WBC: CPT

## 2025-03-11 PROCEDURE — 2500000003 HC RX 250 WO HCPCS: Performed by: INTERNAL MEDICINE

## 2025-03-11 PROCEDURE — 84145 PROCALCITONIN (PCT): CPT

## 2025-03-11 PROCEDURE — 96361 HYDRATE IV INFUSION ADD-ON: CPT

## 2025-03-11 PROCEDURE — 2060000000 HC ICU INTERMEDIATE R&B

## 2025-03-11 PROCEDURE — 96374 THER/PROPH/DIAG INJ IV PUSH: CPT

## 2025-03-11 PROCEDURE — 96366 THER/PROPH/DIAG IV INF ADDON: CPT

## 2025-03-11 RX ORDER — SODIUM CHLORIDE 9 MG/ML
5-250 INJECTION, SOLUTION INTRAVENOUS PRN
OUTPATIENT
Start: 2025-03-12

## 2025-03-11 RX ORDER — SODIUM CHLORIDE 0.9 % (FLUSH) 0.9 %
5-40 SYRINGE (ML) INJECTION PRN
Status: DISCONTINUED | OUTPATIENT
Start: 2025-03-11 | End: 2025-03-21 | Stop reason: HOSPADM

## 2025-03-11 RX ORDER — METOPROLOL SUCCINATE 25 MG/1
25 TABLET, EXTENDED RELEASE ORAL 2 TIMES DAILY
Status: DISCONTINUED | OUTPATIENT
Start: 2025-03-11 | End: 2025-03-21 | Stop reason: HOSPADM

## 2025-03-11 RX ORDER — HYDROXYCHLOROQUINE SULFATE 200 MG/1
200 TABLET, FILM COATED ORAL 2 TIMES DAILY
Status: DISCONTINUED | OUTPATIENT
Start: 2025-03-11 | End: 2025-03-21 | Stop reason: HOSPADM

## 2025-03-11 RX ORDER — SODIUM CHLORIDE 0.9 % (FLUSH) 0.9 %
5-40 SYRINGE (ML) INJECTION EVERY 12 HOURS SCHEDULED
Status: DISCONTINUED | OUTPATIENT
Start: 2025-03-11 | End: 2025-03-13 | Stop reason: SDUPTHER

## 2025-03-11 RX ORDER — CETIRIZINE HYDROCHLORIDE 10 MG/1
10 TABLET ORAL EVERY EVENING
Status: DISCONTINUED | OUTPATIENT
Start: 2025-03-11 | End: 2025-03-21 | Stop reason: HOSPADM

## 2025-03-11 RX ORDER — 0.9 % SODIUM CHLORIDE 0.9 %
30 INTRAVENOUS SOLUTION INTRAVENOUS ONCE
Status: COMPLETED | OUTPATIENT
Start: 2025-03-11 | End: 2025-03-11

## 2025-03-11 RX ORDER — SODIUM CHLORIDE 0.9 % (FLUSH) 0.9 %
10 SYRINGE (ML) INJECTION PRN
Status: DISCONTINUED | OUTPATIENT
Start: 2025-03-11 | End: 2025-03-21 | Stop reason: HOSPADM

## 2025-03-11 RX ORDER — SODIUM CHLORIDE 0.9 % (FLUSH) 0.9 %
5-40 SYRINGE (ML) INJECTION EVERY 12 HOURS SCHEDULED
Status: DISCONTINUED | OUTPATIENT
Start: 2025-03-11 | End: 2025-03-21 | Stop reason: HOSPADM

## 2025-03-11 RX ORDER — AMIODARONE HYDROCHLORIDE 200 MG/1
200 TABLET ORAL EVERY MORNING
Status: DISCONTINUED | OUTPATIENT
Start: 2025-03-12 | End: 2025-03-20

## 2025-03-11 RX ORDER — ADHESIVE TAPE 3"X 2.3 YD
500 TAPE, NON-MEDICATED TOPICAL DAILY
Status: DISCONTINUED | OUTPATIENT
Start: 2025-03-11 | End: 2025-03-11 | Stop reason: RX

## 2025-03-11 RX ORDER — HEPARIN 100 UNIT/ML
500 SYRINGE INTRAVENOUS PRN
Status: DISCONTINUED | OUTPATIENT
Start: 2025-03-11 | End: 2025-03-11

## 2025-03-11 RX ORDER — LIDOCAINE HYDROCHLORIDE 20 MG/ML
JELLY TOPICAL ONCE
Status: COMPLETED | OUTPATIENT
Start: 2025-03-11 | End: 2025-03-11

## 2025-03-11 RX ORDER — BENZONATATE 100 MG/1
200 CAPSULE ORAL 3 TIMES DAILY
Status: DISCONTINUED | OUTPATIENT
Start: 2025-03-11 | End: 2025-03-16

## 2025-03-11 RX ORDER — 0.9 % SODIUM CHLORIDE 0.9 %
500 INTRAVENOUS SOLUTION INTRAVENOUS ONCE
Status: COMPLETED | OUTPATIENT
Start: 2025-03-11 | End: 2025-03-11

## 2025-03-11 RX ORDER — FERROUS SULFATE 325(65) MG
325 TABLET ORAL DAILY
Status: DISCONTINUED | OUTPATIENT
Start: 2025-03-11 | End: 2025-03-21 | Stop reason: HOSPADM

## 2025-03-11 RX ORDER — SODIUM BICARBONATE 650 MG/1
650 TABLET ORAL 3 TIMES DAILY
Status: DISCONTINUED | OUTPATIENT
Start: 2025-03-11 | End: 2025-03-21 | Stop reason: HOSPADM

## 2025-03-11 RX ORDER — ACETAMINOPHEN 325 MG/1
650 TABLET ORAL EVERY 6 HOURS PRN
Status: DISCONTINUED | OUTPATIENT
Start: 2025-03-11 | End: 2025-03-16

## 2025-03-11 RX ORDER — CLOPIDOGREL BISULFATE 75 MG/1
75 TABLET ORAL DAILY
Status: DISCONTINUED | OUTPATIENT
Start: 2025-03-11 | End: 2025-03-21 | Stop reason: HOSPADM

## 2025-03-11 RX ORDER — SENNOSIDES 8.6 MG
1300 CAPSULE ORAL 2 TIMES DAILY
COMMUNITY

## 2025-03-11 RX ORDER — ENOXAPARIN SODIUM 100 MG/ML
1 INJECTION SUBCUTANEOUS 2 TIMES DAILY
Status: DISCONTINUED | OUTPATIENT
Start: 2025-03-12 | End: 2025-03-15 | Stop reason: SDUPTHER

## 2025-03-11 RX ORDER — VITS A,C,E/LUTEIN/MINERALS 300MCG-200
1 TABLET ORAL 2 TIMES DAILY
Status: DISCONTINUED | OUTPATIENT
Start: 2025-03-11 | End: 2025-03-21 | Stop reason: HOSPADM

## 2025-03-11 RX ORDER — IPRATROPIUM BROMIDE AND ALBUTEROL SULFATE 2.5; .5 MG/3ML; MG/3ML
1 SOLUTION RESPIRATORY (INHALATION)
Status: DISCONTINUED | OUTPATIENT
Start: 2025-03-11 | End: 2025-03-21 | Stop reason: HOSPADM

## 2025-03-11 RX ORDER — DULOXETIN HYDROCHLORIDE 30 MG/1
30 CAPSULE, DELAYED RELEASE ORAL EVERY MORNING
Status: DISCONTINUED | OUTPATIENT
Start: 2025-03-12 | End: 2025-03-21 | Stop reason: HOSPADM

## 2025-03-11 RX ORDER — DIPHENHYDRAMINE HCL 25 MG
50 TABLET ORAL ONCE
Start: 2025-03-12 | End: 2025-03-12

## 2025-03-11 RX ORDER — HYDROCORTISONE SODIUM SUCCINATE 100 MG/2ML
200 INJECTION INTRAMUSCULAR; INTRAVENOUS EVERY 6 HOURS
Status: DISPENSED | OUTPATIENT
Start: 2025-03-11 | End: 2025-03-12

## 2025-03-11 RX ORDER — SODIUM CHLORIDE 0.9 % (FLUSH) 0.9 %
5-40 SYRINGE (ML) INJECTION PRN
Status: CANCELLED | OUTPATIENT
Start: 2025-03-12

## 2025-03-11 RX ORDER — SODIUM CHLORIDE 0.9 % (FLUSH) 0.9 %
5-40 SYRINGE (ML) INJECTION PRN
Status: DISCONTINUED | OUTPATIENT
Start: 2025-03-11 | End: 2025-03-11

## 2025-03-11 RX ORDER — ONDANSETRON 2 MG/ML
4 INJECTION INTRAMUSCULAR; INTRAVENOUS ONCE
Status: COMPLETED | OUTPATIENT
Start: 2025-03-11 | End: 2025-03-11

## 2025-03-11 RX ORDER — SODIUM CHLORIDE 9 MG/ML
INJECTION, SOLUTION INTRAVENOUS CONTINUOUS
Status: DISCONTINUED | OUTPATIENT
Start: 2025-03-11 | End: 2025-03-12

## 2025-03-11 RX ORDER — OXYMETAZOLINE HYDROCHLORIDE 0.05 G/100ML
2 SPRAY NASAL ONCE
Status: COMPLETED | OUTPATIENT
Start: 2025-03-11 | End: 2025-03-11

## 2025-03-11 RX ORDER — AMIODARONE HYDROCHLORIDE 200 MG/1
200 TABLET ORAL DAILY
Status: ON HOLD | COMMUNITY
End: 2025-03-21 | Stop reason: HOSPADM

## 2025-03-11 RX ORDER — ROSUVASTATIN CALCIUM 40 MG/1
40 TABLET, COATED ORAL EVERY EVENING
COMMUNITY

## 2025-03-11 RX ORDER — EPINEPHRINE 1 MG/ML
0.3 INJECTION, SOLUTION, CONCENTRATE INTRAVENOUS PRN
OUTPATIENT
Start: 2025-03-12

## 2025-03-11 RX ORDER — GABAPENTIN 300 MG/1
300 CAPSULE ORAL 3 TIMES DAILY
Status: DISCONTINUED | OUTPATIENT
Start: 2025-03-11 | End: 2025-03-21 | Stop reason: HOSPADM

## 2025-03-11 RX ORDER — GLUCAGON 1 MG/ML
1 KIT INJECTION PRN
Status: DISCONTINUED | OUTPATIENT
Start: 2025-03-11 | End: 2025-03-21 | Stop reason: HOSPADM

## 2025-03-11 RX ORDER — SODIUM CHLORIDE 9 MG/ML
INJECTION, SOLUTION INTRAVENOUS PRN
Status: DISCONTINUED | OUTPATIENT
Start: 2025-03-11 | End: 2025-03-21 | Stop reason: HOSPADM

## 2025-03-11 RX ORDER — DIPHENHYDRAMINE HYDROCHLORIDE 50 MG/ML
50 INJECTION, SOLUTION INTRAMUSCULAR; INTRAVENOUS
OUTPATIENT
Start: 2025-03-12

## 2025-03-11 RX ORDER — FOLIC ACID 1 MG/1
1 TABLET ORAL EVERY MORNING
Status: DISCONTINUED | OUTPATIENT
Start: 2025-03-12 | End: 2025-03-21 | Stop reason: HOSPADM

## 2025-03-11 RX ORDER — HEPARIN 100 UNIT/ML
500 SYRINGE INTRAVENOUS PRN
Status: CANCELLED | OUTPATIENT
Start: 2025-03-12

## 2025-03-11 RX ORDER — TORSEMIDE 20 MG/1
40 TABLET ORAL
Status: DISCONTINUED | OUTPATIENT
Start: 2025-03-15 | End: 2025-03-18

## 2025-03-11 RX ORDER — POTASSIUM CHLORIDE 1500 MG/1
40 TABLET, EXTENDED RELEASE ORAL ONCE
Status: COMPLETED | OUTPATIENT
Start: 2025-03-11 | End: 2025-03-11

## 2025-03-11 RX ORDER — ACETAMINOPHEN 650 MG/1
650 SUPPOSITORY RECTAL EVERY 6 HOURS PRN
Status: DISCONTINUED | OUTPATIENT
Start: 2025-03-11 | End: 2025-03-16

## 2025-03-11 RX ORDER — ONDANSETRON 4 MG/1
4 TABLET, ORALLY DISINTEGRATING ORAL EVERY 8 HOURS PRN
Status: DISCONTINUED | OUTPATIENT
Start: 2025-03-11 | End: 2025-03-21 | Stop reason: HOSPADM

## 2025-03-11 RX ORDER — TAMSULOSIN HYDROCHLORIDE 0.4 MG/1
0.4 CAPSULE ORAL DAILY
Status: DISCONTINUED | OUTPATIENT
Start: 2025-03-11 | End: 2025-03-21 | Stop reason: HOSPADM

## 2025-03-11 RX ORDER — ONDANSETRON 2 MG/ML
4 INJECTION INTRAMUSCULAR; INTRAVENOUS EVERY 6 HOURS PRN
Status: DISCONTINUED | OUTPATIENT
Start: 2025-03-11 | End: 2025-03-21 | Stop reason: HOSPADM

## 2025-03-11 RX ORDER — DEXTROSE MONOHYDRATE 100 MG/ML
INJECTION, SOLUTION INTRAVENOUS CONTINUOUS PRN
Status: DISCONTINUED | OUTPATIENT
Start: 2025-03-11 | End: 2025-03-21 | Stop reason: HOSPADM

## 2025-03-11 RX ORDER — TORSEMIDE 20 MG/1
40 TABLET ORAL WEEKLY
Status: DISCONTINUED | OUTPATIENT
Start: 2025-03-11 | End: 2025-03-11 | Stop reason: SDUPTHER

## 2025-03-11 RX ORDER — HYDROCORTISONE SODIUM SUCCINATE 100 MG/2ML
100 INJECTION INTRAMUSCULAR; INTRAVENOUS
OUTPATIENT
Start: 2025-03-12

## 2025-03-11 RX ORDER — PANTOPRAZOLE SODIUM 40 MG/1
40 TABLET, DELAYED RELEASE ORAL EVERY MORNING
Status: DISCONTINUED | OUTPATIENT
Start: 2025-03-12 | End: 2025-03-21 | Stop reason: HOSPADM

## 2025-03-11 RX ORDER — DIPHENHYDRAMINE HYDROCHLORIDE 50 MG/ML
50 INJECTION, SOLUTION INTRAMUSCULAR; INTRAVENOUS ONCE
Status: COMPLETED | OUTPATIENT
Start: 2025-03-11 | End: 2025-03-11

## 2025-03-11 RX ORDER — GUAIFENESIN 400 MG/1
400 TABLET ORAL 3 TIMES DAILY
Status: DISCONTINUED | OUTPATIENT
Start: 2025-03-11 | End: 2025-03-21 | Stop reason: HOSPADM

## 2025-03-11 RX ORDER — IOPAMIDOL 755 MG/ML
75 INJECTION, SOLUTION INTRAVASCULAR
Status: COMPLETED | OUTPATIENT
Start: 2025-03-11 | End: 2025-03-11

## 2025-03-11 RX ADMIN — BENZONATATE 200 MG: 100 CAPSULE ORAL at 20:44

## 2025-03-11 RX ADMIN — POTASSIUM CHLORIDE 40 MEQ: 1500 TABLET, EXTENDED RELEASE ORAL at 17:25

## 2025-03-11 RX ADMIN — AMIODARONE HYDROCHLORIDE 0.5 MG/MIN: 1.8 INJECTION, SOLUTION INTRAVENOUS at 17:30

## 2025-03-11 RX ADMIN — LIDOCAINE HYDROCHLORIDE: 20 JELLY TOPICAL at 13:54

## 2025-03-11 RX ADMIN — SODIUM CHLORIDE, PRESERVATIVE FREE 10 ML: 5 INJECTION INTRAVENOUS at 20:55

## 2025-03-11 RX ADMIN — OXYMETAZOLINE HYDROCHLORIDE 2 SPRAY: 0.5 SPRAY NASAL at 13:53

## 2025-03-11 RX ADMIN — IPRATROPIUM BROMIDE AND ALBUTEROL SULFATE 1 DOSE: 2.5; .5 SOLUTION RESPIRATORY (INHALATION) at 14:34

## 2025-03-11 RX ADMIN — CEFEPIME 2000 MG: 2 INJECTION, POWDER, FOR SOLUTION INTRAVENOUS at 14:35

## 2025-03-11 RX ADMIN — SODIUM CHLORIDE 500 ML: 0.9 INJECTION, SOLUTION INTRAVENOUS at 09:17

## 2025-03-11 RX ADMIN — ONDANSETRON 4 MG: 2 INJECTION, SOLUTION INTRAMUSCULAR; INTRAVENOUS at 09:49

## 2025-03-11 RX ADMIN — SODIUM BICARBONATE 650 MG: 650 TABLET ORAL at 20:44

## 2025-03-11 RX ADMIN — AMPICILLIN SODIUM 2000 MG: 2 INJECTION, POWDER, FOR SOLUTION INTRAMUSCULAR; INTRAVENOUS at 20:55

## 2025-03-11 RX ADMIN — IOPAMIDOL 75 ML: 755 INJECTION, SOLUTION INTRAVENOUS at 12:33

## 2025-03-11 RX ADMIN — DIPHENHYDRAMINE HYDROCHLORIDE 50 MG: 50 INJECTION INTRAMUSCULAR; INTRAVENOUS at 12:14

## 2025-03-11 RX ADMIN — AMIODARONE HYDROCHLORIDE 1 MG/MIN: 1.8 INJECTION, SOLUTION INTRAVENOUS at 12:01

## 2025-03-11 RX ADMIN — DEXTROSE MONOHYDRATE 150 MG: 50 INJECTION, SOLUTION INTRAVENOUS at 11:47

## 2025-03-11 RX ADMIN — AMPICILLIN SODIUM 2000 MG: 2 INJECTION, POWDER, FOR SOLUTION INTRAMUSCULAR; INTRAVENOUS at 16:49

## 2025-03-11 RX ADMIN — HYDROCORTISONE SODIUM SUCCINATE 200 MG: 100 INJECTION, POWDER, FOR SOLUTION INTRAMUSCULAR; INTRAVENOUS at 12:14

## 2025-03-11 RX ADMIN — APIXABAN 5 MG: 5 TABLET, FILM COATED ORAL at 20:44

## 2025-03-11 RX ADMIN — SODIUM CHLORIDE, PRESERVATIVE FREE 10 ML: 5 INJECTION INTRAVENOUS at 09:11

## 2025-03-11 RX ADMIN — SODIUM CHLORIDE 2262 ML: 0.9 INJECTION, SOLUTION INTRAVENOUS at 11:45

## 2025-03-11 RX ADMIN — IPRATROPIUM BROMIDE AND ALBUTEROL SULFATE 1 DOSE: 2.5; .5 SOLUTION RESPIRATORY (INHALATION) at 17:40

## 2025-03-11 RX ADMIN — SODIUM CHLORIDE: 0.9 INJECTION, SOLUTION INTRAVENOUS at 20:51

## 2025-03-11 RX ADMIN — SODIUM CHLORIDE: 0.9 INJECTION, SOLUTION INTRAVENOUS at 16:43

## 2025-03-11 RX ADMIN — HYDROCORTISONE SODIUM SUCCINATE 200 MG: 100 INJECTION, POWDER, FOR SOLUTION INTRAMUSCULAR; INTRAVENOUS at 16:45

## 2025-03-11 RX ADMIN — WATER 2000 MG: 1 INJECTION INTRAMUSCULAR; INTRAVENOUS; SUBCUTANEOUS at 16:47

## 2025-03-11 RX ADMIN — GUAIFENESIN 400 MG: 400 TABLET ORAL at 20:44

## 2025-03-11 RX ADMIN — GABAPENTIN 300 MG: 300 CAPSULE ORAL at 20:44

## 2025-03-11 RX ADMIN — VANCOMYCIN HYDROCHLORIDE 2000 MG: 500 INJECTION, POWDER, LYOPHILIZED, FOR SOLUTION INTRAVENOUS at 11:48

## 2025-03-11 NOTE — PROGRESS NOTES
Faxed CRP from 3/10/25 of 116 to Dr. Casper office and told them to address. He was at 16 last week.

## 2025-03-11 NOTE — PROGRESS NOTES
Case discussed with general surgery resident  Initially it was thought that patient would likely transfer to [facility]  However subsequent notes indicate patient will be staying at [facility] due to no need for higher level of care  Medicine will continue to follow  EP, infectious disease, general surgery on board    Cynthia Hurtado MD  3/11/2025

## 2025-03-11 NOTE — PROCEDURES
PROCEDURE NOTE  Date: 3/11/2025   Name: Ruben Blakely Stuart  YOB: 1945    Procedures  Patient has iodine allergy, will need medicated before coming to [place]

## 2025-03-11 NOTE — PROGRESS NOTES
Spoke to access center and discussed patient's case with 's physician extensively.  It was agreed that the patient is to remain at [facility] due to no difference in the treatment that would be provided at [facility].      Plan:  -Continue n.p.o. okay for sips with meds  -Pain and nausea control.  -Monitor abdominal exam  -Will order small bowel follow-through tomorrow morning to assess the SBO     Discussed with Dr. Juni Vann MD  General Surgery resident, PGY-1

## 2025-03-11 NOTE — ED PROVIDER NOTES
[facility] EMERGENCY DEPARTMENT  EMERGENCY DEPARTMENT ENCOUNTER        Pt Name: Ruben Davidson  MRN: [id number]  Birthdate 1945  Date of evaluation: 3/11/2025  Provider: Nilda Yan MD  Attending Provider: Cynthia Hurtado MD  PCP: Barrington Garcia MD  Note Started: 8:35 AM EDT 3/11/25    CHIEF COMPLAINT       Chief Complaint   Patient presents with    Vomiting     Patient had kidney stone removal and stent placement has not been eating and drinking since due to vomiting        HISTORY OF PRESENT ILLNESS: 1 or more Elements   History From: The patient and his wife        Ruben Davidson is a 79 y.o. male with a past medical history of coronary disease, congestive heart failure, hyperlipidemia, kidney stones status post stent placement, Enterococcus bacteremia presenting with nausea and vomiting.  Patient states that he had a stent placed for his kidney stone on 3/7.  He states that for the last Vi days, he has been unable to eat or drink anything.  He is having persistent nausea and vomiting.  He also reports abdominal discomfort.  He is coming now for further evaluation.  States that he has been taking his medications but he is vomiting shortly afterwards.    Nursing Notes were all reviewed and agreed with or any disagreements were addressed in the HPI.      REVIEW OF EXTERNAL NOTE :           PDMP Monitoring:    Last PDMP Dany as Reviewed:  Review User Review Instant Review Result            Urine Drug Screenings (1 yr)    No resulted procedures found.       Medication Contract and Consent for Opioid Use Documents Filed        No documents found                      REVIEW OF SYSTEMS :           Positives and Pertinent negatives as per HPI.     SURGICAL HISTORY     Past Surgical History:   Procedure Laterality Date    APPENDECTOMY      BACK SURGERY      CARDIAC DEFIBRILLATOR PLACEMENT  12/20/2021    Medtronic dual chamber ICD by Dr. Luther    CARPAL TUNNEL  CLOSE TO HIS HEART        SOCIAL HISTORY       Social History     Tobacco Use    Smoking status: Former     Current packs/day: 0.00     Average packs/day: 0.5 packs/day for 28.2 years (14.1 ttl pk-yrs)     Types: Cigarettes     Start date: 10/8/1972     Quit date: 2001     Years since quittin.2    Smokeless tobacco: Never   Vaping Use    Vaping status: Never Used   Substance Use Topics    Alcohol use: Not Currently     Comment: usually 1-2 galsses wine daily - none recently    Drug use: No       SCREENINGS        Torrie Coma Scale  Eye Opening: Spontaneous  Best Verbal Response: Oriented  Best Motor Response: Obeys commands  Torrie Coma Scale Score: 15                CIWA Assessment  BP: 136/83  Pulse: (!) 101           PHYSICAL EXAM  1 or more Elements     ED Triage Vitals   BP Systolic BP Percentile Diastolic BP Percentile Temp Temp src Pulse Respirations SpO2   25 0830 -- -- 25 0822 -- -- 25 0822 --   115/82   98.6 °F (37 °C)   18       Height Weight         -- --                                Constitutional/General: Alert and oriented x3  Head: Normocephalic and atraumatic  Eyes: PERRL, EOMI, conjunctiva normal, sclera non icteric  Respiratory: Crackles in the bilateral bases  Cardiovascular: Tachycardic rate. Regular rhythm. 2+ distal pulses. Equal extremity pulses.   Chest: No chest wall tenderness  GI: Abdomen soft, diffusely tender to palpation, mild distention, no guarding or rigidity, no McBurney's point tenderness, negative Brown sign, no CVA tenderness.    Musculoskeletal: Moves all extremities x 4. Warm and well perfused, no clubbing, no cyanosis, no edema. Capillary refill <3 seconds  Integument: skin warm and dry. No rashes.   Neurologic: GCS 15, no focal deficits            DIAGNOSTIC RESULTS   LABS:    Labs Reviewed   LACTATE, SEPSIS - Abnormal; Notable for the following components:       Result Value    Lactic Acid, Sepsis 4.4 (*)     All other components within

## 2025-03-11 NOTE — PROGRESS NOTES
Pharmacy Note    Ruben Davidson was ordered Cranberry.  As per the [facility] Formulary Committee Policy, herbals and certain dietary supplements will be discontinued.  The herbal or dietary supplement may be continued after discharge from the hospital.    Jose Walsh RPH, 3/11/2025 4:49 PM

## 2025-03-11 NOTE — ED NOTES
Spoke to Dr. Yan regarding diet order. Nursing to hold meds until stomach is more compressed and surgery sees patient.

## 2025-03-12 ENCOUNTER — ANESTHESIA EVENT (OUTPATIENT)
Dept: OPERATING ROOM | Age: 80
End: 2025-03-12
Payer: MEDICARE

## 2025-03-12 ENCOUNTER — ANESTHESIA (OUTPATIENT)
Dept: OPERATING ROOM | Age: 80
End: 2025-03-12
Payer: MEDICARE

## 2025-03-12 ENCOUNTER — APPOINTMENT (OUTPATIENT)
Dept: GENERAL RADIOLOGY | Age: 80
End: 2025-03-12
Payer: MEDICARE

## 2025-03-12 ENCOUNTER — HOSPITAL ENCOUNTER (OUTPATIENT)
Dept: INFUSION THERAPY | Age: 80
Setting detail: INFUSION SERIES
End: 2025-03-12

## 2025-03-12 PROBLEM — K56.609 SMALL BOWEL OBSTRUCTION (HCC): Status: ACTIVE | Noted: 2025-03-12

## 2025-03-12 LAB
ABO + RH BLD: NORMAL
ALBUMIN SERPL-MCNC: 3.2 G/DL (ref 3.5–5.2)
ALP SERPL-CCNC: 67 U/L (ref 40–129)
ALT SERPL-CCNC: 11 U/L (ref 0–40)
ANION GAP SERPL CALCULATED.3IONS-SCNC: 19 MMOL/L (ref 7–16)
ARM BAND NUMBER: NORMAL
AST SERPL-CCNC: 23 U/L (ref 0–39)
BASOPHILS # BLD: 0.02 K/UL (ref 0–0.2)
BASOPHILS NFR BLD: 0 % (ref 0–2)
BILIRUB SERPL-MCNC: 0.8 MG/DL (ref 0–1.2)
BLOOD BANK SAMPLE EXPIRATION: NORMAL
BLOOD GROUP ANTIBODIES SERPL: NEGATIVE
BUN BLD-MCNC: 22 MG/DL (ref 6–23)
BUN BLD-MCNC: 25 MG/DL (ref 6–23)
BUN SERPL-MCNC: 21 MG/DL (ref 6–23)
CA-I BLD-SCNC: 1.25 MMOL/L (ref 1.15–1.33)
CA-I BLD-SCNC: 1.28 MMOL/L (ref 1.15–1.33)
CALCIUM SERPL-MCNC: 8.4 MG/DL (ref 8.6–10.2)
CHLORIDE BLD-SCNC: 112 MMOL/L (ref 100–108)
CHLORIDE BLD-SCNC: 114 MMOL/L (ref 100–108)
CHLORIDE SERPL-SCNC: 105 MMOL/L (ref 98–107)
CO2 BLD CALC-SCNC: 20 MMOL/L (ref 22–29)
CO2 BLD CALC-SCNC: 22 MMOL/L (ref 22–29)
CO2 SERPL-SCNC: 18 MMOL/L (ref 22–29)
CREAT BLD-MCNC: 0.9 MG/DL (ref 0.7–1.2)
CREAT BLD-MCNC: 0.9 MG/DL (ref 0.7–1.2)
CREAT SERPL-MCNC: 0.9 MG/DL (ref 0.7–1.2)
EGFR, POC: 87 ML/MIN/1.73M2
EGFR, POC: 87 ML/MIN/1.73M2
EOSINOPHIL # BLD: 0 K/UL (ref 0.05–0.5)
EOSINOPHILS RELATIVE PERCENT: 0 % (ref 0–6)
ERYTHROCYTE [DISTWIDTH] IN BLOOD BY AUTOMATED COUNT: 20 % (ref 11.5–15)
GFR, ESTIMATED: 83 ML/MIN/1.73M2
GLUCOSE BLD-MCNC: 120 MG/DL (ref 74–99)
GLUCOSE BLD-MCNC: 126 MG/DL (ref 74–99)
GLUCOSE SERPL-MCNC: 88 MG/DL (ref 74–99)
HCT VFR BLD AUTO: 33 % (ref 37–54)
HCT VFR BLD AUTO: 33.9 % (ref 37–54)
HCT VFR BLD AUTO: 34 % (ref 37–54)
HGB BLD-MCNC: 11.2 G/DL (ref 12.5–16.5)
IMM GRANULOCYTES # BLD AUTO: 0.03 K/UL (ref 0–0.58)
IMM GRANULOCYTES NFR BLD: 0 % (ref 0–5)
L PNEUMO1 AG UR QL IA.RAPID: NEGATIVE
LYMPHOCYTES NFR BLD: 0.4 K/UL (ref 1.5–4)
LYMPHOCYTES RELATIVE PERCENT: 6 % (ref 20–42)
MCH RBC QN AUTO: 30.6 PG (ref 26–35)
MCHC RBC AUTO-ENTMCNC: 33 G/DL (ref 32–34.5)
MCV RBC AUTO: 92.6 FL (ref 80–99.9)
MONOCYTES NFR BLD: 0.75 K/UL (ref 0.1–0.95)
MONOCYTES NFR BLD: 11 % (ref 2–12)
NEGATIVE BASE EXCESS, ART: 4.9 MMOL/L
NEGATIVE BASE EXCESS, ART: 5.9 MMOL/L
NEUTROPHILS NFR BLD: 83 % (ref 43–80)
NEUTS SEG NFR BLD: 5.85 K/UL (ref 1.8–7.3)
PLATELET # BLD AUTO: 156 K/UL (ref 130–450)
PMV BLD AUTO: 11.2 FL (ref 7–12)
POC ANION GAP: 11 MMOL/L (ref 7–16)
POC ANION GAP: 11 MMOL/L (ref 7–16)
POC HCO3: 20.3 MMOL/L (ref 22–26)
POC HCO3: 22.4 MMOL/L (ref 22–26)
POC HEMOGLOBIN (CALC): 11.2 G/DL (ref 12.5–15.5)
POC HEMOGLOBIN (CALC): 11.6 G/DL (ref 12.5–15.5)
POC LACTIC ACID: 1.5 MMOL/L (ref 0.5–2.2)
POC LACTIC ACID: 2.1 MMOL/L (ref 0.5–2.2)
POC O2 SATURATION: 96 % (ref 92–98.5)
POC O2 SATURATION: 96.9 % (ref 92–98.5)
POC PCO2: 41.9 MM HG (ref 35–45)
POC PCO2: 50.1 MM HG (ref 35–45)
POC PH: 7.26 (ref 7.35–7.45)
POC PH: 7.29 (ref 7.35–7.45)
POC PO2: 94.8 MM HG (ref 60–80)
POC PO2: 99 MM HG (ref 60–80)
POTASSIUM BLD-SCNC: 3.3 MMOL/L (ref 3.5–5)
POTASSIUM BLD-SCNC: 3.7 MMOL/L (ref 3.5–5)
POTASSIUM SERPL-SCNC: 3.6 MMOL/L (ref 3.5–5)
PROCALCITONIN SERPL-MCNC: 3.03 NG/ML (ref 0–0.08)
PROT SERPL-MCNC: 5.7 G/DL (ref 6.4–8.3)
RBC # BLD AUTO: 3.66 M/UL (ref 3.8–5.8)
RBC # BLD: ABNORMAL 10*6/UL
S PNEUM AG SPEC QL: NEGATIVE
SODIUM BLD-SCNC: 145 MMOL/L (ref 132–146)
SODIUM BLD-SCNC: 145 MMOL/L (ref 132–146)
SODIUM SERPL-SCNC: 142 MMOL/L (ref 132–146)
SPECIMEN SOURCE: 0
STONE COMPOSITION: NORMAL
STONE DESCRIPTION: NORMAL
STONE MASS: 161 MG
SURGICAL PATHOLOGY REPORT: NORMAL
WBC OTHER # BLD: 7.1 K/UL (ref 4.5–11.5)

## 2025-03-12 PROCEDURE — 6360000002 HC RX W HCPCS

## 2025-03-12 PROCEDURE — 2000000000 HC ICU R&B

## 2025-03-12 PROCEDURE — 6370000000 HC RX 637 (ALT 250 FOR IP)

## 2025-03-12 PROCEDURE — 2700000000 HC OXYGEN THERAPY PER DAY

## 2025-03-12 PROCEDURE — 83605 ASSAY OF LACTIC ACID: CPT

## 2025-03-12 PROCEDURE — 74018 RADEX ABDOMEN 1 VIEW: CPT

## 2025-03-12 PROCEDURE — 3600000004 HC SURGERY LEVEL 4 BASE: Performed by: STUDENT IN AN ORGANIZED HEALTH CARE EDUCATION/TRAINING PROGRAM

## 2025-03-12 PROCEDURE — 6370000000 HC RX 637 (ALT 250 FOR IP): Performed by: NURSE PRACTITIONER

## 2025-03-12 PROCEDURE — 82803 BLOOD GASES ANY COMBINATION: CPT

## 2025-03-12 PROCEDURE — 86850 RBC ANTIBODY SCREEN: CPT

## 2025-03-12 PROCEDURE — 7100000001 HC PACU RECOVERY - ADDTL 15 MIN

## 2025-03-12 PROCEDURE — 3600000014 HC SURGERY LEVEL 4 ADDTL 15MIN: Performed by: STUDENT IN AN ORGANIZED HEALTH CARE EDUCATION/TRAINING PROGRAM

## 2025-03-12 PROCEDURE — 84145 PROCALCITONIN (PCT): CPT

## 2025-03-12 PROCEDURE — 99222 1ST HOSP IP/OBS MODERATE 55: CPT | Performed by: SURGERY

## 2025-03-12 PROCEDURE — 74250 X-RAY XM SM INT 1CNTRST STD: CPT

## 2025-03-12 PROCEDURE — 94002 VENT MGMT INPAT INIT DAY: CPT

## 2025-03-12 PROCEDURE — 2709999900 HC NON-CHARGEABLE SUPPLY: Performed by: STUDENT IN AN ORGANIZED HEALTH CARE EDUCATION/TRAINING PROGRAM

## 2025-03-12 PROCEDURE — 2580000003 HC RX 258

## 2025-03-12 PROCEDURE — 86901 BLOOD TYPING SEROLOGIC RH(D): CPT

## 2025-03-12 PROCEDURE — 2500000003 HC RX 250 WO HCPCS

## 2025-03-12 PROCEDURE — 6360000004 HC RX CONTRAST MEDICATION

## 2025-03-12 PROCEDURE — 2500000003 HC RX 250 WO HCPCS: Performed by: NURSE PRACTITIONER

## 2025-03-12 PROCEDURE — 0D1A0ZA BYPASS JEJUNUM TO JEJUNUM, OPEN APPROACH: ICD-10-PCS | Performed by: STUDENT IN AN ORGANIZED HEALTH CARE EDUCATION/TRAINING PROGRAM

## 2025-03-12 PROCEDURE — 6360000002 HC RX W HCPCS: Performed by: STUDENT IN AN ORGANIZED HEALTH CARE EDUCATION/TRAINING PROGRAM

## 2025-03-12 PROCEDURE — 0DQV0ZZ REPAIR MESENTERY, OPEN APPROACH: ICD-10-PCS | Performed by: STUDENT IN AN ORGANIZED HEALTH CARE EDUCATION/TRAINING PROGRAM

## 2025-03-12 PROCEDURE — 0DN80ZZ RELEASE SMALL INTESTINE, OPEN APPROACH: ICD-10-PCS | Performed by: STUDENT IN AN ORGANIZED HEALTH CARE EDUCATION/TRAINING PROGRAM

## 2025-03-12 PROCEDURE — 86900 BLOOD TYPING SEROLOGIC ABO: CPT

## 2025-03-12 PROCEDURE — 85014 HEMATOCRIT: CPT

## 2025-03-12 PROCEDURE — 2720000010 HC SURG SUPPLY STERILE: Performed by: STUDENT IN AN ORGANIZED HEALTH CARE EDUCATION/TRAINING PROGRAM

## 2025-03-12 PROCEDURE — 94640 AIRWAY INHALATION TREATMENT: CPT

## 2025-03-12 PROCEDURE — 7100000000 HC PACU RECOVERY - FIRST 15 MIN

## 2025-03-12 PROCEDURE — 2500000003 HC RX 250 WO HCPCS: Performed by: INTERNAL MEDICINE

## 2025-03-12 PROCEDURE — 2580000003 HC RX 258: Performed by: INTERNAL MEDICINE

## 2025-03-12 PROCEDURE — 87081 CULTURE SCREEN ONLY: CPT

## 2025-03-12 PROCEDURE — 3700000001 HC ADD 15 MINUTES (ANESTHESIA): Performed by: STUDENT IN AN ORGANIZED HEALTH CARE EDUCATION/TRAINING PROGRAM

## 2025-03-12 PROCEDURE — 80047 BASIC METABLC PNL IONIZED CA: CPT

## 2025-03-12 PROCEDURE — 6360000002 HC RX W HCPCS: Performed by: INTERNAL MEDICINE

## 2025-03-12 PROCEDURE — 80053 COMPREHEN METABOLIC PANEL: CPT

## 2025-03-12 PROCEDURE — 71045 X-RAY EXAM CHEST 1 VIEW: CPT

## 2025-03-12 PROCEDURE — 94664 DEMO&/EVAL PT USE INHALER: CPT

## 2025-03-12 PROCEDURE — 51798 US URINE CAPACITY MEASURE: CPT

## 2025-03-12 PROCEDURE — 44130 BOWEL TO BOWEL FUSION: CPT | Performed by: STUDENT IN AN ORGANIZED HEALTH CARE EDUCATION/TRAINING PROGRAM

## 2025-03-12 PROCEDURE — 85025 COMPLETE CBC W/AUTO DIFF WBC: CPT

## 2025-03-12 PROCEDURE — 3700000000 HC ANESTHESIA ATTENDED CARE: Performed by: STUDENT IN AN ORGANIZED HEALTH CARE EDUCATION/TRAINING PROGRAM

## 2025-03-12 RX ORDER — HYDROCORTISONE SODIUM SUCCINATE 100 MG/2ML
200 INJECTION INTRAMUSCULAR; INTRAVENOUS ONCE
Status: DISCONTINUED | OUTPATIENT
Start: 2025-03-12 | End: 2025-03-12

## 2025-03-12 RX ORDER — FENTANYL CITRATE-0.9 % NACL/PF 10 MCG/ML
25-200 PLASTIC BAG, INJECTION (ML) INTRAVENOUS CONTINUOUS
Refills: 0 | Status: DISCONTINUED | OUTPATIENT
Start: 2025-03-12 | End: 2025-03-14

## 2025-03-12 RX ORDER — PROPOFOL 10 MG/ML
5-50 INJECTION, EMULSION INTRAVENOUS CONTINUOUS
Status: DISCONTINUED | OUTPATIENT
Start: 2025-03-12 | End: 2025-03-14

## 2025-03-12 RX ORDER — ETOMIDATE 2 MG/ML
INJECTION INTRAVENOUS
Status: DISCONTINUED | OUTPATIENT
Start: 2025-03-12 | End: 2025-03-12 | Stop reason: SDUPTHER

## 2025-03-12 RX ORDER — FENTANYL CITRATE 50 UG/ML
INJECTION, SOLUTION INTRAMUSCULAR; INTRAVENOUS
Status: DISCONTINUED | OUTPATIENT
Start: 2025-03-12 | End: 2025-03-12 | Stop reason: SDUPTHER

## 2025-03-12 RX ORDER — POTASSIUM CHLORIDE 29.8 MG/ML
INJECTION INTRAVENOUS
Status: DISCONTINUED | OUTPATIENT
Start: 2025-03-12 | End: 2025-03-12 | Stop reason: SDUPTHER

## 2025-03-12 RX ORDER — DEXAMETHASONE SODIUM PHOSPHATE 10 MG/ML
INJECTION, SOLUTION INTRA-ARTICULAR; INTRALESIONAL; INTRAMUSCULAR; INTRAVENOUS; SOFT TISSUE
Status: DISCONTINUED | OUTPATIENT
Start: 2025-03-12 | End: 2025-03-12 | Stop reason: SDUPTHER

## 2025-03-12 RX ORDER — ROCURONIUM BROMIDE 10 MG/ML
INJECTION, SOLUTION INTRAVENOUS
Status: DISCONTINUED | OUTPATIENT
Start: 2025-03-12 | End: 2025-03-12 | Stop reason: SDUPTHER

## 2025-03-12 RX ORDER — PANTOPRAZOLE SODIUM 40 MG/10ML
40 INJECTION, POWDER, LYOPHILIZED, FOR SOLUTION INTRAVENOUS DAILY
Status: DISCONTINUED | OUTPATIENT
Start: 2025-03-12 | End: 2025-03-18 | Stop reason: SDUPTHER

## 2025-03-12 RX ORDER — BUPIVACAINE HYDROCHLORIDE 2.5 MG/ML
INJECTION, SOLUTION EPIDURAL; INFILTRATION; INTRACAUDAL; PERINEURAL PRN
Status: DISCONTINUED | OUTPATIENT
Start: 2025-03-12 | End: 2025-03-12 | Stop reason: ALTCHOICE

## 2025-03-12 RX ORDER — CHLORHEXIDINE GLUCONATE ORAL RINSE 1.2 MG/ML
15 SOLUTION DENTAL
Status: DISCONTINUED | OUTPATIENT
Start: 2025-03-12 | End: 2025-03-13

## 2025-03-12 RX ORDER — ONDANSETRON 2 MG/ML
INJECTION INTRAMUSCULAR; INTRAVENOUS
Status: DISCONTINUED | OUTPATIENT
Start: 2025-03-12 | End: 2025-03-12 | Stop reason: SDUPTHER

## 2025-03-12 RX ORDER — DIPHENHYDRAMINE HYDROCHLORIDE 50 MG/ML
50 INJECTION, SOLUTION INTRAMUSCULAR; INTRAVENOUS ONCE
Status: COMPLETED | OUTPATIENT
Start: 2025-03-12 | End: 2025-03-12

## 2025-03-12 RX ORDER — LIDOCAINE HYDROCHLORIDE 20 MG/ML
INJECTION, SOLUTION INTRAVENOUS
Status: DISCONTINUED | OUTPATIENT
Start: 2025-03-12 | End: 2025-03-12 | Stop reason: SDUPTHER

## 2025-03-12 RX ORDER — PHENYLEPHRINE HCL IN 0.9% NACL 1 MG/10 ML
SYRINGE (ML) INTRAVENOUS
Status: DISCONTINUED | OUTPATIENT
Start: 2025-03-12 | End: 2025-03-12 | Stop reason: SDUPTHER

## 2025-03-12 RX ORDER — DIPHENHYDRAMINE HCL 25 MG
50 TABLET ORAL ONCE
Status: DISCONTINUED | OUTPATIENT
Start: 2025-03-12 | End: 2025-03-12

## 2025-03-12 RX ORDER — NOREPINEPHRINE BITARTRATE 0.06 MG/ML
INJECTION, SOLUTION INTRAVENOUS
Status: DISCONTINUED | OUTPATIENT
Start: 2025-03-12 | End: 2025-03-12 | Stop reason: SDUPTHER

## 2025-03-12 RX ORDER — SODIUM CHLORIDE, SODIUM LACTATE, POTASSIUM CHLORIDE, CALCIUM CHLORIDE 600; 310; 30; 20 MG/100ML; MG/100ML; MG/100ML; MG/100ML
INJECTION, SOLUTION INTRAVENOUS CONTINUOUS
Status: DISCONTINUED | OUTPATIENT
Start: 2025-03-12 | End: 2025-03-15

## 2025-03-12 RX ORDER — ESMOLOL HYDROCHLORIDE 10 MG/ML
INJECTION INTRAVENOUS
Status: DISCONTINUED | OUTPATIENT
Start: 2025-03-12 | End: 2025-03-12 | Stop reason: SDUPTHER

## 2025-03-12 RX ORDER — SODIUM CHLORIDE, SODIUM LACTATE, POTASSIUM CHLORIDE, AND CALCIUM CHLORIDE .6; .31; .03; .02 G/100ML; G/100ML; G/100ML; G/100ML
250 INJECTION, SOLUTION INTRAVENOUS ONCE
Status: COMPLETED | OUTPATIENT
Start: 2025-03-13 | End: 2025-03-13

## 2025-03-12 RX ORDER — SODIUM CHLORIDE, SODIUM LACTATE, POTASSIUM CHLORIDE, CALCIUM CHLORIDE 600; 310; 30; 20 MG/100ML; MG/100ML; MG/100ML; MG/100ML
INJECTION, SOLUTION INTRAVENOUS
Status: DISCONTINUED | OUTPATIENT
Start: 2025-03-12 | End: 2025-03-12 | Stop reason: SDUPTHER

## 2025-03-12 RX ORDER — DIATRIZOATE MEGLUMINE AND DIATRIZOATE SODIUM 660; 100 MG/ML; MG/ML
120 SOLUTION ORAL; RECTAL
Status: DISCONTINUED | OUTPATIENT
Start: 2025-03-12 | End: 2025-03-17

## 2025-03-12 RX ORDER — ALBUTEROL SULFATE 90 UG/1
INHALANT RESPIRATORY (INHALATION)
Status: DISCONTINUED | OUTPATIENT
Start: 2025-03-12 | End: 2025-03-12 | Stop reason: SDUPTHER

## 2025-03-12 RX ORDER — AMPICILLIN 2 G/1
INJECTION, POWDER, FOR SOLUTION INTRAVENOUS
Status: DISCONTINUED | OUTPATIENT
Start: 2025-03-12 | End: 2025-03-12 | Stop reason: SDUPTHER

## 2025-03-12 RX ORDER — POTASSIUM CHLORIDE 7.45 MG/ML
10 INJECTION INTRAVENOUS
Status: DISPENSED | OUTPATIENT
Start: 2025-03-12 | End: 2025-03-13

## 2025-03-12 RX ORDER — MINERAL OIL AND WHITE PETROLATUM 150; 830 MG/G; MG/G
OINTMENT OPHTHALMIC EVERY 4 HOURS
Status: DISCONTINUED | OUTPATIENT
Start: 2025-03-12 | End: 2025-03-13

## 2025-03-12 RX ORDER — CEFTRIAXONE 1 G/1
INJECTION, POWDER, FOR SOLUTION INTRAMUSCULAR; INTRAVENOUS
Status: DISCONTINUED | OUTPATIENT
Start: 2025-03-12 | End: 2025-03-12 | Stop reason: SDUPTHER

## 2025-03-12 RX ADMIN — Medication 100 MCG: at 17:40

## 2025-03-12 RX ADMIN — PANTOPRAZOLE SODIUM 40 MG: 40 INJECTION, POWDER, FOR SOLUTION INTRAVENOUS at 19:23

## 2025-03-12 RX ADMIN — WATER 40 MG: 1 INJECTION INTRAMUSCULAR; INTRAVENOUS; SUBCUTANEOUS at 04:27

## 2025-03-12 RX ADMIN — PROPOFOL 20 MCG/KG/MIN: 10 INJECTION, EMULSION INTRAVENOUS at 19:08

## 2025-03-12 RX ADMIN — ALBUTEROL SULFATE 2 PUFF: 90 AEROSOL, METERED RESPIRATORY (INHALATION) at 17:46

## 2025-03-12 RX ADMIN — DEXAMETHASONE SODIUM PHOSPHATE 10 MG: 10 INJECTION INTRAMUSCULAR; INTRAVENOUS at 16:21

## 2025-03-12 RX ADMIN — POTASSIUM CHLORIDE 20 MEQ: 29.8 INJECTION, SOLUTION INTRAVENOUS at 17:37

## 2025-03-12 RX ADMIN — ONDANSETRON 4 MG: 2 INJECTION, SOLUTION INTRAMUSCULAR; INTRAVENOUS at 17:26

## 2025-03-12 RX ADMIN — MINERAL OIL, WHITE PETROLATUM: .03; .94 OINTMENT OPHTHALMIC at 23:50

## 2025-03-12 RX ADMIN — DIATRIZOATE MEGLUMINE AND DIATRIZOATE SODIUM 120 ML: 660; 100 LIQUID ORAL; RECTAL at 16:29

## 2025-03-12 RX ADMIN — SODIUM CHLORIDE, POTASSIUM CHLORIDE, SODIUM LACTATE AND CALCIUM CHLORIDE: 600; 310; 30; 20 INJECTION, SOLUTION INTRAVENOUS at 17:24

## 2025-03-12 RX ADMIN — ESMOLOL HYDROCHLORIDE 40 MG: 10 INJECTION, SOLUTION INTRAVENOUS at 16:20

## 2025-03-12 RX ADMIN — SODIUM CHLORIDE, PRESERVATIVE FREE 10 ML: 5 INJECTION INTRAVENOUS at 10:05

## 2025-03-12 RX ADMIN — Medication 100 MCG: at 16:42

## 2025-03-12 RX ADMIN — FENTANYL CITRATE 50 MCG: 0.05 INJECTION, SOLUTION INTRAMUSCULAR; INTRAVENOUS at 18:02

## 2025-03-12 RX ADMIN — POTASSIUM CHLORIDE 10 MEQ: 7.46 INJECTION, SOLUTION INTRAVENOUS at 23:57

## 2025-03-12 RX ADMIN — Medication 25 MCG/HR: at 19:11

## 2025-03-12 RX ADMIN — SODIUM CHLORIDE, PRESERVATIVE FREE 10 ML: 5 INJECTION INTRAVENOUS at 10:06

## 2025-03-12 RX ADMIN — ETOMIDATE 15 MG: 2 INJECTION, SOLUTION INTRAVENOUS at 16:19

## 2025-03-12 RX ADMIN — WATER 2000 MG: 1 INJECTION INTRAMUSCULAR; INTRAVENOUS; SUBCUTANEOUS at 04:26

## 2025-03-12 RX ADMIN — FENTANYL CITRATE 50 MCG: 0.05 INJECTION, SOLUTION INTRAMUSCULAR; INTRAVENOUS at 16:55

## 2025-03-12 RX ADMIN — ROCURONIUM BROMIDE 50 MG: 50 INJECTION INTRAVENOUS at 16:19

## 2025-03-12 RX ADMIN — AMPICILLIN SODIUM 2000 MG: 2 INJECTION, POWDER, FOR SOLUTION INTRAMUSCULAR; INTRAVENOUS at 16:33

## 2025-03-12 RX ADMIN — MINERAL OIL, WHITE PETROLATUM: .03; .94 OINTMENT OPHTHALMIC at 20:10

## 2025-03-12 RX ADMIN — Medication 100 MCG: at 17:45

## 2025-03-12 RX ADMIN — POTASSIUM CHLORIDE 10 MEQ: 7.46 INJECTION, SOLUTION INTRAVENOUS at 23:04

## 2025-03-12 RX ADMIN — DIPHENHYDRAMINE HYDROCHLORIDE 50 MG: 50 INJECTION INTRAMUSCULAR; INTRAVENOUS at 10:07

## 2025-03-12 RX ADMIN — AMPICILLIN SODIUM 2000 MG: 2 INJECTION, POWDER, FOR SOLUTION INTRAMUSCULAR; INTRAVENOUS at 12:05

## 2025-03-12 RX ADMIN — AMPICILLIN SODIUM 2000 MG: 2 INJECTION, POWDER, FOR SOLUTION INTRAMUSCULAR; INTRAVENOUS at 00:48

## 2025-03-12 RX ADMIN — CHLORHEXIDINE GLUCONATE, 0.12% ORAL RINSE 15 ML: 1.2 SOLUTION DENTAL at 23:50

## 2025-03-12 RX ADMIN — SODIUM CHLORIDE, SODIUM LACTATE, POTASSIUM CHLORIDE, AND CALCIUM CHLORIDE: .6; .31; .03; .02 INJECTION, SOLUTION INTRAVENOUS at 18:38

## 2025-03-12 RX ADMIN — IPRATROPIUM BROMIDE AND ALBUTEROL SULFATE 1 DOSE: 2.5; .5 SOLUTION RESPIRATORY (INHALATION) at 20:23

## 2025-03-12 RX ADMIN — ROCURONIUM BROMIDE 50 MG: 50 INJECTION INTRAVENOUS at 17:46

## 2025-03-12 RX ADMIN — IPRATROPIUM BROMIDE AND ALBUTEROL SULFATE 1 DOSE: 2.5; .5 SOLUTION RESPIRATORY (INHALATION) at 07:48

## 2025-03-12 RX ADMIN — IPRATROPIUM BROMIDE AND ALBUTEROL SULFATE 1 DOSE: 2.5; .5 SOLUTION RESPIRATORY (INHALATION) at 14:36

## 2025-03-12 RX ADMIN — FENTANYL CITRATE 100 MCG: 0.05 INJECTION, SOLUTION INTRAMUSCULAR; INTRAVENOUS at 16:19

## 2025-03-12 RX ADMIN — AMPICILLIN SODIUM 2000 MG: 2 INJECTION, POWDER, FOR SOLUTION INTRAMUSCULAR; INTRAVENOUS at 20:15

## 2025-03-12 RX ADMIN — IPRATROPIUM BROMIDE AND ALBUTEROL SULFATE 1 DOSE: 2.5; .5 SOLUTION RESPIRATORY (INHALATION) at 11:23

## 2025-03-12 RX ADMIN — Medication 200 MCG: at 17:04

## 2025-03-12 RX ADMIN — Medication 100 MCG: at 16:22

## 2025-03-12 RX ADMIN — AMIODARONE HYDROCHLORIDE 0.5 MG/MIN: 1.8 INJECTION, SOLUTION INTRAVENOUS at 05:01

## 2025-03-12 RX ADMIN — Medication 0.15 MCG/KG/MIN: at 17:49

## 2025-03-12 RX ADMIN — AMPICILLIN SODIUM 2000 MG: 2 INJECTION, POWDER, FOR SOLUTION INTRAMUSCULAR; INTRAVENOUS at 04:41

## 2025-03-12 RX ADMIN — WATER 40 MG: 1 INJECTION INTRAMUSCULAR; INTRAVENOUS; SUBCUTANEOUS at 10:05

## 2025-03-12 RX ADMIN — FENTANYL CITRATE 50 MCG: 0.05 INJECTION, SOLUTION INTRAMUSCULAR; INTRAVENOUS at 18:06

## 2025-03-12 RX ADMIN — CHLORHEXIDINE GLUCONATE, 0.12% ORAL RINSE 15 ML: 1.2 SOLUTION DENTAL at 20:10

## 2025-03-12 RX ADMIN — Medication 100 MCG: at 17:48

## 2025-03-12 RX ADMIN — SODIUM CHLORIDE: 9 INJECTION, SOLUTION INTRAVENOUS at 16:06

## 2025-03-12 RX ADMIN — LIDOCAINE HYDROCHLORIDE 80 MG: 20 INJECTION, SOLUTION INTRAVENOUS at 16:19

## 2025-03-12 RX ADMIN — SODIUM CHLORIDE, POTASSIUM CHLORIDE, SODIUM LACTATE AND CALCIUM CHLORIDE: 600; 310; 30; 20 INJECTION, SOLUTION INTRAVENOUS at 16:25

## 2025-03-12 RX ADMIN — CEFTRIAXONE SODIUM 2 G: 1 INJECTION, POWDER, FOR SOLUTION INTRAMUSCULAR; INTRAVENOUS at 16:32

## 2025-03-12 ASSESSMENT — PAIN SCALES - GENERAL
PAINLEVEL_OUTOF10: 0

## 2025-03-12 ASSESSMENT — PULMONARY FUNCTION TESTS
PIF_VALUE: 17
PIF_VALUE: 17
PIF_VALUE: 18
PIF_VALUE: 18
PIF_VALUE: 17
PIF_VALUE: 18

## 2025-03-12 ASSESSMENT — LIFESTYLE VARIABLES: SMOKING_STATUS: 0

## 2025-03-12 NOTE — PROGRESS NOTES
4 Eyes Skin Assessment     NAME:  Ruben Davidson  YOB: 1945  MEDICAL RECORD NUMBER:  [id number]    The patient is being assessed for  Admission    I agree that at least one RN has performed a thorough Head to Toe Skin Assessment on the patient. ALL assessment sites listed below have been assessed.      Areas assessed by both nurses:    Head, Face, Ears, Shoulders, Back, Chest, Arms, Elbows, Hands, Sacrum. Buttock, Coccyx, Ischium, Legs. Feet and Heels, and Under Medical Devices         Does the Patient have a Wound? No noted wound(s)       Avelino Prevention initiated by RN: Yes  Wound Care Orders initiated by RN: No    Pressure Injury (Stage 3,4, Unstageable, DTI, NWPT, and Complex wounds) if present, place Wound referral order by RN under : No    New Ostomies, if present place, Ostomy referral order under : No     Nurse 1 eSignature: Electronically signed by Shaan Franco RN on 3/12/25 at 6:25 AM EDT    **SHARE this note so that the co-signing nurse can place an eSignature**    Nurse 2 eSignature: Electronically signed by Lashonda Winter RN on 3/12/25 at 6:52 AM EDT

## 2025-03-12 NOTE — PROGRESS NOTES
Patient has orders take medications with sips of water. Gave patient his mediations and he was having trouble swallowing the four he took. Finally got them down but I did not make patient take the others because he had such a hard time in the beginning. Called doctor to give update no answer. Will update nurse in am about the patients difficulties.

## 2025-03-12 NOTE — PROGRESS NOTES
GENERAL SURGERY  DAILY PROGRESS NOTE    Patient's Name/Date of Birth: Ruben Davidson / 1945    Date: 2025     Chief Complaint   Patient presents with    Vomiting     Patient had kidney stone removal and stent placement has not been eating and drinking since due to vomiting         Subjective:  Patient states his abdominal pain has worsened a bit this morning. Denies any nausea or vomiting. No flatus.     Objective:  Last 24Hrs  Temp  Av.2 °F (36.8 °C)  Min: 97.8 °F (36.6 °C)  Max: 98.6 °F (37 °C)  Resp  Av.6  Min: 16  Max: 24  Pulse  Av.2  Min: 101  Max: 136  Systolic (24hrs), Av , Min:115 , Max:152     Diastolic (24hrs), Av, Min:79, Max:108    SpO2  Av.5 %  Min: 88 %  Max: 100 %    I/O last 3 completed shifts:  In: 227.4 [I.V.:227.4]  Out: -     General: In no acute distress, alert and oriented x4  Cardiovascular: Warm throughout, no edema  Respiratory: no respiratory distress, equal chest rise  Abdomen: soft, non-tender, nondistended  Skin: no obvious rashes or lesions appreciated, no jaundice  Extremities: atraumatic, no focal motor deficits, no open wounds    CBC  Recent Labs     03/10/25  1355 25  0900 25  0455   WBC 3.8* 2.5* 7.1   RBC 3.71* 4.34 3.66*   HGB 11.2* 13.1 11.2*   HCT 34.3* 41.2 33.9*   MCV 92.5 94.9 92.6   MCH 30.2 30.2 30.6   MCHC 32.7 31.8* 33.0   RDW 19.6* 20.1* 20.0*    175 156   MPV 10.4 10.7 11.2       CMP  Recent Labs     03/10/25  1355 25  0900 25  0455    141 142   K 3.6 3.4* 3.6    103 105   CO2 19* 16* 18*   BUN 21 21 21   CREATININE 1.1 1.0 0.9   GLUCOSE 86 73* 88   CALCIUM 8.7 9.6 8.4*   BILITOT 1.2 1.6* 0.8   ALKPHOS 84 88 67   AST 26 35 23   ALT 14 15 11         Assessment/Plan:    Patient Active Problem List   Diagnosis    CAD (coronary artery disease)s/p stent RCA    Valvular heart disease    Hypertension    Obesity, morbid s/p intestinal bypass    STEMI (ST elevation myocardial

## 2025-03-12 NOTE — PROGRESS NOTES
Speech Language Pathology  NAME:  Ruben Davidson  :  1945  DATE: 3/12/2025  ROOM:  7413/7413-B    Pt unavailable at 0930 for Clinical Swallow Evaluation Discussed with patient nurse in person    REASON:  HOLD per RN, Pt was off the floor for a small bowel follow through. Therapist will attempt evaluation at a later date.         Thank You,   Spencer Argueta MSCCC/SLP  Speech Language Pathologist  [phone]

## 2025-03-12 NOTE — PROGRESS NOTES
Department of Internal Medicine  Infectious Diseases  Progress Note      C/C: Enterococcus bacteremia     Pt is awake and alert  Denies fever or chills  Abdomen pain   Afebrile       Current Facility-Administered Medications   Medication Dose Route Frequency Provider Last Rate Last Admin    sodium chloride flush 0.9 % injection 5-40 mL  5-40 mL IntraVENous 2 times per day Nilda Yna MD   10 mL at 03/12/25 1005    sodium chloride flush 0.9 % injection 5-40 mL  5-40 mL IntraVENous PRN Nilda Yan MD        0.9 % sodium chloride infusion   IntraVENous PRN Nilda Yan MD        glucose chewable tablet 16 g  4 tablet Oral PRN Nilda Yan MD        dextrose bolus 10% 125 mL  125 mL IntraVENous PRN Nilda Yan MD        Or    dextrose bolus 10% 250 mL  250 mL IntraVENous PRN Nilda Yan MD        glucagon injection 1 mg  1 mg SubCUTAneous PRN Nilda Yan MD        dextrose 10 % infusion   IntraVENous Continuous PRN Nilda Yan MD        amiodarone (NEXTERONE) 360 mg in dextrose 5% 200 ml  0.5 mg/min IntraVENous Continuous Nilda Yan MD 16.7 mL/hr at 03/12/25 0501 0.5 mg/min at 03/12/25 0501    cetirizine (ZYRTEC) tablet 10 mg  10 mg Oral QPM Marylin Bone APRN - CNP        vitamin D3 (CHOLECALCIFEROL) tablet 5,000 Units  1 tablet Oral QAM Smitha, Marylin, APRN - CNP        [Held by provider] clopidogrel (PLAVIX) tablet 75 mg  75 mg Oral Daily Cene, Marylin, APRN - CNP        DULoxetine (CYMBALTA) extended release capsule 30 mg  30 mg Oral QAM Cene, Marylin, APRN - CNP        ferrous sulfate (IRON 325) tablet 325 mg  325 mg Oral Daily Cene, Marylin, APRN - CNP        folic acid (FOLVITE) tablet 1 mg  1 mg Oral QAM Cene, Marylin, APRN - CNP        gabapentin (NEURONTIN) capsule 300 mg  300 mg Oral TID Cene, Marylin, APRN - CNP   300 mg at 03/11/25 2044    hydroxychloroquine (PLAQUENIL) tablet 200 mg  200 mg Oral BID Cene, Marylin, APRN - CNP        antioxidant multivitamin (OCUVITE) tablet  1 tablet Oral  ILD, component of edema is not excluded     Procalcitionin 3.03    IMPRESSION:     Enterococcus bacteremia- JIMBO no vegetation  - follow up cx neg on 2/7  Small bowel obstruction    Lung infiltrates /Pneumonia    Leukopenia - improved       RECOMMENDATIONS:      IV ampicillin 2 grams IV q 4 hrs and rocephin 2 grams IV q 12 hrs

## 2025-03-12 NOTE — H&P
[facility]  History and Physical      CHIEF COMPLAINT:    Chief Complaint   Patient presents with    Vomiting     Patient had kidney stone removal and stent placement has not been eating and drinking since due to vomiting         Patient of Barrington Garcia MD presents with:  Pneumonia due to organism    History of Present Illness:   Patient is a 79-year-old male with a past medical history of acute kidney injury, A-fib, CAD, CHF, hypertension, MI, sleep apnea, gastric bypass surgery 25 years ago, valvular heart disease, enterococcus bacteremia currently being treated with Daptomycin.  Patient presents to the ED with complaints of nausea and vomiting.  Patient had a recent stent placed for kidney stones on 3/7.  Patient admits that for the last several days he has been unable to eat or drink anything.  Patient is having persistent nausea and vomiting and abdominal pain.  Patient admits he has been compliant with his medications however he has been vomiting shortly afterwards.  While patient was in the emergency room he had multiple runs of V. tach and he spontaneously converted out.  Patient states that he was asymptomatic during these episodes.  Patient was started on amiodarone drip.  Patient is currently on 2 L O2.  ER workup revealed potassium level 3.4, lactic acid 4.4, BNP 57028, troponin 57, WBC 2.5, imaging revealed small bowel obstruction, pneumonia, respiratory panel negative, urinalysis positive for UTI.  General surgery was consulted for small bowel obstruction and they recommended transfer to [facility] where patient had his initial surgery of a Scar-en-Y however  thought the patient could remain at our facility therefore decision was made to transfer patient to the ICU.      REVIEW OF SYSTEMS:  Pertinent negatives are above in HPI.  10 point ROS otherwise negative.      Past Medical History:   Diagnosis Date    Acute kidney injury     Atrial fibrillation (HCC)     CAD (coronary  artery disease)     s/p stent RCA    CHF (congestive heart failure) ([facility])     [place] (hard of hearing)     WEARS HEARING AIDES    Hypertension     Kidney stone 2025    with obstruction-left    Left ventricular systolic dysfunction     MI (myocardial infarction) ([facility]) 1997    Mitral regurgitation     Obesity, morbid     s/p intestinal bypass    Sleep apnea     Valvular heart disease          Past Surgical History:   Procedure Laterality Date    APPENDECTOMY      BACK SURGERY      CARDIAC DEFIBRILLATOR PLACEMENT  12/20/2021    Medtronic dual chamber ICD by Dr. Luther    CARPAL TUNNEL RELEASE Bilateral     COLONOSCOPY      CORONARY ANGIOPLASTY  07/07/1997    PTCA/stent of the RCA     CORONARY ANGIOPLASTY WITH STENT PLACEMENT  12/12/2021    3.5 x 15 Resolute [place] to the prox LAD by Dr. Pablo    CYSTOSCOPY Left 3/7/2025    CYSTOSCOPY RETROGRADE PYELOGRAM URETEROSCOPY J STENT LASER LITHOTRIPSY LEFT performed by Albert Sandra MD at [facility] OR    DEBRIDEMENT Left 02/07/2025    LEFT CYSTOSCOPY RETROGRADE PYELOGRAM STENT INSERTION performed by Dany Starr DO at [facility] [place]    DIAGNOSTIC CARDIAC CATH LAB PROCEDURE  02/24/1997    DIAGNOSTIC CARDIAC CATH LAB PROCEDURE  07/07/1997    EP DEVICE PROCEDURE N/A 01/11/2024    Pacemaker lead revision performed by Viktor Luther DO [place] [facility]    EP DEVICE PROCEDURE N/A 05/23/2024    Remove & replace ICD biv multi leads performed by Viktor Luther DO at [facility]    EYE SURGERY Bilateral     cataract removal w lense implants    FRACTURE SURGERY      Clayton in left femur    HIP SURGERY  2014    fx, clayton placed    INTESTINAL BYPASS      JOINT REPLACEMENT      TONSILLECTOMY      UPPER GASTROINTESTINAL ENDOSCOPY N/A 10/08/2018    EGD CONTROL HEMORRHAGE at bedside performed by Luis Alberto Vargas MD at [facility] ENDOSCOPY    UPPER GASTROINTESTINAL ENDOSCOPY N/A 12/16/2021    EGD ESOPHAGOGASTRODUODENOSCOPY performed by Dariel Alfredo MD at [facility] [place]       Medications

## 2025-03-12 NOTE — OP NOTE
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       Procedures:  Exploratory laparotomy  Lysis of adhesions  Reduction of internal hernia  Jejunojejunal bypass  Closure of mesenteric defect    Surgeon(s):  Barrington Barrientos MD    Assistant:   Resident: Karen Rutledge DO; Barrington Sauceda DO    Anesthesia: General    Estimated Blood Loss (mL): less than 20     Complications: None    Specimens:   * No specimens in log *    Implants:  * No implants in log *      Drains:   Negative Pressure Wound Therapy Abdomen Medial;Upper (Active)       NG/OG/NJ/NE Tube Nasogastric 18 fr Right nostril (Active)   Surrounding Skin Clean, dry & intact 03/12/25 0800   Securement device Tape 03/12/25 0800   Status Suction-low intermittent 03/12/25 0800   Placement Verified X-Ray (Initial) 03/12/25 0800   Drainage Appearance Bile 03/12/25 0800       Urinary Catheter 03/12/25 Emery (Active)   Catheter Indications Need for fluid volume management of the critically ill patient in a critical care setting 03/12/25 1825   Site Assessment No urethral drainage 03/12/25 1825   Urine Color Mariela;Tea 03/12/25 1825   Urine Appearance Clear 03/12/25 1825   Collection Container Standard 03/12/25 1825   Securement Method Securing device (Describe) 03/12/25 1825   Catheter Care  Soap and water 03/12/25 1825   Catheter Best Practices  Drainage tube clipped to bed;Catheter secured to thigh;Tamper seal intact;Bag below bladder;Bag not on floor;Lack of dependent loop in tubing;Drainage bag less than half full 03/12/25 1825   Status Draining;Patent 03/12/25 1825   Output (mL) 300 mL 03/12/25  anastomosis, the mesenteric defect was approximated using interrupted silk suture.  The entire small bowel was again run in entirety.  No areas of questionable viability, tension, or volvulus was present.  All bowel was reduced into the abdomen.  The midline abdominal fascia was approximated using running 0 looped PDS suture x 2.  The skin was approximated using staples.  A Prevena wound VAC dressing was applied over the midline incision.    The patient remained intubated at the conclusion of the procedure.  He was hemodynamically stable at that point.  He was transferred to the SICU in stable condition.    Dr. Barrientos was present and scrubbed in for the procedure      Electronically signed by Barrington Sauceda DO on 3/12/2025 at 7:10 PM

## 2025-03-12 NOTE — PROGRESS NOTES
Patient admitted to SICU with the following belongings:  Jewelry and Other wedding ring . The following belongings admitted with the patient, None, were sent home with the patient's family. Wedding band kept at bedside and labeled.

## 2025-03-12 NOTE — ANESTHESIA PROCEDURE NOTES
Arterial Line:    An arterial line was placed using ultrasound guidance and surface landmarks, in the OR for the following indication(s): continuous blood pressure monitoring and blood sampling needed.    A 20 gauge (size), 1 and 3/8 inch (length), Arrow (type) catheter was placed, Seldinger technique not used, into the left radial artery, secured by tape and Tegaderm.  Anesthesia type: Local  Local infiltration: Injection    Events:  greater than 3 attempts and patient tolerated procedure well with no complications.    Additional notes:  Two attempts with arrow kit ; successful on third attempt with micropuncture kit 3/12/2025 4:25 PM3/12/2025 4:42 PM  Anesthesiologist: Favian Gale DO  Resident/CRNA: Maren Franklin APRN - CRNA  Other anesthesia staff: Tonio Ramirez RN  Performed: Anesthesiologist and Other staff   Preanesthetic Checklist  Completed: patient identified, IV checked, site marked, risks and benefits discussed, surgical/procedural consents, equipment checked, pre-op evaluation, timeout performed, anesthesia consent given, oxygen available, monitors applied/VS acknowledged and blood product R/B/A discussed and consented

## 2025-03-12 NOTE — SIGNIFICANT EVENT
Extensive discussion was held with the patient and multiple family members at bedside regarding his current clinical condition.  We discussed that at this point, the patient has a complete bowel obstruction.  His fluoroscopy images showed no passage of contrast.  He has been vomiting this afternoon.  Abdominal discomfort is worsening as well.    We discussed that currently, my recommendation is for exploratory laparotomy.  We discussed the possible causes of complete bowel obstruction.  We discussed that due to the patient's pneumonia, he is at risk for remaining intubated following the procedure.  We discussed that without surgery, his condition will likely worsen, and his bowel may perforate.    At this point in time, the patient and his family wish to proceed with exploratory laparotomy, possible bowel resection, possible ostomy, possible wound VAC. The risks, benefits, alternatives, and potential complications of the procedure, including the risks of bleeding, infection, injury to surrounding structures, and need for additional procedures were explained to the patient/responsible party. All questions were answered. They understand and agree to the procedure.      Electronically signed by Barrington Sauceda DO on 3/12/2025

## 2025-03-12 NOTE — PROGRESS NOTES
4 Eyes Skin Assessment     NAME:  Ruben Davidson  YOB: 1945  MEDICAL RECORD NUMBER:  [id number]    The patient is being assessed for  Admission    I agree that at least one RN has performed a thorough Head to Toe Skin Assessment on the patient. ALL assessment sites listed below have been assessed.      Areas assessed by both nurses:    Head, Face, Ears, Shoulders, Back, Chest, Arms, Elbows, Hands, Sacrum. Buttock, Coccyx, Ischium, Legs. Feet and Heels, and Under Medical Devices     MLI with [place]   Ecchymosis B/l knees  Dry, healing abrasions b/l great toes  Bilateral heel redness  Old scar right knee   Redness/pink old wound coccyx  Tear left abd fold        Does the Patient have a Wound? No noted wound(s)       Avelino Prevention initiated by RN: Yes  Wound Care Orders initiated by RN: Yes    Pressure Injury (Stage 3,4, Unstageable, DTI, NWPT, and Complex wounds) if present, place Wound referral order by RN under : No    New Ostomies, if present place, Ostomy referral order under : No     Nurse 1 eSignature: Electronically signed by Holly Lockett RN on 3/12/25 at 7:03 PM EDT    **SHARE this note so that the co-signing nurse can place an eSignature**    Nurse 2 eSignature: Electronically signed by Zulma Mccarthy RN on 3/12/25 at 6:31 PM EDT

## 2025-03-12 NOTE — PLAN OF CARE
Problem: Chronic Conditions and Co-morbidities  Goal: Patient's chronic conditions and co-morbidity symptoms are monitored and maintained or improved  3/12/2025 1840 by Zulma Mccarthy, RN  Outcome: Progressing  Flowsheets (Taken 3/12/2025 1825)  Care Plan - Patient's Chronic Conditions and Co-Morbidity Symptoms are Monitored and Maintained or Improved:   Monitor and assess patient's chronic conditions and comorbid symptoms for stability, deterioration, or improvement   Collaborate with multidisciplinary team to address chronic and comorbid conditions and prevent exacerbation or deterioration   Update acute care plan with appropriate goals if chronic or comorbid symptoms are exacerbated and prevent overall improvement and discharge     Problem: Discharge Planning  Goal: Discharge to home or other facility with appropriate resources  Outcome: Progressing  Flowsheets (Taken 3/12/2025 1825)  Discharge to home or other facility with appropriate resources: Identify barriers to discharge with patient and caregiver     Problem: Safety - Adult  Goal: Free from fall injury  3/12/2025 1840 by Zulma Mccarthy, RN  Outcome: Progressing     Problem: Pain  Goal: Verbalizes/displays adequate comfort level or baseline comfort level  Outcome: Progressing  Flowsheets (Taken 3/12/2025 1840)  Verbalizes/displays adequate comfort level or baseline comfort level:   Encourage patient to monitor pain and request assistance   Assess pain using appropriate pain scale   Administer analgesics based on type and severity of pain and evaluate response     Problem: Cardiovascular - Adult  Goal: Maintains optimal cardiac output and hemodynamic stability  Outcome: Progressing  Flowsheets (Taken 3/12/2025 1841)  Maintains optimal cardiac output and hemodynamic stability:   Monitor blood pressure and heart rate   Monitor urine output and notify Licensed Independent Practitioner for values outside of normal range   Assess for signs of decreased cardiac  output   Administer fluid and/or volume expanders as ordered     Problem: Gastrointestinal - Adult  Goal: Minimal or absence of nausea and vomiting  Outcome: Progressing  Flowsheets (Taken 3/12/2025 1841)  Minimal or absence of nausea and vomiting:   Administer IV fluids as ordered to ensure adequate hydration   Maintain NPO status until nausea and vomiting are resolved   Nasogastric tube to low intermittent suction as ordered     Problem: Gastrointestinal - Adult  Goal: Maintains or returns to baseline bowel function  Outcome: Progressing  Flowsheets (Taken 3/12/2025 1841)  Maintains or returns to baseline bowel function:   Assess bowel function   Encourage oral fluids to ensure adequate hydration   Administer IV fluids as ordered to ensure adequate hydration   Administer ordered medications as needed     Problem: Infection - Adult  Goal: Absence of infection at discharge  Outcome: Progressing  Flowsheets (Taken 3/12/2025 1841)  Absence of infection at discharge:   Assess and monitor for signs and symptoms of infection   Monitor lab/diagnostic results   Monitor all insertion sites i.e., indwelling lines, tubes and drains   Monitor endotracheal (as able) and nasal secretions for changes in amount and color   Administer medications as ordered

## 2025-03-12 NOTE — ANESTHESIA PRE PROCEDURE
Department of Anesthesiology  Preprocedure Note       Name:  Ruben Davidson   Age:  79 y.o.  :  1945                                          MRN:  [id number]         Date:  3/12/2025      Surgeon: Surgeon(s):  Barrington Barrientos MD    Procedure: Procedure(s):  LAPAROTOMY EXPLORATORY/ POSSIBLE BOWEL RESECTION, POSSIBLE WOUND VAC, POSSIBLE OSTOMY    Medications prior to admission:   Prior to Admission medications    Medication Sig Start Date End Date Taking? Authorizing Provider   acetaminophen (TYLENOL) 650 MG extended release tablet Take 2 tablets by mouth in the morning and 2 tablets in the evening.    ProviderModesto MD   amiodarone (CORDARONE) 200 MG tablet Take 1 tablet by mouth daily    Modesto Farias MD   apixaban (ELIQUIS) 5 MG TABS tablet Take 1 tablet by mouth 2 times daily    Modesto Farias MD   rosuvastatin (CRESTOR) 40 MG tablet Take 1 tablet by mouth every evening    Modesto Farias MD   sacubitril-valsartan (ENTRESTO) 49-51 MG per tablet Take 0.5 tablets by mouth 2 times daily    Modesto Farias MD   metoprolol succinate (TOPROL XL) 25 MG extended release tablet Take 1 tablet by mouth in the morning and at bedtime 25   Ti Pittman APRN - CNP   amiodarone (CORDARONE) 200 MG tablet Take 1 tablet by mouth daily  Patient not taking: Reported on 3/11/2025 2/17/25 5/18/25  Viktor Luther DO   sodium bicarbonate 650 MG tablet Take 1 tablet by mouth 3 times daily 25   Megan Spivey APRN - CNP   DAPTOmycin (CUBICIN) infusion Infuse 510 mg intravenously every 24 hours Until 3/21/2025 2/12/25 3/21/25  Ramos Casper MD   apixaban (ELIQUIS) 5 MG TABS tablet Take 1 tablet by mouth 2 times daily Indications: Do NOT restart until cleared by Device [facility] at appointment in 2 weeks. . 10/24/24 2/17/25  Viktor Luther DO   torsemide (DEMADEX) 20 MG tablet Take 2 tablets by mouth once a week Given Saturday    Modesto Farias MD

## 2025-03-12 NOTE — PLAN OF CARE
Problem: Safety - Medical Restraint  Goal: Remains free of injury from restraints (Restraint for Interference with Medical Device)  Description: INTERVENTIONS:  1. Determine that other, less restrictive measures have been tried or would not be effective before applying the restraint  2. Evaluate the patient's condition at the time of restraint application  3. Inform patient/family regarding the reason for restraint  4. Q2H: Monitor safety, psychosocial status, comfort, nutrition and hydration  Outcome: Progressing  Flowsheets (Taken 3/12/2025 1937)  Remains free of injury from restraints (restraint for interference with medical device):   Determine that other, less restrictive measures have been tried or would not be effective before applying the restraint   Evaluate the patient's condition at the time of restraint application   Every 2 hours: Monitor safety, psychosocial status, comfort, nutrition and hydration

## 2025-03-12 NOTE — PROGRESS NOTES
Bladder canned patient and got 354ml. Called doctor and no answer left voicemail to see if they wanted to just straight cath patient or place ames? Will wait on call back if none will update day shift nurse.

## 2025-03-12 NOTE — PROGRESS NOTES
Patient arrived to the Surgical ICU from OR with ames catheter intact and patent. Securing device applied. Ames bag is hanging below the level of the bladder, safety clip attached to the bed sheet, tamper seal is intact, drainage bag is not on the floor, lack of dependent loop in tubing, and the drainage bag is less than half full.

## 2025-03-12 NOTE — FLOWSHEET NOTE
When unrestrained, patient is unable to follow commands and attempts to reach for ETT and other lines. Patient unable to be redirected at this time. Soft bilateral wrist restraints started for patient safety.

## 2025-03-13 ENCOUNTER — HOSPITAL ENCOUNTER (OUTPATIENT)
Dept: INFUSION THERAPY | Age: 80
Setting detail: INFUSION SERIES
Discharge: HOME | End: 2025-03-13

## 2025-03-13 ENCOUNTER — APPOINTMENT (OUTPATIENT)
Dept: GENERAL RADIOLOGY | Age: 80
End: 2025-03-13
Payer: MEDICARE

## 2025-03-13 DIAGNOSIS — B95.2 BACTEREMIA DUE TO ENTEROCOCCUS: Primary | ICD-10-CM

## 2025-03-13 DIAGNOSIS — R78.81 BACTEREMIA DUE TO ENTEROCOCCUS: Primary | ICD-10-CM

## 2025-03-13 LAB
AADO2: 128.5 MMHG
ALBUMIN SERPL-MCNC: 2.6 G/DL (ref 3.5–5.2)
ALP SERPL-CCNC: 58 U/L (ref 40–129)
ALT SERPL-CCNC: 9 U/L (ref 0–40)
ANION GAP SERPL CALCULATED.3IONS-SCNC: 15 MMOL/L (ref 7–16)
AST SERPL-CCNC: 17 U/L (ref 0–39)
B.E.: -5.9 MMOL/L (ref -3–3)
BASOPHILS # BLD: 0 K/UL (ref 0–0.2)
BASOPHILS NFR BLD: 0 % (ref 0–2)
BILIRUB SERPL-MCNC: 0.5 MG/DL (ref 0–1.2)
BUN SERPL-MCNC: 22 MG/DL (ref 6–23)
CA-I BLD-SCNC: 1.19 MMOL/L (ref 1.15–1.33)
CALCIUM SERPL-MCNC: 8.2 MG/DL (ref 8.6–10.2)
CHLORIDE SERPL-SCNC: 109 MMOL/L (ref 98–107)
CO2 SERPL-SCNC: 18 MMOL/L (ref 22–29)
COHB: 0.9 % (ref 0–1.5)
CREAT SERPL-MCNC: 0.9 MG/DL (ref 0.7–1.2)
CRITICAL: ABNORMAL
DATE ANALYZED: ABNORMAL
DATE OF COLLECTION: ABNORMAL
EOSINOPHIL # BLD: 0 K/UL (ref 0.05–0.5)
EOSINOPHILS RELATIVE PERCENT: 0 % (ref 0–6)
ERYTHROCYTE [DISTWIDTH] IN BLOOD BY AUTOMATED COUNT: 20.3 % (ref 11.5–15)
FIO2: 55 %
GFR, ESTIMATED: 85 ML/MIN/1.73M2
GLUCOSE SERPL-MCNC: 133 MG/DL (ref 74–99)
HCO3: 19.1 MMOL/L (ref 22–26)
HCT VFR BLD AUTO: 33 % (ref 37–54)
HGB BLD-MCNC: 10.5 G/DL (ref 12.5–16.5)
HHB: 0.5 % (ref 0–5)
LAB: ABNORMAL
LYMPHOCYTES NFR BLD: 0.13 K/UL (ref 1.5–4)
LYMPHOCYTES RELATIVE PERCENT: 2 % (ref 20–42)
Lab: 433
MAGNESIUM SERPL-MCNC: 1.8 MG/DL (ref 1.6–2.6)
MCH RBC QN AUTO: 30.1 PG (ref 26–35)
MCHC RBC AUTO-ENTMCNC: 31.8 G/DL (ref 32–34.5)
MCV RBC AUTO: 94.6 FL (ref 80–99.9)
METHB: 0.3 % (ref 0–1.5)
MODE: AC
MONOCYTES NFR BLD: 0.52 K/UL (ref 0.1–0.95)
MONOCYTES NFR BLD: 7 % (ref 2–12)
NEUTROPHILS NFR BLD: 91 % (ref 43–80)
NEUTS SEG NFR BLD: 6.85 K/UL (ref 1.8–7.3)
O2 SATURATION: 99.5 % (ref 92–98.5)
O2HB: 98.3 % (ref 94–97)
OPERATOR ID: 421
PATIENT TEMP: 37 C
PCO2: 35.9 MMHG (ref 35–45)
PEEP/CPAP: 8 CMH2O
PFO2: 4.07 MMHG/%
PH BLOOD GAS: 7.34 (ref 7.35–7.45)
PHOSPHATE SERPL-MCNC: 3.9 MG/DL (ref 2.5–4.5)
PLATELET # BLD AUTO: 155 K/UL (ref 130–450)
PMV BLD AUTO: 11.1 FL (ref 7–12)
PO2: 223.7 MMHG (ref 75–100)
POTASSIUM SERPL-SCNC: 4.2 MMOL/L (ref 3.5–5)
PROT SERPL-MCNC: 5 G/DL (ref 6.4–8.3)
RBC # BLD AUTO: 3.49 M/UL (ref 3.8–5.8)
RBC # BLD: ABNORMAL 10*6/UL
RI(T): 0.57
RR MECHANICAL: 14 B/MIN
SODIUM SERPL-SCNC: 142 MMOL/L (ref 132–146)
SOURCE, BLOOD GAS: ABNORMAL
THB: 11.5 G/DL (ref 11.5–16.5)
TIME ANALYZED: 450
VT MECHANICAL: 375 ML
WBC OTHER # BLD: 7.5 K/UL (ref 4.5–11.5)

## 2025-03-13 PROCEDURE — 71045 X-RAY EXAM CHEST 1 VIEW: CPT

## 2025-03-13 PROCEDURE — 6370000000 HC RX 637 (ALT 250 FOR IP): Performed by: NURSE PRACTITIONER

## 2025-03-13 PROCEDURE — 82330 ASSAY OF CALCIUM: CPT

## 2025-03-13 PROCEDURE — 2500000003 HC RX 250 WO HCPCS

## 2025-03-13 PROCEDURE — 2580000003 HC RX 258

## 2025-03-13 PROCEDURE — 2000000000 HC ICU R&B

## 2025-03-13 PROCEDURE — 94640 AIRWAY INHALATION TREATMENT: CPT

## 2025-03-13 PROCEDURE — 6360000002 HC RX W HCPCS: Performed by: NURSE PRACTITIONER

## 2025-03-13 PROCEDURE — 2580000003 HC RX 258: Performed by: INTERNAL MEDICINE

## 2025-03-13 PROCEDURE — 6370000000 HC RX 637 (ALT 250 FOR IP)

## 2025-03-13 PROCEDURE — 82805 BLOOD GASES W/O2 SATURATION: CPT

## 2025-03-13 PROCEDURE — 6360000002 HC RX W HCPCS: Performed by: SURGERY

## 2025-03-13 PROCEDURE — 85025 COMPLETE CBC W/AUTO DIFF WBC: CPT

## 2025-03-13 PROCEDURE — 94003 VENT MGMT INPAT SUBQ DAY: CPT

## 2025-03-13 PROCEDURE — 2580000003 HC RX 258: Performed by: SURGERY

## 2025-03-13 PROCEDURE — 80053 COMPREHEN METABOLIC PANEL: CPT

## 2025-03-13 PROCEDURE — 6360000002 HC RX W HCPCS: Performed by: INTERNAL MEDICINE

## 2025-03-13 PROCEDURE — 2500000003 HC RX 250 WO HCPCS: Performed by: NURSE PRACTITIONER

## 2025-03-13 PROCEDURE — 83735 ASSAY OF MAGNESIUM: CPT

## 2025-03-13 PROCEDURE — 2500000003 HC RX 250 WO HCPCS: Performed by: INTERNAL MEDICINE

## 2025-03-13 PROCEDURE — 99291 CRITICAL CARE FIRST HOUR: CPT | Performed by: SURGERY

## 2025-03-13 PROCEDURE — 84100 ASSAY OF PHOSPHORUS: CPT

## 2025-03-13 PROCEDURE — 6360000002 HC RX W HCPCS

## 2025-03-13 PROCEDURE — 2700000000 HC OXYGEN THERAPY PER DAY

## 2025-03-13 RX ORDER — METOPROLOL TARTRATE 1 MG/ML
5 INJECTION, SOLUTION INTRAVENOUS ONCE
Status: COMPLETED | OUTPATIENT
Start: 2025-03-13 | End: 2025-03-13

## 2025-03-13 RX ORDER — SODIUM CHLORIDE 0.9 % (FLUSH) 0.9 %
5-40 SYRINGE (ML) INJECTION PRN
Status: DISCONTINUED | OUTPATIENT
Start: 2025-03-13 | End: 2025-03-13

## 2025-03-13 RX ORDER — SODIUM CHLORIDE, SODIUM LACTATE, POTASSIUM CHLORIDE, AND CALCIUM CHLORIDE .6; .31; .03; .02 G/100ML; G/100ML; G/100ML; G/100ML
500 INJECTION, SOLUTION INTRAVENOUS ONCE
Status: DISCONTINUED | OUTPATIENT
Start: 2025-03-13 | End: 2025-03-17

## 2025-03-13 RX ORDER — MAGNESIUM SULFATE IN WATER 40 MG/ML
2000 INJECTION, SOLUTION INTRAVENOUS ONCE
Status: DISCONTINUED | OUTPATIENT
Start: 2025-03-13 | End: 2025-03-13 | Stop reason: SDUPTHER

## 2025-03-13 RX ORDER — FUROSEMIDE 10 MG/ML
40 INJECTION INTRAMUSCULAR; INTRAVENOUS ONCE
Status: COMPLETED | OUTPATIENT
Start: 2025-03-13 | End: 2025-03-13

## 2025-03-13 RX ORDER — SODIUM CHLORIDE, SODIUM LACTATE, POTASSIUM CHLORIDE, CALCIUM CHLORIDE 600; 310; 30; 20 MG/100ML; MG/100ML; MG/100ML; MG/100ML
INJECTION, SOLUTION INTRAVENOUS ONCE
Status: DISCONTINUED | OUTPATIENT
Start: 2025-03-13 | End: 2025-03-17

## 2025-03-13 RX ORDER — SODIUM CHLORIDE, SODIUM LACTATE, POTASSIUM CHLORIDE, CALCIUM CHLORIDE 600; 310; 30; 20 MG/100ML; MG/100ML; MG/100ML; MG/100ML
INJECTION, SOLUTION INTRAVENOUS ONCE
Status: DISCONTINUED | OUTPATIENT
Start: 2025-03-13 | End: 2025-03-13

## 2025-03-13 RX ORDER — DIPHENHYDRAMINE HCL 25 MG
50 TABLET ORAL ONCE
Start: 2025-03-14 | End: 2025-03-14

## 2025-03-13 RX ORDER — SODIUM CHLORIDE 9 MG/ML
5-250 INJECTION, SOLUTION INTRAVENOUS PRN
OUTPATIENT
Start: 2025-03-14

## 2025-03-13 RX ORDER — DIPHENHYDRAMINE HYDROCHLORIDE 50 MG/ML
50 INJECTION, SOLUTION INTRAMUSCULAR; INTRAVENOUS
OUTPATIENT
Start: 2025-03-14

## 2025-03-13 RX ORDER — EPINEPHRINE 1 MG/ML
0.3 INJECTION, SOLUTION, CONCENTRATE INTRAVENOUS PRN
OUTPATIENT
Start: 2025-03-14

## 2025-03-13 RX ORDER — MAGNESIUM SULFATE IN WATER 40 MG/ML
2000 INJECTION, SOLUTION INTRAVENOUS ONCE
Status: COMPLETED | OUTPATIENT
Start: 2025-03-13 | End: 2025-03-13

## 2025-03-13 RX ORDER — HYDROCORTISONE SODIUM SUCCINATE 100 MG/2ML
100 INJECTION INTRAMUSCULAR; INTRAVENOUS
OUTPATIENT
Start: 2025-03-14

## 2025-03-13 RX ORDER — HEPARIN 100 UNIT/ML
500 SYRINGE INTRAVENOUS PRN
OUTPATIENT
Start: 2025-03-14

## 2025-03-13 RX ORDER — SODIUM CHLORIDE 0.9 % (FLUSH) 0.9 %
5-40 SYRINGE (ML) INJECTION PRN
OUTPATIENT
Start: 2025-03-14

## 2025-03-13 RX ORDER — POTASSIUM CHLORIDE 7.45 MG/ML
10 INJECTION INTRAVENOUS ONCE
Status: COMPLETED | OUTPATIENT
Start: 2025-03-13 | End: 2025-03-13

## 2025-03-13 RX ORDER — METOPROLOL TARTRATE 1 MG/ML
INJECTION, SOLUTION INTRAVENOUS
Status: COMPLETED
Start: 2025-03-13 | End: 2025-03-13

## 2025-03-13 RX ORDER — HEPARIN 100 UNIT/ML
500 SYRINGE INTRAVENOUS PRN
Status: DISCONTINUED | OUTPATIENT
Start: 2025-03-13 | End: 2025-03-13

## 2025-03-13 RX ORDER — ENOXAPARIN SODIUM 100 MG/ML
40 INJECTION SUBCUTANEOUS DAILY
Status: DISCONTINUED | OUTPATIENT
Start: 2025-03-13 | End: 2025-03-15

## 2025-03-13 RX ADMIN — METOROPROLOL TARTRATE 5 MG: 5 INJECTION, SOLUTION INTRAVENOUS at 13:52

## 2025-03-13 RX ADMIN — SODIUM CHLORIDE, PRESERVATIVE FREE 10 ML: 5 INJECTION INTRAVENOUS at 08:03

## 2025-03-13 RX ADMIN — SODIUM CHLORIDE, PRESERVATIVE FREE 10 ML: 5 INJECTION INTRAVENOUS at 23:37

## 2025-03-13 RX ADMIN — CHLORHEXIDINE GLUCONATE, 0.12% ORAL RINSE 15 ML: 1.2 SOLUTION DENTAL at 04:12

## 2025-03-13 RX ADMIN — MAGNESIUM SULFATE HEPTAHYDRATE 2000 MG: 40 INJECTION, SOLUTION INTRAVENOUS at 08:23

## 2025-03-13 RX ADMIN — AMPICILLIN SODIUM 2000 MG: 2 INJECTION, POWDER, FOR SOLUTION INTRAMUSCULAR; INTRAVENOUS at 02:09

## 2025-03-13 RX ADMIN — IPRATROPIUM BROMIDE AND ALBUTEROL SULFATE 1 DOSE: 2.5; .5 SOLUTION RESPIRATORY (INHALATION) at 22:14

## 2025-03-13 RX ADMIN — ENOXAPARIN SODIUM 40 MG: 100 INJECTION SUBCUTANEOUS at 09:32

## 2025-03-13 RX ADMIN — SODIUM CHLORIDE, SODIUM LACTATE, POTASSIUM CHLORIDE, AND CALCIUM CHLORIDE 500 ML: .6; .31; .03; .02 INJECTION, SOLUTION INTRAVENOUS at 04:54

## 2025-03-13 RX ADMIN — POLYVINYL ALCOHOL, POVIDONE 1 DROP: 14; 6 SOLUTION/ DROPS OPHTHALMIC at 12:02

## 2025-03-13 RX ADMIN — WATER 2000 MG: 1 INJECTION INTRAMUSCULAR; INTRAVENOUS; SUBCUTANEOUS at 04:12

## 2025-03-13 RX ADMIN — ONDANSETRON 4 MG: 2 INJECTION, SOLUTION INTRAMUSCULAR; INTRAVENOUS at 23:35

## 2025-03-13 RX ADMIN — PANTOPRAZOLE SODIUM 40 MG: 40 INJECTION, POWDER, FOR SOLUTION INTRAVENOUS at 08:02

## 2025-03-13 RX ADMIN — SODIUM CHLORIDE, PRESERVATIVE FREE 10 ML: 5 INJECTION INTRAVENOUS at 23:25

## 2025-03-13 RX ADMIN — IPRATROPIUM BROMIDE AND ALBUTEROL SULFATE 1 DOSE: 2.5; .5 SOLUTION RESPIRATORY (INHALATION) at 16:56

## 2025-03-13 RX ADMIN — SODIUM CHLORIDE, SODIUM LACTATE, POTASSIUM CHLORIDE, AND CALCIUM CHLORIDE: .6; .31; .03; .02 INJECTION, SOLUTION INTRAVENOUS at 03:09

## 2025-03-13 RX ADMIN — SODIUM CHLORIDE, SODIUM LACTATE, POTASSIUM CHLORIDE, CALCIUM CHLORIDE: 600; 310; 30; 20 INJECTION, SOLUTION INTRAVENOUS at 03:13

## 2025-03-13 RX ADMIN — HYDROMORPHONE HYDROCHLORIDE 0.5 MG: 1 INJECTION, SOLUTION INTRAMUSCULAR; INTRAVENOUS; SUBCUTANEOUS at 23:35

## 2025-03-13 RX ADMIN — HYDROMORPHONE HYDROCHLORIDE 0.5 MG: 1 INJECTION, SOLUTION INTRAMUSCULAR; INTRAVENOUS; SUBCUTANEOUS at 13:43

## 2025-03-13 RX ADMIN — AMPICILLIN SODIUM 2000 MG: 2 INJECTION, POWDER, FOR SOLUTION INTRAMUSCULAR; INTRAVENOUS at 14:20

## 2025-03-13 RX ADMIN — CHLORHEXIDINE GLUCONATE, 0.12% ORAL RINSE 15 ML: 1.2 SOLUTION DENTAL at 12:02

## 2025-03-13 RX ADMIN — POTASSIUM CHLORIDE 10 MEQ: 7.46 INJECTION, SOLUTION INTRAVENOUS at 01:01

## 2025-03-13 RX ADMIN — SODIUM CHLORIDE, SODIUM LACTATE, POTASSIUM CHLORIDE, AND CALCIUM CHLORIDE: .6; .31; .03; .02 INJECTION, SOLUTION INTRAVENOUS at 23:23

## 2025-03-13 RX ADMIN — WATER 2000 MG: 1 INJECTION INTRAMUSCULAR; INTRAVENOUS; SUBCUTANEOUS at 15:30

## 2025-03-13 RX ADMIN — POTASSIUM CHLORIDE 10 MEQ: 7.46 INJECTION, SOLUTION INTRAVENOUS at 02:03

## 2025-03-13 RX ADMIN — SODIUM CHLORIDE, SODIUM LACTATE, POTASSIUM CHLORIDE, AND CALCIUM CHLORIDE: .6; .31; .03; .02 INJECTION, SOLUTION INTRAVENOUS at 04:12

## 2025-03-13 RX ADMIN — SODIUM CHLORIDE, SODIUM LACTATE, POTASSIUM CHLORIDE, AND CALCIUM CHLORIDE: .6; .31; .03; .02 INJECTION, SOLUTION INTRAVENOUS at 09:34

## 2025-03-13 RX ADMIN — SODIUM CHLORIDE, PRESERVATIVE FREE 10 ML: 5 INJECTION INTRAVENOUS at 08:01

## 2025-03-13 RX ADMIN — AMIODARONE HYDROCHLORIDE 0.5 MG/MIN: 1.8 INJECTION, SOLUTION INTRAVENOUS at 04:51

## 2025-03-13 RX ADMIN — MINERAL OIL, WHITE PETROLATUM: .03; .94 OINTMENT OPHTHALMIC at 08:02

## 2025-03-13 RX ADMIN — SODIUM CHLORIDE, SODIUM LACTATE, POTASSIUM CHLORIDE, AND CALCIUM CHLORIDE: .6; .31; .03; .02 INJECTION, SOLUTION INTRAVENOUS at 16:12

## 2025-03-13 RX ADMIN — SODIUM CHLORIDE, SODIUM LACTATE, POTASSIUM CHLORIDE, AND CALCIUM CHLORIDE 250 ML: .6; .31; .03; .02 INJECTION, SOLUTION INTRAVENOUS at 01:21

## 2025-03-13 RX ADMIN — METOPROLOL TARTRATE 5 MG: 1 INJECTION, SOLUTION INTRAVENOUS at 13:52

## 2025-03-13 RX ADMIN — AMPICILLIN SODIUM 2000 MG: 2 INJECTION, POWDER, FOR SOLUTION INTRAMUSCULAR; INTRAVENOUS at 22:02

## 2025-03-13 RX ADMIN — METOROPROLOL TARTRATE 5 MG: 5 INJECTION, SOLUTION INTRAVENOUS at 18:19

## 2025-03-13 RX ADMIN — IPRATROPIUM BROMIDE AND ALBUTEROL SULFATE 1 DOSE: 2.5; .5 SOLUTION RESPIRATORY (INHALATION) at 13:04

## 2025-03-13 RX ADMIN — PROPOFOL 20 MCG/KG/MIN: 10 INJECTION, EMULSION INTRAVENOUS at 03:34

## 2025-03-13 RX ADMIN — AMPICILLIN SODIUM 2000 MG: 2 INJECTION, POWDER, FOR SOLUTION INTRAMUSCULAR; INTRAVENOUS at 06:03

## 2025-03-13 RX ADMIN — CHLORHEXIDINE GLUCONATE, 0.12% ORAL RINSE 15 ML: 1.2 SOLUTION DENTAL at 16:11

## 2025-03-13 RX ADMIN — AMIODARONE HYDROCHLORIDE 0.5 MG/MIN: 1.8 INJECTION, SOLUTION INTRAVENOUS at 18:16

## 2025-03-13 RX ADMIN — IPRATROPIUM BROMIDE AND ALBUTEROL SULFATE 1 DOSE: 2.5; .5 SOLUTION RESPIRATORY (INHALATION) at 08:58

## 2025-03-13 RX ADMIN — AMPICILLIN SODIUM 2000 MG: 2 INJECTION, POWDER, FOR SOLUTION INTRAMUSCULAR; INTRAVENOUS at 18:05

## 2025-03-13 RX ADMIN — CHLORHEXIDINE GLUCONATE, 0.12% ORAL RINSE 15 ML: 1.2 SOLUTION DENTAL at 08:02

## 2025-03-13 RX ADMIN — MINERAL OIL, WHITE PETROLATUM: .03; .94 OINTMENT OPHTHALMIC at 04:12

## 2025-03-13 RX ADMIN — SODIUM CHLORIDE, PRESERVATIVE FREE 20 ML: 5 INJECTION INTRAVENOUS at 22:02

## 2025-03-13 RX ADMIN — SODIUM CHLORIDE, PRESERVATIVE FREE 10 ML: 5 INJECTION INTRAVENOUS at 08:25

## 2025-03-13 RX ADMIN — FUROSEMIDE 40 MG: 10 INJECTION, SOLUTION INTRAMUSCULAR; INTRAVENOUS at 23:20

## 2025-03-13 RX ADMIN — AMPICILLIN SODIUM 2000 MG: 2 INJECTION, POWDER, FOR SOLUTION INTRAMUSCULAR; INTRAVENOUS at 10:38

## 2025-03-13 ASSESSMENT — PAIN DESCRIPTION - DESCRIPTORS: DESCRIPTORS: OTHER (COMMENT);PATIENT UNABLE TO DESCRIBE

## 2025-03-13 ASSESSMENT — PULMONARY FUNCTION TESTS
PIF_VALUE: 17
PIF_VALUE: 17
PIF_VALUE: 18
PIF_VALUE: 17
PIF_VALUE: 12
PIF_VALUE: 17
PIF_VALUE: 20
PIF_VALUE: 7
PIF_VALUE: 17
PIF_VALUE: 12
PIF_VALUE: 17

## 2025-03-13 ASSESSMENT — PAIN - FUNCTIONAL ASSESSMENT
PAIN_FUNCTIONAL_ASSESSMENT: ACTIVITIES ARE NOT PREVENTED
PAIN_FUNCTIONAL_ASSESSMENT: ACTIVITIES ARE NOT PREVENTED

## 2025-03-13 ASSESSMENT — PAIN SCALES - GENERAL
PAINLEVEL_OUTOF10: 7
PAINLEVEL_OUTOF10: 0
PAINLEVEL_OUTOF10: 7

## 2025-03-13 ASSESSMENT — PAIN DESCRIPTION - LOCATION
LOCATION: GENERALIZED
LOCATION: OTHER (COMMENT)

## 2025-03-13 NOTE — ANESTHESIA POSTPROCEDURE EVALUATION
Department of Anesthesiology  Postprocedure Note    Patient: Ruben Davidson  MRN: [id number]  YOB: 1945  Date of evaluation: 3/13/2025    Procedure Summary       Date: 03/12/25 Room / Location: [address]    Anesthesia Start: 1606 Anesthesia Stop: 1834    Procedure: LAPAROTOMY EXPLORATORY JEJUNUM BYPASS WITH WOUND VAC APPLICATION (Abdomen) Diagnosis:       Intestinal obstruction, unspecified cause, unspecified whether partial or complete (HCC)      (Intestinal obstruction, unspecified cause, unspecified whether partial or complete (HCC) [K56.609])    Surgeons: Barrington Barrientos MD Responsible Provider: Favian Gale DO    Anesthesia Type: general ASA Status: 4 - Emergent            Anesthesia Type: No value filed.    Mildred Phase I: Mildred Score: 4    Mildred Phase II:      Anesthesia Post Evaluation    Patient location during evaluation: PACU  Patient participation: complete - patient participated  Level of consciousness: awake  Cardiovascular status: blood pressure returned to baseline  Respiratory status: acceptable  Hydration status: euvolemic  Multimodal analgesia pain management approach  Pain management: adequate        No notable events documented.

## 2025-03-13 NOTE — PROGRESS NOTES
[facility]  SURGICAL CRITICAL CARE SERVICE    Attending Physician Statement:  I have examined the patient, reviewed the record, and discussed the case with the critical care team.  I agree with the assessment and plan with the following additions, corrections, and changes.    CC: Small bowel obstruction    ASSESSMENT/PLAN:  Neuro:   Acute pain syndrome-continue IV fentanyl.  Transition to as needed IV and oral opioids once extubated.  Hold home gabapentin for now  Mood disorder-hold Cymbalta    CV:     Permanent atrial fibrillation/atrial flutter-currently rate controlled.  Continue IV amiodarone.  EP following.  Hold home Eliquis.  Resume metoprolol once hemodynamics allow.  Ischemic cardiomyopathy-hold Demadex.  Hold Entresto  Coronary artery disease-hold Plavix    Respiratory:   Acute respiratory failure-attempt spontaneous breathing trials.  Monitor CXR    Renal:    Oliguria-continue Emery catheter for strict I's and O's.  Renal function stable  Recent left ureteral stone extraction and stent insertion for complicated UTI-monitor    FEN: LR at 100 mL/h    GI:    Status post exploratory laparotomy/lysis of adhesions/reduction of internal hernia/jejunal jejunal bypass 3/13/2025-continue NGT.  Continue PPI.  Await return of bowel function.  Continue local wound care    Heme:    Acute blood loss anemia-stable.  No active bleeding.  Monitor CBC    ID:   Enterococcus bacteremia-ID following.  Continue ampicillin/ceftriaxone.  Monitor temperature and CBC.    Endocrine:    Monitor glucose    MSK/Skin:   History of psoriatic arthritis-hold Plaquenil    DVT/GI ppx: SCDs.  Start prophylactic Lovenox.  Continue PPI    Full Code    Disposition: SICU      HPI:  79-year-old male admitted to SICU for postoperative management on 3/12/2025.  He underwent an exploratory laparotomy/lysis of adhesions/reduction of internal hernia/closure of mesenteric defect/jejunal jejunal bypass for small bowel obstruction.   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  He initially presented to the emergency department on 3/11/2025.    He does have a significant cardiac history with HFrEF/ischemic cardiomyopathy, persistent atrial fibrillation/flutter, severe mitral and tricuspid regurgitation.  He has had multiple coronary stents in 1997 in 2021.  He also had AICD implanted in 2021 that was upgraded in 2024.    He recently underwent cystoscopy with left ureteroscopy/stone extraction and stent exchange on 3/7/2025 for complicated urinary tract infection.  He was receiving IV daptomycin.  He also ended up with Enterococcus bacteremia and has been receiving IV ampicillin per the infectious disease service    HOSPITAL COURSE:  3/13-remains critically ill on mechanical ventilation, propofol/fentanyl for sedation, and an IV amiodarone infusion.  He has been oliguric overnight and required crystalloid fluid boluses.    PHYSICAL EXAM:  Temp 97.3 P80 paced RR 14 /58 O2 sat 97%   general-79-year-old white man  Neuro-sedated on propofol and fentanyl  HEENT-orotracheal intubation.  NGT  CV-paced at 80.  Warm.  Well-perfused.  No significant edema  Lungs/Chest-controlled mechanical ventilation FiO2 50%/PEEP 8.  Synchronous with vent  Abdomen-soft, nondistended.  Prevena present over midline wound  -Emery with dark yellow urine  Skin-warm.  Dry.  No rash      I have reviewed the laboratory studies and these are my findings and my interpretation: Sodium 142  Potassium 4.2  Chloride 109  Bicarb 18  BUN 22  Creatinine 0.9  Glucose 133  LFTs unremarkable  WBC 7.5  Hemoglobin 10.5  Platelets 155  ABG 7.34/36/224/19/-5.9.  P/F = 407    Blood cultures 3/11 no growth  Viral panel negative      I have personally reviewed the imaging and diagnostic studies and these are my findings and my interpretation:  CXR: Endotracheal tube appropriately positioned.  AICD present.

## 2025-03-13 NOTE — PROGRESS NOTES
03/13/25 1322   Patient Observation   Patient Observations verbal order Dr Geronimo Perla to Rt Extubate from mechanical ventilation . Rt Dr Perla and Rns at bedside . pt sxd orally for scant amt .  balloon deflated oet removed . pt verbalized name clear strong voice. coughed dry non productive, resp rate 16 heart rate 122 pt instructed on calm relaxed breathing . spo2 99 via ox with oxygen on pt at 5 lpm nasal cannula

## 2025-03-13 NOTE — PROGRESS NOTES
03/13/25 1311   Weaning Parameters   Spontaneous Breathing Trial Complete Yes   Respiratory Rate Observed 15   Ve 9      RSBI 24   NIF -30   VC 1050   Miller Agitation Sedation Scale (RASS) +1

## 2025-03-13 NOTE — PROGRESS NOTES
03/13/25 1313   Patient Observation   Patient Observations oet leak test. pt sxd orally evac and oet.  balloon deflated insp vt 500 given exh vt 358 in return.with audible airflow heard orally.  balloon reinflated pt placed back on psv awaiting further orders. .

## 2025-03-13 NOTE — CARE COORDINATION
3/13 Care Coordination: Pt presented to the emergency department for evaluation of Vomiting. Pt had a stent placed for his kidney stone on 3/7. Pt was a discharge from Hospital 2/13, here fro sepsis, flu A infection. Was discharge to home and set up with Infusion Center for OP IV ATB. Now in ED Dx with  high-grade small bowel obstruction. General Surgery consult, ID consult, EP consult, Afib, NSVT.. HAs Hx AICD. Admit to Monitor floor. Attempt was made to transfer the patient to  but the bariatric service declined. On 3/12 Pt to OR for SBO.exploratory laparotomy/lysis of adhesions reduction of internal hernia/jejunal jejunal bypass      Admit to SICU post Op, Pt remains on Vent.propofol/fentanyl for sedation, and an IV amiodarone infusion. With Current status unclear on discharge needs.   CM/SW will continue to follow for discharge planning.   Ronal REEVES,RN-CV-BC  [phone]

## 2025-03-13 NOTE — PLAN OF CARE
Problem: Chronic Conditions and Co-morbidities  Goal: Patient's chronic conditions and co-morbidity symptoms are monitored and maintained or improved  3/13/2025 0845 by Nixon Nichols RN  Outcome: Progressing     Problem: Discharge Planning  Goal: Discharge to home or other facility with appropriate resources  3/13/2025 0845 by Nixon Nichols RN  Outcome: Progressing     Problem: Safety - Adult  Goal: Free from fall injury  3/13/2025 0845 by Nixon Nichols RN  Outcome: Progressing     Problem: Pain  Goal: Verbalizes/displays adequate comfort level or baseline comfort level  3/13/2025 0845 by Nixon Nichols RN  Outcome: Progressing     Problem: Cardiovascular - Adult  Goal: Maintains optimal cardiac output and hemodynamic stability  3/13/2025 0845 by Nixon Nichols RN  Outcome: Progressing     Problem: Gastrointestinal - Adult  Goal: Minimal or absence of nausea and vomiting  3/13/2025 0845 by Nixon Nichols RN  Outcome: Progressing     Problem: Gastrointestinal - Adult  Goal: Maintains or returns to baseline bowel function  3/13/2025 0845 by Nixon Nichols RN  Outcome: Progressing     Problem: Infection - Adult  Goal: Absence of infection at discharge  3/13/2025 0845 by Nixon Nichols RN  Outcome: Progressing     Problem: Safety - Medical Restraint  Goal: Remains free of injury from restraints (Restraint for Interference with Medical Device)  Description: INTERVENTIONS:  1. Determine that other, less restrictive measures have been tried or would not be effective before applying the restraint  2. Evaluate the patient's condition at the time of restraint application  3. Inform patient/family regarding the reason for restraint  4. Q2H: Monitor safety, psychosocial status, comfort, nutrition and hydration  3/13/2025 0845 by Nixon Nichols RN  Outcome: Progressing  Flowsheets  Taken 3/13/2025 0800 by Nixon Nichols RN  Remains free of injury from restraints (restraint  for interference with medical device):   Determine that other, less restrictive measures have been tried or would not be effective before applying the restraint   Evaluate the patient's condition at the time of restraint application   Inform patient/family regarding the reason for restraint   Every 2 hours: Monitor safety, psychosocial status, comfort, nutrition and hydration  Taken 3/13/2025 0600 by Tawnya Charles RN  Remains free of injury from restraints (restraint for interference with medical device):   Determine that other, less restrictive measures have been tried or would not be effective before applying the restraint   Evaluate the patient's condition at the time of restraint application   Inform patient/family regarding the reason for restraint   Every 2 hours: Monitor safety, psychosocial status, comfort, nutrition and hydration  Taken 3/13/2025 0400 by Tawnya Charles RN  Remains free of injury from restraints (restraint for interference with medical device):   Determine that other, less restrictive measures have been tried or would not be effective before applying the restraint   Evaluate the patient's condition at the time of restraint application   Inform patient/family regarding the reason for restraint   Every 2 hours: Monitor safety, psychosocial status, comfort, nutrition and hydration     Problem: Skin/Tissue Integrity  Goal: Skin integrity remains intact  Description: 1.  Monitor for areas of redness and/or skin breakdown  2.  Assess vascular access sites hourly  3.  Every 4-6 hours minimum:  Change oxygen saturation probe site  4.  Every 4-6 hours:  If on nasal continuous positive airway pressure, respiratory therapy assess nares and determine need for appliance change or resting period  Outcome: Progressing     Problem: ABCDS Injury Assessment  Goal: Absence of physical injury  Outcome: Progressing

## 2025-03-13 NOTE — FLOWSHEET NOTE
03/13/25 0131   Non-Invasive Cardiac Output (NICOM)   Cornelius (l/min) 7.2 l/min   Leigh Ann ( l/min/m2) 3.7 l/min/m2   niSVI (ml/m2/b) 41 ml/m2/b   niSVV (%) 20 %   niTPR (dynes*sec/cm5) 1069 dynes*sec/cm5   niTPRI (dynes*sec/cm5/m2) 2042 dynes*sec/cm5/m2   % Change niSVI (%) 22 %

## 2025-03-13 NOTE — PROGRESS NOTES
[facility]   Progress note      Subjective:    Intubated sedated on evaluation in SICU  Post ex lap yesterday  Objective:    /62   Pulse (!) 124   Temp 99 °F (37.2 °C) (Axillary)   Resp 21   Ht 1.676 m (5' 6\")   Wt 81 kg (178 lb 9.2 oz)   SpO2 98%   BMI 28.82 kg/m²     In: 5729 [I.V.:4612.3]  Out: 1646   In: 5729   Out: 1646 [Urine:1096]    General appearance: In debated sedated  HEENT: AT/NC, MMM  Neck: FROM, supple  Lungs: Clear to auscultation  CV: RRR, no MRGs  Vasc: Radial pulses 2+  Abdomen: Post exploratory laparotomy  Extremities: No peripheral edema or digital cyanosis  Skin: no rash, lesions or ulcers       Recent Labs     03/11/25  0900 03/12/25  0455 03/13/25  0426   WBC 2.5* 7.1 7.5   HGB 13.1 11.2* 10.5*   HCT 41.2 33.9* 33.0*    156 155       Recent Labs     03/11/25  0900 03/12/25  0455 03/12/25  1656 03/12/25  1731 03/13/25  0426    142  --   --  142   K 3.4* 3.6  --   --  4.2    105  --   --  109*   CO2 16* 18*  --   --  18*   BUN 21 21  --   --  22   CREATININE 1.0 0.9 0.9 0.9 0.9   CALCIUM 9.6 8.4*  --   --  8.2*       Assessment:    Principal Problem:    Pneumonia due to organism  Active Problems:    SBO (small bowel obstruction) ([facility])  Resolved Problems:    * No resolved hospital problems. *      Plan:    79-year-old male with a history of A-fib, enterococcus bacteremia, and CHF presents to the ED with complaints of nausea and vomiting and is admitted to telemetry unit with     Multifocal pneumonia in a patient with UTI  -Supplement O2 demands keeping oxygen saturation greater than 92%.  Patient currently utilizing 2 L O2  Consult infectious disease as patient has been currently been on daptomycin  -IV ampicillin 2 g IV every 4 hours and Rocephin 2 g IV Q 12 hours  -DuoNebs   -Monitor labs hemoglobin.  Monitor electrolytes place as necessary  Check Pro-Patel     Small bowel obstruction  Keep patient n.p.o.  IV hydration  Consult general  surgery-appreciate input   NG-low intermittent suction  Small bowel follow-through today-Await results     NSVT  Monitor closely  Consult EP  IV amiodarone drip initiated per EPContinue Eliquis    3/13/2025  Seen in surgical ICU  The patient was emergently taken to the OR yesterday evening secondary to high-grade bowel obstruction  He remains intubated on my evaluation-being weaned off ventilator  Not requiring pressor support  H&H 10.5/33 today  Decreased urine output with tea colored urine  Continue supportive care and SICU  Primary management by surgical team/ICU team  Remains on IV ampicillin and Rocephin for Enterococcus bacteremia  Medicine will continue to follow  Wife at bedside with whom case was discussed       Code Status: Full code  Consultants: General surgery, SICU team  DVT Prophylaxis   PT/OT  Discharge planning          Cynthia Hurtado MD  5:46 PM  3/13/2025 blood

## 2025-03-13 NOTE — PROGRESS NOTES
Speech Language Pathology  NAME:  Ruben Davidson  :  1945  DATE: 3/13/2025  ROOM:  [facility]-A    Pt unavailable at 0715 for Clinical Swallow Evaluation     REASON:  Patient intubated prior to evaluation being completed please reorder evaluation when medically appropriate.    Will re-attempt as appropriate.

## 2025-03-13 NOTE — PROGRESS NOTES
03/13/25 0907   Patient Observation   Pulse 91   SpO2 97 %   Breath Sounds   Breath Sounds Bilateral Clear;Diminished   Right Upper Lobe Diminished;Clear   Right Middle Lobe Diminished   Right Lower Lobe Diminished   Left Upper Lobe Diminished;Clear   Left Lower Lobe Diminished   Vent Information   Ventilator Day(s) 2   Vent Mode AC/VC   Ventilator Settings   Vt (Set, mL) 375 mL   Resp Rate (Set) 14 bpm   PEEP/CPAP (cmH2O) 8   FiO2  45 %   Peak Inspiratory Flow (Set) 55 L/sec   Vent Patient Data (Readings)   Vt (Measured) 370 mL   Peak Inspiratory Pressure (cmH2O) 17 cmH2O   Rate Measured 14 br/min   Minute Volume (L/min) 5.19 Liters   Mean Airway Pressure (cmH2O) 10 cmH20   Plateau Pressure (cm H2O) 15 cm H2O   Driving Pressure 7   I:E Ratio 1:4.20   Flow Sensitivity 3 L/min   PEEP Intrinsic (cm H2O) 0.3 cm H2O   Static Compliance (L/cm H2O) 55   Backup Apnea On   Backup Rate 14 Breaths Per Minute   Backup Vt 375   Vent Alarm Settings   Low Pressure (cmH2O)   (cont connected vent    to wallla rm)   High Pressure (cmH2O) 40 cmH2O   Low Minute Volume (lpm) 4 L/min   High Minute Volume (lpm) 15 L/min   Low Exhaled Vt (ml) 300 mL   High Exhaled Vt (ml) 700 mL   RR High (bpm) 30 br/min   Apnea (secs) 20 secs   Additional Respiratoray Assessments   Humidification Source Heated wire   Humidification Temp 37   Ambu Bag With Mask At Bedside Yes   Airway Clearance   Suction ET Tube   Subglottic Suction Done No   Suction Device Inline suction catheter   Sputum Amount Moderate   Sputum Color/Odor White   Sputum Consistency Thick   ETT    Placement Date/Time: 03/12/25 1621   Present on Admission/Arrival: No  Placed By: In surgery  Placement Verified By: Direct visualization;Auscultation;Capnometry  Preoxygenation: Yes  Mask Ventilation: Mask ventilation not attempted (0)  Technique: Vi...   Secured At 23 cm   Measured From Lips   ETT Placement Right   Secured By Commercial tube delgado   Weaning Eladia Miller  Agitation Sedation Scale (RASS) -2     DAILY VENTILATOR WEANING ASSESSMENT PERFORMED    P/FIO2 Ratio =   407       (<100= do not Wean)                  Cs =  56                        (<32= Instability)  Plat. Pressure = 15  MV =6.8  RSBI =25    Instabilities:       Cardiovascular =       CNS =       Respiratory =       Metabolic =    Parameters        Ask Physician for a weaning plan yes    Was SAT completed yes    Was SBT completed currently in the process of SBT     Additional Comments: pt progressing     Performed by Debbie Day RCP RRT      Reference Table:    Cardiovascular     CNS      1. Mean BP less than or equal to 75   1. Neuromuscular blockade  2. Heart Rate greater than 130   2. RASS of -3, -4, -5  3. Myocardial Ischemia    3. RASS of +3, +4  4. Mechanical Assist Device    4. ICP greater than 15 or             Intracranial Hypertension         Respiratory      Metabolic  1. PEEP equal to or greater than 10cm/H20  1.Temp. (8hrs) less than 95 or > 103  2. Respiratory Rate greater than 35   2. WBC < 5000 or > 19222  3. Minute Volume greater than 15L  4. pH less than 7.30  5. Deteriorating chest X-ray

## 2025-03-13 NOTE — PROGRESS NOTES
Spiritual Health History and Assessment/Progress Note  [facility] [place]    (P) Spiritual/Emotional Needs,  ,  ,      Name: Ruben Davidson MRN: [id number]    Age: 79 y.o.     Sex: male   Language: English   Druze: Evangelical   Pneumonia due to organism     Date: 3/13/2025                           Spiritual Assessment began in [facility] S[facility]        Referral/Consult From: (P) Rounding   Encounter Overview/Reason: (P) Spiritual/Emotional Needs  Service Provided For: (P) Patient and family together    Cande, Belief, Meaning:   Patient unable to assess at this time  Family/Friends identify as spiritual      Importance and Influence:  Patient unable to assess at this time  Family/Friends have spiritual/personal beliefs that influence decisions regarding the patient's health    Community:  Patient feels well-supported. Support system includes: Extended family  Family/Friends feel well-supported. Support system includes: Extended family    Assessment and Plan of Care:     Patient Interventions include: Facilitated expression of thoughts and feelings and Affirmed coping skills/support systems  Family/Friends Interventions include: Facilitated expression of thoughts and feelings and Affirmed coping skills/support systems    Patient Plan of Care: No spiritual needs identified for follow-up  Family/Friends Plan of Care: No spiritual needs identified for follow-up    Electronically signed by Chaplain DEBORAH on 3/13/2025 at 5:57 PM

## 2025-03-13 NOTE — ACP (ADVANCE CARE PLANNING)
Advance Care Planning   Healthcare Decision Maker:    Primary Decision Maker: Saira Davidson - Spouse - [phone]    Secondary Decision Maker: lAka Davidson - Child - [phone]    Click here to complete Healthcare Decision Makers including selection of the Healthcare Decision Maker Relationship (ie \"Primary\").

## 2025-03-13 NOTE — PROGRESS NOTES
Department of Internal Medicine  Infectious Diseases  Progress Note      C/C: Enterococcus bacteremia , pneumonia, small bowel obstruction     All above noted  Pt underwent exp lap , lysis of adhesion and reduction of internal hernia ( 3/12)     Pt is awake and alert, extubated   Reports abdomen pain       Current Facility-Administered Medications   Medication Dose Route Frequency Provider Last Rate Last Admin    lactated ringers infusion   IntraVENous Once Graciela Oviedo DO        lactated ringers bolus 500 mL  500 mL IntraVENous Once Graciela Oviedo DO        enoxaparin (LOVENOX) injection 40 mg  40 mg SubCUTAneous Daily Piotr Monroe MD   40 mg at 03/13/25 0932    HYDROmorphone (DILAUDID) injection 0.25 mg  0.25 mg IntraVENous Q3H PRN Piotr Monroe MD        Or    HYDROmorphone (DILAUDID) injection 0.5 mg  0.5 mg IntraVENous Q3H PRN Piotr Monroe MD   0.5 mg at 03/13/25 1343    diatrizoate meglumine-sodium (GASTROGRAFIN) 66-10 % solution 120 mL  120 mL Per NG tube ONCE PRN Jose Rob DO   120 mL at 03/12/25 1629    lactated ringers infusion   IntraVENous Continuous Piotr Monroe  mL/hr at 03/13/25 0934 New Bag at 03/13/25 0934    chlorhexidine (PERIDEX) 0.12 % solution 15 mL  15 mL Mouth/Throat 6 times per day Graciela Oviedo DO   15 mL at 03/13/25 1202    propofol infusion  5-50 mcg/kg/min IntraVENous Continuous Graciela Oviedo DO 9.7 mL/hr at 03/13/25 1207 20 mcg/kg/min at 03/13/25 1207    fentaNYL (SUBLIMAZE) 1,000 mcg in sodium chloride 0.9% 100 mL infusion   mcg/hr IntraVENous Continuous Graciela Oviedo DO 5 mL/hr at 03/13/25 1145 50 mcg/hr at 03/13/25 1145    pantoprazole (PROTONIX) injection 40 mg  40 mg IntraVENous Daily Graciela Oviedo DO   40 mg at 03/13/25 0802    Polyvinyl Alcohol-Povidone PF (REFRESH) 1.4-0.6 % ophthalmic solution 1 drop  1 drop Both Eyes Q4H Graciela Oviedo, DO   1 drop at 03/13/25 1202    Or    lubrifresh P.M. (artificial tears) ophthalmic ointment   Both Eyes Q4H

## 2025-03-14 ENCOUNTER — APPOINTMENT (OUTPATIENT)
Dept: GENERAL RADIOLOGY | Age: 80
End: 2025-03-14
Payer: MEDICARE

## 2025-03-14 ENCOUNTER — HOSPITAL ENCOUNTER (OUTPATIENT)
Dept: INFUSION THERAPY | Age: 80
Setting detail: INFUSION SERIES
Discharge: HOME | End: 2025-03-14

## 2025-03-14 LAB
ALBUMIN SERPL-MCNC: 2.6 G/DL (ref 3.5–5.2)
ALP SERPL-CCNC: 66 U/L (ref 40–129)
ALT SERPL-CCNC: 19 U/L (ref 0–40)
ANION GAP SERPL CALCULATED.3IONS-SCNC: 15 MMOL/L (ref 7–16)
ANION GAP SERPL CALCULATED.3IONS-SCNC: 18 MMOL/L (ref 7–16)
AST SERPL-CCNC: 47 U/L (ref 0–39)
BASOPHILS # BLD: 0 K/UL (ref 0–0.2)
BASOPHILS NFR BLD: 0 % (ref 0–2)
BILIRUB SERPL-MCNC: 0.6 MG/DL (ref 0–1.2)
BUN SERPL-MCNC: 25 MG/DL (ref 6–23)
BUN SERPL-MCNC: 26 MG/DL (ref 6–23)
CA-I BLD-SCNC: 1.19 MMOL/L (ref 1.15–1.33)
CALCIUM SERPL-MCNC: 8.3 MG/DL (ref 8.6–10.2)
CALCIUM SERPL-MCNC: 8.5 MG/DL (ref 8.6–10.2)
CHLORIDE SERPL-SCNC: 106 MMOL/L (ref 98–107)
CHLORIDE SERPL-SCNC: 108 MMOL/L (ref 98–107)
CO2 SERPL-SCNC: 16 MMOL/L (ref 22–29)
CO2 SERPL-SCNC: 17 MMOL/L (ref 22–29)
CREAT SERPL-MCNC: 1.1 MG/DL (ref 0.7–1.2)
CREAT SERPL-MCNC: 1.1 MG/DL (ref 0.7–1.2)
EOSINOPHIL # BLD: 0 K/UL (ref 0.05–0.5)
EOSINOPHILS RELATIVE PERCENT: 0 % (ref 0–6)
ERYTHROCYTE [DISTWIDTH] IN BLOOD BY AUTOMATED COUNT: 20.3 % (ref 11.5–15)
GFR, ESTIMATED: 69 ML/MIN/1.73M2
GFR, ESTIMATED: 69 ML/MIN/1.73M2
GLUCOSE SERPL-MCNC: 103 MG/DL (ref 74–99)
GLUCOSE SERPL-MCNC: 104 MG/DL (ref 74–99)
HCT VFR BLD AUTO: 32.2 % (ref 37–54)
HGB BLD-MCNC: 10.6 G/DL (ref 12.5–16.5)
LYMPHOCYTES NFR BLD: 0.32 K/UL (ref 1.5–4)
LYMPHOCYTES RELATIVE PERCENT: 3 % (ref 20–42)
MAGNESIUM SERPL-MCNC: 2 MG/DL (ref 1.6–2.6)
MCH RBC QN AUTO: 30.5 PG (ref 26–35)
MCHC RBC AUTO-ENTMCNC: 32.9 G/DL (ref 32–34.5)
MCV RBC AUTO: 92.5 FL (ref 80–99.9)
MICROORGANISM SPEC CULT: NORMAL
MONOCYTES NFR BLD: 0.42 K/UL (ref 0.1–0.95)
MONOCYTES NFR BLD: 4 % (ref 2–12)
NEUTROPHILS NFR BLD: 94 % (ref 43–80)
NEUTS SEG NFR BLD: 11.46 K/UL (ref 1.8–7.3)
NUCLEATED RED BLOOD CELLS: 1 PER 100 WBC
PHOSPHATE SERPL-MCNC: 3.2 MG/DL (ref 2.5–4.5)
PLATELET # BLD AUTO: 196 K/UL (ref 130–450)
PMV BLD AUTO: 11.8 FL (ref 7–12)
POTASSIUM SERPL-SCNC: 3.8 MMOL/L (ref 3.5–5)
POTASSIUM SERPL-SCNC: 4.3 MMOL/L (ref 3.5–5)
PROT SERPL-MCNC: 5.6 G/DL (ref 6.4–8.3)
RBC # BLD AUTO: 3.48 M/UL (ref 3.8–5.8)
RBC # BLD: ABNORMAL 10*6/UL
SODIUM SERPL-SCNC: 138 MMOL/L (ref 132–146)
SODIUM SERPL-SCNC: 142 MMOL/L (ref 132–146)
SPECIMEN DESCRIPTION: NORMAL
WBC OTHER # BLD: 12.2 K/UL (ref 4.5–11.5)

## 2025-03-14 PROCEDURE — 97163 PT EVAL HIGH COMPLEX 45 MIN: CPT

## 2025-03-14 PROCEDURE — 99291 CRITICAL CARE FIRST HOUR: CPT | Performed by: SURGERY

## 2025-03-14 PROCEDURE — 6360000002 HC RX W HCPCS

## 2025-03-14 PROCEDURE — 71045 X-RAY EXAM CHEST 1 VIEW: CPT

## 2025-03-14 PROCEDURE — 6370000000 HC RX 637 (ALT 250 FOR IP)

## 2025-03-14 PROCEDURE — 2000000000 HC ICU R&B

## 2025-03-14 PROCEDURE — 97530 THERAPEUTIC ACTIVITIES: CPT

## 2025-03-14 PROCEDURE — 2700000000 HC OXYGEN THERAPY PER DAY

## 2025-03-14 PROCEDURE — 2500000003 HC RX 250 WO HCPCS

## 2025-03-14 PROCEDURE — 82330 ASSAY OF CALCIUM: CPT

## 2025-03-14 PROCEDURE — 84100 ASSAY OF PHOSPHORUS: CPT

## 2025-03-14 PROCEDURE — 2580000003 HC RX 258: Performed by: SURGERY

## 2025-03-14 PROCEDURE — 6370000000 HC RX 637 (ALT 250 FOR IP): Performed by: NURSE PRACTITIONER

## 2025-03-14 PROCEDURE — 2580000003 HC RX 258: Performed by: INTERNAL MEDICINE

## 2025-03-14 PROCEDURE — 6360000002 HC RX W HCPCS: Performed by: SURGERY

## 2025-03-14 PROCEDURE — 37799 UNLISTED PX VASCULAR SURGERY: CPT

## 2025-03-14 PROCEDURE — 85025 COMPLETE CBC W/AUTO DIFF WBC: CPT

## 2025-03-14 PROCEDURE — 2580000003 HC RX 258: Performed by: NURSE PRACTITIONER

## 2025-03-14 PROCEDURE — 94640 AIRWAY INHALATION TREATMENT: CPT

## 2025-03-14 PROCEDURE — 6360000002 HC RX W HCPCS: Performed by: NURSE PRACTITIONER

## 2025-03-14 PROCEDURE — 97166 OT EVAL MOD COMPLEX 45 MIN: CPT

## 2025-03-14 PROCEDURE — 83735 ASSAY OF MAGNESIUM: CPT

## 2025-03-14 PROCEDURE — 80048 BASIC METABOLIC PNL TOTAL CA: CPT

## 2025-03-14 PROCEDURE — 6360000002 HC RX W HCPCS: Performed by: INTERNAL MEDICINE

## 2025-03-14 PROCEDURE — 80053 COMPREHEN METABOLIC PANEL: CPT

## 2025-03-14 PROCEDURE — 2500000003 HC RX 250 WO HCPCS: Performed by: INTERNAL MEDICINE

## 2025-03-14 RX ORDER — MECOBALAMIN 5000 MCG
5 TABLET,DISINTEGRATING ORAL NIGHTLY
Status: DISCONTINUED | OUTPATIENT
Start: 2025-03-14 | End: 2025-03-21 | Stop reason: HOSPADM

## 2025-03-14 RX ORDER — FUROSEMIDE 10 MG/ML
40 INJECTION INTRAMUSCULAR; INTRAVENOUS ONCE
Status: COMPLETED | OUTPATIENT
Start: 2025-03-14 | End: 2025-03-14

## 2025-03-14 RX ADMIN — Medication 5 MG: at 22:11

## 2025-03-14 RX ADMIN — PANTOPRAZOLE SODIUM 40 MG: 40 INJECTION, POWDER, FOR SOLUTION INTRAVENOUS at 09:56

## 2025-03-14 RX ADMIN — SODIUM CHLORIDE, PRESERVATIVE FREE 10 ML: 5 INJECTION INTRAVENOUS at 06:25

## 2025-03-14 RX ADMIN — DULOXETINE HYDROCHLORIDE 30 MG: 30 CAPSULE, DELAYED RELEASE ORAL at 09:51

## 2025-03-14 RX ADMIN — SODIUM CHLORIDE, PRESERVATIVE FREE 10 ML: 5 INJECTION INTRAVENOUS at 10:00

## 2025-03-14 RX ADMIN — METOPROLOL SUCCINATE 25 MG: 50 TABLET, EXTENDED RELEASE ORAL at 22:05

## 2025-03-14 RX ADMIN — AMPICILLIN SODIUM 2000 MG: 2 INJECTION, POWDER, FOR SOLUTION INTRAMUSCULAR; INTRAVENOUS at 23:20

## 2025-03-14 RX ADMIN — IPRATROPIUM BROMIDE AND ALBUTEROL SULFATE 1 DOSE: 2.5; .5 SOLUTION RESPIRATORY (INHALATION) at 07:52

## 2025-03-14 RX ADMIN — SODIUM CHLORIDE, PRESERVATIVE FREE 20 ML: 5 INJECTION INTRAVENOUS at 22:15

## 2025-03-14 RX ADMIN — AMPICILLIN SODIUM 2000 MG: 2 INJECTION, POWDER, FOR SOLUTION INTRAMUSCULAR; INTRAVENOUS at 15:02

## 2025-03-14 RX ADMIN — ONDANSETRON 4 MG: 2 INJECTION, SOLUTION INTRAMUSCULAR; INTRAVENOUS at 06:25

## 2025-03-14 RX ADMIN — SODIUM CHLORIDE, SODIUM LACTATE, POTASSIUM CHLORIDE, AND CALCIUM CHLORIDE: .6; .31; .03; .02 INJECTION, SOLUTION INTRAVENOUS at 05:28

## 2025-03-14 RX ADMIN — SODIUM CHLORIDE, PRESERVATIVE FREE 20 ML: 5 INJECTION INTRAVENOUS at 03:44

## 2025-03-14 RX ADMIN — CHOLECALCIFEROL TAB 125 MCG (5000 UNIT) 5000 UNITS: 125 TAB at 09:52

## 2025-03-14 RX ADMIN — SODIUM CHLORIDE 25 ML: 0.9 INJECTION, SOLUTION INTRAVENOUS at 02:41

## 2025-03-14 RX ADMIN — AMPICILLIN SODIUM 2000 MG: 2 INJECTION, POWDER, FOR SOLUTION INTRAMUSCULAR; INTRAVENOUS at 05:31

## 2025-03-14 RX ADMIN — IPRATROPIUM BROMIDE AND ALBUTEROL SULFATE 1 DOSE: 2.5; .5 SOLUTION RESPIRATORY (INHALATION) at 16:55

## 2025-03-14 RX ADMIN — AMPICILLIN SODIUM 2000 MG: 2 INJECTION, POWDER, FOR SOLUTION INTRAMUSCULAR; INTRAVENOUS at 10:11

## 2025-03-14 RX ADMIN — AMPICILLIN SODIUM 2000 MG: 2 INJECTION, POWDER, FOR SOLUTION INTRAMUSCULAR; INTRAVENOUS at 18:29

## 2025-03-14 RX ADMIN — IPRATROPIUM BROMIDE AND ALBUTEROL SULFATE 1 DOSE: 2.5; .5 SOLUTION RESPIRATORY (INHALATION) at 12:25

## 2025-03-14 RX ADMIN — WATER 2000 MG: 1 INJECTION INTRAMUSCULAR; INTRAVENOUS; SUBCUTANEOUS at 03:44

## 2025-03-14 RX ADMIN — AMPICILLIN SODIUM 2000 MG: 2 INJECTION, POWDER, FOR SOLUTION INTRAMUSCULAR; INTRAVENOUS at 02:05

## 2025-03-14 RX ADMIN — WATER 2000 MG: 1 INJECTION INTRAMUSCULAR; INTRAVENOUS; SUBCUTANEOUS at 16:09

## 2025-03-14 RX ADMIN — GABAPENTIN 300 MG: 300 CAPSULE ORAL at 22:05

## 2025-03-14 RX ADMIN — ENOXAPARIN SODIUM 40 MG: 100 INJECTION SUBCUTANEOUS at 09:59

## 2025-03-14 RX ADMIN — IPRATROPIUM BROMIDE AND ALBUTEROL SULFATE 1 DOSE: 2.5; .5 SOLUTION RESPIRATORY (INHALATION) at 20:15

## 2025-03-14 RX ADMIN — SODIUM CHLORIDE 25 ML: 0.9 INJECTION, SOLUTION INTRAVENOUS at 06:12

## 2025-03-14 RX ADMIN — FUROSEMIDE 40 MG: 10 INJECTION, SOLUTION INTRAMUSCULAR; INTRAVENOUS at 12:22

## 2025-03-14 RX ADMIN — GABAPENTIN 300 MG: 300 CAPSULE ORAL at 14:48

## 2025-03-14 RX ADMIN — AMIODARONE HYDROCHLORIDE 0.5 MG/MIN: 1.8 INJECTION, SOLUTION INTRAVENOUS at 05:26

## 2025-03-14 ASSESSMENT — PAIN SCALES - GENERAL
PAINLEVEL_OUTOF10: 4
PAINLEVEL_OUTOF10: 0

## 2025-03-14 ASSESSMENT — PULMONARY FUNCTION TESTS
PIF_VALUE: 0
PIF_VALUE: 0

## 2025-03-14 ASSESSMENT — PAIN DESCRIPTION - LOCATION: LOCATION: OTHER (COMMENT)

## 2025-03-14 NOTE — PROGRESS NOTES
increased independence with ADLs  * Neuro-muscular re-education: facilitation of righting/equilibrium reactions, midline orientation, scapular stability/mobility, normalization of muscle tone, and facilitation of volitional active controled movement  * Positioning to improve skin integrity, interaction with environment and functional independence  * Delirium prevention/treatment  * Manual techniques for edema management    Recommended Adaptive Equipment: TBD     Home Living: Pt lives with wife in a 1 story home with 3 step(s) to enter and 1 rail(s); bed/bath on main level.  Bathroom setup: walk-in shower, stnd commode  Equipment owned: FWW    Prior Level of Function: Ind with ADLs;  Ind with IADLs. No AD for functional mobility.     Pain Level: pt denies pain this date    Cognition: A&O: 4/4; Follows 1-2 step commands; mild/moderate agitation throughout session.   Memory: F   Comprehension: F   Problem solving: F   Judgement/safety: F               Communication skills: WFL           Vision: pt reports WFL               Glasses:no                                                   Hearing: [place]     RASS: 0  CAM-ICU: (NT) Delirium    UE Assessment:      ROM Strength STM goal: PRN   RUE  Functionally Assessed  WFL NT  D/t precautions   Increase overall strength for participation in ADLs.     LUE Functionally Assessed  WFL NT  D/t precautions   Increase overall strength for participation in ADLs.       Sensation: No c/o numbness/tingling in extremities.  Tone: WNL   Edema: Unremarkable     Functional Assessment:  AM-PAC Daily Activity Raw Score: 12/24   Initial Eval Status  Date: 3/14/25 Treatment Status  Date:  STGs = LTGs  Time frame: 7-14 days   Feeding SBA                      Ind       Grooming Min A                      Mod I        UB dressing/bathing Mod A                      SBA       LB dressing/bathing Dep                      Min A        Toileting Dep                      Min A     Bed Mobility  Supine to  throughout session.    Comments: Pt case discussed in rounds, OK from RN to see patient. Evaluation completed with PT collaboration d/t decreased functional status of patient and extensive line management. Upon arrival, patient semi supine in bed with wife present. Pt agreeable to participate in therapy session- agitated and confused throughout session.  Therapist facilitated ADL tasks & bed mobility to address safety awareness, implementation of fall prevention strategies, & functional engagement throughout daily activities.  Pt demo fair tolerance with fair understanding of education/techniques. Pt educated on POC. Pt limited by cognition/agitation. At end of session, patient properly positioned in  with call light within reach, all lines and tubes intact. Pt instructed on use of call light for assistance and fall prevention. Nursing notified of patient positioning.    Patient presents with decreased ROM/strength, activity tolerance, dynamic balance, functional mobility limiting completion of ADLs and safety.  Pt can benefit from continued skilled OT services to increase safety, functional independence and quality of life.     Rehab Potential: Good for established goals    Patient / Family Goal: to return to PLOF    Patient and/or family were instructed/educated on diagnosis, prognosis/goals and plan of care. Patient demonstrated fair understanding.      Evaluation Complexity: Moderate     History: Expanded chart review of consults, imaging, and psychosocial history related to current functional performance.   Exam: 5+ performance deficits identified limiting functional independence and safe return home   Assistance/Modification: Min/mod assistance or modifications required to perform tasks. May have comorbidities that affect occupational performance.    [] Malnutrition indicators have been identified and nursing has been notified to ensure a dietitian consult is ordered.      Time In:1351             Time Out:

## 2025-03-14 NOTE — PROGRESS NOTES
Physician Progress Note      PATIENT:               FRANTZ HUNT  CSN #:                  [id number]  :                       1945  ADMIT DATE:       3/11/2025 8:30 AM  DISCH DATE:  RESPONDING  PROVIDER #:        Cynthia Hurtado MD          QUERY TEXT:    Patient admitted with pneumonia and SBO. Noted documentation of acute hypoxic   respiratory failure in ED Provider note. In order to support the diagnosis of   acute hypoxic respiratory failure, please include additional clinical   indicators in your documentation.  Or please document if the diagnosis of   acute respiratory failure has been ruled out after further study.    The medical record reflects the following:  Risk Factors: Acute on chronic illness, pneumonia, SBO  Clinical Indicators: Noted SPO2 of 88% in ED, placed on 2L NC. No   documentation of distress or work of breathing. No ABGs performed in ED.  Treatment: Oxygen 2L NC  Options provided:  -- Acute Respiratory Failure as evidenced by, Please document evidence.  -- Acute Respiratory Failure ruled out after study  -- Other - I will add my own diagnosis  -- Disagree - Not applicable / Not valid  -- Disagree - Clinically unable to determine / Unknown  -- Refer to Clinical Documentation Reviewer    PROVIDER RESPONSE TEXT:    Acute Respiratory Failure has been ruled out after study.    Query created by: Megan West on 3/13/2025 6:46 PM      QUERY TEXT:    Pt admitted with UTI.  Pt noted to have recent kidney stone removal and   placement of stent. If possible, please document in the progress notes and   discharge summary if you are evaluating and/or treating any of the following:    The medical record reflects the following:  Risk Factors: Acute on chronic illness, Kidney stone, stent placement  Clinical Indicators: UA positive nitrites, bacteria 1+, leukocyte esterase   trace. WBC 3.8 2.5.  Treatment: Rocephin 2g IV q 24 hr., Omnipen 2,000mg IV q 4 hr.,  Options provided:  -- UTI due to

## 2025-03-14 NOTE — PROGRESS NOTES
[facility]   Progress note      Subjective:  Awake alert SICU  Post ex lap 's  Extubated doing well  Objective:    BP (!) 137/113   Pulse (!) 118   Temp 98.6 °F (37 °C) (Oral)   Resp 21   Ht 1.676 m (5' 6\")   Wt 83.2 kg (183 lb 6.8 oz)   SpO2 97%   BMI 29.61 kg/m²     In: 6613.4 [P.O.:42; I.V.:5507.7; NG/GT:60]  Out: 2413   In: 6613.4   Out: 2413 [Urine:2163]    General appearance: Extubated, denies any pain  HEENT: AT/NC, MMM  Neck: FROM, supple  Lungs: Clear to auscultation  CV: RRR, no MRGs  Vasc: Radial pulses 2+  Abdomen: Post exploratory laparotomy  Extremities: No peripheral edema or digital cyanosis  Skin: no rash, lesions or ulcers       Recent Labs     03/12/25  0455 03/13/25  0426 03/14/25  0340   WBC 7.1 7.5 12.2*   HGB 11.2* 10.5* 10.6*   HCT 33.9* 33.0* 32.2*    155 196       Recent Labs     03/13/25  0426 03/14/25  0340 03/14/25  1755    142 138   K 4.2 4.3 3.8   * 108* 106   CO2 18* 16* 17*   BUN 22 25* 26*   CREATININE 0.9 1.1 1.1   CALCIUM 8.2* 8.5* 8.3*       Assessment:    Principal Problem:    Pneumonia due to organism  Active Problems:    SBO (small bowel obstruction) ([facility])  Resolved Problems:    * No resolved hospital problems. *      Plan:    79-year-old male with a history of A-fib, enterococcus bacteremia, and CHF presents to the ED with complaints of nausea and vomiting and is admitted to telemetry unit with     Multifocal pneumonia in a patient with UTI  -Supplement O2 demands keeping oxygen saturation greater than 92%.  Patient currently utilizing 2 L O2  Consult infectious disease as patient has been currently been on daptomycin  -IV ampicillin 2 g IV every 4 hours and Rocephin 2 g IV Q 12 hours  -DuoNebs   -Monitor labs hemoglobin.  Monitor electrolytes place as necessary  Check Pro-Patel     Small bowel obstruction  Keep patient n.p.o.  IV hydration  Consult general surgery-appreciate input   NG-low intermittent suction  Small bowel  follow-through today-Await results     NSVT  Monitor closely  Consult EP  IV amiodarone drip initiated per EPContinue Eliquis    3/13/2025  Seen in surgical ICU  The patient was emergently taken to the OR yesterday evening secondary to high-grade bowel obstruction  He remains intubated on my evaluation-being weaned off ventilator  Not requiring pressor support  H&H 10.5/33 today  Decreased urine output with tea colored urine  Continue supportive care and SICU  Primary management by surgical team/ICU team  Remains on IV ampicillin and Rocephin for Enterococcus bacteremia  Medicine will continue to follow  Wife at bedside with whom case was discussed    3/14/2025  No acute issues overnight  Extubated, sitting up in bed asking for water  General Surgery managing  Adequate urine output  Continue broad-spectrum IV antibiotic for Enterococcus bacteremia  No evidence of infective endocarditis on echocardiogram JIMBO  Continue pain control  Cardiac medications are currently on hold including amiodarone-resume at discretion of cardiology/EP     Code Status: Full code  Consultants: General surgery, SICU team  DVT Prophylaxis   PT/OT  Discharge planning          Cynthia Hurtado MD  7:16 PM  3/14/2025

## 2025-03-14 NOTE — PROGRESS NOTES
Department of Internal Medicine  Infectious Diseases  Progress Note      C/C: Enterococcus bacteremia , pneumonia, small bowel obstruction     Pt is awake and alert   Denies fever or chills    Pt underwent exp lap , lysis of adhesion and reduction of internal hernia ( 3/12)   Afebrile         Current Facility-Administered Medications   Medication Dose Route Frequency Provider Last Rate Last Admin    furosemide (LASIX) injection 40 mg  40 mg IntraVENous Once Geronimo Perla DO        lactated ringers infusion   IntraVENous Once Graciela Oviedo DO        lactated ringers bolus 500 mL  500 mL IntraVENous Once Graciela Oviedo DO        enoxaparin (LOVENOX) injection 40 mg  40 mg SubCUTAneous Daily Piotr Monroe MD   40 mg at 03/14/25 0959    HYDROmorphone (DILAUDID) injection 0.25 mg  0.25 mg IntraVENous Q3H PRN Piotr Monroe MD        Or    HYDROmorphone (DILAUDID) injection 0.5 mg  0.5 mg IntraVENous Q3H PRN Piotr Monroe MD   0.5 mg at 03/13/25 2335    diatrizoate meglumine-sodium (GASTROGRAFIN) 66-10 % solution 120 mL  120 mL Per NG tube ONCE PRN Jose Rob DO   120 mL at 03/12/25 1629    lactated ringers infusion   IntraVENous Continuous Geronimo Perla  mL/hr at 03/14/25 0700 Rate Verify at 03/14/25 0700    pantoprazole (PROTONIX) injection 40 mg  40 mg IntraVENous Daily Graciela Oviedo, DO   40 mg at 03/14/25 0956    sodium chloride flush 0.9 % injection 5-40 mL  5-40 mL IntraVENous 2 times per day Nlida Yan MD   10 mL at 03/14/25 1000    sodium chloride flush 0.9 % injection 5-40 mL  5-40 mL IntraVENous PRN Nilda Yan MD   10 mL at 03/14/25 0625    0.9 % sodium chloride infusion   IntraVENous PRN Nilda Yan MD   Stopped at 03/12/25 1707    glucose chewable tablet 16 g  4 tablet Oral PRN Nilda Yan MD        dextrose bolus 10% 125 mL  125 mL IntraVENous PRN Nilda Yan MD        Or    dextrose bolus 10% 250 mL  250 mL IntraVENous PRN Nilda Yan MD        glucagon injection 1 mg

## 2025-03-14 NOTE — FLOWSHEET NOTE
When unrestrained, patient attempts to reach for ETT and other life saving lines. Patient unable to be redirected at this time. Soft bilateral wrist restraints continued for patient safety.

## 2025-03-14 NOTE — PLAN OF CARE
Problem: Chronic Conditions and Co-morbidities  Goal: Patient's chronic conditions and co-morbidity symptoms are monitored and maintained or improved  Outcome: Progressing     Problem: Discharge Planning  Goal: Discharge to home or other facility with appropriate resources  Outcome: Progressing     Problem: Safety - Adult  Goal: Free from fall injury  Outcome: Progressing     Problem: Pain  Goal: Verbalizes/displays adequate comfort level or baseline comfort level  Outcome: Progressing     Problem: Cardiovascular - Adult  Goal: Maintains optimal cardiac output and hemodynamic stability  Outcome: Progressing     Problem: Gastrointestinal - Adult  Goal: Minimal or absence of nausea and vomiting  Outcome: Progressing     Problem: Gastrointestinal - Adult  Goal: Maintains or returns to baseline bowel function  Outcome: Progressing     Problem: Safety - Medical Restraint  Goal: Remains free of injury from restraints (Restraint for Interference with Medical Device)  Description: INTERVENTIONS:  1. Determine that other, less restrictive measures have been tried or would not be effective before applying the restraint  2. Evaluate the patient's condition at the time of restraint application  3. Inform patient/family regarding the reason for restraint  4. Q2H: Monitor safety, psychosocial status, comfort, nutrition and hydration  Outcome: Progressing     Problem: Skin/Tissue Integrity  Goal: Skin integrity remains intact  Description: 1.  Monitor for areas of redness and/or skin breakdown  2.  Assess vascular access sites hourly  3.  Every 4-6 hours minimum:  Change oxygen saturation probe site  4.  Every 4-6 hours:  If on nasal continuous positive airway pressure, respiratory therapy assess nares and determine need for appliance change or resting period  Outcome: Progressing     Problem: ABCDS Injury Assessment  Goal: Absence of physical injury  Outcome: Progressing

## 2025-03-14 NOTE — PROGRESS NOTES
2300 - Pt with continued decreased urine output and increasing peripheral edema. RN and Dr. Oviedo reviewed. Lasix ordered. See new orders.    0130 - RN to Dr. Oviedo - pt with increased LUE swelling. Pt with arterial line and PIV in arm. RN request removal of PIV. Ok per Dr. Oviedo. See flowsheet for removal.       0615 - RN to Dr. Perla face to face. Current ames orders with indication of perioperative management; per Dr. Ayuob's progress note, ames is for fluid volume management. RN request change in ames order to reflect correct indication. See new orders.     Pt with nausea - Dr. Perla at bedside - NGT flushed without issue. PRN meds given.

## 2025-03-14 NOTE — PROGRESS NOTES
[facility]  SURGICAL CRITICAL CARE SERVICE    Attending Physician Statement:  I have examined the patient, reviewed the record, and discussed the case with the critical care team.  I agree with the assessment and plan with the following additions, corrections, and changes.    CC: Small bowel obstruction    ASSESSMENT/PLAN:  Neuro:   Acute pain syndrome-as needed Dilaudid Hold home gabapentin for now  Mood disorder-hold Cymbalta    CV:     Permanent atrial fibrillation/atrial flutter-currently rate controlled.  Continue IV amiodarone.  EP following.  Hold home Eliquis.  Resume metoprolol today.  Likely will start therapeutic Lovenox within the next 24 hours  Ischemic cardiomyopathy-hold Demadex.  Hold Entresto.  IV Lasix  Coronary artery disease-hold Plavix    Respiratory:   Acute respiratory failure-continue supplemental oxygen.  Cough deep breathing.  Check CXR tomorrow.    Renal:    Oliguria-continue Emery catheter for strict I's and O's.  Renal function stable  Recent left ureteral stone extraction and stent insertion for complicated UTI-monitor    FEN: LR at 100 mL/h    GI:    Status post exploratory laparotomy/lysis of adhesions/reduction of internal hernia/jejunal jejunal bypass 3/13/2025-continue NGT.  Limited clear liquid diet today continue PPI.  Await return of bowel function.  Continue local wound care    Heme:    Acute blood loss anemia-stable.  No active bleeding.  Monitor CBC    ID:   Enterococcus bacteremia-ID following.  Continue ampicillin/ceftriaxone.  Monitor temperature and CBC.    Endocrine:    Monitor glucose    MSK/Skin:   History of psoriatic arthritis-hold Plaquenil    DVT/GI ppx: SCDs.  prophylactic Lovenox.  Continue PPI    Full Code    Disposition: SICU.  PT OT consult.    HPI:  79-year-old male admitted to SICU for postoperative management on 3/12/2025.  He underwent an exploratory laparotomy/lysis of adhesions/reduction of internal hernia/closure of mesenteric  personally reviewed the imaging and diagnostic studies and these are my findings and my interpretation:  None today    CC TIME:  Approximately 35 minutes.  Due to a high probability of clinical significant, life threatening deterioration, the patient required my highest level of preparedness to intervene emergently and I personally spent this critical care time directly and personally managing the patient. This care involved high complexity decision making to assess, manipulate, and support vital organ system failure. The failure to initiate these interventions on an urgent basis would likely result in sudden, clinically significant or life threatening deterioration in the patient's condition.   This critical care time included obtaining a history; examining the patient; pulse oximetry; ordering and review of studies; arranging urgent treatment with development of a management plan; evaluation of patient's response to treatment; frequent reassessment; and, discussions with other providers.    This critical care time was performed to assess and manage the high probability of imminent, life-threatening deterioration that could results in multi-organ failure.  It was exclusive of separately billable procedures and treating other patients and teaching time.

## 2025-03-14 NOTE — PROGRESS NOTES
Physical Therapy  Physical Therapy Initial Assessment     Name: Ruben Davidson  : 1945  MRN: [id number]      Date of Service: 3/14/2025    Evaluating PT:  Toby Paredes, PT, DPT [id number]    Room #:  8890/5235-A  Diagnosis:  SBO (small bowel obstruction) ([facility]) [K56.609]  Pneumonia due to organism [J18.9]  Non-sustained ventricular tachycardia ([facility]) [I47.29]  Pneumonia due to infectious organism, unspecified laterality, unspecified part of lung [J18.9]  Sepsis, due to unspecified organism, unspecified whether acute organ dysfunction present ([facility]) [A41.9]  Acute hypoxic respiratory failure ([facility]) [J96.01]  PMHx/PSHx:    Past Medical History:   Diagnosis Date    Acute kidney injury     Atrial fibrillation ([facility])     CAD (coronary artery disease)     s/p stent RCA    CHF (congestive heart failure) ([facility])     [place] (hard of hearing)     WEARS HEARING AIDES    Hypertension     Kidney stone     with obstruction-left    Left ventricular systolic dysfunction     MI (myocardial infarction) ([facility])     Mitral regurgitation     Obesity, morbid     s/p intestinal bypass    Sleep apnea     Valvular heart disease      Procedure/Surgery:    3/12  Exploratory laparotomy  Lysis of adhesions  Reduction of internal hernia  Jejunojejunal bypass  Closure of mesenteric defect  Precautions:  Falls, [place], corpak, alarms, abdominal, wound vac, cognition  Equipment Needs:  TBD    SUBJECTIVE:    Pt lives with wife in a 1 story home with 3 step(s) to enter and 1 rail(s).      Pt ambulated with WW recently PTA.    OBJECTIVE:   Initial Evaluation  Date: 3/14/25 Treatment Short Term/ Long Term   Goals   AM-PAC 6 Clicks 10/24     Was pt agreeable to Eval/treatment? Yes     Does pt have pain? No c/o pain     Bed Mobility  Rolling: NT  Supine to sit: MaxA with HOB elevated  Sit to supine: NT  Scooting: MaxA  Mod Independent   Transfers Sit to stand: ModA elevated bed  Stand to sit: ModA  Stand pivot: ModA with WW  Mod Independent with  independence with all functional transfers   [x] Gait Training to improve gait mechanics, endurance and asses need for appropriate assistive device  [x] Stair Training in preparation for safe discharge home and/or into the community   [x] Positioning to prevent skin breakdown and contractures  [x] Safety and Education Training   [x] Patient/Caregiver Education   [] HEP  [] Other     PT long term treatment goals are located in above grid    Frequency of treatments: 2-5x/week x 1-2 weeks.    Time in  1350  Time out  1415    Total Treatment Time  10 minutes     Evaluation Time includes thorough review of current medical information, gathering information on past medical history/social history and prior level of function, completion of standardized testing/informal observation of tasks, assessment of data and education on plan of care and goals.    CPT codes:  [] Low Complexity PT evaluation 57931  [] Moderate Complexity PT evaluation 63812  [x] High Complexity PT evaluation 74462  [] PT Re-evaluation 19960  [] Gait training 18213 - minutes  [] Manual therapy 87188 - minutes  [x] Therapeutic activities 68788 10 minutes  [] Therapeutic exercises 90086 - minutes  [] Neuromuscular reeducation 75267 - minutes     Toby Paredes, PT, DPT  [id number]

## 2025-03-14 NOTE — CARE COORDINATION
3/14 Care Coordination: [facility] let CM know [facility] can not accept. CM awaiting wife to return to get other options. CM/SW will continue to follow for discharge planning.   Ronal REEVES,RN-CHIP-BC  [phone]     3/14 Care Coordination: CM met with Wife, gave her a DURAN list to review. Explained that [facility] not [facility]. CM/SW will continue to follow for discharge planning.   Ronal REEVES,RN-CHRIS  [phone]

## 2025-03-14 NOTE — CARE COORDINATION
3/14 Care Coordination: Status post exploratory laparotomy/lysis of adhesions/reduction of internal hernia/jejunal jejunal bypass 3/13/2025-continue NGT.  Limited clear liquid diet today. CM spoke to pt and wife to discuss discharge plan/needs. Pt wants to return home but they both are good with a referral to [facility]. Pt will cont to need IV ATB till 3/21 per ID. Pt was going to Infusion center prior to this admission. Was unable to do Home IV ATB because of the cost. Will need PT/OT when able.  CM/SW will continue to follow for discharge planning.   Ronal BALDWINN,RN--BC  [phone]

## 2025-03-15 ENCOUNTER — APPOINTMENT (OUTPATIENT)
Dept: GENERAL RADIOLOGY | Age: 80
End: 2025-03-15
Payer: MEDICARE

## 2025-03-15 PROBLEM — R78.81 ENTEROCOCCAL BACTEREMIA: Status: ACTIVE | Noted: 2025-03-15

## 2025-03-15 PROBLEM — I25.5 ISCHEMIC CARDIOMYOPATHY: Status: ACTIVE | Noted: 2025-03-15

## 2025-03-15 PROBLEM — D62 ACUTE BLOOD LOSS ANEMIA: Status: ACTIVE | Noted: 2025-03-15

## 2025-03-15 PROBLEM — B95.2 ENTEROCOCCAL BACTEREMIA: Status: ACTIVE | Noted: 2025-03-15

## 2025-03-15 LAB
ALBUMIN SERPL-MCNC: 2.9 G/DL (ref 3.5–5.2)
ALP SERPL-CCNC: 73 U/L (ref 40–129)
ALT SERPL-CCNC: 42 U/L (ref 0–40)
ANION GAP SERPL CALCULATED.3IONS-SCNC: 15 MMOL/L (ref 7–16)
AST SERPL-CCNC: 69 U/L (ref 0–39)
BASOPHILS # BLD: 0.03 K/UL (ref 0–0.2)
BASOPHILS NFR BLD: 0 % (ref 0–2)
BILIRUB SERPL-MCNC: 0.5 MG/DL (ref 0–1.2)
BUN SERPL-MCNC: 27 MG/DL (ref 6–23)
CA-I BLD-SCNC: 1.18 MMOL/L (ref 1.15–1.33)
CALCIUM SERPL-MCNC: 8.7 MG/DL (ref 8.6–10.2)
CHLORIDE SERPL-SCNC: 106 MMOL/L (ref 98–107)
CO2 SERPL-SCNC: 21 MMOL/L (ref 22–29)
CREAT SERPL-MCNC: 1.2 MG/DL (ref 0.7–1.2)
EOSINOPHIL # BLD: 0 K/UL (ref 0.05–0.5)
EOSINOPHILS RELATIVE PERCENT: 0 % (ref 0–6)
ERYTHROCYTE [DISTWIDTH] IN BLOOD BY AUTOMATED COUNT: 19.8 % (ref 11.5–15)
GFR, ESTIMATED: 64 ML/MIN/1.73M2
GLUCOSE SERPL-MCNC: 92 MG/DL (ref 74–99)
HCT VFR BLD AUTO: 32.2 % (ref 37–54)
HGB BLD-MCNC: 10.3 G/DL (ref 12.5–16.5)
IMM GRANULOCYTES # BLD AUTO: 0.11 K/UL (ref 0–0.58)
IMM GRANULOCYTES NFR BLD: 1 % (ref 0–5)
LYMPHOCYTES NFR BLD: 0.66 K/UL (ref 1.5–4)
LYMPHOCYTES RELATIVE PERCENT: 4 % (ref 20–42)
MAGNESIUM SERPL-MCNC: 2.1 MG/DL (ref 1.6–2.6)
MCH RBC QN AUTO: 30.1 PG (ref 26–35)
MCHC RBC AUTO-ENTMCNC: 32 G/DL (ref 32–34.5)
MCV RBC AUTO: 94.2 FL (ref 80–99.9)
MONOCYTES NFR BLD: 1.28 K/UL (ref 0.1–0.95)
MONOCYTES NFR BLD: 8 % (ref 2–12)
NEUTROPHILS NFR BLD: 87 % (ref 43–80)
NEUTS SEG NFR BLD: 13.75 K/UL (ref 1.8–7.3)
PHOSPHATE SERPL-MCNC: 3.2 MG/DL (ref 2.5–4.5)
PLATELET # BLD AUTO: 177 K/UL (ref 130–450)
PMV BLD AUTO: 11 FL (ref 7–12)
POTASSIUM SERPL-SCNC: 3.9 MMOL/L (ref 3.5–5)
PROT SERPL-MCNC: 5.5 G/DL (ref 6.4–8.3)
RBC # BLD AUTO: 3.42 M/UL (ref 3.8–5.8)
SODIUM SERPL-SCNC: 142 MMOL/L (ref 132–146)
WBC OTHER # BLD: 15.8 K/UL (ref 4.5–11.5)

## 2025-03-15 PROCEDURE — 82330 ASSAY OF CALCIUM: CPT

## 2025-03-15 PROCEDURE — 6360000002 HC RX W HCPCS: Performed by: SURGERY

## 2025-03-15 PROCEDURE — 2700000000 HC OXYGEN THERAPY PER DAY

## 2025-03-15 PROCEDURE — 6370000000 HC RX 637 (ALT 250 FOR IP)

## 2025-03-15 PROCEDURE — 6360000002 HC RX W HCPCS: Performed by: INTERNAL MEDICINE

## 2025-03-15 PROCEDURE — 83735 ASSAY OF MAGNESIUM: CPT

## 2025-03-15 PROCEDURE — 6360000002 HC RX W HCPCS

## 2025-03-15 PROCEDURE — 2500000003 HC RX 250 WO HCPCS

## 2025-03-15 PROCEDURE — 84100 ASSAY OF PHOSPHORUS: CPT

## 2025-03-15 PROCEDURE — 94640 AIRWAY INHALATION TREATMENT: CPT

## 2025-03-15 PROCEDURE — 37799 UNLISTED PX VASCULAR SURGERY: CPT

## 2025-03-15 PROCEDURE — 71045 X-RAY EXAM CHEST 1 VIEW: CPT

## 2025-03-15 PROCEDURE — 2500000003 HC RX 250 WO HCPCS: Performed by: INTERNAL MEDICINE

## 2025-03-15 PROCEDURE — 6370000000 HC RX 637 (ALT 250 FOR IP): Performed by: NURSE PRACTITIONER

## 2025-03-15 PROCEDURE — 2580000003 HC RX 258: Performed by: INTERNAL MEDICINE

## 2025-03-15 PROCEDURE — 2000000000 HC ICU R&B

## 2025-03-15 PROCEDURE — 85025 COMPLETE CBC W/AUTO DIFF WBC: CPT

## 2025-03-15 PROCEDURE — 80053 COMPREHEN METABOLIC PANEL: CPT

## 2025-03-15 PROCEDURE — 99233 SBSQ HOSP IP/OBS HIGH 50: CPT | Performed by: SURGERY

## 2025-03-15 RX ORDER — ENOXAPARIN SODIUM 100 MG/ML
1 INJECTION SUBCUTANEOUS 2 TIMES DAILY
Status: DISCONTINUED | OUTPATIENT
Start: 2025-03-15 | End: 2025-03-17

## 2025-03-15 RX ORDER — FUROSEMIDE 10 MG/ML
40 INJECTION INTRAMUSCULAR; INTRAVENOUS ONCE
Status: COMPLETED | OUTPATIENT
Start: 2025-03-15 | End: 2025-03-15

## 2025-03-15 RX ADMIN — AMPICILLIN SODIUM 2000 MG: 2 INJECTION, POWDER, FOR SOLUTION INTRAMUSCULAR; INTRAVENOUS at 10:12

## 2025-03-15 RX ADMIN — SODIUM CHLORIDE, PRESERVATIVE FREE 20 ML: 5 INJECTION INTRAVENOUS at 05:34

## 2025-03-15 RX ADMIN — AMPICILLIN SODIUM 2000 MG: 2 INJECTION, POWDER, FOR SOLUTION INTRAMUSCULAR; INTRAVENOUS at 06:29

## 2025-03-15 RX ADMIN — WATER 2000 MG: 1 INJECTION INTRAMUSCULAR; INTRAVENOUS; SUBCUTANEOUS at 05:33

## 2025-03-15 RX ADMIN — IPRATROPIUM BROMIDE AND ALBUTEROL SULFATE 1 DOSE: 2.5; .5 SOLUTION RESPIRATORY (INHALATION) at 08:41

## 2025-03-15 RX ADMIN — AMPICILLIN SODIUM 2000 MG: 2 INJECTION, POWDER, FOR SOLUTION INTRAMUSCULAR; INTRAVENOUS at 22:04

## 2025-03-15 RX ADMIN — DULOXETINE HYDROCHLORIDE 30 MG: 30 CAPSULE, DELAYED RELEASE ORAL at 08:51

## 2025-03-15 RX ADMIN — CHOLECALCIFEROL TAB 125 MCG (5000 UNIT) 5000 UNITS: 125 TAB at 08:45

## 2025-03-15 RX ADMIN — SODIUM CHLORIDE, PRESERVATIVE FREE 10 ML: 5 INJECTION INTRAVENOUS at 21:05

## 2025-03-15 RX ADMIN — AMPICILLIN SODIUM 2000 MG: 2 INJECTION, POWDER, FOR SOLUTION INTRAMUSCULAR; INTRAVENOUS at 17:52

## 2025-03-15 RX ADMIN — METOPROLOL SUCCINATE 25 MG: 50 TABLET, EXTENDED RELEASE ORAL at 21:05

## 2025-03-15 RX ADMIN — IPRATROPIUM BROMIDE AND ALBUTEROL SULFATE 1 DOSE: 2.5; .5 SOLUTION RESPIRATORY (INHALATION) at 11:15

## 2025-03-15 RX ADMIN — GABAPENTIN 300 MG: 300 CAPSULE ORAL at 21:08

## 2025-03-15 RX ADMIN — AMIODARONE HYDROCHLORIDE 200 MG: 200 TABLET ORAL at 11:51

## 2025-03-15 RX ADMIN — GABAPENTIN 300 MG: 300 CAPSULE ORAL at 08:50

## 2025-03-15 RX ADMIN — ENOXAPARIN SODIUM 80 MG: 100 INJECTION SUBCUTANEOUS at 21:08

## 2025-03-15 RX ADMIN — IPRATROPIUM BROMIDE AND ALBUTEROL SULFATE 1 DOSE: 2.5; .5 SOLUTION RESPIRATORY (INHALATION) at 15:38

## 2025-03-15 RX ADMIN — FUROSEMIDE 40 MG: 10 INJECTION, SOLUTION INTRAMUSCULAR; INTRAVENOUS at 11:49

## 2025-03-15 RX ADMIN — AMPICILLIN SODIUM 2000 MG: 2 INJECTION, POWDER, FOR SOLUTION INTRAMUSCULAR; INTRAVENOUS at 14:19

## 2025-03-15 RX ADMIN — IPRATROPIUM BROMIDE AND ALBUTEROL SULFATE 1 DOSE: 2.5; .5 SOLUTION RESPIRATORY (INHALATION) at 19:31

## 2025-03-15 RX ADMIN — SODIUM CHLORIDE, PRESERVATIVE FREE 10 ML: 5 INJECTION INTRAVENOUS at 08:51

## 2025-03-15 RX ADMIN — POLYVINYL ALCOHOL, POVIDONE 1 DROP: 14; 6 SOLUTION/ DROPS OPHTHALMIC at 08:45

## 2025-03-15 RX ADMIN — WATER 2000 MG: 1 INJECTION INTRAMUSCULAR; INTRAVENOUS; SUBCUTANEOUS at 15:41

## 2025-03-15 RX ADMIN — FOLIC ACID 1 MG: 1 TABLET ORAL at 08:51

## 2025-03-15 RX ADMIN — GABAPENTIN 300 MG: 300 CAPSULE ORAL at 14:17

## 2025-03-15 RX ADMIN — PANTOPRAZOLE SODIUM 40 MG: 40 INJECTION, POWDER, FOR SOLUTION INTRAVENOUS at 08:50

## 2025-03-15 RX ADMIN — METOPROLOL SUCCINATE 25 MG: 50 TABLET, EXTENDED RELEASE ORAL at 08:50

## 2025-03-15 RX ADMIN — Medication 5 MG: at 21:05

## 2025-03-15 RX ADMIN — ENOXAPARIN SODIUM 40 MG: 100 INJECTION SUBCUTANEOUS at 08:50

## 2025-03-15 NOTE — PROGRESS NOTES
2020 - Pt agitated and verbally aggressive with RN. Pt arguing with RN after pt educated on POC, nursing interventions and fluid restriction (80z per shift). Pt demanding RN to bring \"4oz of water every hour\" reports this is what the order says and RN will bring every hour. Pt told RN  he knows what the order is and will call RN via call light on the hour if RN does not bring it exactly at that time. RN clarified fluid restriction order and reassured pt that he would get full 8oz of ordered water for shift. Pt also demanding from RN the water pt is \"owed for 1830.\" Education again provided to patient on 8oz/shift fluid restriction and again reassured pt would receive his full amount. Pt continues to be verbally aggressive, accusatory-calling RN a liar and highly agitated. RN reports to patient she will print out a copy of order for patient and explain.     2045 - Printed copy of fluid restriction/diet order printed and given to pt - Fluid restriction portion highlighted. Pt continues to argue and demand RN measure out water every hour. Pt educated on appropriate expectations of RN - Pt unwilling to listen. RN informs pt she will have doctor come to bedside to verify orders.    2113 - RN to Dr. Oviedo face to face, reports patient agitation and need for clarification of diet/fluid restriction orders.MD to bedside. Clarified Diet orders with fluid restriction - explained realistic expectations with patient. Pt continues to be argumentative. RN prepared 3 specimen cups with 80cc of water each with visibly marked measurements. RN showed pt fluid amount for shift and asked patient if he would like to have 1 cup every 4 hours or all at once; RN also clarified that pt will be responsible for regulating fluid intake. Pt very reluctant to agree but finally does.    2230 - Pt given PO night meds. Pt given dissolving melatonin - Pt with mild coughing after meds/water. Pt reports \"it doesn't feel like it went down.\" VSS. Pt  repositioned for better facilitation of swallowing.    2350 - Antibiotic-omnipen- due at 2200 - no dose in omnicell - pharmacy called dose sent. Administered at 2320. RN called pharmacy, spoke to Agustin. Requested retime of antibiotic administration. Per Agustin, Will retime, next dose due 0300 then Q4H. See MAR.     0100 - Pt sleeping without s/s distress.     0235 - Pt awake - reporting sleep was not restful.     0550 - Pt reports \"I passed gas.\" Pt pleasant and respectful; seems enthusiastic. Pt and RN discussed progression. Pt satisfied.

## 2025-03-15 NOTE — PROGRESS NOTES
[facility]  SURGICAL CRITICAL CARE SERVICE    Attending Physician Statement:  I have examined the patient, reviewed the record, and discussed the case with the critical care team.  I agree with the assessment and plan with the following additions, corrections, and changes.    CC: Small bowel obstruction    ASSESSMENT/PLAN:  Neuro:   Acute pain syndrome-as needed Dilaudid.  Home gabapentin.  Mood disorder-resume Cymbalta    CV:     Permanent atrial fibrillation/atrial flutter-currently rate controlled.  Resume home amiodarone.    Hold home Eliquis.  Start therapeutic Lovenox.  Continue metoprolol .  EP following.  Ischemic cardiomyopathy-hold Demadex.  Hold Entresto.  Redose IV Lasix  Coronary artery disease-hold Plavix    Respiratory:   Acute respiratory failure-continue supplemental oxygen.  Cough deep breathing.     Renal:    Remove Emery   recent left ureteral stone extraction and stent insertion for complicated UTI-monitor    FEN: Stop IVF.    GI:    Status post exploratory laparotomy/lysis of adhesions/reduction of internal hernia/jejunal jejunal bypass 3/13/2025-remove NGT.  Clear liquid diet    Heme:    Acute blood loss anemia-stable.  No active bleeding.  Monitor CBC    ID:   Enterococcus bacteremia-ID following.  Continue ampicillin/ceftriaxone.  Monitor temperature and CBC.    Endocrine:    Monitor glucose    MSK/Skin:   History of psoriatic arthritis-hold Plaquenil    DVT/GI ppx: SCDs.  Therapeutic Lovenox.  Continue PPI    Full Code    Disposition: SICU.  Continue PT OT    HPI:  79-year-old male admitted to SICU for postoperative management on 3/12/2025.  He underwent an exploratory laparotomy/lysis of adhesions/reduction of internal hernia/closure of mesenteric defect/jejunal jejunal bypass for small bowel obstruction.  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  He initially presented to the  3/11 no growth      I have personally reviewed the imaging and diagnostic studies and these are my findings and my interpretation:  CXR-consistent with volume overload

## 2025-03-15 NOTE — PLAN OF CARE
Problem: Discharge Planning  Goal: Discharge to home or other facility with appropriate resources  Outcome: Not Progressing     Problem: Chronic Conditions and Co-morbidities  Goal: Patient's chronic conditions and co-morbidity symptoms are monitored and maintained or improved  Outcome: Progressing     Problem: Safety - Adult  Goal: Free from fall injury  Outcome: Progressing     Problem: Pain  Goal: Verbalizes/displays adequate comfort level or baseline comfort level  Outcome: Progressing     Problem: Cardiovascular - Adult  Goal: Maintains optimal cardiac output and hemodynamic stability  Outcome: Progressing  Flowsheets (Taken 3/14/2025 2000 by Stephanie Enamorado, RN)  Maintains optimal cardiac output and hemodynamic stability:   Monitor blood pressure and heart rate   Monitor urine output and notify Licensed Independent Practitioner for values outside of normal range   Assess for signs of decreased cardiac output   Administer fluid and/or volume expanders as ordered   Administer vasoactive medications as ordered     Problem: Gastrointestinal - Adult  Goal: Minimal or absence of nausea and vomiting  Outcome: Progressing  Flowsheets (Taken 3/14/2025 2000 by Stephanie Enamorado, RN)  Minimal or absence of nausea and vomiting:   Administer IV fluids as ordered to ensure adequate hydration   Maintain NPO status until nausea and vomiting are resolved   Nasogastric tube to low intermittent suction as ordered   Administer ordered antiemetic medications as needed   Provide nonpharmacologic comfort measures as appropriate   Advance diet as tolerated, if ordered   Nutrition consult to assist patient with adequate nutrition and appropriate food choices  Goal: Maintains or returns to baseline bowel function  Outcome: Progressing  Flowsheets (Taken 3/14/2025 2000 by Stephanie Enamorado, RN)  Maintains or returns to baseline bowel function:   Assess bowel function   Encourage oral fluids to ensure adequate  hydration   Administer IV fluids as ordered to ensure adequate hydration   Administer ordered medications as needed   Encourage mobilization and activity   Nutrition consult to assist patient with appropriate food choices     Problem: Skin/Tissue Integrity  Goal: Skin integrity remains intact  Description: 1.  Monitor for areas of redness and/or skin breakdown  2.  Assess vascular access sites hourly  3.  Every 4-6 hours minimum:  Change oxygen saturation probe site  4.  Every 4-6 hours:  If on nasal continuous positive airway pressure, respiratory therapy assess nares and determine need for appliance change or resting period  Outcome: Progressing     Problem: ABCDS Injury Assessment  Goal: Absence of physical injury  Outcome: Progressing     Problem: Safety - Medical Restraint  Goal: Remains free of injury from restraints (Restraint for Interference with Medical Device)  Description: INTERVENTIONS:  1. Determine that other, less restrictive measures have been tried or would not be effective before applying the restraint  2. Evaluate the patient's condition at the time of restraint application  3. Inform patient/family regarding the reason for restraint  4. Q2H: Monitor safety, psychosocial status, comfort, nutrition and hydration  Outcome: Completed     Problem: Discharge Planning  Goal: Discharge to home or other facility with appropriate resources  Outcome: Not Progressing

## 2025-03-15 NOTE — PROGRESS NOTES
Department of Internal Medicine  Infectious Diseases  Progress Note      C/C: Enterococcus bacteremia , pneumonia, small bowel obstruction     Pt is awake and alert   Denies fever or chills  Denies abdomen pain   Afebrile         Current Facility-Administered Medications   Medication Dose Route Frequency Provider Last Rate Last Admin    melatonin disintegrating tablet 5 mg  5 mg Oral Nightly Graciela Oviedo DO   5 mg at 03/14/25 2211    lactated ringers infusion   IntraVENous Once Graciela Oviedo DO        lactated ringers bolus 500 mL  500 mL IntraVENous Once Graciela Oviedo DO        enoxaparin (LOVENOX) injection 40 mg  40 mg SubCUTAneous Daily Piotr Monroe MD   40 mg at 03/15/25 0850    HYDROmorphone (DILAUDID) injection 0.25 mg  0.25 mg IntraVENous Q3H PRN Piotr Monroe MD        Or    HYDROmorphone (DILAUDID) injection 0.5 mg  0.5 mg IntraVENous Q3H PRN Piotr Monroe MD   0.5 mg at 03/13/25 2335    diatrizoate meglumine-sodium (GASTROGRAFIN) 66-10 % solution 120 mL  120 mL Per NG tube ONCE PRN Jose Rob DO   120 mL at 03/12/25 1629    lactated ringers infusion   IntraVENous Continuous Geronimo Perla DO 75 mL/hr at 03/15/25 0700 Rate Verify at 03/15/25 0700    pantoprazole (PROTONIX) injection 40 mg  40 mg IntraVENous Daily Graciela Oviedo DO   40 mg at 03/15/25 0850    sodium chloride flush 0.9 % injection 5-40 mL  5-40 mL IntraVENous 2 times per day Nilda Yan MD   10 mL at 03/15/25 0851    sodium chloride flush 0.9 % injection 5-40 mL  5-40 mL IntraVENous PRN Nilda Yan MD   20 mL at 03/15/25 0534    0.9 % sodium chloride infusion   IntraVENous PRN Nilda Yan MD   Stopped at 03/12/25 1707    glucose chewable tablet 16 g  4 tablet Oral PRN Nilda Yan MD        dextrose bolus 10% 125 mL  125 mL IntraVENous PRN Nilda Yan MD        Or    dextrose bolus 10% 250 mL  250 mL IntraVENous PRN Nilda Yan MD        glucagon injection 1 mg  1 mg SubCUTAneous PRN Nilda Yan MD         40%. Left ventricle size is normal. Normal wall thickness. Normal wall motion.    Right Ventricle: Right ventricle size is normal. Lead present in the right ventricle. Normal systolic function.    Aortic Valve: Mild sclerosis of the aortic valve cusps. No stenosis.    Mitral Valve: Mild annular calcification.    Tricuspid Valve: Mild regurgitation. Normal RVSP. The estimated RVSP is 26 mmHg.    Left Atrium: Normal sized appendage. No left atrial appendage thrombus noted. No left atrial appendage mass noted.    Interatrial Septum: No interatrial shunt visualized with color Doppler. Agitated saline study was negative with and without provocation.    Pericardium: No pericardial effusion.    Image quality is adequate.    No echocardiographic evidence of endocarditis.    Radiology :    Chest X ray    CT scan of chest -  1. No acute infiltrate   2. Basilar interstitial opacification and mild bronchiectasis suggestive of   primary ILD, component of edema is not excluded     Procalcitionin 3.03    IMPRESSION:     Enterococcus bacteremia- JIMBO no vegetation  - follow up cx neg on 2/7  Small bowel obstruction -s/p exp lap    Lung infiltrates /Pneumonia    Leukocytosis    S/P exp lap , lysis of adhesion and reduction of internal hernia ( 3/12)         RECOMMENDATIONS:      IV ampicillin 2 grams IV q 4 hrs and rocephin 2 grams IV q 12 hrs until 3/21/2025  Monitor labs, Cr , CBC

## 2025-03-16 LAB
ALBUMIN SERPL-MCNC: 2.5 G/DL (ref 3.5–5.2)
ALP SERPL-CCNC: 63 U/L (ref 40–129)
ALT SERPL-CCNC: 36 U/L (ref 0–40)
ANION GAP SERPL CALCULATED.3IONS-SCNC: 13 MMOL/L (ref 7–16)
AST SERPL-CCNC: 45 U/L (ref 0–39)
BASOPHILS # BLD: 0 K/UL (ref 0–0.2)
BASOPHILS NFR BLD: 0 % (ref 0–2)
BILIRUB SERPL-MCNC: 0.5 MG/DL (ref 0–1.2)
BUN SERPL-MCNC: 23 MG/DL (ref 6–23)
CA-I BLD-SCNC: 1.17 MMOL/L (ref 1.15–1.33)
CALCIUM SERPL-MCNC: 7.9 MG/DL (ref 8.6–10.2)
CHLORIDE SERPL-SCNC: 107 MMOL/L (ref 98–107)
CO2 SERPL-SCNC: 21 MMOL/L (ref 22–29)
CREAT SERPL-MCNC: 1.1 MG/DL (ref 0.7–1.2)
EOSINOPHIL # BLD: 0.09 K/UL (ref 0.05–0.5)
EOSINOPHILS RELATIVE PERCENT: 1 % (ref 0–6)
ERYTHROCYTE [DISTWIDTH] IN BLOOD BY AUTOMATED COUNT: 19.2 % (ref 11.5–15)
GFR, ESTIMATED: 71 ML/MIN/1.73M2
GLUCOSE SERPL-MCNC: 89 MG/DL (ref 74–99)
HCT VFR BLD AUTO: 28.1 % (ref 37–54)
HGB BLD-MCNC: 8.8 G/DL (ref 12.5–16.5)
LYMPHOCYTES NFR BLD: 0.78 K/UL (ref 1.5–4)
LYMPHOCYTES RELATIVE PERCENT: 8 % (ref 20–42)
MAGNESIUM SERPL-MCNC: 1.8 MG/DL (ref 1.6–2.6)
MCH RBC QN AUTO: 29.5 PG (ref 26–35)
MCHC RBC AUTO-ENTMCNC: 31.3 G/DL (ref 32–34.5)
MCV RBC AUTO: 94.3 FL (ref 80–99.9)
MICROORGANISM SPEC CULT: NORMAL
MICROORGANISM SPEC CULT: NORMAL
MONOCYTES NFR BLD: 0.17 K/UL (ref 0.1–0.95)
MONOCYTES NFR BLD: 2 % (ref 2–12)
MYELOCYTES ABSOLUTE COUNT: 0.09 K/UL
MYELOCYTES: 1 %
NEUTROPHILS NFR BLD: 89 % (ref 43–80)
NEUTS SEG NFR BLD: 8.87 K/UL (ref 1.8–7.3)
NUCLEATED RED BLOOD CELLS: 1 PER 100 WBC
PHOSPHATE SERPL-MCNC: 2.4 MG/DL (ref 2.5–4.5)
PLATELET # BLD AUTO: 132 K/UL (ref 130–450)
PMV BLD AUTO: 11.4 FL (ref 7–12)
POTASSIUM SERPL-SCNC: 3.1 MMOL/L (ref 3.5–5)
PROT SERPL-MCNC: 4.7 G/DL (ref 6.4–8.3)
RBC # BLD AUTO: 2.98 M/UL (ref 3.8–5.8)
RBC # BLD: ABNORMAL 10*6/UL
SERVICE CMNT-IMP: NORMAL
SERVICE CMNT-IMP: NORMAL
SODIUM SERPL-SCNC: 141 MMOL/L (ref 132–146)
SPECIMEN DESCRIPTION: NORMAL
SPECIMEN DESCRIPTION: NORMAL
WBC OTHER # BLD: 10 K/UL (ref 4.5–11.5)

## 2025-03-16 PROCEDURE — 6370000000 HC RX 637 (ALT 250 FOR IP): Performed by: NURSE PRACTITIONER

## 2025-03-16 PROCEDURE — 80053 COMPREHEN METABOLIC PANEL: CPT

## 2025-03-16 PROCEDURE — 6370000000 HC RX 637 (ALT 250 FOR IP)

## 2025-03-16 PROCEDURE — 99233 SBSQ HOSP IP/OBS HIGH 50: CPT | Performed by: SURGERY

## 2025-03-16 PROCEDURE — 84100 ASSAY OF PHOSPHORUS: CPT

## 2025-03-16 PROCEDURE — 2500000003 HC RX 250 WO HCPCS

## 2025-03-16 PROCEDURE — 85025 COMPLETE CBC W/AUTO DIFF WBC: CPT

## 2025-03-16 PROCEDURE — 6360000002 HC RX W HCPCS

## 2025-03-16 PROCEDURE — 2060000000 HC ICU INTERMEDIATE R&B

## 2025-03-16 PROCEDURE — 2500000003 HC RX 250 WO HCPCS: Performed by: INTERNAL MEDICINE

## 2025-03-16 PROCEDURE — 83735 ASSAY OF MAGNESIUM: CPT

## 2025-03-16 PROCEDURE — 2580000003 HC RX 258: Performed by: INTERNAL MEDICINE

## 2025-03-16 PROCEDURE — 94640 AIRWAY INHALATION TREATMENT: CPT

## 2025-03-16 PROCEDURE — 82330 ASSAY OF CALCIUM: CPT

## 2025-03-16 PROCEDURE — 2700000000 HC OXYGEN THERAPY PER DAY

## 2025-03-16 PROCEDURE — 36592 COLLECT BLOOD FROM PICC: CPT

## 2025-03-16 PROCEDURE — 6360000002 HC RX W HCPCS: Performed by: INTERNAL MEDICINE

## 2025-03-16 RX ORDER — OXYCODONE HYDROCHLORIDE 5 MG/1
2.5 TABLET ORAL EVERY 4 HOURS PRN
Refills: 0 | Status: DISCONTINUED | OUTPATIENT
Start: 2025-03-16 | End: 2025-03-21 | Stop reason: HOSPADM

## 2025-03-16 RX ORDER — PANTOPRAZOLE SODIUM 40 MG/1
40 TABLET, DELAYED RELEASE ORAL EVERY MORNING
Status: CANCELLED | OUTPATIENT
Start: 2025-03-17

## 2025-03-16 RX ORDER — MAGNESIUM SULFATE IN WATER 40 MG/ML
2000 INJECTION, SOLUTION INTRAVENOUS ONCE
Status: COMPLETED | OUTPATIENT
Start: 2025-03-16 | End: 2025-03-16

## 2025-03-16 RX ORDER — TORSEMIDE 20 MG/1
40 TABLET ORAL
Status: CANCELLED | OUTPATIENT
Start: 2025-03-22

## 2025-03-16 RX ORDER — OXYCODONE HYDROCHLORIDE 5 MG/1
5 TABLET ORAL EVERY 4 HOURS PRN
Refills: 0 | Status: DISCONTINUED | OUTPATIENT
Start: 2025-03-16 | End: 2025-03-21 | Stop reason: HOSPADM

## 2025-03-16 RX ORDER — TAMSULOSIN HYDROCHLORIDE 0.4 MG/1
0.4 CAPSULE ORAL DAILY
Status: CANCELLED | OUTPATIENT
Start: 2025-03-16

## 2025-03-16 RX ORDER — VITS A,C,E/LUTEIN/MINERALS 300MCG-200
1 TABLET ORAL 2 TIMES DAILY
Status: CANCELLED | OUTPATIENT
Start: 2025-03-16

## 2025-03-16 RX ORDER — CETIRIZINE HYDROCHLORIDE 10 MG/1
10 TABLET ORAL EVERY EVENING
Status: CANCELLED | OUTPATIENT
Start: 2025-03-16

## 2025-03-16 RX ADMIN — WATER 2000 MG: 1 INJECTION INTRAMUSCULAR; INTRAVENOUS; SUBCUTANEOUS at 03:40

## 2025-03-16 RX ADMIN — POTASSIUM BICARBONATE 40 MEQ: 782 TABLET, EFFERVESCENT ORAL at 20:57

## 2025-03-16 RX ADMIN — METOPROLOL SUCCINATE 25 MG: 50 TABLET, EXTENDED RELEASE ORAL at 08:18

## 2025-03-16 RX ADMIN — SODIUM CHLORIDE, PRESERVATIVE FREE 10 ML: 5 INJECTION INTRAVENOUS at 08:18

## 2025-03-16 RX ADMIN — AMIODARONE HYDROCHLORIDE 200 MG: 200 TABLET ORAL at 08:19

## 2025-03-16 RX ADMIN — DIBASIC SODIUM PHOSPHATE, MONOBASIC POTASSIUM PHOSPHATE AND MONOBASIC SODIUM PHOSPHATE 1 TABLET: 852; 155; 130 TABLET ORAL at 09:56

## 2025-03-16 RX ADMIN — PANTOPRAZOLE SODIUM 40 MG: 40 INJECTION, POWDER, FOR SOLUTION INTRAVENOUS at 08:18

## 2025-03-16 RX ADMIN — CHOLECALCIFEROL TAB 125 MCG (5000 UNIT) 5000 UNITS: 125 TAB at 08:19

## 2025-03-16 RX ADMIN — GABAPENTIN 300 MG: 300 CAPSULE ORAL at 08:18

## 2025-03-16 RX ADMIN — TAMSULOSIN HYDROCHLORIDE 0.4 MG: 0.4 CAPSULE ORAL at 09:56

## 2025-03-16 RX ADMIN — POTASSIUM BICARBONATE 40 MEQ: 782 TABLET, EFFERVESCENT ORAL at 08:19

## 2025-03-16 RX ADMIN — ENOXAPARIN SODIUM 80 MG: 100 INJECTION SUBCUTANEOUS at 09:55

## 2025-03-16 RX ADMIN — GUAIFENESIN 400 MG: 400 TABLET ORAL at 14:20

## 2025-03-16 RX ADMIN — FERROUS SULFATE TAB 325 MG (65 MG ELEMENTAL FE) 325 MG: 325 (65 FE) TAB at 09:56

## 2025-03-16 RX ADMIN — AMPICILLIN SODIUM 2000 MG: 2 INJECTION, POWDER, FOR SOLUTION INTRAMUSCULAR; INTRAVENOUS at 22:20

## 2025-03-16 RX ADMIN — WATER 2000 MG: 1 INJECTION INTRAMUSCULAR; INTRAVENOUS; SUBCUTANEOUS at 15:59

## 2025-03-16 RX ADMIN — METOPROLOL SUCCINATE 25 MG: 50 TABLET, EXTENDED RELEASE ORAL at 20:58

## 2025-03-16 RX ADMIN — DIBASIC SODIUM PHOSPHATE, MONOBASIC POTASSIUM PHOSPHATE AND MONOBASIC SODIUM PHOSPHATE 1 TABLET: 852; 155; 130 TABLET ORAL at 20:59

## 2025-03-16 RX ADMIN — GUAIFENESIN 400 MG: 400 TABLET ORAL at 20:57

## 2025-03-16 RX ADMIN — CETIRIZINE HYDROCHLORIDE 10 MG: 10 TABLET, FILM COATED ORAL at 18:18

## 2025-03-16 RX ADMIN — IPRATROPIUM BROMIDE AND ALBUTEROL SULFATE 1 DOSE: 2.5; .5 SOLUTION RESPIRATORY (INHALATION) at 08:02

## 2025-03-16 RX ADMIN — IPRATROPIUM BROMIDE AND ALBUTEROL SULFATE 1 DOSE: 2.5; .5 SOLUTION RESPIRATORY (INHALATION) at 19:55

## 2025-03-16 RX ADMIN — DULOXETINE HYDROCHLORIDE 30 MG: 30 CAPSULE, DELAYED RELEASE ORAL at 08:18

## 2025-03-16 RX ADMIN — Medication 5 MG: at 20:58

## 2025-03-16 RX ADMIN — AMPICILLIN SODIUM 2000 MG: 2 INJECTION, POWDER, FOR SOLUTION INTRAMUSCULAR; INTRAVENOUS at 01:50

## 2025-03-16 RX ADMIN — FOLIC ACID 1 MG: 1 TABLET ORAL at 08:18

## 2025-03-16 RX ADMIN — ENOXAPARIN SODIUM 80 MG: 100 INJECTION SUBCUTANEOUS at 21:04

## 2025-03-16 RX ADMIN — GABAPENTIN 300 MG: 300 CAPSULE ORAL at 14:20

## 2025-03-16 RX ADMIN — Medication 1 TABLET: at 10:27

## 2025-03-16 RX ADMIN — AMPICILLIN SODIUM 2000 MG: 2 INJECTION, POWDER, FOR SOLUTION INTRAMUSCULAR; INTRAVENOUS at 14:22

## 2025-03-16 RX ADMIN — OXYCODONE 5 MG: 5 TABLET ORAL at 21:04

## 2025-03-16 RX ADMIN — SODIUM CHLORIDE, PRESERVATIVE FREE 10 ML: 5 INJECTION INTRAVENOUS at 20:59

## 2025-03-16 RX ADMIN — AMPICILLIN SODIUM 2000 MG: 2 INJECTION, POWDER, FOR SOLUTION INTRAMUSCULAR; INTRAVENOUS at 10:00

## 2025-03-16 RX ADMIN — MAGNESIUM SULFATE HEPTAHYDRATE 2000 MG: 40 INJECTION, SOLUTION INTRAVENOUS at 08:17

## 2025-03-16 RX ADMIN — AMPICILLIN SODIUM 2000 MG: 2 INJECTION, POWDER, FOR SOLUTION INTRAMUSCULAR; INTRAVENOUS at 05:26

## 2025-03-16 RX ADMIN — GABAPENTIN 300 MG: 300 CAPSULE ORAL at 20:58

## 2025-03-16 RX ADMIN — AMPICILLIN SODIUM 2000 MG: 2 INJECTION, POWDER, FOR SOLUTION INTRAMUSCULAR; INTRAVENOUS at 18:22

## 2025-03-16 RX ADMIN — IPRATROPIUM BROMIDE AND ALBUTEROL SULFATE 1 DOSE: 2.5; .5 SOLUTION RESPIRATORY (INHALATION) at 15:54

## 2025-03-16 RX ADMIN — GUAIFENESIN 400 MG: 400 TABLET ORAL at 09:56

## 2025-03-16 RX ADMIN — IPRATROPIUM BROMIDE AND ALBUTEROL SULFATE 1 DOSE: 2.5; .5 SOLUTION RESPIRATORY (INHALATION) at 11:26

## 2025-03-16 RX ADMIN — Medication 1 TABLET: at 21:00

## 2025-03-16 ASSESSMENT — PAIN DESCRIPTION - DESCRIPTORS: DESCRIPTORS: ACHING;DISCOMFORT;SORE

## 2025-03-16 ASSESSMENT — PAIN DESCRIPTION - ONSET: ONSET: ON-GOING

## 2025-03-16 ASSESSMENT — PAIN - FUNCTIONAL ASSESSMENT: PAIN_FUNCTIONAL_ASSESSMENT: ACTIVITIES ARE NOT PREVENTED

## 2025-03-16 ASSESSMENT — PAIN DESCRIPTION - ORIENTATION: ORIENTATION: RIGHT;ANTERIOR

## 2025-03-16 ASSESSMENT — PAIN SCALES - GENERAL: PAINLEVEL_OUTOF10: 7

## 2025-03-16 ASSESSMENT — PAIN DESCRIPTION - PAIN TYPE: TYPE: ACUTE PAIN

## 2025-03-16 ASSESSMENT — PAIN DESCRIPTION - LOCATION: LOCATION: ABDOMEN

## 2025-03-16 ASSESSMENT — PAIN DESCRIPTION - FREQUENCY: FREQUENCY: INTERMITTENT

## 2025-03-16 NOTE — PROGRESS NOTES
Department of Internal Medicine  Infectious Diseases  Progress Note      C/C: Enterococcus bacteremia , pneumonia, small bowel obstruction     Pt is awake and alert   Denies fever or chills  Denies abdomen pain, moving bowel  No distress        Current Facility-Administered Medications   Medication Dose Route Frequency Provider Last Rate Last Admin    potassium bicarb-citric acid (EFFER-K) effervescent tablet 40 mEq  40 mEq Oral BID Ciarra Ling MD   40 mEq at 03/16/25 0819    magnesium sulfate 2000 mg in 50 mL IVPB premix  2,000 mg IntraVENous Once Ciarra Ling MD 25 mL/hr at 03/16/25 0817 2,000 mg at 03/16/25 0817    phosphorus (K PHOS NEUTRAL) 1 tablet  250 mg Oral BID Cirara Ling MD        oxyCODONE (ROXICODONE) immediate release tablet 2.5 mg  2.5 mg Oral Q4H PRN Ciarra Ling MD        Or    oxyCODONE (ROXICODONE) immediate release tablet 5 mg  5 mg Oral Q4H PRN Ciarra Ling MD        enoxaparin (LOVENOX) injection 80 mg  1 mg/kg SubCUTAneous BID Bernardino Rucker DO   80 mg at 03/15/25 2108    melatonin disintegrating tablet 5 mg  5 mg Oral Nightly Graciela Oviedo DO   5 mg at 03/15/25 2105    lactated ringers infusion   IntraVENous Once Graciela Oviedo DO        lactated ringers bolus 500 mL  500 mL IntraVENous Once Graciela Oviedo DO        diatrizoate meglumine-sodium (GASTROGRAFIN) 66-10 % solution 120 mL  120 mL Per NG tube ONCE PRN Jose Rob DO   120 mL at 03/12/25 1629    pantoprazole (PROTONIX) injection 40 mg  40 mg IntraVENous Daily Graciela Oviedo DO   40 mg at 03/16/25 0818    sodium chloride flush 0.9 % injection 5-40 mL  5-40 mL IntraVENous 2 times per day Nilda Yan MD   10 mL at 03/16/25 0818    sodium chloride flush 0.9 % injection 5-40 mL  5-40 mL IntraVENous PRN Nilda Yan MD   20 mL at 03/15/25 0534    0.9 % sodium chloride infusion   IntraVENous PRN Nilda Yan MD   Stopped at 03/12/25 1707    glucose chewable tablet 16 g  4 tablet Oral PRN Nilda Yan MD        dextrose bolus 10% 125 mL  125 mL  noted.    Interatrial Septum: No interatrial shunt visualized with color Doppler. Agitated saline study was negative with and without provocation.    Pericardium: No pericardial effusion.    Image quality is adequate.    No echocardiographic evidence of endocarditis.    Radiology :    Chest X ray    CT scan of chest -  1. No acute infiltrate   2. Basilar interstitial opacification and mild bronchiectasis suggestive of   primary ILD, component of edema is not excluded     Procalcitionin 3.03    IMPRESSION:     Enterococcus bacteremia- JIMBO no vegetation  - follow up cx neg on 2/7  Small bowel obstruction -s/p exp lap    Pneumonia    Leukocytosis -resolved    S/P exp lap , lysis of adhesion and reduction of internal hernia ( 3/12)         RECOMMENDATIONS:      IV ampicillin 2 grams IV q 4 hrs and rocephin 2 grams IV q 12 hrs until 3/21/2025  Monitor labs, Cr , CBC

## 2025-03-16 NOTE — PLAN OF CARE
Problem: Discharge Planning  Goal: Discharge to home or other facility with appropriate resources  3/16/2025 0757 by Sylwia Rodriguez RN  Outcome: Not Progressing  3/15/2025 2238 by Emelina Wade RN  Outcome: Progressing     Problem: Chronic Conditions and Co-morbidities  Goal: Patient's chronic conditions and co-morbidity symptoms are monitored and maintained or improved  3/16/2025 0757 by Sylwia Rodriguez RN  Outcome: Progressing  3/15/2025 2238 by Emelina Wade RN  Outcome: Progressing     Problem: Safety - Adult  Goal: Free from fall injury  3/16/2025 0757 by Sylwia Rodriguez RN  Outcome: Progressing  3/15/2025 2238 by Emelina Wade RN  Outcome: Progressing  Flowsheets (Taken 3/15/2025 0921 by Sylwia Rodriguez RN)  Free From Fall Injury: Instruct family/caregiver on patient safety     Problem: Pain  Goal: Verbalizes/displays adequate comfort level or baseline comfort level  3/16/2025 0757 by Sylwia Rodriguez RN  Outcome: Progressing  3/15/2025 2238 by Emelina Wade RN  Outcome: Progressing     Problem: Cardiovascular - Adult  Goal: Maintains optimal cardiac output and hemodynamic stability  3/16/2025 0757 by Sylwia Rodriguez RN  Outcome: Progressing  3/15/2025 2238 by Emelina Wade RN  Outcome: Progressing     Problem: Gastrointestinal - Adult  Goal: Minimal or absence of nausea and vomiting  3/16/2025 0757 by Sylwia Rodriguez RN  Outcome: Progressing  3/15/2025 2238 by Emelina Wade RN  Outcome: Progressing  Goal: Maintains or returns to baseline bowel function  3/15/2025 2238 by Emelina Wade RN  Outcome: Progressing     Problem: Skin/Tissue Integrity  Goal: Skin integrity remains intact  Description: 1.  Monitor for areas of redness and/or skin breakdown  2.  Assess vascular access sites hourly  3.  Every 4-6 hours minimum:  Change oxygen saturation probe site  4.  Every 4-6 hours:  If on nasal continuous positive airway pressure, respiratory therapy assess nares and determine need for appliance  change or resting period  3/15/2025 2238 by Emelina Wade RN  Outcome: Progressing  Flowsheets (Taken 3/15/2025 0921 by Sylwia Rodriguez, RN)  Skin Integrity Remains Intact: Monitor for areas of redness and/or skin breakdown     Problem: ABCDS Injury Assessment  Goal: Absence of physical injury  3/15/2025 2238 by Emelina Wade RN  Outcome: Progressing  Flowsheets (Taken 3/15/2025 0921 by Sylwia Rodriguez, RN)  Absence of Physical Injury: Implement safety measures based on patient assessment     Problem: Discharge Planning  Goal: Discharge to home or other facility with appropriate resources  3/16/2025 0757 by Sylwia Rodriguez, RN  Outcome: Not Progressing  3/15/2025 2238 by Emelina Wade RN  Outcome: Progressing

## 2025-03-16 NOTE — PROGRESS NOTES
[facility]   Progress note      Subjective:  Awake alert doing well  Family members at bedside  Awaiting transfer out of ICU  Objective:    BP (!) 140/117   Pulse 96   Temp 97.9 °F (36.6 °C) (Axillary)   Resp 24   Ht 1.676 m (5' 6\")   Wt 83.7 kg (184 lb 8.4 oz)   SpO2 99%   BMI 29.78 kg/m²     In: 2973.2 [P.O.:1800; I.V.:430.9; NG/GT:30]  Out: 2990   In: 2973.2   Out: 2990 [Urine:2990]    General appearance: Extubated, denies any pain  HEENT: AT/NC, MMM  Neck: FROM, supple  Lungs: Clear to auscultation  CV: RRR, no MRGs  Vasc: Radial pulses 2+  Abdomen: Post exploratory laparotomy  Extremities: No peripheral edema or digital cyanosis  Skin: no rash, lesions or ulcers       Recent Labs     03/14/25  0340 03/15/25  0551 03/16/25  0437   WBC 12.2* 15.8* 10.0   HGB 10.6* 10.3* 8.8*   HCT 32.2* 32.2* 28.1*    177 132       Recent Labs     03/14/25  1755 03/15/25  0551 03/16/25  0437    142 141   K 3.8 3.9 3.1*    106 107   CO2 17* 21* 21*   BUN 26* 27* 23   CREATININE 1.1 1.2 1.1   CALCIUM 8.3* 8.7 7.9*       Assessment:    Principal Problem:    Pneumonia due to organism  Active Problems:    SBO (small bowel obstruction) (HCC)    Ischemic cardiomyopathy    Acute blood loss anemia    Enterococcal bacteremia  Resolved Problems:    * No resolved hospital problems. *      Plan:    79-year-old male with a history of A-fib, enterococcus bacteremia, and CHF presents to the ED with complaints of nausea and vomiting and is admitted to telemetry unit with     Multifocal pneumonia in a patient with UTI  -Supplement O2 demands keeping oxygen saturation greater than 92%.  Patient currently utilizing 2 L O2  Consult infectious disease as patient has been currently been on daptomycin  -IV ampicillin 2 g IV every 4 hours and Rocephin 2 g IV Q 12 hours  -DuoNebs   -Monitor labs hemoglobin.  Monitor electrolytes place as necessary  Check Pro-Patel     Small bowel obstruction  Keep patient  n.p.o.  IV hydration  Consult general surgery-appreciate input   NG-low intermittent suction  Small bowel follow-through today-Await results     NSVT  Monitor closely  Consult EP  IV amiodarone drip initiated per EPContinue Eliquis    3/13/2025  Seen in surgical ICU  The patient was emergently taken to the OR yesterday evening secondary to high-grade bowel obstruction  He remains intubated on my evaluation-being weaned off ventilator  Not requiring pressor support  H&H 10.5/33 today  Decreased urine output with tea colored urine  Continue supportive care and SICU  Primary management by surgical team/ICU team  Remains on IV ampicillin and Rocephin for Enterococcus bacteremia  Medicine will continue to follow  Wife at bedside with whom case was discussed    3/14/2025  No acute issues overnight  Extubated, sitting up in bed asking for water  General Surgery managing  Adequate urine output  Continue broad-spectrum IV antibiotic for Enterococcus bacteremia  No evidence of infective endocarditis on echocardiogram JIMBO  Continue pain control  Cardiac medications are currently on hold including amiodarone-resume at discretion of cardiology/EP    3/16/2025  No acute events or issues overnight  Doing really well  Awaiting transfer out of ICU setting  Off pressors  Tolerating p.o. intake without issues  Supplement electrolytes-potassium 3.1 today  Continue to monitor H&H 8.8/28.1 today  Discussed case with bedside RN     Code Status: Full code  Consultants: General surgery, SICU team  DVT Prophylaxis   PT/OT  Discharge planning          Cynthia Hurtado MD  5:51 PM  3/16/2025

## 2025-03-16 NOTE — PROGRESS NOTES
to the emergency department on 3/11/2025.    He does have a significant cardiac history with HFrEF/ischemic cardiomyopathy, persistent atrial fibrillation/flutter, severe mitral and tricuspid regurgitation.  He has had multiple coronary stents in 1997 in 2021.  He also had AICD implanted in 2021 that was upgraded in 2024.    He recently underwent cystoscopy with left ureteroscopy/stone extraction and stent exchange on 3/7/2025 for complicated urinary tract infection.  He was receiving IV daptomycin.  He also ended up with Enterococcus bacteremia and has been receiving IV ampicillin per the infectious disease service    HOSPITAL COURSE:  3/13-remains critically ill on mechanical ventilation, propofol/fentanyl for sedation, and an IV amiodarone infusion.  He has been oliguric overnight and required crystalloid fluid boluses.  3/14-extubated yesterday.  Received IV diuretics overnight for oliguria.  Demanding clear liquids.  No flatus or BM yet.  States his pain is well-controlled.  Complaining of increased shortness of breath.  Family at bedside.  3/15-denies nausea.  Pain well-controlled.  Passing flatus.  Tolerating limited clear liquids.  Off IV amiodarone.  3/16-doing well today.  Tolerating clear liquids.  Bowels working.      PHYSICAL EXAM:  /62   Pulse 71   Temp 98.8 °F (37.1 °C) (Axillary)   Resp 19   Ht 1.676 m (5' 6\")   Wt 83.7 kg (184 lb 8.4 oz)   SpO2 100%   BMI 29.78 kg/m²    general-79-year-old white man  Neuro-awake, alert, attentive  HEENT-oral mucosa moist.  NGT with minimal bilious output  CV-Warm.  Well-perfused.  Moderate edema   lungs/Chest-4 L nasal cannula   abdomen-soft, nondistended.  Prevena present over midline wound  Skin-warm.  Dry.  No rash      I have reviewed the laboratory studies and these are my findings and my interpretation:   Sodium 141  Potassium 3.1  Chloride 107  Bicarb 21  BUN 23  Creatinine 1.1  Glucose 89  LFTs unremarkable  WBC 10.0  hemoglobin 8.8  Platelets  132    Blood cultures 3/11 no growth

## 2025-03-17 ENCOUNTER — HOSPITAL ENCOUNTER (OUTPATIENT)
Dept: INFUSION THERAPY | Age: 80
Setting detail: INFUSION SERIES
End: 2025-03-17

## 2025-03-17 LAB
ALBUMIN SERPL-MCNC: 2.6 G/DL (ref 3.5–5.2)
ALP SERPL-CCNC: 65 U/L (ref 40–129)
ALT SERPL-CCNC: 32 U/L (ref 0–40)
ANION GAP SERPL CALCULATED.3IONS-SCNC: 12 MMOL/L (ref 7–16)
AST SERPL-CCNC: 36 U/L (ref 0–39)
BASOPHILS # BLD: 0.01 K/UL (ref 0–0.2)
BASOPHILS NFR BLD: 0 % (ref 0–2)
BILIRUB SERPL-MCNC: 0.4 MG/DL (ref 0–1.2)
BUN SERPL-MCNC: 18 MG/DL (ref 6–23)
CA-I BLD-SCNC: 1.15 MMOL/L (ref 1.15–1.33)
CALCIUM SERPL-MCNC: 7.8 MG/DL (ref 8.6–10.2)
CHLORIDE SERPL-SCNC: 105 MMOL/L (ref 98–107)
CO2 SERPL-SCNC: 25 MMOL/L (ref 22–29)
CREAT SERPL-MCNC: 0.9 MG/DL (ref 0.7–1.2)
EOSINOPHIL # BLD: 0.24 K/UL (ref 0.05–0.5)
EOSINOPHILS RELATIVE PERCENT: 3 % (ref 0–6)
ERYTHROCYTE [DISTWIDTH] IN BLOOD BY AUTOMATED COUNT: 18.8 % (ref 11.5–15)
GFR, ESTIMATED: 88 ML/MIN/1.73M2
GLUCOSE SERPL-MCNC: 83 MG/DL (ref 74–99)
HCT VFR BLD AUTO: 29 % (ref 37–54)
HGB BLD-MCNC: 9.3 G/DL (ref 12.5–16.5)
IMM GRANULOCYTES # BLD AUTO: 0.1 K/UL (ref 0–0.58)
IMM GRANULOCYTES NFR BLD: 1 % (ref 0–5)
LYMPHOCYTES NFR BLD: 0.63 K/UL (ref 1.5–4)
LYMPHOCYTES RELATIVE PERCENT: 7 % (ref 20–42)
MAGNESIUM SERPL-MCNC: 2 MG/DL (ref 1.6–2.6)
MCH RBC QN AUTO: 29.8 PG (ref 26–35)
MCHC RBC AUTO-ENTMCNC: 32.1 G/DL (ref 32–34.5)
MCV RBC AUTO: 92.9 FL (ref 80–99.9)
MONOCYTES NFR BLD: 0.79 K/UL (ref 0.1–0.95)
MONOCYTES NFR BLD: 8 % (ref 2–12)
NEUTROPHILS NFR BLD: 82 % (ref 43–80)
NEUTS SEG NFR BLD: 7.87 K/UL (ref 1.8–7.3)
PHOSPHATE SERPL-MCNC: 2.6 MG/DL (ref 2.5–4.5)
PLATELET # BLD AUTO: 153 K/UL (ref 130–450)
PMV BLD AUTO: 11.1 FL (ref 7–12)
POTASSIUM SERPL-SCNC: 3.5 MMOL/L (ref 3.5–5)
PROT SERPL-MCNC: 4.8 G/DL (ref 6.4–8.3)
RBC # BLD AUTO: 3.12 M/UL (ref 3.8–5.8)
SODIUM SERPL-SCNC: 142 MMOL/L (ref 132–146)
WBC OTHER # BLD: 9.6 K/UL (ref 4.5–11.5)

## 2025-03-17 PROCEDURE — 84100 ASSAY OF PHOSPHORUS: CPT

## 2025-03-17 PROCEDURE — 85025 COMPLETE CBC W/AUTO DIFF WBC: CPT

## 2025-03-17 PROCEDURE — 6370000000 HC RX 637 (ALT 250 FOR IP)

## 2025-03-17 PROCEDURE — 2580000003 HC RX 258

## 2025-03-17 PROCEDURE — 97530 THERAPEUTIC ACTIVITIES: CPT

## 2025-03-17 PROCEDURE — 2500000003 HC RX 250 WO HCPCS: Performed by: INTERNAL MEDICINE

## 2025-03-17 PROCEDURE — 2700000000 HC OXYGEN THERAPY PER DAY

## 2025-03-17 PROCEDURE — 94640 AIRWAY INHALATION TREATMENT: CPT

## 2025-03-17 PROCEDURE — 6360000002 HC RX W HCPCS

## 2025-03-17 PROCEDURE — 80053 COMPREHEN METABOLIC PANEL: CPT

## 2025-03-17 PROCEDURE — 2580000003 HC RX 258: Performed by: INTERNAL MEDICINE

## 2025-03-17 PROCEDURE — 2060000000 HC ICU INTERMEDIATE R&B

## 2025-03-17 PROCEDURE — 99232 SBSQ HOSP IP/OBS MODERATE 35: CPT | Performed by: SURGERY

## 2025-03-17 PROCEDURE — 6360000002 HC RX W HCPCS: Performed by: INTERNAL MEDICINE

## 2025-03-17 PROCEDURE — 2500000003 HC RX 250 WO HCPCS

## 2025-03-17 PROCEDURE — 97535 SELF CARE MNGMENT TRAINING: CPT

## 2025-03-17 PROCEDURE — 6370000000 HC RX 637 (ALT 250 FOR IP): Performed by: NURSE PRACTITIONER

## 2025-03-17 PROCEDURE — 82330 ASSAY OF CALCIUM: CPT

## 2025-03-17 PROCEDURE — 83735 ASSAY OF MAGNESIUM: CPT

## 2025-03-17 RX ORDER — ACETAMINOPHEN 325 MG/1
650 TABLET ORAL EVERY 6 HOURS SCHEDULED
Status: DISCONTINUED | OUTPATIENT
Start: 2025-03-17 | End: 2025-03-21 | Stop reason: HOSPADM

## 2025-03-17 RX ADMIN — CHOLECALCIFEROL TAB 125 MCG (5000 UNIT) 5000 UNITS: 125 TAB at 09:57

## 2025-03-17 RX ADMIN — DULOXETINE HYDROCHLORIDE 30 MG: 30 CAPSULE, DELAYED RELEASE ORAL at 09:57

## 2025-03-17 RX ADMIN — POTASSIUM BICARBONATE 40 MEQ: 782 TABLET, EFFERVESCENT ORAL at 09:57

## 2025-03-17 RX ADMIN — Medication 5 MG: at 20:28

## 2025-03-17 RX ADMIN — APIXABAN 5 MG: 5 TABLET, FILM COATED ORAL at 09:57

## 2025-03-17 RX ADMIN — METOPROLOL SUCCINATE 25 MG: 50 TABLET, EXTENDED RELEASE ORAL at 09:57

## 2025-03-17 RX ADMIN — METOPROLOL SUCCINATE 25 MG: 50 TABLET, EXTENDED RELEASE ORAL at 20:28

## 2025-03-17 RX ADMIN — TAMSULOSIN HYDROCHLORIDE 0.4 MG: 0.4 CAPSULE ORAL at 09:56

## 2025-03-17 RX ADMIN — IPRATROPIUM BROMIDE AND ALBUTEROL SULFATE 1 DOSE: 2.5; .5 SOLUTION RESPIRATORY (INHALATION) at 21:32

## 2025-03-17 RX ADMIN — FERROUS SULFATE TAB 325 MG (65 MG ELEMENTAL FE) 325 MG: 325 (65 FE) TAB at 09:56

## 2025-03-17 RX ADMIN — GUAIFENESIN 400 MG: 400 TABLET ORAL at 20:28

## 2025-03-17 RX ADMIN — AMPICILLIN SODIUM 2000 MG: 2 INJECTION, POWDER, FOR SOLUTION INTRAMUSCULAR; INTRAVENOUS at 13:32

## 2025-03-17 RX ADMIN — CETIRIZINE HYDROCHLORIDE 10 MG: 10 TABLET, FILM COATED ORAL at 18:02

## 2025-03-17 RX ADMIN — GABAPENTIN 300 MG: 300 CAPSULE ORAL at 09:57

## 2025-03-17 RX ADMIN — WATER 2000 MG: 1 INJECTION INTRAMUSCULAR; INTRAVENOUS; SUBCUTANEOUS at 15:50

## 2025-03-17 RX ADMIN — SODIUM CHLORIDE, PRESERVATIVE FREE 10 ML: 5 INJECTION INTRAVENOUS at 20:28

## 2025-03-17 RX ADMIN — AMPICILLIN SODIUM 2000 MG: 2 INJECTION, POWDER, FOR SOLUTION INTRAMUSCULAR; INTRAVENOUS at 06:08

## 2025-03-17 RX ADMIN — POTASSIUM BICARBONATE 40 MEQ: 782 TABLET, EFFERVESCENT ORAL at 20:28

## 2025-03-17 RX ADMIN — PANTOPRAZOLE SODIUM 40 MG: 40 INJECTION, POWDER, FOR SOLUTION INTRAVENOUS at 09:57

## 2025-03-17 RX ADMIN — AMPICILLIN SODIUM 2000 MG: 2 INJECTION, POWDER, FOR SOLUTION INTRAMUSCULAR; INTRAVENOUS at 18:00

## 2025-03-17 RX ADMIN — GUAIFENESIN 400 MG: 400 TABLET ORAL at 13:24

## 2025-03-17 RX ADMIN — GABAPENTIN 300 MG: 300 CAPSULE ORAL at 20:29

## 2025-03-17 RX ADMIN — Medication 1 TABLET: at 09:57

## 2025-03-17 RX ADMIN — IPRATROPIUM BROMIDE AND ALBUTEROL SULFATE 1 DOSE: 2.5; .5 SOLUTION RESPIRATORY (INHALATION) at 12:55

## 2025-03-17 RX ADMIN — PANTOPRAZOLE SODIUM 40 MG: 40 TABLET, DELAYED RELEASE ORAL at 06:08

## 2025-03-17 RX ADMIN — FOLIC ACID 1 MG: 1 TABLET ORAL at 09:57

## 2025-03-17 RX ADMIN — CETIRIZINE HYDROCHLORIDE 10 MG: 10 TABLET, FILM COATED ORAL at 17:53

## 2025-03-17 RX ADMIN — IPRATROPIUM BROMIDE AND ALBUTEROL SULFATE 1 DOSE: 2.5; .5 SOLUTION RESPIRATORY (INHALATION) at 08:33

## 2025-03-17 RX ADMIN — AMPICILLIN SODIUM 2000 MG: 2 INJECTION, POWDER, FOR SOLUTION INTRAMUSCULAR; INTRAVENOUS at 10:02

## 2025-03-17 RX ADMIN — AMIODARONE HYDROCHLORIDE 200 MG: 200 TABLET ORAL at 09:57

## 2025-03-17 RX ADMIN — ACETAMINOPHEN 650 MG: 325 TABLET ORAL at 13:24

## 2025-03-17 RX ADMIN — IPRATROPIUM BROMIDE AND ALBUTEROL SULFATE 1 DOSE: 2.5; .5 SOLUTION RESPIRATORY (INHALATION) at 16:28

## 2025-03-17 RX ADMIN — CLOPIDOGREL BISULFATE 75 MG: 75 TABLET, FILM COATED ORAL at 09:56

## 2025-03-17 RX ADMIN — AMPICILLIN SODIUM 2000 MG: 2 INJECTION, POWDER, FOR SOLUTION INTRAMUSCULAR; INTRAVENOUS at 02:25

## 2025-03-17 RX ADMIN — ACETAMINOPHEN 650 MG: 325 TABLET ORAL at 17:54

## 2025-03-17 RX ADMIN — SODIUM CHLORIDE, PRESERVATIVE FREE 10 ML: 5 INJECTION INTRAVENOUS at 09:57

## 2025-03-17 RX ADMIN — APIXABAN 5 MG: 5 TABLET, FILM COATED ORAL at 20:28

## 2025-03-17 RX ADMIN — AMPICILLIN SODIUM 2000 MG: 2 INJECTION, POWDER, FOR SOLUTION INTRAMUSCULAR; INTRAVENOUS at 21:56

## 2025-03-17 RX ADMIN — WATER 2000 MG: 1 INJECTION INTRAMUSCULAR; INTRAVENOUS; SUBCUTANEOUS at 04:01

## 2025-03-17 RX ADMIN — GUAIFENESIN 400 MG: 400 TABLET ORAL at 09:57

## 2025-03-17 RX ADMIN — GABAPENTIN 300 MG: 300 CAPSULE ORAL at 13:24

## 2025-03-17 ASSESSMENT — PAIN DESCRIPTION - LOCATION
LOCATION: ABDOMEN
LOCATION: ABDOMEN

## 2025-03-17 ASSESSMENT — PAIN DESCRIPTION - DESCRIPTORS
DESCRIPTORS: ACHING
DESCRIPTORS: ACHING

## 2025-03-17 ASSESSMENT — PAIN SCALES - GENERAL
PAINLEVEL_OUTOF10: 0
PAINLEVEL_OUTOF10: 4
PAINLEVEL_OUTOF10: 2
PAINLEVEL_OUTOF10: 6

## 2025-03-17 ASSESSMENT — PAIN DESCRIPTION - ORIENTATION
ORIENTATION: MID
ORIENTATION: MID

## 2025-03-17 NOTE — PROGRESS NOTES
Report called to Tara CARRILLO, [facility]. Transport Requested. The following patient belongings:  Cell Phone, Cell Phone , Personal Electronic Device, and Other bilateral hearing aides with charging box and cable.   were gathered and placed with patient on transfer upon transfer to [facility]. Family notified by phone, updated on new unit and room number.

## 2025-03-17 NOTE — PROGRESS NOTES
OCCUPATIONAL THERAPY TREATMENT NOTE  [facility] [address] [place], [place]      Date:3/17/2025  Patient Name: Ruben Davidson  MRN: [id number]  : 1945  Room: [address] [place] [address]A     Per OT Eval:   Evaluating OT: Cherrie Luu, RAZD,  OTR/L; [id number]     Referring Provider: Geronimo Perla DO    Specific Provider Orders/Date: OT eval and treat (3/14/25)     Diagnosis: SBO (small bowel obstruction) ([facility]) [K56.609]  Pneumonia due to organism [J18.9]  Non-sustained ventricular tachycardia ([facility]) [I47.29]  Pneumonia due to infectious organism, unspecified laterality, unspecified part of lung [J18.9]  Sepsis, due to unspecified organism, unspecified whether acute organ dysfunction present ([facility]) [A41.9]  Acute hypoxic respiratory failure ([facility]) [J96.01]      Reason for admission: Pt admitted with pneumonia/vomitting.     Surgery/Procedures:   3/12:    Exploratory laparotomy  Lysis of adhesions  Reduction of internal hernia  Jejunojejunal bypass  Closure of mesenteric defect     Pertinent Medical History:    Past Medical History        Past Medical History:   Diagnosis Date    Acute kidney injury      Atrial fibrillation ([facility])      CAD (coronary artery disease)       s/p stent RCA    CHF (congestive heart failure) ([facility])      [place] (hard of hearing)       WEARS HEARING AIDES    Hypertension      Kidney stone      with obstruction-left    Left ventricular systolic dysfunction      MI (myocardial infarction) ([facility])     Mitral regurgitation      Obesity, morbid       s/p intestinal bypass    Sleep apnea      Valvular heart disease              *Precautions:  Fall Risk, clear liquid, abdominal wound vac, abdominal precautions, [place], NGT suction, cognition, alarms+     Goes by \"Vamsi\"     Assessment of current deficits   [x] Functional mobility          [x]ADLs           [x] Strength                  []Cognition   [x] Functional transfers        [x] IADLs        gross/fine motor skills for increased independence with ADLs  * Neuro-muscular re-education: facilitation of righting/equilibrium reactions, midline orientation, scapular stability/mobility, normalization of muscle tone, and facilitation of volitional active controled movement  * Positioning to improve skin integrity, interaction with environment and functional independence  * Delirium prevention/treatment  * Manual techniques for edema management     Recommended Adaptive Equipment: TBD      Home Living: Pt lives with wife in a 1 story home with 3 step(s) to enter and 1 rail(s); bed/bath on main level.  Bathroom setup: walk-in shower, stnd commode  Equipment owned: FWW     Prior Level of Function: Ind with ADLs;  Ind with IADLs. No AD for functional mobility.      Pain Level: Pt c/o abdominal pain this date. Did not rate. Therapist facilitated repositioning for pain control.   Noted edema in L UE. Nursing and physician present and aware. L UE elevated for edema control at end of session.    Cognition: A&O: 4/4; Follows multi step commands.             Memory: F             Comprehension: F             Problem solving: F             Judgement/safety: F     Functional Assessment:  AM-PAC Daily Activity Raw Score: 12/24    Initial Eval Status  Date: 3/14/25 Treatment Status  Date: 3/17/25 STGs = LTGs  Time frame: 7-14 days   Feeding SBA S/Setup  Assist required to open packages/containers on meal tray.                      Ind         Grooming Min A  Min A  Pt demo'd ability to wash face/hands seated at EOB.                     Mod I         UB dressing/bathing Mod A  Mod A  To don hospital gown bed level. Assist required to thread L UE and to manage gown around trunk in back.                      SBA         LB dressing/bathing Dep  Dependent  To don/doff socks seated at EOB.                     Min A          Toileting Dep  Dependent  For hygiene and clothing management.                     Min A      Bed Mobility

## 2025-03-17 NOTE — PROGRESS NOTES
GENERAL SURGERY  DAILY PROGRESS NOTE    Patient's Name/Date of Birth: Ruben Davidson / 1945    Date: 2025     Chief Complaint   Patient presents with    Vomiting     Patient had kidney stone removal and stent placement has not been eating and drinking since due to vomiting         Subjective:  Transferred out of SICU yesterday. Patient with no acute events overnight. Passing flatus, no BM. Denies any nausea or vomiting.     Objective:  Last 24Hrs  Temp  Av °F (36.7 °C)  Min: 97.8 °F (36.6 °C)  Max: 98.8 °F (37.1 °C)  Resp  Av  Min: 14  Max: 24  Pulse  Av.2  Min: 66  Max: 96  Systolic (24hrs), Av , Min:114 , Max:151     Diastolic (24hrs), Av, Min:62, Max:117    SpO2  Av.5 %  Min: 94 %  Max: 100 %    I/O last 3 completed shifts:  In: 3190.7 [P.O.:1920; I.V.:430.9; NG/GT:30; IV Piggyback:809.8]  Out: 3590 [Urine:3590]      General: In no acute distress, alert and oriented x4  Cardiovascular: Warm throughout, no edema  Respiratory: no respiratory distress, equal chest rise, 4L NC  Abdomen: soft, minimally tender to palpation around incision, covered with staples, c/d/I, non-distended, no rebound or guarding  Skin: no obvious rashes or lesions appreciated, no jaundice  Extremities: atraumatic, no focal motor deficits, no open wounds      CBC  Recent Labs     03/15/25  0551 25  0437   WBC 15.8* 10.0   RBC 3.42* 2.98*   HGB 10.3* 8.8*   HCT 32.2* 28.1*   MCV 94.2 94.3   MCH 30.1 29.5   MCHC 32.0 31.3*   RDW 19.8* 19.2*    132   MPV 11.0 11.4       CMP  Recent Labs     25  1755 03/15/25  0551 25  0437    142 141   K 3.8 3.9 3.1*    106 107   CO2 17* 21* 21*   BUN 26* 27* 23   CREATININE 1.1 1.2 1.1   GLUCOSE 103* 92 89   CALCIUM 8.3* 8.7 7.9*   BILITOT  --  0.5 0.5   ALKPHOS  --  73 63   AST  --  69* 45*   ALT  --  42* 36         Assessment/Plan:    Patient Active Problem List   Diagnosis    CAD (coronary artery disease)s/p stent RCA     with the  assessment and plan with the following corrections/ additions made above    Hank Cueva MD

## 2025-03-17 NOTE — PLAN OF CARE
Problem: Chronic Conditions and Co-morbidities  Goal: Patient's chronic conditions and co-morbidity symptoms are monitored and maintained or improved  3/17/2025 0339 by Papi Vargas RN  Outcome: Progressing  Flowsheets (Taken 3/17/2025 0246)  Care Plan - Patient's Chronic Conditions and Co-Morbidity Symptoms are Monitored and Maintained or Improved: Monitor and assess patient's chronic conditions and comorbid symptoms for stability, deterioration, or improvement     Problem: Discharge Planning  Goal: Discharge to home or other facility with appropriate resources  3/17/2025 0339 by Papi Vargas RN  Outcome: Progressing  Flowsheets (Taken 3/17/2025 0246)  Discharge to home or other facility with appropriate resources: Identify barriers to discharge with patient and caregiver     Problem: Safety - Adult  Goal: Free from fall injury  3/17/2025 0339 by Papi Vargas RN  Outcome: Progressing  Flowsheets (Taken 3/17/2025 0254)  Free From Fall Injury: Instruct family/caregiver on patient safety     Problem: Pain  Goal: Verbalizes/displays adequate comfort level or baseline comfort level  3/17/2025 0339 by Papi Vargas RN  Outcome: Progressing     Problem: Cardiovascular - Adult  Goal: Maintains optimal cardiac output and hemodynamic stability  3/17/2025 0339 by Papi Vargas RN  Outcome: Progressing  Flowsheets (Taken 3/17/2025 0246)  Maintains optimal cardiac output and hemodynamic stability: Monitor blood pressure and heart rate     Problem: Gastrointestinal - Adult  Goal: Minimal or absence of nausea and vomiting  3/17/2025 0339 by Papi Vargas RN  Outcome: Progressing  Flowsheets (Taken 3/17/2025 0246)  Minimal or absence of nausea and vomiting: Administer IV fluids as ordered to ensure adequate hydration     Problem: Gastrointestinal - Adult  Goal: Maintains or returns to baseline bowel function  3/17/2025 0339 by Papi Vargas RN  Outcome: Progressing  Flowsheets (Taken  3/17/2025 0246)  Maintains or returns to baseline bowel function: Assess bowel function     Problem: Infection - Adult  Goal: Absence of infection at discharge  Recent Flowsheet Documentation  Taken 3/17/2025 0246 by Papi Vargas RN  Absence of infection at discharge: Assess and monitor for signs and symptoms of infection     Problem: Skin/Tissue Integrity  Goal: Skin integrity remains intact  Description: 1.  Monitor for areas of redness and/or skin breakdown  2.  Assess vascular access sites hourly  3.  Every 4-6 hours minimum:  Change oxygen saturation probe site  4.  Every 4-6 hours:  If on nasal continuous positive airway pressure, respiratory therapy assess nares and determine need for appliance change or resting period  3/17/2025 0339 by Papi Vargas RN  Outcome: Progressing  Flowsheets  Taken 3/17/2025 0254  Skin Integrity Remains Intact: Monitor for areas of redness and/or skin breakdown  Taken 3/17/2025 0246  Skin Integrity Remains Intact: Monitor for areas of redness and/or skin breakdown     Problem: ABCDS Injury Assessment  Goal: Absence of physical injury  3/17/2025 0339 by Papi Vargas RN  Outcome: Progressing  Flowsheets (Taken 3/17/2025 0254)  Absence of Physical Injury: Implement safety measures based on patient assessment     Problem: Confusion  Goal: Confusion, delirium, dementia, or psychosis is improved or at baseline  Description: INTERVENTIONS:  1. Assess for possible contributors to thought disturbance, including medications, impaired vision or hearing, underlying metabolic abnormalities, dehydration, psychiatric diagnoses, and notify attending LIP  2. [place] high risk fall precautions, as indicated  3. Provide frequent short contacts to provide reality reorientation, refocusing and direction  4. Decrease environmental stimuli, including noise as appropriate  5. Monitor and intervene to maintain adequate nutrition, hydration, elimination, sleep and activity  6. If unable

## 2025-03-17 NOTE — PROGRESS NOTES
Department of Internal Medicine  Infectious Diseases  Progress Note      C/C: Enterococcus bacteremia , pneumonia, small bowel obstruction     Pt is awake and alert   Denies abdomen pain  Denies nausea or vomiting  Afebrile       Current Facility-Administered Medications   Medication Dose Route Frequency Provider Last Rate Last Admin    acetaminophen (TYLENOL) tablet 650 mg  650 mg Oral 4 times per day Ciarra Ling MD   650 mg at 03/17/25 1324    potassium bicarb-citric acid (EFFER-K) effervescent tablet 40 mEq  40 mEq Oral BID Ciarra Ling MD   40 mEq at 03/17/25 0957    oxyCODONE (ROXICODONE) immediate release tablet 2.5 mg  2.5 mg Oral Q4H PRN Ciarra Ling MD        Or    oxyCODONE (ROXICODONE) immediate release tablet 5 mg  5 mg Oral Q4H PRN Ciarra Ling MD   5 mg at 03/16/25 2104    melatonin disintegrating tablet 5 mg  5 mg Oral Nightly Ciarra Ling MD   5 mg at 03/16/25 2058    pantoprazole (PROTONIX) injection 40 mg  40 mg IntraVENous Daily Ciarra Ling MD   40 mg at 03/17/25 0957    sodium chloride flush 0.9 % injection 5-40 mL  5-40 mL IntraVENous 2 times per day Ciarra Ling MD   10 mL at 03/17/25 0957    sodium chloride flush 0.9 % injection 5-40 mL  5-40 mL IntraVENous PRN Ciarra Ling MD   20 mL at 03/15/25 0534    0.9 % sodium chloride infusion   IntraVENous PRN Ciarra Ling MD   Stopped at 03/12/25 1707    glucose chewable tablet 16 g  4 tablet Oral PRN Ciarra Ling MD        dextrose bolus 10% 125 mL  125 mL IntraVENous PRN Ciarra Ling MD        Or    dextrose bolus 10% 250 mL  250 mL IntraVENous PRN Ciarra Lnig MD        glucagon injection 1 mg  1 mg SubCUTAneous PRN Ciarra Ling MD        dextrose 10 % infusion   IntraVENous Continuous PRN Ciarra Ling MD        cetirizine (ZYRTEC) tablet 10 mg  10 mg Oral QPM Ciarra Ling MD   10 mg at 03/16/25 1818    vitamin D3 (CHOLECALCIFEROL) tablet 5,000 Units  1 tablet Oral QAM Ciarra Ling MD   5,000 Units at 03/17/25 0957    clopidogrel (PLAVIX) tablet 75 mg  75 mg Oral Daily Ciarra Ling MD   75 mg at 03/17/25 0956    DULoxetine  (CYMBALTA) extended release capsule 30 mg  30 mg Oral QAM Ciarra Ling MD   30 mg at 03/17/25 0957    ferrous sulfate (IRON 325) tablet 325 mg  325 mg Oral Daily Ciarra Ling MD   325 mg at 03/17/25 0956    folic acid (FOLVITE) tablet 1 mg  1 mg Oral QAM Ciarra Ling MD   1 mg at 03/17/25 0957    gabapentin (NEURONTIN) capsule 300 mg  300 mg Oral TID Ciarra Ling MD   300 mg at 03/17/25 1324    [Held by provider] hydroxychloroquine (PLAQUENIL) tablet 200 mg  200 mg Oral BID Ciarra Ling MD        antioxidant multivitamin (OCUVITE) tablet  1 tablet Oral BID Ciarra Ling MD   1 tablet at 03/17/25 0957    pantoprazole (PROTONIX) tablet 40 mg  40 mg Oral QAM Ciarra Ling MD   40 mg at 03/17/25 0608    Polyvinyl Alcohol-Povidone PF (REFRESH) 1.4-0.6 % ophthalmic solution 1 drop  1 drop Both Eyes PRN Ciarra Ling MD   1 drop at 03/15/25 0845    [Held by provider] sodium bicarbonate tablet 650 mg  650 mg Oral TID Ciarra Ling MD   650 mg at 03/11/25 2044    tamsulosin (FLOMAX) capsule 0.4 mg  0.4 mg Oral Daily Ciarra Ling MD   0.4 mg at 03/17/25 0956    apixaban (ELIQUIS) tablet 5 mg  5 mg Oral BID Ciarra Ling MD   5 mg at 03/17/25 0957    amiodarone (CORDARONE) tablet 200 mg  200 mg Oral QAM Ciarra Ling MD   200 mg at 03/17/25 0957    metoprolol succinate (TOPROL XL) extended release tablet 25 mg  25 mg Oral BID Ciarra Ling MD   25 mg at 03/17/25 0957    sodium chloride flush 0.9 % injection 10 mL  10 mL IntraVENous PRN Ciarra Ling MD   10 mL at 03/13/25 2337    0.9 % sodium chloride infusion   IntraVENous PRN Ciarra Ling MD   Stopped at 03/14/25 0622    ondansetron (ZOFRAN-ODT) disintegrating tablet 4 mg  4 mg Oral Q8H PRN Ciarra Ling MD        Or    ondansetron (ZOFRAN) injection 4 mg  4 mg IntraVENous Q6H PRN Ciarra Ling MD   4 mg at 03/14/25 0625    ipratropium 0.5 mg-albuterol 2.5 mg (DUONEB) nebulizer solution 1 Dose  1 Dose Inhalation Q4H WA RT Ciarra Ling MD   1 Dose at 03/17/25 1255    guaiFENesin tablet 400 mg  400 mg Oral TID Ciarra Ling MD   400 mg at 03/17/25 1324    ampicillin (OMNIPEN)

## 2025-03-17 NOTE — PLAN OF CARE
Problem: Discharge Planning  Goal: Discharge to home or other facility with appropriate resources  Outcome: Progressing     Problem: Safety - Adult  Goal: Free from fall injury  Outcome: Progressing     Problem: Pain  Goal: Verbalizes/displays adequate comfort level or baseline comfort level  Outcome: Progressing  Flowsheets  Taken 3/16/2025 1600 by Sylwia Rodriguez, RN  Verbalizes/displays adequate comfort level or baseline comfort level: Encourage patient to monitor pain and request assistance  Taken 3/16/2025 1400 by Sylwia Rodriguez, RN  Verbalizes/displays adequate comfort level or baseline comfort level: Implement non-pharmacological measures as appropriate and evaluate response     Problem: Cardiovascular - Adult  Goal: Maintains optimal cardiac output and hemodynamic stability  Outcome: Progressing     Problem: Gastrointestinal - Adult  Goal: Minimal or absence of nausea and vomiting  Outcome: Progressing  Goal: Maintains or returns to baseline bowel function  Outcome: Progressing     Problem: Skin/Tissue Integrity  Goal: Skin integrity remains intact  Description: 1.  Monitor for areas of redness and/or skin breakdown  2.  Assess vascular access sites hourly  3.  Every 4-6 hours minimum:  Change oxygen saturation probe site  4.  Every 4-6 hours:  If on nasal continuous positive airway pressure, respiratory therapy assess nares and determine need for appliance change or resting period  Outcome: Progressing     Problem: ABCDS Injury Assessment  Goal: Absence of physical injury  Outcome: Progressing

## 2025-03-17 NOTE — PROGRESS NOTES
[facility]                                Progress note    Subjective:  Denies chest pain and dyspnea     Objective:  Lying almost completely flat in bed, conversing without difficulty  No acute distress  Wife present at the bedside  BP (!) 137/99   Pulse 80   Temp 97 °F (36.1 °C) (Temporal)   Resp 18   Ht 1.676 m (5' 6\")   Wt 86.2 kg (190 lb)   SpO2 93%   BMI 30.67 kg/m²   CONST:  Well developed, obese, elderly  male who appears stated age. Awake, alert, cooperative, no apparent distress  HEENT:   Head- Normocephalic, atraumatic   Eyes- Conjunctivae pink, anicteric  Throat- Oral mucosa pink and moist  Neck-  No stridor, trachea midline, no jugular venous distention. No adenopathy   CHEST: Chest symmetrical and non-tender to palpation. No accessory muscle use or intercostal retractions  RESPIRATORY: Lung sounds - clear throughout fields   CARDIOVASCULAR:     No carotid bruit  Heart Inspection- shows no noted pulsations  Heart Palpation- no heaves or thrills; PMI is non-displaced   Heart Ausculation- Regular rate and rhythm, no murmur. No s3, s4 or rub   PV: No lower extremity edema. No varicosities. Pedal pulses palpable, no clubbing or cyanosis   ABDOMEN: Soft, non-tender to light palpation. Bowel sounds present. No palpable masses no organomegaly; no abdominal bruit  MS: Good muscle strength and tone. No atrophy or abnormal movements.   : Deferred  SKIN: Warm and dry no statis dermatitis or ulcers   NEURO / PSYCH: Oriented to person, place and time. Speech clear and appropriate. Follows all commands. Pleasant affect      Recent Labs     03/15/25  0551 03/16/25  0437 03/17/25  0600   WBC 15.8* 10.0 9.6   HGB 10.3* 8.8* 9.3*   HCT 32.2* 28.1* 29.0*    132 153       Recent Labs     03/15/25  0551 03/16/25  0437 03/17/25  0600    141 142   K 3.9 3.1* 3.5    107 105   CO2 21* 21* 25   BUN 27* 23 18   CREATININE 1.2 1.1 0.9   CALCIUM 8.7 7.9* 7.8*  Bipolar disorder

## 2025-03-17 NOTE — CARE COORDINATION
Received case in transfer, [facility] unable to accept. 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 Called wife, 1. [facility] 2 [facility]. Referral to Francheska at [facility] via message, await assessment and acceptance.  12:25pm received message that Francheska is out of office until 2pm. Referral to Roe at [facility] via message, await assessment. 12:40pm no beds at [facility]. Spoke with wife, pt and wife will review list.Marylin Sr, MSW, LSW

## 2025-03-17 NOTE — PROGRESS NOTES
4 Eyes Skin Assessment     NAME:  Ruben Davidson  YOB: 1945  MEDICAL RECORD NUMBER:  [id number]    The patient is being assessed for  Admission    I agree that at least one RN has performed a thorough Head to Toe Skin Assessment on the patient. ALL assessment sites listed below have been assessed.      Areas assessed by both nurses:    Head, Face, Ears, Shoulders, Back, Chest, Arms, Elbows, Hands, Sacrum. Buttock, Coccyx, Ischium, Legs. Feet and Heels, and Under Medical Devices         Does the Patient have a Wound? No noted wound(s)       Avelino Prevention initiated by RN: Yes  Wound Care Orders initiated by RN: No    Pressure Injury (Stage 3,4, Unstageable, DTI, NWPT, and Complex wounds) if present, place Wound referral order by RN under : No    New Ostomies, if present place, Ostomy referral order under : No     Nurse 1 eSignature: Electronically signed by Aide Vargas RN on 3/17/25 at 2:58 AM EDT    **SHARE this note so that the co-signing nurse can place an eSignature**    Nurse 2 eSignature: Electronically signed by Laura Wagoner RN on 3/17/25 at 5:37 AM EDT

## 2025-03-18 ENCOUNTER — APPOINTMENT (OUTPATIENT)
Dept: ULTRASOUND IMAGING | Age: 80
End: 2025-03-18
Payer: MEDICARE

## 2025-03-18 ENCOUNTER — HOSPITAL ENCOUNTER (OUTPATIENT)
Dept: INFUSION THERAPY | Age: 80
Setting detail: INFUSION SERIES
End: 2025-03-18

## 2025-03-18 ENCOUNTER — APPOINTMENT (OUTPATIENT)
Dept: GENERAL RADIOLOGY | Age: 80
End: 2025-03-18
Payer: MEDICARE

## 2025-03-18 PROBLEM — I50.23 ACUTE ON CHRONIC SYSTOLIC HEART FAILURE (HCC): Status: ACTIVE | Noted: 2025-03-18

## 2025-03-18 LAB
ALBUMIN SERPL-MCNC: 2.6 G/DL (ref 3.5–5.2)
ALP SERPL-CCNC: 63 U/L (ref 40–129)
ALT SERPL-CCNC: 26 U/L (ref 0–40)
ANION GAP SERPL CALCULATED.3IONS-SCNC: 13 MMOL/L (ref 7–16)
AST SERPL-CCNC: 31 U/L (ref 0–39)
BASOPHILS # BLD: 0.01 K/UL (ref 0–0.2)
BASOPHILS NFR BLD: 0 % (ref 0–2)
BILIRUB SERPL-MCNC: 0.5 MG/DL (ref 0–1.2)
BNP SERPL-MCNC: ABNORMAL PG/ML (ref 0–450)
BUN SERPL-MCNC: 14 MG/DL (ref 6–23)
CA-I BLD-SCNC: 1.13 MMOL/L (ref 1.15–1.33)
CALCIUM SERPL-MCNC: 7.8 MG/DL (ref 8.6–10.2)
CHLORIDE SERPL-SCNC: 106 MMOL/L (ref 98–107)
CO2 SERPL-SCNC: 23 MMOL/L (ref 22–29)
CREAT SERPL-MCNC: 0.9 MG/DL (ref 0.7–1.2)
EOSINOPHIL # BLD: 0.32 K/UL (ref 0.05–0.5)
EOSINOPHILS RELATIVE PERCENT: 3 % (ref 0–6)
ERYTHROCYTE [DISTWIDTH] IN BLOOD BY AUTOMATED COUNT: 19 % (ref 11.5–15)
GFR, ESTIMATED: 89 ML/MIN/1.73M2
GLUCOSE SERPL-MCNC: 77 MG/DL (ref 74–99)
HCT VFR BLD AUTO: 29.7 % (ref 37–54)
HGB BLD-MCNC: 9.4 G/DL (ref 12.5–16.5)
IMM GRANULOCYTES # BLD AUTO: 0.15 K/UL (ref 0–0.58)
IMM GRANULOCYTES NFR BLD: 2 % (ref 0–5)
LYMPHOCYTES NFR BLD: 0.66 K/UL (ref 1.5–4)
LYMPHOCYTES RELATIVE PERCENT: 7 % (ref 20–42)
MAGNESIUM SERPL-MCNC: 2 MG/DL (ref 1.6–2.6)
MCH RBC QN AUTO: 29.7 PG (ref 26–35)
MCHC RBC AUTO-ENTMCNC: 31.6 G/DL (ref 32–34.5)
MCV RBC AUTO: 94 FL (ref 80–99.9)
MONOCYTES NFR BLD: 0.75 K/UL (ref 0.1–0.95)
MONOCYTES NFR BLD: 8 % (ref 2–12)
NEUTROPHILS NFR BLD: 81 % (ref 43–80)
NEUTS SEG NFR BLD: 8.08 K/UL (ref 1.8–7.3)
PHOSPHATE SERPL-MCNC: 2.2 MG/DL (ref 2.5–4.5)
PLATELET # BLD AUTO: 173 K/UL (ref 130–450)
PMV BLD AUTO: 11.3 FL (ref 7–12)
POTASSIUM SERPL-SCNC: 4.1 MMOL/L (ref 3.5–5)
PROT SERPL-MCNC: 4.8 G/DL (ref 6.4–8.3)
RBC # BLD AUTO: 3.16 M/UL (ref 3.8–5.8)
SODIUM SERPL-SCNC: 142 MMOL/L (ref 132–146)
WBC OTHER # BLD: 10 K/UL (ref 4.5–11.5)

## 2025-03-18 PROCEDURE — 99222 1ST HOSP IP/OBS MODERATE 55: CPT | Performed by: INTERNAL MEDICINE

## 2025-03-18 PROCEDURE — 83880 ASSAY OF NATRIURETIC PEPTIDE: CPT

## 2025-03-18 PROCEDURE — 2500000003 HC RX 250 WO HCPCS

## 2025-03-18 PROCEDURE — 6370000000 HC RX 637 (ALT 250 FOR IP)

## 2025-03-18 PROCEDURE — 6360000002 HC RX W HCPCS

## 2025-03-18 PROCEDURE — 6370000000 HC RX 637 (ALT 250 FOR IP): Performed by: NURSE PRACTITIONER

## 2025-03-18 PROCEDURE — 84100 ASSAY OF PHOSPHORUS: CPT

## 2025-03-18 PROCEDURE — 99232 SBSQ HOSP IP/OBS MODERATE 35: CPT | Performed by: SURGERY

## 2025-03-18 PROCEDURE — 2580000003 HC RX 258

## 2025-03-18 PROCEDURE — 2060000000 HC ICU INTERMEDIATE R&B

## 2025-03-18 PROCEDURE — 93971 EXTREMITY STUDY: CPT

## 2025-03-18 PROCEDURE — 80053 COMPREHEN METABOLIC PANEL: CPT

## 2025-03-18 PROCEDURE — 2700000000 HC OXYGEN THERAPY PER DAY

## 2025-03-18 PROCEDURE — 71046 X-RAY EXAM CHEST 2 VIEWS: CPT

## 2025-03-18 PROCEDURE — APPSS60 APP SPLIT SHARED TIME 46-60 MINUTES

## 2025-03-18 PROCEDURE — 94640 AIRWAY INHALATION TREATMENT: CPT

## 2025-03-18 PROCEDURE — 82330 ASSAY OF CALCIUM: CPT

## 2025-03-18 PROCEDURE — 6370000000 HC RX 637 (ALT 250 FOR IP): Performed by: STUDENT IN AN ORGANIZED HEALTH CARE EDUCATION/TRAINING PROGRAM

## 2025-03-18 PROCEDURE — 83735 ASSAY OF MAGNESIUM: CPT

## 2025-03-18 PROCEDURE — 85025 COMPLETE CBC W/AUTO DIFF WBC: CPT

## 2025-03-18 PROCEDURE — 6360000002 HC RX W HCPCS: Performed by: STUDENT IN AN ORGANIZED HEALTH CARE EDUCATION/TRAINING PROGRAM

## 2025-03-18 RX ORDER — CALCIUM CARBONATE 500 MG/1
500 TABLET, CHEWABLE ORAL 2 TIMES DAILY
Status: COMPLETED | OUTPATIENT
Start: 2025-03-18 | End: 2025-03-18

## 2025-03-18 RX ORDER — CALCIUM GLUCONATE 20 MG/ML
1000 INJECTION, SOLUTION INTRAVENOUS ONCE
Status: COMPLETED | OUTPATIENT
Start: 2025-03-18 | End: 2025-03-18

## 2025-03-18 RX ORDER — FUROSEMIDE 10 MG/ML
40 INJECTION INTRAMUSCULAR; INTRAVENOUS 2 TIMES DAILY
Status: DISCONTINUED | OUTPATIENT
Start: 2025-03-18 | End: 2025-03-18

## 2025-03-18 RX ORDER — TORSEMIDE 20 MG/1
40 TABLET ORAL ONCE
Status: COMPLETED | OUTPATIENT
Start: 2025-03-18 | End: 2025-03-18

## 2025-03-18 RX ORDER — TORSEMIDE 20 MG/1
40 TABLET ORAL DAILY
Status: DISCONTINUED | OUTPATIENT
Start: 2025-03-19 | End: 2025-03-19

## 2025-03-18 RX ADMIN — POTASSIUM BICARBONATE 40 MEQ: 782 TABLET, EFFERVESCENT ORAL at 21:15

## 2025-03-18 RX ADMIN — PANTOPRAZOLE SODIUM 40 MG: 40 TABLET, DELAYED RELEASE ORAL at 06:14

## 2025-03-18 RX ADMIN — IPRATROPIUM BROMIDE AND ALBUTEROL SULFATE 1 DOSE: 2.5; .5 SOLUTION RESPIRATORY (INHALATION) at 20:45

## 2025-03-18 RX ADMIN — APIXABAN 5 MG: 5 TABLET, FILM COATED ORAL at 21:15

## 2025-03-18 RX ADMIN — IPRATROPIUM BROMIDE AND ALBUTEROL SULFATE 1 DOSE: 2.5; .5 SOLUTION RESPIRATORY (INHALATION) at 16:03

## 2025-03-18 RX ADMIN — AMPICILLIN SODIUM 2000 MG: 2 INJECTION, POWDER, FOR SOLUTION INTRAMUSCULAR; INTRAVENOUS at 02:06

## 2025-03-18 RX ADMIN — GABAPENTIN 300 MG: 300 CAPSULE ORAL at 21:15

## 2025-03-18 RX ADMIN — ACETAMINOPHEN 650 MG: 325 TABLET ORAL at 23:24

## 2025-03-18 RX ADMIN — APIXABAN 5 MG: 5 TABLET, FILM COATED ORAL at 09:11

## 2025-03-18 RX ADMIN — CETIRIZINE HYDROCHLORIDE 10 MG: 10 TABLET, FILM COATED ORAL at 17:49

## 2025-03-18 RX ADMIN — GUAIFENESIN 400 MG: 400 TABLET ORAL at 09:20

## 2025-03-18 RX ADMIN — CALCIUM CARBONATE 500 MG: 500 TABLET, CHEWABLE ORAL at 21:16

## 2025-03-18 RX ADMIN — AMIODARONE HYDROCHLORIDE 200 MG: 200 TABLET ORAL at 09:11

## 2025-03-18 RX ADMIN — IPRATROPIUM BROMIDE AND ALBUTEROL SULFATE 1 DOSE: 2.5; .5 SOLUTION RESPIRATORY (INHALATION) at 12:32

## 2025-03-18 RX ADMIN — TORSEMIDE 40 MG: 20 TABLET ORAL at 09:11

## 2025-03-18 RX ADMIN — SODIUM CHLORIDE, PRESERVATIVE FREE 10 ML: 5 INJECTION INTRAVENOUS at 21:16

## 2025-03-18 RX ADMIN — CALCIUM GLUCONATE 1000 MG: 20 INJECTION, SOLUTION INTRAVENOUS at 09:10

## 2025-03-18 RX ADMIN — AMPICILLIN SODIUM 2000 MG: 2 INJECTION, POWDER, FOR SOLUTION INTRAMUSCULAR; INTRAVENOUS at 21:29

## 2025-03-18 RX ADMIN — WATER 2000 MG: 1 INJECTION INTRAMUSCULAR; INTRAVENOUS; SUBCUTANEOUS at 03:46

## 2025-03-18 RX ADMIN — IPRATROPIUM BROMIDE AND ALBUTEROL SULFATE 1 DOSE: 2.5; .5 SOLUTION RESPIRATORY (INHALATION) at 09:30

## 2025-03-18 RX ADMIN — Medication 1 TABLET: at 21:16

## 2025-03-18 RX ADMIN — AMPICILLIN SODIUM 2000 MG: 2 INJECTION, POWDER, FOR SOLUTION INTRAMUSCULAR; INTRAVENOUS at 09:27

## 2025-03-18 RX ADMIN — POTASSIUM BICARBONATE 40 MEQ: 782 TABLET, EFFERVESCENT ORAL at 09:10

## 2025-03-18 RX ADMIN — ACETAMINOPHEN 650 MG: 325 TABLET ORAL at 17:49

## 2025-03-18 RX ADMIN — DIBASIC SODIUM PHOSPHATE, MONOBASIC POTASSIUM PHOSPHATE AND MONOBASIC SODIUM PHOSPHATE 2 TABLET: 852; 155; 130 TABLET ORAL at 13:13

## 2025-03-18 RX ADMIN — FOLIC ACID 1 MG: 1 TABLET ORAL at 09:11

## 2025-03-18 RX ADMIN — ACETAMINOPHEN 650 MG: 325 TABLET ORAL at 11:14

## 2025-03-18 RX ADMIN — TAMSULOSIN HYDROCHLORIDE 0.4 MG: 0.4 CAPSULE ORAL at 09:11

## 2025-03-18 RX ADMIN — CHOLECALCIFEROL TAB 125 MCG (5000 UNIT) 5000 UNITS: 125 TAB at 09:12

## 2025-03-18 RX ADMIN — GABAPENTIN 300 MG: 300 CAPSULE ORAL at 13:12

## 2025-03-18 RX ADMIN — ACETAMINOPHEN 650 MG: 325 TABLET ORAL at 00:07

## 2025-03-18 RX ADMIN — CALCIUM CARBONATE 500 MG: 500 TABLET, CHEWABLE ORAL at 13:13

## 2025-03-18 RX ADMIN — PANTOPRAZOLE SODIUM 40 MG: 40 INJECTION, POWDER, FOR SOLUTION INTRAVENOUS at 09:11

## 2025-03-18 RX ADMIN — SODIUM CHLORIDE, PRESERVATIVE FREE 10 ML: 5 INJECTION INTRAVENOUS at 09:12

## 2025-03-18 RX ADMIN — AMPICILLIN SODIUM 2000 MG: 2 INJECTION, POWDER, FOR SOLUTION INTRAMUSCULAR; INTRAVENOUS at 14:48

## 2025-03-18 RX ADMIN — GUAIFENESIN 400 MG: 400 TABLET ORAL at 13:13

## 2025-03-18 RX ADMIN — METOPROLOL SUCCINATE 25 MG: 50 TABLET, EXTENDED RELEASE ORAL at 09:11

## 2025-03-18 RX ADMIN — GUAIFENESIN 400 MG: 400 TABLET ORAL at 21:15

## 2025-03-18 RX ADMIN — GABAPENTIN 300 MG: 300 CAPSULE ORAL at 09:11

## 2025-03-18 RX ADMIN — AMPICILLIN SODIUM 2000 MG: 2 INJECTION, POWDER, FOR SOLUTION INTRAMUSCULAR; INTRAVENOUS at 17:55

## 2025-03-18 RX ADMIN — FERROUS SULFATE TAB 325 MG (65 MG ELEMENTAL FE) 325 MG: 325 (65 FE) TAB at 09:11

## 2025-03-18 RX ADMIN — DULOXETINE HYDROCHLORIDE 30 MG: 30 CAPSULE, DELAYED RELEASE ORAL at 09:12

## 2025-03-18 RX ADMIN — Medication 1 TABLET: at 09:12

## 2025-03-18 RX ADMIN — CLOPIDOGREL BISULFATE 75 MG: 75 TABLET, FILM COATED ORAL at 09:12

## 2025-03-18 RX ADMIN — DIBASIC SODIUM PHOSPHATE, MONOBASIC POTASSIUM PHOSPHATE AND MONOBASIC SODIUM PHOSPHATE 2 TABLET: 852; 155; 130 TABLET ORAL at 17:52

## 2025-03-18 RX ADMIN — DIBASIC SODIUM PHOSPHATE, MONOBASIC POTASSIUM PHOSPHATE AND MONOBASIC SODIUM PHOSPHATE 2 TABLET: 852; 155; 130 TABLET ORAL at 21:16

## 2025-03-18 RX ADMIN — Medication 5 MG: at 21:16

## 2025-03-18 RX ADMIN — WATER 2000 MG: 1 INJECTION INTRAMUSCULAR; INTRAVENOUS; SUBCUTANEOUS at 16:04

## 2025-03-18 RX ADMIN — METOPROLOL SUCCINATE 25 MG: 50 TABLET, EXTENDED RELEASE ORAL at 21:15

## 2025-03-18 RX ADMIN — ACETAMINOPHEN 650 MG: 325 TABLET ORAL at 06:14

## 2025-03-18 RX ADMIN — AMPICILLIN SODIUM 2000 MG: 2 INJECTION, POWDER, FOR SOLUTION INTRAMUSCULAR; INTRAVENOUS at 06:12

## 2025-03-18 ASSESSMENT — PAIN DESCRIPTION - ORIENTATION
ORIENTATION: RIGHT;LEFT
ORIENTATION: ANTERIOR

## 2025-03-18 ASSESSMENT — PAIN - FUNCTIONAL ASSESSMENT
PAIN_FUNCTIONAL_ASSESSMENT: ACTIVITIES ARE NOT PREVENTED
PAIN_FUNCTIONAL_ASSESSMENT: ACTIVITIES ARE NOT PREVENTED
PAIN_FUNCTIONAL_ASSESSMENT: PREVENTS OR INTERFERES SOME ACTIVE ACTIVITIES AND ADLS

## 2025-03-18 ASSESSMENT — PAIN DESCRIPTION - LOCATION
LOCATION: GENERALIZED
LOCATION: ABDOMEN

## 2025-03-18 ASSESSMENT — PAIN SCALES - GENERAL
PAINLEVEL_OUTOF10: 3
PAINLEVEL_OUTOF10: 0
PAINLEVEL_OUTOF10: 2
PAINLEVEL_OUTOF10: 3
PAINLEVEL_OUTOF10: 2

## 2025-03-18 ASSESSMENT — PAIN DESCRIPTION - DESCRIPTORS
DESCRIPTORS: ACHING;DISCOMFORT
DESCRIPTORS: ACHING;TENDER;DULL
DESCRIPTORS: DISCOMFORT;TENDER
DESCRIPTORS: TENDER;DISCOMFORT

## 2025-03-18 ASSESSMENT — PAIN DESCRIPTION - PAIN TYPE: TYPE: CHRONIC PAIN

## 2025-03-18 ASSESSMENT — PAIN DESCRIPTION - ONSET: ONSET: ON-GOING

## 2025-03-18 ASSESSMENT — PAIN DESCRIPTION - FREQUENCY: FREQUENCY: CONTINUOUS

## 2025-03-18 NOTE — PROGRESS NOTES
GENERAL SURGERY  DAILY PROGRESS NOTE    Patient's Name/Date of Birth: Ruben Davidson / 1945    Date: 2025     Chief Complaint   Patient presents with    Vomiting     Patient had kidney stone removal and stent placement has not been eating and drinking since due to vomiting         Subjective:  LUE significantly edematous, awaiting DVT US. Patient resting comfortably this morning, tolerated diet. Passing gas, had 1 BM.     Objective:  Last 24Hrs  Temp  Av.3 °F (36.3 °C)  Min: 97 °F (36.1 °C)  Max: 97.9 °F (36.6 °C)  Resp  Av  Min: 16  Max: 20  Pulse  Av  Min: 59  Max: 80  Systolic (24hrs), Av , Min:92 , Max:137     Diastolic (24hrs), Av, Min:77, Max:99    SpO2  Av.6 %  Min: 10 %  Max: 100 %    I/O last 3 completed shifts:  In: 2799.1 [P.O.:2370; IV Piggyback:429.1]  Out: 1350 [Urine:1350]      General: In no acute distress, alert and oriented x4  Cardiovascular: Warm throughout, no edema  Respiratory: no respiratory distress, equal chest rise, 4L NC  Abdomen: soft, minimally tender to palpation around incision, covered with staples, c/d/I, non-distended, no rebound or guarding, edematous   Skin: no obvious rashes or lesions appreciated, no jaundice  Extremities: atraumatic, no focal motor deficits, no open wounds, edema       CBC  Recent Labs     03/15/25  0551 25  0437 25  0600   WBC 15.8* 10.0 9.6   RBC 3.42* 2.98* 3.12*   HGB 10.3* 8.8* 9.3*   HCT 32.2* 28.1* 29.0*   MCV 94.2 94.3 92.9   MCH 30.1 29.5 29.8   MCHC 32.0 31.3* 32.1   RDW 19.8* 19.2* 18.8*    132 153   MPV 11.0 11.4 11.1       CMP  Recent Labs     03/15/25  0551 25  0437 25  0600    141 142   K 3.9 3.1* 3.5    107 105   CO2 21* 21* 25   BUN 27* 23 18   CREATININE 1.2 1.1 0.9   GLUCOSE 92 89 83   CALCIUM 8.7 7.9* 7.8*   BILITOT 0.5 0.5 0.4   ALKPHOS 73 63 65   AST 69* 45* 36   ALT 42* 36 32         Assessment/Plan:    Patient Active Problem List   Diagnosis

## 2025-03-18 NOTE — PROGRESS NOTES
HCT 29.7 03/18/2025 06:00 AM     03/18/2025 06:00 AM    MCV 94.0 03/18/2025 06:00 AM    MCH 29.7 03/18/2025 06:00 AM    MCHC 31.6 03/18/2025 06:00 AM    RDW 19.0 03/18/2025 06:00 AM    NRBC 1 03/16/2025 04:37 AM    LYMPHOPCT 7 03/18/2025 06:00 AM    MONOPCT 8 03/18/2025 06:00 AM    MYELOPCT 1 03/16/2025 04:37 AM    EOSPCT 3 03/18/2025 06:00 AM    BASOPCT 0 03/18/2025 06:00 AM    MONOSABS 0.75 03/18/2025 06:00 AM    LYMPHSABS 0.66 03/18/2025 06:00 AM    EOSABS 0.32 03/18/2025 06:00 AM    BASOSABS 0.01 03/18/2025 06:00 AM       CMP     Lab Results   Component Value Date/Time     03/18/2025 06:00 AM    K 4.1 03/18/2025 06:00 AM    K 3.5 12/17/2021 04:50 AM     03/18/2025 06:00 AM    CO2 23 03/18/2025 06:00 AM    BUN 14 03/18/2025 06:00 AM    CREATININE 0.9 03/18/2025 06:00 AM    GFRAA >60 12/21/2021 05:41 AM    LABGLOM 89 03/18/2025 06:00 AM    LABGLOM >60 01/12/2024 03:57 AM    GLUCOSE 77 03/18/2025 06:00 AM    CALCIUM 7.8 03/18/2025 06:00 AM    BILITOT 0.5 03/18/2025 06:00 AM    ALKPHOS 63 03/18/2025 06:00 AM    AST 31 03/18/2025 06:00 AM    ALT 26 03/18/2025 06:00 AM         Hepatic Function Panel:    Lab Results   Component Value Date/Time    ALKPHOS 63 03/18/2025 06:00 AM    ALT 26 03/18/2025 06:00 AM    AST 31 03/18/2025 06:00 AM    BILITOT 0.5 03/18/2025 06:00 AM    BILIDIR 0.7 12/19/2021 05:00 AM    IBILI 2.0 12/19/2021 05:00 AM       PT/INR:    Lab Results   Component Value Date/Time    PROTIME 12.5 12/20/2021 09:10 PM    INR 1.1 12/20/2021 09:10 PM       TSH:    Lab Results   Component Value Date/Time    TSH 4.38 11/19/2024 02:50 PM       U/A:    Lab Results   Component Value Date/Time    COLORU Dark Yellow 03/11/2025 09:00 AM    PHUR 5.0 03/11/2025 09:00 AM    PHUR 6.5 12/18/2021 11:51 AM    WBCUA 0 TO 5 03/11/2025 09:00 AM    RBCUA TOO NUMEROUS TO COUNT 03/11/2025 09:00 AM    BACTERIA 1+ 03/11/2025 09:00 AM    CLARITYU Clear 12/18/2021 11:51 AM    LEUKOCYTESUR TRACE 03/11/2025 09:00 AM      Enterococcus bacteremia- JIMBO no vegetation  - follow up cx neg on 2/7  Small bowel obstruction -s/p exp lap    Pneumonia - treated    Leukocytosis -resolved    S/P exp lap , lysis of adhesion and reduction of internal hernia ( 3/12)         RECOMMENDATIONS:      IV ampicillin 2 grams IV q 4 hrs and rocephin 2 grams IV q 12 hrs until 3/21/2025  Monitor labs, Cr , CBC   Awaiting placement

## 2025-03-18 NOTE — PROGRESS NOTES
Physical Therapy  Physical Therapy Missed Visit    Name: Ruben Davidson  : 1945  MRN: [id number]  Room #: 7413/7413-A    Date of Service: 3/18/2025    Attempted PT treatment. Pt off the floor for testing, will attempt again as schedule allows.    Horacio Dhaliwal, PT, DPT  [id number]

## 2025-03-18 NOTE — PROGRESS NOTES
Comprehensive Nutrition Assessment    Type and Reason for Visit:  Initial, LOS    Nutrition Recommendations/Plan:   Continue Current Diet, Start Oral Nutrition Supplement   Dae BID, Ensure HP BID     Malnutrition Assessment:  Malnutrition Status:  At risk for malnutrition (03/18/25 1418)    Context:  Acute Illness     Findings of the 6 clinical characteristics of malnutrition:  Energy Intake:  75% or less of estimated energy requirements for 7 or more days  Weight Loss:  Unable to assess (d/t large fluctuations)     Body Fat Loss:  Unable to assess (pt not available)     Muscle Mass Loss:  Unable to assess (pt not available)    Fluid Accumulation:  No fluid accumulation     Strength:  Not Performed    Nutrition Assessment:    Pt admit w/ high grade SBO, PNA & UTI. Noted intolerance to PO intake w/ N/V x several days pta. Pt s/p NGT LIS on admit. 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 Pt found w/ internal hernia, jejunojejunal flo-anastomotic stricture s/p exlap, MARANDA, reduction of internal hernia, jejunojejunal bypass 3/12. PO diet advanced w/ no noted intolerance. Will start ONS to aid in surgical healing/recovery. Will monitor.    Nutrition Related Findings:    pt alert, hypoactive BS, abd distention/tenderness, +2-4 pitting edema, boggy heels, +I/Os 8.7L, hypophosphatemia Wound Type: Surgical Incision       Current Nutrition Intake & Therapies:    Average Meal Intake: 26-50% (average per doc flow)  Average Supplements Intake: None Ordered  ADULT DIET; Regular; Low Fat/Low Chol/High Fiber/2 gm Na    Anthropometric Measures:  Height: 167.6 cm (5' 6\")  Ideal Body Weight (IBW): 142 lbs (65 kg)    Admission Body Weight: 80.3 kg (177 lb) (3/12 bed scale)  Current Body Weight: 80.3 kg (177 lb) (3/12 dry admit wt as CBW appears elevated), 124.6 % IBW.    Current BMI (kg/m2): 28.6  Usual Body Weight:  (154-187# acutal measured wt range x past year per EMR)                          BMI Categories: Overweight (BMI

## 2025-03-18 NOTE — PLAN OF CARE
Problem: Chronic Conditions and Co-morbidities  Goal: Patient's chronic conditions and co-morbidity symptoms are monitored and maintained or improved  3/18/2025 1026 by Radha Cosme RN  Outcome: Progressing  3/18/2025 0053 by Rocio Kathleen RN  Outcome: Progressing  3/17/2025 2134 by Meghna Morrison RN  Outcome: Progressing     Problem: Discharge Planning  Goal: Discharge to home or other facility with appropriate resources  3/18/2025 1026 by Radha Cosme RN  Outcome: Progressing  3/18/2025 0053 by Rocio Kathleen RN  Outcome: Progressing  3/17/2025 2134 by Meghna Morrison RN  Outcome: Progressing     Problem: Safety - Adult  Goal: Free from fall injury  3/18/2025 1026 by Radha Cosme RN  Outcome: Progressing  3/18/2025 0053 by Rocio Kathleen RN  Outcome: Progressing  3/17/2025 2134 by Meghna Morrison RN  Outcome: Progressing     Problem: Pain  Goal: Verbalizes/displays adequate comfort level or baseline comfort level  3/18/2025 1026 by Radha Cosme RN  Outcome: Progressing  3/18/2025 0053 by Rocio Kathleen RN  Outcome: Progressing  3/17/2025 2134 by Meghna Morrison RN  Outcome: Progressing     Problem: Cardiovascular - Adult  Goal: Maintains optimal cardiac output and hemodynamic stability  3/18/2025 1026 by Radha Cosme RN  Outcome: Progressing  3/18/2025 0053 by Rocio Kathleen RN  Outcome: Progressing  3/17/2025 2134 by Meghna Morrison RN  Outcome: Progressing     Problem: Gastrointestinal - Adult  Goal: Minimal or absence of nausea and vomiting  3/18/2025 1026 by Radha Cosme RN  Outcome: Progressing  3/18/2025 0053 by Rocio Kathleen RN  Outcome: Progressing  3/17/2025 2134 by Meghna Morrison RN  Outcome: Progressing  Goal: Maintains or returns to baseline bowel function  3/18/2025 1026 by Radha Cosme RN  Outcome: Progressing  3/18/2025 0053 by Rocio Kathleen RN  Outcome: Progressing  3/17/2025 2134 by Meghna Morrison, RN  Outcome: Progressing     Problem:  Radha RN  Outcome: Progressing  3/18/2025 0053 by Rocio Kathleen RN  Outcome: Progressing  3/17/2025 2134 by Meghna Morrison RN  Outcome: Progressing

## 2025-03-18 NOTE — PROGRESS NOTES
Occupational Therapy  Date:3/18/2025  Patient Name: Ruben Davidson  MRN: [id number]  : 1945  Room: [address] [place] [address]-A    Pt's chart reviewed and treatment attempted however pt off unit for x-ray at this time.  Will attempt treatment at a later date/time.    Jennifer GILL/MIKI [id number]

## 2025-03-18 NOTE — PROGRESS NOTES
Cardiology consult placed via [facility] serve to Dr Yates.  Nataly LUNA taking messages   - - -

## 2025-03-18 NOTE — CARE COORDINATION
Reviewed chart, pt currently on 4LO2. ID following, IV ampicillin q6 and IV rocephin q12. Spoke with ID on 3/17, antibiotics can stop at discharge.  Per Roe at [facility], [facility] beds[facility]. Envelope and ambullete form in soft chart. Will need a completed PASRR once accepting facility.  12:30pm per Monalisa at [facility], no male beds. Spoke with pt and spouse, they would like to try [facility] then [facility]. Referral to Iman at [facility], await assessment.  2:10pm [facility] has no beds, referral to [facility], await assessment and acceptance.Marylin Sr, MSW, LSW

## 2025-03-18 NOTE — PROGRESS NOTES
[facility]                                Progress note    Subjective:  Denies chest pain and dyspnea     Objective:  Sitting up in bed, conversing without difficulty  No acute distress  Wife present at the bedside  /76   Pulse 79   Temp 97.3 °F (36.3 °C) (Temporal)   Resp 11   Ht 1.676 m (5' 6\")   Wt 87.3 kg (192 lb 7.4 oz)   SpO2 100%   BMI 31.06 kg/m²   CONST:  Well developed, obese, elderly  male who appears stated age. Awake, alert, cooperative, no apparent distress  HEENT:   Head- Normocephalic, atraumatic   Eyes- Conjunctivae pink, anicteric  Throat- Oral mucosa pink and moist  Neck-  No stridor, trachea midline, no jugular venous distention. No adenopathy   CHEST: Chest symmetrical and non-tender to palpation. No accessory muscle use or intercostal retractions  RESPIRATORY: Lung sounds -diminished throughout fields   CARDIOVASCULAR:     No carotid bruit  Heart Inspection- shows no noted pulsations  Heart Palpation- no heaves or thrills; PMI is non-displaced   Heart Ausculation- Regular rate and rhythm, no murmur. No s3, s4 or rub   PV: 1-2+ pitting bilateral lower extremity edema. No varicosities. Pedal pulses palpable, no clubbing or cyanosis.  LUE 1+ pitting edema  ABDOMEN: Soft, obese, non-tender to light palpation. Bowel sounds present. No palpable masses no organomegaly; no abdominal bruit  MS: Good muscle strength and tone. No atrophy or abnormal movements.   : Deferred  SKIN: Warm and dry no statis dermatitis or ulcers   NEURO / PSYCH: Oriented to person, place and time. Speech clear and appropriate. Follows all commands. Pleasant affect      Recent Labs     03/16/25 0437 03/17/25  0600 03/18/25  0600   WBC 10.0 9.6 10.0   HGB 8.8* 9.3* 9.4*   HCT 28.1* 29.0* 29.7*    153 173       Recent Labs     03/16/25 0437 03/17/25  0600 03/18/25  0600    142 142   K 3.1* 3.5 4.1    105 106   CO2 21* 25 23   BUN 23 18 14   CREATININE 1.1 0.9 0.9  control  Cardiac medications are currently on hold including amiodarone-resume at discretion of cardiology/EP     3/16/2025  No acute events or issues overnight  Doing really well  Awaiting transfer out of ICU setting  Off pressors  Tolerating p.o. intake without issues  Supplement electrolytes-potassium 3.1 today  Continue to monitor H&H 8.8/28.1 today  Discussed case with bedside RN    03/17/2025  Acute Hypoxic Respiratory Failure: Resolved  Small bowel obstruction s/p exploratory laparotomy/lysis of adhesions/reduction of internal hernia/jejunal jejunal bypass 03/13/2025   Tolerating diet  Enterococcus Bacteremia  Continue Ampicillin, Rocephin   Permanent Atrial Fibrillation   Continue Eliquis  VS stable, continue to monitor  Anemia, stable  Monitor CBC, BMP    03/18/2025  Acute Hypoxic Respiratory Failure: Resolved  Small bowel obstruction s/p exploratory laparotomy/lysis of adhesions/reduction of internal hernia/jejunal jejunal bypass 03/13/2025   Tolerating diet  Enterococcus Bacteremia  Continue Ampicillin until 03/21/2025 per ID  Permanent Atrial Fibrillation   Continue Eliquis  VS stable, continue to monitor  Anemia, stable  Hypocalcemia, supplement  Monitor CBC, BMP  LUE edema, ultrasound results pending  Hypervolemic on exam  Check NT proBNP and CXR  Received Demadex 40 mg PO today per General Surgery  Known Hx HFrEF   Known Hx Ischemic Cardiomyopathy s/p [employer] CRT-D  Known Hx moderate MR, moderate TR  Consult Cardiology  Discharge planning to facility when able, awaiting acceptance        Code status: FULL  Consultants:  Pulmonology, Infectious Disease, General Surgery, Cardiology  DVT Prophylaxis Eliquis  PT/OT  Discharge planning           KAYE Jon - CNP,  11:27 AM  3/18/2025

## 2025-03-18 NOTE — PLAN OF CARE
Problem: Chronic Conditions and Co-morbidities  Goal: Patient's chronic conditions and co-morbidity symptoms are monitored and maintained or improved  3/18/2025 0053 by Rocio Kathleen RN  Outcome: Progressing  3/17/2025 2134 by Meghna Morrison RN  Outcome: Progressing     Problem: Discharge Planning  Goal: Discharge to home or other facility with appropriate resources  3/18/2025 0053 by Rocio Kathleen RN  Outcome: Progressing  3/17/2025 2134 by Meghna Morrison RN  Outcome: Progressing     Problem: Safety - Adult  Goal: Free from fall injury  3/18/2025 0053 by Rocio Kathleen RN  Outcome: Progressing  3/17/2025 2134 by Meghna Morrison RN  Outcome: Progressing     Problem: Pain  Goal: Verbalizes/displays adequate comfort level or baseline comfort level  3/18/2025 0053 by Rocio Kathleen RN  Outcome: Progressing  3/17/2025 2134 by Meghna Morrison RN  Outcome: Progressing     Problem: Cardiovascular - Adult  Goal: Maintains optimal cardiac output and hemodynamic stability  3/18/2025 0053 by Rocio Kathleen RN  Outcome: Progressing  3/17/2025 2134 by Meghna Morrison RN  Outcome: Progressing     Problem: Gastrointestinal - Adult  Goal: Minimal or absence of nausea and vomiting  3/18/2025 0053 by Rocio Kathleen RN  Outcome: Progressing  3/17/2025 2134 by Meghna Morrison RN  Outcome: Progressing  Goal: Maintains or returns to baseline bowel function  3/18/2025 0053 by Rocio Kathleen RN  Outcome: Progressing  3/17/2025 2134 by Meghna Morrison RN  Outcome: Progressing     Problem: Skin/Tissue Integrity  Goal: Skin integrity remains intact  Description: 1.  Monitor for areas of redness and/or skin breakdown  2.  Assess vascular access sites hourly  3.  Every 4-6 hours minimum:  Change oxygen saturation probe site  4.  Every 4-6 hours:  If on nasal continuous positive airway pressure, respiratory therapy assess nares and determine need for appliance change or resting period  3/18/2025 0053 by Hever

## 2025-03-18 NOTE — PLAN OF CARE
Problem: Chronic Conditions and Co-morbidities  Goal: Patient's chronic conditions and co-morbidity symptoms are monitored and maintained or improved  Outcome: Progressing     Problem: Discharge Planning  Goal: Discharge to home or other facility with appropriate resources  Outcome: Progressing     Problem: Safety - Adult  Goal: Free from fall injury  Outcome: Progressing     Problem: Pain  Goal: Verbalizes/displays adequate comfort level or baseline comfort level  Outcome: Progressing     Problem: Cardiovascular - Adult  Goal: Maintains optimal cardiac output and hemodynamic stability  Outcome: Progressing     Problem: Gastrointestinal - Adult  Goal: Minimal or absence of nausea and vomiting  Outcome: Progressing  Goal: Maintains or returns to baseline bowel function  Outcome: Progressing     Problem: Skin/Tissue Integrity  Goal: Skin integrity remains intact  Description: 1.  Monitor for areas of redness and/or skin breakdown  2.  Assess vascular access sites hourly  3.  Every 4-6 hours minimum:  Change oxygen saturation probe site  4.  Every 4-6 hours:  If on nasal continuous positive airway pressure, respiratory therapy assess nares and determine need for appliance change or resting period  Outcome: Progressing     Problem: ABCDS Injury Assessment  Goal: Absence of physical injury  Outcome: Progressing     Problem: Confusion  Goal: Confusion, delirium, dementia, or psychosis is improved or at baseline  Description: INTERVENTIONS:  1. Assess for possible contributors to thought disturbance, including medications, impaired vision or hearing, underlying metabolic abnormalities, dehydration, psychiatric diagnoses, and notify attending LIP  2. [place] high risk fall precautions, as indicated  3. Provide frequent short contacts to provide reality reorientation, refocusing and direction  4. Decrease environmental stimuli, including noise as appropriate  5. Monitor and intervene to maintain adequate nutrition,

## 2025-03-19 ENCOUNTER — HOSPITAL ENCOUNTER (OUTPATIENT)
Dept: INFUSION THERAPY | Age: 80
Setting detail: INFUSION SERIES
End: 2025-03-19

## 2025-03-19 LAB
ALBUMIN SERPL-MCNC: 2.6 G/DL (ref 3.5–5.2)
ALP SERPL-CCNC: 69 U/L (ref 40–129)
ALT SERPL-CCNC: 21 U/L (ref 0–40)
ANION GAP SERPL CALCULATED.3IONS-SCNC: 12 MMOL/L (ref 7–16)
AST SERPL-CCNC: 27 U/L (ref 0–39)
BASOPHILS # BLD: 0.02 K/UL (ref 0–0.2)
BASOPHILS NFR BLD: 0 % (ref 0–2)
BILIRUB SERPL-MCNC: 0.5 MG/DL (ref 0–1.2)
BUN SERPL-MCNC: 15 MG/DL (ref 6–23)
CA-I BLD-SCNC: 1.11 MMOL/L (ref 1.15–1.33)
CALCIUM SERPL-MCNC: 8 MG/DL (ref 8.6–10.2)
CHLORIDE SERPL-SCNC: 102 MMOL/L (ref 98–107)
CO2 SERPL-SCNC: 29 MMOL/L (ref 22–29)
CREAT SERPL-MCNC: 0.9 MG/DL (ref 0.7–1.2)
EOSINOPHIL # BLD: 0.35 K/UL (ref 0.05–0.5)
EOSINOPHILS RELATIVE PERCENT: 3 % (ref 0–6)
ERYTHROCYTE [DISTWIDTH] IN BLOOD BY AUTOMATED COUNT: 19.1 % (ref 11.5–15)
GFR, ESTIMATED: 88 ML/MIN/1.73M2
GLUCOSE SERPL-MCNC: 76 MG/DL (ref 74–99)
HCT VFR BLD AUTO: 30.5 % (ref 37–54)
HGB BLD-MCNC: 9.8 G/DL (ref 12.5–16.5)
IMM GRANULOCYTES # BLD AUTO: 0.12 K/UL (ref 0–0.58)
IMM GRANULOCYTES NFR BLD: 1 % (ref 0–5)
LYMPHOCYTES NFR BLD: 0.69 K/UL (ref 1.5–4)
LYMPHOCYTES RELATIVE PERCENT: 6 % (ref 20–42)
MAGNESIUM SERPL-MCNC: 1.7 MG/DL (ref 1.6–2.6)
MCH RBC QN AUTO: 30.2 PG (ref 26–35)
MCHC RBC AUTO-ENTMCNC: 32.1 G/DL (ref 32–34.5)
MCV RBC AUTO: 93.8 FL (ref 80–99.9)
MONOCYTES NFR BLD: 0.72 K/UL (ref 0.1–0.95)
MONOCYTES NFR BLD: 6 % (ref 2–12)
NEUTROPHILS NFR BLD: 84 % (ref 43–80)
NEUTS SEG NFR BLD: 9.73 K/UL (ref 1.8–7.3)
PHOSPHATE SERPL-MCNC: 3.1 MG/DL (ref 2.5–4.5)
PLATELET # BLD AUTO: 201 K/UL (ref 130–450)
PMV BLD AUTO: 11.5 FL (ref 7–12)
POTASSIUM SERPL-SCNC: 3.4 MMOL/L (ref 3.5–5)
PROT SERPL-MCNC: 4.9 G/DL (ref 6.4–8.3)
RBC # BLD AUTO: 3.25 M/UL (ref 3.8–5.8)
SODIUM SERPL-SCNC: 143 MMOL/L (ref 132–146)
WBC OTHER # BLD: 11.6 K/UL (ref 4.5–11.5)

## 2025-03-19 PROCEDURE — 2580000003 HC RX 258

## 2025-03-19 PROCEDURE — 6370000000 HC RX 637 (ALT 250 FOR IP): Performed by: INTERNAL MEDICINE

## 2025-03-19 PROCEDURE — 6370000000 HC RX 637 (ALT 250 FOR IP)

## 2025-03-19 PROCEDURE — 36415 COLL VENOUS BLD VENIPUNCTURE: CPT

## 2025-03-19 PROCEDURE — 2700000000 HC OXYGEN THERAPY PER DAY

## 2025-03-19 PROCEDURE — 97530 THERAPEUTIC ACTIVITIES: CPT

## 2025-03-19 PROCEDURE — 6360000002 HC RX W HCPCS: Performed by: NURSE PRACTITIONER

## 2025-03-19 PROCEDURE — 94640 AIRWAY INHALATION TREATMENT: CPT

## 2025-03-19 PROCEDURE — 82330 ASSAY OF CALCIUM: CPT

## 2025-03-19 PROCEDURE — 2500000003 HC RX 250 WO HCPCS

## 2025-03-19 PROCEDURE — 6360000002 HC RX W HCPCS

## 2025-03-19 PROCEDURE — 2060000000 HC ICU INTERMEDIATE R&B

## 2025-03-19 PROCEDURE — 83735 ASSAY OF MAGNESIUM: CPT

## 2025-03-19 PROCEDURE — 84100 ASSAY OF PHOSPHORUS: CPT

## 2025-03-19 PROCEDURE — 6370000000 HC RX 637 (ALT 250 FOR IP): Performed by: NURSE PRACTITIONER

## 2025-03-19 PROCEDURE — 97535 SELF CARE MNGMENT TRAINING: CPT

## 2025-03-19 PROCEDURE — 80053 COMPREHEN METABOLIC PANEL: CPT

## 2025-03-19 PROCEDURE — 6370000000 HC RX 637 (ALT 250 FOR IP): Performed by: STUDENT IN AN ORGANIZED HEALTH CARE EDUCATION/TRAINING PROGRAM

## 2025-03-19 PROCEDURE — 99024 POSTOP FOLLOW-UP VISIT: CPT | Performed by: SURGERY

## 2025-03-19 PROCEDURE — 85025 COMPLETE CBC W/AUTO DIFF WBC: CPT

## 2025-03-19 RX ORDER — TORSEMIDE 20 MG/1
20 TABLET ORAL DAILY
Status: DISCONTINUED | OUTPATIENT
Start: 2025-03-19 | End: 2025-03-19

## 2025-03-19 RX ORDER — TORSEMIDE 20 MG/1
40 TABLET ORAL DAILY
Status: DISCONTINUED | OUTPATIENT
Start: 2025-03-19 | End: 2025-03-21 | Stop reason: HOSPADM

## 2025-03-19 RX ORDER — CALCIUM CARBONATE 500 MG/1
500 TABLET, CHEWABLE ORAL ONCE
Status: COMPLETED | OUTPATIENT
Start: 2025-03-19 | End: 2025-03-19

## 2025-03-19 RX ORDER — MAGNESIUM SULFATE IN WATER 40 MG/ML
2000 INJECTION, SOLUTION INTRAVENOUS ONCE
Status: COMPLETED | OUTPATIENT
Start: 2025-03-19 | End: 2025-03-19

## 2025-03-19 RX ADMIN — Medication 1 TABLET: at 21:40

## 2025-03-19 RX ADMIN — TAMSULOSIN HYDROCHLORIDE 0.4 MG: 0.4 CAPSULE ORAL at 09:12

## 2025-03-19 RX ADMIN — GUAIFENESIN 400 MG: 400 TABLET ORAL at 09:13

## 2025-03-19 RX ADMIN — WATER 2000 MG: 1 INJECTION INTRAMUSCULAR; INTRAVENOUS; SUBCUTANEOUS at 03:28

## 2025-03-19 RX ADMIN — SALINE NASAL SPRAY 1 SPRAY: 1.5 SOLUTION NASAL at 21:40

## 2025-03-19 RX ADMIN — CLOPIDOGREL BISULFATE 75 MG: 75 TABLET, FILM COATED ORAL at 09:12

## 2025-03-19 RX ADMIN — SODIUM CHLORIDE: 0.9 INJECTION, SOLUTION INTRAVENOUS at 21:35

## 2025-03-19 RX ADMIN — APIXABAN 5 MG: 5 TABLET, FILM COATED ORAL at 21:28

## 2025-03-19 RX ADMIN — MAGNESIUM SULFATE HEPTAHYDRATE 2000 MG: 40 INJECTION, SOLUTION INTRAVENOUS at 10:50

## 2025-03-19 RX ADMIN — AMPICILLIN SODIUM 2000 MG: 2 INJECTION, POWDER, FOR SOLUTION INTRAMUSCULAR; INTRAVENOUS at 05:58

## 2025-03-19 RX ADMIN — AMIODARONE HYDROCHLORIDE 200 MG: 200 TABLET ORAL at 09:13

## 2025-03-19 RX ADMIN — WATER 2000 MG: 1 INJECTION INTRAMUSCULAR; INTRAVENOUS; SUBCUTANEOUS at 15:51

## 2025-03-19 RX ADMIN — AMPICILLIN SODIUM 2000 MG: 2 INJECTION, POWDER, FOR SOLUTION INTRAMUSCULAR; INTRAVENOUS at 17:41

## 2025-03-19 RX ADMIN — DIBASIC SODIUM PHOSPHATE, MONOBASIC POTASSIUM PHOSPHATE AND MONOBASIC SODIUM PHOSPHATE 2 TABLET: 852; 155; 130 TABLET ORAL at 09:12

## 2025-03-19 RX ADMIN — GUAIFENESIN 400 MG: 400 TABLET ORAL at 21:28

## 2025-03-19 RX ADMIN — GABAPENTIN 300 MG: 300 CAPSULE ORAL at 09:13

## 2025-03-19 RX ADMIN — Medication 1 TABLET: at 09:12

## 2025-03-19 RX ADMIN — IPRATROPIUM BROMIDE AND ALBUTEROL SULFATE 1 DOSE: 2.5; .5 SOLUTION RESPIRATORY (INHALATION) at 08:23

## 2025-03-19 RX ADMIN — ACETAMINOPHEN 650 MG: 325 TABLET ORAL at 17:37

## 2025-03-19 RX ADMIN — APIXABAN 5 MG: 5 TABLET, FILM COATED ORAL at 09:13

## 2025-03-19 RX ADMIN — SACUBITRIL AND VALSARTAN 1 TABLET: 24; 26 TABLET, FILM COATED ORAL at 09:13

## 2025-03-19 RX ADMIN — AMPICILLIN SODIUM 2000 MG: 2 INJECTION, POWDER, FOR SOLUTION INTRAMUSCULAR; INTRAVENOUS at 02:52

## 2025-03-19 RX ADMIN — METOPROLOL SUCCINATE 25 MG: 50 TABLET, EXTENDED RELEASE ORAL at 09:13

## 2025-03-19 RX ADMIN — METOPROLOL SUCCINATE 25 MG: 50 TABLET, EXTENDED RELEASE ORAL at 21:28

## 2025-03-19 RX ADMIN — POTASSIUM BICARBONATE 40 MEQ: 782 TABLET, EFFERVESCENT ORAL at 21:29

## 2025-03-19 RX ADMIN — AMPICILLIN SODIUM 2000 MG: 2 INJECTION, POWDER, FOR SOLUTION INTRAMUSCULAR; INTRAVENOUS at 21:38

## 2025-03-19 RX ADMIN — CALCIUM CARBONATE 500 MG: 500 TABLET, CHEWABLE ORAL at 10:45

## 2025-03-19 RX ADMIN — AMPICILLIN SODIUM 2000 MG: 2 INJECTION, POWDER, FOR SOLUTION INTRAMUSCULAR; INTRAVENOUS at 13:50

## 2025-03-19 RX ADMIN — CHOLECALCIFEROL TAB 125 MCG (5000 UNIT) 5000 UNITS: 125 TAB at 09:13

## 2025-03-19 RX ADMIN — CETIRIZINE HYDROCHLORIDE 10 MG: 10 TABLET, FILM COATED ORAL at 17:37

## 2025-03-19 RX ADMIN — POTASSIUM BICARBONATE 40 MEQ: 782 TABLET, EFFERVESCENT ORAL at 09:13

## 2025-03-19 RX ADMIN — GABAPENTIN 300 MG: 300 CAPSULE ORAL at 21:28

## 2025-03-19 RX ADMIN — SACUBITRIL AND VALSARTAN 1 TABLET: 24; 26 TABLET, FILM COATED ORAL at 21:29

## 2025-03-19 RX ADMIN — SODIUM CHLORIDE, PRESERVATIVE FREE 10 ML: 5 INJECTION INTRAVENOUS at 21:00

## 2025-03-19 RX ADMIN — AMPICILLIN SODIUM 2000 MG: 2 INJECTION, POWDER, FOR SOLUTION INTRAMUSCULAR; INTRAVENOUS at 10:11

## 2025-03-19 RX ADMIN — TORSEMIDE 40 MG: 20 TABLET ORAL at 10:45

## 2025-03-19 RX ADMIN — GUAIFENESIN 400 MG: 400 TABLET ORAL at 13:44

## 2025-03-19 RX ADMIN — ACETAMINOPHEN 650 MG: 325 TABLET ORAL at 05:55

## 2025-03-19 RX ADMIN — Medication 5 MG: at 21:28

## 2025-03-19 RX ADMIN — FERROUS SULFATE TAB 325 MG (65 MG ELEMENTAL FE) 325 MG: 325 (65 FE) TAB at 09:13

## 2025-03-19 RX ADMIN — DULOXETINE HYDROCHLORIDE 30 MG: 30 CAPSULE, DELAYED RELEASE ORAL at 09:12

## 2025-03-19 RX ADMIN — SODIUM CHLORIDE, PRESERVATIVE FREE 10 ML: 5 INJECTION INTRAVENOUS at 09:12

## 2025-03-19 RX ADMIN — FOLIC ACID 1 MG: 1 TABLET ORAL at 09:12

## 2025-03-19 RX ADMIN — IPRATROPIUM BROMIDE AND ALBUTEROL SULFATE 1 DOSE: 2.5; .5 SOLUTION RESPIRATORY (INHALATION) at 20:41

## 2025-03-19 RX ADMIN — ACETAMINOPHEN 650 MG: 325 TABLET ORAL at 10:45

## 2025-03-19 RX ADMIN — PANTOPRAZOLE SODIUM 40 MG: 40 TABLET, DELAYED RELEASE ORAL at 05:55

## 2025-03-19 RX ADMIN — IPRATROPIUM BROMIDE AND ALBUTEROL SULFATE 1 DOSE: 2.5; .5 SOLUTION RESPIRATORY (INHALATION) at 15:45

## 2025-03-19 RX ADMIN — GABAPENTIN 300 MG: 300 CAPSULE ORAL at 13:44

## 2025-03-19 ASSESSMENT — PAIN SCALES - GENERAL
PAINLEVEL_OUTOF10: 0
PAINLEVEL_OUTOF10: 3
PAINLEVEL_OUTOF10: 0

## 2025-03-19 ASSESSMENT — PAIN DESCRIPTION - LOCATION: LOCATION: ABDOMEN

## 2025-03-19 NOTE — PROGRESS NOTES
GENERAL SURGERY  DAILY PROGRESS NOTE    Patient's Name/Date of Birth: Ruben Davidson / 1945    Date: 2025     Chief Complaint   Patient presents with    Vomiting     Patient had kidney stone removal and stent placement has not been eating and drinking since due to vomiting         Subjective:  DVT US negative yesterday. Given dose of torsemide with significant UOP (6.6L). Patient just had 4th BM of the last 24 hours. No nausea or vomiting.     Objective:  Last 24Hrs  Temp  Av.8 °F (36.6 °C)  Min: 97 °F (36.1 °C)  Max: 98.7 °F (37.1 °C)  Resp  Av.3  Min: 11  Max: 18  Pulse  Av.4  Min: 72  Max: 88  Systolic (24hrs), Av , Min:118 , Max:151     Diastolic (24hrs), Av, Min:70, Max:88    SpO2  Av.3 %  Min: 98 %  Max: 100 %    I/O last 3 completed shifts:  In: 960 [P.O.:960]  Out: 4700 [Urine:4700]      General: In no acute distress, alert and oriented x4  Cardiovascular: Warm throughout, no edema  Respiratory: no respiratory distress, equal chest rise, 4L NC  Abdomen: soft, minimally tender to palpation around incision, covered with staples, c/d/I, non-distended, no rebound or guarding, edematous   Skin: no obvious rashes or lesions appreciated, no jaundice  Extremities: atraumatic, no focal motor deficits, no open wounds, edema, LUE > RUE      CBC  Recent Labs     25  0600 25  0600   WBC 9.6 10.0   RBC 3.12* 3.16*   HGB 9.3* 9.4*   HCT 29.0* 29.7*   MCV 92.9 94.0   MCH 29.8 29.7   MCHC 32.1 31.6*   RDW 18.8* 19.0*    173   MPV 11.1 11.3       CMP  Recent Labs     25  0600 25  0600    142   K 3.5 4.1    106   CO2 25 23   BUN 18 14   CREATININE 0.9 0.9   GLUCOSE 83 77   CALCIUM 7.8* 7.8*   BILITOT 0.4 0.5   ALKPHOS 65 63   AST 36 31   ALT 32 26         Assessment/Plan:    Patient Active Problem List   Diagnosis    CAD (coronary artery disease)s/p stent RCA    Valvular heart disease    Hypertension    Obesity, morbid s/p intestinal

## 2025-03-19 NOTE — CARE COORDINATION
Reviewed chart, called and left message for [facility]'s admission team to check to see if pt was accepted, await response. Envelope and ambullete form in soft chart, will need completed PASRR once accepting facility.  10:20am spoke with Francia at [facility], they accepted pt and will start precert today. Completed PASRR in soft chart.  3:55pm received precert, good through 3/22.Marylin Sr, MSW, LSW

## 2025-03-19 NOTE — PROGRESS NOTES
Physical Therapy  Treatment Note    Name: Ruben Davidson  : 1945  MRN: [id number]      Date of Service: 3/19/2025    Evaluating PT:  Toby Paredes PT, DPT [id number]    Room #:  7413/7413-A  Diagnosis:  SBO (small bowel obstruction) ([facility]) [K56.609]  Pneumonia due to organism [J18.9]  Non-sustained ventricular tachycardia ([facility]) [I47.29]  Pneumonia due to infectious organism, unspecified laterality, unspecified part of lung [J18.9]  Sepsis, due to unspecified organism, unspecified whether acute organ dysfunction present ([facility]) [A41.9]  Acute hypoxic respiratory failure ([facility]) [J96.01]  PMHx/PSHx:    Past Medical History:   Diagnosis Date    Acute kidney injury     Atrial fibrillation ([facility])     CAD (coronary artery disease)     s/p stent RCA    CHF (congestive heart failure) ([facility])     [place] (hard of hearing)     WEARS HEARING AIDES    Hypertension     Kidney stone     with obstruction-left    Left ventricular systolic dysfunction     MI (myocardial infarction) ([facility])     Mitral regurgitation     Obesity, morbid     s/p intestinal bypass    Sleep apnea     Valvular heart disease      Procedure/Surgery:    3/12  Exploratory laparotomy  Lysis of adhesions  Reduction of internal hernia  Jejunojejunal bypass  Closure of mesenteric defect  Precautions:  Falls, [place], corpak, alarms, abdominal, wound vac, cognition  Equipment Needs:  TBD    SUBJECTIVE:    Pt lives with wife in a 1 story home with 3 step(s) to enter and 1 rail(s).      Pt ambulated with WW recently PTA.    OBJECTIVE:   Initial Evaluation  Date: 3/14/25 Treatment  Date: 3/19/25 Short Term/ Long Term   Goals   AM-PAC 6 Clicks 10/24 10/24    Was pt agreeable to Eval/treatment? Yes yes    Does pt have pain? No c/o pain No pain    Bed Mobility  Rolling: NT  Supine to sit: MaxA with HOB elevated  Sit to supine: NT  Scooting: MaxA Rolling: mod A  Supine to sit: mod A  Sit to supine: NT  Scooting: mod A Mod Independent   Transfers Sit to stand: ModA

## 2025-03-19 NOTE — PROGRESS NOTES
Department of Internal Medicine  Infectious Diseases  Progress Note      C/C: Enterococcus bacteremia , pneumonia, small bowel obstruction     Pt is awake and alert   Denies abdomen pain  Denies nausea or vomiting  Afebrile       Current Facility-Administered Medications   Medication Dose Route Frequency Provider Last Rate Last Admin    torsemide (DEMADEX) tablet 40 mg  40 mg Oral Daily Cene, Marylin, APRN - CNP   40 mg at 03/19/25 1045    sacubitril-valsartan (ENTRESTO) 24-26 MG per tablet 1 tablet  1 tablet Oral BID Cene, Marylin, APRN - CNP   1 tablet at 03/19/25 0913    acetaminophen (TYLENOL) tablet 650 mg  650 mg Oral 4 times per day Ciarra Ling MD   650 mg at 03/19/25 1045    potassium bicarb-citric acid (EFFER-K) effervescent tablet 40 mEq  40 mEq Oral BID Ciarra Ling MD   40 mEq at 03/19/25 0913    oxyCODONE (ROXICODONE) immediate release tablet 2.5 mg  2.5 mg Oral Q4H PRN Ciarra Ling MD        Or    oxyCODONE (ROXICODONE) immediate release tablet 5 mg  5 mg Oral Q4H PRN Ciarra Ling MD   5 mg at 03/16/25 2104    melatonin disintegrating tablet 5 mg  5 mg Oral Nightly Ciarra Ling MD   5 mg at 03/18/25 2116    sodium chloride flush 0.9 % injection 5-40 mL  5-40 mL IntraVENous 2 times per day Ciarra Ling MD   10 mL at 03/19/25 0912    sodium chloride flush 0.9 % injection 5-40 mL  5-40 mL IntraVENous PRN Ciarra Ling MD   20 mL at 03/15/25 0534    0.9 % sodium chloride infusion   IntraVENous PRN Ciarra Ling MD   Stopped at 03/12/25 1707    glucose chewable tablet 16 g  4 tablet Oral PRN Ciarra Ling MD        dextrose bolus 10% 125 mL  125 mL IntraVENous PRN Ciarra Ling MD        Or    dextrose bolus 10% 250 mL  250 mL IntraVENous PRN Ciarra Ling MD        glucagon injection 1 mg  1 mg SubCUTAneous PRN Ciarra Ling MD        dextrose 10 % infusion   IntraVENous Continuous PRN Ciarra Ling MD        cetirizine (ZYRTEC) tablet 10 mg  10 mg Oral QPM Ciarra Ling MD   10 mg at 03/18/25 1749    vitamin D3 (CHOLECALCIFEROL) tablet 5,000 Units  1 tablet Oral Ciarra Li MD    100 03/11/2025 09:00 AM       ABG:  No results found for: \"HPU5MWB\", \"BEART\", \"B6GHFAOH\", \"PHART\", \"THGBART\", \"GAN2UOK\", \"PO2ART\", \"ZPW3GEE\"    MICROBIOLOGY:    Blood culture -( 2/6)     Susceptibility    Enterococcus faecalis (1)    Antibiotic Interpretation ALIYAH Method Status    ampicillin Sensitive <=2 BACTERIAL SUSCEPTIBILITY PANEL ALIYAH Final    Gentamicin, High Level Sensitive  BACTERIAL SUSCEPTIBILITY PANEL ALIYAH Final    linezolid Sensitive 2 BACTERIAL SUSCEPTIBILITY PANEL ALIYAH Final    Streptomycin, Hi Level Sensitive  BACTERIAL SUSCEPTIBILITY PANEL ALIYAH Final    vancomycin Sensitive 1 BACTERIAL SUSCEPTIBILITY PANEL ALIYAH Final          Vanco -    Vancomycin Rm 10.0       JIMBO-      Left Ventricle: Moderately reduced left ventricular systolic function with a visually estimated EF of 35 - 40%. Left ventricle size is normal. Normal wall thickness. Normal wall motion.    Right Ventricle: Right ventricle size is normal. Lead present in the right ventricle. Normal systolic function.    Aortic Valve: Mild sclerosis of the aortic valve cusps. No stenosis.    Mitral Valve: Mild annular calcification.    Tricuspid Valve: Mild regurgitation. Normal RVSP. The estimated RVSP is 26 mmHg.    Left Atrium: Normal sized appendage. No left atrial appendage thrombus noted. No left atrial appendage mass noted.    Interatrial Septum: No interatrial shunt visualized with color Doppler. Agitated saline study was negative with and without provocation.    Pericardium: No pericardial effusion.    Image quality is adequate.    No echocardiographic evidence of endocarditis.    Radiology :    Chest X ray    CT scan of chest -  1. No acute infiltrate   2. Basilar interstitial opacification and mild bronchiectasis suggestive of   primary ILD, component of edema is not excluded     Procalcitionin 3.03    IMPRESSION:     Enterococcus bacteremia- JIMBO no vegetation  - follow up cx neg on 2/7  Small bowel obstruction -s/p exp lap    Pneumonia - treated

## 2025-03-19 NOTE — PLAN OF CARE
Problem: Chronic Conditions and Co-morbidities  Goal: Patient's chronic conditions and co-morbidity symptoms are monitored and maintained or improved  3/19/2025 0924 by Radha Cosme RN  Outcome: Progressing  Flowsheets (Taken 3/19/2025 0918)  Care Plan - Patient's Chronic Conditions and Co-Morbidity Symptoms are Monitored and Maintained or Improved: Monitor and assess patient's chronic conditions and comorbid symptoms for stability, deterioration, or improvement  3/19/2025 0011 by Meghna Morrison RN  Outcome: Progressing     Problem: Discharge Planning  Goal: Discharge to home or other facility with appropriate resources  3/19/2025 0924 by Radha Cosme RN  Outcome: Progressing  Flowsheets (Taken 3/19/2025 0918)  Discharge to home or other facility with appropriate resources: Identify barriers to discharge with patient and caregiver  3/19/2025 0011 by Meghna Morrison RN  Outcome: Progressing     Problem: Safety - Adult  Goal: Free from fall injury  3/19/2025 0924 by Radha Cosme RN  Outcome: Progressing  3/19/2025 0011 by Meghna Morrison RN  Outcome: Progressing     Problem: Pain  Goal: Verbalizes/displays adequate comfort level or baseline comfort level  3/19/2025 0924 by Radha Cosme RN  Outcome: Progressing  3/19/2025 0011 by Meghna Morrison RN  Outcome: Progressing     Problem: Cardiovascular - Adult  Goal: Maintains optimal cardiac output and hemodynamic stability  3/19/2025 0924 by Radha Cosme RN  Outcome: Progressing  3/19/2025 0011 by Meghna Morrison RN  Outcome: Progressing     Problem: Gastrointestinal - Adult  Goal: Minimal or absence of nausea and vomiting  3/19/2025 0924 by Radha Cosme RN  Outcome: Progressing  3/19/2025 0011 by Meghna Morrison RN  Outcome: Progressing  Goal: Maintains or returns to baseline bowel function  3/19/2025 0924 by Radha Cosme RN  Outcome: Progressing  3/19/2025 0011 by Meghna Morrison RN  Outcome: Progressing

## 2025-03-19 NOTE — PLAN OF CARE
Problem: Chronic Conditions and Co-morbidities  Goal: Patient's chronic conditions and co-morbidity symptoms are monitored and maintained or improved  3/19/2025 0011 by Meghna Morrison RN  Outcome: Progressing  3/18/2025 1026 by Radha Cosme RN  Outcome: Progressing     Problem: Discharge Planning  Goal: Discharge to home or other facility with appropriate resources  3/19/2025 0011 by Meghna Morrison RN  Outcome: Progressing  3/18/2025 1026 by Radha Cosme RN  Outcome: Progressing     Problem: Safety - Adult  Goal: Free from fall injury  3/19/2025 0011 by Meghna Morrison RN  Outcome: Progressing  3/18/2025 1026 by Radha Cosme RN  Outcome: Progressing     Problem: Pain  Goal: Verbalizes/displays adequate comfort level or baseline comfort level  3/19/2025 0011 by Meghna Morrison RN  Outcome: Progressing  3/18/2025 1026 by Radha Cosme RN  Outcome: Progressing     Problem: Cardiovascular - Adult  Goal: Maintains optimal cardiac output and hemodynamic stability  3/19/2025 0011 by Meghna Morrison RN  Outcome: Progressing  3/18/2025 1026 by Radha Cosme RN  Outcome: Progressing     Problem: Gastrointestinal - Adult  Goal: Minimal or absence of nausea and vomiting  3/19/2025 0011 by Meghna Morrison RN  Outcome: Progressing  3/18/2025 1026 by Radha Cosme RN  Outcome: Progressing  Goal: Maintains or returns to baseline bowel function  3/19/2025 0011 by Meghna Morrison RN  Outcome: Progressing  3/18/2025 1026 by Radha Cosme RN  Outcome: Progressing     Problem: Skin/Tissue Integrity  Goal: Skin integrity remains intact  Description: 1.  Monitor for areas of redness and/or skin breakdown  2.  Assess vascular access sites hourly  3.  Every 4-6 hours minimum:  Change oxygen saturation probe site  4.  Every 4-6 hours:  If on nasal continuous positive airway pressure, respiratory therapy assess nares and determine need for appliance change or resting period  3/19/2025  0011 by Meghna Morrison RN  Outcome: Progressing  3/18/2025 1026 by Radha Cosme RN  Outcome: Progressing  Flowsheets (Taken 3/18/2025 1025)  Skin Integrity Remains Intact: Monitor for areas of redness and/or skin breakdown     Problem: ABCDS Injury Assessment  Goal: Absence of physical injury  3/19/2025 0011 by Meghna Morrison RN  Outcome: Progressing  3/18/2025 1026 by Radha Cosme RN  Outcome: Progressing  Flowsheets (Taken 3/18/2025 1025)  Absence of Physical Injury: Implement safety measures based on patient assessment     Problem: Confusion  Goal: Confusion, delirium, dementia, or psychosis is improved or at baseline  Description: INTERVENTIONS:  1. Assess for possible contributors to thought disturbance, including medications, impaired vision or hearing, underlying metabolic abnormalities, dehydration, psychiatric diagnoses, and notify attending LIP  2. [place] high risk fall precautions, as indicated  3. Provide frequent short contacts to provide reality reorientation, refocusing and direction  4. Decrease environmental stimuli, including noise as appropriate  5. Monitor and intervene to maintain adequate nutrition, hydration, elimination, sleep and activity  6. If unable to ensure safety without constant attention obtain sitter and review sitter guidelines with assigned personnel  7. Initiate Psychosocial CNS and Spiritual Care consult, as indicated  3/19/2025 0011 by Meghna Morrison RN  Outcome: Progressing  3/18/2025 1026 by Radha Cosme RN  Outcome: Progressing     Problem: Nutrition Deficit:  Goal: Optimize nutritional status  Outcome: Progressing

## 2025-03-19 NOTE — ACP (ADVANCE CARE PLANNING)
.Advance Care Planning   Healthcare Decision Maker:    Primary Decision Maker: Saira Davidson - Spouse - [phone]    Secondary Decision Maker: Alka Davidson - Child - [phone]    Click here to complete Healthcare Decision Makers including selection of the Healthcare Decision Maker Relationship (ie \"Primary\").

## 2025-03-20 LAB
ALBUMIN SERPL-MCNC: 2.4 G/DL (ref 3.5–5.2)
ALP SERPL-CCNC: 71 U/L (ref 40–129)
ALT SERPL-CCNC: 17 U/L (ref 0–40)
ANION GAP SERPL CALCULATED.3IONS-SCNC: 12 MMOL/L (ref 7–16)
AST SERPL-CCNC: 23 U/L (ref 0–39)
BASOPHILS # BLD: 0.02 K/UL (ref 0–0.2)
BASOPHILS NFR BLD: 0 % (ref 0–2)
BILIRUB SERPL-MCNC: 0.4 MG/DL (ref 0–1.2)
BUN SERPL-MCNC: 15 MG/DL (ref 6–23)
CA-I BLD-SCNC: 1.1 MMOL/L (ref 1.15–1.33)
CALCIUM SERPL-MCNC: 7.7 MG/DL (ref 8.6–10.2)
CHLORIDE SERPL-SCNC: 99 MMOL/L (ref 98–107)
CO2 SERPL-SCNC: 28 MMOL/L (ref 22–29)
CREAT SERPL-MCNC: 0.9 MG/DL (ref 0.7–1.2)
EOSINOPHIL # BLD: 0.35 K/UL (ref 0.05–0.5)
EOSINOPHILS RELATIVE PERCENT: 2 % (ref 0–6)
ERYTHROCYTE [DISTWIDTH] IN BLOOD BY AUTOMATED COUNT: 18.8 % (ref 11.5–15)
GFR, ESTIMATED: 87 ML/MIN/1.73M2
GLUCOSE SERPL-MCNC: 83 MG/DL (ref 74–99)
HCT VFR BLD AUTO: 34.4 % (ref 37–54)
HGB BLD-MCNC: 11 G/DL (ref 12.5–16.5)
IMM GRANULOCYTES # BLD AUTO: 0.17 K/UL (ref 0–0.58)
IMM GRANULOCYTES NFR BLD: 1 % (ref 0–5)
LYMPHOCYTES NFR BLD: 0.84 K/UL (ref 1.5–4)
LYMPHOCYTES RELATIVE PERCENT: 6 % (ref 20–42)
MAGNESIUM SERPL-MCNC: 2 MG/DL (ref 1.6–2.6)
MCH RBC QN AUTO: 30.1 PG (ref 26–35)
MCHC RBC AUTO-ENTMCNC: 32 G/DL (ref 32–34.5)
MCV RBC AUTO: 94 FL (ref 80–99.9)
MONOCYTES NFR BLD: 0.87 K/UL (ref 0.1–0.95)
MONOCYTES NFR BLD: 6 % (ref 2–12)
NEUTROPHILS NFR BLD: 84 % (ref 43–80)
NEUTS SEG NFR BLD: 12.08 K/UL (ref 1.8–7.3)
PHOSPHATE SERPL-MCNC: 3 MG/DL (ref 2.5–4.5)
PLATELET # BLD AUTO: 261 K/UL (ref 130–450)
PMV BLD AUTO: 11.3 FL (ref 7–12)
POTASSIUM SERPL-SCNC: 3.8 MMOL/L (ref 3.5–5)
PROT SERPL-MCNC: 5 G/DL (ref 6.4–8.3)
RBC # BLD AUTO: 3.66 M/UL (ref 3.8–5.8)
SODIUM SERPL-SCNC: 139 MMOL/L (ref 132–146)
WBC OTHER # BLD: 14.3 K/UL (ref 4.5–11.5)

## 2025-03-20 PROCEDURE — 2500000003 HC RX 250 WO HCPCS

## 2025-03-20 PROCEDURE — 6370000000 HC RX 637 (ALT 250 FOR IP)

## 2025-03-20 PROCEDURE — 6360000002 HC RX W HCPCS

## 2025-03-20 PROCEDURE — 85025 COMPLETE CBC W/AUTO DIFF WBC: CPT

## 2025-03-20 PROCEDURE — 80053 COMPREHEN METABOLIC PANEL: CPT

## 2025-03-20 PROCEDURE — 6370000000 HC RX 637 (ALT 250 FOR IP): Performed by: NURSE PRACTITIONER

## 2025-03-20 PROCEDURE — 82330 ASSAY OF CALCIUM: CPT

## 2025-03-20 PROCEDURE — 2580000003 HC RX 258

## 2025-03-20 PROCEDURE — 2700000000 HC OXYGEN THERAPY PER DAY

## 2025-03-20 PROCEDURE — 2580000003 HC RX 258: Performed by: FAMILY MEDICINE

## 2025-03-20 PROCEDURE — 6370000000 HC RX 637 (ALT 250 FOR IP): Performed by: FAMILY MEDICINE

## 2025-03-20 PROCEDURE — 83735 ASSAY OF MAGNESIUM: CPT

## 2025-03-20 PROCEDURE — 2060000000 HC ICU INTERMEDIATE R&B

## 2025-03-20 PROCEDURE — 94640 AIRWAY INHALATION TREATMENT: CPT

## 2025-03-20 PROCEDURE — 84100 ASSAY OF PHOSPHORUS: CPT

## 2025-03-20 PROCEDURE — 99232 SBSQ HOSP IP/OBS MODERATE 35: CPT | Performed by: NURSE PRACTITIONER

## 2025-03-20 RX ORDER — SODIUM CHLORIDE 9 MG/ML
INJECTION, SOLUTION INTRAVENOUS ONCE
Status: COMPLETED | OUTPATIENT
Start: 2025-03-20 | End: 2025-03-20

## 2025-03-20 RX ORDER — AMIODARONE HYDROCHLORIDE 200 MG/1
200 TABLET ORAL 2 TIMES DAILY
Status: DISCONTINUED | OUTPATIENT
Start: 2025-03-20 | End: 2025-03-21 | Stop reason: HOSPADM

## 2025-03-20 RX ORDER — CALCIUM CARBONATE 500 MG/1
500 TABLET, CHEWABLE ORAL 3 TIMES DAILY PRN
Status: DISCONTINUED | OUTPATIENT
Start: 2025-03-20 | End: 2025-03-21 | Stop reason: HOSPADM

## 2025-03-20 RX ADMIN — FOLIC ACID 1 MG: 1 TABLET ORAL at 09:50

## 2025-03-20 RX ADMIN — GABAPENTIN 300 MG: 300 CAPSULE ORAL at 09:49

## 2025-03-20 RX ADMIN — GUAIFENESIN 400 MG: 400 TABLET ORAL at 20:00

## 2025-03-20 RX ADMIN — AMPICILLIN SODIUM 2000 MG: 2 INJECTION, POWDER, FOR SOLUTION INTRAMUSCULAR; INTRAVENOUS at 06:47

## 2025-03-20 RX ADMIN — Medication 1 TABLET: at 09:50

## 2025-03-20 RX ADMIN — AMIODARONE HYDROCHLORIDE 200 MG: 200 TABLET ORAL at 19:59

## 2025-03-20 RX ADMIN — SACUBITRIL AND VALSARTAN 1 TABLET: 24; 26 TABLET, FILM COATED ORAL at 20:00

## 2025-03-20 RX ADMIN — CLOPIDOGREL BISULFATE 75 MG: 75 TABLET, FILM COATED ORAL at 09:51

## 2025-03-20 RX ADMIN — Medication 5 MG: at 20:00

## 2025-03-20 RX ADMIN — METOPROLOL SUCCINATE 25 MG: 50 TABLET, EXTENDED RELEASE ORAL at 09:50

## 2025-03-20 RX ADMIN — AMPICILLIN SODIUM 2000 MG: 2 INJECTION, POWDER, FOR SOLUTION INTRAMUSCULAR; INTRAVENOUS at 02:16

## 2025-03-20 RX ADMIN — ACETAMINOPHEN 650 MG: 325 TABLET ORAL at 06:45

## 2025-03-20 RX ADMIN — IPRATROPIUM BROMIDE AND ALBUTEROL SULFATE 1 DOSE: 2.5; .5 SOLUTION RESPIRATORY (INHALATION) at 11:56

## 2025-03-20 RX ADMIN — POTASSIUM BICARBONATE 40 MEQ: 782 TABLET, EFFERVESCENT ORAL at 09:56

## 2025-03-20 RX ADMIN — GABAPENTIN 300 MG: 300 CAPSULE ORAL at 19:59

## 2025-03-20 RX ADMIN — AMPICILLIN SODIUM 2000 MG: 2 INJECTION, POWDER, FOR SOLUTION INTRAMUSCULAR; INTRAVENOUS at 21:44

## 2025-03-20 RX ADMIN — GUAIFENESIN 400 MG: 400 TABLET ORAL at 15:19

## 2025-03-20 RX ADMIN — TORSEMIDE 40 MG: 20 TABLET ORAL at 09:49

## 2025-03-20 RX ADMIN — ACETAMINOPHEN 650 MG: 325 TABLET ORAL at 17:49

## 2025-03-20 RX ADMIN — DULOXETINE HYDROCHLORIDE 30 MG: 30 CAPSULE, DELAYED RELEASE ORAL at 09:51

## 2025-03-20 RX ADMIN — IPRATROPIUM BROMIDE AND ALBUTEROL SULFATE 1 DOSE: 2.5; .5 SOLUTION RESPIRATORY (INHALATION) at 19:39

## 2025-03-20 RX ADMIN — FERROUS SULFATE TAB 325 MG (65 MG ELEMENTAL FE) 325 MG: 325 (65 FE) TAB at 09:51

## 2025-03-20 RX ADMIN — CHOLECALCIFEROL TAB 125 MCG (5000 UNIT) 5000 UNITS: 125 TAB at 09:51

## 2025-03-20 RX ADMIN — SODIUM CHLORIDE: 9 INJECTION, SOLUTION INTRAVENOUS at 11:30

## 2025-03-20 RX ADMIN — APIXABAN 5 MG: 5 TABLET, FILM COATED ORAL at 19:59

## 2025-03-20 RX ADMIN — SODIUM CHLORIDE, PRESERVATIVE FREE 10 ML: 5 INJECTION INTRAVENOUS at 19:59

## 2025-03-20 RX ADMIN — AMPICILLIN SODIUM 2000 MG: 2 INJECTION, POWDER, FOR SOLUTION INTRAMUSCULAR; INTRAVENOUS at 17:50

## 2025-03-20 RX ADMIN — AMPICILLIN SODIUM 2000 MG: 2 INJECTION, POWDER, FOR SOLUTION INTRAMUSCULAR; INTRAVENOUS at 09:59

## 2025-03-20 RX ADMIN — APIXABAN 5 MG: 5 TABLET, FILM COATED ORAL at 09:50

## 2025-03-20 RX ADMIN — SODIUM CHLORIDE, PRESERVATIVE FREE 10 ML: 5 INJECTION INTRAVENOUS at 11:31

## 2025-03-20 RX ADMIN — SACUBITRIL AND VALSARTAN 1 TABLET: 24; 26 TABLET, FILM COATED ORAL at 09:55

## 2025-03-20 RX ADMIN — AMIODARONE HYDROCHLORIDE 200 MG: 200 TABLET ORAL at 09:50

## 2025-03-20 RX ADMIN — POTASSIUM BICARBONATE 40 MEQ: 782 TABLET, EFFERVESCENT ORAL at 20:00

## 2025-03-20 RX ADMIN — IPRATROPIUM BROMIDE AND ALBUTEROL SULFATE 1 DOSE: 2.5; .5 SOLUTION RESPIRATORY (INHALATION) at 16:32

## 2025-03-20 RX ADMIN — Medication 1 TABLET: at 20:00

## 2025-03-20 RX ADMIN — AMPICILLIN SODIUM 2000 MG: 2 INJECTION, POWDER, FOR SOLUTION INTRAMUSCULAR; INTRAVENOUS at 15:22

## 2025-03-20 RX ADMIN — WATER 2000 MG: 1 INJECTION INTRAMUSCULAR; INTRAVENOUS; SUBCUTANEOUS at 04:28

## 2025-03-20 RX ADMIN — WATER 2000 MG: 1 INJECTION INTRAMUSCULAR; INTRAVENOUS; SUBCUTANEOUS at 16:00

## 2025-03-20 RX ADMIN — IPRATROPIUM BROMIDE AND ALBUTEROL SULFATE 1 DOSE: 2.5; .5 SOLUTION RESPIRATORY (INHALATION) at 07:58

## 2025-03-20 RX ADMIN — CALCIUM CARBONATE 500 MG: 500 TABLET, CHEWABLE ORAL at 11:25

## 2025-03-20 RX ADMIN — GUAIFENESIN 400 MG: 400 TABLET ORAL at 09:50

## 2025-03-20 RX ADMIN — PANTOPRAZOLE SODIUM 40 MG: 40 TABLET, DELAYED RELEASE ORAL at 06:45

## 2025-03-20 RX ADMIN — CETIRIZINE HYDROCHLORIDE 10 MG: 10 TABLET, FILM COATED ORAL at 17:49

## 2025-03-20 RX ADMIN — TAMSULOSIN HYDROCHLORIDE 0.4 MG: 0.4 CAPSULE ORAL at 09:50

## 2025-03-20 RX ADMIN — METOPROLOL SUCCINATE 25 MG: 50 TABLET, EXTENDED RELEASE ORAL at 20:00

## 2025-03-20 ASSESSMENT — PAIN SCALES - GENERAL
PAINLEVEL_OUTOF10: 5
PAINLEVEL_OUTOF10: 0
PAINLEVEL_OUTOF10: 0

## 2025-03-20 ASSESSMENT — PAIN DESCRIPTION - LOCATION: LOCATION: ABDOMEN

## 2025-03-20 NOTE — PROGRESS NOTES
Occupational Therapy  Date:3/20/2025  Patient Name: Ruben Davidson  MRN: [id number]  : 1945  Room: [address] [place] [address]-A    Pt's chart reviewed and treatment attempted however pt on hold per nursing this date.  Will attempt treatment at a later date/time when appropriate.    Jennifer GILL/MIKI [id number]

## 2025-03-20 NOTE — PLAN OF CARE
Problem: Chronic Conditions and Co-morbidities  Goal: Patient's chronic conditions and co-morbidity symptoms are monitored and maintained or improved  3/19/2025 2239 by Madonna Smith RN  Outcome: Progressing  3/19/2025 0924 by Radha Cosme RN  Outcome: Progressing  Flowsheets (Taken 3/19/2025 0918)  Care Plan - Patient's Chronic Conditions and Co-Morbidity Symptoms are Monitored and Maintained or Improved: Monitor and assess patient's chronic conditions and comorbid symptoms for stability, deterioration, or improvement     Problem: Discharge Planning  Goal: Discharge to home or other facility with appropriate resources  3/19/2025 2239 by Madonna Smith RN  Outcome: Progressing  3/19/2025 0924 by Radha Cosme RN  Outcome: Progressing  Flowsheets (Taken 3/19/2025 0918)  Discharge to home or other facility with appropriate resources: Identify barriers to discharge with patient and caregiver     Problem: Safety - Adult  Goal: Free from fall injury  3/19/2025 2239 by Madonna Smith RN  Outcome: Progressing  3/19/2025 0924 by Radha Cosme RN  Outcome: Progressing     Problem: Pain  Goal: Verbalizes/displays adequate comfort level or baseline comfort level  3/19/2025 2239 by Madonna Smith RN  Outcome: Progressing  3/19/2025 0924 by Radha Cosme RN  Outcome: Progressing     Problem: Cardiovascular - Adult  Goal: Maintains optimal cardiac output and hemodynamic stability  3/19/2025 2239 by Madonna Smith RN  Outcome: Progressing  3/19/2025 0924 by Radha Cosme RN  Outcome: Progressing     Problem: Gastrointestinal - Adult  Goal: Minimal or absence of nausea and vomiting  3/19/2025 2239 by Madonna Smith RN  Outcome: Progressing  3/19/2025 0924 by Radha Cosme RN  Outcome: Progressing  Goal: Maintains or returns to baseline bowel function  3/19/2025 2239 by Madonna Smith RN  Outcome: Progressing  3/19/2025 0924 by Radha Cosme RN  Outcome: Progressing     Problem: Skin/Tissue

## 2025-03-20 NOTE — PROGRESS NOTES
[facility]                                Progress note    Subjective:    Patient is awake and alert lying in bed. Patient is edematous.  No acute events overnight.      Objective:    BP 95/63   Pulse 85   Temp 99 °F (37.2 °C) (Temporal)   Resp 18   Ht 1.676 m (5' 6\")   Wt 86.7 kg (191 lb 1.8 oz)   SpO2 94%   BMI 30.85 kg/m²   CONST:  Well developed, obese, elderly  male who appears stated age. Awake, alert, cooperative, no apparent distress  HEENT:   Head- Normocephalic, atraumatic   Eyes- Conjunctivae pink, anicteric  Throat- Oral mucosa pink and moist  Neck-  No stridor, trachea midline, no jugular venous distention. No adenopathy   CHEST: Chest symmetrical and non-tender to palpation. No accessory muscle use or intercostal retractions  RESPIRATORY: Lung sounds -diminished throughout fields   CARDIOVASCULAR:     No carotid bruit  Heart Inspection- shows no noted pulsations  Heart Palpation- no heaves or thrills; PMI is non-displaced   Heart Ausculation- Regular rate and rhythm, no murmur. No s3, s4 or rub   PV: 1-2+ pitting bilateral lower extremity edema. No varicosities. Pedal pulses palpable, no clubbing or cyanosis.  LUE 1+ pitting edema  ABDOMEN: Soft, obese, non-tender to light palpation. Bowel sounds present. No palpable masses no organomegaly; no abdominal bruit  MS: Good muscle strength and tone. No atrophy or abnormal movements.   : Deferred  SKIN: Warm and dry no statis dermatitis or ulcers   NEURO / PSYCH: Oriented to person, place and time. Speech clear and appropriate. Follows all commands. Pleasant affect      Recent Labs     03/18/25 0600 03/19/25 0630 03/20/25 0600   WBC 10.0 11.6* 14.3*   HGB 9.4* 9.8* 11.0*   HCT 29.7* 30.5* 34.4*    201 261       Recent Labs     03/18/25  0600 03/19/25 0630 03/20/25  0600    143 139   K 4.1 3.4* 3.8    102 99   CO2 23 29 28   BUN 14 15 15   CREATININE 0.9 0.9 0.9   CALCIUM 7.8* 8.0* 7.7*  stable  Hypocalcemia, supplement  Hypokalemia, supplement  Supplement potassium  Monitor CBC, BMP  LUE edema, ultrasound negative for DVT  Acute on chronic HFrEF  Monitor daily weight, I&O, BMP, NT proBNP  NT proBNP 29,733 (03/18)  Continue Demadex 40 mg PO daily per Cardiology  Known Hx Ischemic Cardiomyopathy s/p Medtronic CRT-D  Entresto initiated per Cardiology, continue with holding parameters in view of BP  Known Hx moderate MR, moderate TR  Discharge planning to facility when able, pre-CERT initiated    3/20/2025  SBP soft 250 cc bolus  Received call that patient had 13 beats Vtak patient did not notice if AICD fired. - EP consulted.  Mag - 2.0  Potassium 3.8  Leukocytosis worsening 14.3  Continues ampicillin 2 gm IV and Rocephin 2 gm IV until 3/21/2025  Spouse at bedside case discussed.        Code status: FULL  Consultants:  Pulmonology, Infectious Disease, General Surgery, Cardiology  DVT Prophylaxis Eliquis  PT/OT  Discharge planning           KAYE Ling - CNP,  2:29 PM  3/20/2025

## 2025-03-20 NOTE — PROGRESS NOTES
Physical Therapy  Physical Therapy Missed Visit    Name: Ruben Davidson  : 1945  MRN: [id number]  Room #: 7413/7413-A    Date of Service: 3/20/2025    Attempted PT treatment. Spoke with pt's nurse, requesting PT hold this date, pt hypotensive, not feeling well. Will attempt again on later date.    Horacio Dhaliwal, PT, DPT  [id number]

## 2025-03-20 NOTE — PROGRESS NOTES
[facility] [place] [place]  Electrophysiology  Consultation Report  PATIENT: Ruben Davidson  MEDICAL RECORD NUMBER: [id number]  DATE OF SERVICE:  3/20/2025  ATTENDING ELECTROPHYSIOLOGIST: Flavia Bower MD   PRIMARY ELECTROPHYSIOLOGIST: Viktor Luther DO   REFERRING PHYSICIAN:  Barrington Garcia MD  CHIEF COMPLAINT: Abdominal pain.     HPI: This is a 79 y.o. 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 every other day, Medtronic CRT-D with LBB area lead via tunneling from right  axillary vein originally implanted 12/20/2021 with LBB area lead upgrade 05/23/2024, persistent atrial fibrillation DCCV 11/22/2024, SVT, Coronary artery disease with RYAN placed 07/07/97, RYAN in the proximal LAD (12/12/21) Moderate mitral regurgitation, hypertension, diabetes mellitus, prior obesity S/p Gastic bypass, BETTIE, peptic ulcer disease with EGD clip/epinephrine injection 10/08/2018, Psoriartic arthritis,  obstructive uropathy/with associated sepsis blood cultures positive for enterococcus faecalis with anticipated 6 weeks of daptomycin per Dr. Casper. He is managed on Amiodarone, Eliquis, Crestor, Entresto, Toprol XL Daptomycin, Demadex, Plavix, Protonix, neurontin, Plaquenil, Cymbalta, Flomax.  In 1997  patient had PTCA/stent to RCA. Since 2017, LVEF declined from 45-50% to 29% in 2020. In 12/2021, he presented with MMVT at 190 bpm with LBBB morphology, which was treated with external defibrillation and amiodarone. Following defibrillation, he was diagnosed with STEMI, which was treated with RYAN x1 proximal LAD. Following PCI, he was noted have paroxysms of AF/AFL, was treated with OAC. Prior to discharge, a Medtronic dual-chamber ICD was implanted. At the time of implant, he was noted to have low voltage throughout the RA chamber in the setting of AF. Ultimately, the RA lead was positioned in the RAA as this had the best sensing on passive mapping of the  EMR  Continue with telemetry monitoring    I have spent a total of 35 minutes with the patient and the family reviewing the above stated recommendations.  And a total of >50% of that time involved face-to-face time providing counseling and or coordination of care with the other providers, reviewing records/tests, counseling/education of the patient, ordering medications/tests/procedures, coordinating care, and documenting clinical information in the EHR.       Thank you for allowing me to participate in your patient's care.  Please call me if there are any questions or concerns.      Olga Limon, KAYE - CNP  Cardiac Electrophysiology  [facility]  [facility] [place]: [place] [facility]  1:04 PM  3/20/2025

## 2025-03-20 NOTE — PROGRESS NOTES
Department of Internal Medicine  Infectious Diseases  Progress Note      C/C: Enterococcus bacteremia , pneumonia, small bowel obstruction     Pt is awake and alert   Denies abdomen pain  Denies nausea or vomiting  Afebrile       Current Facility-Administered Medications   Medication Dose Route Frequency Provider Last Rate Last Admin    torsemide (DEMADEX) tablet 40 mg  40 mg Oral Daily Cene, Marylin, APRN - CNP   40 mg at 03/20/25 0949    sodium chloride (OCEAN, BABY AYR) 0.65 % nasal spray 1 spray  1 spray Each Nostril Q2H PRN Cene, Marylin, APRN - CNP   1 spray at 03/19/25 2140    sacubitril-valsartan (ENTRESTO) 24-26 MG per tablet 1 tablet  1 tablet Oral BID Cene, Marylin, APRN - CNP   1 tablet at 03/20/25 0955    acetaminophen (TYLENOL) tablet 650 mg  650 mg Oral 4 times per day Ciarra Ling MD   650 mg at 03/20/25 0645    potassium bicarb-citric acid (EFFER-K) effervescent tablet 40 mEq  40 mEq Oral BID Ciarra Ling MD   40 mEq at 03/20/25 0956    oxyCODONE (ROXICODONE) immediate release tablet 2.5 mg  2.5 mg Oral Q4H PRN Ciarra Ling MD        Or    oxyCODONE (ROXICODONE) immediate release tablet 5 mg  5 mg Oral Q4H PRN Ciarra Ling MD   5 mg at 03/16/25 2104    melatonin disintegrating tablet 5 mg  5 mg Oral Nightly Ciarra Ling MD   5 mg at 03/19/25 2128    sodium chloride flush 0.9 % injection 5-40 mL  5-40 mL IntraVENous 2 times per day Ciarra Ling MD   10 mL at 03/19/25 2100    sodium chloride flush 0.9 % injection 5-40 mL  5-40 mL IntraVENous PRN Ciarra Ling MD   20 mL at 03/15/25 0534    0.9 % sodium chloride infusion   IntraVENous PRN Ciarra Ling MD   Stopped at 03/12/25 1707    glucose chewable tablet 16 g  4 tablet Oral PRN Ciarra Ling MD        dextrose bolus 10% 125 mL  125 mL IntraVENous PRN Ciarra Ling MD        Or    dextrose bolus 10% 250 mL  250 mL IntraVENous PRN Ciarra Ling MD        glucagon injection 1 mg  1 mg SubCUTAneous PRN Ciarra Ling MD        dextrose 10 % infusion   IntraVENous Continuous PRN Ciarra Ling MD        cetirizine (ZYRTEC)    Gentamicin, High Level Sensitive  BACTERIAL SUSCEPTIBILITY PANEL ALIYAH Final    linezolid Sensitive 2 BACTERIAL SUSCEPTIBILITY PANEL ALIYAH Final    Streptomycin, Hi Level Sensitive  BACTERIAL SUSCEPTIBILITY PANEL ALIYAH Final    vancomycin Sensitive 1 BACTERIAL SUSCEPTIBILITY PANEL ALIYAH Final          Vanco -    Vancomycin Rm 10.0       JIMBO-      Left Ventricle: Moderately reduced left ventricular systolic function with a visually estimated EF of 35 - 40%. Left ventricle size is normal. Normal wall thickness. Normal wall motion.    Right Ventricle: Right ventricle size is normal. Lead present in the right ventricle. Normal systolic function.    Aortic Valve: Mild sclerosis of the aortic valve cusps. No stenosis.    Mitral Valve: Mild annular calcification.    Tricuspid Valve: Mild regurgitation. Normal RVSP. The estimated RVSP is 26 mmHg.    Left Atrium: Normal sized appendage. No left atrial appendage thrombus noted. No left atrial appendage mass noted.    Interatrial Septum: No interatrial shunt visualized with color Doppler. Agitated saline study was negative with and without provocation.    Pericardium: No pericardial effusion.    Image quality is adequate.    No echocardiographic evidence of endocarditis.    Radiology :    Chest X ray    CT scan of chest -  1. No acute infiltrate   2. Basilar interstitial opacification and mild bronchiectasis suggestive of   primary ILD, component of edema is not excluded     Procalcitionin 3.03    IMPRESSION:     Enterococcus bacteremia- JIMBO no vegetation  - follow up cx neg on 2/7  Small bowel obstruction -s/p exp lap    Pneumonia - treated    Leukocytosis -14 K    S/P exp lap , lysis of adhesion and reduction of internal hernia ( 3/12)         RECOMMENDATIONS:      IV ampicillin 2 grams IV q 4 hrs and rocephin 2 grams IV q 12 hrs until 3/21/2025  Monitor labs, Cr , CBC   Awaiting placement  ( no abx at discharge )

## 2025-03-20 NOTE — PROGRESS NOTES
Pt c/o feeling like a \"wet noodle\" nauseated and fatigued. Pt hypotensive at time 87/50  (60). Pt  denies any other complaints. Orders received

## 2025-03-20 NOTE — PROGRESS NOTES
Pt had 13 bts VT. Pt denies noticing AICD fire and wife states he has never felt anything from device in the past with arrythmias. Provider notified orders received

## 2025-03-20 NOTE — PLAN OF CARE
Problem: Chronic Conditions and Co-morbidities  Goal: Patient's chronic conditions and co-morbidity symptoms are monitored and maintained or improved  3/20/2025 1142 by Teri Handy RN  Outcome: Progressing  3/19/2025 2239 by Madonna Smith RN  Outcome: Progressing     Problem: Discharge Planning  Goal: Discharge to home or other facility with appropriate resources  3/20/2025 1142 by Teri Handy RN  Outcome: Progressing  3/19/2025 2239 by Madonna Smith RN  Outcome: Progressing     Problem: Safety - Adult  Goal: Free from fall injury  3/20/2025 1142 by Teri Handy RN  Outcome: Progressing  3/19/2025 2239 by Madonna Smith RN  Outcome: Progressing     Problem: Pain  Goal: Verbalizes/displays adequate comfort level or baseline comfort level  3/20/2025 1142 by Teri Handy RN  Outcome: Progressing  3/19/2025 2239 by Madonna Smith RN  Outcome: Progressing     Problem: Cardiovascular - Adult  Goal: Maintains optimal cardiac output and hemodynamic stability  3/20/2025 1142 by Teri Handy RN  Outcome: Progressing  3/19/2025 2239 by Madonna Smith RN  Outcome: Progressing     Problem: Gastrointestinal - Adult  Goal: Minimal or absence of nausea and vomiting  3/20/2025 1142 by Teri Handy RN  Outcome: Progressing  3/19/2025 2239 by Madonna Smith RN  Outcome: Progressing  Goal: Maintains or returns to baseline bowel function  3/20/2025 1142 by Teri Handy RN  Outcome: Progressing  3/19/2025 2239 by Madonna Smith RN  Outcome: Progressing     Problem: Skin/Tissue Integrity  Goal: Skin integrity remains intact  Description: 1.  Monitor for areas of redness and/or skin breakdown  2.  Assess vascular access sites hourly  3.  Every 4-6 hours minimum:  Change oxygen saturation probe site  4.  Every 4-6 hours:  If on nasal continuous positive airway pressure, respiratory therapy assess nares and determine need for appliance change or resting period  3/20/2025 1142 by Teri Handy  RN  Outcome: Progressing  3/19/2025 2239 by Madonna Smith RN  Outcome: Progressing     Problem: ABCDS Injury Assessment  Goal: Absence of physical injury  3/20/2025 1142 by Teri Handy RN  Outcome: Progressing  3/19/2025 2239 by Madonna Smith RN  Outcome: Progressing     Problem: Confusion  Goal: Confusion, delirium, dementia, or psychosis is improved or at baseline  Description: INTERVENTIONS:  1. Assess for possible contributors to thought disturbance, including medications, impaired vision or hearing, underlying metabolic abnormalities, dehydration, psychiatric diagnoses, and notify attending LIP  2. [place] high risk fall precautions, as indicated  3. Provide frequent short contacts to provide reality reorientation, refocusing and direction  4. Decrease environmental stimuli, including noise as appropriate  5. Monitor and intervene to maintain adequate nutrition, hydration, elimination, sleep and activity  6. If unable to ensure safety without constant attention obtain sitter and review sitter guidelines with assigned personnel  7. Initiate Psychosocial CNS and Spiritual Care consult, as indicated  3/20/2025 1142 by Teri Handy RN  Outcome: Progressing  3/19/2025 2239 by Madonna Smith RN  Outcome: Progressing     Problem: Nutrition Deficit:  Goal: Optimize nutritional status  3/20/2025 1142 by Teri Handy RN  Outcome: Progressing  3/19/2025 2239 by Madonna Smith RN  Outcome: Progressing

## 2025-03-21 VITALS
HEIGHT: 66 IN | WEIGHT: 192 LBS | DIASTOLIC BLOOD PRESSURE: 55 MMHG | OXYGEN SATURATION: 92 % | SYSTOLIC BLOOD PRESSURE: 101 MMHG | RESPIRATION RATE: 16 BRPM | TEMPERATURE: 97.4 F | BODY MASS INDEX: 30.86 KG/M2 | HEART RATE: 94 BPM

## 2025-03-21 DIAGNOSIS — I50.20 HFREF (HEART FAILURE WITH REDUCED EJECTION FRACTION) (HCC): Primary | ICD-10-CM

## 2025-03-21 LAB
ALBUMIN SERPL-MCNC: 2.4 G/DL (ref 3.5–5.2)
ALP SERPL-CCNC: 78 U/L (ref 40–129)
ALT SERPL-CCNC: 15 U/L (ref 0–40)
ANION GAP SERPL CALCULATED.3IONS-SCNC: 12 MMOL/L (ref 7–16)
AST SERPL-CCNC: 23 U/L (ref 0–39)
BASOPHILS # BLD: 0.03 K/UL (ref 0–0.2)
BASOPHILS NFR BLD: 0 % (ref 0–2)
BILIRUB SERPL-MCNC: 0.4 MG/DL (ref 0–1.2)
BUN SERPL-MCNC: 15 MG/DL (ref 6–23)
CA-I BLD-SCNC: 1.11 MMOL/L (ref 1.15–1.33)
CALCIUM SERPL-MCNC: 7.9 MG/DL (ref 8.6–10.2)
CHLORIDE SERPL-SCNC: 99 MMOL/L (ref 98–107)
CO2 SERPL-SCNC: 28 MMOL/L (ref 22–29)
CREAT SERPL-MCNC: 1 MG/DL (ref 0.7–1.2)
EOSINOPHIL # BLD: 0.21 K/UL (ref 0.05–0.5)
EOSINOPHILS RELATIVE PERCENT: 2 % (ref 0–6)
ERYTHROCYTE [DISTWIDTH] IN BLOOD BY AUTOMATED COUNT: 19.1 % (ref 11.5–15)
GFR, ESTIMATED: 77 ML/MIN/1.73M2
GLUCOSE SERPL-MCNC: 85 MG/DL (ref 74–99)
HCT VFR BLD AUTO: 33.5 % (ref 37–54)
HGB BLD-MCNC: 10.5 G/DL (ref 12.5–16.5)
IMM GRANULOCYTES # BLD AUTO: 0.11 K/UL (ref 0–0.58)
IMM GRANULOCYTES NFR BLD: 1 % (ref 0–5)
LYMPHOCYTES NFR BLD: 0.86 K/UL (ref 1.5–4)
LYMPHOCYTES RELATIVE PERCENT: 7 % (ref 20–42)
MAGNESIUM SERPL-MCNC: 1.8 MG/DL (ref 1.6–2.6)
MCH RBC QN AUTO: 29.7 PG (ref 26–35)
MCHC RBC AUTO-ENTMCNC: 31.3 G/DL (ref 32–34.5)
MCV RBC AUTO: 94.9 FL (ref 80–99.9)
MONOCYTES NFR BLD: 0.86 K/UL (ref 0.1–0.95)
MONOCYTES NFR BLD: 7 % (ref 2–12)
NEUTROPHILS NFR BLD: 83 % (ref 43–80)
NEUTS SEG NFR BLD: 10.29 K/UL (ref 1.8–7.3)
PHOSPHATE SERPL-MCNC: 2.7 MG/DL (ref 2.5–4.5)
PLATELET # BLD AUTO: 268 K/UL (ref 130–450)
PMV BLD AUTO: 10.9 FL (ref 7–12)
POTASSIUM SERPL-SCNC: 4 MMOL/L (ref 3.5–5)
PROT SERPL-MCNC: 4.8 G/DL (ref 6.4–8.3)
RBC # BLD AUTO: 3.53 M/UL (ref 3.8–5.8)
SODIUM SERPL-SCNC: 139 MMOL/L (ref 132–146)
WBC OTHER # BLD: 12.4 K/UL (ref 4.5–11.5)

## 2025-03-21 PROCEDURE — 2500000003 HC RX 250 WO HCPCS

## 2025-03-21 PROCEDURE — 99232 SBSQ HOSP IP/OBS MODERATE 35: CPT | Performed by: NURSE PRACTITIONER

## 2025-03-21 PROCEDURE — 2580000003 HC RX 258

## 2025-03-21 PROCEDURE — 6370000000 HC RX 637 (ALT 250 FOR IP)

## 2025-03-21 PROCEDURE — 94640 AIRWAY INHALATION TREATMENT: CPT

## 2025-03-21 PROCEDURE — 83735 ASSAY OF MAGNESIUM: CPT

## 2025-03-21 PROCEDURE — 82330 ASSAY OF CALCIUM: CPT

## 2025-03-21 PROCEDURE — 6370000000 HC RX 637 (ALT 250 FOR IP): Performed by: NURSE PRACTITIONER

## 2025-03-21 PROCEDURE — 80053 COMPREHEN METABOLIC PANEL: CPT

## 2025-03-21 PROCEDURE — 97530 THERAPEUTIC ACTIVITIES: CPT

## 2025-03-21 PROCEDURE — 97535 SELF CARE MNGMENT TRAINING: CPT

## 2025-03-21 PROCEDURE — 85025 COMPLETE CBC W/AUTO DIFF WBC: CPT

## 2025-03-21 PROCEDURE — 6360000002 HC RX W HCPCS

## 2025-03-21 PROCEDURE — 84100 ASSAY OF PHOSPHORUS: CPT

## 2025-03-21 RX ORDER — GUAIFENESIN 400 MG/1
400 TABLET ORAL 3 TIMES DAILY
Qty: 56 TABLET | Refills: 0 | Status: SHIPPED | OUTPATIENT
Start: 2025-03-21

## 2025-03-21 RX ORDER — AMIODARONE HYDROCHLORIDE 200 MG/1
200 TABLET ORAL 2 TIMES DAILY
Qty: 180 TABLET | Refills: 1 | Status: SHIPPED | OUTPATIENT
Start: 2025-03-21

## 2025-03-21 RX ADMIN — GUAIFENESIN 400 MG: 400 TABLET ORAL at 08:23

## 2025-03-21 RX ADMIN — AMPICILLIN SODIUM 2000 MG: 2 INJECTION, POWDER, FOR SOLUTION INTRAMUSCULAR; INTRAVENOUS at 08:34

## 2025-03-21 RX ADMIN — CLOPIDOGREL BISULFATE 75 MG: 75 TABLET, FILM COATED ORAL at 08:23

## 2025-03-21 RX ADMIN — ACETAMINOPHEN 650 MG: 325 TABLET ORAL at 05:20

## 2025-03-21 RX ADMIN — IPRATROPIUM BROMIDE AND ALBUTEROL SULFATE 1 DOSE: 2.5; .5 SOLUTION RESPIRATORY (INHALATION) at 10:17

## 2025-03-21 RX ADMIN — Medication 1 TABLET: at 08:23

## 2025-03-21 RX ADMIN — PANTOPRAZOLE SODIUM 40 MG: 40 TABLET, DELAYED RELEASE ORAL at 05:20

## 2025-03-21 RX ADMIN — CHOLECALCIFEROL TAB 125 MCG (5000 UNIT) 5000 UNITS: 125 TAB at 08:23

## 2025-03-21 RX ADMIN — SACUBITRIL AND VALSARTAN 1 TABLET: 24; 26 TABLET, FILM COATED ORAL at 08:22

## 2025-03-21 RX ADMIN — WATER 2000 MG: 1 INJECTION INTRAMUSCULAR; INTRAVENOUS; SUBCUTANEOUS at 03:33

## 2025-03-21 RX ADMIN — IPRATROPIUM BROMIDE AND ALBUTEROL SULFATE 1 DOSE: 2.5; .5 SOLUTION RESPIRATORY (INHALATION) at 08:03

## 2025-03-21 RX ADMIN — POTASSIUM BICARBONATE 40 MEQ: 782 TABLET, EFFERVESCENT ORAL at 08:22

## 2025-03-21 RX ADMIN — GABAPENTIN 300 MG: 300 CAPSULE ORAL at 08:23

## 2025-03-21 RX ADMIN — TAMSULOSIN HYDROCHLORIDE 0.4 MG: 0.4 CAPSULE ORAL at 08:23

## 2025-03-21 RX ADMIN — AMIODARONE HYDROCHLORIDE 200 MG: 200 TABLET ORAL at 08:22

## 2025-03-21 RX ADMIN — APIXABAN 5 MG: 5 TABLET, FILM COATED ORAL at 08:23

## 2025-03-21 RX ADMIN — FOLIC ACID 1 MG: 1 TABLET ORAL at 08:22

## 2025-03-21 RX ADMIN — AMPICILLIN SODIUM 2000 MG: 2 INJECTION, POWDER, FOR SOLUTION INTRAMUSCULAR; INTRAVENOUS at 01:55

## 2025-03-21 RX ADMIN — SODIUM CHLORIDE, PRESERVATIVE FREE 10 ML: 5 INJECTION INTRAVENOUS at 08:24

## 2025-03-21 RX ADMIN — AMPICILLIN SODIUM 2000 MG: 2 INJECTION, POWDER, FOR SOLUTION INTRAMUSCULAR; INTRAVENOUS at 05:27

## 2025-03-21 RX ADMIN — FERROUS SULFATE TAB 325 MG (65 MG ELEMENTAL FE) 325 MG: 325 (65 FE) TAB at 08:22

## 2025-03-21 RX ADMIN — DULOXETINE HYDROCHLORIDE 30 MG: 30 CAPSULE, DELAYED RELEASE ORAL at 08:22

## 2025-03-21 RX ADMIN — TORSEMIDE 40 MG: 20 TABLET ORAL at 08:23

## 2025-03-21 ASSESSMENT — PAIN SCALES - GENERAL: PAINLEVEL_OUTOF10: 3

## 2025-03-21 ASSESSMENT — PAIN - FUNCTIONAL ASSESSMENT: PAIN_FUNCTIONAL_ASSESSMENT: ACTIVITIES ARE NOT PREVENTED

## 2025-03-21 ASSESSMENT — PAIN DESCRIPTION - DESCRIPTORS: DESCRIPTORS: ACHING;DISCOMFORT;TENDER;SORE

## 2025-03-21 ASSESSMENT — PAIN DESCRIPTION - LOCATION: LOCATION: GENERALIZED

## 2025-03-21 NOTE — PROGRESS NOTES
OCCUPATIONAL THERAPY TREATMENT NOTE  [facility] [address] [place], [place]      Date:3/21/2025  Patient Name: Ruben Davidson  MRN: [id number]  : 1945  Room: [address] [place] [address]-A     Per OT Eval:   Evaluating OT: Cherrie Luu, RAZD,  OTR/L; [id number]     Referring Provider: Geronimo Perla DO    Specific Provider Orders/Date: OT eval and treat (3/14/25)     Diagnosis: SBO (small bowel obstruction) ([facility]) [K56.609]  Pneumonia due to organism [J18.9]  Non-sustained ventricular tachycardia ([facility]) [I47.29]  Pneumonia due to infectious organism, unspecified laterality, unspecified part of lung [J18.9]  Sepsis, due to unspecified organism, unspecified whether acute organ dysfunction present ([facility]) [A41.9]  Acute hypoxic respiratory failure ([facility]) [J96.01]      Reason for admission: Pt admitted with pneumonia/vomitting.     Surgery/Procedures:   3/12:    Exploratory laparotomy  Lysis of adhesions  Reduction of internal hernia  Jejunojejunal bypass  Closure of mesenteric defect     Pertinent Medical History:    Past Medical History        Past Medical History:   Diagnosis Date    Acute kidney injury      Atrial fibrillation ([facility])      CAD (coronary artery disease)       s/p stent RCA    CHF (congestive heart failure) ([facility])      [place] (hard of hearing)       WEARS HEARING AIDES    Hypertension      Kidney stone      with obstruction-left    Left ventricular systolic dysfunction      MI (myocardial infarction) ([facility])     Mitral regurgitation      Obesity, morbid       s/p intestinal bypass    Sleep apnea      Valvular heart disease           *Precautions:  Fall Risk,   abdominal precautions, [place], , cognition, alarms+     Goes by \"Vamsi\"     Assessment of current deficits   [x] Functional mobility          [x]ADLs           [x] Strength                  []Cognition   [x] Functional transfers        [x] IADLs         [x] Safety Awareness   [x]Endurance   [x]  bedside chair. Verbal cues to reach back for chair before sitting down                          SBA      Functional Mobility Mod A with Fww  For few steps from EOB to chair  Min A- a few feet with ww                      SBA with AD as recommended by PT         Balance Sitting:     Static: SBA    Dynamic:Min A  Standing: Mod A Sitting:     Static: SBA    Dynamic:Min A  Standing: Mod/Min A Sitting:     Static: Ind    Dynamic: S  Standing: SBA                   Endurance/Activity Tolerance    fair tolerance with light activity.   Fair-  For light activity.                     WFL  For full ADL/light IADL tasks   Visual/  Perceptual Pt reports WFL          Comments:  Cleared by RN to see pt. Upon arrival patient in supine and agreeable to OT session. At end of session, patient left sitting in bedside chair with call light within reach, all lines and tubes intact and wife present. Pt educated on adaptive techniques for ADL tasks,  transfer safety, posture, body mechanics, precautions, maintaining precautions during functional activities, benefits of increasing activity and safety/fall prevention.Pt required rest breaks during session and educated on pursed lip breathing. Pt would benefit from continued skilled OT to increase safety and independence with completion of ADL/IADL tasks for functional independence and quality of life.    Treatment: OT treatment provided this date includes:   ADL- Instruction/training on safety and adapted techniques for completion of ADLs.   Transfers/Mobility- Instruction/training on safety and improved independence with bed mobility/functional transfers and functional mobility.   Sitting/Standing Balance/Tolerance: to increase balance and activity tolerance during ADLs and facilitate proper posture and positioning.   Activity tolerance- Instruction/training on energy conservation/work simplification for completion of ADLs.    Neuromuscular Reeducation to facilitate balance/righting

## 2025-03-21 NOTE — DISCHARGE INSTR - COC
Continuity of Care Form    Patient Name: Ruben Davidson   :  1945  MRN:  [id number]    Admit date:  3/11/2025  Discharge date:  25    Code Status Order: Full Code   Advance Directives:     Admitting Physician:  Cynthia Hurtado MD  PCP: Barrington Garcia MD    Discharging Nurse: Teri Handy  Discharging Hospital Unit/Room#: 7413/7413-A  Discharging Unit Phone Number: [phone]    Emergency Contact:   Extended Emergency Contact Information  Primary Emergency Contact: Saira Davidson  Address: [address] [place] [address] [facility]  Home Phone: [phone]  Relation: Spouse  Secondary Emergency Contact: StuartAlka  Address: [address] [place], [place] [address] [place] [address]  Home Phone: [phone]  Relation: Child    Past Surgical History:  Past Surgical History:   Procedure Laterality Date    APPENDECTOMY      BACK SURGERY      CARDIAC DEFIBRILLATOR PLACEMENT  2021    Medtronic dual chamber ICD by Dr. Luther    CARPAL TUNNEL RELEASE Bilateral     COLONOSCOPY      CORONARY ANGIOPLASTY  1997    PTCA/stent of the RCA     CORONARY ANGIOPLASTY WITH STENT PLACEMENT  2021    3.5 x 15 Resolute Spelter to the prox LAD by Dr. Pablo    CYSTOSCOPY Left 3/7/2025    CYSTOSCOPY RETROGRADE PYELOGRAM URETEROSCOPY J STENT LASER LITHOTRIPSY LEFT performed by Albert Sandra MD at [facility] OR    DEBRIDEMENT Left 2025    LEFT CYSTOSCOPY RETROGRADE PYELOGRAM STENT INSERTION performed [facility] Dany Starr DO [place] [facility] [place]    DIAGNOSTIC CARDIAC CATH LAB PROCEDURE  1997    DIAGNOSTIC CARDIAC CATH LAB PROCEDURE  1997    EP DEVICE PROCEDURE N/A 2024    Pacemaker lead revision performed by Viktor Luther DO [place] [facility]    EP DEVICE PROCEDURE N/A 2024    Remove & replace ICD biv multi leads performed by Viktor Luther DO [place] [facility]    EYE SURGERY Bilateral     cataract removal w jessica  MANAGEMENT/SOCIAL WORK SECTION    Inpatient Status Date: ***    Readmission Risk Assessment Score:  [facility] RISK OF UNPLANNED READMISSION 2.0             25 Total Score        Discharging to Facility/ Agency   Name:   Address:  Phone:  Fax:    Dialysis Facility (if applicable)   Name:  Address:  Dialysis Schedule:  Phone:  Fax:    / signature: {Esignature:[id number]}    PHYSICIAN SECTION    Prognosis: Good    Condition at Discharge: Stable    Rehab Potential (if transferring to Rehab): Good    Recommended Labs or Other Treatments After Discharge: cbc and bmp in 3 days    Physician Certification: I certify the above information and transfer of Ruben Davidson  is necessary for the continuing treatment of the diagnosis listed and that he requires Skilled Nursing Facility for greater 30 days.     Update Admission H&P: No change in H&P    PHYSICIAN SIGNATURE:  Electronically signed by KAYE Ling CNP on 3/21/25 at 10:58 AM EDT

## 2025-03-21 NOTE — PROGRESS NOTES
Report called to Aziza Myers. Notified of medication orders to move diuretics to noon and monitor for hypotension.

## 2025-03-21 NOTE — DISCHARGE SUMMARY
[facility]   Discharge summary   Patient ID:  Ruben Davidson  [id number]  79 y.o.  1945    Admit date: 3/11/2025    Discharge date and time: 3/21/2025    Admission Diagnoses:   Patient Active Problem List   Diagnosis    CAD (coronary artery disease)s/p stent RCA    Valvular heart disease    Hypertension    Obesity, morbid s/p intestinal bypass    STEMI (ST elevation myocardial infarction) ([facility])    Stented coronary artery    Hyperlipidemia    H/O psoriatic arthritis    Acute upper GI bleed    SVT (supraventricular tachycardia)    LV dysfunction    Cardiac arrest ([facility])    Acute systolic (congestive) heart failure ([facility])    Ventricular tachycardia ([facility])    Mitral stenosis    Mitral regurgitation    Moderate to severe pulmonary hypertension ([facility])    Anemia    Paroxysmal atrial fibrillation ([facility])    ICD (implantable cardioverter-defibrillator) battery depletion    S/P ICD (internal cardiac defibrillator) procedure    HFrEF (heart failure with reduced ejection fraction) ([facility])    RADHA (acute kidney injury)    Pneumonia due to organism    Calculus of left ureter    Elevated liver enzymes    CKD (chronic kidney disease) stage 2, GFR 60-89 ml/min    Head injury    Bacteremia due to Enterococcus    SBO (small bowel obstruction) ([facility])    Ischemic cardiomyopathy    Acute blood loss anemia    Enterococcal bacteremia    Acute on chronic systolic heart failure ([facility])       Discharge Diagnoses: ***    Consults: {consultation:26225}    Procedures: ***    Hospital Course: The patient is a 79 y.o. male of Barrington Garcia MD with significant past medical history of *** who presents with ***    Recent Labs     03/19/25  0630 03/20/25  0600 03/21/25  0530   WBC 11.6* 14.3* 12.4*   HGB 9.8* 11.0* 10.5*   HCT 30.5* 34.4* 33.5*    261 268       Recent Labs     03/19/25  0630 03/20/25  0600 03/21/25  0530    139 139   K 3.4* 3.8 4.0    99 99   CO2 29 28 28   BUN 15 15 15   CREATININE 0.9 0.9 1.0    vitamin D3 125 MCG (5000 UT) Tabs tablet  Commonly known as: CHOLECALCIFEROL            STOP taking these medications      DAPTOmycin infusion  Commonly known as: CUBICIN     hydroxychloroquine 200 MG tablet  Commonly known as: PLAQUENIL     sodium bicarbonate 650 MG tablet               Where to Get Your Medications        These medications were sent to [facility] [address] [place] [address] [place] [address] -  [phone] - F [phone] [address] [facility] [address]      Phone: [phone]   amiodarone 200 MG tablet  guaiFENesin 400 MG tablet  potassium bicarb-citric acid 20 MEQ Tbef effervescent tablet  Torsemide 40 MG Tabs       Activity: {discharge activity:03185}  Diet: {diet:83656}    Pt has been advised to:    Follow-up with Barrington Garcia MD in 1 week.  Follow-up with consultants as recommended by them      Signed:  KAYE Ling - CNP  3/21/2025  1:18 PM

## 2025-03-21 NOTE — PLAN OF CARE
Problem: Chronic Conditions and Co-morbidities  Goal: Patient's chronic conditions and co-morbidity symptoms are monitored and maintained or improved  3/21/2025 1116 by Teri Handy RN  Outcome: Progressing  3/21/2025 0928 by Teri Handy RN  Outcome: Progressing     Problem: Discharge Planning  Goal: Discharge to home or other facility with appropriate resources  3/21/2025 1116 by Teri Handy RN  Outcome: Progressing  3/21/2025 0928 by Teri Handy RN  Outcome: Progressing     Problem: Safety - Adult  Goal: Free from fall injury  3/21/2025 1116 by Teri Handy RN  Outcome: Progressing  3/21/2025 0928 by Teri Handy RN  Outcome: Progressing     Problem: Pain  Goal: Verbalizes/displays adequate comfort level or baseline comfort level  3/21/2025 1116 by Teri Handy RN  Outcome: Progressing  3/21/2025 0928 by Teri Handy RN  Outcome: Progressing     Problem: Cardiovascular - Adult  Goal: Maintains optimal cardiac output and hemodynamic stability  3/21/2025 1116 by Teri Handy RN  Outcome: Progressing  3/21/2025 0928 by Teri Handy RN  Outcome: Progressing     Problem: Gastrointestinal - Adult  Goal: Minimal or absence of nausea and vomiting  3/21/2025 1116 by Teri Handy RN  Outcome: Progressing  3/21/2025 0928 by Teri Handy RN  Outcome: Progressing  Goal: Maintains or returns to baseline bowel function  3/21/2025 1116 by Teri Handy RN  Outcome: Progressing  3/21/2025 0928 by Teri Handy RN  Outcome: Progressing     Problem: Skin/Tissue Integrity  Goal: Skin integrity remains intact  Description: 1.  Monitor for areas of redness and/or skin breakdown  2.  Assess vascular access sites hourly  3.  Every 4-6 hours minimum:  Change oxygen saturation probe site  4.  Every 4-6 hours:  If on nasal continuous positive airway pressure, respiratory therapy assess nares and determine need for appliance change or resting period  3/21/2025 1116 by Teri Handy RN  Outcome:

## 2025-03-21 NOTE — PLAN OF CARE
Problem: Chronic Conditions and Co-morbidities  Goal: Patient's chronic conditions and co-morbidity symptoms are monitored and maintained or improved  3/20/2025 2101 by Graciela Gaemz, RN  Outcome: Progressing     Problem: Discharge Planning  Goal: Discharge to home or other facility with appropriate resources  3/20/2025 2101 by Graciela Gamez, RN  Outcome: Progressing     Problem: Safety - Adult  Goal: Free from fall injury  3/20/2025 2101 by Graciela Gamez, RN  Outcome: Progressing     Problem: Pain  Goal: Verbalizes/displays adequate comfort level or baseline comfort level  3/20/2025 2101 by Graciela Gamez, RN  Outcome: Progressing

## 2025-03-21 NOTE — CONSULTS
[facility]   Inpatient CHF Nurse Navigator Consult      Cardiologist: Dr. Hever Miranda Reddy Davidson is a 79 y.o. (1945) male with a history of HFrEF, most recent EF:  Lab Results   Component Value Date    LVEF 15 01/05/2024    LVEFMODE Echo (A) 01/05/2024       Patient was awake and alert, laying in bed during the consultation and is agreeable to heart failure education. He was engaged and asked appropriate questions throughout the education session.     Barriers identified during consult contributing to HF Hospitalization:  [] Limited medication adherence   [] Poor health literacy, education regarding HF medications provided   [] Pill box provided to patient  [] Difficulty affording medications  [] Difficulty obtaining/ managing medications  [] Prescription assistance information given     [] Not weighing themselves daily  [x] Weight log provided for easy monitoring  [] Scale provided     [] Not following low sodium diet  [] Food insecurity   [x] 2 gram sodium diet education provided   [] Low sodium recipes provided  [] Sodium free seasoning provided   [] Low sodium meal delivery options given to patient  [] Dietician consulted     [] Lack of transportation to appointments     [] Depression, given chronic illness  [] Primary team notified     [] Goals of care need addressed  [] Palliative care consulted     [] CHF CHW consulted, to assist with         Chart Reviewed:  Diet: ADULT DIET; Regular; Low Fat/Low Chol/High Fiber/2 gm Na  ADULT ORAL NUTRITION SUPPLEMENT; Breakfast, Dinner; Wound Healing Oral Supplement  ADULT ORAL NUTRITION SUPPLEMENT; Lunch, Dinner; Low Calorie/High Protein Oral Supplement   Daily Weights: Patient Vitals for the past 96 hrs (Last 3 readings):   Weight   03/21/25 0520 87.1 kg (192 lb)   03/18/25 2310 86.7 kg (191 lb 1.8 oz)   03/18/25 0600 87.3 kg (192 lb 7.4 oz)     I/O:   Intake/Output Summary (Last 24 hours) at 3/21/2025 1413  Last data 
      Inpatient Cardiology Consultation      Reason for Consult: Acute on chronic HFrEF    Consulting Physician: Dr. Solorzano    Requesting Physician:  Marylin Bone, APRN - CNP     Date of Consultation: 3/18/2025    HISTORY OF PRESENT ILLNESS:   Patient is a 79-year-old male who is known to [facility] cardiology through Dr. Kathleen.  He was last seen inpatient by Dr. Yates 2/7/2025 for elevated troponin.    HPI:  Patient presented to ER 3/11/2025 with nausea and vomiting status post kidney stent placed for stone 3/7.  Patient found to have multifocal pneumonia and urinary tract infection.  Patient started ATB and infectious disease was consulted.  Concern for SBO and general surgery was following.  Patient with NSVT started on IV amiodarone drip and EP was consulted.  3/12 patient had exploratory laparotomy with lysis of adhesions, reduction of internal hernia, jejunojejunal bypass and closure of mesenteric defect.  Cardiology consulted for history of HFrEF.  VS upon arrival 99.7, 18 respirations, 98 pulse, 171/154, 99% room air.  Labs: Potassium 4.1, BUN 14, creatinine 0.9, calcium 1.13, magnesium 2.0, calcium 7.8, phosphorus 2.2, total protein 4.8, BNP 3/11 30,000 >3/18 2,900, albumin 2.6, WBC 10.0, H&H 9.4/29.7, platelet 173  CXR 3/18: Right upper and lower and left lower lung atelectasis/pneumonia.  Bilateral pleural effusions  Ultrasound left upper extremity: No DVT    Upon assessment today 3/18/2025 patient lying Semi-Pérez in hospital bed 4 L cannula.  Patient is alert and oriented.  Family at bedside.  Patient reports he has been asymptomatic.  He reports he has been bedbound almost entirely since surgery and he was only up to the chair once.  He reports no shortness of breath, chest pain, orthopnea, PND, dizziness, or palpitations.  He is currently atrial flutter in the 70s.  On exam he is hypervolemic with JVD, crackles in the base of his lungs and +2 pitting edema to lower extremities up to scrotum.    Most 
Department of Internal Medicine  Infectious Diseases  Consult  Note      Reason for consult : Enterococcus bacteremia , sepsis, flu A infection     Referring physician : Dr Hurtado       Pt is known to me . This is a  79 yrs old male with hx of CAD, HTN,  Sleep apnea ,  psoriatic arthritis who developed Enterococcus bacteremia ( 2/6/2025) , JIMBO was neg for vegetation - he has been receiving IV daptomycin   Pt presented to the ER with nausea , vomiting and abdomen pain   Denied fever or chills  WBC was 3.8 K   CT scan of abdomen and pelvis - small bowel obstruction   CT scan of chest - bibasilar infiltrates     Past Medical History:   Diagnosis Date    Acute kidney injury     Atrial fibrillation (HCC)     CAD (coronary artery disease)     s/p stent RCA    CHF (congestive heart failure) ([facility])     [place] (hard of hearing)     WEARS HEARING AIDES    Hypertension     Kidney stone 2025    with obstruction-left    Left ventricular systolic dysfunction     MI (myocardial infarction) ([facility]) 1997    Mitral regurgitation     Obesity, morbid     s/p intestinal bypass    Sleep apnea     Valvular heart disease      Past Surgical History:   Procedure Laterality Date    APPENDECTOMY      BACK SURGERY      CARDIAC DEFIBRILLATOR PLACEMENT  12/20/2021    Medtronic dual chamber ICD by Dr. Luther    CARPAL TUNNEL RELEASE Bilateral     COLONOSCOPY      CORONARY ANGIOPLASTY  07/07/1997    PTCA/stent of the RCA     CORONARY ANGIOPLASTY WITH STENT PLACEMENT  12/12/2021    3.5 x 15 Resolute Luis to the prox LAD by Dr. Pablo    CYSTOSCOPY Left 3/7/2025    CYSTOSCOPY RETROGRADE PYELOGRAM URETEROSCOPY J STENT LASER LITHOTRIPSY LEFT performed by Albert Sandra MD at [facility] OR    DEBRIDEMENT Left 02/07/2025    LEFT CYSTOSCOPY RETROGRADE PYELOGRAM STENT INSERTION performed by Dany Starr DO at [facility] OR    DIAGNOSTIC CARDIAC CATH LAB PROCEDURE  02/24/1997    DIAGNOSTIC CARDIAC CATH LAB PROCEDURE  07/07/1997    EP DEVICE PROCEDURE N/A 01/11/2024    
[facility]- [facility] [place] [place]  Electrophysiology  Consultation Report  PATIENT: Ruben Davidson  MEDICAL RECORD NUMBER: [id number]  DATE OF SERVICE:  3/11/2025  ATTENDING ELECTROPHYSIOLOGIST: Flavia Bwoer MD   PRIMARY ELECTROPHYSIOLOGIST: Viktor Luther DO   REFERRING PHYSICIAN:  Barrington Garcia MD  CHIEF COMPLAINT: Abdominal pain.     HPI: This is a 79 y.o. 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 every other day, Medtronic CRT-D with LBB area lead via tunneling from right  axillary vein originally implanted 12/20/2021 with LBB area lead upgrade 05/23/2024, persistent atrial fibrillation DCCV 11/22/2024, SVT, Coronary artery disease with RYAN placed 07/07/97, RYAN in the proximal LAD (12/12/21) Moderate mitral regurgitation, hypertension, diabetes mellitus, prior obesity S/p Gastic bypass, BETTIE, peptic ulcer disease with EGD clip/epinephrine injection 10/08/2018, Psoriartic arthritis,  obstructive uropathy/with associated sepsis blood cultures positive for enterococcus faecalis with anticipated 6 weeks of daptomycin per Dr. Casper. He is managed on Amiodarone, Eliquis, Crestor, Entresto, Toprol XL Daptomycin, Demadex, Plavix, Protonix, neurontin, Plaquenil, Cymbalta, Flomax.  In 1997  patient had PTCA/stent to RCA. Since 2017, LVEF declined from 45-50% to 29% in 2020. In 12/2021, he presented with MMVT at 190 bpm with LBBB morphology, which was treated with external defibrillation and amiodarone. Following defibrillation, he was diagnosed with STEMI, which was treated with RYAN x1 proximal LAD. Following PCI, he was noted have paroxysms of AF/AFL, was treated with OAC. Prior to discharge, a Medtronic dual-chamber ICD was implanted. At the time of implant, he was noted to have low voltage throughout the RA chamber in the setting of AF. Ultimately, the RA lead was positioned in the RAA as this had the best sensing on passive mapping of the 
General Surgery   Consult Note      Patient's Name/Date of Birth: Ruben Davidson / 1945    Date: March 11, 2025     PCP: Barrington Garcia MD     Chief Complaint:   Chief Complaint   Patient presents with    Vomiting     Patient had kidney stone removal and stent placement has not been eating and drinking since due to vomiting        HPI:   Ruben Davidson is a 79 y.o. male who presents for evaluation of abdominal pain, decreased p.o. intake, nausea and hematemesis that started on Friday 3/7/2025 after he had a left ureteroscopy with stent exchange by urology.  Reports that his pain was diffuse, very severe and constant rated 9/10, but now decreased to 3/10 after pain medication.  Last bowel movement was yesterday night and was very firm in contrast to his normal bowel movements.  Not passing any flatus.  Denies hematochezia, melena, fevers, chills, unintentional weight loss, chest pain, SOB.  Patient has a history of obesity s/p gastric bypass 25 years ago at .  Denies any prior episodes of small bowel obstruction.  General surgery was consulted for SBO.  NG tube was placed in the ED with 100 cc of gastric output with some streaks of blood.    PMHx includes CAD s/p stent placement, CHF, A-fib on Eliquis, HTN, mitral regurgitation, BETTIE.  SHX includes appendectomy, ICD/pacemaker placement, back surgery, left ureteroscopy with lithotripsy and stent exchange, hip surgery.  Denies personal or family history of cancer including gastric/colorectal cancer, denies personal or family history of IBD.     In the ED patient is afebrile, tachycardic 105-136, saturating at 99% on 2L NC.  Labs reviewed CR 1, lactate 3.1, BNP 07075, T. bili 1.6, WBC 2.5, hemoglobin 13.1, platelets 175, respiratory panel negative, UA positive for UTI.  CT abdomen pelvis today shows dilated small bowel with transition point and decompressed bowel distal to it with air-fluid levels, gastric distention, postoperative changes with 
failure    - Multifocal pneumonia in setting of urinary tract infection-currently patient is on 2 L oxygen and IV antibiotics    - SVT-EP following.  Patient is currently on amiodarone drip as well as Haja Alfredo MD, FACS  3/12/2025  11:41 AM      NOTE: This report was transcribed using voice recognition software. Every effort was made to ensure accuracy; however, inadvertent computerized transcription errors may be present.

## 2025-03-21 NOTE — PROGRESS NOTES
[place] [facility]- [facility] [place] [place]  Electrophysiology  Consultation Report  PATIENT: Ruben Davidson  MEDICAL RECORD NUMBER: [id number]  DATE OF SERVICE:  3/21/2025  ATTENDING ELECTROPHYSIOLOGIST: Flavia Bower MD   PRIMARY ELECTROPHYSIOLOGIST: Viktor Luther DO   REFERRING PHYSICIAN:  Barrington Garcia MD  CHIEF COMPLAINT: Abdominal pain.     HPI: This is a 79 y.o. 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 every other day, Medtronic CRT-D with LBB area lead via tunneling from right  axillary vein originally implanted 12/20/2021 with LBB area lead upgrade 05/23/2024, persistent atrial fibrillation DCCV 11/22/2024, SVT, Coronary artery disease with RYAN placed 07/07/97, RYAN in the proximal LAD (12/12/21) Moderate mitral regurgitation, hypertension, diabetes mellitus, prior obesity S/p Gastic bypass, BETTIE, peptic ulcer disease with EGD clip/epinephrine injection 10/08/2018, Psoriartic arthritis,  obstructive uropathy/with associated sepsis blood cultures positive for enterococcus faecalis with anticipated 6 weeks of daptomycin per Dr. Casper. He is managed on Amiodarone, Eliquis, Crestor, Entresto, Toprol XL Daptomycin, Demadex, Plavix, Protonix, neurontin, Plaquenil, Cymbalta, Flomax.  In 1997  patient had PTCA/stent to RCA. Since 2017, LVEF declined from 45-50% to 29% in 2020. In 12/2021, he presented with MMVT at 190 bpm with LBBB morphology, which was treated with external defibrillation and amiodarone. Following defibrillation, he was diagnosed with STEMI, which was treated with RYAN x1 proximal LAD. Following PCI, he was noted have paroxysms of AF/AFL, was treated with OAC. Prior to discharge, a Medtronic dual-chamber ICD was implanted. At the time of implant, he was noted to have low voltage throughout the RA chamber in the setting of AF. Ultimately, the RA lead was positioned in the RAA as this had the best sensing on passive mapping of the

## 2025-03-21 NOTE — PROGRESS NOTES
Physical Therapy  Treatment Note    Name: Ruben Davidson  : 1945  MRN: [id number]      Date of Service: 3/21/2025    Evaluating PT:  Toby Paredes, PT, DPT [id number]    Room #:  7413/7413-A  Diagnosis:  SBO (small bowel obstruction) ([facility]) [K56.609]  Pneumonia due to organism [J18.9]  Non-sustained ventricular tachycardia ([facility]) [I47.29]  Pneumonia due to infectious organism, unspecified laterality, unspecified part of lung [J18.9]  Sepsis, due to unspecified organism, unspecified whether acute organ dysfunction present ([facility]) [A41.9]  Acute hypoxic respiratory failure [J96.01]  PMHx/PSHx:    Past Medical History:   Diagnosis Date    Acute kidney injury     Atrial fibrillation ([facility])     CAD (coronary artery disease)     s/p stent RCA    CHF (congestive heart failure) ([facility])     [place] (hard of hearing)     WEARS HEARING AIDES    Hypertension     Kidney stone     with obstruction-left    Left ventricular systolic dysfunction     MI (myocardial infarction) ([facility])     Mitral regurgitation     Obesity, morbid     s/p intestinal bypass    Sleep apnea     Valvular heart disease      Procedure/Surgery:    3/12  Exploratory laparotomy  Lysis of adhesions  Reduction of internal hernia  Jejunojejunal bypass  Closure of mesenteric defect  Precautions:  Falls, [place], , alarms, abdominal, , cognition  Equipment Needs:  TBD    SUBJECTIVE:    Pt lives with wife in a 1 story home with 3 step(s) to enter and 1 rail(s).      Pt ambulated with WW recently PTA.    OBJECTIVE:   Initial Evaluation  Date: 3/14/25 Treatment  Date: 3/21/25 Short Term/ Long Term   Goals   AM-PAC 6 Clicks 10/24 14/24    Was pt agreeable to Eval/treatment? Yes yes    Does pt have pain? No c/o pain No pain    Bed Mobility  Rolling: NT  Supine to sit: MaxA with HOB elevated  Sit to supine: NT  Scooting: MaxA Rolling: min A  Supine to sit: mod A  Sit to supine: NT  Scooting: min A Mod Independent   Transfers Sit to stand: ModA elevated bed  Stand to  for comfort with all needs met and call bell in reach prior to exiting.    Treatment:  Patient practiced and was instructed in the following treatment:    Bed mobility training - pt given verbal and tactile cues to facilitate proper sequencing and safety during rolling and supine>sit as well as provided with physical assistance to complete task   Sitting EOB for >5 minutes for upright tolerance, postural awareness and BLE ROM  STS and pivot transfer training - pt educated on proper hand and foot placement, safety and sequencing, and use of verbal and tactile cues to safely complete sit<>stand and pivot transfers with physical assistance to complete task safely   Gait training- pt was given verbal and tactile cues to facilitate pt safety with ww use during ambulation as well as provided with physical assistance.    PLAN:    Patient is making fair progress towards established goals.  Will continue with current POC.      Time in  1027  Time out  1100    Total Treatment Time  25 minutes     CPT codes:  [] Gait training 10251 -- minutes  [] Manual therapy 25702 -- minutes  [x] Therapeutic activities 82573 25 minutes  [] Therapeutic exercises 27883 -- minutes  [] Neuromuscular reeducation 92484 -- minutes    Horacio Dhaliwal PT, DPT  [id number]

## 2025-03-21 NOTE — PROGRESS NOTES
Notified provider about patients medication list specifically cardiac medications and diuretics. Notified that medications were held today due to hypotension. Demadex, toporol, enteresto all scheduled at morning medication pass. Spoke with pharmacy to adjust times while inpatient but discussed with provider this nurses concern regarding discharge medication orders.

## 2025-03-21 NOTE — PLAN OF CARE
Problem: Chronic Conditions and Co-morbidities  Goal: Patient's chronic conditions and co-morbidity symptoms are monitored and maintained or improved  3/21/2025 0928 by Teri Handy RN  Outcome: Progressing  3/20/2025 2101 by Graciela Gamez RN  Outcome: Progressing     Problem: Discharge Planning  Goal: Discharge to home or other facility with appropriate resources  3/21/2025 0928 by eTri Handy RN  Outcome: Progressing  3/20/2025 2101 by Graciela Gamez RN  Outcome: Progressing     Problem: Safety - Adult  Goal: Free from fall injury  3/21/2025 0928 by Teri Handy RN  Outcome: Progressing  3/20/2025 2101 by Graciela Gamez RN  Outcome: Progressing     Problem: Pain  Goal: Verbalizes/displays adequate comfort level or baseline comfort level  3/21/2025 0928 by Teri Handy RN  Outcome: Progressing  3/20/2025 2101 by Graciela Gamez RN  Outcome: Progressing     Problem: Cardiovascular - Adult  Goal: Maintains optimal cardiac output and hemodynamic stability  Outcome: Progressing     Problem: Gastrointestinal - Adult  Goal: Minimal or absence of nausea and vomiting  Outcome: Progressing  Goal: Maintains or returns to baseline bowel function  Outcome: Progressing     Problem: Skin/Tissue Integrity  Goal: Skin integrity remains intact  Description: 1.  Monitor for areas of redness and/or skin breakdown  2.  Assess vascular access sites hourly  3.  Every 4-6 hours minimum:  Change oxygen saturation probe site  4.  Every 4-6 hours:  If on nasal continuous positive airway pressure, respiratory therapy assess nares and determine need for appliance change or resting period  Outcome: Progressing     Problem: ABCDS Injury Assessment  Goal: Absence of physical injury  Outcome: Progressing     Problem: Confusion  Goal: Confusion, delirium, dementia, or psychosis is improved or at baseline  Description: INTERVENTIONS:  1. Assess for possible contributors to thought disturbance, including

## 2025-03-21 NOTE — CARE COORDINATION
Discharge order noted, EP signed off. [facility] booked. PAS for 2:30pm ambullete. Notified charge RN, bedside RN, pt, wife and Caprice of discharge/time. Envelope, ambullete form, and PASRR in soft chart.  11:50am received call from [facility], they can transport at 1:30pm, cancelled PAS. Updated charge RN and pt/wife.Marylin Sr, MSW, LSW

## 2025-03-21 NOTE — NURSE NAVIGATOR
Patient's chart updated to reflect:      .    - HF care plan, HF education points and HF discharge instructions.  -Orders: 2 gram sodium diet, daily weights, I/O.  -PCP or cardiology follow up appointments to be scheduled within 7 days of hospital discharge.  -CHF education session will be provided to the patient prior to hospital discharge.     Gloria Woodson RN, CHFN   Heart Failure Navigator

## 2025-03-21 NOTE — DISCHARGE INSTRUCTIONS
HEART FAILURE  / CONGESTIVE HEART FAILURE  DISCHARGE INSTRUCTIONS:  GUIDELINES TO FOLLOW AT HOME    Self- Managed Care:     MEDICATIONS:  Take your medication as directed. If you are experiencing any side effects, inform your doctor, Do not stop taking any of your medications without letting your doctor know.   Check with your doctor before taking any over-the-counter medications / herbal / or dietary supplements. They may interfere with your other medications.  Do not take ibuprofen (Advil or Motrin) and naproxen (Aleve) without talking to your doctor first. They could make your heart failure worse.         WEIGHT MONITORING:   Weigh yourself everyday (with the same scale) around the same time of the day and write it down. (you can chart them on a calendar or keep track of them on paper.   Notify your doctor of a weight gain of 3 pounds or more in 1 day   OR a total of 5 pounds or more in 1 week    Take your weight record to your doctor visits  Also, the same goes if you loose more than 3# in one day, let your heart doctor know.         DIET:   Cardiac heart healthy diet- Low saturated / low trans fat, no added salt, caffeine restricted, Low sodium diet-   No more than 2,000mg (2 grams) of salt / sodium per day (which equals to a little less than  a teaspoon of salt)  If your doctor wants you on a fluid restriction...it is usually recommended a fluid limit of 2,000cc -  Fluid restriction- 2,000 ml (milliliters) = 64 ounces = you can have 8 glasses of fluid per day (each glass 8 ounces)    Follow a low salt diet - avoid using salt at the table, avoid / limit use of canned soups, processed / packaged foods, salted snacks, olives and pickles.  Do not use a salt substitute without checking with your doctor, they may contain a high amount of potassioum. (Mrs. Dash is safe to use).    Limit the use of alcohol       CALL YOUR DOCTOR THE FIRST DAY YOU NOTICE ANY OF THESE   SYMPTOMS:  You have a weight  whether you need another dose.       My Goal for Self-management of Heart Failure Includes 5 steps :    1. Notice a change in symptoms ( weight gain, short of breath, leg swelling, decreased activity level, bloating....)    2. Evaluate the change: (use the Heart Failure Zones )     3. Decide to take action: decide what your options are, such as: (call your doctor for an extra visit, take a prescribed medication, such as your water pill if your doctor has given you directions to do so, CALL YOUR DOCTOR)    4. Come up with a strategy:  (now you call the doctor for advice / appointment. This is where you take action!!!  Do not wait, catch the symptom early and treat it before it worsens.    5. Evaluate the response: The next day, check your Heart Failure Zones: are you in the GREEN ZONE (safe zone)?  Worsening symptoms of YELLOW ZONE? Or have you moved to the RED ZONE and need to call 911 or go to the Emergency Room for evaluation? Call your doctor's office to update them on your symptoms of heart failure.           Please the Demadex after 12 pm.  Patient's blood pressure has a tendency to drop when given all cardiac meds at the same time.

## 2025-03-26 ENCOUNTER — HOSPITAL ENCOUNTER (OUTPATIENT)
Dept: OTHER | Age: 80
Setting detail: THERAPIES SERIES
Discharge: HOME | End: 2025-03-26
Payer: MEDICARE

## 2025-03-26 VITALS
RESPIRATION RATE: 18 BRPM | OXYGEN SATURATION: 92 % | WEIGHT: 152 LBS | DIASTOLIC BLOOD PRESSURE: 80 MMHG | SYSTOLIC BLOOD PRESSURE: 124 MMHG | HEART RATE: 73 BPM | BODY MASS INDEX: 24.53 KG/M2

## 2025-03-26 DIAGNOSIS — I50.23 ACUTE ON CHRONIC SYSTOLIC HEART FAILURE (HCC): Primary | ICD-10-CM

## 2025-03-26 PROCEDURE — 2500000003 HC RX 250 WO HCPCS: Performed by: INTERNAL MEDICINE

## 2025-03-26 PROCEDURE — 99204 OFFICE O/P NEW MOD 45 MIN: CPT

## 2025-03-26 PROCEDURE — 6360000002 HC RX W HCPCS: Performed by: INTERNAL MEDICINE

## 2025-03-26 RX ORDER — SODIUM CHLORIDE 0.9 % (FLUSH) 0.9 %
5-40 SYRINGE (ML) INJECTION 2 TIMES DAILY
Status: DISCONTINUED | OUTPATIENT
Start: 2025-03-26 | End: 2025-03-27 | Stop reason: HOSPADM

## 2025-03-26 RX ORDER — BUMETANIDE 0.25 MG/ML
2 INJECTION, SOLUTION INTRAMUSCULAR; INTRAVENOUS ONCE
Status: DISCONTINUED | OUTPATIENT
Start: 2025-03-26 | End: 2025-03-27 | Stop reason: HOSPADM

## 2025-03-26 ASSESSMENT — PATIENT HEALTH QUESTIONNAIRE - PHQ9
SUM OF ALL RESPONSES TO PHQ QUESTIONS 1-9: 0
1. LITTLE INTEREST OR PLEASURE IN DOING THINGS: NOT AT ALL
2. FEELING DOWN, DEPRESSED OR HOPELESS: NOT AT ALL
SUM OF ALL RESPONSES TO PHQ QUESTIONS 1-9: 0

## 2025-03-26 NOTE — PLAN OF CARE
Problem: Chronic Conditions and Co-morbidities  Goal: Patient's chronic conditions and co-morbidity symptoms are monitored and maintained or improved  Flowsheets (Taken 3/26/2025 1234)  Care Plan - Patient's Chronic Conditions and Co-Morbidity Symptoms are Monitored and Maintained or Improved: Monitor and assess patient's chronic conditions and comorbid symptoms for stability, deterioration, or improvement

## 2025-03-26 NOTE — PROGRESS NOTES
for rehab.  Scheduled to see Dr. Luther and Dr. Kathleen in May  Follow up at CHF Clinic in 2 weeks.    Spoke to patient's nurse at [facility]. Patient had a CMP drawn on 3/22/25 that resulted today. Results faxed to CHF Clinic.      Patient has labs drawn every Monday. Requested that results are faxed to CHF Clinic. Understanding verbalized. Orders faxed to [facility] per request.    []Lab work obtained - UNABLE TO OBTAIN IV ACCESS FOR LAB WORK. SEVERAL NURSES ATTEMPTED MULTIPLE TIMES. MESSAGE LEFT FOR IV TEAM; NO RESPONSE.    [x] Patient/Family Educated On:  [x] HF zones (Green, Yellow, Red) and aware of when to take action   [x] Daily weights  [] Scale provided   [x] Low sodium diet (2000 mg)  Barriers to compliance  [] Refuses to monitor diet  [] Socioeconomic difficulties  [] Unable to cook for self (use of frozen meals, can goods, etc)  [] CHF CHW consulted  [] Low sodium meal delivery options given to patient  [] Dietician consulted   [] Low sodium recipes provided  [] Sodium free seasoning provided   [x] Fluid intake 6-8 cups (around 64 oz)  [x] Reviewed currently prescribed medications with patient, educated on importance of compliance and answered any questions regarding their medication  [] Pill box provided to patient  [] Patient using pill packing pharmacy   [] CPAP/BiPAP use  [] Low impact exercise / cardiac rehab   [] LifeVest use  [x] Patient aware of signs and symptoms of worsening HF, CHF clinic phone number provided and made aware to call clinic for sooner if evaluation if needed     [] Difficulty affording medications  [] CHF CHW consulted  [] Prescription assistance information given   [] [facility] medication assistance program information given   [] Sample medications provided to patient to help bridge gap until affordability N/A          Scheduled to follow up in CHF clinic on:   Future Appointments   Date Time Provider Department Center   4/4/2025  8:30 AM [place]

## 2025-03-27 RX ORDER — MIDODRINE HYDROCHLORIDE 2.5 MG/1
2.5 TABLET ORAL 3 TIMES DAILY
COMMUNITY
Start: 2025-03-22

## 2025-04-02 ENCOUNTER — TELEPHONE (OUTPATIENT)
Dept: NON INVASIVE DIAGNOSTICS | Age: 80
End: 2025-04-02

## 2025-04-02 RX ORDER — TORSEMIDE 20 MG/1
20 TABLET ORAL DAILY
Qty: 30 TABLET | Refills: 3 | Status: SHIPPED | OUTPATIENT
Start: 2025-04-04

## 2025-04-02 NOTE — TELEPHONE ENCOUNTER
Patient called back and clarified how he is supposed to be taking his Amiodarone.  I informed him with his last note he is to be taking amiodarone 200 mg BID.

## 2025-04-02 NOTE — TELEPHONE ENCOUNTER
Patient left a message stating he has questions about the amiodarone.  I returned call and left message for a return call.

## 2025-04-02 NOTE — PROGRESS NOTES
Message from Oly RN:  Received a call from [facility]. Patient has had a significant weight loss in the past two weeks. Dropped from 168 lbs on 3/21 to 136 lbs today 4/2. Patient reports that he feels weak and tired, BP check 76/44 recheck 100/58. Nurse states they held torsemide and BP meds today.         Hold Torsemide and Potassium today and tomorrow.   Resume on Friday at decreased doses of torsemide 20 mg daily and potassium 40 meq daily   SNF to call if BP remains low and can consider adjustment of midodrine vs adjustment of his GDMT  Follow up in CHF clinic as scheduled

## 2025-04-03 ENCOUNTER — TELEPHONE (OUTPATIENT)
Dept: OTHER | Age: 80
End: 2025-04-03

## 2025-04-03 NOTE — TELEPHONE ENCOUNTER
Received a call from [facility]. Patient has had a significant weight loss in the past two weeks. Dropped from 168 lbs on 3/21 to 136 lbs today 4/2. Patient reports that he feels weak and tired, BP check 76/44 recheck 100/58. Nurse states they held torsemide and BP meds today. We saw him 3/26 follow up is 4/8.    Called and spoke with BEATRIZ JOSHUA regarding patient's weight loss/low BP. Defer weight loss to GI/PCP as patient has a history of small bowel obstruction as well as gastric bypass. New orders are to hold Torsemide and potassium Weds and Thurs, then resume Torsemide at 20 mg daily and potassium at 40 mg daily on Fri. Facility should call Fri for further orders if patient's BP does not improve.     Called [facility] back and spoke with patient's RN, Kamini. She was given above orders, repeated them back correctly and expressed verbal understanding.

## 2025-04-04 ENCOUNTER — OFFICE VISIT (OUTPATIENT)
Dept: SURGERY | Age: 80
End: 2025-04-04

## 2025-04-04 VITALS
BODY MASS INDEX: 24.53 KG/M2 | HEART RATE: 74 BPM | SYSTOLIC BLOOD PRESSURE: 81 MMHG | HEIGHT: 66 IN | DIASTOLIC BLOOD PRESSURE: 56 MMHG | RESPIRATION RATE: 16 BRPM

## 2025-04-04 DIAGNOSIS — Z09 POSTOP CHECK: Primary | ICD-10-CM

## 2025-04-04 PROCEDURE — 99024 POSTOP FOLLOW-UP VISIT: CPT | Performed by: STUDENT IN AN ORGANIZED HEALTH CARE EDUCATION/TRAINING PROGRAM

## 2025-04-04 NOTE — PROGRESS NOTES
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   Eating a regular diet without difficulty. Bowel movements are Normal.  The patient is not having any pain..  He is finally started gaining some weight.       Objective:      BP (!) 81/56 (BP Site: Right Upper Arm, Patient Position: Sitting, BP Cuff Size: Small Adult)   Pulse 74   Resp 16   Ht 1.676 m (5' 6\")   BMI 24.53 kg/m²     General:  alert, appears stated age, and cooperative   Abdomen: soft, bowel sounds active, non-tender   Incision:   healing well, no drainage, no erythema, no hernia, no seroma, no swelling, no dehiscence, incision well approximated       Assessment:      Doing well postoperatively.   Staples removed in clinic      Plan:      1. Continue any current medications.  2. Incision care discussed.  3. Pt is to increase activities as tolerated.  4. Follow up: PRN

## 2025-04-08 ENCOUNTER — HOSPITAL ENCOUNTER (OUTPATIENT)
Dept: OTHER | Age: 80
Setting detail: THERAPIES SERIES
Discharge: HOME | End: 2025-04-08
Payer: MEDICARE

## 2025-04-08 VITALS
WEIGHT: 133 LBS | HEART RATE: 76 BPM | OXYGEN SATURATION: 99 % | SYSTOLIC BLOOD PRESSURE: 80 MMHG | RESPIRATION RATE: 18 BRPM | DIASTOLIC BLOOD PRESSURE: 51 MMHG | BODY MASS INDEX: 21.47 KG/M2

## 2025-04-08 LAB
ANION GAP SERPL CALCULATED.3IONS-SCNC: 14 MMOL/L (ref 7–16)
BNP SERPL-MCNC: 2569 PG/ML (ref 0–450)
BUN SERPL-MCNC: 27 MG/DL (ref 6–23)
CALCIUM SERPL-MCNC: 8.2 MG/DL (ref 8.6–10.2)
CHLORIDE SERPL-SCNC: 103 MMOL/L (ref 98–107)
CO2 SERPL-SCNC: 22 MMOL/L (ref 22–29)
CREAT SERPL-MCNC: 1.1 MG/DL (ref 0.7–1.2)
GFR, ESTIMATED: 66 ML/MIN/1.73M2
GLUCOSE SERPL-MCNC: 101 MG/DL (ref 74–99)
POTASSIUM SERPL-SCNC: 3.5 MMOL/L (ref 3.5–5)
SODIUM SERPL-SCNC: 139 MMOL/L (ref 132–146)

## 2025-04-08 PROCEDURE — 83880 ASSAY OF NATRIURETIC PEPTIDE: CPT

## 2025-04-08 PROCEDURE — 80048 BASIC METABOLIC PNL TOTAL CA: CPT

## 2025-04-08 PROCEDURE — 99214 OFFICE O/P EST MOD 30 MIN: CPT

## 2025-04-08 NOTE — PLAN OF CARE
Problem: Chronic Conditions and Co-morbidities  Goal: Patient's chronic conditions and co-morbidity symptoms are monitored and maintained or improved  Flowsheets (Taken 4/8/2025 3059)  Care Plan - Patient's Chronic Conditions and Co-Morbidity Symptoms are Monitored and Maintained or Improved: Monitor and assess patient's chronic conditions and comorbid symptoms for stability, deterioration, or improvement

## 2025-04-08 NOTE — PROGRESS NOTES
(H)   03/11/2025 30,420 (H)     Pro-BNP (pg/mL)   Date Value   12/15/2021 20,550 (H)   12/12/2021 4,962 (H)      CBC:  WBC (10*3/uL)   Date Value   03/31/2025 6.9     Hemoglobin (g/dl)   Date Value   03/31/2025 9.8 (L)     Hematocrit (%)   Date Value   03/31/2025 31.2 (L)     Platelets (10*3/uL)   Date Value   03/31/2025 234     Iron Studies:  No results found for: \"FERRITIN\", \"IRON\", \"TIBC\"  Hepatic:  AST (IU/L)   Date Value   03/31/2025 47 (H)     ALT (U/L)   Date Value   03/31/2025 35     Total Bilirubin (mg/dl)   Date Value   03/31/2025 0.5     Alkaline Phosphatase (U/L)   Date Value   03/31/2025 99     INR:  INR (no units)   Date Value   12/20/2021 1.1         Wt Readings from Last 3 Encounters:   04/08/25 60.3 kg (133 lb)   03/26/25 68.9 kg (152 lb)   03/21/25 87.1 kg (192 lb)           ASSESSMENT/PLAN:    [x] Euvolemic          [] Hypervolemic, with increase from baseline:  [] Shortness of breath/LAND[place]  [] JVD  [] HJR  [] Abnormal lung assessment: Exp Wheezes  [] Orthopnea  [] PND  [] Decreased urinary response to oral diuretic   [] Scrotal swelling   [] Lower extremity edema  [] Compression stockings provided - Compression bandage on  [] Decline in functional capacity (unable to perform activities they had previously been able to do)  [] Weight gain     [] IV diuretics given No   [] Provider notified of recurrent IV diuretic use  [x]Lab work obtained     Additional Notes:     Patient presents for follow up today. Recently discharged from [facility] and is at home. No complaints of sob, LE edema, or abdominal distention/bloating. No JVD on assessment. Follow up scheduled for 1 month as patient is leaving to go to [place] within the next week he states.  BP still low today in the clinic but patient has not taken his midodrine yet today. Also has not taken his amiodarone or torsemide today. Stressed that patient needs to watch his blood pressure at home closely and stressed the importance of watching his sodium

## 2025-04-09 RX ORDER — METOPROLOL SUCCINATE 25 MG/1
25 TABLET, EXTENDED RELEASE ORAL NIGHTLY
Qty: 180 TABLET | Refills: 3 | Status: SHIPPED | OUTPATIENT
Start: 2025-04-09

## 2025-04-09 NOTE — RESULT ENCOUNTER NOTE
Labs and CHF clinic note reviewed  When is he leaving for [place]?  Please have him hold torsemide and get follow up BMP and BP/HR check before he leaves?    Also med rec was Entresto 49/51 mg BID (1/2 tablet) -- was he cutting this in half? I sent the 24/26 mg tablets to the pharmacy

## 2025-04-10 ENCOUNTER — TELEPHONE (OUTPATIENT)
Dept: OTHER | Age: 80
End: 2025-04-10

## 2025-04-10 NOTE — TELEPHONE ENCOUNTER
Francia Ashraf APRN - CNP Pasek, Samara, RN  Labs and CHF clinic note reviewed  When is he leaving for [place]?  Please have him hold torsemide and get follow up BMP and BP/HR check before he leaves?    Also med rec was Entresto 49/51 mg BID (1/2 tablet) -- was he cutting this in half? I sent the 24/26 mg tablets to the pharmacy    Called patient's wife and spoke with her regarding new appointment and medications.     Patient is still cutting Entresto tablets in half but does have the full tablet of low dose Entresto. Wife adds that metoprolol was supposed to be held after discharge and they were not doing that. Scheduled for follow up 4/15, do you want the BMP recheck before then?    Francia Ashraf APRN - CNP Pasek, Samara, RN  4/15 is good -- thanks. Cut metoprolol to once daily until follow up next week given hypotension     Called patient's wife back and gave her above orders. She expressed verbal understanding and repeated orders back correctly.

## 2025-04-15 ENCOUNTER — HOSPITAL ENCOUNTER (OUTPATIENT)
Dept: OTHER | Age: 80
Setting detail: THERAPIES SERIES
Discharge: HOME | End: 2025-04-15
Payer: MEDICARE

## 2025-04-15 VITALS
RESPIRATION RATE: 18 BRPM | DIASTOLIC BLOOD PRESSURE: 61 MMHG | SYSTOLIC BLOOD PRESSURE: 113 MMHG | OXYGEN SATURATION: 98 % | WEIGHT: 140 LBS | BODY MASS INDEX: 22.6 KG/M2 | HEART RATE: 68 BPM

## 2025-04-15 LAB
ANION GAP SERPL CALCULATED.3IONS-SCNC: 11 MMOL/L (ref 7–16)
BNP SERPL-MCNC: 5864 PG/ML (ref 0–450)
BUN SERPL-MCNC: 22 MG/DL (ref 8–23)
CALCIUM SERPL-MCNC: 9 MG/DL (ref 8.8–10.2)
CHLORIDE SERPL-SCNC: 105 MMOL/L (ref 98–107)
CO2 SERPL-SCNC: 22 MMOL/L (ref 22–29)
CREAT SERPL-MCNC: 1 MG/DL (ref 0.7–1.2)
GFR, ESTIMATED: 77 ML/MIN/1.73M2
GLUCOSE SERPL-MCNC: 72 MG/DL (ref 74–99)
POTASSIUM SERPL-SCNC: 3.9 MMOL/L (ref 3.5–5.1)
SODIUM SERPL-SCNC: 138 MMOL/L (ref 136–145)

## 2025-04-15 PROCEDURE — 6360000002 HC RX W HCPCS: Performed by: INTERNAL MEDICINE

## 2025-04-15 PROCEDURE — 80048 BASIC METABOLIC PNL TOTAL CA: CPT

## 2025-04-15 PROCEDURE — 99214 OFFICE O/P EST MOD 30 MIN: CPT

## 2025-04-15 PROCEDURE — 83880 ASSAY OF NATRIURETIC PEPTIDE: CPT

## 2025-04-15 PROCEDURE — 2500000003 HC RX 250 WO HCPCS: Performed by: INTERNAL MEDICINE

## 2025-04-15 PROCEDURE — 96374 THER/PROPH/DIAG INJ IV PUSH: CPT

## 2025-04-15 RX ORDER — SODIUM CHLORIDE 0.9 % (FLUSH) 0.9 %
5-40 SYRINGE (ML) INJECTION 2 TIMES DAILY
Status: DISCONTINUED | OUTPATIENT
Start: 2025-04-15 | End: 2025-04-16 | Stop reason: HOSPADM

## 2025-04-15 RX ORDER — BUMETANIDE 0.25 MG/ML
2 INJECTION, SOLUTION INTRAMUSCULAR; INTRAVENOUS ONCE
Status: COMPLETED | OUTPATIENT
Start: 2025-04-15 | End: 2025-04-15

## 2025-04-15 RX ADMIN — BUMETANIDE 2 MG: 0.25 INJECTION INTRAMUSCULAR; INTRAVENOUS at 13:50

## 2025-04-15 RX ADMIN — SODIUM CHLORIDE, PRESERVATIVE FREE 10 ML: 5 INJECTION INTRAVENOUS at 13:50

## 2025-04-15 NOTE — PLAN OF CARE
Problem: Chronic Conditions and Co-morbidities  Goal: Patient's chronic conditions and co-morbidity symptoms are monitored and maintained or improved  Flowsheets (Taken 4/15/2025 4444)  Care Plan - Patient's Chronic Conditions and Co-Morbidity Symptoms are Monitored and Maintained or Improved: Monitor and assess patient's chronic conditions and comorbid symptoms for stability, deterioration, or improvement

## 2025-04-15 NOTE — PROGRESS NOTES
Congestive Heart Failure Clinic   [facility]      Referring Provider: -  Primary Care Physician: Barrington Garcia MD   Cardiologist: Tor Kathleen MD  Nephrologist: N/A      HISTORY OF PRESENT ILLNESS:     Ruben Davidson is a 79 y.o. (1945) male with a history of HFrEF (EF < 40%)  Pre Cupid:     Lab Results   Component Value Date    LVEF 35 02/10/2025     Post Cupid:    Lab Results   Component Value Date    EFBP 36 (A) 10/01/2024         He presents to the CHF clinic for ongoing evaluation and monitoring of heart failure.    In the CHF clinic today he denies any adverse symptoms except:  [] Dizziness or lightheadedness   [] Syncope or near syncope  [] Recent Fall  [] Shortness of breath at rest   [x] Dyspnea with exertion  [] Decline in functional capacity (unable to perform activities they had previously been able to do)  [] Fatigue   [] Orthopnea  [] PND  [] Nocturnal cough  [] Hemoptysis  [] Chest pain, pressure, or discomfort  [] Palpitations  [] Abdominal distention  [] Abdominal bloating  [] Early satiety  [] Blood in stool   [] Diarrhea  [] Constipation  [] Nausea/Vomiting  [] Decreased urinary response to oral diuretic   [] Scrotal swelling   [x] Lower extremity edema (R > L)  [] Used PRN doses of oral diuretic   [x] Weight gain    Wt Readings from Last 3 Encounters:   04/15/25 63.5 kg (140 lb)   04/08/25 60.3 kg (133 lb)   03/26/25 68.9 kg (152 lb)           SOCIAL HISTORY:  [x] Denies tobacco, alcohol or illicit drug abuse  [] Tobacco use:  [] ETOH use:  [] Illicit drug use:        MEDICATIONS:    Allergies   Allergen Reactions    Iodides Anaphylaxis     Passing out    Dust Mite Extract Other (See Comments)     All year around  Coughing, itching, congestion    Seasonal Other (See Comments)     Cough, stuffiness, itching, etc     Prior to Visit Medications    Medication Sig Taking? Authorizing Provider   sacubitril-valsartan

## 2025-05-07 ENCOUNTER — HOSPITAL ENCOUNTER (OUTPATIENT)
Dept: OTHER | Age: 80
Setting detail: THERAPIES SERIES
Discharge: HOME | End: 2025-05-07
Payer: MEDICARE

## 2025-05-07 ENCOUNTER — RESULTS FOLLOW-UP (OUTPATIENT)
Age: 80
End: 2025-05-07

## 2025-05-07 VITALS
HEART RATE: 78 BPM | WEIGHT: 142 LBS | SYSTOLIC BLOOD PRESSURE: 109 MMHG | OXYGEN SATURATION: 98 % | BODY MASS INDEX: 22.92 KG/M2 | RESPIRATION RATE: 18 BRPM | DIASTOLIC BLOOD PRESSURE: 57 MMHG

## 2025-05-07 DIAGNOSIS — I50.23 ACUTE ON CHRONIC SYSTOLIC HEART FAILURE (HCC): Primary | ICD-10-CM

## 2025-05-07 LAB
ANION GAP SERPL CALCULATED.3IONS-SCNC: 10 MMOL/L (ref 7–16)
BNP SERPL-MCNC: 3535 PG/ML (ref 0–450)
BUN SERPL-MCNC: 19 MG/DL (ref 8–23)
CALCIUM SERPL-MCNC: 8.2 MG/DL (ref 8.8–10.2)
CHLORIDE SERPL-SCNC: 108 MMOL/L (ref 98–107)
CO2 SERPL-SCNC: 23 MMOL/L (ref 22–29)
CREAT SERPL-MCNC: 1.1 MG/DL (ref 0.7–1.2)
GFR, ESTIMATED: 69 ML/MIN/1.73M2
GLUCOSE SERPL-MCNC: 36 MG/DL (ref 74–99)
POTASSIUM SERPL-SCNC: 2.9 MMOL/L (ref 3.5–5.1)
SODIUM SERPL-SCNC: 141 MMOL/L (ref 136–145)

## 2025-05-07 PROCEDURE — 80048 BASIC METABOLIC PNL TOTAL CA: CPT

## 2025-05-07 PROCEDURE — 99214 OFFICE O/P EST MOD 30 MIN: CPT

## 2025-05-07 PROCEDURE — 83880 ASSAY OF NATRIURETIC PEPTIDE: CPT

## 2025-05-07 NOTE — RESULT ENCOUNTER NOTE
Labs and CHF Clinic note reviewed    Chronic HFrEF, history of CRT-D    Hypervolemic on exam in clinic today with lower extremity edema and weight gain.  Weight 142 pounds today compared to 140 pounds at last visit on 4/15/2025.  Patient refused IV diuretic.    Wt Readings from Last 3 Encounters:  05/07/25 : 64.4 kg (142 lb)  04/15/25 : 63.5 kg (140 lb)  04/08/25 : 60.3 kg (133 lb)    BP Readings from Last 1 Encounters:  05/07/25 : (!) 109/57    Pulse Readings from Last 1 Encounters:  05/07/25 : 78      Current GDMT:  Entresto 24/26 mg p.o. twice daily  Torsemide 40 mg every 2 to 3 days--was prescribed 20 mg daily but taking 40 mg every 2 to 3 days as apparently prescribed by a physician at [facility]    Effer-K 20 mEq tab-2 tablets p.o. daily    Also on:   Midodrine 2.5 mg p.o. 3 times daily  Plavix 75 mg p.o. daily  Amiodarone 200 mg p.o. twice daily  Eliquis 5 mg p.o. twice daily      NT proBNP 3535 compared to 5864 on 4/15/2025  BMP shows potassium low at 2.9 and glucose low at 36.  Creatinine stable at 1.1.    **Was notified by nurse earlier today about low glucose and patient advised ER however declined at that time.  As patient also with low potassium at 2.9--ER advised      1.  Advise ER due to significant hypokalemia and hypoglycemia  2.   Please follow-up tomorrow to see result of ER visit

## 2025-05-07 NOTE — FLOWSHEET NOTE
Patient labs resulted a critical glucose of 36 and a low K+ of 2.9, requested that labs be repeated. Repeat critical glucose result was 35. Spoke with MORGAN Loja, orders are for patient to report to ER.     Called patient x3 with no answer, left VM explaining the his blood sugar was low and he should report to ER. Attempted to call his spouse Saira x3, no answer and I am unable to leave a VM as her box is full. Also attempted to call his daughter Alka x2 with no answer or option to leave a VM.     ADDENDUM 1:46 PM    Patient returned call to clinic, is still at a  and is eating now. States he did not eat anything this morning besides toast and feels fine so he will not go to ER. He will recheck his blood sugar when he gets home. Stressed that he should still report to ER to be evaluated for at least his low potassium level. Saira, his wife, has agreed to take him to ER.

## 2025-05-07 NOTE — PLAN OF CARE
Problem: Chronic Conditions and Co-morbidities  Goal: Patient's chronic conditions and co-morbidity symptoms are monitored and maintained or improved  Flowsheets (Taken 5/7/2025 0913)  Care Plan - Patient's Chronic Conditions and Co-Morbidity Symptoms are Monitored and Maintained or Improved: Monitor and assess patient's chronic conditions and comorbid symptoms for stability, deterioration, or improvement

## 2025-05-07 NOTE — PROGRESS NOTES
Congestive Heart Failure Clinic   [facility]      Referring Provider: -  Primary Care Physician: Barrington Garcia MD   Cardiologist: Tor Kathleen MD  Nephrologist: N/A      HISTORY OF PRESENT ILLNESS:     Ruben Davidson is a 79 y.o. (1945) male with a history of HFrEF (EF < 40%)  Pre Cupid:     Lab Results   Component Value Date    LVEF 35 02/10/2025     Post Cupid:    Lab Results   Component Value Date    EFBP 36 (A) 10/01/2024         He presents to the CHF clinic for ongoing evaluation and monitoring of heart failure.    In the CHF clinic today he denies any adverse symptoms except:  [] Dizziness or lightheadedness   [] Syncope or near syncope  [] Recent Fall  [] Shortness of breath at rest   [x] Dyspnea with exertion  [] Decline in functional capacity (unable to perform activities they had previously been able to do)  [] Fatigue   [] Orthopnea  [] PND  [] Nocturnal cough  [] Hemoptysis  [] Chest pain, pressure, or discomfort  [] Palpitations  [] Abdominal distention  [] Abdominal bloating  [] Early satiety  [] Blood in stool   [] Diarrhea  [] Constipation  [] Nausea/Vomiting  [] Decreased urinary response to oral diuretic   [] Scrotal swelling   [x] Lower extremity edema  [] Used PRN doses of oral diuretic   [x] Weight gain    Wt Readings from Last 3 Encounters:   05/07/25 64.4 kg (142 lb)   04/15/25 63.5 kg (140 lb)   04/08/25 60.3 kg (133 lb)       SOCIAL HISTORY:  [x] Denies tobacco, alcohol or illicit drug abuse  [] Tobacco use:  [] ETOH use:  [] Illicit drug use:        MEDICATIONS:    Allergies   Allergen Reactions    Iodides Anaphylaxis     Passing out    Dust Mite Extract Other (See Comments)     All year around  Coughing, itching, congestion    Seasonal Other (See Comments)     Cough, stuffiness, itching, etc     Prior to Visit Medications    Medication Sig Taking? Authorizing Provider   sacubitril-valsartan (ENTRESTO) 24-26

## 2025-05-08 RX ORDER — POTASSIUM CHLORIDE 1500 MG/1
20 TABLET, EXTENDED RELEASE ORAL DAILY
COMMUNITY
Start: 2025-03-25 | End: 2025-05-08

## 2025-05-08 RX ORDER — POTASSIUM CHLORIDE 1500 MG/1
40 TABLET, EXTENDED RELEASE ORAL DAILY
Qty: 60 TABLET | Refills: 3 | Status: SHIPPED | OUTPATIENT
Start: 2025-05-08

## 2025-05-08 NOTE — FLOWSHEET NOTE
Called patient regarding low potassium from yesterday as he did not repot to ER. New orders are to take potassium 40 meq now and 40 meq this afternoon, repeat labs today, then start 40 meq of potassium daily.     Patient states he will try to go for labs today, stressed that labs need to be repeated today to ensure his potassium level has not gone lower. If he cannot go today he is to go tomorrow morning. Patient expressed verbal understanding and repeated orders back correctly.

## 2025-05-09 ENCOUNTER — HOSPITAL ENCOUNTER (OUTPATIENT)
Age: 80
Discharge: HOME | End: 2025-05-09
Payer: MEDICARE

## 2025-05-09 DIAGNOSIS — I50.23 ACUTE ON CHRONIC SYSTOLIC HEART FAILURE (HCC): ICD-10-CM

## 2025-05-09 LAB
ANION GAP SERPL CALCULATED.3IONS-SCNC: 12 MMOL/L (ref 7–16)
BUN SERPL-MCNC: 24 MG/DL (ref 8–23)
CALCIUM SERPL-MCNC: 8.4 MG/DL (ref 8.8–10.2)
CHLORIDE SERPL-SCNC: 106 MMOL/L (ref 98–107)
CO2 SERPL-SCNC: 23 MMOL/L (ref 22–29)
CREAT SERPL-MCNC: 1 MG/DL (ref 0.7–1.2)
GFR, ESTIMATED: 78 ML/MIN/1.73M2
GLUCOSE SERPL-MCNC: 61 MG/DL (ref 74–99)
POTASSIUM SERPL-SCNC: 3.6 MMOL/L (ref 3.5–5.1)
SODIUM SERPL-SCNC: 141 MMOL/L (ref 136–145)

## 2025-05-09 PROCEDURE — 80048 BASIC METABOLIC PNL TOTAL CA: CPT

## 2025-05-09 PROCEDURE — 36415 COLL VENOUS BLD VENIPUNCTURE: CPT

## 2025-05-19 ENCOUNTER — TELEPHONE (OUTPATIENT)
Dept: NON INVASIVE DIAGNOSTICS | Age: 80
End: 2025-05-19

## 2025-05-19 ENCOUNTER — OFFICE VISIT (OUTPATIENT)
Dept: NON INVASIVE DIAGNOSTICS | Age: 80
End: 2025-05-19
Payer: MEDICARE

## 2025-05-19 ENCOUNTER — CLINICAL SUPPORT (OUTPATIENT)
Dept: NON INVASIVE DIAGNOSTICS | Age: 80
End: 2025-05-19

## 2025-05-19 VITALS
TEMPERATURE: 97.1 F | SYSTOLIC BLOOD PRESSURE: 92 MMHG | WEIGHT: 136.8 LBS | DIASTOLIC BLOOD PRESSURE: 54 MMHG | HEART RATE: 68 BPM | HEIGHT: 63 IN | RESPIRATION RATE: 16 BRPM | BODY MASS INDEX: 24.24 KG/M2

## 2025-05-19 DIAGNOSIS — I48.0 PAROXYSMAL ATRIAL FIBRILLATION (HCC): Primary | ICD-10-CM

## 2025-05-19 DIAGNOSIS — Z95.810 ICD (IMPLANTABLE CARDIOVERTER-DEFIBRILLATOR) IN PLACE: Primary | ICD-10-CM

## 2025-05-19 PROCEDURE — 1123F ACP DISCUSS/DSCN MKR DOCD: CPT | Performed by: STUDENT IN AN ORGANIZED HEALTH CARE EDUCATION/TRAINING PROGRAM

## 2025-05-19 PROCEDURE — 3074F SYST BP LT 130 MM HG: CPT | Performed by: STUDENT IN AN ORGANIZED HEALTH CARE EDUCATION/TRAINING PROGRAM

## 2025-05-19 PROCEDURE — 99215 OFFICE O/P EST HI 40 MIN: CPT | Performed by: STUDENT IN AN ORGANIZED HEALTH CARE EDUCATION/TRAINING PROGRAM

## 2025-05-19 PROCEDURE — 1159F MED LIST DOCD IN RCRD: CPT | Performed by: STUDENT IN AN ORGANIZED HEALTH CARE EDUCATION/TRAINING PROGRAM

## 2025-05-19 PROCEDURE — 93000 ELECTROCARDIOGRAM COMPLETE: CPT | Performed by: STUDENT IN AN ORGANIZED HEALTH CARE EDUCATION/TRAINING PROGRAM

## 2025-05-19 PROCEDURE — 1160F RVW MEDS BY RX/DR IN RCRD: CPT | Performed by: STUDENT IN AN ORGANIZED HEALTH CARE EDUCATION/TRAINING PROGRAM

## 2025-05-19 PROCEDURE — 3078F DIAST BP <80 MM HG: CPT | Performed by: STUDENT IN AN ORGANIZED HEALTH CARE EDUCATION/TRAINING PROGRAM

## 2025-05-19 RX ORDER — PREDNISONE 50 MG/1
50 TABLET ORAL PRN
COMMUNITY
End: 2025-05-19 | Stop reason: SDUPTHER

## 2025-05-19 RX ORDER — PREDNISONE 50 MG/1
50 TABLET ORAL PRN
Qty: 3 TABLET | Refills: 0 | Status: SHIPPED | OUTPATIENT
Start: 2025-05-19

## 2025-05-19 NOTE — TELEPHONE ENCOUNTER
Per Dr Luther schedule for DCCV in near future and then AF Ablation.  Patient is scheduled DCCV on 6/3/25 at 12:30 pm (arrival of 11:30 am).  Patient is scheduled for AF Ablation on 9/3/25 at 1:30 pm (arrival of 11:30 am).  Patient given instructions for both and he verbalized understanding.

## 2025-05-19 NOTE — PATIENT INSTRUCTIONS
No medication changes at this time.  You will be scheduled for direct-current cardioversion.  Do not miss any doses of Eliquis prior to this.  You will be scheduled for atrial fibrillation ablation.  Do not take Eliquis the morning of the procedure, but do NOT miss any other doses of Eliquis prior to the procedure.  Follow-up with his office will be arranged based on the dates of these procedures.   PAIN SCALE 9 OF 10.

## 2025-05-19 NOTE — PROGRESS NOTES
mild LV dilation, moderate RV dilation, mild RVSD, severe MR, moderate TR, moderate AXEL, transmural infarct mid-- basal inferior and inferior lateral walls, small bilateral pleural effusions, and partially visualized large left renal cyst.  TTE (12/12/21): LVEF = NR, akinetic inferior inferolateral and basal lateral walls,  LV dilation, RV dilation, mild RVSD, mild LAE, moderate TERRY, MS-?mild, moderate-severe MR, mild TR, and moderate-severe pulmonary HTN.  LHC (12/12/21): LVEF = 25% with aneurysm/pseudoaneurysm of posterior LV wall, LV dilation, akinetic inferior wall and hypokinesis of anterolateral and apical walls, moderate MR, 10% distal LM stenosis, 80-90% proximal LAD stenosis treated with RYAN x 1 with kissing balloon technique involving D1, 90% ostial and 70-80% proximal D2 stenosis, 90% osital D3 stenosis, 50% mid LAD stenosis, 60% distal LAD stenosis, 60-70% apical LAD stenosis, 70-80% proximal and 80% mid LCx stenosis, 40% mid RCA stenosis, and 70% ostial PLB stenosis.  TTE (9/10/20): LVEF = 51%, mild LV dilation, RV dilation, mild LAE, TERRY, moderate MR.  Nuc Stress (7/1/20): LVEF = 29%, inferolateral infarct, and no comment on ischemia.  Cardiac MRI (8/13/19): LVEF = 40%, mild LV dilation, basal-mid inferior inferolateral and anterolateral infarct, low probability of viability, mild-moderate MR, and moderate AXEL.  Event monitor (9/27/21): SR with HR of  bpm (mean: 50 bpm), 2 episodes of NSVT (fastest/longest: 133 bpm for 5 beats), IVR episodes, SVE burden = 6.2% with isolated PACs, VE burden = 11.5% with predominantly isoalted PVCs.  TTE (10/9/18): LVEF = 35-40%  TTE (3/20/17): LVEF 45-50%     Past Medical History:   Diagnosis Date    Acute kidney injury     Atrial fibrillation (HCC)     CAD (coronary artery disease)     s/p stent RCA    CHF (congestive heart failure) (HCC)     Torres Martinez (hard of hearing)     WEARS HEARING AIDES    Hypertension     Kidney stone 2025    with obstruction-left    Left

## 2025-05-21 NOTE — TELEPHONE ENCOUNTER
Patient scheduled for remote transmission on 6.10.2025 s/p DCCV. And 9.10.2025 s/p AF ablation.      Jacey Diaz, CMA

## 2025-05-30 ENCOUNTER — HOSPITAL ENCOUNTER (OUTPATIENT)
Dept: OTHER | Age: 80
Setting detail: THERAPIES SERIES
End: 2025-05-30
Payer: MEDICARE

## (undated) PROCEDURE — 0T778DZ DILATION OF LEFT URETER WITH INTRALUMINAL DEVICE, VIA NATURAL OR ARTIFICIAL OPENING ENDOSCOPIC: ICD-10-PCS

## (undated) DEVICE — WORKING LENGTH 155CM, WORKING CHANNEL 2.8MM: Brand: RESOLUTION 360 CLIP

## (undated) DEVICE — SOLUTION SCRB 4OZ 4% CHG H2O AIDED FOR PREOPERATIVE SKIN

## (undated) DEVICE — GOWN,SIRUS,FABRNF,XL,20/CS: Brand: MEDLINE

## (undated) DEVICE — GARMENT,MEDLINE,DVT,INT,CALF,MED, GEN2: Brand: MEDLINE

## (undated) DEVICE — RADIFOCUS GLIDEWIRE: Brand: GLIDEWIRE

## (undated) DEVICE — DEFENDO AIR WATER SUCTION AND BIOPSY VALVE KIT FOR  OLYMPUS: Brand: DEFENDO AIR/WATER/SUCTION AND BIOPSY VALVE

## (undated) DEVICE — HEMOSTASIS VALVE PHD SM BORE WITH SPRING

## (undated) DEVICE — GUIDEWIRE WITH ICE™ HYDROPHILIC COATING: Brand: CHOICE™

## (undated) DEVICE — CYSTO: Brand: MEDLINE INDUSTRIES, INC.

## (undated) DEVICE — CATHETER DIAG 6FR L100CM GWIRE 0.038IN S STL POLYUR MP W/ 2

## (undated) DEVICE — GUIDE COR SNUS L40CM DIA9FR 0.035IN STD CRV ADV UNIQUE

## (undated) DEVICE — TRAUMA: Brand: MEDLINE INDUSTRIES, INC.

## (undated) DEVICE — Device: Brand: SPOT EX ENDOSCOPIC TATTOO

## (undated) DEVICE — PAD, DEFIB, ADULT, RADIOTRAN, PHYSIO, LO: Brand: MEDLINE

## (undated) DEVICE — FIXED CORE WIRE GUIDE STRAIGHT: Brand: COOK

## (undated) DEVICE — APPLICATOR MEDICATED 26 CC SOLUTION HI LT ORNG CHLORAPREP

## (undated) DEVICE — BAG DRNGE COMB PK

## (undated) DEVICE — TUBING PRSS MON L12IN PVC RIG NONEXPANDING M TO FEM CONN

## (undated) DEVICE — MEDIA CONTRAST ISOVUE 300 100ML

## (undated) DEVICE — SOLUTION IRRIG 1000ML H2O PIC PLAS SHATTERPROOF CONTAINER

## (undated) DEVICE — Z DISCONTINUED NO SUB IDED TUBING ETCO2 AD L6.5FT NSL ORAL CVD PRNG NONFLARED TIP OVR

## (undated) DEVICE — SYRINGE MED 20ML STD CLR PLAS LUERLOCK TIP N CTRL DISP

## (undated) DEVICE — 4-PORT MANIFOLD: Brand: NEPTUNE 2

## (undated) DEVICE — KIT INTRO 9FR L13CM DIA0.118IN SPLITTABLE HEMSTAT ROBUST

## (undated) DEVICE — SELECTOR VEIN 5FR L75CM DIA0.046IN VERT UNIQUE HUB BRAID

## (undated) DEVICE — NEEDLE SCLERO L200CM OD0.51MM ID0.24MM SHTH DIA1.8MM LOK

## (undated) DEVICE — SOLUTION IRRIG 3000ML 0.9% SOD CHL USP UROMATIC PLAS CONT

## (undated) DEVICE — BLOCK BITE 60FR RUBBER ADLT DENTAL

## (undated) DEVICE — GRADUATE TRIANG MEASURE 1000ML BLK PRNT

## (undated) DEVICE — AGENT HEMOSTATIC SURGIFLOW MATRIX KIT W/THROMBIN

## (undated) DEVICE — PLASMABLADE PS210-030S 3.0S LOCK: Brand: PLASMABLADE™

## (undated) DEVICE — MICROPUNCTURE INTRODUCER SET SILHOUETTE TRANSITIONLESS PUSH-PLUS DESIGN - STIFFENED CANNULA WITH STAINLESS STEEL WIRE GUIDE: Brand: MICROPUNCTURE

## (undated) DEVICE — BITEBLOCK 54FR W/ DENT RIM BLOX

## (undated) DEVICE — GLOVE ORANGE PI 7 1/2   MSG9075

## (undated) DEVICE — HOSE CONN FOR WST MGMT SYS NEPTUNE 2

## (undated) DEVICE — GUIDEWIRE ANGIO L150CM OD0.035IN STR FIX PTFE SLD SUPP

## (undated) DEVICE — STAPLER INT L34CM 60MM LNG ENDOSCP ARTC PWR + ECHELON FLX

## (undated) DEVICE — SEALER ENDOSCP NANO COAT OPN DIV CRV L JAW LIGASURE IMPACT

## (undated) DEVICE — MICROPUNCTURE INTRODUCER SET SILHOUETTE TRANSITIONLESS WITH STAINLESS STEEL WIRE GUIDE: Brand: MICROPUNCTURE

## (undated) DEVICE — SOLUTION IRRG H2O 3000 ML USP STRL TITAN XL CONTAINER

## (undated) DEVICE — ELECTRODE PT RET AD L9FT HI MOIST COND ADH HYDRGEL CORDED

## (undated) DEVICE — PREVENA PLUS INCISION MANAGEMENT SYSTEM: Brand: PREVENA PLUS™

## (undated) DEVICE — SPONGE GZ W4XL4IN RAYON POLY FILL CVR W/ NONWOVEN FAB

## (undated) DEVICE — SYRINGE MED 20ML STD CLR PLAS LUERSLIP TIP N CTRL DISP

## (undated) DEVICE — SYRINGE MED 10ML LUERLOCK TIP W/O SFTY DISP

## (undated) DEVICE — GUIDEWIRE ENDOSCP L150CM DIA0.035IN TIP L15CM BENT PTFE

## (undated) DEVICE — GUIDEWIRE ENDOSCP L150CM DIA0.035IN TIP 3CM PTFE NIT

## (undated) DEVICE — CYSTO PACK: Brand: MEDLINE INDUSTRIES, INC.

## (undated) DEVICE — GAUZE,SPONGE,POST-OP,4X3,STRL,LF: Brand: MEDLINE

## (undated) DEVICE — TUBING, SUCTION, 3/16" X 12', STRAIGHT: Brand: MEDLINE

## (undated) DEVICE — SYRINGE MED 30ML STD CLR PLAS LUERLOCK TIP N CTRL DISP

## (undated) DEVICE — GAUZE,SPONGE,4"X4",8PLY,STRL,LF,10/TRAY: Brand: MEDLINE

## (undated) DEVICE — SOLUTION IRRIG 1000ML STRL H2O USP PLAS POUR BTL

## (undated) DEVICE — RELOAD STPL L60MM H1.5-3.6MM REG TISS BLU GRIPPING SURF B

## (undated) DEVICE — GUIDEWIRE: Brand: AMPLATZ SUPER STIFF™

## (undated) DEVICE — SOLUTION IRRIG 3000ML STRL H2O USP UROMATIC PLAS CONT

## (undated) DEVICE — BAG DRAINAGE CONTAINER 15 LT FLUID COLLCTN